# Patient Record
Sex: FEMALE | Race: WHITE | NOT HISPANIC OR LATINO | Employment: OTHER | URBAN - METROPOLITAN AREA
[De-identification: names, ages, dates, MRNs, and addresses within clinical notes are randomized per-mention and may not be internally consistent; named-entity substitution may affect disease eponyms.]

---

## 2017-06-06 ENCOUNTER — OUTPATIENT INFUSION SERVICES (OUTPATIENT)
Dept: ONCOLOGY | Facility: MEDICAL CENTER | Age: 72
End: 2017-06-06
Attending: FAMILY MEDICINE
Payer: MEDICARE

## 2017-06-06 VITALS
OXYGEN SATURATION: 92 % | WEIGHT: 143.3 LBS | BODY MASS INDEX: 23.03 KG/M2 | HEIGHT: 66 IN | HEART RATE: 75 BPM | RESPIRATION RATE: 18 BRPM | TEMPERATURE: 98.8 F | SYSTOLIC BLOOD PRESSURE: 98 MMHG | DIASTOLIC BLOOD PRESSURE: 53 MMHG

## 2017-06-06 LAB
CA-I BLD ISE-SCNC: 1.16 MMOL/L (ref 1.1–1.3)
CREAT BLD-MCNC: 0.9 MG/DL (ref 0.5–1.4)

## 2017-06-06 PROCEDURE — 700111 HCHG RX REV CODE 636 W/ 250 OVERRIDE (IP): Performed by: FAMILY MEDICINE

## 2017-06-06 PROCEDURE — 82565 ASSAY OF CREATININE: CPT

## 2017-06-06 PROCEDURE — 82330 ASSAY OF CALCIUM: CPT

## 2017-06-06 PROCEDURE — 96401 CHEMO ANTI-NEOPL SQ/IM: CPT

## 2017-06-06 RX ADMIN — DENOSUMAB 60 MG: 60 INJECTION SUBCUTANEOUS at 13:52

## 2017-06-06 ASSESSMENT — PAIN SCALES - GENERAL: PAINLEVEL: NO PAIN

## 2017-06-06 NOTE — PROGRESS NOTES
Pharmacy Note:    Ca = 1.16 mmol/L  SCr = 0.9 mg/dL, est CrCl ~60 mL/min  Last Dose 12/7/16  Okay to receive Prolia today.    Sylwia Lyons, PharmD

## 2017-06-06 NOTE — Clinical Note
Infusion Services   [address] [place] [address]  [place] [address]  Phone: [phone]  Fax: [phone]              Dear Dr. Thomson,    Your patient, Yasmin Zhong (: 1945), was scheduled at [facility].  Yasmin's encounter diagnosis is:  No diagnosis found.  She arrived for her appointment, and  the scheduled treatment was   given. These medications were administered to the patient: We administered denosumab..  Yasmin tolerated treatment.. In addition, the following labs were drawn    Recent Results (from the past 24 hour(s))   ISTAT CREATININE    Collection Time: 17  1:45 PM   Result Value Ref Range    Istat Creatinine 0.9 0.5 - 1.4 mg/dL   ISTAT IONIZED CA    Collection Time: 17  1:45 PM   Result Value Ref Range    Istat Ionized Calcium 1.16 1.10 - 1.30 mmol/L            Her next appointment is rescheduled for Visit date not found. 2017    For more information, you may review the nurse's progress notes in chart review under the notes section.       Sincerely,  Erin Gramajo R.N.

## 2017-07-20 ENCOUNTER — TELEPHONE (OUTPATIENT)
Dept: MEDICAL GROUP | Facility: PHYSICIAN GROUP | Age: 72
End: 2017-07-20

## 2017-07-20 NOTE — TELEPHONE ENCOUNTER
Left message for patient to call back regarding pre-visit planning. Please transfer call to Lisette at [phone] re New Pt Appointment

## 2017-07-20 NOTE — TELEPHONE ENCOUNTER
NEW PATIENT VISIT PRE-VISIT PLANNING    1.  [employer] Patient is checked in Patient Demographics? YES    2.  Immunizations were [facility] in [facility] using Web[facility]?: Yes       •  Web Iz Recommendations: TDAP    3.  Is this appointment scheduled as a Hospital Follow-Up? No    4.  Patient is due for the following Health Maintenance Topics:   Health Maintenance Due   Topic Date Due   • Annual Wellness Visit  1945   • IMM DTaP/Tdap/Td Vaccine (1 - Tdap) 11/23/1965   • PAP SMEAR  11/23/1967   • COLONOSCOPY  11/23/1996   • IMM ZOSTER VACCINE  11/23/2006   • IMM PNEUMOCOCCAL 65+ (ADULT) LOW/MEDIUM RISK SERIES (2 of 2 - PCV13) 12/16/2014   • MAMMOGRAM  07/01/2017       - Patient declines Immunizations: TDAP     5.  Reviewed/Updated the following with patient:       •   Preferred Pharmacy? YES       •   Preferred Lab? YES       •   Medications? YES. Was Abstract Encounter opened and chart updated? YES       •   Social History? YES. Was Abstract Encounter opened and chart updated? YES       •   Family History? YES. Was Abstract Encounter opened and chart updated? YES    6.  Updated Care Team?       •   DME Company (gait device, O2, CPAP, etc.) NO       •   Other Specialists (eye doctor, derm, GYN, cardiology, endo, etc): NO    7.  Patient was informed to arrive 15 min prior to their scheduled appointment and bring in their medication bottles.

## 2017-07-21 ENCOUNTER — OFFICE VISIT (OUTPATIENT)
Dept: MEDICAL GROUP | Facility: PHYSICIAN GROUP | Age: 72
End: 2017-07-21
Payer: MEDICARE

## 2017-07-21 VITALS
BODY MASS INDEX: 23.99 KG/M2 | WEIGHT: 143.96 LBS | RESPIRATION RATE: 16 BRPM | DIASTOLIC BLOOD PRESSURE: 58 MMHG | SYSTOLIC BLOOD PRESSURE: 94 MMHG | HEART RATE: 72 BPM | TEMPERATURE: 98.2 F | OXYGEN SATURATION: 96 % | HEIGHT: 65 IN

## 2017-07-21 DIAGNOSIS — F33.41 RECURRENT MAJOR DEPRESSIVE DISORDER, IN PARTIAL REMISSION (HCC): ICD-10-CM

## 2017-07-21 DIAGNOSIS — Z13.1 SCREENING FOR DIABETES MELLITUS: ICD-10-CM

## 2017-07-21 DIAGNOSIS — J84.10 PULMONARY FIBROSIS (HCC): ICD-10-CM

## 2017-07-21 DIAGNOSIS — F51.01 PRIMARY INSOMNIA: ICD-10-CM

## 2017-07-21 DIAGNOSIS — I36.1 NON-RHEUMATIC TRICUSPID VALVE INSUFFICIENCY: ICD-10-CM

## 2017-07-21 DIAGNOSIS — E78.00 PURE HYPERCHOLESTEROLEMIA: ICD-10-CM

## 2017-07-21 DIAGNOSIS — H35.30 MACULAR DEGENERATION: ICD-10-CM

## 2017-07-21 PROCEDURE — 99203 OFFICE O/P NEW LOW 30 MIN: CPT | Performed by: FAMILY MEDICINE

## 2017-07-21 RX ORDER — CITALOPRAM HYDROBROMIDE 10 MG/1
1 TABLET ORAL DAILY
COMMUNITY
Start: 2017-05-02 | End: 2017-10-04 | Stop reason: SDUPTHER

## 2017-07-21 RX ORDER — ATORVASTATIN CALCIUM 20 MG/1
1 TABLET, FILM COATED ORAL DAILY
COMMUNITY
Start: 2017-06-01 | End: 2017-11-21 | Stop reason: SDUPTHER

## 2017-07-21 ASSESSMENT — PATIENT HEALTH QUESTIONNAIRE - PHQ9: CLINICAL INTERPRETATION OF PHQ2 SCORE: 0

## 2017-07-21 NOTE — ASSESSMENT & PLAN NOTE
Patient requests referral to ophthalmologist. She tells me that she was told she had the beginnings of macular degeneration in the past. Denies vision changes, blurry vision or vision loss.

## 2017-07-21 NOTE — PROGRESS NOTES
Yasmin Zhong is a 71 y.o. female here to establish care and discuss macular degeneration, lab request.    HPI:  Yasmin is a pleasant 71-year-old female her to establish. Her previous PCP was Dr. Krishnamurthy. She is a retired  and worked for [employer].    Macular degeneration  Patient requests referral to ophthalmologist. She tells me that she was told she had the beginnings of macular degeneration in the past. Denies vision changes, blurry vision or vision loss.    Pulmonary fibrosis  Patient had a prolonged hospitalization in 2014. She was intubated and later required a tracheostomy tube for respiratory failure. She was found to have pulmonary fibrosis. Patient tells me that she did go through alcohol withdrawal while in the hospital. Now, she rarely drinks. She has not seen a pulmonologist since, but did recently have a normal pulmonary function test in [place] as part of a  study.    Primary insomnia  Patient has difficulty with sleeping. Primarily mind racing. Difficulty falling asleep and staying asleep. Patient uses cell phone or watches TV before bed. Drinks beverages with caffeine into the afternoon. Takes Ambien at night.    Recurrent major depressive disorder, in partial remission (CMS-[facility])  Mood improved with current medication and therapy. She currently takes buspirone 10mg three times daily, citalopram 10mg daily and quetiapine 50mg at night. She has been stable on this regimen for many years. Taking medications as prescribed without side effects. Patient denies SI/HI.    Pure hypercholesterolemia  Doing well. Taking medications as prescribed. No myalgias.    Current medicines (including changes today)  Current Outpatient Prescriptions   Medication Sig Dispense Refill   • atorvastatin (LIPITOR) 20 MG Tab Take 1 Tab by mouth every day.     • citalopram (CELEXA) 10 MG tablet Take 1 Tab by mouth every day.     • Biotin 1 MG Cap Take  by mouth.     • vitamin e  (VITAMIN E) 400 UNIT Cap Take 400 Units by mouth every day.     • Prenatal Vit-Fe Fumarate-FA (PRENATAL VITAMIN PO) Take  by mouth.     • Magnesium 100 MG CAPS Take  by mouth.     • oyster shell calcium/vitamin D 250-125 MG-UNIT TABS tablet Take 1 Tab by mouth 2 Times a Day. 30 Tab    • busPIRone (BUSPAR) 10 MG TABS Take 10 mg by mouth 3 times a day.     • quetiapine (SEROQUEL) 50 MG tablet Take 50 mg by mouth every bedtime.     • zolpidem (AMBIEN) 10 MG TABS Take 10 mg by mouth every bedtime. Scheduled  Indications: Trouble Sleeping       No current facility-administered medications for this visit.     She  has a past medical history of Hyperlipidemia; Peripheral neuropathy; Hypertension; Depression; Anxiety; Pulmonary fibrosis (CMS-HCC); Insomnia; and History of respiratory failure.  She  has past surgical history that includes other and hip hemiarthroplasty (2015).  Social History   Substance Use Topics   • Smoking status: Former Smoker -- 0.25 packs/day for 20 years     Types: Cigarettes     Quit date: 1985   • Smokeless tobacco: Never Used   • Alcohol Use: 0.0 oz/week      Comment: Rare     Retired .   Single. No children. 1 dog.  Lives in [place].    Family History   Problem Relation Age of Onset   • Cancer Mother      Bladder cancer, hx. of smoking   • Diabetes Mother    • Heart Attack Father    • Cancer Maternal Uncle    • Diabetes Maternal Uncle    • Diabetes Maternal Aunt      Family Status   Relation Status Death Age   • Mother  82     Bladder CA   • Father  89     heart attack   • Maternal Uncle  50's     Lung CA     ROS  Constitutional: Negative for fever, chills and malaise/fatigue.   HENT: Negative for congestion.    Eyes: Negative for pain.   Respiratory: Negative for cough and shortness of breath.    Cardiovascular: Negative for leg swelling.   Gastrointestinal: Negative for nausea, vomiting, abdominal pain and diarrhea.   Genitourinary: Negative for  "dysuria and hematuria.   Skin: Negative for rash.   Neurological: Negative for dizziness, focal weakness and headaches.   Endo/Heme/Allergies: Does not bruise/bleed easily.   Psychiatric/Behavioral: See HPI.     Objective:     Physical Exam:  Blood pressure 94/58, pulse 72, temperature 36.8 °C (98.2 °F), resp. rate 16, height 1.638 m (5' 4.5\"), weight 65.3 kg (143 lb 15.4 oz), SpO2 96 %. Body mass index is 24.34 kg/(m^2).  Constitutional: Alert, no distress.  Skin: Warm, dry, good turgor, no rashes in visible areas.  Eye: Equal, round and reactive, conjunctiva clear, lids normal.  ENMT: TM's clear bilaterally, lips without lesions, good dentition, oropharynx clear.  Neck: Trachea midline, no masses, no thyromegaly. No cervical or supraclavicular lymphadenopathy.  Respiratory: Unlabored respiratory effort, lungs clear to auscultation, no wheezes, no ronchi.  Cardiovascular: Normal S1, S2, +soft murmur, no edema.  Abdomen: Soft, non-tender, no masses, no hepatosplenomegaly.  Psych: Alert and oriented x3, normal affect and mood.    Assessment and Plan:     1. Macular degeneration  Per patient history. No new vision changes. Refer to ophthalmology.  - REFERRAL TO OPHTHALMOLOGY    2. Pulmonary fibrosis (CMS-HCC)  Noted during patient's 2014 extended hospitalization. She is asymptomatic. Recently had a pulmonary function test that she reports was normal. We'll continue to monitor and refer to pulmonology if she develops symptoms.    3. Primary insomnia  Chronic issue for patient. Discussed benefits, risks and alternatives to medication. Emphasized importance of maintaining good sleep hygiene including: no caffeine after 3pm, no napping, using bedroom only for sleep or sex, no TV or phones before bed. Doing well on Ambien. Continue to monitor.    4. Recurrent major depressive disorder, in partial remission (CMS-HCC)  Patient is feeling well on current medications. She is taking a small dose of antipsychotic, but has been " stable on this regimen for several years. Will continue. Denies any suicidal or homicidal ideation. Emphasized importance of healthy diet and exercise. Discussed that should the patient have any symptoms they should call suicide prevention hotline or report to the emergency room immediately.    5. Pure hypercholesterolemia  Well controlled. Labs as indicated. Continue statin medication. Continue to monitor.  - LIPID PROFILE; Future    6. Non-rheumatic tricuspid valve insufficiency  Murmur noted on exam. Reviewed 2014 echocardiogram that shows mild tricuspid regurgitation.    7. Screening for diabetes mellitus  Fasting glucose ordered.  - COMP METABOLIC PANEL; Future    Records requested from previous PCP.  Followup: Return in about 4 months (around 11/21/2017) for f/u lab results and med refills, short.         PLEASE NOTE: This dictation was created using voice recognition software. I have made every reasonable attempt to correct obvious errors, but I expect that there are errors of grammar and possibly content that I did not discover before finalizing the note.

## 2017-07-21 NOTE — Clinical Note
[facility]  Katherine Burton M.D.  [address]  [place] [address]  Fax: [phone]   Authorization for Release/Disclosure of   Protected Health Information   Name: KELLY ZHONG : 1945 SSN: [id number]   Address: [address] [place] [address] [address]  [facility] [address] Phone:    [phone] (home)    I authorize the entity listed below to release/disclose the PHI below to:   [facility]/Katherine Burton M.D. and Katherine Burton M.D.   Provider or Entity Name:  Dr. Shade Kebede   Address   [facility], [place], [place]  Phone:      Fax:     Reason for request: continuity of care   Information to be released:    [  ] LAST COLONOSCOPY,  including any PATH REPORT and follow-up  [  ] LAST FIT/COLOGUARD RESULT [  ] LAST DEXA  [  ] LAST MAMMOGRAM  [  ] LAST PAP  [  ] LAST LABS [  ] RETINA EXAM REPORT  [  ] IMMUNIZATION RECORDS  [X  ] Release all info      [  ] Check here and initial the line next to each item to release ALL health information INCLUDING  _____ Care and treatment for drug and / or alcohol abuse  _____ HIV testing, infection status, or AIDS  _____ Genetic Testing    DATES OF SERVICE OR TIME PERIOD TO BE DISCLOSED: _____________  I understand and acknowledge that:  * This Authorization may be revoked at any time by you in writing, except if your health information has already been used or disclosed.  * Your health information that will be used or disclosed as a result of you signing this authorization could be re-disclosed by the recipient. If this occurs, your re-disclosed health information may no longer be protected by State or Federal laws.  * You may refuse to sign this Authorization. Your refusal will not affect your ability to obtain treatment.  * This Authorization becomes effective upon signing and will  on (date) __________.      If no date is indicated, this Authorization will  one (1) year from the signature date.    Name: Kelly Zhong    Signature:   Date:          7/21/2017       PLEASE FAX REQUESTED RECORDS BACK TO: [phone]

## 2017-07-21 NOTE — MR AVS SNAPSHOT
"        Yasmin Zhong   2017 8:20 AM   Office Visit   MRN: [id number]    Department:  [facility]   Dept Phone:  [phone]    Description:  Female : 1945   Provider:  Katherine Burton M.D.           Reason for Visit     Referral Needed mammogram      Allergies as of 2017     No Known Allergies      You were diagnosed with     Macular degeneration   [025107]       Pulmonary fibrosis (CMS-[facility])   [362598]       Primary insomnia   [977746]       Recurrent major depressive disorder, in partial remission (CMS-[facility])   [8409374]       Non-rheumatic tricuspid valve insufficiency   [148254]       Pure hypercholesterolemia   [272.0.ICD-9-CM]       Screening for diabetes mellitus   [V77.1.ICD-9-CM]         Vital Signs     Blood Pressure Pulse Temperature Respirations Height Weight    94/58 mmHg 72 36.8 °C (98.2 °F) 16 1.638 m (5' 4.5\") 65.3 kg (143 lb 15.4 oz)    Body Mass Index Oxygen Saturation Smoking Status             24.34 kg/m2 96% Former Smoker         Basic Information     Date Of Birth Sex Race Ethnicity Preferred Language    1945 Female White Non- English      Your appointments     2017  1:00 PM   Established Patient with Katherine Burton M.D.   [facility] (--)    [address]  [address]  [place] [id number] [phone]           You will be receiving a confirmation call a few days before your appointment from our automated call confirmation system.            Dec 06, 2017  2:00 PM   Est Injection with INFUSION QUICK INJECT   Infusion Services ([facility])    [address] [facility] [address]  [place] [id number] [phone]              Problem List              ICD-10-CM Priority Class Noted - Resolved    Pulmonary fibrosis (CMS-HCC) J84.10   3/11/2015 - Present    Primary insomnia F51.01   2015 - Present    DDD (degenerative disc disease), lumbar M51.36   8/3/2016 - Present    Macular degeneration H35.30   2017 - Present    Recurrent major " depressive disorder, in partial remission (CMS-[facility]) F33.41   7/21/2017 - Present    Pure hypercholesterolemia E78.00   7/21/2017 - Present    Non-rheumatic tricuspid valve insufficiency I36.1   7/21/2017 - Present      Health Maintenance        Date Due Completion Dates    IMM DTaP/Tdap/Td Vaccine (1 - Tdap) 11/23/1965 ---    COLONOSCOPY 11/23/1996 ---    IMM ZOSTER VACCINE 11/23/2006 ---    IMM PNEUMOCOCCAL 65+ (ADULT) LOW/MEDIUM RISK SERIES (2 of 2 - PCV13) 12/16/2014 12/16/2013    MAMMOGRAM 7/1/2017 7/1/2016, 6/19/2015    IMM INFLUENZA (1) 9/1/2017 2/19/2015, 10/1/2013    BONE DENSITY 6/19/2020 6/19/2015            Current Immunizations     Influenza TIV (IM) 10/1/2013    Influenza Vaccine Adult HD 2/19/2015  8:26 AM    Pneumococcal polysaccharide vaccine (PPSV-23) 12/16/2013 11:04 AM      Below and/or attached are the medications your provider expects you to take. Review all of your home medications and newly ordered medications with your provider and/or pharmacist. Follow medication instructions as directed by your provider and/or pharmacist. Please keep your medication list with you and share with your provider. Update the information when medications are discontinued, doses are changed, or new medications (including over-the-counter products) are added; and carry medication information at all times in the event of emergency situations     Allergies:  No Known Allergies          Medications  Valid as of: July 21, 2017 -  8:47 AM    Generic Name Brand Name Tablet Size Instructions for use    Atorvastatin Calcium (Tab) LIPITOR 20 MG Take 1 Tab by mouth every day.        Biotin (Cap) Biotin 1 MG Take  by mouth.        BusPIRone HCl (Tab) BUSPAR 10 MG Take 10 mg by mouth 3 times a day.        Calcium Carb-Cholecalciferol (Tab) oyster shell calcium/vitamin D 250-125 MG-UNIT Take 1 Tab by mouth 2 Times a Day.        Citalopram Hydrobromide (Tab) CELEXA 10 MG Take 1 Tab by mouth every day.        Magnesium (Cap)  Magnesium 100 MG Take  by mouth.        Prenatal Vit-Fe Fumarate-FA   Take  by mouth.        QUEtiapine Fumarate (Tab) SEROQUEL 50 MG Take 50 mg by mouth every bedtime.        Vitamin E (Cap) VITAMIN E 400 UNIT Take 400 Units by mouth every day.        Zolpidem Tartrate (Tab) AMBIEN 10 MG Take 10 mg by mouth every bedtime. Scheduled  Indications: Trouble Sleeping        .                 Medicines prescribed today were sent to:     [facility] [address] [place] [address] [address] [id number] [facility] [place] [address]    Phone: [phone] Fax: [phone]    Open 24 Hours?: No      Medication refill instructions:       If your prescription bottle indicates you have medication refills left, it is not necessary to call your provider’s office. Please contact your pharmacy and they will refill your medication.    If your prescription bottle indicates you do not have any refills left, you may request refills at any time through one of the following ways: The online LiveRamp system (except Urgent Care), by calling your provider’s office, or by asking your pharmacy to contact your provider’s office with a refill request. Medication refills are processed only during regular business hours and may not be available until the next business day. Your provider may request additional information or to have a follow-up visit with you prior to refilling your medication.   *Please Note: Medication refills are assigned a new Rx number when refilled electronically. Your pharmacy may indicate that no refills were authorized even though a new prescription for the same medication is available at the pharmacy. Please request the medicine by name with the pharmacy before contacting your provider for a refill.        Your To Do List     Future Labs/Procedures Complete By Expires    COMP METABOLIC PANEL  As directed 7/22/2018    LIPID PROFILE  As directed 7/22/2018      Referral     A referral request has been sent to our  patient care coordination department. Please allow 3-5 business days for us to process this request and contact you either by phone or mail. If you do not hear from us by the 5th business day, please call us at [phone].           GeoMet[employer] Access Code: Activation code not generated  Current [employer]Met[employer] Status: Active

## 2017-07-21 NOTE — ASSESSMENT & PLAN NOTE
Patient had a prolonged hospitalization in 2014. She was intubated and later required a tracheostomy tube for respiratory failure. She was found to have pulmonary fibrosis. Patient tells me that she did go through alcohol withdrawal while in the hospital. Now, she rarely drinks. She has not seen a pulmonologist since, but did recently have a normal pulmonary function test in [place] as part of a  study.

## 2017-07-21 NOTE — ASSESSMENT & PLAN NOTE
Patient has difficulty with sleeping. Primarily mind racing. Difficulty falling asleep and staying asleep. Patient uses cell phone or watches TV before bed. Drinks beverages with caffeine into the afternoon. Takes Ambien at night.

## 2017-07-21 NOTE — ASSESSMENT & PLAN NOTE
Mood improved with current medication and therapy. She currently takes buspirone 10mg three times daily, citalopram 10mg daily and quetiapine 50mg at night. She has been stable on this regimen for many years. Taking medications as prescribed without side effects. Patient denies SI/HI.

## 2017-07-27 LAB
ALBUMIN SERPL-MCNC: 4.6 G/DL (ref 3.5–4.8)
ALBUMIN/GLOB SERPL: 2.1 {RATIO} (ref 1.2–2.2)
ALP SERPL-CCNC: 54 IU/L (ref 39–117)
ALT SERPL-CCNC: 15 IU/L (ref 0–32)
AST SERPL-CCNC: 16 IU/L (ref 0–40)
BILIRUB SERPL-MCNC: 0.6 MG/DL (ref 0–1.2)
BUN SERPL-MCNC: 19 MG/DL (ref 8–27)
BUN/CREAT SERPL: 20 (ref 12–28)
CALCIUM SERPL-MCNC: 9.6 MG/DL (ref 8.7–10.3)
CHLORIDE SERPL-SCNC: 104 MMOL/L (ref 96–106)
CHOLEST SERPL-MCNC: 150 MG/DL (ref 100–199)
CO2 SERPL-SCNC: 23 MMOL/L (ref 18–29)
COMMENT 011824: ABNORMAL
CREAT SERPL-MCNC: 0.94 MG/DL (ref 0.57–1)
GLOBULIN SER CALC-MCNC: 2.2 G/DL (ref 1.5–4.5)
GLUCOSE SERPL-MCNC: 89 MG/DL (ref 65–99)
HDLC SERPL-MCNC: 47 MG/DL
LDLC SERPL CALC-MCNC: 60 MG/DL (ref 0–99)
POTASSIUM SERPL-SCNC: 4 MMOL/L (ref 3.5–5.2)
PROT SERPL-MCNC: 6.8 G/DL (ref 6–8.5)
SODIUM SERPL-SCNC: 141 MMOL/L (ref 134–144)
TRIGL SERPL-MCNC: 213 MG/DL (ref 0–149)
VLDLC SERPL CALC-MCNC: 43 MG/DL (ref 5–40)

## 2017-08-25 ENCOUNTER — HOSPITAL ENCOUNTER (OUTPATIENT)
Dept: RADIOLOGY | Facility: MEDICAL CENTER | Age: 72
End: 2017-08-25
Attending: FAMILY MEDICINE
Payer: MEDICARE

## 2017-08-25 DIAGNOSIS — Z12.31 VISIT FOR SCREENING MAMMOGRAM: ICD-10-CM

## 2017-08-25 PROCEDURE — 77063 BREAST TOMOSYNTHESIS BI: CPT

## 2017-09-07 RX ORDER — ZOLPIDEM TARTRATE 10 MG/1
10 TABLET ORAL NIGHTLY PRN
Qty: 30 TAB | Refills: 3 | Status: SHIPPED
Start: 2017-09-07 | End: 2017-11-22 | Stop reason: SDUPTHER

## 2017-10-04 RX ORDER — CITALOPRAM HYDROBROMIDE 10 MG/1
10 TABLET ORAL DAILY
Qty: 30 TAB | Refills: 11 | Status: SHIPPED | OUTPATIENT
Start: 2017-10-04 | End: 2018-02-23 | Stop reason: SDUPTHER

## 2017-11-21 RX ORDER — QUETIAPINE FUMARATE 50 MG/1
50 TABLET, FILM COATED ORAL
Qty: 30 TAB | Refills: 11 | Status: SHIPPED | OUTPATIENT
Start: 2017-11-21 | End: 2018-10-31 | Stop reason: SDUPTHER

## 2017-11-21 RX ORDER — ATORVASTATIN CALCIUM 20 MG/1
20 TABLET, FILM COATED ORAL DAILY
Qty: 30 TAB | Refills: 11 | Status: SHIPPED | OUTPATIENT
Start: 2017-11-21 | End: 2018-09-07 | Stop reason: SDUPTHER

## 2017-11-22 ENCOUNTER — OFFICE VISIT (OUTPATIENT)
Dept: MEDICAL GROUP | Facility: PHYSICIAN GROUP | Age: 72
End: 2017-11-22
Payer: MEDICARE

## 2017-11-22 VITALS
BODY MASS INDEX: 23.82 KG/M2 | SYSTOLIC BLOOD PRESSURE: 100 MMHG | OXYGEN SATURATION: 95 % | HEIGHT: 65 IN | RESPIRATION RATE: 16 BRPM | WEIGHT: 143 LBS | TEMPERATURE: 99 F | HEART RATE: 70 BPM | DIASTOLIC BLOOD PRESSURE: 60 MMHG

## 2017-11-22 DIAGNOSIS — Z23 NEED FOR VACCINATION: ICD-10-CM

## 2017-11-22 DIAGNOSIS — N32.81 OVERACTIVE BLADDER: ICD-10-CM

## 2017-11-22 DIAGNOSIS — F51.01 PRIMARY INSOMNIA: ICD-10-CM

## 2017-11-22 DIAGNOSIS — R13.14 PHARYNGOESOPHAGEAL DYSPHAGIA: ICD-10-CM

## 2017-11-22 PROCEDURE — G0008 ADMIN INFLUENZA VIRUS VAC: HCPCS | Performed by: FAMILY MEDICINE

## 2017-11-22 PROCEDURE — 99214 OFFICE O/P EST MOD 30 MIN: CPT | Mod: 25 | Performed by: FAMILY MEDICINE

## 2017-11-22 PROCEDURE — G0009 ADMIN PNEUMOCOCCAL VACCINE: HCPCS | Performed by: FAMILY MEDICINE

## 2017-11-22 PROCEDURE — 90662 IIV NO PRSV INCREASED AG IM: CPT | Performed by: FAMILY MEDICINE

## 2017-11-22 PROCEDURE — 90670 PCV13 VACCINE IM: CPT | Performed by: FAMILY MEDICINE

## 2017-11-22 RX ORDER — OXYBUTYNIN CHLORIDE 10 MG/1
10 TABLET, EXTENDED RELEASE ORAL DAILY
Qty: 30 TAB | Refills: 2 | Status: SHIPPED | OUTPATIENT
Start: 2017-11-22 | End: 2020-08-09

## 2017-11-22 RX ORDER — ZOLPIDEM TARTRATE 10 MG/1
10 TABLET ORAL NIGHTLY PRN
Qty: 33 TAB | Refills: 5 | Status: SHIPPED
Start: 2017-11-22 | End: 2017-12-26 | Stop reason: SDUPTHER

## 2017-11-22 NOTE — PROGRESS NOTES
Subjective:   Yasmin Zhong is a 71 y.o. female here today for follow-up insomnia and dysphagia.    Pharyngoesophageal dysphagia  This is a new problem. Patient reports that she occassionally has difficulty swallowing. No regurgitation of food. No pain with swallowing. No abdominal pain or blood in stool. She tells me that many of her friends are also having similar problems and she wonders if it is related to her age.     Overactive bladder  This is a new problem. Patient reports that she will feel a sense of urgency to use the restroom when she is out in public. It was bothersome to her when she was traveling in [place]. No leakage when laughing, sneezing or coughing. She is interested in trying a medication.    Primary insomnia  Patient has difficulty with sleeping. Primarily mind racing. Difficulty falling asleep and staying asleep. Patient uses cell phone or watches TV before bed. Drinks beverages with caffeine into the afternoon. Takes Ambien at night.     Current medicines (including changes today)  Current Outpatient Prescriptions   Medication Sig Dispense Refill   • oxybutynin SR (DITROPAN-XL) 10 MG CR tablet Take 1 Tab by mouth every day. 30 Tab 2   • zolpidem (AMBIEN) 10 MG Tab Take 1 Tab by mouth at bedtime as needed for Sleep. 33 Tab 5   • atorvastatin (LIPITOR) 20 MG Tab Take 1 Tab by mouth every day. 30 Tab 11   • quetiapine (SEROQUEL) 50 MG tablet Take 1 Tab by mouth every bedtime. 30 Tab 11   • citalopram (CELEXA) 10 MG tablet Take 1 Tab by mouth every day. 30 Tab 11   • Biotin 1 MG Cap Take  by mouth.     • vitamin e (VITAMIN E) 400 UNIT Cap Take 400 Units by mouth every day.     • Prenatal Vit-Fe Fumarate-FA (PRENATAL VITAMIN PO) Take  by mouth.     • Magnesium 100 MG CAPS Take  by mouth.     • oyster shell calcium/vitamin D 250-125 MG-UNIT TABS tablet Take 1 Tab by mouth 2 Times a Day. 30 Tab    • busPIRone (BUSPAR) 10 MG TABS Take 10 mg by mouth 3 times a day.       No current  "facility-administered medications for this visit.      She  has a past medical history of Anxiety; Depression; History of respiratory failure; Hyperlipidemia; Hypertension; Insomnia; Peripheral neuropathy (CMS-HCC); and Pulmonary fibrosis (CMS-[facility]).    ROS   See HPI. No chest pain, no shortness of breath, no abdominal pain.     Objective:     Physical Exam:  Blood pressure 100/60, pulse 70, temperature 37.2 °C (99 °F), resp. rate 16, height 1.638 m (5' 4.5\"), weight 64.9 kg (143 lb), SpO2 95 %. Body mass index is 24.17 kg/m².   Constitutional: Alert, no distress.  Skin: Warm, dry, good turgor, no rashes in visible areas.  Eye: Equal, round and reactive, conjunctiva clear, lids normal.  ENMT: Lips without lesions, good dentition, oropharynx clear.  Neck: Trachea midline, no masses, no thyromegaly.  Respiratory: Unlabored respiratory effort, lungs clear to auscultation, no wheezes, no ronchi.  Cardiovascular: Normal S1, S2, no murmur, no edema.  Abdomen: Soft, non-tender, no masses, no hepatosplenomegaly.  Psych: Alert and oriented x3, normal affect and mood.    Assessment and Plan:     1. Pharyngoesophageal dysphagia  This is a new problem. No red flags and reassuring exam. Patient is not interested in work-up at this time. Will continue to monitor.    2. Overactive bladder  This is a new problem. Trial of oxybutynin. Monitor.  - oxybutynin SR (DITROPAN-XL) 10 MG CR tablet; Take 1 Tab by mouth every day.  Dispense: 30 Tab; Refill: 2    3. Primary insomnia  Chronic issue for patient. Discussed benefits, risks and alternatives to medication. Emphasized importance of maintaining good sleep hygiene including: no caffeine after 3pm, no napping, using bedroom only for sleep or sex, no TV or phones before bed. Prescription for Ambien filled. Continue to monitor.  - zolpidem (AMBIEN) 10 MG Tab; Take 1 Tab by mouth at bedtime as needed for Sleep.  Dispense: 33 Tab; Refill: 5    4. Need for vaccination  Influenza and " Prevnar-13 discussed and administered in office today.  - INFLUENZA VACCINE, HIGH DOSE (65+ ONLY)  - PNEUMOCOCCAL CONJUGATE VACCINE 13-VALENT    Followup: Return in about 6 months (around 5/22/2018) for f/u insomnia, short.         PLEASE NOTE: This dictation was created using voice recognition software. I have made every reasonable attempt to correct obvious errors, but I expect that there are errors of grammar and possibly content that I did not discover before finalizing the note.

## 2017-11-22 NOTE — ASSESSMENT & PLAN NOTE
This is a new problem. Patient reports that she occassionally has difficulty swallowing. No regurgitation of food. No pain with swallowing. No abdominal pain or blood in stool. She tells me that many of her friends are also having similar problems and she wonders if it is related to her age.

## 2017-11-22 NOTE — ASSESSMENT & PLAN NOTE
This is a new problem. Patient reports that she will feel a sense of urgency to use the restroom when she is out in public. It was bothersome to her when she was traveling in [place]. No leakage when laughing, sneezing or coughing. She is interested in trying a medication.

## 2017-12-18 ENCOUNTER — OUTPATIENT INFUSION SERVICES (OUTPATIENT)
Dept: ONCOLOGY | Facility: MEDICAL CENTER | Age: 72
End: 2017-12-18
Attending: FAMILY MEDICINE
Payer: MEDICARE

## 2017-12-18 VITALS
HEART RATE: 78 BPM | DIASTOLIC BLOOD PRESSURE: 53 MMHG | TEMPERATURE: 97.1 F | OXYGEN SATURATION: 93 % | SYSTOLIC BLOOD PRESSURE: 104 MMHG | WEIGHT: 145.94 LBS | BODY MASS INDEX: 24.32 KG/M2 | HEIGHT: 65 IN | RESPIRATION RATE: 18 BRPM

## 2017-12-18 PROCEDURE — 96401 CHEMO ANTI-NEOPL SQ/IM: CPT

## 2017-12-18 PROCEDURE — 82330 ASSAY OF CALCIUM: CPT

## 2017-12-18 PROCEDURE — 700111 HCHG RX REV CODE 636 W/ 250 OVERRIDE (IP): Performed by: FAMILY MEDICINE

## 2017-12-18 PROCEDURE — 82565 ASSAY OF CREATININE: CPT

## 2017-12-18 PROCEDURE — 306780 HCHG STAT FOR TRANSFUSION PER CASE

## 2017-12-18 RX ADMIN — DENOSUMAB 60 MG: 60 INJECTION SUBCUTANEOUS at 14:29

## 2017-12-18 ASSESSMENT — PAIN SCALES - GENERAL: PAINLEVEL: NO PAIN

## 2017-12-18 NOTE — PROGRESS NOTES
Pharmacy Note:    iCa = 1.23mmol/L  SCr = 0.9mg/dL, est CrCl ~ 60mL/min    Last Dose 6/6/17  Okay to receive Prolia today.    Martha AbrahamD.

## 2017-12-18 NOTE — PROGRESS NOTES
Patient arrived to Infusion for Prolia injection; reports no issues or concerns with previous Prolia injections.  No recent or upcoming dental surgeries or procedures known at this time.  Lab drawn with 23G butterfly from R AC, reviewed results. Within parameters. Prolia injection given to R back arm, tolerated well. Pt to f/u with new PCP prior to next appt. Pt discharged home in great spirits under no apparent distress.

## 2017-12-19 LAB
CA-I BLD ISE-SCNC: 1.23 MMOL/L (ref 1.1–1.3)
CREAT BLD-MCNC: 0.9 MG/DL (ref 0.5–1.4)

## 2017-12-26 ENCOUNTER — PATIENT MESSAGE (OUTPATIENT)
Dept: MEDICAL GROUP | Facility: PHYSICIAN GROUP | Age: 72
End: 2017-12-26

## 2017-12-26 DIAGNOSIS — F51.01 PRIMARY INSOMNIA: ICD-10-CM

## 2017-12-26 RX ORDER — ZOLPIDEM TARTRATE 10 MG/1
10-20 TABLET ORAL NIGHTLY PRN
Qty: 33 TAB | Refills: 5 | Status: SHIPPED
Start: 2017-12-26 | End: 2018-03-27 | Stop reason: SDUPTHER

## 2018-01-23 ENCOUNTER — OFFICE VISIT (OUTPATIENT)
Dept: MEDICAL GROUP | Facility: PHYSICIAN GROUP | Age: 73
End: 2018-01-23
Payer: MEDICARE

## 2018-01-23 VITALS
HEART RATE: 73 BPM | OXYGEN SATURATION: 95 % | TEMPERATURE: 98.6 F | DIASTOLIC BLOOD PRESSURE: 58 MMHG | HEIGHT: 65 IN | WEIGHT: 143 LBS | SYSTOLIC BLOOD PRESSURE: 112 MMHG | BODY MASS INDEX: 23.82 KG/M2 | RESPIRATION RATE: 14 BRPM

## 2018-01-23 DIAGNOSIS — G60.9 IDIOPATHIC PERIPHERAL NEUROPATHY: ICD-10-CM

## 2018-01-23 DIAGNOSIS — R26.89 BALANCE DISORDER: ICD-10-CM

## 2018-01-23 DIAGNOSIS — J84.10 PULMONARY FIBROSIS (HCC): ICD-10-CM

## 2018-01-23 PROBLEM — G62.9 PERIPHERAL NEUROPATHY: Status: ACTIVE | Noted: 2018-01-23

## 2018-01-23 PROCEDURE — 99214 OFFICE O/P EST MOD 30 MIN: CPT | Performed by: INTERNAL MEDICINE

## 2018-01-23 NOTE — PROGRESS NOTES
Subjective:   Yasmin Zhong is a 72 y.o. female here today for DMV paper fill out     72-year-old female past medical history of peripheral neuropathy, degenerative joint disease, history of alcohol abuse, major depression is requesting today and DMV fill out. She tells me that because if peripheral neuropathy she has trouble to walk more then 200 feet. She has been intubated due to alcohol use and aspiration pneumonia. She has significant problem with balance. She has no pain  She is asking me about pulmonary fibrosis if that is possible to be recovered. She tells me that she has seen Dr. Tao [facility] once. She denies sob, wheezing. She has had recent PFTs at [facility] and were normal, per Dr. Burton note. I was not able to find [facility] outpatient notes. . She denies chest pain, shortness of breath, wheezing. She does not smoke    Current medicines (including changes today)  Current Outpatient Prescriptions   Medication Sig Dispense Refill   • zolpidem (AMBIEN) 10 MG Tab Take 1-2 Tabs by mouth at bedtime as needed for Sleep for up to 30 days. 33 Tab 5   • oxybutynin SR (DITROPAN-XL) 10 MG CR tablet Take 1 Tab by mouth every day. 30 Tab 2   • atorvastatin (LIPITOR) 20 MG Tab Take 1 Tab by mouth every day. 30 Tab 11   • quetiapine (SEROQUEL) 50 MG tablet Take 1 Tab by mouth every bedtime. 30 Tab 11   • citalopram (CELEXA) 10 MG tablet Take 1 Tab by mouth every day. 30 Tab 11   • Biotin 1 MG Cap Take  by mouth.     • vitamin e (VITAMIN E) 400 UNIT Cap Take 400 Units by mouth every day.     • Prenatal Vit-Fe Fumarate-FA (PRENATAL VITAMIN PO) Take  by mouth.     • Magnesium 100 MG CAPS Take  by mouth.     • oyster shell calcium/vitamin D 250-125 MG-UNIT TABS tablet Take 1 Tab by mouth 2 Times a Day. 30 Tab    • busPIRone (BUSPAR) 10 MG TABS Take 10 mg by mouth 3 times a day.       No current facility-administered medications for this visit.      She  has a past medical history of Anxiety; Depression; History of respiratory  failure; Hyperlipidemia; Hypertension; Insomnia; Peripheral neuropathy (CMS-HCC); and Pulmonary fibrosis (CMS-[facility]).    Current Outpatient Prescriptions   Medication Sig Dispense Refill   • zolpidem (AMBIEN) 10 MG Tab Take 1-2 Tabs by mouth at bedtime as needed for Sleep for up to 30 days. 33 Tab 5   • oxybutynin SR (DITROPAN-XL) 10 MG CR tablet Take 1 Tab by mouth every day. 30 Tab 2   • atorvastatin (LIPITOR) 20 MG Tab Take 1 Tab by mouth every day. 30 Tab 11   • quetiapine (SEROQUEL) 50 MG tablet Take 1 Tab by mouth every bedtime. 30 Tab 11   • citalopram (CELEXA) 10 MG tablet Take 1 Tab by mouth every day. 30 Tab 11   • Biotin 1 MG Cap Take  by mouth.     • vitamin e (VITAMIN E) 400 UNIT Cap Take 400 Units by mouth every day.     • Prenatal Vit-Fe Fumarate-FA (PRENATAL VITAMIN PO) Take  by mouth.     • Magnesium 100 MG CAPS Take  by mouth.     • oyster shell calcium/vitamin D 250-125 MG-UNIT TABS tablet Take 1 Tab by mouth 2 Times a Day. 30 Tab    • busPIRone (BUSPAR) 10 MG TABS Take 10 mg by mouth 3 times a day.       No current facility-administered medications for this visit.        Allergies as of 01/23/2018   • (No Known Allergies)       Social History     Social History   • Marital status: Single     Spouse name: N/A   • Number of children: 0   • Years of education: N/A     Occupational History   • Not on file.     Social History Main Topics   • Smoking status: Former Smoker     Packs/day: 0.25     Years: 20.00     Types: Cigarettes     Quit date: 3/11/1985   • Smokeless tobacco: Never Used   • Alcohol use 0.0 oz/week      Comment: Rare   • Drug use: No   • Sexual activity: Not Currently     Other Topics Concern   • Not on file     Social History Narrative   • No narrative on file        Family History   Problem Relation Age of Onset   • Cancer Mother      Bladder cancer, hx. of smoking   • Diabetes Mother    • Heart Attack Father    • Cancer Maternal Uncle    • Diabetes Maternal Uncle    • Diabetes  "Maternal Aunt        Past Surgical History:   Procedure Laterality Date   • HIP HEMIARTHROPLASTY  4/25/2015    Performed by Raymond Lantigua M.D. at SURGERY [facility] ORS   • OTHER      breast reduction       ROS   All systems reviewed are negative except for HPI       Objective:     Blood pressure 112/58, pulse 73, temperature 37 °C (98.6 °F), resp. rate 14, height 1.657 m (5' 5.25\"), weight 64.9 kg (143 lb), SpO2 95 %, not currently breastfeeding. Body mass index is 23.61 kg/m².   Physical Exam:  Constitutional: Alert, no distress.  Skin: Warm, dry, good turgor, no rashes in visible areas.  Eye: Equal, round and reactive, conjunctiva clear, lids normal. She has right eye strabismus   ENMT: Lips without lesions, good dentition, oropharynx clear.  Neck: Trachea midline, no masses, no thyromegaly. No cervical or supraclavicular lymphadenopathy  Respiratory: Unlabored respiratory effort,   Psych: Alert and oriented x3, normal affect and mood.  Neurologic: Alert & oriented x 3   - Language: Normal rhythm and rate, able to repeat the phrase \"No ifs, ands, or buts\"   - Memory, knowledge, attention   - Cranial Nerves: CNII-XII intact. No tongue deviation, Uvula midline, shoulder shrug equal   - Coordination: Finger to nose smooth and intact bilaterally. Heel to shin intact bilaterally. She has difficulties to do small step walking   - Motor: 5/5 in upper extremities and lower extremities   - Sensation: Tactile sensation intact throughout,       Assessment and Plan:   The following treatment plan was discussed    1. Pulmonary fibrosis (CMS-HCC)  Stable., possible just pneumonitis. She is not on oxygen. Consider repeat chest CT, or referral to [facility]. She is doing better.     2. Idiopathic peripheral neuropathy  3. Balance disorder  DMV paper completed. I suspect balance coordination from previous alcohol use, which some cases can cause cerebellar atrophy, due to previous thiamine deficiency.. Advised patient to continue " physical therapy, hoping to improve her symptoms. Follow up with Dr. Burton   - REFERRAL TO PHYSICAL THERAPY Reason for Therapy: Eval/Treat/Report    Total 21 minutes face-to-face time spent with patient, with greater than 50% of the total time discussing patient's issues and symptoms as listed above in assessment and plan, as well as managing coordination of care for future evaluation and treatment.      Followup: Return if symptoms worsen or fail to improve, keep apt as scheduled with Dr. burton , for Short.

## 2018-01-30 ENCOUNTER — PHYSICAL THERAPY (OUTPATIENT)
Dept: PHYSICAL THERAPY | Facility: REHABILITATION | Age: 73
End: 2018-01-30
Attending: INTERNAL MEDICINE
Payer: MEDICARE

## 2018-01-30 DIAGNOSIS — G60.9 IDIOPATHIC PERIPHERAL NEUROPATHY: ICD-10-CM

## 2018-01-30 DIAGNOSIS — R26.89 UNSTABLE BALANCE: ICD-10-CM

## 2018-01-30 PROCEDURE — 97162 PT EVAL MOD COMPLEX 30 MIN: CPT

## 2018-01-30 PROCEDURE — G8978 MOBILITY CURRENT STATUS: HCPCS | Mod: CK

## 2018-01-30 PROCEDURE — 97110 THERAPEUTIC EXERCISES: CPT

## 2018-01-30 PROCEDURE — G8979 MOBILITY GOAL STATUS: HCPCS | Mod: CJ

## 2018-01-30 ASSESSMENT — ACTIVITIES OF DAILY LIVING (ADL): POOR_BALANCE: 1

## 2018-01-30 NOTE — OP THERAPY EVALUATION
Outpatient Physical Therapy  INITIAL EVALUATION    [facility] Physical Therapy [address]  [address]  [place] [address]  Phone:  [phone]  Fax:  [phone]    Date of Evaluation: 01/30/2018    Patient: Yasmin Zhong  YOB: 1945  MRN: [id number]     Referring Provider: SAL[address]A [address]  [place] [address]   Referring Diagnosis Idiopathic peripheral neuropathy [G60.9];Balance disorder [R26.89]     Time Calculation  Start time: 1430  Stop time: 1515 Time Calculation (min): 45 minutes     Physical Therapy Occurrence Codes    Date physical therapy care plan established or reviewed:  1/30/18          Chief Complaint: Loss Of Balance    Visit Diagnoses     ICD-10-CM   1. Unstable balance R26.89   2. Idiopathic peripheral neuropathy G60.9         Subjective:   History of Present Illness:     Mechanism of injury:  73 y/o female reports that although she hasn't fallen, she realizes her balance is impaired  Pain:     Pain Comments::  2/10 feet due to neuropathy  Patient Goals:     Other patient goals:  Improve balance      Past Medical History:   Diagnosis Date   • Anxiety    • Depression    • History of respiratory failure     6-month hospitalization in 2014   • Hyperlipidemia    • Hypertension     denies   • Insomnia    • Peripheral neuropathy (CMS-HCC)    • Pulmonary fibrosis (CMS-HCC)      Past Surgical History:   Procedure Laterality Date   • HIP HEMIARTHROPLASTY  4/25/2015    Performed by Raymond Lantigua M.D. at SURGERY [facility]   • OTHER      breast reduction     Social History   Substance Use Topics   • Smoking status: Former Smoker     Packs/day: 0.25     Years: 20.00     Types: Cigarettes     Quit date: 3/11/1985   • Smokeless tobacco: Never Used   • Alcohol use 0.0 oz/week      Comment: Rare     Family and Occupational History     Social History   • Marital status: Single     Spouse name: N/A   • Number of children: 0   • Years of  "education: N/A       Objective     Static Posture     Comments  ROM grossly WFL. Strength 4/5 although DF 3/5, Abd.3+/5. Sensation present but impaired both feet. Unable to maintain Rhomberg E/C <5\". DGI 12/24= fall risk. 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 Causes minor difficulty with vision.Minor ataxia present  Ambulation     Comments   Ambulates wide base support. Does not want to use assistive device. Occasional off balance when turning        Therapeutic Exercises (CPT 25614):     Total treatment time spent on Therapeutic Exercises: 10 minutes.      1. STANDING: toe/heel raise, 10    2. Hip Abd, 10    3. Ext, 10    4. Marching    5. Mini squats, 10        Assessment, Response and Plan:   Impairments: abnormal gait, abnormal muscle tone, impaired balance and impaired physical strength    Assessment details:  Pt. Is a fall risk. Suggested SPC but she does not wantto use one at this point  Prognosis: fair    Goals:   Short Term Goals:   1) improve DGI by 1-2 points 2) Pt will be able to stand rhomberg E/C 15\" 3)return demonstration HEP 4) Pt will be able to step over 6\" foam with out all  Short term goal time span:  2-4 weeks      Long Term Goals:    1) decrease fall risk by improving DGI score.2) Pt will be able to safely attend community exercise program 3) Pt will stand 30\" E/C feet together 4) independent HEP  Long term goal time span:  4-6 weeks    Plan:   Planned therapy interventions:  Gait Training (CPT 18670), Neuromuscular Re-education (CPT 02434) and Therapeutic Exercise (CPT 18356)  Plan details:  2x4-5 weeks      Functional Limitation G-Codes and Severity Modifiers  PT Functional Assessment Tool Used: DGI  PT Functional Assessment Score: 12  Current: Mobility: Current (): CK   Goal: Mobility: Goal (): CJ     Referring provider co-signature:  I have reviewed this plan of care and my co-signature certifies the need for " services.  Certification Dates:   From 1/30/18    To 4/29/18    Physician Signature: ________________________________ Date: ______________

## 2018-02-01 ENCOUNTER — APPOINTMENT (OUTPATIENT)
Dept: PHYSICAL THERAPY | Facility: REHABILITATION | Age: 73
End: 2018-02-01
Attending: INTERNAL MEDICINE
Payer: MEDICARE

## 2018-02-08 ENCOUNTER — PHYSICAL THERAPY (OUTPATIENT)
Dept: PHYSICAL THERAPY | Facility: REHABILITATION | Age: 73
End: 2018-02-08
Attending: INTERNAL MEDICINE
Payer: MEDICARE

## 2018-02-08 DIAGNOSIS — G60.9 IDIOPATHIC PERIPHERAL NEUROPATHY: ICD-10-CM

## 2018-02-08 DIAGNOSIS — R26.89 UNSTABLE BALANCE: ICD-10-CM

## 2018-02-08 PROCEDURE — 97110 THERAPEUTIC EXERCISES: CPT

## 2018-02-08 NOTE — OP THERAPY DAILY TREATMENT
"  Outpatient Physical Therapy  DAILY TREATMENT     [facility] Physical [address].  [address]  [place] [address]  Phone:  [phone]  Fax:  [phone]    Date: 02/08/2018    Patient: Yasmin Zhong  YOB: 1945  MRN: [id number]     Time Calculation  Start time: 1500  Stop time: 1530 Time Calculation (min): 30 minutes     Chief Complaint: Loss Of Balance    Visit #: 2    SUBJECTIVE:  States compliant HEP    OBJECTIVE:  Current objective measures: single leg stance 2\"L, 3\" R         Therapeutic Exercises (CPT 23018):     Total treatment time spent on Therapeutic Exercises: 30 minutes.      1. Rocker board, A/P, Lat[place]    2. SLS , 10 each leg    3.  braiding, 10x4    4. Shuttle, 5 cords, 45    5. Shuttle with duel task ball toss, 20    6. Toe/heel raise in corner, 10    7. Seated abd with t-band, 15              ASSESSMENT:   Response to treatment: compliant HEP    PLAN/RECOMMENDATIONS:   Plan for treatment: therapy treatment to continue next visit.  Planned interventions for next visit: continue with current treatment.      "

## 2018-02-13 ENCOUNTER — PHYSICAL THERAPY (OUTPATIENT)
Dept: PHYSICAL THERAPY | Facility: REHABILITATION | Age: 73
End: 2018-02-13
Attending: INTERNAL MEDICINE
Payer: MEDICARE

## 2018-02-13 DIAGNOSIS — R26.89 UNSTABLE BALANCE: ICD-10-CM

## 2018-02-13 DIAGNOSIS — G60.9 IDIOPATHIC PERIPHERAL NEUROPATHY: ICD-10-CM

## 2018-02-13 PROCEDURE — 97110 THERAPEUTIC EXERCISES: CPT

## 2018-02-13 PROCEDURE — 97112 NEUROMUSCULAR REEDUCATION: CPT

## 2018-02-13 NOTE — OP THERAPY DAILY TREATMENT
Outpatient Physical Therapy  DAILY TREATMENT     [facility] Physical Therapy [address].  [address]  [place] [address]  Phone:  [phone]  Fax:  [phone]    Date: 02/13/2018    Patient: Yasmin Zhong  YOB: 1945  MRN: [id number]     Time Calculation  Start time: 1400  Stop time: 1435 Time Calculation (min): 35 minutes     Chief Complaint: Loss Of Balance    Visit #: 3    SUBJECTIVE:  No reported falls. Compliant HEP    OBJECTIVE:  Current objective measures: Mildly SOB after eliptical machine, otherwise she was able to perform all exercise. Practiced braiding b/c functionally she reports trouble with balance when turning         Therapeutic Exercises (CPT 68838):     1. Tramp e/o e/c, 20 reps, 5    2. Braiding , contact assist, 7'    3. Duel tqsk walking with ball toss, 50x2 5'    4. Bosu step up for balance, 6'    5. Shuttle 5 cords , 50, 6'    6. Nu step , 3 , 6'              ASSESSMENT:   Response to treatment: slightly less SOB with exercise    PLAN/RECOMMENDATIONS:   Plan for treatment: therapy treatment to continue next visit.  Planned interventions for next visit: gait training (CPT 49620), neuromuscular re-education (CPT 58801) and therapeutic exercise (CPT 09845).

## 2018-02-15 ENCOUNTER — PHYSICAL THERAPY (OUTPATIENT)
Dept: PHYSICAL THERAPY | Facility: REHABILITATION | Age: 73
End: 2018-02-15
Attending: INTERNAL MEDICINE
Payer: MEDICARE

## 2018-02-15 DIAGNOSIS — G60.9 IDIOPATHIC PERIPHERAL NEUROPATHY: ICD-10-CM

## 2018-02-15 DIAGNOSIS — R26.89 UNSTABLE BALANCE: ICD-10-CM

## 2018-02-15 PROCEDURE — 97112 NEUROMUSCULAR REEDUCATION: CPT

## 2018-02-15 PROCEDURE — 97110 THERAPEUTIC EXERCISES: CPT

## 2018-02-16 NOTE — OP THERAPY DAILY TREATMENT
"  Outpatient Physical Therapy  DAILY TREATMENT     [facility] Physical [address]  [address]  [place] [address]  Phone:  [phone]  Fax:  [phone]    Date: 02/15/2018    Patient: Yasmin Zhong  YOB: 1945  MRN: [id number]     Time Calculation  Start time: 1600  Stop time: 1640 Time Calculation (min): 40 minutes     Chief Complaint: Loss Of Balance    Visit #: 4    SUBJECTIVE:  No reported falls    OBJECTIVE:  Current objective measures: Able to perform Rhomberg E/C for 30\" (STG met)         Therapeutic Exercises (CPT 77743):     Total treatment time spent on Therapeutic Exercises: 45 minutes.      1. Modied superwoman, 8', 5x2    2. 6\" foam roller step overs, 8', 10x2    3. Abd. step outs with t-band, 8', 10x2    4. Partial tandem rowing with t-band, 5', 25    5. Nu step , 2 10'    6.  standing ball circles for balance, 5'              ASSESSMENT:   Response to treatment: SaO2 93% with exercise. Less rest needed today    PLAN/RECOMMENDATIONS:   Plan for treatment: therapy treatment to continue next visit.  Planned interventions for next visit: gait training (CPT 76650), neuromuscular re-education (CPT 81257) and therapeutic exercise (CPT 10717).      "

## 2018-02-20 ENCOUNTER — APPOINTMENT (OUTPATIENT)
Dept: PHYSICAL THERAPY | Facility: REHABILITATION | Age: 73
End: 2018-02-20
Attending: INTERNAL MEDICINE
Payer: MEDICARE

## 2018-02-22 ENCOUNTER — PHYSICAL THERAPY (OUTPATIENT)
Dept: PHYSICAL THERAPY | Facility: REHABILITATION | Age: 73
End: 2018-02-22
Attending: INTERNAL MEDICINE
Payer: MEDICARE

## 2018-02-22 DIAGNOSIS — R26.89 UNSTABLE BALANCE: ICD-10-CM

## 2018-02-22 DIAGNOSIS — G60.9 IDIOPATHIC PERIPHERAL NEUROPATHY: ICD-10-CM

## 2018-02-22 PROCEDURE — 97110 THERAPEUTIC EXERCISES: CPT

## 2018-02-22 PROCEDURE — 97112 NEUROMUSCULAR REEDUCATION: CPT

## 2018-02-22 NOTE — OP THERAPY DAILY TREATMENT
Outpatient Physical Therapy  DAILY TREATMENT     [facility] Physical [address]  [address]  [place] [address]  Phone:  [phone]  Fax:  [phone]    Date: 02/22/2018    Patient: Yasmin Zhong  YOB: 1945  MRN: [id number]     Time Calculation  Start time: 1450  Stop time: 1530 Time Calculation (min): 40 minutes     Chief Complaint: Loss Of Balance    Visit #: 5    SUBJECTIVE:  Feeling stronger. No reported falls    OBJECTIVE:  Current objective measures: Tolerating ther ex without SOB. Close S/Contact assist with single leg balance(Cone stepping )         Therapeutic Exercises (CPT 39538):     1. Bosu step ups, 20x each x2, 5'    2. Lateral step ups, 20x 2, 5'    3. Cone stepping for balance, x4 5'    4. Cone walk arounds for pro active balance training, 5'    5. 1/2 foam balance , 5'    6. Shuttle, 6 cords for LE strength, 5'    7. Swiss ball, wt.. shifts, 5'      Time-based treatments/modalities:  Therapeutic exercise minutes (CPT 51429): 18 minutes  Neuromusc re-ed, balance, coor, post minutes (CPT 12185): 15 minutes             ASSESSMENT:   Response to treatment: Endurance improving    PLAN/RECOMMENDATIONS:   Plan for treatment: therapy treatment to continue next visit.  Planned interventions for next visit: gait training (CPT 20900), neuromuscular re-education (CPT 16361) and therapeutic exercise (CPT 30496).

## 2018-02-23 DIAGNOSIS — F33.41 RECURRENT MAJOR DEPRESSIVE DISORDER, IN PARTIAL REMISSION (HCC): ICD-10-CM

## 2018-02-23 RX ORDER — CITALOPRAM HYDROBROMIDE 10 MG/1
10 TABLET ORAL DAILY
Qty: 90 TAB | Refills: 0 | Status: SHIPPED | OUTPATIENT
Start: 2018-02-23 | End: 2018-02-26 | Stop reason: SDUPTHER

## 2018-02-24 NOTE — TELEPHONE ENCOUNTER
Pharmacy called and left VM. Citalopram is on back order and they wanted to know if they can change it from 10mg to 20mg and have patient cut them in half.     Please Advise. LM

## 2018-02-26 ENCOUNTER — PATIENT MESSAGE (OUTPATIENT)
Dept: MEDICAL GROUP | Facility: PHYSICIAN GROUP | Age: 73
End: 2018-02-26

## 2018-02-26 DIAGNOSIS — F33.41 RECURRENT MAJOR DEPRESSIVE DISORDER, IN PARTIAL REMISSION (HCC): ICD-10-CM

## 2018-02-26 RX ORDER — CITALOPRAM HYDROBROMIDE 10 MG/1
20 TABLET ORAL DAILY
Qty: 90 TAB | Refills: 0 | Status: SHIPPED
Start: 2018-02-26 | End: 2018-05-23 | Stop reason: SDUPTHER

## 2018-02-26 RX ORDER — CITALOPRAM 20 MG/1
10 TABLET ORAL DAILY
Qty: 30 TAB | Refills: 1 | Status: SHIPPED | OUTPATIENT
Start: 2018-02-26 | End: 2018-05-23

## 2018-02-26 NOTE — TELEPHONE ENCOUNTER
"Was the patient seen in the last year in this department? Yes     Does patient have an active prescription for medications requested? No     Received Request Via: Pharmacy     Pharmacy is currently out of 10mg tab. Please write new Rx for 20mg with direction change of \"take one half tab by mouth daily. \"  "

## 2018-02-27 ENCOUNTER — PHYSICAL THERAPY (OUTPATIENT)
Dept: PHYSICAL THERAPY | Facility: REHABILITATION | Age: 73
End: 2018-02-27
Attending: INTERNAL MEDICINE
Payer: MEDICARE

## 2018-02-27 DIAGNOSIS — R26.89 UNSTABLE BALANCE: ICD-10-CM

## 2018-02-27 DIAGNOSIS — G60.9 IDIOPATHIC PERIPHERAL NEUROPATHY: ICD-10-CM

## 2018-02-27 PROCEDURE — 97110 THERAPEUTIC EXERCISES: CPT

## 2018-02-27 PROCEDURE — 97112 NEUROMUSCULAR REEDUCATION: CPT

## 2018-02-27 NOTE — OP THERAPY DAILY TREATMENT
"  Outpatient Physical Therapy  DAILY TREATMENT     [facility] Physical [address]  [address]  [place] [address]  Phone:  [phone]  Fax:  [phone]    Date: 02/27/2018    Patient: Yasmin Zhong  YOB: 1945  MRN: [id number]     Time Calculation  Start time: 1300  Stop time: 1330 Time Calculation (min): 30 minutes     Chief Complaint: Loss Of Balance    Visit #: 6    SUBJECTIVE:  States feels good. No reported falls    OBJECTIVE:  Current objective measures: DGI score improved 18/24 Able to stand rhomberg E/C 30\"         Therapeutic Exercises (CPT 81351):     1. Guatemalan abll isometric abd, 10x2    2. Nu step, 5 10'    3. DGI retest, 6'    4. Foam step overs, 10    5. Single leg /UE isometric, 10 3\" hold      Time-based treatments/modalities:  Therapeutic exercise minutes (CPT 93968): 15 minutes  Neuromusc re-ed, balance, coor, post minutes (CPT 03027): 15 minutes             ASSESSMENT:   Response to treatment: Improved balance scores    PLAN/RECOMMENDATIONS:   Plan for treatment: therapy treatment to continue next visit.  Planned interventions for next visit: neuromuscular re-education (CPT 11715) and therapeutic exercise (CPT 30404). 1 more visit      "

## 2018-03-27 ENCOUNTER — PATIENT MESSAGE (OUTPATIENT)
Dept: MEDICAL GROUP | Facility: PHYSICIAN GROUP | Age: 73
End: 2018-03-27

## 2018-03-27 DIAGNOSIS — F51.01 PRIMARY INSOMNIA: ICD-10-CM

## 2018-03-27 RX ORDER — ZOLPIDEM TARTRATE 10 MG/1
10-20 TABLET ORAL NIGHTLY PRN
Qty: 30 TAB | Refills: 5 | Status: SHIPPED
Start: 2018-03-27 | End: 2018-04-26

## 2018-04-18 NOTE — OP THERAPY DISCHARGE SUMMARY
Outpatient Physical Therapy  DISCHARGE SUMMARY NOTE      [facility] [address]  [address]  [place] [address]  Phone:  [phone]  Fax:  [phone]    Date of Visit: 02/22/2018    Patient: Yasmin Zhong  YOB: 1945  MRN: [id number]     Referring Provider: Sp Gordon M.D.  [address]  [place] [address]   Referring Diagnosis Hereditary and idiopathic neuropathy, unspecified [G60.9];Other abnormalities of gait and mobility [R26.89]     Physical Therapy Occurrence Codes    Date physical therapy care plan established or reviewed:  2/27/18          Functional Limitation G-Codes and Severity Modifiers      Goal:  L4150QR   Discharge:  [id number]       Your patient is being discharged from Physical Therapy with the following comments:   · Patient has failed to schedule or reschedule follow-up visits    Comments:  See note. When last seen see was doing significantly becki rand was not a fall risk     Limitations Remaining:  See last note    Recommendations:  D/C    Onur Cheng, PT    Date: 4/18/2018

## 2018-05-23 ENCOUNTER — OFFICE VISIT (OUTPATIENT)
Dept: MEDICAL GROUP | Facility: PHYSICIAN GROUP | Age: 73
End: 2018-05-23
Payer: MEDICARE

## 2018-05-23 VITALS
WEIGHT: 140 LBS | HEART RATE: 82 BPM | OXYGEN SATURATION: 92 % | SYSTOLIC BLOOD PRESSURE: 92 MMHG | DIASTOLIC BLOOD PRESSURE: 66 MMHG | BODY MASS INDEX: 23.32 KG/M2 | RESPIRATION RATE: 18 BRPM | TEMPERATURE: 98.4 F | HEIGHT: 65 IN

## 2018-05-23 DIAGNOSIS — E78.00 PURE HYPERCHOLESTEROLEMIA: ICD-10-CM

## 2018-05-23 DIAGNOSIS — J84.10 PULMONARY FIBROSIS (HCC): ICD-10-CM

## 2018-05-23 DIAGNOSIS — G60.9 IDIOPATHIC PERIPHERAL NEUROPATHY: ICD-10-CM

## 2018-05-23 DIAGNOSIS — Z00.00 MEDICARE ANNUAL WELLNESS VISIT, INITIAL: ICD-10-CM

## 2018-05-23 DIAGNOSIS — H35.30 MACULAR DEGENERATION, UNSPECIFIED LATERALITY, UNSPECIFIED TYPE: ICD-10-CM

## 2018-05-23 DIAGNOSIS — M81.0 AGE-RELATED OSTEOPOROSIS WITHOUT CURRENT PATHOLOGICAL FRACTURE: ICD-10-CM

## 2018-05-23 DIAGNOSIS — F51.01 PRIMARY INSOMNIA: ICD-10-CM

## 2018-05-23 DIAGNOSIS — I36.1 NON-RHEUMATIC TRICUSPID VALVE INSUFFICIENCY: ICD-10-CM

## 2018-05-23 DIAGNOSIS — F33.41 RECURRENT MAJOR DEPRESSIVE DISORDER, IN PARTIAL REMISSION (HCC): ICD-10-CM

## 2018-05-23 DIAGNOSIS — N32.81 OVERACTIVE BLADDER: ICD-10-CM

## 2018-05-23 PROBLEM — R26.89 BALANCE DISORDER: Status: RESOLVED | Noted: 2018-01-23 | Resolved: 2018-05-23

## 2018-05-23 PROBLEM — R13.14 PHARYNGOESOPHAGEAL DYSPHAGIA: Status: RESOLVED | Noted: 2017-11-22 | Resolved: 2018-05-23

## 2018-05-23 PROCEDURE — G0439 PPPS, SUBSEQ VISIT: HCPCS | Performed by: FAMILY MEDICINE

## 2018-05-23 RX ORDER — ZOLPIDEM TARTRATE 10 MG/1
TABLET ORAL
COMMUNITY
Start: 2018-05-16 | End: 2018-06-13 | Stop reason: SDUPTHER

## 2018-05-23 RX ORDER — CITALOPRAM HYDROBROMIDE 10 MG/1
10 TABLET ORAL DAILY
Qty: 90 TAB | Refills: 3 | Status: SHIPPED | OUTPATIENT
Start: 2018-05-23 | End: 2019-05-10 | Stop reason: SDUPTHER

## 2018-05-23 ASSESSMENT — PAIN SCALES - GENERAL: PAINLEVEL: NO PAIN

## 2018-05-23 ASSESSMENT — ACTIVITIES OF DAILY LIVING (ADL): BATHING_REQUIRES_ASSISTANCE: 0

## 2018-05-23 ASSESSMENT — PATIENT HEALTH QUESTIONNAIRE - PHQ9: CLINICAL INTERPRETATION OF PHQ2 SCORE: 0

## 2018-05-23 ASSESSMENT — ENCOUNTER SYMPTOMS: GENERAL WELL-BEING: EXCELLENT

## 2018-05-23 NOTE — LETTER
[facility]  Katherine Burton M.D.  [address]  [place] [address]  Fax: [phone]   Authorization for Release/Disclosure of   Protected Health Information   Name: KELLY DAVIS : 1945 SSN: [id number]   Address: [address] [place] [address]   [address]  [place] [address] Phone:    [phone] (home)    I authorize the entity listed below to release/disclose the PHI below to:   [facility]/Katherine Burton M.D. and Katherine Burton M.D.   Provider or Entity Name:  [facility]   Address   City, State, Zip:               [address] [place] [address], [place] [address]   Phone:  [phone]      Fax:      [phone]        Reason for request: continuity of care   Information to be released:    [ X ] LAST COLONOSCOPY,  including any PATH REPORT and follow-up  [ X ] LAST FIT/COLOGUARD RESULT [  ] LAST DEXA  [  ] LAST MAMMOGRAM  [  ] LAST PAP  [  ] LAST LABS [  ] RETINA EXAM REPORT  [  ] IMMUNIZATION RECORDS  [  ] Release all info      [  ] Check here and initial the line next to each item to release ALL health information INCLUDING  _____ Care and treatment for drug and / or alcohol abuse  _____ HIV testing, infection status, or AIDS  _____ Genetic Testing    DATES OF SERVICE OR TIME PERIOD TO BE DISCLOSED: _____________  I understand and acknowledge that:  * This Authorization may be revoked at any time by you in writing, except if your health information has already been used or disclosed.  * Your health information that will be used or disclosed as a result of you signing this authorization could be re-disclosed by the recipient. If this occurs, your re-disclosed health information may no longer be protected by State or Federal laws.  * You may refuse to sign this Authorization. Your refusal will not affect your ability to obtain treatment.  * This Authorization becomes effective upon signing and will  on (date) __________.      If no date is indicated, this Authorization will  one (1)  year from the signature date.    Name: Yasminkingsley Villegas Zhong    Signature:   Date:     5/23/2018       PLEASE FAX REQUESTED RECORDS BACK TO: [phone]

## 2018-05-23 NOTE — LETTER
[facility]  Katherine Burton M.D.  [address]  [place] [address]  Fax: [phone]   Authorization for Release/Disclosure of   Protected Health Information   Name: KELLY DAVIS : 1945 SSN: [id number]   Address: [address] [place] [address]   [address]  [place] [address] Phone:    [phone] (home)    I authorize the entity listed below to release/disclose the PHI below to:   [facility]/Katherine Burton M.D. and Katherine Burton M.D.   Provider or Entity Name:  [facility]   Address   [facility], [address]            [address], [place] [address] Phone:  [phone]      Fax:       [phone]          Reason for request: continuity of care   Information to be released:    [ X ] LAST COLONOSCOPY,  including any PATH REPORT and follow-up  [ X ] LAST FIT/COLOGUARD RESULT [  ] LAST DEXA  [  ] LAST MAMMOGRAM  [  ] LAST PAP  [  ] LAST LABS [  ] RETINA EXAM REPORT  [  ] IMMUNIZATION RECORDS  [  ] Release all info      [  ] Check here and initial the line next to each item to release ALL health information INCLUDING  _____ Care and treatment for drug and / or alcohol abuse  _____ HIV testing, infection status, or AIDS  _____ Genetic Testing    DATES OF SERVICE OR TIME PERIOD TO BE DISCLOSED: _____________  I understand and acknowledge that:  * This Authorization may be revoked at any time by you in writing, except if your health information has already been used or disclosed.  * Your health information that will be used or disclosed as a result of you signing this authorization could be re-disclosed by the recipient. If this occurs, your re-disclosed health information may no longer be protected by State or Federal laws.  * You may refuse to sign this Authorization. Your refusal will not affect your ability to obtain treatment.  * This Authorization becomes effective upon signing and will  on (date) __________.      If no date is indicated, this Authorization will  one (1) year  from the signature date.    Name: Yasmin Nelly Trevin    Signature:   Date:     5/23/2018       PLEASE FAX REQUESTED RECORDS BACK TO: [phone]

## 2018-05-24 NOTE — PROGRESS NOTES
Chief Complaint   Patient presents with   • Annual Exam     Medicare Annual Wellness exam       HPI:  Yasmin Zhong is a 72 y.o. here for Medicare Annual Wellness Visit.     Patient Active Problem List    Diagnosis Date Noted   • Age-related osteoporosis without current pathological fracture 05/23/2018   • Peripheral neuropathy ([facility]) 01/23/2018   • Overactive bladder 11/22/2017   • Macular degeneration 07/21/2017   • Recurrent major depressive disorder, in partial remission ([facility]) 07/21/2017   • Pure hypercholesterolemia 07/21/2017   • Non-rheumatic tricuspid valve insufficiency 07/21/2017   • Primary insomnia 04/25/2015   • Pulmonary fibrosis ([facility]) 03/11/2015       Current Outpatient Prescriptions   Medication Sig Dispense Refill   • zolpidem (AMBIEN) 10 MG Tab      • citalopram (CELEXA) 10 MG tablet Take 1 Tab by mouth every day. 90 Tab 3   • denosumab (PROLIA) 60 MG/ML Solution Inject 1 mL as instructed Once for 1 dose. 1 mL 1   • atorvastatin (LIPITOR) 20 MG Tab Take 1 Tab by mouth every day. 30 Tab 11   • quetiapine (SEROQUEL) 50 MG tablet Take 1 Tab by mouth every bedtime. 30 Tab 11   • Biotin 1 MG Cap Take  by mouth.     • vitamin e (VITAMIN E) 400 UNIT Cap Take 400 Units by mouth every day.     • Prenatal Vit-Fe Fumarate-FA (PRENATAL VITAMIN PO) Take  by mouth.     • Magnesium 100 MG CAPS Take  by mouth.     • busPIRone (BUSPAR) 10 MG TABS Take 10 mg by mouth 3 times a day.     • oxybutynin SR (DITROPAN-XL) 10 MG CR tablet Take 1 Tab by mouth every day. 30 Tab 2     No current facility-administered medications for this visit.             Current supplements as per medication list.       Allergies: Patient has no known allergies.    Current social contact/activities: spending time with family and friends, traveling    She  reports that she quit smoking about 33 years ago. Her smoking use included Cigarettes. She has a 5.00 pack-year smoking history. She has never used smokeless tobacco. She reports that  she drinks alcohol. She reports that she does not use drugs.  Counseling given: Not Answered      DPA/Advanced Directive:  Patient has POLST on file. It is from her prolonged hospitalization in 2015. We reviewed it and she confirmed she does not want resuscitation attempted.    ROS:    Gait: Uses no assistive device  Ostomy: No  Other tubes: No  Amputations: No  Chronic oxygen use: No  Last eye exam: This year  Wears hearing aids: No   : Denies any urinary leakage during the last 6 months    Screening:    Depression Screening  Little interest or pleasure in doing things?  0 - not at all  Feeling down, depressed , or hopeless? 0 - not at all  Patient Health Questionnaire Score: 0     If depressive symptoms identified deferred to follow up visit unless specifically addressed in assessment and plan.    Interpretation of PHQ-9 Total Score   Score Severity   1-4 No Depression   5-9 Mild Depression   10-14 Moderate Depression   15-19 Moderately Severe Depression   20-27 Severe Depression    Screening for Cognitive Impairment  Three Minute Recall (leader, season, table) 2/3    Peter clock face with all 12 numbers and set the hands to show 10 past 11.  Yes    Cognitive concerns identified deferred for follow up unless specifically addressed in assessment and plan.    Fall Risk Assessment  Has the patient had two or more falls in the last year or any fall with injury in the last year?  No    Safety Assessment  Throw rugs on floor.  No  Handrails on all stairs.  Yes  Good lighting in all hallways.  Yes  Difficulty hearing.  Yes  Patient counseled about all safety risks that were identified.    Functional Assessment ADLs  Are there any barriers preventing you from cooking for yourself or meeting nutritional needs?  No.    Are there any barriers preventing you from driving safely or obtaining transportation?  No.    Are there any barriers preventing you from using a telephone or calling for help?  No.    Are there any barriers  preventing you from shopping?  No.    Are there any barriers preventing you from taking care of your own finances?  No.    Are there any barriers preventing you from managing your medications?  No.    Are there any barriers preventing you from showering, bathing or dressing yourself?  No.    Are you currently engaging in any exercise or physical activity?  Yes.  Walking dog 4x/day  What is your perception of your health?  Excellent.      Health Maintenance Summary                Annual Wellness Visit Overdue 1945     COLONOSCOPY Postponed 6/13/2018 Originally 11/23/1996. Patient Refused    IMM DTaP/Tdap/Td Vaccine Postponed 5/22/2019 Originally 11/23/1965. Patient Refused    MAMMOGRAM Next Due 8/25/2018      Done 8/25/2017 MA-MAMMO SCREENING BILAT W/TOMOSYNTHESIS W/CAD     Patient has more history with this topic...    BONE DENSITY Next Due 6/19/2020      Done 6/19/2015 DS-BONE DENSITY STUDY (DEXA)          Patient Care Team:  Katherine Burton M.D. as PCP - General (Family Medicine)  Onur Cheng PT as Physical Therapy (Physical Therapy)  Onur Cheng PT as Physical Therapy (Physical Therapy)        Social History   Substance Use Topics   • Smoking status: Former Smoker     Packs/day: 0.25     Years: 20.00     Types: Cigarettes     Quit date: 3/11/1985   • Smokeless tobacco: Never Used   • Alcohol use 0.0 oz/week      Comment: Rare     Family History   Problem Relation Age of Onset   • Cancer Mother      Bladder cancer, hx. of smoking   • Diabetes Mother    • Heart Attack Father    • Cancer Maternal Uncle    • Diabetes Maternal Uncle    • Diabetes Maternal Aunt      She  has a past medical history of Anxiety; Depression; History of respiratory failure; Hyperlipidemia; Hypertension; Insomnia; Peripheral neuropathy (HCC); and Pulmonary fibrosis (HCC).   Past Surgical History:   Procedure Laterality Date   • HIP HEMIARTHROPLASTY  4/25/2015    Performed by Raymond Lantigua M.D. at SURGERY [facility]   •  "OTHER      breast reduction       Exam:   Blood pressure (!) 92/66, pulse 82, temperature 36.9 °C (98.4 °F), resp. rate 18, height 1.657 m (5' 5.25\"), weight 63.5 kg (140 lb), SpO2 92 %, not currently breastfeeding. Body mass index is 23.12 kg/m².    Hearing excellent.    Dentition good.  Alert, oriented in no acute distress.  Eye contact is good, speech goal directed, affect calm.    Assessment and Plan. The following treatment and monitoring plan is recommended:    1. Medicare annual wellness visit, initial  HRA reviewed and appropriate. Patient's previous medical history, healthcare maintenance and immunization status reviewed. See discussion of anticipatory guidance and individual problems below. Patient will return annually for Medicare annual well visit.    2. Recurrent major depressive disorder, in partial remission (HCC)  Chronic and stable. Continue anti-depressant and monitor.  citalopram (CELEXA) 10 MG tablet   3. Age-related osteoporosis without current pathological fracture  Chronic and improving. Continue Prolia and monitor. Next DEXA due in 2020.  denosumab (PROLIA) 60 MG/ML Solution   4. Idiopathic peripheral neuropathy  Chronic and improving. Continue current treatment and therapy. Monitor.   5. Pulmonary fibrosis (HCC)  Chronic and stable. Patient is essentially asymptomatic. Following with pulmonary. Monitor.   6. Primary insomnia  Chronic and stable. Continue Ambien and monitor.   7. Macular degeneration, unspecified laterality, unspecified type  Chronic and stable. Following closely with her ophthalmologist. Monitor.   8. Overactive bladder  Chronic and improving. Continue curren medication and monitor.   9. Non-rheumatic tricuspid valve insufficiency  Chronic and stable. Patient is asymptomatic. Continue to monitor.   10. Pure hypercholesterolemia  Chronic and stable. Continue statin and monitor.     Services suggested: No services needed at this time  Health Care Screening: Age-appropriate " preventive services recommended by USPTF and ACIP covered by Medicare were discussed today. Services ordered if indicated and agreed upon by the patient.  Referrals offered: Community-based lifestyle interventions to reduce health risks and promote self-management and wellness, fall prevention, nutrition, physical activity, tobacco-use cessation, weight loss, and mental health services as per orders if indicated.    Discussion today about general wellness and lifestyle habits:    · Prevent falls and reduce trip hazards; Cautioned about securing or removing rugs.  · Have a working fire alarm and carbon monoxide detector;   · Engage in regular physical activity and social activities     Follow-up: Return in about 6 months (around 11/23/2018) for f/u insomnia, Ambien, short.

## 2018-06-13 DIAGNOSIS — F51.04 CHRONIC INSOMNIA: ICD-10-CM

## 2018-06-13 RX ORDER — ZOLPIDEM TARTRATE 10 MG/1
10-20 TABLET ORAL NIGHTLY PRN
Qty: 33 TAB | Refills: 2 | Status: SHIPPED
Start: 2018-06-13 | End: 2018-09-06 | Stop reason: SDUPTHER

## 2018-06-13 NOTE — TELEPHONE ENCOUNTER
Zolpidem faxed to [facility] pharmacy at [phone], confirmation received, scanned to media.

## 2018-06-18 ENCOUNTER — OUTPATIENT INFUSION SERVICES (OUTPATIENT)
Dept: ONCOLOGY | Facility: MEDICAL CENTER | Age: 73
End: 2018-06-18
Attending: FAMILY MEDICINE
Payer: MEDICARE

## 2018-06-18 VITALS
HEART RATE: 83 BPM | HEIGHT: 65 IN | RESPIRATION RATE: 18 BRPM | WEIGHT: 140.65 LBS | TEMPERATURE: 98.7 F | BODY MASS INDEX: 23.43 KG/M2 | DIASTOLIC BLOOD PRESSURE: 65 MMHG | OXYGEN SATURATION: 92 % | SYSTOLIC BLOOD PRESSURE: 102 MMHG

## 2018-06-18 LAB
CA-I BLD ISE-SCNC: 1.14 MMOL/L (ref 1.1–1.3)
CREAT BLD-MCNC: 1.1 MG/DL (ref 0.5–1.4)

## 2018-06-18 PROCEDURE — 96401 CHEMO ANTI-NEOPL SQ/IM: CPT

## 2018-06-18 PROCEDURE — 82565 ASSAY OF CREATININE: CPT

## 2018-06-18 PROCEDURE — 82330 ASSAY OF CALCIUM: CPT

## 2018-06-18 PROCEDURE — 700111 HCHG RX REV CODE 636 W/ 250 OVERRIDE (IP): Mod: JG | Performed by: FAMILY MEDICINE

## 2018-06-18 RX ADMIN — DENOSUMAB 60 MG: 60 INJECTION SUBCUTANEOUS at 15:00

## 2018-06-18 ASSESSMENT — PAIN SCALES - GENERAL: PAINLEVEL_OUTOF10: 0

## 2018-06-18 NOTE — PROGRESS NOTES
Pt ambulated into [facility] for Q 6 month Prolia injection for Osteoporosis. Pt reported that she has tolerated past injections well and without incident. 25 G butterfly needle used to draw blood from right AC, bleeding controlled with gauze and coban after. Ionized calcium/creatinine tested, WNL, pharmacist notified that Pt within parameters to treat. Prolia injection given in the back of her right arm SQ. Pt tolerated well. Next appointment on 12/17/18 at 13:30 confirmed with Pt prior to leaving, left department by self appearing in good spirits and NAD.

## 2018-06-18 NOTE — LETTER
[facility]   [address] [place] [address]  [place] [address]  Phone: [phone]  Fax: [phone]              Dear Dr. Burton,    Your patient, Yasmin Zhong (: 1945), was scheduled at [facility].  Yasmin's encounter diagnosis is:  No diagnosis found.  She arrived for her appointment, and  the scheduled treatment was   given. These medications were administered to the patient: We administered denosumab..  Yasmin tolerated treatment.. In addition, the following labs were drawn    Recent Results (from the past 24 hour(s))   ISTAT CREATININE    Collection Time: 18  2:35 PM   Result Value Ref Range    Istat Creatinine 1.1 0.5 - 1.4 mg/dL   ISTAT IONIZED CA    Collection Time: 18  2:35 PM   Result Value Ref Range    Istat Ionized Calcium 1.14 1.10 - 1.30 mmol/L            Her next appointment is rescheduled for 6 months from today (18 at 13:30)    For more information, you may review the nurse's progress notes in chart review under the notes section.       Sincerely,  Katherine Mcgrath R.N.

## 2018-06-18 NOTE — PROGRESS NOTES
Pharmacy Note:    iCa = 1.14mmol/L  SCr = 1.1mg/dL, est CrCl ~ 47mL/min    Last Dose 12/18/17  Okay to receive Prolia today.    KARRI Mittal Pharm.D.

## 2018-08-09 RX ORDER — BUSPIRONE HYDROCHLORIDE 10 MG/1
10 TABLET ORAL 3 TIMES DAILY
Qty: 270 TAB | Refills: 3 | Status: SHIPPED | OUTPATIENT
Start: 2018-08-09 | End: 2019-08-09 | Stop reason: SDUPTHER

## 2018-08-27 ENCOUNTER — HOSPITAL ENCOUNTER (OUTPATIENT)
Dept: RADIOLOGY | Facility: MEDICAL CENTER | Age: 73
End: 2018-08-27
Attending: FAMILY MEDICINE
Payer: MEDICARE

## 2018-08-27 DIAGNOSIS — Z12.31 VISIT FOR SCREENING MAMMOGRAM: ICD-10-CM

## 2018-08-27 PROCEDURE — 77067 SCR MAMMO BI INCL CAD: CPT

## 2018-09-06 DIAGNOSIS — F51.04 CHRONIC INSOMNIA: ICD-10-CM

## 2018-09-06 RX ORDER — ZOLPIDEM TARTRATE 10 MG/1
10-20 TABLET ORAL NIGHTLY PRN
Qty: 33 TAB | Refills: 2 | OUTPATIENT
Start: 2018-09-06 | End: 2018-09-07 | Stop reason: SDUPTHER

## 2018-09-06 NOTE — TELEPHONE ENCOUNTER
reviewed and appropriate.  Please phone in prescription as I am out of the office today.  Katherine Burton M.D.

## 2018-09-07 DIAGNOSIS — F51.04 CHRONIC INSOMNIA: ICD-10-CM

## 2018-09-07 RX ORDER — ZOLPIDEM TARTRATE 10 MG/1
TABLET ORAL
Qty: 33 TAB | Refills: 2 | Status: SHIPPED
Start: 2018-09-07 | End: 2018-10-07

## 2018-09-07 RX ORDER — ATORVASTATIN CALCIUM 20 MG/1
20 TABLET, FILM COATED ORAL DAILY
Qty: 90 TAB | Refills: 3 | Status: SHIPPED | OUTPATIENT
Start: 2018-09-07 | End: 2019-08-09 | Stop reason: SDUPTHER

## 2018-10-31 RX ORDER — QUETIAPINE FUMARATE 50 MG/1
TABLET, FILM COATED ORAL
Qty: 90 TAB | Refills: 3 | Status: SHIPPED | OUTPATIENT
Start: 2018-10-31 | End: 2019-11-05 | Stop reason: SDUPTHER

## 2018-11-12 ENCOUNTER — OFFICE VISIT (OUTPATIENT)
Dept: MEDICAL GROUP | Facility: PHYSICIAN GROUP | Age: 73
End: 2018-11-12
Payer: MEDICARE

## 2018-11-12 VITALS
HEIGHT: 65 IN | WEIGHT: 142 LBS | HEART RATE: 62 BPM | RESPIRATION RATE: 18 BRPM | BODY MASS INDEX: 23.66 KG/M2 | TEMPERATURE: 98.6 F | OXYGEN SATURATION: 91 % | DIASTOLIC BLOOD PRESSURE: 62 MMHG | SYSTOLIC BLOOD PRESSURE: 122 MMHG

## 2018-11-12 DIAGNOSIS — R13.19 OTHER DYSPHAGIA: ICD-10-CM

## 2018-11-12 DIAGNOSIS — F51.01 PRIMARY INSOMNIA: ICD-10-CM

## 2018-11-12 DIAGNOSIS — R13.10 DIFFICULTY SWALLOWING SOLIDS: ICD-10-CM

## 2018-11-12 DIAGNOSIS — R11.10 REGURGITATION OF FOOD: ICD-10-CM

## 2018-11-12 PROCEDURE — 99214 OFFICE O/P EST MOD 30 MIN: CPT | Performed by: FAMILY MEDICINE

## 2018-11-12 NOTE — PROGRESS NOTES
Subjective:   Yasmin Zhong is a 72 y.o. female here today for difficulty swallowing, vomiting up food and follow-up of insomnia.  She is unaccompanied for today's visit.    Yasmin recently went on a vacation to [place].  She tells me that while she was there, she had difficulty eating, specifically swallowing solid foods.  On 4 occasions, she had to remove herself and go to the bathroom to throw up because she could not completely swallow the food.  She tells me that it was very embarrassing for her and she would like to find out what she needs to do to fix it.    We have discussed this issue in the past.  Please refer to my note from November 2017.  At that time, her symptoms were not severe enough for her to want to see a gastroenterologist.  She denies any difficulty swallowing or change in her voice.  No abdominal pain or blood in stool.    Of note, her history is significant for a extended ICU stay including intubation and tracheostomy in 2014 secondary to alcohol abuse and upper GI bleed.    In regards to her insomnia, this is well controlled with use of zolpidem 10 mg at night.  She also is taking a small dose of quetiapine.  She is not due for a refill today.    Current medicines (including changes today)  Current Outpatient Prescriptions   Medication Sig Dispense Refill   • quetiapine (SEROQUEL) 50 MG tablet TAKE ONE TABLET BY MOUTH ONCE DAILY AT BEDTIME 90 Tab 3   • atorvastatin (LIPITOR) 20 MG Tab Take 1 Tab by mouth every day. 90 Tab 3   • busPIRone (BUSPAR) 10 MG Tab Take 1 Tab by mouth 3 times a day. 270 Tab 3   • citalopram (CELEXA) 10 MG tablet Take 1 Tab by mouth every day. 90 Tab 3   • oxybutynin SR (DITROPAN-XL) 10 MG CR tablet Take 1 Tab by mouth every day. 30 Tab 2   • Biotin 1 MG Cap Take  by mouth.     • vitamin e (VITAMIN E) 400 UNIT Cap Take 400 Units by mouth every day.     • Prenatal Vit-Fe Fumarate-FA (PRENATAL VITAMIN PO) Take  by mouth.     • Magnesium 100 MG CAPS Take  by mouth.    "    No current facility-administered medications for this visit.      She  has a past medical history of Anxiety; Depression; History of respiratory failure; Hyperlipidemia; Hypertension; Insomnia; Peripheral neuropathy; and Pulmonary fibrosis (HCC).    ROS   No chest pain, no shortness of breath, no abdominal pain.     Objective:     Physical Exam:  Blood pressure 122/62, pulse 62, temperature 37 °C (98.6 °F), temperature source Temporal, resp. rate 18, height 1.651 m (5' 5\"), weight 64.4 kg (142 lb), SpO2 91 %, not currently breastfeeding. Body mass index is 23.63 kg/m².   Constitutional: Alert, no distress, non-toxic appearance.  Skin: Warm, dry, good turgor, no rashes in visible areas.  Eye: Equal, round and reactive, conjunctiva clear, lids normal.  ENMT: Lips without lesions, good dentition, oropharynx clear.  Neck: Healed tracheostomy scar. Trachea midline, no masses, no thyromegaly. No cervical or supraclavicular lymphadenopathy.  Respiratory: Unlabored respiratory effort, no cough.  Abdomen: Soft, no gross masses.  Psych: Alert and oriented x3, normal affect and mood.    Assessment and Plan:     1. Other dysphagia  2. Difficulty swallowing solids  3. Regurgitation of food  Worsening.  Patient has not had multiple episodes of regurgitation of food.  Differentials include esophageal stricture versus mass.  I referred her to gastroenterology for consideration of an upper endoscopy.  - REFERRAL TO GASTROENTEROLOGY    4. Primary insomnia  Chronic issue for patient.  Discussed benefits, risks and alternatives to medication.  Emphasized importance of maintaining good sleep hygiene including: no caffeine after 3pm, no napping, using bedroom only for sleep or sex, no TV or phones before bed. Trial of melatonin.  Continue zolpidem and monitor.    Followup: Return if symptoms worsen or fail to improve.         PLEASE NOTE: This dictation was created using voice recognition software. I have made every reasonable " attempt to correct obvious errors, but I expect that there are errors of grammar and possibly content that I did not discover before finalizing the note.

## 2018-11-27 DIAGNOSIS — F51.01 PRIMARY INSOMNIA: ICD-10-CM

## 2018-11-27 RX ORDER — ZOLPIDEM TARTRATE 10 MG/1
TABLET ORAL
Qty: 33 TAB | Refills: 2 | OUTPATIENT
Start: 2018-11-27 | End: 2018-12-27

## 2018-11-27 NOTE — TELEPHONE ENCOUNTER
Rx phoned in to [facility] pharmacy on automated provider refill request line as pharmacist was unable to take call the tech suggested to use the refill line.

## 2018-11-27 NOTE — TELEPHONE ENCOUNTER
Was the patient seen in the last year in this department? Yes    Does patient have an active prescription for medications requested? Yes    Received Request Via: Pharmacy       KELLY DAVIS     Age: 73  demographics  Data as of: 11/27/2018              NARCOTIC 120        SEDATIVE 271       STIMULANT 000       NARxSCORES can range from 000 to 999. The first two digits represent the composite percentile risk based on an overall analysis of prescription drug use. The third digit represents the number of active prescriptions. The distribution of scores in the population is such that approximately 75% fall below 200, 95% fall below 500 and 99% fall below 650. The information on this report is not warranted as accurate or complete. This report is based on the search criteria supplied and the data entered by the dispensing pharmacy. For more information about any prescription, please contact the dispensing pharmacy or the prescriber. NARxSCORES and Reports are intended to aid, not replace medical decision making. None of the information presented should be used as sole justification for providing or refusing to provide medications.       Rx Graph Grayed out drugs could not be included in score calculations.   [x] Narcotic [x] Sedative [x] Stimulant [x] Buprenorphine [x] Other    Created with Raphaël 2.2.0All Prescribers  Created with Raphaël [id number] - Katherine Burton2 - Katherine Burton3 - Pasquale Kebede  Lorazepam MgEq (LME)  Created with Raphaël [id number]  Created with Raphaël [id number]  *Per CDC guidance, the MME conversion factors prescribed or provided as part of the medication-assisted treatment for opioid use disorder should not be used to benchmark against dosage thresholds meant for opioids prescribed for pain. Buprenorphine products have no agreed upon morphine equivalency, and as partial opioid agonists, are not expected to be associated with overdose risk in the  same dose-dependent manner as doses for full agonist opioids. MME = morphine milligram equivalents. LME = Lorazepam milligram equivalents. mg = dose in milligrams.     Data Analysis   Narcotics (120) 2 months 6 months 1 year 2 years   Prescribers (narcotic, sedative) 1  19 2  22 2  16 3  16   Pharmacies (narcotic, sedative) 1  25 1  16 1  13 2  19   Morphine mg 0  0 0  0 0  0 0  0   Morphine overlap (1) 0  0 0  0 0  0 0  0           Sedatives (271) 2 months 6 months 1 year 2 years   Prescribers (narcotic, sedative) 1  19 2  22 2  16 3  16   Pharmacies (narcotic, sedative) 1  25 1  16 1  13 2  19   Sedative mg 33  37 99  57 214  66 394  74   Sedative overlap (1) 0  0 0  0 1  1 8  4           Stimulants (000)  2 months 6 months 1 year 2 years   Prescribers (stimulant) 0  0 0  0 0  0 0  0   Pharmacies (stimulant) 0  0 0  0 0  0 0  0   Stimulant days 0  0 0  0 0  0 0  0   Stimulant overlap (1) 0  0 0  0 0  0 0  0      (1) Number of days for which a simliar type of medication was prescribed from different prescribers for use on the same day.         Summary   Total Prescriptions: 19   Total Prescribers: 3   Total Pharmacies: 2   Narcotics*    (excluding buprenorphine)  Current Qty: 0   Current MME/day:    30 Day Avg MME/day:    Buprenorphine*   Current Qty: 0   Current mg/day:    30 Day Avg mg/day:    Sedatives*   Current Qty: 0   Current LME/day:    30 Day Avg LME/day:    *Highlighted drugs could not be included in score calculations   PRESCRIPTIONS  Total Prescriptions: 19   Total Private Pay: 0   Fill Date ID Written Drug Qty Days Prescriber Rx # Pharmacy Refill Daily Dose * Pymt Type    10/31/2018  2   09/07/2018  Zolpidem Tartrate 10 MG Tablet  33 30 Me War  [id number] [facility] ([facility])  2 0.55 LME  [place]   10/04/2018  2   09/07/2018  Zolpidem Tartrate 10 MG Tablet  33 30 Me War  [id number] [facility] ([facility]97)  1 0.55 L[place]   09/07/2018  2   09/07/2018  Zolpidem  Tartrate 10 MG Tablet  33 30 Me War  [id number] [facility] ([facility])  0 0.55 L[place]   08/08/2018  2   06/13/2018  Zolpidem Tartrate 10 MG Tablet  33 30 Me War  [id number] [facility] ([facility]32)  2 0.55 L[place]   07/11/2018  2   06/13/2018  Zolpidem Tartrate 10 MG Tablet  33 30 Me War  [id number] [facility] ([facility])  1 0.55 [place]   06/13/2018  2   06/13/2018  Zolpidem Tartrate 10 MG Tablet  33 30 Me War  [id number] [facility] ([facility]32)  0 0.55 [place]   05/16/2018  2   12/26/2017  Zolpidem Tartrate 10 MG Tablet  33 30 Me War  [id number] [facility] ([facility])  5 0.55 L[place]   04/19/2018  2   12/26/2017  Zolpidem Tartrate 10 MG Tablet  33 30 Me War  [id number] [facility] (8532)  4 0.55 L[place]   03/21/2018  2   12/26/2017  Zolpidem Tartrate 10 MG Tablet  33 30 Me War  [id number] [facility] (8532)  3 0.55 L[place]   02/22/2018  2   12/26/2017  Zolpidem Tartrate 10 MG Tablet  33 30 Me War  [id number] [facility] (8532)  2 0.55 LME  [place]   01/24/2018  2   12/26/2017  Zolpidem Tartrate 10 MG Tablet  33 30 Me War  [id number] [facility] (8532)  1 0.55 [place]   12/26/2017  2   12/26/2017  Zolpidem Tartrate 10 MG Tablet  33 30 Me War  [id number] [facility] (8532)  0 0.55 LME  [place]   11/29/2017  2   11/22/2017  Zolpidem Tartrate 10 MG Tablet  33 33 Me War  [id number] [facility] (8532)  0 0.50 LME  [place]   11/01/2017  2   09/07/2017  Zolpidem Tartrate 10 MG Tablet  30 30 Me War  [id number] [facility] (8532)  2 0.50 LME  [place]   10/04/2017  2   09/07/2017  Zolpidem Tartrate 10 MG Tablet  30 30 Me War  [id number] [facility] (8532)  1 0.50 L[place]   09/07/2017  2   09/07/2017  Zolpidem Tartrate 10 MG Tablet  30 30 Me War  [id number] [facility] (8532)  0 0.50 LME  [place]   06/16/2017  2   06/16/2017  Zolpidem Tartrate 10 MG Tablet  90 90 Qu Tho  [id number] [facility] (8532)  0 0.50 LME  [place]   03/20/2017  1   03/20/2017  Zolpidem Tartrate 10 MG Tablet  90 90 Qu Tho  [id number] [facility] (0079)  0 0.50 LME  [place]   12/22/2016  1   12/22/2016  Zolpidem Tartrate 10  MG Tablet  90 90 Qu Tho  [id number] [facility] (1439)  0 0.50 LME  [place]   *Per CDC guidance, the MME conversion factors prescribed or provided as part of the medication-assisted treatment for opioid use disorder should not be used to benchmark against dosage thresholds meant for opioids prescribed for pain. Buprenorphine products have no agreed upon morphine equivalency, and as partial opioid agonists, are not expected to be associated with overdose risk in the same dose-dependent manner as doses for full agonist opioids. MME = morphine milligram equivalents. LME = Lorazepam milligram equivalents. MG = dose in milligrams.  PROVIDERS  Total Providers: 3   Name Address City State Zipcode Phone   Pasquale Kebede [address] [place] [address]    [facility] [address] [place] [address] [address] [place] [address]    [facility] [address] [place] [address]    PHARMACIES  Total Pharmacies: 2   Name Address City State Zipcode Phone   [facility] [phone] [place] [address] [phone]   [facility]  [phone] [facility] [place] [address] [phone]

## 2018-12-03 ENCOUNTER — TELEPHONE (OUTPATIENT)
Dept: MEDICAL GROUP | Facility: PHYSICIAN GROUP | Age: 73
End: 2018-12-03

## 2018-12-03 DIAGNOSIS — F41.9 ANXIETY: ICD-10-CM

## 2018-12-03 RX ORDER — PROPRANOLOL HYDROCHLORIDE 10 MG/1
10 TABLET ORAL 3 TIMES DAILY PRN
Qty: 30 TAB | Refills: 0 | Status: SHIPPED | OUTPATIENT
Start: 2018-12-03 | End: 2020-08-09

## 2018-12-03 NOTE — TELEPHONE ENCOUNTER
1. Caller Name: Yasmin Zhong                                           Call Back Number: [phone] (home)         Patient approves a detailed voicemail message: no    Medication question- Pt would like to know if she could have a Rx for lorazepam. She has started a new job and is having trouble with her anxiety while training. She would like to have the Rx for a month and has had it in the past.

## 2018-12-03 NOTE — TELEPHONE ENCOUNTER
Noted.  I would prefer Yasmin to try a different short-acting medication for anxiety as she is on Ambien.  Lorazepam interacts with Ambien.  Is she interested in trying propranolol or hydroxyzine?  -Katherine Burton M.D.

## 2018-12-03 NOTE — TELEPHONE ENCOUNTER
Phone Number Called: [phone] (home)       Message: Pt informed by phone     Left Message for patient to call back: no

## 2018-12-03 NOTE — TELEPHONE ENCOUNTER
Phone Number Called: [phone] (home)       Message: Pt states she is willing to try either one.     Left Message for patient to call back: yes

## 2018-12-14 ENCOUNTER — TELEPHONE (OUTPATIENT)
Dept: MEDICAL GROUP | Facility: PHYSICIAN GROUP | Age: 73
End: 2018-12-14

## 2018-12-14 DIAGNOSIS — M81.0 AGE-RELATED OSTEOPOROSIS WITHOUT CURRENT PATHOLOGICAL FRACTURE: ICD-10-CM

## 2018-12-14 NOTE — TELEPHONE ENCOUNTER
Pt needs a new Rx for Prolia sent to the infusion center before 5:00 PM today. Pt has an ppt for her inj on 12/17 and is out of refills. Please reorder. I was unable to write up refill for some reason.     Fax [phone] [facility]

## 2018-12-15 NOTE — TELEPHONE ENCOUNTER
----- Message from Arleth Amaya R.N. sent at 12/14/2018  5:26 PM PST -----  Regarding: Orders  Dr. Burton,    Your orders did not arrive. Please refax before pt's appt on Monday.     Thanks,    Arleth

## 2018-12-17 ENCOUNTER — OUTPATIENT INFUSION SERVICES (OUTPATIENT)
Dept: ONCOLOGY | Facility: MEDICAL CENTER | Age: 73
End: 2018-12-17
Attending: FAMILY MEDICINE
Payer: MEDICARE

## 2018-12-17 VITALS
TEMPERATURE: 97.5 F | WEIGHT: 144.62 LBS | HEIGHT: 65 IN | BODY MASS INDEX: 24.1 KG/M2 | HEART RATE: 73 BPM | RESPIRATION RATE: 18 BRPM | OXYGEN SATURATION: 95 % | DIASTOLIC BLOOD PRESSURE: 61 MMHG | SYSTOLIC BLOOD PRESSURE: 123 MMHG

## 2018-12-17 DIAGNOSIS — M81.0 AGE-RELATED OSTEOPOROSIS WITHOUT CURRENT PATHOLOGICAL FRACTURE: ICD-10-CM

## 2018-12-17 LAB
CA-I BLD ISE-SCNC: 1.21 MMOL/L (ref 1.1–1.3)
CREAT BLD-MCNC: 0.8 MG/DL (ref 0.5–1.4)

## 2018-12-17 PROCEDURE — 700111 HCHG RX REV CODE 636 W/ 250 OVERRIDE (IP): Mod: JG | Performed by: FAMILY MEDICINE

## 2018-12-17 PROCEDURE — 96401 CHEMO ANTI-NEOPL SQ/IM: CPT

## 2018-12-17 PROCEDURE — 82330 ASSAY OF CALCIUM: CPT

## 2018-12-17 PROCEDURE — 82565 ASSAY OF CREATININE: CPT

## 2018-12-17 RX ADMIN — DENOSUMAB 60 MG: 60 INJECTION SUBCUTANEOUS at 14:02

## 2018-12-17 ASSESSMENT — PAIN SCALES - GENERAL: PAINLEVEL_OUTOF10: 4

## 2018-12-17 NOTE — PROGRESS NOTES
Pt arrived to [facility] ambulatory, here for q6 month Prolia. iStat Ca/Creat drawn per protocol. Pt denies any current infections or recent/planned dental work. Results reviewed by pharmacy and pt appropriate for Prolia injection. Medication given to back of R arm, band aide applied. No future appts in place at this time. Pt will need to follow up with her MD to determine if Prolia is to be a continued treatment. Pt reported she would follow up with her MD and communication sent to MD as well. Pt understands plan of care. Discharged home under self care in no apparent distress.

## 2018-12-17 NOTE — PROGRESS NOTES
Pharmacy Note:    iCa = 1.21mmol/L  SCr = 0.8mg/dL, est CrCl ~ 66mL/min    Last Dose 6/18/18  Okay to receive Prolia today.    Martha AbrahamD.

## 2018-12-17 NOTE — LETTER
Infusion Services   [address] [place], [place] [address], [place] [address]  Phone: [phone]  Fax: [phone]              Dear Dr. Burton,    Your patient, Yasmin Zhong (: 1945), was scheduled at [facility].  Yasmin's encounter diagnosis is:  osteoporosis  She arrived for her appointment, and  the scheduled treatment was   given. These medications were administered to the patient: We administered denosumab..  Yasmin tolerated treatment. In addition, the following labs were drawn    Recent Results (from the past 24 hour(s))   ISTAT CREATININE    Collection Time: 18  1:45 PM   Result Value Ref Range    Istat Creatinine 0.8 0.5 - 1.4 mg/dL   ISTAT IONIZED CA    Collection Time: 18  1:45 PM   Result Value Ref Range    Istat Ionized Calcium 1.21 1.10 - 1.30 mmol/L          Yasmin did not reschedule; because she must see her provider first. We will make another appt for the pt after you and she have evaluated if the Prolia is still indicated. Last DEXA scan was in . Pt will be in contact with you and we will be happy to schedule her in 6 months for more Prolia when you send orders.     For more information, you may review the nurse's progress notes in chart review under the notes section.       Sincerely,  Arleth Amaya R.N.

## 2018-12-18 ENCOUNTER — PATIENT MESSAGE (OUTPATIENT)
Dept: MEDICAL GROUP | Facility: PHYSICIAN GROUP | Age: 73
End: 2018-12-18

## 2018-12-18 DIAGNOSIS — M81.0 AGE-RELATED OSTEOPOROSIS WITHOUT CURRENT PATHOLOGICAL FRACTURE: ICD-10-CM

## 2018-12-19 ENCOUNTER — PATIENT MESSAGE (OUTPATIENT)
Dept: MEDICAL GROUP | Facility: PHYSICIAN GROUP | Age: 73
End: 2018-12-19

## 2018-12-21 ENCOUNTER — TELEPHONE (OUTPATIENT)
Dept: MEDICAL GROUP | Facility: PHYSICIAN GROUP | Age: 73
End: 2018-12-21

## 2018-12-21 NOTE — TELEPHONE ENCOUNTER
Phone Number Called: [phone] (home)       Message:   Left generic voice message for the pt to call back     Left Message for patient to call back: yes

## 2018-12-21 NOTE — TELEPHONE ENCOUNTER
Pt is unable to unable to get in to get her epidural  in her back and she is calling asking if there is anything we can do for her

## 2018-12-21 NOTE — TELEPHONE ENCOUNTER
We do not do epidurals here.  I can refer her to another provider if she wants another opinion.  She may try over-the-counter pain relievers like Tylenol or Aleve.  If she is looking for narcotic pain medication, we would need to see her for an appointment.  -Katherine Burton M.D.

## 2018-12-27 ENCOUNTER — TELEPHONE (OUTPATIENT)
Dept: MEDICAL GROUP | Facility: PHYSICIAN GROUP | Age: 73
End: 2018-12-27

## 2018-12-27 NOTE — TELEPHONE ENCOUNTER
Pt LVM with message regarding back pain and wanting medication. I called her last week to discuss this she never called back..    Called pt and LVM that the pt needs a appointment for pain medication.    Or to take OTC medication

## 2018-12-27 NOTE — PROGRESS NOTES
Pt pulled to d/c, PA to consult GI for further recommendations    1745: PA reviewed discharge instructions and options with patient and patient verbalized understanding. RN reviewed discharge instructions using teachback method. Pt ambulated to exit without difficulty and in no signs of acute distress escorted by  who will drive home. No complaints or needs expressed at this time. Patient was counseled on medications prescribed at discharge. VSS at time of discharge.  Pt to call GI in the morning for scheduled colonoscopy Pt returns for q 6 month Prolia.  Pt states tolerating this medication well.  Pt denies any dental surgeries in the past 2 months, or planned.  Labs drawn from R AC with 23 G butterfly.  Pt within parameters for Prolia.  Medication given to R back arm.  Next apt scheduled.  Pt left Ic, no s/s of distress.

## 2018-12-27 NOTE — TELEPHONE ENCOUNTER
Yes, patient will need an appointment.  I could squeeze her in tomorrow at 8am.  -Katherine Burton M.D.

## 2018-12-28 NOTE — TELEPHONE ENCOUNTER
Phone Number Called: [phone] (home)       Message:   Left generic voice message for the pt to call back re appointment tomorrow     Left Message for patient to call back: yes

## 2018-12-31 ENCOUNTER — OFFICE VISIT (OUTPATIENT)
Dept: MEDICAL GROUP | Facility: PHYSICIAN GROUP | Age: 73
End: 2018-12-31
Payer: MEDICARE

## 2018-12-31 VITALS
BODY MASS INDEX: 23.03 KG/M2 | DIASTOLIC BLOOD PRESSURE: 68 MMHG | OXYGEN SATURATION: 96 % | SYSTOLIC BLOOD PRESSURE: 110 MMHG | HEART RATE: 94 BPM | RESPIRATION RATE: 16 BRPM | HEIGHT: 65 IN | TEMPERATURE: 97.2 F | WEIGHT: 138.2 LBS

## 2018-12-31 DIAGNOSIS — J84.10 PULMONARY FIBROSIS (HCC): ICD-10-CM

## 2018-12-31 DIAGNOSIS — M51.36 LUMBAR DEGENERATIVE DISC DISEASE: ICD-10-CM

## 2018-12-31 DIAGNOSIS — M54.6 ACUTE MIDLINE THORACIC BACK PAIN: ICD-10-CM

## 2018-12-31 DIAGNOSIS — M81.0 AGE-RELATED OSTEOPOROSIS WITHOUT CURRENT PATHOLOGICAL FRACTURE: ICD-10-CM

## 2018-12-31 DIAGNOSIS — R05.9 COUGH: ICD-10-CM

## 2018-12-31 PROCEDURE — 99214 OFFICE O/P EST MOD 30 MIN: CPT | Performed by: FAMILY MEDICINE

## 2018-12-31 RX ORDER — HYDROCODONE BITARTRATE AND ACETAMINOPHEN 5; 325 MG/1; MG/1
1 TABLET ORAL EVERY 8 HOURS PRN
Qty: 21 TAB | Refills: 0 | Status: SHIPPED | OUTPATIENT
Start: 2018-12-31 | End: 2019-01-07

## 2018-12-31 RX ORDER — AZITHROMYCIN 250 MG/1
TABLET, FILM COATED ORAL
Qty: 6 TAB | Refills: 0 | Status: SHIPPED | OUTPATIENT
Start: 2018-12-31 | End: 2019-06-20

## 2018-12-31 RX ORDER — HYDROCODONE BITARTRATE AND ACETAMINOPHEN 5; 325 MG/1; MG/1
1 TABLET ORAL EVERY 8 HOURS PRN
Qty: 21 TAB | Refills: 0 | Status: SHIPPED | OUTPATIENT
Start: 2018-12-31 | End: 2018-12-31 | Stop reason: SDUPTHER

## 2018-12-31 ASSESSMENT — PATIENT HEALTH QUESTIONNAIRE - PHQ9
2. FEELING DOWN, DEPRESSED, IRRITABLE, OR HOPELESS: NOT AT ALL
1. LITTLE INTEREST OR PLEASURE IN DOING THINGS: NOT AT ALL
9. THOUGHTS THAT YOU WOULD BE BETTER OFF DEAD, OR OF HURTING YOURSELF: NOT AT ALL
SUM OF ALL RESPONSES TO PHQ9 QUESTIONS 1 AND 2: 0
5. POOR APPETITE OR OVEREATING: NOT AT ALL
8. MOVING OR SPEAKING SO SLOWLY THAT OTHER PEOPLE COULD HAVE NOTICED. OR THE OPPOSITE, BEING SO FIGETY OR RESTLESS THAT YOU HAVE BEEN MOVING AROUND A LOT MORE THAN USUAL: NOT AT ALL
6. FEELING BAD ABOUT YOURSELF - OR THAT YOU ARE A FAILURE OR HAVE LET YOURSELF OR YOUR FAMILY DOWN: NOT AL ALL
4. FEELING TIRED OR HAVING LITTLE ENERGY: NOT AT ALL
7. TROUBLE CONCENTRATING ON THINGS, SUCH AS READING THE NEWSPAPER OR WATCHING TELEVISION: NOT AT ALL
SUM OF ALL RESPONSES TO PHQ QUESTIONS 1-9: 0
3. TROUBLE FALLING OR STAYING ASLEEP OR SLEEPING TOO MUCH: NOT AT ALL

## 2018-12-31 ASSESSMENT — PAIN SCALES - GENERAL: PAINLEVEL: 8=MODERATE-SEVERE PAIN

## 2018-12-31 NOTE — PROGRESS NOTES
"Subjective:   Yasmin Zhong is a 73 y.o. female here today for back pain, cough and medication request.    For the last several weeks, Yasmin's mid to upper back has been painful.  She denies any injury or trauma.  She has been working long hours as a  at [employer] during the holiday season.  The pain is located in the middle of her back and extends up towards her neck.  She describes it as a constant \"dull ache\" and intermittently will have \"sharp, shooting\" pains.  No associated muscle weakness, numbness or tingling.  She mentions that she will be following up with her spine specialist.  2 or 3 years ago, she had an epidural injection in her lower back.  Review of imaging shows that she had an MRI in 2016.  She had degenerative disc disease in her lumbar spine as well as some chronic compression fractures.  For help with pain, Yasmin has been taking over-the-counter Aleve, sometimes up to 4 5 tablets a day.    Acute pain   This is a new problem.   Patient is complaining of pain x 2-3 week(s) located in her mid-upper back.  Pain is constant, described as sharp, dull, shooting and a 8/10 on the pain scale.   Treatments tried include:NSAIDs, ice, rest     Is the pain medication improving the patient’s symptoms: Never prescribed  Any adverse effects: Never prescribed  Alcohol or illicit drug use:   She  reports that she drinks alcohol.  She  reports that she does not use drugs.     History of controlled substance used in a way other than prescribed? No     Any early refills of a controlled substance: No  History of lost or stolen controlled substance prescription: No  Any aberrant behavior or intoxication while on a controlled substance: No  Has the patient self-modified their dose or frequency of the medication :No  Compliant with treatment recommendations and plan: Yes  Any major health change to the patient: No  Concerns for misuse, abuse or addiction: No  /NarxCheck report reviewed: No  History " of abnormal drug screening: No  I have assessed the patient’s risk for abuse, dependency, and addiction using the validated Opioid Risk Tool.     Opioid Risk Score: 3      Interpretation of Opioid Risk Score   Score 0-3 = Low risk of abuse. Do UDS at least once per year.  Score 4-7 = Moderate risk of abuse. Do UDS 1-4 times per year.  Score 8+ = High risk of abuse. Refer to specialist.     I have conducted a physical exam and documented findings.     I certify that I have obtained and reviewed her medical history. I have also made a good eryn effort to obtain applicable records from other providers who have treated the patient.  I have reviewed the patient's prescription history as maintained by the [place] Prescription Monitoring Program.      Given the above, I believe the benefits of controlled substance therapy outweigh the risks. The reasons for prescribing controlled substances include non-narcotic, oral analgesic alternatives have been inadequate for pain control. Accordingly, I have discussed the risk and benefits, treatment plan, and alternative therapies with the patient. Patient was advised that this medicine is intended for short term (no more than 14 days)/intermittent use only and not intended to be an ongoing prescription.    For the last few days, Yasmin has also had a dry, nonproductive cough.  She denies fever, chills, sore throat, nasal congestion, shortness of breath or chest pain.  She does have known pulmonary fibrosis, possibly secondary to her previous occupation as a .  She is not on any respiratory medications at this time.    Current medicines (including changes today)  Current Outpatient Prescriptions   Medication Sig Dispense Refill   • azithromycin (ZITHROMAX) 250 MG Tab Take 2 tablets PO on day #1, then 1 tablet PO daily on days #2-5. 6 Tab 0   • HYDROcodone-acetaminophen (NORCO) 5-325 MG Tab per tablet Take 1 Tab by mouth every 8 hours as needed (severe pain) for up to 7  "days. 21 Tab 0   • vitamin D (CHOLECALCIFEROL) 1000 UNIT Tab Take 1,000 Units by mouth every day.     • Calcium Carbonate-Vit D-Min (CALCIUM 1200 PO) Take  by mouth.     • propranolol (INDERAL) 10 MG Tab Take 1 Tab by mouth 3 times a day as needed (anxiety). 30 Tab 0   • quetiapine (SEROQUEL) 50 MG tablet TAKE ONE TABLET BY MOUTH ONCE DAILY AT BEDTIME 90 Tab 3   • atorvastatin (LIPITOR) 20 MG Tab Take 1 Tab by mouth every day. 90 Tab 3   • busPIRone (BUSPAR) 10 MG Tab Take 1 Tab by mouth 3 times a day. 270 Tab 3   • citalopram (CELEXA) 10 MG tablet Take 1 Tab by mouth every day. 90 Tab 3   • Biotin 1 MG Cap Take  by mouth.     • vitamin e (VITAMIN E) 400 UNIT Cap Take 400 Units by mouth every day.     • Prenatal Vit-Fe Fumarate-FA (PRENATAL VITAMIN PO) Take  by mouth.     • Magnesium 100 MG CAPS Take  by mouth.     • oxybutynin SR (DITROPAN-XL) 10 MG CR tablet Take 1 Tab by mouth every day. (Patient not taking: Reported on 12/31/2018) 30 Tab 2     No current facility-administered medications for this visit.      She  has a past medical history of Anxiety; Depression; History of respiratory failure; Hyperlipidemia; Hypertension; Insomnia; Peripheral neuropathy; and Pulmonary fibrosis (HCC).    ROS   No chest pain, no shortness of breath, no abdominal pain.     Objective:     Physical Exam:  Blood pressure 110/68, pulse 94, temperature 36.2 °C (97.2 °F), resp. rate 16, height 1.651 m (5' 5\"), weight 62.7 kg (138 lb 3.2 oz), SpO2 96 %, not currently breastfeeding. Body mass index is 23 kg/m².   Constitutional: Alert, no distress, non-toxic appearance.  Skin: Warm, dry, good turgor, no rashes in visible areas.  Eye: Equal, round and reactive, conjunctiva clear, lids normal.  ENMT: Lips without lesions, good dentition, oropharynx clear.  Neck: Trachea midline, no masses, no thyromegaly. No cervical or supraclavicular lymphadenopathy.  Respiratory: Unlabored respiratory effort, lungs clear to auscultation, no wheezes, no " rhonchi.  Cardiovascular: Normal S1, S2, no murmur, no edema.  Abdomen: Soft, non-tender, no masses, no hepatosplenomegaly.  MSK: Normal gait, moves all extremities.  Spine: No significant spinal curvature on forward bend. Tenderness to palpation along thoracic spine and upper lumbar spine. SLT negative. DTR 2+ patella, 1+ achilles bilaterally. Strength 5/5 proximal and distal LE.  No pain with stress of SI. Full hip ROM. Poor hamstring flexibility. No symptoms with axial loading.   Psych: Alert and oriented x3, normal affect and mood.    Assessment and Plan:     1. Acute midline thoracic back pain  2. Lumbar degenerative disc disease  This is a new problem.  No known injury or trauma.  Patient has been taking over-the-counter anti-inflammatories at high doses without improvement in her pain.  She has close follow-up with her spine specialist to discuss repeat epidural.  I advised her that the location of her current pain is different than when she had it 2-3 years ago.  We agreed to evaluate further with plain films.  I am concerned for compression fractures with her history of osteoporosis.  I've provided her with a small quantity of narcotic pain pills.  Informed consent signed.  - DX-THORACIC SPINE-2 VIEWS; Future  - Consent for Opiate Prescription  - HYDROcodone-acetaminophen (NORCO) 5-325 MG Tab per tablet; Take 1 Tab by mouth every 8 hours as needed (severe pain) for up to 7 days.  Dispense: 21 Tab; Refill: 0    3. Cough  4. Pulmonary fibrosis (HCC)  This is a new issue.  Most likely a viral upper respiratory infection, however, given that tomorrow is a holiday, I have provided a prescription for antibiotics to take if symptoms worsen.  - azithromycin (ZITHROMAX) 250 MG Tab; Take 2 tablets PO on day #1, then 1 tablet PO daily on days #2-5.  Dispense: 6 Tab; Refill: 0    5. Age-related osteoporosis without current pathological fracture  Chronic and stable.  Patient has follow-up DEXA scan ordered to determine  if treatment is still needed.    Followup: As needed.         PLEASE NOTE: This dictation was created using voice recognition software. I have made every reasonable attempt to correct obvious errors, but I expect that there are errors of grammar and possibly content that I did not discover before finalizing the note.

## 2019-01-07 ENCOUNTER — HOSPITAL ENCOUNTER (OUTPATIENT)
Dept: RADIOLOGY | Facility: MEDICAL CENTER | Age: 74
End: 2019-01-07
Attending: FAMILY MEDICINE
Payer: MEDICARE

## 2019-01-07 DIAGNOSIS — M81.0 AGE-RELATED OSTEOPOROSIS WITHOUT CURRENT PATHOLOGICAL FRACTURE: ICD-10-CM

## 2019-01-07 PROCEDURE — 77080 DXA BONE DENSITY AXIAL: CPT

## 2019-01-08 ENCOUNTER — HOSPITAL ENCOUNTER (OUTPATIENT)
Dept: RADIOLOGY | Facility: MEDICAL CENTER | Age: 74
End: 2019-01-08
Attending: FAMILY MEDICINE
Payer: MEDICARE

## 2019-01-08 DIAGNOSIS — M54.6 ACUTE MIDLINE THORACIC BACK PAIN: ICD-10-CM

## 2019-01-08 PROBLEM — M85.89 OSTEOPENIA OF MULTIPLE SITES: Status: ACTIVE | Noted: 2018-05-23

## 2019-01-08 PROCEDURE — 72072 X-RAY EXAM THORAC SPINE 3VWS: CPT

## 2019-03-27 ENCOUNTER — OFFICE VISIT (OUTPATIENT)
Dept: MEDICAL GROUP | Facility: PHYSICIAN GROUP | Age: 74
End: 2019-03-27
Payer: MEDICARE

## 2019-03-27 VITALS
BODY MASS INDEX: 22.99 KG/M2 | TEMPERATURE: 97.9 F | HEART RATE: 70 BPM | SYSTOLIC BLOOD PRESSURE: 112 MMHG | DIASTOLIC BLOOD PRESSURE: 66 MMHG | WEIGHT: 138 LBS | HEIGHT: 65 IN | OXYGEN SATURATION: 94 % | RESPIRATION RATE: 12 BRPM
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
## 2019-03-27 DIAGNOSIS — S91.311A LACERATION OF RIGHT FOOT, INITIAL ENCOUNTER: ICD-10-CM

## 2019-03-27 DIAGNOSIS — Z23 NEED FOR VACCINATION: ICD-10-CM

## 2019-03-27 DIAGNOSIS — Z91.89 AT HIGH RISK FOR INFECTION: ICD-10-CM

## 2019-03-27 PROCEDURE — 90471 IMMUNIZATION ADMIN: CPT | Performed by: FAMILY MEDICINE

## 2019-03-27 PROCEDURE — 12004 RPR S/N/AX/GEN/TRK7.6-12.5CM: CPT | Performed by: FAMILY MEDICINE

## 2019-03-27 PROCEDURE — 90715 TDAP VACCINE 7 YRS/> IM: CPT | Performed by: FAMILY MEDICINE

## 2019-03-27 RX ORDER — CEPHALEXIN 500 MG/1
500 CAPSULE ORAL 3 TIMES DAILY
Qty: 15 CAP | Refills: 0 | Status: SHIPPED | OUTPATIENT
Start: 2019-03-27 | End: 2019-04-01

## 2019-03-27 NOTE — Clinical Note
Aureliano Neal!  Will you check the coding for me on this visit? I don't often get to see lacerations in our office! Thanks! -Katherine Burton M.D.

## 2019-03-27 NOTE — Clinical Note
Saw Neal, Hoping you can review my coding on this one for me.  I had sent it earlier in the week, but the end of the month is approaching. Thanks, Katherine Burton M.D.

## 2019-03-27 NOTE — PROGRESS NOTES
"Subjective:   Yasmin Zhong is a 73 y.o. female here today for cut on bottom of right foot.  She is unaccompanied for today's appointment.    This is a new problem.  Yasmin had contacted our office approximately 30 minutes ago stating that she cut the bottom of her right foot on a piece of glass.  We instructed her to come to our office for further evaluation and treatment.  She tells me that she broke a glass yesterday on the carpet in her living room.  She had thought that it was all cleaned up.  This morning, while walking across the carpet, she felt a sudden, sharp pain.  Looking down, she saw that she had a bloody \"gash\" on the bottom of her right foot.  There was a small piece of glass on the carpet.  She immediately covered it with a paper towel and called our office.  She is able to walk on her foot with little pain.  She reports good feeling.  No previous injury to her right foot.  She believes she may have lost 1/8 cup of blood in total.  She is not on blood thinners.  Due for tetanus shot.    Current medicines (including changes today)  Current Outpatient Prescriptions   Medication Sig Dispense Refill   • cephALEXin (KEFLEX) 500 MG Cap Take 1 Cap by mouth 3 times a day for 5 days. 15 Cap 0   • NON SPECIFIED Adult crutch 1 Each 0   • zolpidem (AMBIEN) 10 MG Tab TAKE 1 TO 2 TABLETS BY MOUTH AT BEDTIME AS NEEDED FOR SLEEP 33 Tab 2   • azithromycin (ZITHROMAX) 250 MG Tab Take 2 tablets PO on day #1, then 1 tablet PO daily on days #2-5. 6 Tab 0   • vitamin D (CHOLECALCIFEROL) 1000 UNIT Tab Take 1,000 Units by mouth every day.     • Calcium Carbonate-Vit D-Min (CALCIUM 1200 PO) Take  by mouth.     • propranolol (INDERAL) 10 MG Tab Take 1 Tab by mouth 3 times a day as needed (anxiety). 30 Tab 0   • quetiapine (SEROQUEL) 50 MG tablet TAKE ONE TABLET BY MOUTH ONCE DAILY AT BEDTIME 90 Tab 3   • atorvastatin (LIPITOR) 20 MG Tab Take 1 Tab by mouth every day. 90 Tab 3   • busPIRone (BUSPAR) 10 MG Tab Take 1 Tab " "by mouth 3 times a day. 270 Tab 3   • citalopram (CELEXA) 10 MG tablet Take 1 Tab by mouth every day. 90 Tab 3   • oxybutynin SR (DITROPAN-XL) 10 MG CR tablet Take 1 Tab by mouth every day. (Patient not taking: Reported on 12/31/2018) 30 Tab 2   • Biotin 1 MG Cap Take  by mouth.     • vitamin e (VITAMIN E) 400 UNIT Cap Take 400 Units by mouth every day.     • Prenatal Vit-Fe Fumarate-FA (PRENATAL VITAMIN PO) Take  by mouth.     • Magnesium 100 MG CAPS Take  by mouth.       No current facility-administered medications for this visit.      She  has a past medical history of Anxiety; Depression; History of respiratory failure; Hyperlipidemia; Hypertension; Insomnia; Peripheral neuropathy; and Pulmonary fibrosis (HCC).    ROS   No fever, no chills, no chest pain, no shortness of breath, no abdominal pain, no lightheadedness.     Objective:     Physical Exam:  Blood pressure 112/66, pulse 70, temperature 36.6 °C (97.9 °F), resp. rate 12, height 1.651 m (5' 5\"), weight 62.6 kg (138 lb), SpO2 94 %, not currently breastfeeding. Body mass index is 22.96 kg/m².   Constitutional: Alert, no distress, non-toxic appearance, well-groomed.  Skin: Warm, dry, good turgor, no rashes in visible areas.  Eye: Equal, round and reactive, conjunctiva clear, lids normal.  ENMT: Lips without lesions, good dentition, moist mucous membranes.  Neck: Trachea midline, no masses, no thyromegaly.  Respiratory: Unlabored respiratory effort, no cough.  Abdomen: Soft, no gross masses.  MSK: Normal gait, moves all extremities.  Neuro: Grossly non-focal. No cranial nerve deficit. Strength and sensation intact.   Feet: Normal left foot exam. On right sole, there is a laceration that measures approximately 10 cm in length. At it's deepest, the laceration is approximately 1 cm in depth with adipose tissue visible. Base of laceration easily visible and there are no foreign objects. No extension to tendons and muscles. The area is bleeding during " exam.  Psych: Alert and oriented x3, normal affect and mood.    Assessment and Plan:     1. Injury from broken glass, initial encounter  2. Laceration of right foot, initial encounter  3. At high risk for infection  This is a new problem.  Patient presents with an acute laceration to her right sole after stepping on broken glass.  On exam, patient has an approximately 10 cm laceration.  Irrigation was performed and no foreign objects were visualized.  Laceration repair report performed.  See procedure note below.  Advised to avoid placing weight on her right foot by using a crutch for the next week.  Given the location of the laceration and concern for infection, antibiotics were prescribed.  Return precautions discussed.  Follow-up in 7 days for suture removal and wound check.    PROCEDURE:    Indication: Right sole laceration.     Note: Wound was irrigated with sterile water. No foreign body seen. 1% lidocaine with epinehrine injected at the borders of wound. A total of 8, 4-0 nylon sutures were placed in interrupted fashion. Steri-strips were also placed.  Patient tolerated procedure well without any complications.    Wound was dressed with topical antibiotic ointment and bandage. Advised to leave bandage on for 24 hours. Follow-up in 7-10 days for recheck and suture removal.    - cephALEXin (KEFLEX) 500 MG Cap; Take 1 Cap by mouth 3 times a day for 5 days.  Dispense: 15 Cap; Refill: 0  - Tdap =>6yo IM  - NON SPECIFIED; Adult crutch  Dispense: 1 Each; Refill: 0    4. Need for vaccination  Patient is overdue for her Tetanus booster and is here today with a new laceration.  Tdap administered.  - Tdap =>6yo IM    Followup: Return in about 1 week (around 4/3/2019) for suture removal and wound check, short.         PLEASE NOTE: This dictation was created using voice recognition software. I have made every reasonable attempt to correct obvious errors, but I expect that there are errors of grammar and possibly content  that I did not discover before finalizing the note.

## 2019-04-03 ENCOUNTER — OFFICE VISIT (OUTPATIENT)
Dept: MEDICAL GROUP | Facility: PHYSICIAN GROUP | Age: 74
End: 2019-04-03
Payer: MEDICARE

## 2019-04-03 VITALS
TEMPERATURE: 98 F | OXYGEN SATURATION: 93 % | WEIGHT: 143.6 LBS | HEART RATE: 86 BPM | BODY MASS INDEX: 23.93 KG/M2 | DIASTOLIC BLOOD PRESSURE: 70 MMHG | SYSTOLIC BLOOD PRESSURE: 116 MMHG | HEIGHT: 65 IN | RESPIRATION RATE: 16 BRPM

## 2019-04-03 DIAGNOSIS — S91.311D LACERATION OF RIGHT FOOT, SUBSEQUENT ENCOUNTER: ICD-10-CM

## 2019-04-03 DIAGNOSIS — Z13.1 SCREENING FOR DIABETES MELLITUS: ICD-10-CM

## 2019-04-03 DIAGNOSIS — Z48.02 ENCOUNTER FOR REMOVAL OF SUTURES: ICD-10-CM
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
## 2019-04-03 DIAGNOSIS — Z13.220 SCREENING FOR LIPID DISORDERS: ICD-10-CM

## 2019-04-03 DIAGNOSIS — Z51.89 ENCOUNTER FOR POST-TRAUMATIC WOUND CHECK: ICD-10-CM

## 2019-04-03 PROCEDURE — 99213 OFFICE O/P EST LOW 20 MIN: CPT | Performed by: FAMILY MEDICINE

## 2019-04-04 NOTE — PROGRESS NOTES
Subjective:   Yasmin Zhong is a 73 y.o. female here today for wound check and suture removal.  She is unaccompanied for today's appointment.    No issues since her last appointment.  She denies foot pain, bleeding or drainage.  No issues with her sutures.  She mentions that she had difficulty obtaining a crutch and so she has been walking on the side of her right foot.  Denies fevers, chills, nausea or abdominal pain.  She is ready to have the sutures removed.    Current medicines (including changes today)  Current Outpatient Prescriptions   Medication Sig Dispense Refill   • NON SPECIFIED Adult crutch 1 Each 0   • vitamin D (CHOLECALCIFEROL) 1000 UNIT Tab Take 1,000 Units by mouth every day.     • Calcium Carbonate-Vit D-Min (CALCIUM 1200 PO) Take  by mouth.     • propranolol (INDERAL) 10 MG Tab Take 1 Tab by mouth 3 times a day as needed (anxiety). 30 Tab 0   • quetiapine (SEROQUEL) 50 MG tablet TAKE ONE TABLET BY MOUTH ONCE DAILY AT BEDTIME 90 Tab 3   • atorvastatin (LIPITOR) 20 MG Tab Take 1 Tab by mouth every day. 90 Tab 3   • busPIRone (BUSPAR) 10 MG Tab Take 1 Tab by mouth 3 times a day. 270 Tab 3   • citalopram (CELEXA) 10 MG tablet Take 1 Tab by mouth every day. 90 Tab 3   • oxybutynin SR (DITROPAN-XL) 10 MG CR tablet Take 1 Tab by mouth every day. 30 Tab 2   • Biotin 1 MG Cap Take  by mouth.     • vitamin e (VITAMIN E) 400 UNIT Cap Take 400 Units by mouth every day.     • Prenatal Vit-Fe Fumarate-FA (PRENATAL VITAMIN PO) Take  by mouth.     • Magnesium 100 MG CAPS Take  by mouth.     • azithromycin (ZITHROMAX) 250 MG Tab Take 2 tablets PO on day #1, then 1 tablet PO daily on days #2-5. 6 Tab 0     No current facility-administered medications for this visit.      She  has a past medical history of Anxiety; Depression; History of respiratory failure; Hyperlipidemia; Hypertension; Insomnia; Peripheral neuropathy; and Pulmonary fibrosis (HCC).    ROS   See HPI. No fever, no chills, no nausea, no  "abdominal pain.     Objective:     Physical Exam:  /70   Pulse 86   Temp 36.7 °C (98 °F)   Resp 16   Ht 1.651 m (5' 5\")   Wt 65.1 kg (143 lb 9.6 oz)   SpO2 93%  Body mass index is 23.9 kg/m².   Constitutional: Alert, no distress, well-groomed.  Skin: Warm, dry, good turgor, no rashes in visible areas.  Eye: Equal, round and reactive, conjunctiva clear, lids normal.  ENMT: Lips without lesions, good dentition, moist mucous membranes.  Neck: Trachea midline, no masses, no thyromegaly.  Respiratory: Unlabored respiratory effort, no cough.  Abdomen: Soft, no gross masses.  MSK: Normal gait, moves all extremities.  Neuro: Grossly non-focal. No cranial nerve deficit. Strength and sensation intact.   Feet: Laceration of right sole well-approximated with steri-strips and 8 sutures. No surrounding erythema. No discharge. Non-tender to palpation. 8 nylon sutures removed without complication.  Psych: Alert and oriented x3, normal affect and mood.    Assessment and Plan:     1. Injury from broken glass, subsequent encounter  2. Laceration of right foot, subsequent encounter  3. Encounter for post-traumatic wound check  4. Encounter for removal of sutures  Improving.  No evidence of infection or poor healing.  Sutures easily removed today.  Return precautions discussed.    5. Screening for diabetes mellitus  Fasting glucose ordered.  - Comp Metabolic Panel; Future    6. Screening for lipid disorders  Fasting lipid panel ordered.  - Lipid Profile; Future    Followup: Return in about 6 months (around 10/3/2019) for f/u cassi Craven.         PLEASE NOTE: This dictation was created using voice recognition software. I have made every reasonable attempt to correct obvious errors, but I expect that there are errors of grammar and possibly content that I did not discover before finalizing the note.  "

## 2019-05-06 ENCOUNTER — TELEPHONE (OUTPATIENT)
Dept: MEDICAL GROUP | Facility: PHYSICIAN GROUP | Age: 74
End: 2019-05-06

## 2019-05-06 DIAGNOSIS — M85.89 OSTEOPENIA OF MULTIPLE SITES: ICD-10-CM

## 2019-05-06 NOTE — TELEPHONE ENCOUNTER
It looks like she needs a new Prolia prescription.  Please fax this to the infusion center.  -Katherine Burton M.D.

## 2019-05-06 NOTE — TELEPHONE ENCOUNTER
Pt called and stated that the infusion center has not received the order for her infusion. I do not see it in the media please advise

## 2019-05-10 DIAGNOSIS — F33.41 RECURRENT MAJOR DEPRESSIVE DISORDER, IN PARTIAL REMISSION (HCC): ICD-10-CM

## 2019-05-10 RX ORDER — CITALOPRAM HYDROBROMIDE 10 MG/1
TABLET ORAL
Qty: 90 TAB | Refills: 3 | Status: SHIPPED | OUTPATIENT
Start: 2019-05-10 | End: 2020-05-01

## 2019-05-17 DIAGNOSIS — F51.01 PRIMARY INSOMNIA: ICD-10-CM

## 2019-05-17 RX ORDER — ZOLPIDEM TARTRATE 10 MG/1
TABLET ORAL
Qty: 33 TAB | Refills: 2 | Status: SHIPPED
Start: 2019-05-17 | End: 2019-06-16

## 2019-05-17 NOTE — TELEPHONE ENCOUNTER
Was the patient seen in the last year in this department? Yes    Does patient have an active prescription for medications requested? Yes    Received Request Via: Patient         KELLY DAVIS     Age: 73  demographics  Data as of: 05/17/2019              NARCOTIC 190        SEDATIVE 291       STIMULANT 000       NARxSCORES can range from 000 to 999. The first two digits represent the composite percentile risk based on an overall analysis of prescription drug use. The third digit represents the number of active prescriptions. The distribution of scores in the population is such that approximately 75% fall below 200, 95% fall below 500 and 99% fall below 650. The information on this report is not warranted as accurate or complete. This report is based on the search criteria supplied and the data entered by the dispensing pharmacy. For more information about any prescription, please contact the dispensing pharmacy or the prescriber. NARxSCORES and Reports are intended to aid, not replace medical decision making. None of the information presented should be used as sole justification for providing or refusing to provide medications.       Rx Graph Grayed out drugs could not be included in score calculations.   [x] Narcotic [x] Sedative [x] Stimulant [x] Buprenorphine [x] Other    Created with Raphaël 2.2.0All Prescribers  Created with Raphaël [id number] - Katherine Burton2 - Katherine Burton3 - Pasquale Kebede  Morphine MgEq (MME)  Created with Raphaël [id number]  Created with Raphaël [id number]  Lorazepam MgEq (LME)  Created with Raphaël [id number]  Created with Raphaël [id number]  *Per CDC guidance, the MME conversion factors prescribed or provided as part of the medication-assisted treatment for opioid use disorder should not be used to benchmark against dosage thresholds meant for opioids prescribed for pain. Buprenorphine products have no agreed upon  "morphine equivalency, and as partial opioid agonists, are not expected to be associated with overdose risk in the same dose-dependent manner as doses for full agonist opioids. MME = morphine milligram equivalents. LME = Lorazepam milligram equivalents. mg = dose in milligrams.     Data Analysis   Narcotics (190) 2 months 6 months 1 year 2 years   Prescribers (narcotic, sedative) 1  19 2  22 2  16 3  16   Pharmacies (narcotic, sedative) 1  25 1  16 1  13 1  10   Morphine mg 0  0 105  24 105  26 105  25   Morphine overlap (1) 0  0 0  0 0  0 0  0           Sedatives (291) 2 months 6 months 1 year 2 years   Prescribers (narcotic, sedative) 1  19 2  22 2  16 3  16   Pharmacies (narcotic, sedative) 1  25 1  16 1  13 1  10   Sedative mg 33  37 99  57 198  65 404  74   Sedative overlap (1) 0  0 0  0 0  0 8  4           Stimulants (000)  2 months 6 months 1 year 2 years   Prescribers (stimulant) 0  0 0  0 0  0 0  0   Pharmacies (stimulant) 0  0 0  0 0  0 0  0   Stimulant days 0  0 0  0 0  0 0  0   Stimulant overlap (1) 0  0 0  0 0  0 0  0      (1) Number of days for which a simliar type of medication was prescribed from different prescribers for use on the same day.         Summary   Total Prescriptions: 24   Total Prescribers: 3   Total Pharmacies: 1   Narcotics*    (excluding buprenorphine)  Current Qty: 0   Current MME/day: 0.00   30 Day Avg MME/day: 0.00   Buprenorphine*   Current Qty: 0   Current mg/day: 0.00   30 Day Avg mg/day: 0.00   Sedatives*     Zolpidem Tartrate 10 Mg Tablet : 3\"> Current Qty: 3   Current LME/day: 0.55    30 Day Avg LME/day: 0.59      *Highlighted drugs could not be included in score calculations   PRESCRIPTIONS  Total Prescriptions: 24   Total Private Pay: 0   Fill Date ID Written Drug Qty Days Prescriber Rx # Pharmacy Refill Daily Dose * Pymt Type    04/21/2019  1   02/25/2019  Zolpidem Tartrate 10 MG Tablet  33 30 Me War  [id number]   [facility]  " 2 0.55 L[place]  [place]   03/24/2019  1   02/25/2019  Zolpidem Tartrate 10 MG Tablet  33 30 Me War  [id number]   [facility] (8532[facility]  1 0.55 L[place]   02/25/2019  1   02/25/2019  Zolpidem Tartrate 10 MG Tablet  33 30 Me War  [id number]   [facility] (8532[facility]  0 0.55 [place]   01/26/2019  1   11/27/2018  Zolpidem Tartrate 10 MG Tablet  33 30 Me War  [id number]   [facility] ([facility]32)  2 0.55 L[place]   01/01/2019  1   01/01/2019  Hydrocodone-Acetamin 5-325 MG  21 7 Me War  [id number]   [facility] (8532)  0 15.00 M[place]   12/28/2018  1   11/27/2018  Zolpidem Tartrate 10 MG Tablet  33 30 Me War  [id number]   [facility] (8532)  1 0.55 L[place]   12/01/2018  1   11/27/2018  Zolpidem Tartrate 10 MG Tablet  33 30 Me War  [id number]   [facility] (8532)  0 0.55 [place]   10/31/2018  1   09/07/2018  Zolpidem Tartrate 10 MG Tablet  33 30 Me War  [id number]   [facility] (8532)  2 0.55 [place]   10/04/2018  1   09/07/2018  Zolpidem Tartrate 10 MG Tablet  33 30 Me War  [id number]   [facility] (8532)  1 0.55 [place]   09/07/2018  1   09/07/2018  Zolpidem Tartrate 10 MG Tablet  33 30 Me War  [id number]   [facility] (8532)  0 0.55 L[place]   08/08/2018  1   06/13/2018  Zolpidem Tartrate 10 MG Tablet  33 30 Me War  [id number]   [facility] (8532)  2 0.55 [place]   07/11/2018  1   06/13/2018  Zolpidem Tartrate 10 MG Tablet  33 30 Me War  [id number]   [facility] (8532[facility]  1 0.55 [place]   06/13/2018  1   06/13/2018  Zolpidem Tartrate 10 MG Tablet  33 30 Me War  [id number]   [facility] (8532)  0 0.55 L[place]   05/16/2018  1   12/26/2017  Zolpidem Tartrate 10 MG Tablet  33 30 Me War  [id number]   [facility] (8532)  5 0.55 L[place]   04/19/2018  1   12/26/2017  Zolpidem Tartrate 10 MG Tablet  33 30 Me War  [id number]   [facility] (8549)  4 0.55 LME  [place]   03/21/2018  1   12/26/2017  Zolpidem Tartrate 10 MG Tablet  33 30 Me War  [id number]   [facility] (8532)  3 0.55 LME  [place]   02/22/2018  1   12/26/2017  Zolpidem Tartrate 10 MG Tablet  33 30 Me War   [id number]   [facility] (8532)  2 0.55 L[place]   01/24/2018  1   12/26/2017  Zolpidem Tartrate 10 MG Tablet  33 30 Me War  [id number]   [facility] (8532)  1 0.55 L[place]   12/26/2017  1   12/26/2017  Zolpidem Tartrate 10 MG Tablet  33 30 Me War  [id number]   [facility] (8532)  0 0.55 LME  [place]   11/29/2017  1   11/22/2017  Zolpidem Tartrate 10 MG Tablet  33 33 Me War  [id number]   [facility] (8532)  0 0.50 LME  [place]   11/01/2017  1   09/07/2017  Zolpidem Tartrate 10 MG Tablet  30 30 Me War  [id number]   [facility] (8532)  2 0.50 LME  [place]   10/04/2017  1   09/07/2017  Zolpidem Tartrate 10 MG Tablet  30 30 Me War  [id number]   [facility] (8532)  1 0.50 LME  [place]   09/07/2017  1   09/07/2017  Zolpidem Tartrate 10 MG Tablet  30 30 Me War  [id number]   [facility] (8532)  0 0.50 LME  [place]   06/16/2017  1   06/16/2017  Zolpidem Tartrate 10 MG Tablet  90 90 Qu Tho  [id number]   [facility] (8532)  0 0.50 LME  [place]   *Per CDC guidance, the MME conversion factors prescribed or provided as part of the medication-assisted treatment for opioid use disorder should not be used to benchmark against dosage thresholds meant for opioids prescribed for pain. Buprenorphine products have no agreed upon morphine equivalency, and as partial opioid agonists, are not expected to be associated with overdose risk in the same dose-dependent manner as doses for full agonist opioids. MME = morphine milligram equivalents. LME = Lorazepam milligram equivalents. MG = dose in milligrams.   PROVIDERS  Total Providers: 3   Name Address City State Zipcode Phone   Pasquale Kebede [address] [place] [address]    [facility] [address] [place] [address]    [facility] [address] [place] [address]    PHARMACIES  Total Pharmacies: 1   Name Address City State Zipcode Phone   [facility]  [phone] [facility] [address] [phone]

## 2019-06-19 ENCOUNTER — TELEPHONE (OUTPATIENT)
Dept: MEDICAL GROUP | Facility: PHYSICIAN GROUP | Age: 74
End: 2019-06-19

## 2019-06-19 NOTE — TELEPHONE ENCOUNTER
Mariaelena from the infusion center called to update Dr. Burton that she will be changing the route to SQ

## 2019-06-20 ENCOUNTER — OUTPATIENT INFUSION SERVICES (OUTPATIENT)
Dept: ONCOLOGY | Facility: MEDICAL CENTER | Age: 74
End: 2019-06-20
Attending: FAMILY MEDICINE
Payer: MEDICARE

## 2019-06-20 VITALS
HEIGHT: 65 IN | BODY MASS INDEX: 23.03 KG/M2 | HEART RATE: 83 BPM | DIASTOLIC BLOOD PRESSURE: 57 MMHG | WEIGHT: 138.23 LBS | SYSTOLIC BLOOD PRESSURE: 107 MMHG | RESPIRATION RATE: 18 BRPM | OXYGEN SATURATION: 95 % | TEMPERATURE: 97.4 F

## 2019-06-20 DIAGNOSIS — Z13.220 SCREENING FOR LIPID DISORDERS: ICD-10-CM

## 2019-06-20 DIAGNOSIS — M81.0 OSTEOPOROSIS, UNSPECIFIED OSTEOPOROSIS TYPE, UNSPECIFIED PATHOLOGICAL FRACTURE PRESENCE: Primary | ICD-10-CM

## 2019-06-20 DIAGNOSIS — Z13.1 SCREENING FOR DIABETES MELLITUS: ICD-10-CM

## 2019-06-20 LAB
ALBUMIN SERPL BCP-MCNC: 4 G/DL (ref 3.2–4.9)
ALBUMIN/GLOB SERPL: 1.7 G/DL
ALP SERPL-CCNC: 57 U/L (ref 30–99)
ALT SERPL-CCNC: 11 U/L (ref 2–50)
ANION GAP SERPL CALC-SCNC: 8 MMOL/L (ref 0–11.9)
AST SERPL-CCNC: 15 U/L (ref 12–45)
BILIRUB SERPL-MCNC: 0.6 MG/DL (ref 0.1–1.5)
BUN SERPL-MCNC: 17 MG/DL (ref 8–22)
CA-I BLD ISE-SCNC: 1.06 MMOL/L (ref 1.1–1.3)
CALCIUM SERPL-MCNC: 9.4 MG/DL (ref 8.5–10.5)
CHLORIDE SERPL-SCNC: 110 MMOL/L (ref 96–112)
CHOLEST SERPL-MCNC: 137 MG/DL (ref 100–199)
CO2 SERPL-SCNC: 24 MMOL/L (ref 20–33)
CREAT BLD-MCNC: 0.8 MG/DL (ref 0.5–1.4)
CREAT SERPL-MCNC: 0.82 MG/DL (ref 0.5–1.4)
GLOBULIN SER CALC-MCNC: 2.4 G/DL (ref 1.9–3.5)
GLUCOSE SERPL-MCNC: 100 MG/DL (ref 65–99)
HDLC SERPL-MCNC: 62 MG/DL
LDLC SERPL CALC-MCNC: 55 MG/DL
POTASSIUM SERPL-SCNC: 3.7 MMOL/L (ref 3.6–5.5)
PROT SERPL-MCNC: 6.4 G/DL (ref 6–8.2)
SODIUM SERPL-SCNC: 142 MMOL/L (ref 135–145)
TRIGL SERPL-MCNC: 98 MG/DL (ref 0–149)

## 2019-06-20 PROCEDURE — 36415 COLL VENOUS BLD VENIPUNCTURE: CPT

## 2019-06-20 PROCEDURE — 96372 THER/PROPH/DIAG INJ SC/IM: CPT

## 2019-06-20 PROCEDURE — 82565 ASSAY OF CREATININE: CPT

## 2019-06-20 PROCEDURE — 82330 ASSAY OF CALCIUM: CPT

## 2019-06-20 PROCEDURE — 80053 COMPREHEN METABOLIC PANEL: CPT

## 2019-06-20 PROCEDURE — 80061 LIPID PANEL: CPT

## 2019-06-20 PROCEDURE — 700111 HCHG RX REV CODE 636 W/ 250 OVERRIDE (IP): Mod: JG | Performed by: FAMILY MEDICINE

## 2019-06-20 RX ORDER — UBIDECARENONE 75 MG
100 CAPSULE ORAL DAILY
COMMUNITY
End: 2020-08-09

## 2019-06-20 RX ADMIN — DENOSUMAB 60 MG: 60 INJECTION SUBCUTANEOUS at 09:18

## 2019-06-20 NOTE — PROGRESS NOTES
Pharmacy note    Ionized Ca = 1.06  Cr = 0.8, CrCl ~ 63 ml/min  OK for denosumab (Prolia) 60 mg SQ today    Last tx = 12/17/18      Sheryl Christie, PharmD

## 2019-06-20 NOTE — PROGRESS NOTES
Pt to \[facility]\"" for labs and possible Prolia injection.  Pt denied any current infections. No recent oral surgery, nor does she have plans for any surgery in the future. Denied s/sx of hypocalcemia.  Stated that she takes calcium once daily. Pt stated she has orders from Dr. Burton for CMP and Lipid panel.  Orders in Epic, released.  Venipuncture right AC with 25g butterfly, unable to fill entire green top. Gauze and coban to site.  Venipuncture left AC with 25g butterfly, specimen obtained.  iStat calcium 1.06mg/dl.  Discussed with patient that her calcium was a little low.  She stated that she may have run out of calcium, that she would go home and verify that she has some in stock, and will remember to take it daily.  Received Prolia as ordered. Tolerated well. Left OPIC in NAD.

## 2019-08-09 DIAGNOSIS — E78.00 PURE HYPERCHOLESTEROLEMIA: ICD-10-CM

## 2019-08-09 DIAGNOSIS — F33.41 RECURRENT MAJOR DEPRESSIVE DISORDER, IN PARTIAL REMISSION (HCC): ICD-10-CM

## 2019-08-09 DIAGNOSIS — F51.01 PRIMARY INSOMNIA: ICD-10-CM

## 2019-08-09 RX ORDER — BUSPIRONE HYDROCHLORIDE 10 MG/1
10 TABLET ORAL 3 TIMES DAILY
Qty: 270 TAB | Refills: 3 | Status: SHIPPED | OUTPATIENT
Start: 2019-08-09 | End: 2020-08-09

## 2019-08-09 RX ORDER — ZOLPIDEM TARTRATE 10 MG/1
10-20 TABLET ORAL NIGHTLY PRN
Qty: 33 TAB | Refills: 0 | Status: SHIPPED
Start: 2019-08-09 | End: 2019-09-09 | Stop reason: SDUPTHER

## 2019-08-09 RX ORDER — ATORVASTATIN CALCIUM 20 MG/1
20 TABLET, FILM COATED ORAL DAILY
Qty: 90 TAB | Refills: 3 | Status: SHIPPED | OUTPATIENT
Start: 2019-08-09 | End: 2020-07-28 | Stop reason: SDUPTHER

## 2019-08-09 RX ORDER — ZOLPIDEM TARTRATE 10 MG/1
10 TABLET ORAL
Refills: 2 | COMMUNITY
Start: 2019-07-14 | End: 2019-08-09 | Stop reason: SDUPTHER

## 2019-08-09 NOTE — TELEPHONE ENCOUNTER
"Was the patient seen in the last year in this department? Yes    Does patient have an active prescription for medications requested? No     Received Request Via: Pharmacy     <HTML><META HTTP-EQUIV=\"content-type\" CONTENT=\"text/html;charset=utf-8\"><p class=\"title-bar\"><SPAN class=\"patient-title\">KELLY DAVIS</SPAN>       <SPAN class=\"float-right\"><IMG alt=\"Between logo\" src=\"[email or web address]\">       </SPAN>     </DIV><p id=\"demobar\"><SPAN>Age: 73</SPAN>       <SPAN class=\"demoitem\"></SPAN>       <SPAN class=\"demoitem\"><A class=\"nc_link\" rvbg-jsmsnyjpq-abpi-overlay-toggle=\"\">demographics</A>  </SPAN>       <SPAN class=\"demodate\">Data as of: 08/09/2019</SPAN>     </DIV><p> </DIV><TABLE class=\"nc_datatable\"><TBODY><TR><TD></TD><TD></TD><TD></TD><TD></TD><TD></TD></TR><TR><TD class=\"score_label\">NARCOTIC</TD><TD class=\"score\">160</TD><TD></TD><TD></TD><TD></TD><TD style=\"text-align: right;\" rowspan=\"4\"><p id=\"percent-graph\"><svg xmlns=\"http://www.[employer]iki.org/2000/svg\" style=\"top: -0.12px; overflow: hidden; position: relative;\" viewBox=\"-5 -5 310 262\" preserveAspectRatio=\"xMinYMin meet\" width=\"300\" height=\"272\" version=\"1.1\" xmlns=\"http://www.w3.org/2000/svg\" xmlns:NS1=\"\" NS1:xmlns:xlink=\"http://www.w3.org/1999/xlink\"><desc></desc><defs></defs><path fill=\"#[id number]\" stroke=\"#767069\" stroke-width=\"0\" d=\"M 0 15 L 0 255 L 255 255 L 120 225 L 60 190 L 0 15\"></path><path fill=\"none\" stroke=\"#cccccc\" stroke-width=\"2px\" d=\"M 60 255 L 60 50\"></path><text font-family=\"&quot;[place]&quot;\" font-size=\"11px\" style=\"font-family: &quot;[place]&quot;; font-size: 11px; text-anchor: middle;\" fill=\"#305071\" stroke=\"none\" text-anchor=\"middle\" x=\"64\" y=\"43\"><tspan dy=\"3.635\">75%</tspan></text><path fill=\"none\" stroke=\"#cccccc\" stroke-width=\"2px\" d=\"M 153 255 L 153 50\"></path><text font-family=\"&quot;[place]&quot;\" font-size=\"11px\" style=\"font-family: &quot;[place]&quot;; font-size: 11px; " "text-anchor: middle;\" fill=\"#831018\" stroke=\"none\" text-anchor=\"middle\" x=\"157\" y=\"43\"><tspan dy=\"3.635\">95%</tspan></text><path fill=\"none\" stroke=\"#cccccc\" stroke-width=\"2px\" d=\"M 199.5 255 L 199.5 50\"></path><text font-family=\"&quot;[place]&quot;\" font-size=\"11px\" style=\"font-family: &quot;[place]&quot;; font-size: 11px; text-anchor: middle;\" fill=\"#939175\" stroke=\"none\" text-anchor=\"middle\" x=\"203.5\" y=\"43\"><tspan dy=\"3.635\">99%</tspan></text><text font-family=\"&quot;[place]&quot;\" font-size=\"12px\" style=\"font-family: &quot;[place]&quot;; font-size: 12px; text-anchor: middle;\" fill=\"#185946\" stroke=\"none\" text-anchor=\"middle\" x=\"0\" y=\"265\"><tspan dy=\"3.96\">0</tspan></text><text font-family=\"&quot;[place]&quot;\" font-size=\"12px\" style=\"font-family: &quot;[place]&quot;; font-size: 12px; text-anchor: middle;\" fill=\"#634089\" stroke=\"none\" text-anchor=\"middle\" x=\"30\" y=\"265\"><tspan dy=\"3.96\">100</tspan></text><text font-family=\"&quot;[place]&quot;\" font-size=\"12px\" style=\"font-family: &quot;[place]&quot;; font-size: 12px; text-anchor: middle;\" fill=\"#658918\" stroke=\"none\" text-anchor=\"middle\" x=\"60\" y=\"265\"><tspan dy=\"3.96\">200</tspan></text><text font-family=\"&quot;[place]&quot;\" font-size=\"12px\" style=\"font-family: &quot;[place]&quot;; font-size: 12px; text-anchor: middle;\" fill=\"#130043\" stroke=\"none\" text-anchor=\"middle\" x=\"90\" y=\"265\"><tspan dy=\"3.96\">300</tspan></text><text font-family=\"&quot;[place]&quot;\" font-size=\"12px\" style=\"font-family: &quot;[place]&quot;; font-size: 12px; text-anchor: middle;\" fill=\"#242264\" stroke=\"none\" text-anchor=\"middle\" x=\"120\" y=\"265\"><tspan dy=\"3.96\">400</tspan></text><text font-family=\"&quot;[place]&quot;\" font-size=\"12px\" style=\"font-family: &quot;[place]&quot;; font-size: 12px; text-anchor: middle;\" fill=\"#492154\" stroke=\"none\" text-anchor=\"middle\" x=\"150\" y=\"265\"><tspan dy=\"3.96\">500</tspan></text><text font-family=\"&quot;[place]&quot;\" font-size=\"12px\" style=\"font-family: &quot;[place]&quot;; font-size: " "12px; text-anchor: middle;\" fill=\"#570000\" stroke=\"none\" text-anchor=\"middle\" x=\"180\" y=\"265\"><tspan dy=\"3.96\">600</tspan></text><text font-family=\"&quot;[place]&quot;\" font-size=\"12px\" style=\"font-family: &quot;[place]&quot;; font-size: 12px; text-anchor: middle;\" fill=\"#843672\" stroke=\"none\" text-anchor=\"middle\" x=\"210\" y=\"265\"><tspan dy=\"3.96\">700</tspan></text><text font-family=\"&quot;[place]&quot;\" font-size=\"12px\" style=\"font-family: &quot;[place]&quot;; font-size: 12px; text-anchor: middle;\" fill=\"#905838\" stroke=\"none\" text-anchor=\"middle\" x=\"240\" y=\"265\"><tspan dy=\"3.96\">800</tspan></text><text font-family=\"&quot;[place]&quot;\" font-size=\"12px\" style=\"font-family: &quot;[place]&quot;; font-size: 12px; text-anchor: middle;\" fill=\"#745482\" stroke=\"none\" text-anchor=\"middle\" x=\"270\" y=\"265\"><tspan dy=\"3.96\">900</tspan></text><path fill=\"none\" stroke=\"#ig9298\" stroke-width=\"2px\" d=\"M 30 255 L 30 50\"></path><path fill=\"none\" stroke=\"#0000ff\" stroke-width=\"2px\" d=\"M 18 255 L 18 50\"></path><path fill=\"none\" stroke=\"#365578\" stroke-width=\"2px\" d=\"M 42 255 L 42 50\"></path><path fill=\"none\" stroke=\"#895408\" stroke-width=\"2px\" d=\"M 0 0 L 0 255\"></path><path fill=\"none\" stroke=\"#582252\" stroke-width=\"2px\" d=\"M 0 255 L 300 255\"></path></svg></DIV></TD></TR><TR><TD class=\"score_label\">SEDATIVE</TD><TD class=\"score\">271</TD><TD></TD><TD></TD><TD></TD></TR><TR><TD class=\"score_label\">STIMULANT</TD><TD class=\"score\">000</TD><TD></TD><TD></TD><TD></TD></TR><TR><TD class=\"score_legend\" colspan=\"5\">          NARxSCORES can range from 000 to 999. The first          two digits represent the composite percentile risk based on an overall          analysis of prescription drug use. The third digit represents the number          of active prescriptions. The distribution of scores in the population          is such that approximately 75% fall below 200, 95% fall below 500 and          99% fall below 650.  The information on this report is not " "warranted          as accurate or complete. This report is based on the search          criteria supplied and the data entered by the dispensing pharmacy.  For          more information about any prescription, please contact the dispensing          pharmacy or the prescriber. NARxSCORES and Reports are intended to aid,          not replace medical decision making. None of the information presented          should be used as sole justification for providing or refusing to          provide medications.         </TD></TR></TBODY></TABLE><HR><p style=\"margin-top: 12px;\"><SPAN class=\"title\" style=\"margin-left: 10px;\" data-test-id=\"graphName\">RX GRAPH</SPAN>       <SPAN class=\"gray aligin-right float-right font14\">Grayed out drugs could not be included in score calculations.</SPAN>     </DIV><p class=\"nc_clear\"></DIV><p class=\"centered font14\" style=\"margin-bottom: 10px;\"><LABEL style=\"color: red;\"><INPUT class=\"draw-graph\" id=\"cbNarcotic\" type=\"checkbox\" checked=\"checked\"> Narcotic</LABEL>       <LABEL style=\"color: darkblue; margin-left: 40px;\"><INPUT class=\"draw-graph\" id=\"cbSedative\" type=\"checkbox\" checked=\"checked\"> Sedative</LABEL>       <LABEL style=\"color: rgb(51, 153, 51); margin-left: 40px;\"><INPUT class=\"draw-graph\" id=\"cbStimulant\" type=\"checkbox\" checked=\"checked\"> Stimulant</LABEL>       <LABEL style=\"color: rgb(47, 3, 81); margin-left: 40px; display: none;\"><INPUT class=\"draw-graph\" id=\"cbBuprenorphine\" type=\"checkbox\" checked=\"checked\"></LABEL>       <LABEL style=\"color: rgb(47, 3, 81); margin-left: 40px;\"><INPUT class=\"draw-graph\" id=\"cbOther\" type=\"checkbox\" checked=\"checked\"> Other</LABEL>       <SPAN id=\"msgbox\"></SPAN>     </DIV><p id=\"graph_container\"><p><p id=\"summary_container\"><svg xmlns=\"http://www.[employer]/2000/svg\" style=\"top: -0.47px; overflow: hidden; position: relative;\" width=\"1005\" height=\"23\" version=\"1.1\" xmlns=\"http://www.[employer]/2000/svg\" xmlns:NS2=\"\" " "NS2:xmlns:xlink=\"http://www.w3.org/1999/xlink\"><rect style=\"cursor: pointer; opacity: 1;\" opacity=\"1\" fill=\"#ffffff\" stroke=\"#f8f8f8\" stroke-width=\"1\" x=\"1\" y=\"2\" width=\"169\" height=\"21\" rx=\"0\" ry=\"0\"></rect><desc></desc><defs></defs><path fill=\"none\" stroke=\"#cccccc\" stroke-width=\"1\" transform=\"matrix(1 0 0 1 0.5 0.5)\" d=\"M 170 1 L 170 24 Z\"></path><path fill=\"none\" stroke=\"#dddddd\" stroke-width=\"1\" transform=\"matrix(1 0 0 1 0.5 0.5)\" d=\"M 230 1 L 230 22 Z\"></path><path fill=\"none\" stroke=\"#dddddd\" stroke-width=\"1\" transform=\"matrix(1 0 0 1 0.5 0.5)\" d=\"M 1 1 L 1006 1 Z\"></path><path fill=\"none\" stroke=\"#dddddd\" stroke-width=\"1\" transform=\"matrix(1 0 0 1 0.5 0.5)\" d=\"M 1 22 L 1006 22 Z\"></path><text font-family=\"&quot;[place]&quot;\" font-size=\"10px\" style=\"font: italic 14px/normal arial; font-size-adjust: none; font-stretch: normal; text-anchor: start;\" fill=\"#239270\" stroke=\"none\" text-anchor=\"start\" x=\"10\" y=\"11\" font=\"italic 14px arial\"><tspan dy=\"4.63\">All Prescribers</tspan></text><rect style=\"opacity: 0.5;\" opacity=\"0.5\" fill=\"#0000ff\" stroke=\"#0000ff\" stroke-width=\"1\" x=\"226\" y=\"3\" width=\"30\" height=\"18\" rx=\"0\" ry=\"0\"></rect><rect style=\"opacity: 0.5;\" opacity=\"0.5\" fill=\"#0000ff\" stroke=\"#0000ff\" stroke-width=\"1\" x=\"255\" y=\"3\" width=\"30\" height=\"18\" rx=\"0\" ry=\"0\"></rect><rect style=\"opacity: 0.5;\" opacity=\"0.5\" fill=\"#0000ff\" stroke=\"#0000ff\" stroke-width=\"1\" x=\"284\" y=\"3\" width=\"30\" height=\"18\" rx=\"0\" ry=\"0\"></rect><rect style=\"opacity: 0.5;\" opacity=\"0.5\" fill=\"#0000ff\" stroke=\"#0000ff\" stroke-width=\"1\" x=\"310\" y=\"3\" width=\"30\" height=\"18\" rx=\"0\" ry=\"0\"></rect><rect style=\"opacity: 0.5;\" opacity=\"0.5\" fill=\"#0000ff\" stroke=\"#0000ff\" stroke-width=\"1\" x=\"338\" y=\"3\" width=\"30\" height=\"18\" rx=\"0\" ry=\"0\"></rect><rect style=\"opacity: 0.5;\" opacity=\"0.5\" fill=\"#0000ff\" stroke=\"#0000ff\" stroke-width=\"1\" x=\"365\" y=\"3\" width=\"30\" height=\"18\" rx=\"0\" ry=\"0\"></rect><rect style=\"opacity: 0.5;\" opacity=\"0.5\" " "fill=\"#0000ff\" stroke=\"#0000ff\" stroke-width=\"1\" x=\"395\" y=\"3\" width=\"30\" height=\"18\" rx=\"0\" ry=\"0\"></rect><rect style=\"opacity: 0.5;\" opacity=\"0.5\" fill=\"#mh4347\" stroke=\"#aa5137\" stroke-width=\"1\" x=\"443\" y=\"3\" width=\"7\" height=\"18\" rx=\"0\" ry=\"0\"></rect><rect style=\"opacity: 0.5;\" opacity=\"0.5\" fill=\"#0000ff\" stroke=\"#0000ff\" stroke-width=\"1\" x=\"424\" y=\"3\" width=\"30\" height=\"18\" rx=\"0\" ry=\"0\"></rect><rect style=\"opacity: 0.5;\" opacity=\"0.5\" fill=\"#0000ff\" stroke=\"#0000ff\" stroke-width=\"1\" x=\"451\" y=\"3\" width=\"30\" height=\"18\" rx=\"0\" ry=\"0\"></rect><rect style=\"opacity: 0.5;\" opacity=\"0.5\" fill=\"#0000ff\" stroke=\"#0000ff\" stroke-width=\"1\" x=\"482\" y=\"3\" width=\"30\" height=\"18\" rx=\"0\" ry=\"0\"></rect><rect style=\"opacity: 0.5;\" opacity=\"0.5\" fill=\"#0000ff\" stroke=\"#0000ff\" stroke-width=\"1\" x=\"509\" y=\"3\" width=\"30\" height=\"18\" rx=\"0\" ry=\"0\"></rect><rect style=\"opacity: 0.5;\" opacity=\"0.5\" fill=\"#0000ff\" stroke=\"#0000ff\" stroke-width=\"1\" x=\"536\" y=\"3\" width=\"30\" height=\"18\" rx=\"0\" ry=\"0\"></rect><rect style=\"opacity: 0.5;\" opacity=\"0.5\" fill=\"#0000ff\" stroke=\"#0000ff\" stroke-width=\"1\" x=\"566\" y=\"3\" width=\"30\" height=\"18\" rx=\"0\" ry=\"0\"></rect><rect style=\"opacity: 0.5;\" opacity=\"0.5\" fill=\"#0000ff\" stroke=\"#0000ff\" stroke-width=\"1\" x=\"594\" y=\"3\" width=\"30\" height=\"18\" rx=\"0\" ry=\"0\"></rect><rect style=\"opacity: 0.5;\" opacity=\"0.5\" fill=\"#0000ff\" stroke=\"#0000ff\" stroke-width=\"1\" x=\"622\" y=\"3\" width=\"30\" height=\"18\" rx=\"0\" ry=\"0\"></rect><rect style=\"opacity: 0.5;\" opacity=\"0.5\" fill=\"#0000ff\" stroke=\"#0000ff\" stroke-width=\"1\" x=\"650\" y=\"3\" width=\"30\" height=\"18\" rx=\"0\" ry=\"0\"></rect><rect style=\"opacity: 0.5;\" opacity=\"0.5\" fill=\"#0000ff\" stroke=\"#0000ff\" stroke-width=\"1\" x=\"677\" y=\"3\" width=\"30\" height=\"18\" rx=\"0\" ry=\"0\"></rect><rect style=\"opacity: 0.5;\" opacity=\"0.5\" fill=\"#0000ff\" stroke=\"#0000ff\" stroke-width=\"1\" x=\"706\" y=\"3\" width=\"30\" height=\"18\" rx=\"0\" ry=\"0\"></rect><rect style=\"opacity: 0.5;\" opacity=\"0.5\" " "fill=\"#0000ff\" stroke=\"#0000ff\" stroke-width=\"1\" x=\"733\" y=\"3\" width=\"30\" height=\"18\" rx=\"0\" ry=\"0\"></rect><rect style=\"opacity: 0.5;\" opacity=\"0.5\" fill=\"#0000ff\" stroke=\"#0000ff\" stroke-width=\"1\" x=\"762\" y=\"3\" width=\"30\" height=\"18\" rx=\"0\" ry=\"0\"></rect><rect style=\"opacity: 0.5;\" opacity=\"0.5\" fill=\"#0000ff\" stroke=\"#0000ff\" stroke-width=\"1\" x=\"791\" y=\"3\" width=\"30\" height=\"18\" rx=\"0\" ry=\"0\"></rect><rect style=\"opacity: 0.5;\" opacity=\"0.5\" fill=\"#0000ff\" stroke=\"#0000ff\" stroke-width=\"1\" x=\"815\" y=\"3\" width=\"33\" height=\"18\" rx=\"0\" ry=\"0\"></rect><rect style=\"opacity: 0.5;\" opacity=\"0.5\" fill=\"#0000ff\" stroke=\"#0000ff\" stroke-width=\"1\" x=\"846\" y=\"3\" width=\"30\" height=\"18\" rx=\"0\" ry=\"0\"></rect><rect style=\"opacity: 0.5;\" opacity=\"0.5\" fill=\"#0000ff\" stroke=\"#0000ff\" stroke-width=\"1\" x=\"874\" y=\"3\" width=\"30\" height=\"18\" rx=\"0\" ry=\"0\"></rect><rect style=\"opacity: 0.5;\" opacity=\"0.5\" fill=\"#0000ff\" stroke=\"#0000ff\" stroke-width=\"1\" x=\"901\" y=\"3\" width=\"30\" height=\"18\" rx=\"0\" ry=\"0\"></rect><rect style=\"cursor: pointer; opacity: 0;\" opacity=\"0\" fill=\"#ffffff\" stroke=\"#0000ff\" stroke-width=\"1\" x=\"170\" y=\"2\" width=\"790\" height=\"20\" rx=\"0\" ry=\"0\"></rect></svg></DIV><p id=\"canvas_container\" style=\"margin-top: 30px;\"><svg xmlns=\"http://www.[employer].org/2000/svg\" style=\"top: -0.46px; overflow: hidden; position: relative;\" width=\"1005\" height=\"127\" version=\"1.1\" xmlns=\"http://www.[employer].org/2000/svg\" xmlns:NS3=\"\" NS3:xmlns:xlink=\"http://www.[employer].org/1999/xlink\"><rect style=\"cursor: pointer; opacity: 1;\" opacity=\"1\" fill=\"#f8f8f8\" stroke=\"#f8f8f8\" stroke-width=\"1\" x=\"1\" y=\"107\" width=\"1005\" height=\"20\" rx=\"0\" ry=\"0\"></rect><rect style=\"cursor: pointer; opacity: 1;\" opacity=\"1\" fill=\"#ffffff\" stroke=\"#f8f8f8\" stroke-width=\"1\" x=\"1\" y=\"2\" width=\"169\" height=\"104\" rx=\"0\" ry=\"0\"></rect><desc></desc><defs></defs><path fill=\"none\" stroke=\"#cccccc\" stroke-width=\"1\" transform=\"matrix(1 0 0 1 0.5 0.5)\" d=\"M 1 106 L 1006 106 Z\"></path><path " "fill=\"none\" stroke=\"#cccccc\" stroke-width=\"1\" transform=\"matrix(1 0 0 1 0.5 0.5)\" d=\"M 1 85 L 1006 85 Z\"></path><path fill=\"none\" stroke=\"#cccccc\" stroke-width=\"1\" transform=\"matrix(1 0 0 1 0.5 0.5)\" d=\"M 1 64 L 1006 64 Z\"></path><path fill=\"none\" stroke=\"#cccccc\" stroke-width=\"1\" transform=\"matrix(1 0 0 1 0.5 0.5)\" d=\"M 1 43 L 1006 43 Z\"></path><path fill=\"none\" stroke=\"#cccccc\" stroke-width=\"1\" transform=\"matrix(1 0 0 1 0.5 0.5)\" d=\"M 1 22 L 1006 22 Z\"></path><path fill=\"none\" stroke=\"#cccccc\" stroke-width=\"1\" transform=\"matrix(1 0 0 1 0.5 0.5)\" d=\"M 1 1 L 1006 1 Z\"></path><path fill=\"none\" stroke=\"#cccccc\" stroke-width=\"1\" transform=\"matrix(1 0 0 1 0.5 0.5)\" d=\"M 170 1 L 170 127 Z\"></path><path fill=\"none\" stroke=\"#dddddd\" stroke-width=\"1\" transform=\"matrix(1 0 0 1 0.5 0.5)\" d=\"M 230 1 L 230 106 Z\"></path><text font-family=\"&quot;[place]&quot;\" font-size=\"12px\" font-weight=\"bold\" style=\"font-family: &quot;[place]&quot;; font-size: 12px; font-weight: bold; text-anchor: middle;\" fill=\"#145569\" stroke=\"none\" text-anchor=\"middle\" x=\"45\" y=\"117\"><tspan dy=\"3.96\">Timeline</tspan></text><text font-family=\"&quot;[place]&quot;\" font-size=\"12px\" style=\"font-family: &quot;[place]&quot;; font-size: 12px; text-anchor: middle;\" fill=\"#739915\" stroke=\"none\" text-anchor=\"middle\" x=\"230\" y=\"117\"><tspan dy=\"3.96\">08/09</tspan></text><text font-family=\"&quot;[place]&quot;\" font-size=\"12px\" style=\"font-family: &quot;[place]&quot;; font-size: 12px; text-anchor: middle;\" fill=\"#868189\" stroke=\"none\" text-anchor=\"middle\" x=\"290\" y=\"117\"><tspan dy=\"3.96\">2m</tspan></text><text font-family=\"&quot;[place]&quot;\" font-size=\"12px\" style=\"font-family: &quot;[place]&quot;; font-size: 12px; text-anchor: middle;\" fill=\"#241012\" stroke=\"none\" text-anchor=\"middle\" x=\"410\" y=\"117\"><tspan dy=\"3.96\">6m</tspan></text><text font-family=\"&quot;[place]&quot;\" font-size=\"12px\" style=\"font-family: &quot;[place]&quot;; font-size: 12px; text-anchor: middle;\" fill=\"#085798\" " "stroke=\"none\" text-anchor=\"middle\" x=\"595\" y=\"117\"><tspan dy=\"3.96\">1y</tspan></text><text font-family=\"&quot;[place]&quot;\" font-size=\"12px\" style=\"font-family: &quot;[place]&quot;; font-size: 12px; text-anchor: middle;\" fill=\"#297851\" stroke=\"none\" text-anchor=\"middle\" x=\"945\" y=\"117\"><tspan dy=\"3.96\">2y</tspan></text><path fill=\"none\" stroke=\"#cccccc\" stroke-width=\"1\" transform=\"matrix(1 0 0 1 0.5 0.5)\" d=\"M 1 126 L 1006 126 Z\"></path><text font-family=\"&quot;[place]&quot;\" font-size=\"12px\" style=\"font-family: &quot;[place]&quot;; font-size: 12px; text-anchor: start;\" fill=\"#608045\" stroke=\"none\" text-anchor=\"start\" x=\"10\" y=\"12\"><tspan dy=\"3.96\">Prescribers</tspan></text><text font-family=\"&quot;[place]&quot;\" font-size=\"12px\" style=\"font-family: &quot;[place]&quot;; font-size: 12px; text-anchor: start;\" fill=\"#001728\" stroke=\"none\" text-anchor=\"start\" x=\"10\" y=\"96\"><tspan dy=\"3.96\">1 - Katherine L Burton</tspan></text><rect style=\"cursor: pointer; opacity: 0;\" opacity=\"0\" fill=\"#lc5996\" stroke=\"#bc0768\" stroke-width=\"1\" x=\"1\" y=\"86\" width=\"168\" height=\"20\" rx=\"0\" ry=\"0\"></rect><text font-family=\"&quot;[place]&quot;\" font-size=\"12px\" style=\"font-family: &quot;[place]&quot;; font-size: 12px; text-anchor: start;\" fill=\"#306728\" stroke=\"none\" text-anchor=\"start\" x=\"10\" y=\"75\"><tspan dy=\"3.96\">[address] [place], [place] [address]han L Burton</tspan></text><rect style=\"cursor: pointer; opacity: 0;\" opacity=\"0\" fill=\"#vk4789\" stroke=\"#kn0940\" stroke-width=\"1\" x=\"1\" y=\"65\" width=\"168\" height=\"20\" rx=\"0\" ry=\"0\"></rect><rect style=\"cursor: pointer; opacity: 0.5;\" opacity=\"0.5\" fill=\"#0000ff\" stroke=\"#0000ff\" stroke-width=\"1\" x=\"226\" y=\"87\" width=\"30\" height=\"18\" rx=\"0\" ry=\"0\"></rect><rect style=\"cursor: pointer; opacity: 0.5;\" opacity=\"0.5\" fill=\"#0000ff\" stroke=\"#0000ff\" stroke-width=\"1\" x=\"255\" y=\"87\" width=\"30\" height=\"18\" rx=\"0\" ry=\"0\"></rect><rect style=\"cursor: pointer; opacity: 0.5;\" opacity=\"0.5\" fill=\"#0000ff\" stroke=\"#0000ff\" stroke-width=\"1\" x=\"284\" y=\"87\" " "width=\"30\" height=\"18\" rx=\"0\" ry=\"0\"></rect><rect style=\"cursor: pointer; opacity: 0.5;\" opacity=\"0.5\" fill=\"#0000ff\" stroke=\"#0000ff\" stroke-width=\"1\" x=\"310\" y=\"87\" width=\"30\" height=\"18\" rx=\"0\" ry=\"0\"></rect><rect style=\"cursor: pointer; opacity: 0.5;\" opacity=\"0.5\" fill=\"#0000ff\" stroke=\"#0000ff\" stroke-width=\"1\" x=\"338\" y=\"87\" width=\"30\" height=\"18\" rx=\"0\" ry=\"0\"></rect><rect style=\"cursor: pointer; opacity: 0.5;\" opacity=\"0.5\" fill=\"#0000ff\" stroke=\"#0000ff\" stroke-width=\"1\" x=\"365\" y=\"87\" width=\"30\" height=\"18\" rx=\"0\" ry=\"0\"></rect><rect style=\"cursor: pointer; opacity: 0.5;\" opacity=\"0.5\" fill=\"#0000ff\" stroke=\"#0000ff\" stroke-width=\"1\" x=\"395\" y=\"66\" width=\"30\" height=\"18\" rx=\"0\" ry=\"0\"></rect><rect style=\"cursor: pointer; opacity: 0.5;\" opacity=\"0.5\" fill=\"#cq0319\" stroke=\"#wm6563\" stroke-width=\"1\" x=\"443\" y=\"66\" width=\"7\" height=\"18\" rx=\"0\" ry=\"0\"></rect><rect style=\"cursor: pointer; opacity: 0.5;\" opacity=\"0.5\" fill=\"#0000ff\" stroke=\"#0000ff\" stroke-width=\"1\" x=\"424\" y=\"66\" width=\"30\" height=\"18\" rx=\"0\" ry=\"0\"></rect><rect style=\"cursor: pointer; opacity: 0.5;\" opacity=\"0.5\" fill=\"#0000ff\" stroke=\"#0000ff\" stroke-width=\"1\" x=\"451\" y=\"66\" width=\"30\" height=\"18\" rx=\"0\" ry=\"0\"></rect><rect style=\"cursor: pointer; opacity: 0.5;\" opacity=\"0.5\" fill=\"#0000ff\" stroke=\"#0000ff\" stroke-width=\"1\" x=\"482\" y=\"66\" width=\"30\" height=\"18\" rx=\"0\" ry=\"0\"></rect><rect style=\"cursor: pointer; opacity: 0.5;\" opacity=\"0.5\" fill=\"#0000ff\" stroke=\"#0000ff\" stroke-width=\"1\" x=\"509\" y=\"66\" width=\"30\" height=\"18\" rx=\"0\" ry=\"0\"></rect><rect style=\"cursor: pointer; opacity: 0.5;\" opacity=\"0.5\" fill=\"#0000ff\" stroke=\"#0000ff\" stroke-width=\"1\" x=\"536\" y=\"66\" width=\"30\" height=\"18\" rx=\"0\" ry=\"0\"></rect><rect style=\"cursor: pointer; opacity: 0.5;\" opacity=\"0.5\" fill=\"#0000ff\" stroke=\"#0000ff\" stroke-width=\"1\" x=\"566\" y=\"87\" width=\"30\" height=\"18\" rx=\"0\" ry=\"0\"></rect><rect style=\"cursor: pointer; opacity: 0.5;\" opacity=\"0.5\" " "fill=\"#0000ff\" stroke=\"#0000ff\" stroke-width=\"1\" x=\"594\" y=\"87\" width=\"30\" height=\"18\" rx=\"0\" ry=\"0\"></rect><rect style=\"cursor: pointer; opacity: 0.5;\" opacity=\"0.5\" fill=\"#0000ff\" stroke=\"#0000ff\" stroke-width=\"1\" x=\"622\" y=\"87\" width=\"30\" height=\"18\" rx=\"0\" ry=\"0\"></rect><rect style=\"cursor: pointer; opacity: 0.5;\" opacity=\"0.5\" fill=\"#0000ff\" stroke=\"#0000ff\" stroke-width=\"1\" x=\"650\" y=\"87\" width=\"30\" height=\"18\" rx=\"0\" ry=\"0\"></rect><rect style=\"cursor: pointer; opacity: 0.5;\" opacity=\"0.5\" fill=\"#0000ff\" stroke=\"#0000ff\" stroke-width=\"1\" x=\"677\" y=\"87\" width=\"30\" height=\"18\" rx=\"0\" ry=\"0\"></rect><rect style=\"cursor: pointer; opacity: 0.5;\" opacity=\"0.5\" fill=\"#0000ff\" stroke=\"#0000ff\" stroke-width=\"1\" x=\"706\" y=\"66\" width=\"30\" height=\"18\" rx=\"0\" ry=\"0\"></rect><rect style=\"cursor: pointer; opacity: 0.5;\" opacity=\"0.5\" fill=\"#0000ff\" stroke=\"#0000ff\" stroke-width=\"1\" x=\"733\" y=\"66\" width=\"30\" height=\"18\" rx=\"0\" ry=\"0\"></rect><rect style=\"cursor: pointer; opacity: 0.5;\" opacity=\"0.5\" fill=\"#0000ff\" stroke=\"#0000ff\" stroke-width=\"1\" x=\"762\" y=\"66\" width=\"30\" height=\"18\" rx=\"0\" ry=\"0\"></rect><rect style=\"cursor: pointer; opacity: 0.5;\" opacity=\"0.5\" fill=\"#0000ff\" stroke=\"#0000ff\" stroke-width=\"1\" x=\"791\" y=\"66\" width=\"30\" height=\"18\" rx=\"0\" ry=\"0\"></rect><rect style=\"cursor: pointer; opacity: 0.5;\" opacity=\"0.5\" fill=\"#0000ff\" stroke=\"#0000ff\" stroke-width=\"1\" x=\"815\" y=\"66\" width=\"33\" height=\"18\" rx=\"0\" ry=\"0\"></rect><rect style=\"cursor: pointer; opacity: 0.5;\" opacity=\"0.5\" fill=\"#0000ff\" stroke=\"#0000ff\" stroke-width=\"1\" x=\"846\" y=\"66\" width=\"30\" height=\"18\" rx=\"0\" ry=\"0\"></rect><rect style=\"cursor: pointer; opacity: 0.5;\" opacity=\"0.5\" fill=\"#0000ff\" stroke=\"#0000ff\" stroke-width=\"1\" x=\"874\" y=\"66\" width=\"30\" height=\"18\" rx=\"0\" ry=\"0\"></rect><rect style=\"cursor: pointer; opacity: 0.5;\" opacity=\"0.5\" fill=\"#0000ff\" stroke=\"#0000ff\" stroke-width=\"1\" x=\"901\" y=\"66\" width=\"30\" height=\"18\" rx=\"0\" " "ry=\"0\"></rect><rect style=\"cursor: pointer; opacity: 0;\" opacity=\"0\" fill=\"#ffffff\" stroke=\"#0000ff\" stroke-width=\"1\" x=\"170\" y=\"1\" width=\"790\" height=\"107\" rx=\"0\" ry=\"0\"></rect></svg></DIV></DIV><p><p class=\"mmeDivider\">Morphine MgEq (MME)</DIV><p id=\"mme_container\" style=\"margin-top: 35px;\"><svg xmlns=\"http://www.[employer].org/2000/svg\" style=\"top: -0.46px; overflow: hidden; position: relative;\" width=\"1005\" height=\"100\" version=\"1.1\" xmlns=\"http://www.[employer].org/2000/svg\" xmlns:NS4=\"\" NS4:xmlns:xlink=\"http://www.[employer].org/1999/xlink\"><path fill=\"none\" stroke=\"#dddddd\" stroke-width=\"1\" transform=\"matrix(1 0 0 1 0.5 0.5)\" d=\"M 170 90 L 1005 90 Z\"></path><path fill=\"none\" stroke=\"#eeeeee\" stroke-width=\"1\" transform=\"matrix(1 0 0 1 0.5 0.5)\" d=\"M 170 70 L 1005 70 Z\"></path><path fill=\"none\" stroke=\"#eeeeee\" stroke-width=\"1\" transform=\"matrix(1 0 0 1 0.5 0.5)\" d=\"M 170 40 L 1005 40 Z\"></path><path fill=\"none\" stroke=\"#dddddd\" stroke-width=\"1\" transform=\"matrix(1 0 0 1 0.5 0.5)\" d=\"M 170 10 L 1005 10 Z\"></path><desc></desc><defs></defs><path fill=\"none\" stroke=\"#cccccc\" stroke-width=\"1\" transform=\"matrix(1 0 0 1 0.5 0.5)\" d=\"M 170 10 L 170 90 Z\"></path><path fill=\"none\" stroke=\"#dddddd\" stroke-width=\"1\" transform=\"matrix(1 0 0 1 0.5 0.5)\" d=\"M 230 10 L 230 110 Z\"></path><text font-family=\"&quot;[place]&quot;\" font-size=\"12px\" font-weight=\"bold\" style=\"font-family: &quot;[place]&quot;; font-size: 12px; font-weight: bold; text-anchor: end;\" fill=\"#361200\" stroke=\"none\" text-anchor=\"end\" x=\"160\" y=\"10\"><tspan dy=\"3.96\">320</tspan></text><text font-family=\"&quot;[place]&quot;\" font-size=\"12px\" font-weight=\"bold\" style=\"font-family: &quot;[place]&quot;; font-size: 12px; font-weight: bold; text-anchor: end;\" fill=\"#541984\" stroke=\"none\" text-anchor=\"end\" x=\"160\" y=\"40\"><tspan dy=\"3.96\">200</tspan></text><text font-family=\"&quot;[place]&quot;\" font-size=\"12px\" font-weight=\"bold\" style=\"font-family: &quot;[place]&quot;; font-size: 12px; font-weight: " "bold; text-anchor: end;\" fill=\"#320893\" stroke=\"none\" text-anchor=\"end\" x=\"160\" y=\"70\"><tspan dy=\"3.96\">80</tspan></text><text font-family=\"&quot;[place]&quot;\" font-size=\"12px\" font-weight=\"bold\" style=\"font-family: &quot;[place]&quot;; font-size: 12px; font-weight: bold; text-anchor: end;\" fill=\"#541342\" stroke=\"none\" text-anchor=\"end\" x=\"160\" y=\"90\"><tspan dy=\"3.96\">0</tspan></text><rect style=\"cursor: pointer; opacity: 0.65;\" opacity=\"0.65\" fill=\"#mk0567\" stroke=\"#am2269\" stroke-width=\"1\" x=\"443\" y=\"87\" width=\"1\" height=\"3\" rx=\"0\" ry=\"0\"></rect><rect style=\"cursor: pointer; opacity: 0.65;\" opacity=\"0.65\" fill=\"#lu7080\" stroke=\"#rh3488\" stroke-width=\"1\" x=\"444\" y=\"87\" width=\"1\" height=\"3\" rx=\"0\" ry=\"0\"></rect><rect style=\"cursor: pointer; opacity: 0.65;\" opacity=\"0.65\" fill=\"#uf5832\" stroke=\"#fc9172\" stroke-width=\"1\" x=\"445\" y=\"87\" width=\"1\" height=\"3\" rx=\"0\" ry=\"0\"></rect><rect style=\"cursor: pointer; opacity: 0.65;\" opacity=\"0.65\" fill=\"#an9522\" stroke=\"#ov9714\" stroke-width=\"1\" x=\"446\" y=\"87\" width=\"1\" height=\"3\" rx=\"0\" ry=\"0\"></rect><rect style=\"cursor: pointer; opacity: 0.65;\" opacity=\"0.65\" fill=\"#ra2182\" stroke=\"#ht9291\" stroke-width=\"1\" x=\"447\" y=\"87\" width=\"1\" height=\"3\" rx=\"0\" ry=\"0\"></rect><rect style=\"cursor: pointer; opacity: 0.65;\" opacity=\"0.65\" fill=\"#ec7333\" stroke=\"#hx6528\" stroke-width=\"1\" x=\"448\" y=\"87\" width=\"1\" height=\"3\" rx=\"0\" ry=\"0\"></rect><rect style=\"cursor: pointer; opacity: 0.65;\" opacity=\"0.65\" fill=\"#wi9244\" stroke=\"#zm6047\" stroke-width=\"1\" x=\"449\" y=\"87\" width=\"1\" height=\"3\" rx=\"0\" ry=\"0\"></rect></svg></DIV><p id=\"mme_container_footer\"><svg xmlns=\"http://www.[employer].org/2000/svg\" style=\"top: -0.38px; overflow: hidden; position: relative;\" width=\"1005\" height=\"20\" version=\"1.1\" xmlns=\"http://www.[employer].org/2000/svg\" xmlns:NS5=\"\" NS5:xmlns:xlink=\"http://www.[employer].org/1999/xlink\"><rect style=\"cursor: pointer; opacity: 1;\" opacity=\"1\" fill=\"#f8f8f8\" stroke=\"#f8f8f8\" stroke-width=\"1\" x=\"0\" y=\"0\" " "width=\"1005\" height=\"20\" rx=\"0\" ry=\"0\"></rect><desc></desc><defs></defs><text font-family=\"&quot;[place]&quot;\" font-size=\"12px\" font-weight=\"bold\" style=\"font-family: &quot;[place]&quot;; font-size: 12px; font-weight: bold; text-anchor: middle;\" fill=\"#250125\" stroke=\"none\" text-anchor=\"middle\" x=\"45\" y=\"12\"><tspan dy=\"3.96\">Timeline</tspan></text><text font-family=\"&quot;[place]&quot;\" font-size=\"12px\" style=\"font-family: &quot;[place]&quot;; font-size: 12px; text-anchor: middle;\" fill=\"#953591\" stroke=\"none\" text-anchor=\"middle\" x=\"230\" y=\"10\"><tspan dy=\"3.96\">08/09</tspan></text><text font-family=\"&quot;[place]&quot;\" font-size=\"12px\" style=\"font-family: &quot;[place]&quot;; font-size: 12px; text-anchor: middle;\" fill=\"#446073\" stroke=\"none\" text-anchor=\"middle\" x=\"290\" y=\"10\"><tspan dy=\"3.96\">2m</tspan></text><text font-family=\"&quot;[place]&quot;\" font-size=\"12px\" style=\"font-family: &quot;[place]&quot;; font-size: 12px; text-anchor: middle;\" fill=\"#999680\" stroke=\"none\" text-anchor=\"middle\" x=\"410\" y=\"10\"><tspan dy=\"3.96\">6m</tspan></text><text font-family=\"&quot;[place]&quot;\" font-size=\"12px\" style=\"font-family: &quot;[place]&quot;; font-size: 12px; text-anchor: middle;\" fill=\"#236855\" stroke=\"none\" text-anchor=\"middle\" x=\"595\" y=\"10\"><tspan dy=\"3.96\">1y</tspan></text><text font-family=\"&quot;[place]&quot;\" font-size=\"12px\" style=\"font-family: &quot;[place]&quot;; font-size: 12px; text-anchor: middle;\" fill=\"#104062\" stroke=\"none\" text-anchor=\"middle\" x=\"945\" y=\"10\"><tspan dy=\"3.96\">2y</tspan></text><path fill=\"none\" stroke=\"#dddddd\" stroke-width=\"1\" transform=\"matrix(1 0 0 1 0.5 0.5)\" d=\"M 0 0 L 1005 0 Z\"></path></svg></DIV></DIV><p style=\"display: none;\"><p class=\"mmeDivider\"></DIV><p id=\"buprenorphine_container\" style=\"margin-top: 35px;\"><svg xmlns=\"http://www.[employer].org/2000/svg\" style=\"overflow: hidden; position: relative;\" width=\"1005\" height=\"100\" version=\"1.1\" xmlns=\"http://www.[employer].org/2000/svg\" xmlns:NS6=\"\" " "NS6:xmlns:xlink=\"http://www.w[employer].org/1999/xlink\"><path fill=\"none\" stroke=\"#dddddd\" stroke-width=\"1\" transform=\"matrix(1 0 0 1 0.5 0.5)\" d=\"M 170 90 L 1005 90 Z\"></path><path fill=\"none\" stroke=\"#eeeeee\" stroke-width=\"1\" transform=\"matrix(1 0 0 1 0.5 0.5)\" d=\"M 170 79 L 1005 79 Z\"></path><path fill=\"none\" stroke=\"#eeeeee\" stroke-width=\"1\" transform=\"matrix(1 0 0 1 0.5 0.5)\" d=\"M 170 44.5 L 1005 44.5 Z\"></path><path fill=\"none\" stroke=\"#dddddd\" stroke-width=\"1\" transform=\"matrix(1 0 0 1 0.5 0.5)\" d=\"M 170 10 L 1005 10 Z\"></path><desc></desc><defs></defs><path fill=\"none\" stroke=\"#cccccc\" stroke-width=\"1\" transform=\"matrix(1 0 0 1 0.5 0.5)\" d=\"M 170 10 L 170 90 Z\"></path><path fill=\"none\" stroke=\"#dddddd\" stroke-width=\"1\" transform=\"matrix(1 0 0 1 0.5 0.5)\" d=\"M 230 10 L 230 110 Z\"></path><text font-family=\"&quot;[place]&quot;\" font-size=\"12px\" font-weight=\"bold\" style=\"font-family: &quot;[place]&quot;; font-size: 12px; font-weight: bold; text-anchor: end;\" fill=\"#742153\" stroke=\"none\" text-anchor=\"end\" x=\"160\" y=\"10\"><tspan dy=\"10\"></tspan></text><text font-family=\"&quot;[place]&quot;\" font-size=\"12px\" font-weight=\"bold\" style=\"font-family: &quot;[place]&quot;; font-size: 12px; font-weight: bold; text-anchor: end;\" fill=\"#695731\" stroke=\"none\" text-anchor=\"end\" x=\"160\" y=\"44.5\"><tspan dy=\"44.5\"></tspan></text><text font-family=\"&quot;[place]&quot;\" font-size=\"12px\" font-weight=\"bold\" style=\"font-family: &quot;[place]&quot;; font-size: 12px; font-weight: bold; text-anchor: end;\" fill=\"#899361\" stroke=\"none\" text-anchor=\"end\" x=\"160\" y=\"79\"><tspan dy=\"79\"></tspan></text><text font-family=\"&quot;[place]&quot;\" font-size=\"12px\" font-weight=\"bold\" style=\"font-family: &quot;[place]&quot;; font-size: 12px; font-weight: bold; text-anchor: end;\" fill=\"#223923\" stroke=\"none\" text-anchor=\"end\" x=\"160\" y=\"90\"><tspan dy=\"90\"></tspan></text></svg></DIV><p id=\"buprenorphine_container_footer\"><svg xmlns=\"http://www.w3.org/2000/svg\" style=\"overflow: " "hidden; position: relative;\" width=\"1005\" height=\"20\" version=\"1.1\" xmlns=\"http://www.[employer].org/2000/svg\" xmlns:NS7=\"\" NS7:xmlns:xlink=\"http://www.[employer].org/1999/xlink\"><rect style=\"cursor: pointer; opacity: 1;\" opacity=\"1\" fill=\"#f8f8f8\" stroke=\"#f8f8f8\" stroke-width=\"1\" x=\"0\" y=\"0\" width=\"1005\" height=\"20\" rx=\"0\" ry=\"0\"></rect><desc></desc><defs></defs><text font-family=\"&quot;[place]&quot;\" font-size=\"12px\" font-weight=\"bold\" style=\"font-family: &quot;[place]&quot;; font-size: 12px; font-weight: bold; text-anchor: middle;\" fill=\"#990940\" stroke=\"none\" text-anchor=\"middle\" x=\"45\" y=\"12\"><tspan dy=\"12\"></tspan></text><text font-family=\"&quot;[place]&quot;\" font-size=\"12px\" style=\"font-family: &quot;[place]&quot;; font-size: 12px; text-anchor: middle;\" fill=\"#569797\" stroke=\"none\" text-anchor=\"middle\" x=\"230\" y=\"10\"><tspan dy=\"10\"></tspan></text><text font-family=\"&quot;[place]&quot;\" font-size=\"12px\" style=\"font-family: &quot;[place]&quot;; font-size: 12px; text-anchor: middle;\" fill=\"#894019\" stroke=\"none\" text-anchor=\"middle\" x=\"290\" y=\"10\"><tspan dy=\"10\"></tspan></text><text font-family=\"&quot;[place]&quot;\" font-size=\"12px\" style=\"font-family: &quot;[place]&quot;; font-size: 12px; text-anchor: middle;\" fill=\"#134616\" stroke=\"none\" text-anchor=\"middle\" x=\"410\" y=\"10\"><tspan dy=\"10\"></tspan></text><text font-family=\"&quot;[place]&quot;\" font-size=\"12px\" style=\"font-family: &quot;[place]&quot;; font-size: 12px; text-anchor: middle;\" fill=\"#044441\" stroke=\"none\" text-anchor=\"middle\" x=\"595\" y=\"10\"><tspan dy=\"10\"></tspan></text><text font-family=\"&quot;[place]&quot;\" font-size=\"12px\" style=\"font-family: &quot;[place]&quot;; font-size: 12px; text-anchor: middle;\" fill=\"#728469\" stroke=\"none\" text-anchor=\"middle\" x=\"945\" y=\"10\"><tspan dy=\"10\"></tspan></text><path fill=\"none\" stroke=\"#dddddd\" stroke-width=\"1\" transform=\"matrix(1 0 0 1 0.5 0.5)\" d=\"M 0 0 L 1005 0 Z\"></path></svg></DIV></DIV><p><p class=\"mmeDivider\">Lorazepam MgEq (LME)</DIV><p " "id=\"sedative_container\" style=\"margin-top: 35px;\"><svg xmlns=\"http://www.[employer].org/2000/svg\" style=\"top: -0.28px; overflow: hidden; position: relative;\" width=\"1005\" height=\"100\" version=\"1.1\" xmlns=\"http://www.[employer].org/2000/svg\" xmlns:NS8=\"\" NS8:xmlns:xlink=\"http://www.[employer].org/1999/xlink\"><path fill=\"none\" stroke=\"#dddddd\" stroke-width=\"1\" transform=\"matrix(1 0 0 1 0.5 0.5)\" d=\"M 170 90 L 1005 90 Z\"></path><path fill=\"none\" stroke=\"#eeeeee\" stroke-width=\"1\" transform=\"matrix(1 0 0 1 0.5 0.5)\" d=\"M 170 81 L 1005 81 Z\"></path><path fill=\"none\" stroke=\"#eeeeee\" stroke-width=\"1\" transform=\"matrix(1 0 0 1 0.5 0.5)\" d=\"M 170 45.5 L 1005 45.5 Z\"></path><path fill=\"none\" stroke=\"#dddddd\" stroke-width=\"1\" transform=\"matrix(1 0 0 1 0.5 0.5)\" d=\"M 170 10 L 1005 10 Z\"></path><desc></desc><defs></defs><path fill=\"none\" stroke=\"#cccccc\" stroke-width=\"1\" transform=\"matrix(1 0 0 1 0.5 0.5)\" d=\"M 170 10 L 170 90 Z\"></path><path fill=\"none\" stroke=\"#dddddd\" stroke-width=\"1\" transform=\"matrix(1 0 0 1 0.5 0.5)\" d=\"M 230 10 L 230 110 Z\"></path><text font-family=\"&quot;[place]&quot;\" font-size=\"12px\" font-weight=\"bold\" style=\"font-family: &quot;[place]&quot;; font-size: 12px; font-weight: bold; text-anchor: end;\" fill=\"#780153\" stroke=\"none\" text-anchor=\"end\" x=\"160\" y=\"10\"><tspan dy=\"3.96\">18</tspan></text><text font-family=\"&quot;[place]&quot;\" font-size=\"12px\" font-weight=\"bold\" style=\"font-family: &quot;[place]&quot;; font-size: 12px; font-weight: bold; text-anchor: end;\" fill=\"#209889\" stroke=\"none\" text-anchor=\"end\" x=\"160\" y=\"45.5\"><tspan dy=\"3.96\">10</tspan></text><text font-family=\"&quot;[place]&quot;\" font-size=\"12px\" font-weight=\"bold\" style=\"font-family: &quot;[place]&quot;; font-size: 12px; font-weight: bold; text-anchor: end;\" fill=\"#100661\" stroke=\"none\" text-anchor=\"end\" x=\"160\" y=\"81\"><tspan dy=\"3.96\">2</tspan></text><text font-family=\"&quot;[place]&quot;\" font-size=\"12px\" font-weight=\"bold\" style=\"font-family: &quot;[place]&quot;; " "font-size: 12px; font-weight: bold; text-anchor: end;\" fill=\"#468337\" stroke=\"none\" text-anchor=\"end\" x=\"160\" y=\"92\"><tspan dy=\"3.96\">0</tspan></text><rect style=\"cursor: pointer; opacity: 0.65;\" opacity=\"0.65\" fill=\"#0000ff\" stroke=\"#0000ff\" stroke-width=\"1\" x=\"226\" y=\"88\" width=\"1\" height=\"2\" rx=\"0\" ry=\"0\"></rect><rect style=\"cursor: pointer; opacity: 0.65;\" opacity=\"0.65\" fill=\"#0000ff\" stroke=\"#0000ff\" stroke-width=\"1\" x=\"227\" y=\"88\" width=\"1\" height=\"2\" rx=\"0\" ry=\"0\"></rect><rect style=\"cursor: pointer; opacity: 0.65;\" opacity=\"0.65\" fill=\"#0000ff\" stroke=\"#0000ff\" stroke-width=\"1\" x=\"228\" y=\"88\" width=\"1\" height=\"2\" rx=\"0\" ry=\"0\"></rect><rect style=\"cursor: pointer; opacity: 0.65;\" opacity=\"0.65\" fill=\"#0000ff\" stroke=\"#0000ff\" stroke-width=\"1\" x=\"229\" y=\"88\" width=\"1\" height=\"2\" rx=\"0\" ry=\"0\"></rect><rect style=\"cursor: pointer; opacity: 0.65;\" opacity=\"0.65\" fill=\"#0000ff\" stroke=\"#0000ff\" stroke-width=\"1\" x=\"230\" y=\"88\" width=\"1\" height=\"2\" rx=\"0\" ry=\"0\"></rect><rect style=\"cursor: pointer; opacity: 0.65;\" opacity=\"0.65\" fill=\"#0000ff\" stroke=\"#0000ff\" stroke-width=\"1\" x=\"231\" y=\"88\" width=\"1\" height=\"2\" rx=\"0\" ry=\"0\"></rect><rect style=\"cursor: pointer; opacity: 0.65;\" opacity=\"0.65\" fill=\"#0000ff\" stroke=\"#0000ff\" stroke-width=\"1\" x=\"232\" y=\"88\" width=\"1\" height=\"2\" rx=\"0\" ry=\"0\"></rect><rect style=\"cursor: pointer; opacity: 0.65;\" opacity=\"0.65\" fill=\"#0000ff\" stroke=\"#0000ff\" stroke-width=\"1\" x=\"233\" y=\"88\" width=\"1\" height=\"2\" rx=\"0\" ry=\"0\"></rect><rect style=\"cursor: pointer; opacity: 0.65;\" opacity=\"0.65\" fill=\"#0000ff\" stroke=\"#0000ff\" stroke-width=\"1\" x=\"234\" y=\"88\" width=\"1\" height=\"2\" rx=\"0\" ry=\"0\"></rect><rect style=\"cursor: pointer; opacity: 0.65;\" opacity=\"0.65\" fill=\"#0000ff\" stroke=\"#0000ff\" stroke-width=\"1\" x=\"235\" y=\"88\" width=\"1\" height=\"2\" rx=\"0\" ry=\"0\"></rect><rect style=\"cursor: pointer; opacity: 0.65;\" opacity=\"0.65\" fill=\"#0000ff\" stroke=\"#0000ff\" stroke-width=\"1\" x=\"236\" y=\"88\" " "width=\"1\" height=\"2\" rx=\"0\" ry=\"0\"></rect><rect style=\"cursor: pointer; opacity: 0.65;\" opacity=\"0.65\" fill=\"#0000ff\" stroke=\"#0000ff\" stroke-width=\"1\" x=\"237\" y=\"88\" width=\"1\" height=\"2\" rx=\"0\" ry=\"0\"></rect><rect style=\"cursor: pointer; opacity: 0.65;\" opacity=\"0.65\" fill=\"#0000ff\" stroke=\"#0000ff\" stroke-width=\"1\" x=\"238\" y=\"88\" width=\"1\" height=\"2\" rx=\"0\" ry=\"0\"></rect><rect style=\"cursor: pointer; opacity: 0.65;\" opacity=\"0.65\" fill=\"#0000ff\" stroke=\"#0000ff\" stroke-width=\"1\" x=\"239\" y=\"88\" width=\"1\" height=\"2\" rx=\"0\" ry=\"0\"></rect><rect style=\"cursor: pointer; opacity: 0.65;\" opacity=\"0.65\" fill=\"#0000ff\" stroke=\"#0000ff\" stroke-width=\"1\" x=\"240\" y=\"88\" width=\"1\" height=\"2\" rx=\"0\" ry=\"0\"></rect><rect style=\"cursor: pointer; opacity: 0.65;\" opacity=\"0.65\" fill=\"#0000ff\" stroke=\"#0000ff\" stroke-width=\"1\" x=\"241\" y=\"88\" width=\"1\" height=\"2\" rx=\"0\" ry=\"0\"></rect><rect style=\"cursor: pointer; opacity: 0.65;\" opacity=\"0.65\" fill=\"#0000ff\" stroke=\"#0000ff\" stroke-width=\"1\" x=\"242\" y=\"88\" width=\"1\" height=\"2\" rx=\"0\" ry=\"0\"></rect><rect style=\"cursor: pointer; opacity: 0.65;\" opacity=\"0.65\" fill=\"#0000ff\" stroke=\"#0000ff\" stroke-width=\"1\" x=\"243\" y=\"88\" width=\"1\" height=\"2\" rx=\"0\" ry=\"0\"></rect><rect style=\"cursor: pointer; opacity: 0.65;\" opacity=\"0.65\" fill=\"#0000ff\" stroke=\"#0000ff\" stroke-width=\"1\" x=\"244\" y=\"88\" width=\"1\" height=\"2\" rx=\"0\" ry=\"0\"></rect><rect style=\"cursor: pointer; opacity: 0.65;\" opacity=\"0.65\" fill=\"#0000ff\" stroke=\"#0000ff\" stroke-width=\"1\" x=\"245\" y=\"88\" width=\"1\" height=\"2\" rx=\"0\" ry=\"0\"></rect><rect style=\"cursor: pointer; opacity: 0.65;\" opacity=\"0.65\" fill=\"#0000ff\" stroke=\"#0000ff\" stroke-width=\"1\" x=\"246\" y=\"88\" width=\"1\" height=\"2\" rx=\"0\" ry=\"0\"></rect><rect style=\"cursor: pointer; opacity: 0.65;\" opacity=\"0.65\" fill=\"#0000ff\" stroke=\"#0000ff\" stroke-width=\"1\" x=\"247\" y=\"88\" width=\"1\" height=\"2\" rx=\"0\" ry=\"0\"></rect><rect style=\"cursor: pointer; opacity: 0.65;\" opacity=\"0.65\" " "fill=\"#0000ff\" stroke=\"#0000ff\" stroke-width=\"1\" x=\"248\" y=\"88\" width=\"1\" height=\"2\" rx=\"0\" ry=\"0\"></rect><rect style=\"cursor: pointer; opacity: 0.65;\" opacity=\"0.65\" fill=\"#0000ff\" stroke=\"#0000ff\" stroke-width=\"1\" x=\"249\" y=\"88\" width=\"1\" height=\"2\" rx=\"0\" ry=\"0\"></rect><rect style=\"cursor: pointer; opacity: 0.65;\" opacity=\"0.65\" fill=\"#0000ff\" stroke=\"#0000ff\" stroke-width=\"1\" x=\"250\" y=\"88\" width=\"1\" height=\"2\" rx=\"0\" ry=\"0\"></rect><rect style=\"cursor: pointer; opacity: 0.65;\" opacity=\"0.65\" fill=\"#0000ff\" stroke=\"#0000ff\" stroke-width=\"1\" x=\"251\" y=\"88\" width=\"1\" height=\"2\" rx=\"0\" ry=\"0\"></rect><rect style=\"cursor: pointer; opacity: 0.65;\" opacity=\"0.65\" fill=\"#0000ff\" stroke=\"#0000ff\" stroke-width=\"1\" x=\"252\" y=\"88\" width=\"1\" height=\"2\" rx=\"0\" ry=\"0\"></rect><rect style=\"cursor: pointer; opacity: 0.65;\" opacity=\"0.65\" fill=\"#0000ff\" stroke=\"#0000ff\" stroke-width=\"1\" x=\"253\" y=\"88\" width=\"1\" height=\"2\" rx=\"0\" ry=\"0\"></rect><rect style=\"cursor: pointer; opacity: 0.65;\" opacity=\"0.65\" fill=\"#0000ff\" stroke=\"#0000ff\" stroke-width=\"1\" x=\"254\" y=\"88\" width=\"1\" height=\"2\" rx=\"0\" ry=\"0\"></rect><rect style=\"cursor: pointer; opacity: 0.65;\" opacity=\"0.65\" fill=\"#0000ff\" stroke=\"#0000ff\" stroke-width=\"1\" x=\"255\" y=\"86\" width=\"1\" height=\"4\" rx=\"0\" ry=\"0\"></rect><rect style=\"cursor: pointer; opacity: 0.65;\" opacity=\"0.65\" fill=\"#0000ff\" stroke=\"#0000ff\" stroke-width=\"1\" x=\"256\" y=\"88\" width=\"1\" height=\"2\" rx=\"0\" ry=\"0\"></rect><rect style=\"cursor: pointer; opacity: 0.65;\" opacity=\"0.65\" fill=\"#0000ff\" stroke=\"#0000ff\" stroke-width=\"1\" x=\"257\" y=\"88\" width=\"1\" height=\"2\" rx=\"0\" ry=\"0\"></rect><rect style=\"cursor: pointer; opacity: 0.65;\" opacity=\"0.65\" fill=\"#0000ff\" stroke=\"#0000ff\" stroke-width=\"1\" x=\"258\" y=\"88\" width=\"1\" height=\"2\" rx=\"0\" ry=\"0\"></rect><rect style=\"cursor: pointer; opacity: 0.65;\" opacity=\"0.65\" fill=\"#0000ff\" stroke=\"#0000ff\" stroke-width=\"1\" x=\"259\" y=\"88\" width=\"1\" height=\"2\" rx=\"0\" ry=\"0\"></rect><rect " "style=\"cursor: pointer; opacity: 0.65;\" opacity=\"0.65\" fill=\"#0000ff\" stroke=\"#0000ff\" stroke-width=\"1\" x=\"260\" y=\"88\" width=\"1\" height=\"2\" rx=\"0\" ry=\"0\"></rect><rect style=\"cursor: pointer; opacity: 0.65;\" opacity=\"0.65\" fill=\"#0000ff\" stroke=\"#0000ff\" stroke-width=\"1\" x=\"261\" y=\"88\" width=\"1\" height=\"2\" rx=\"0\" ry=\"0\"></rect><rect style=\"cursor: pointer; opacity: 0.65;\" opacity=\"0.65\" fill=\"#0000ff\" stroke=\"#0000ff\" stroke-width=\"1\" x=\"262\" y=\"88\" width=\"1\" height=\"2\" rx=\"0\" ry=\"0\"></rect><rect style=\"cursor: pointer; opacity: 0.65;\" opacity=\"0.65\" fill=\"#0000ff\" stroke=\"#0000ff\" stroke-width=\"1\" x=\"263\" y=\"88\" width=\"1\" height=\"2\" rx=\"0\" ry=\"0\"></rect><rect style=\"cursor: pointer; opacity: 0.65;\" opacity=\"0.65\" fill=\"#0000ff\" stroke=\"#0000ff\" stroke-width=\"1\" x=\"264\" y=\"88\" width=\"1\" height=\"2\" rx=\"0\" ry=\"0\"></rect><rect style=\"cursor: pointer; opacity: 0.65;\" opacity=\"0.65\" fill=\"#0000ff\" stroke=\"#0000ff\" stroke-width=\"1\" x=\"265\" y=\"88\" width=\"1\" height=\"2\" rx=\"0\" ry=\"0\"></rect><rect style=\"cursor: pointer; opacity: 0.65;\" opacity=\"0.65\" fill=\"#0000ff\" stroke=\"#0000ff\" stroke-width=\"1\" x=\"266\" y=\"88\" width=\"1\" height=\"2\" rx=\"0\" ry=\"0\"></rect><rect style=\"cursor: pointer; opacity: 0.65;\" opacity=\"0.65\" fill=\"#0000ff\" stroke=\"#0000ff\" stroke-width=\"1\" x=\"267\" y=\"88\" width=\"1\" height=\"2\" rx=\"0\" ry=\"0\"></rect><rect style=\"cursor: pointer; opacity: 0.65;\" opacity=\"0.65\" fill=\"#0000ff\" stroke=\"#0000ff\" stroke-width=\"1\" x=\"268\" y=\"88\" width=\"1\" height=\"2\" rx=\"0\" ry=\"0\"></rect><rect style=\"cursor: pointer; opacity: 0.65;\" opacity=\"0.65\" fill=\"#0000ff\" stroke=\"#0000ff\" stroke-width=\"1\" x=\"269\" y=\"88\" width=\"1\" height=\"2\" rx=\"0\" ry=\"0\"></rect><rect style=\"cursor: pointer; opacity: 0.65;\" opacity=\"0.65\" fill=\"#0000ff\" stroke=\"#0000ff\" stroke-width=\"1\" x=\"270\" y=\"88\" width=\"1\" height=\"2\" rx=\"0\" ry=\"0\"></rect><rect style=\"cursor: pointer; opacity: 0.65;\" opacity=\"0.65\" fill=\"#0000ff\" stroke=\"#0000ff\" stroke-width=\"1\" x=\"271\" " "y=\"88\" width=\"1\" height=\"2\" rx=\"0\" ry=\"0\"></rect><rect style=\"cursor: pointer; opacity: 0.65;\" opacity=\"0.65\" fill=\"#0000ff\" stroke=\"#0000ff\" stroke-width=\"1\" x=\"272\" y=\"88\" width=\"1\" height=\"2\" rx=\"0\" ry=\"0\"></rect><rect style=\"cursor: pointer; opacity: 0.65;\" opacity=\"0.65\" fill=\"#0000ff\" stroke=\"#0000ff\" stroke-width=\"1\" x=\"273\" y=\"88\" width=\"1\" height=\"2\" rx=\"0\" ry=\"0\"></rect><rect style=\"cursor: pointer; opacity: 0.65;\" opacity=\"0.65\" fill=\"#0000ff\" stroke=\"#0000ff\" stroke-width=\"1\" x=\"274\" y=\"88\" width=\"1\" height=\"2\" rx=\"0\" ry=\"0\"></rect><rect style=\"cursor: pointer; opacity: 0.65;\" opacity=\"0.65\" fill=\"#0000ff\" stroke=\"#0000ff\" stroke-width=\"1\" x=\"275\" y=\"88\" width=\"1\" height=\"2\" rx=\"0\" ry=\"0\"></rect><rect style=\"cursor: pointer; opacity: 0.65;\" opacity=\"0.65\" fill=\"#0000ff\" stroke=\"#0000ff\" stroke-width=\"1\" x=\"276\" y=\"88\" width=\"1\" height=\"2\" rx=\"0\" ry=\"0\"></rect><rect style=\"cursor: pointer; opacity: 0.65;\" opacity=\"0.65\" fill=\"#0000ff\" stroke=\"#0000ff\" stroke-width=\"1\" x=\"277\" y=\"88\" width=\"1\" height=\"2\" rx=\"0\" ry=\"0\"></rect><rect style=\"cursor: pointer; opacity: 0.65;\" opacity=\"0.65\" fill=\"#0000ff\" stroke=\"#0000ff\" stroke-width=\"1\" x=\"278\" y=\"88\" width=\"1\" height=\"2\" rx=\"0\" ry=\"0\"></rect><rect style=\"cursor: pointer; opacity: 0.65;\" opacity=\"0.65\" fill=\"#0000ff\" stroke=\"#0000ff\" stroke-width=\"1\" x=\"279\" y=\"88\" width=\"1\" height=\"2\" rx=\"0\" ry=\"0\"></rect><rect style=\"cursor: pointer; opacity: 0.65;\" opacity=\"0.65\" fill=\"#0000ff\" stroke=\"#0000ff\" stroke-width=\"1\" x=\"280\" y=\"88\" width=\"1\" height=\"2\" rx=\"0\" ry=\"0\"></rect><rect style=\"cursor: pointer; opacity: 0.65;\" opacity=\"0.65\" fill=\"#0000ff\" stroke=\"#0000ff\" stroke-width=\"1\" x=\"281\" y=\"88\" width=\"1\" height=\"2\" rx=\"0\" ry=\"0\"></rect><rect style=\"cursor: pointer; opacity: 0.65;\" opacity=\"0.65\" fill=\"#0000ff\" stroke=\"#0000ff\" stroke-width=\"1\" x=\"282\" y=\"88\" width=\"1\" height=\"2\" rx=\"0\" ry=\"0\"></rect><rect style=\"cursor: pointer; opacity: 0.65;\" opacity=\"0.65\" " "fill=\"#0000ff\" stroke=\"#0000ff\" stroke-width=\"1\" x=\"283\" y=\"88\" width=\"1\" height=\"2\" rx=\"0\" ry=\"0\"></rect><rect style=\"cursor: pointer; opacity: 0.65;\" opacity=\"0.65\" fill=\"#0000ff\" stroke=\"#0000ff\" stroke-width=\"1\" x=\"284\" y=\"86\" width=\"1\" height=\"4\" rx=\"0\" ry=\"0\"></rect><rect style=\"cursor: pointer; opacity: 0.65;\" opacity=\"0.65\" fill=\"#0000ff\" stroke=\"#0000ff\" stroke-width=\"1\" x=\"285\" y=\"88\" width=\"1\" height=\"2\" rx=\"0\" ry=\"0\"></rect><rect style=\"cursor: pointer; opacity: 0.65;\" opacity=\"0.65\" fill=\"#0000ff\" stroke=\"#0000ff\" stroke-width=\"1\" x=\"286\" y=\"88\" width=\"1\" height=\"2\" rx=\"0\" ry=\"0\"></rect><rect style=\"cursor: pointer; opacity: 0.65;\" opacity=\"0.65\" fill=\"#0000ff\" stroke=\"#0000ff\" stroke-width=\"1\" x=\"287\" y=\"88\" width=\"1\" height=\"2\" rx=\"0\" ry=\"0\"></rect><rect style=\"cursor: pointer; opacity: 0.65;\" opacity=\"0.65\" fill=\"#0000ff\" stroke=\"#0000ff\" stroke-width=\"1\" x=\"288\" y=\"88\" width=\"1\" height=\"2\" rx=\"0\" ry=\"0\"></rect><rect style=\"cursor: pointer; opacity: 0.65;\" opacity=\"0.65\" fill=\"#0000ff\" stroke=\"#0000ff\" stroke-width=\"1\" x=\"289\" y=\"88\" width=\"1\" height=\"2\" rx=\"0\" ry=\"0\"></rect><rect style=\"cursor: pointer; opacity: 0.65;\" opacity=\"0.65\" fill=\"#0000ff\" stroke=\"#0000ff\" stroke-width=\"1\" x=\"290\" y=\"88\" width=\"1\" height=\"2\" rx=\"0\" ry=\"0\"></rect><rect style=\"cursor: pointer; opacity: 0.65;\" opacity=\"0.65\" fill=\"#0000ff\" stroke=\"#0000ff\" stroke-width=\"1\" x=\"291\" y=\"88\" width=\"1\" height=\"2\" rx=\"0\" ry=\"0\"></rect><rect style=\"cursor: pointer; opacity: 0.65;\" opacity=\"0.65\" fill=\"#0000ff\" stroke=\"#0000ff\" stroke-width=\"1\" x=\"292\" y=\"88\" width=\"1\" height=\"2\" rx=\"0\" ry=\"0\"></rect><rect style=\"cursor: pointer; opacity: 0.65;\" opacity=\"0.65\" fill=\"#0000ff\" stroke=\"#0000ff\" stroke-width=\"1\" x=\"293\" y=\"88\" width=\"1\" height=\"2\" rx=\"0\" ry=\"0\"></rect><rect style=\"cursor: pointer; opacity: 0.65;\" opacity=\"0.65\" fill=\"#0000ff\" stroke=\"#0000ff\" stroke-width=\"1\" x=\"294\" y=\"88\" width=\"1\" height=\"2\" rx=\"0\" ry=\"0\"></rect><rect " "style=\"cursor: pointer; opacity: 0.65;\" opacity=\"0.65\" fill=\"#0000ff\" stroke=\"#0000ff\" stroke-width=\"1\" x=\"295\" y=\"88\" width=\"1\" height=\"2\" rx=\"0\" ry=\"0\"></rect><rect style=\"cursor: pointer; opacity: 0.65;\" opacity=\"0.65\" fill=\"#0000ff\" stroke=\"#0000ff\" stroke-width=\"1\" x=\"296\" y=\"88\" width=\"1\" height=\"2\" rx=\"0\" ry=\"0\"></rect><rect style=\"cursor: pointer; opacity: 0.65;\" opacity=\"0.65\" fill=\"#0000ff\" stroke=\"#0000ff\" stroke-width=\"1\" x=\"297\" y=\"88\" width=\"1\" height=\"2\" rx=\"0\" ry=\"0\"></rect><rect style=\"cursor: pointer; opacity: 0.65;\" opacity=\"0.65\" fill=\"#0000ff\" stroke=\"#0000ff\" stroke-width=\"1\" x=\"298\" y=\"88\" width=\"1\" height=\"2\" rx=\"0\" ry=\"0\"></rect><rect style=\"cursor: pointer; opacity: 0.65;\" opacity=\"0.65\" fill=\"#0000ff\" stroke=\"#0000ff\" stroke-width=\"1\" x=\"299\" y=\"88\" width=\"1\" height=\"2\" rx=\"0\" ry=\"0\"></rect><rect style=\"cursor: pointer; opacity: 0.65;\" opacity=\"0.65\" fill=\"#0000ff\" stroke=\"#0000ff\" stroke-width=\"1\" x=\"300\" y=\"88\" width=\"1\" height=\"2\" rx=\"0\" ry=\"0\"></rect><rect style=\"cursor: pointer; opacity: 0.65;\" opacity=\"0.65\" fill=\"#0000ff\" stroke=\"#0000ff\" stroke-width=\"1\" x=\"301\" y=\"88\" width=\"1\" height=\"2\" rx=\"0\" ry=\"0\"></rect><rect style=\"cursor: pointer; opacity: 0.65;\" opacity=\"0.65\" fill=\"#0000ff\" stroke=\"#0000ff\" stroke-width=\"1\" x=\"302\" y=\"88\" width=\"1\" height=\"2\" rx=\"0\" ry=\"0\"></rect><rect style=\"cursor: pointer; opacity: 0.65;\" opacity=\"0.65\" fill=\"#0000ff\" stroke=\"#0000ff\" stroke-width=\"1\" x=\"303\" y=\"88\" width=\"1\" height=\"2\" rx=\"0\" ry=\"0\"></rect><rect style=\"cursor: pointer; opacity: 0.65;\" opacity=\"0.65\" fill=\"#0000ff\" stroke=\"#0000ff\" stroke-width=\"1\" x=\"304\" y=\"88\" width=\"1\" height=\"2\" rx=\"0\" ry=\"0\"></rect><rect style=\"cursor: pointer; opacity: 0.65;\" opacity=\"0.65\" fill=\"#0000ff\" stroke=\"#0000ff\" stroke-width=\"1\" x=\"305\" y=\"88\" width=\"1\" height=\"2\" rx=\"0\" ry=\"0\"></rect><rect style=\"cursor: pointer; opacity: 0.65;\" opacity=\"0.65\" fill=\"#0000ff\" stroke=\"#0000ff\" stroke-width=\"1\" x=\"306\" " "y=\"88\" width=\"1\" height=\"2\" rx=\"0\" ry=\"0\"></rect><rect style=\"cursor: pointer; opacity: 0.65;\" opacity=\"0.65\" fill=\"#0000ff\" stroke=\"#0000ff\" stroke-width=\"1\" x=\"307\" y=\"88\" width=\"1\" height=\"2\" rx=\"0\" ry=\"0\"></rect><rect style=\"cursor: pointer; opacity: 0.65;\" opacity=\"0.65\" fill=\"#0000ff\" stroke=\"#0000ff\" stroke-width=\"1\" x=\"308\" y=\"88\" width=\"1\" height=\"2\" rx=\"0\" ry=\"0\"></rect><rect style=\"cursor: pointer; opacity: 0.65;\" opacity=\"0.65\" fill=\"#0000ff\" stroke=\"#0000ff\" stroke-width=\"1\" x=\"309\" y=\"88\" width=\"1\" height=\"2\" rx=\"0\" ry=\"0\"></rect><rect style=\"cursor: pointer; opacity: 0.65;\" opacity=\"0.65\" fill=\"#0000ff\" stroke=\"#0000ff\" stroke-width=\"1\" x=\"310\" y=\"86\" width=\"1\" height=\"4\" rx=\"0\" ry=\"0\"></rect><rect style=\"cursor: pointer; opacity: 0.65;\" opacity=\"0.65\" fill=\"#0000ff\" stroke=\"#0000ff\" stroke-width=\"1\" x=\"311\" y=\"86\" width=\"1\" height=\"4\" rx=\"0\" ry=\"0\"></rect><rect style=\"cursor: pointer; opacity: 0.65;\" opacity=\"0.65\" fill=\"#0000ff\" stroke=\"#0000ff\" stroke-width=\"1\" x=\"312\" y=\"86\" width=\"1\" height=\"4\" rx=\"0\" ry=\"0\"></rect><rect style=\"cursor: pointer; opacity: 0.65;\" opacity=\"0.65\" fill=\"#0000ff\" stroke=\"#0000ff\" stroke-width=\"1\" x=\"313\" y=\"86\" width=\"1\" height=\"4\" rx=\"0\" ry=\"0\"></rect><rect style=\"cursor: pointer; opacity: 0.65;\" opacity=\"0.65\" fill=\"#0000ff\" stroke=\"#0000ff\" stroke-width=\"1\" x=\"314\" y=\"88\" width=\"1\" height=\"2\" rx=\"0\" ry=\"0\"></rect><rect style=\"cursor: pointer; opacity: 0.65;\" opacity=\"0.65\" fill=\"#0000ff\" stroke=\"#0000ff\" stroke-width=\"1\" x=\"315\" y=\"88\" width=\"1\" height=\"2\" rx=\"0\" ry=\"0\"></rect><rect style=\"cursor: pointer; opacity: 0.65;\" opacity=\"0.65\" fill=\"#0000ff\" stroke=\"#0000ff\" stroke-width=\"1\" x=\"316\" y=\"88\" width=\"1\" height=\"2\" rx=\"0\" ry=\"0\"></rect><rect style=\"cursor: pointer; opacity: 0.65;\" opacity=\"0.65\" fill=\"#0000ff\" stroke=\"#0000ff\" stroke-width=\"1\" x=\"317\" y=\"88\" width=\"1\" height=\"2\" rx=\"0\" ry=\"0\"></rect><rect style=\"cursor: pointer; opacity: 0.65;\" opacity=\"0.65\" " "fill=\"#0000ff\" stroke=\"#0000ff\" stroke-width=\"1\" x=\"318\" y=\"88\" width=\"1\" height=\"2\" rx=\"0\" ry=\"0\"></rect><rect style=\"cursor: pointer; opacity: 0.65;\" opacity=\"0.65\" fill=\"#0000ff\" stroke=\"#0000ff\" stroke-width=\"1\" x=\"319\" y=\"88\" width=\"1\" height=\"2\" rx=\"0\" ry=\"0\"></rect><rect style=\"cursor: pointer; opacity: 0.65;\" opacity=\"0.65\" fill=\"#0000ff\" stroke=\"#0000ff\" stroke-width=\"1\" x=\"320\" y=\"88\" width=\"1\" height=\"2\" rx=\"0\" ry=\"0\"></rect><rect style=\"cursor: pointer; opacity: 0.65;\" opacity=\"0.65\" fill=\"#0000ff\" stroke=\"#0000ff\" stroke-width=\"1\" x=\"321\" y=\"88\" width=\"1\" height=\"2\" rx=\"0\" ry=\"0\"></rect><rect style=\"cursor: pointer; opacity: 0.65;\" opacity=\"0.65\" fill=\"#0000ff\" stroke=\"#0000ff\" stroke-width=\"1\" x=\"322\" y=\"88\" width=\"1\" height=\"2\" rx=\"0\" ry=\"0\"></rect><rect style=\"cursor: pointer; opacity: 0.65;\" opacity=\"0.65\" fill=\"#0000ff\" stroke=\"#0000ff\" stroke-width=\"1\" x=\"323\" y=\"88\" width=\"1\" height=\"2\" rx=\"0\" ry=\"0\"></rect><rect style=\"cursor: pointer; opacity: 0.65;\" opacity=\"0.65\" fill=\"#0000ff\" stroke=\"#0000ff\" stroke-width=\"1\" x=\"324\" y=\"88\" width=\"1\" height=\"2\" rx=\"0\" ry=\"0\"></rect><rect style=\"cursor: pointer; opacity: 0.65;\" opacity=\"0.65\" fill=\"#0000ff\" stroke=\"#0000ff\" stroke-width=\"1\" x=\"325\" y=\"88\" width=\"1\" height=\"2\" rx=\"0\" ry=\"0\"></rect><rect style=\"cursor: pointer; opacity: 0.65;\" opacity=\"0.65\" fill=\"#0000ff\" stroke=\"#0000ff\" stroke-width=\"1\" x=\"326\" y=\"88\" width=\"1\" height=\"2\" rx=\"0\" ry=\"0\"></rect><rect style=\"cursor: pointer; opacity: 0.65;\" opacity=\"0.65\" fill=\"#0000ff\" stroke=\"#0000ff\" stroke-width=\"1\" x=\"327\" y=\"88\" width=\"1\" height=\"2\" rx=\"0\" ry=\"0\"></rect><rect style=\"cursor: pointer; opacity: 0.65;\" opacity=\"0.65\" fill=\"#0000ff\" stroke=\"#0000ff\" stroke-width=\"1\" x=\"328\" y=\"88\" width=\"1\" height=\"2\" rx=\"0\" ry=\"0\"></rect><rect style=\"cursor: pointer; opacity: 0.65;\" opacity=\"0.65\" fill=\"#0000ff\" stroke=\"#0000ff\" stroke-width=\"1\" x=\"329\" y=\"88\" width=\"1\" height=\"2\" rx=\"0\" ry=\"0\"></rect><rect " "style=\"cursor: pointer; opacity: 0.65;\" opacity=\"0.65\" fill=\"#0000ff\" stroke=\"#0000ff\" stroke-width=\"1\" x=\"330\" y=\"88\" width=\"1\" height=\"2\" rx=\"0\" ry=\"0\"></rect><rect style=\"cursor: pointer; opacity: 0.65;\" opacity=\"0.65\" fill=\"#0000ff\" stroke=\"#0000ff\" stroke-width=\"1\" x=\"331\" y=\"88\" width=\"1\" height=\"2\" rx=\"0\" ry=\"0\"></rect><rect style=\"cursor: pointer; opacity: 0.65;\" opacity=\"0.65\" fill=\"#0000ff\" stroke=\"#0000ff\" stroke-width=\"1\" x=\"332\" y=\"88\" width=\"1\" height=\"2\" rx=\"0\" ry=\"0\"></rect><rect style=\"cursor: pointer; opacity: 0.65;\" opacity=\"0.65\" fill=\"#0000ff\" stroke=\"#0000ff\" stroke-width=\"1\" x=\"333\" y=\"88\" width=\"1\" height=\"2\" rx=\"0\" ry=\"0\"></rect><rect style=\"cursor: pointer; opacity: 0.65;\" opacity=\"0.65\" fill=\"#0000ff\" stroke=\"#0000ff\" stroke-width=\"1\" x=\"334\" y=\"88\" width=\"1\" height=\"2\" rx=\"0\" ry=\"0\"></rect><rect style=\"cursor: pointer; opacity: 0.65;\" opacity=\"0.65\" fill=\"#0000ff\" stroke=\"#0000ff\" stroke-width=\"1\" x=\"335\" y=\"88\" width=\"1\" height=\"2\" rx=\"0\" ry=\"0\"></rect><rect style=\"cursor: pointer; opacity: 0.65;\" opacity=\"0.65\" fill=\"#0000ff\" stroke=\"#0000ff\" stroke-width=\"1\" x=\"336\" y=\"88\" width=\"1\" height=\"2\" rx=\"0\" ry=\"0\"></rect><rect style=\"cursor: pointer; opacity: 0.65;\" opacity=\"0.65\" fill=\"#0000ff\" stroke=\"#0000ff\" stroke-width=\"1\" x=\"337\" y=\"88\" width=\"1\" height=\"2\" rx=\"0\" ry=\"0\"></rect><rect style=\"cursor: pointer; opacity: 0.65;\" opacity=\"0.65\" fill=\"#0000ff\" stroke=\"#0000ff\" stroke-width=\"1\" x=\"338\" y=\"86\" width=\"1\" height=\"4\" rx=\"0\" ry=\"0\"></rect><rect style=\"cursor: pointer; opacity: 0.65;\" opacity=\"0.65\" fill=\"#0000ff\" stroke=\"#0000ff\" stroke-width=\"1\" x=\"339\" y=\"86\" width=\"1\" height=\"4\" rx=\"0\" ry=\"0\"></rect><rect style=\"cursor: pointer; opacity: 0.65;\" opacity=\"0.65\" fill=\"#0000ff\" stroke=\"#0000ff\" stroke-width=\"1\" x=\"340\" y=\"88\" width=\"1\" height=\"2\" rx=\"0\" ry=\"0\"></rect><rect style=\"cursor: pointer; opacity: 0.65;\" opacity=\"0.65\" fill=\"#0000ff\" stroke=\"#0000ff\" stroke-width=\"1\" x=\"341\" " "y=\"88\" width=\"1\" height=\"2\" rx=\"0\" ry=\"0\"></rect><rect style=\"cursor: pointer; opacity: 0.65;\" opacity=\"0.65\" fill=\"#0000ff\" stroke=\"#0000ff\" stroke-width=\"1\" x=\"342\" y=\"88\" width=\"1\" height=\"2\" rx=\"0\" ry=\"0\"></rect><rect style=\"cursor: pointer; opacity: 0.65;\" opacity=\"0.65\" fill=\"#0000ff\" stroke=\"#0000ff\" stroke-width=\"1\" x=\"343\" y=\"88\" width=\"1\" height=\"2\" rx=\"0\" ry=\"0\"></rect><rect style=\"cursor: pointer; opacity: 0.65;\" opacity=\"0.65\" fill=\"#0000ff\" stroke=\"#0000ff\" stroke-width=\"1\" x=\"344\" y=\"88\" width=\"1\" height=\"2\" rx=\"0\" ry=\"0\"></rect><rect style=\"cursor: pointer; opacity: 0.65;\" opacity=\"0.65\" fill=\"#0000ff\" stroke=\"#0000ff\" stroke-width=\"1\" x=\"345\" y=\"88\" width=\"1\" height=\"2\" rx=\"0\" ry=\"0\"></rect><rect style=\"cursor: pointer; opacity: 0.65;\" opacity=\"0.65\" fill=\"#0000ff\" stroke=\"#0000ff\" stroke-width=\"1\" x=\"346\" y=\"88\" width=\"1\" height=\"2\" rx=\"0\" ry=\"0\"></rect><rect style=\"cursor: pointer; opacity: 0.65;\" opacity=\"0.65\" fill=\"#0000ff\" stroke=\"#0000ff\" stroke-width=\"1\" x=\"347\" y=\"88\" width=\"1\" height=\"2\" rx=\"0\" ry=\"0\"></rect><rect style=\"cursor: pointer; opacity: 0.65;\" opacity=\"0.65\" fill=\"#0000ff\" stroke=\"#0000ff\" stroke-width=\"1\" x=\"348\" y=\"88\" width=\"1\" height=\"2\" rx=\"0\" ry=\"0\"></rect><rect style=\"cursor: pointer; opacity: 0.65;\" opacity=\"0.65\" fill=\"#0000ff\" stroke=\"#0000ff\" stroke-width=\"1\" x=\"349\" y=\"88\" width=\"1\" height=\"2\" rx=\"0\" ry=\"0\"></rect><rect style=\"cursor: pointer; opacity: 0.65;\" opacity=\"0.65\" fill=\"#0000ff\" stroke=\"#0000ff\" stroke-width=\"1\" x=\"350\" y=\"88\" width=\"1\" height=\"2\" rx=\"0\" ry=\"0\"></rect><rect style=\"cursor: pointer; opacity: 0.65;\" opacity=\"0.65\" fill=\"#0000ff\" stroke=\"#0000ff\" stroke-width=\"1\" x=\"351\" y=\"88\" width=\"1\" height=\"2\" rx=\"0\" ry=\"0\"></rect><rect style=\"cursor: pointer; opacity: 0.65;\" opacity=\"0.65\" fill=\"#0000ff\" stroke=\"#0000ff\" stroke-width=\"1\" x=\"352\" y=\"88\" width=\"1\" height=\"2\" rx=\"0\" ry=\"0\"></rect><rect style=\"cursor: pointer; opacity: 0.65;\" opacity=\"0.65\" " "fill=\"#0000ff\" stroke=\"#0000ff\" stroke-width=\"1\" x=\"353\" y=\"88\" width=\"1\" height=\"2\" rx=\"0\" ry=\"0\"></rect><rect style=\"cursor: pointer; opacity: 0.65;\" opacity=\"0.65\" fill=\"#0000ff\" stroke=\"#0000ff\" stroke-width=\"1\" x=\"354\" y=\"88\" width=\"1\" height=\"2\" rx=\"0\" ry=\"0\"></rect><rect style=\"cursor: pointer; opacity: 0.65;\" opacity=\"0.65\" fill=\"#0000ff\" stroke=\"#0000ff\" stroke-width=\"1\" x=\"355\" y=\"88\" width=\"1\" height=\"2\" rx=\"0\" ry=\"0\"></rect><rect style=\"cursor: pointer; opacity: 0.65;\" opacity=\"0.65\" fill=\"#0000ff\" stroke=\"#0000ff\" stroke-width=\"1\" x=\"356\" y=\"88\" width=\"1\" height=\"2\" rx=\"0\" ry=\"0\"></rect><rect style=\"cursor: pointer; opacity: 0.65;\" opacity=\"0.65\" fill=\"#0000ff\" stroke=\"#0000ff\" stroke-width=\"1\" x=\"357\" y=\"88\" width=\"1\" height=\"2\" rx=\"0\" ry=\"0\"></rect><rect style=\"cursor: pointer; opacity: 0.65;\" opacity=\"0.65\" fill=\"#0000ff\" stroke=\"#0000ff\" stroke-width=\"1\" x=\"358\" y=\"88\" width=\"1\" height=\"2\" rx=\"0\" ry=\"0\"></rect><rect style=\"cursor: pointer; opacity: 0.65;\" opacity=\"0.65\" fill=\"#0000ff\" stroke=\"#0000ff\" stroke-width=\"1\" x=\"359\" y=\"88\" width=\"1\" height=\"2\" rx=\"0\" ry=\"0\"></rect><rect style=\"cursor: pointer; opacity: 0.65;\" opacity=\"0.65\" fill=\"#0000ff\" stroke=\"#0000ff\" stroke-width=\"1\" x=\"360\" y=\"88\" width=\"1\" height=\"2\" rx=\"0\" ry=\"0\"></rect><rect style=\"cursor: pointer; opacity: 0.65;\" opacity=\"0.65\" fill=\"#0000ff\" stroke=\"#0000ff\" stroke-width=\"1\" x=\"361\" y=\"88\" width=\"1\" height=\"2\" rx=\"0\" ry=\"0\"></rect><rect style=\"cursor: pointer; opacity: 0.65;\" opacity=\"0.65\" fill=\"#0000ff\" stroke=\"#0000ff\" stroke-width=\"1\" x=\"362\" y=\"88\" width=\"1\" height=\"2\" rx=\"0\" ry=\"0\"></rect><rect style=\"cursor: pointer; opacity: 0.65;\" opacity=\"0.65\" fill=\"#0000ff\" stroke=\"#0000ff\" stroke-width=\"1\" x=\"363\" y=\"88\" width=\"1\" height=\"2\" rx=\"0\" ry=\"0\"></rect><rect style=\"cursor: pointer; opacity: 0.65;\" opacity=\"0.65\" fill=\"#0000ff\" stroke=\"#0000ff\" stroke-width=\"1\" x=\"364\" y=\"88\" width=\"1\" height=\"2\" rx=\"0\" ry=\"0\"></rect><rect " "style=\"cursor: pointer; opacity: 0.65;\" opacity=\"0.65\" fill=\"#0000ff\" stroke=\"#0000ff\" stroke-width=\"1\" x=\"365\" y=\"86\" width=\"1\" height=\"4\" rx=\"0\" ry=\"0\"></rect><rect style=\"cursor: pointer; opacity: 0.65;\" opacity=\"0.65\" fill=\"#0000ff\" stroke=\"#0000ff\" stroke-width=\"1\" x=\"366\" y=\"86\" width=\"1\" height=\"4\" rx=\"0\" ry=\"0\"></rect><rect style=\"cursor: pointer; opacity: 0.65;\" opacity=\"0.65\" fill=\"#0000ff\" stroke=\"#0000ff\" stroke-width=\"1\" x=\"367\" y=\"86\" width=\"1\" height=\"4\" rx=\"0\" ry=\"0\"></rect><rect style=\"cursor: pointer; opacity: 0.65;\" opacity=\"0.65\" fill=\"#0000ff\" stroke=\"#0000ff\" stroke-width=\"1\" x=\"368\" y=\"88\" width=\"1\" height=\"2\" rx=\"0\" ry=\"0\"></rect><rect style=\"cursor: pointer; opacity: 0.65;\" opacity=\"0.65\" fill=\"#0000ff\" stroke=\"#0000ff\" stroke-width=\"1\" x=\"369\" y=\"88\" width=\"1\" height=\"2\" rx=\"0\" ry=\"0\"></rect><rect style=\"cursor: pointer; opacity: 0.65;\" opacity=\"0.65\" fill=\"#0000ff\" stroke=\"#0000ff\" stroke-width=\"1\" x=\"370\" y=\"88\" width=\"1\" height=\"2\" rx=\"0\" ry=\"0\"></rect><rect style=\"cursor: pointer; opacity: 0.65;\" opacity=\"0.65\" fill=\"#0000ff\" stroke=\"#0000ff\" stroke-width=\"1\" x=\"371\" y=\"88\" width=\"1\" height=\"2\" rx=\"0\" ry=\"0\"></rect><rect style=\"cursor: pointer; opacity: 0.65;\" opacity=\"0.65\" fill=\"#0000ff\" stroke=\"#0000ff\" stroke-width=\"1\" x=\"372\" y=\"88\" width=\"1\" height=\"2\" rx=\"0\" ry=\"0\"></rect><rect style=\"cursor: pointer; opacity: 0.65;\" opacity=\"0.65\" fill=\"#0000ff\" stroke=\"#0000ff\" stroke-width=\"1\" x=\"373\" y=\"88\" width=\"1\" height=\"2\" rx=\"0\" ry=\"0\"></rect><rect style=\"cursor: pointer; opacity: 0.65;\" opacity=\"0.65\" fill=\"#0000ff\" stroke=\"#0000ff\" stroke-width=\"1\" x=\"374\" y=\"88\" width=\"1\" height=\"2\" rx=\"0\" ry=\"0\"></rect><rect style=\"cursor: pointer; opacity: 0.65;\" opacity=\"0.65\" fill=\"#0000ff\" stroke=\"#0000ff\" stroke-width=\"1\" x=\"375\" y=\"88\" width=\"1\" height=\"2\" rx=\"0\" ry=\"0\"></rect><rect style=\"cursor: pointer; opacity: 0.65;\" opacity=\"0.65\" fill=\"#0000ff\" stroke=\"#0000ff\" stroke-width=\"1\" x=\"376\" " "y=\"88\" width=\"1\" height=\"2\" rx=\"0\" ry=\"0\"></rect><rect style=\"cursor: pointer; opacity: 0.65;\" opacity=\"0.65\" fill=\"#0000ff\" stroke=\"#0000ff\" stroke-width=\"1\" x=\"377\" y=\"88\" width=\"1\" height=\"2\" rx=\"0\" ry=\"0\"></rect><rect style=\"cursor: pointer; opacity: 0.65;\" opacity=\"0.65\" fill=\"#0000ff\" stroke=\"#0000ff\" stroke-width=\"1\" x=\"378\" y=\"88\" width=\"1\" height=\"2\" rx=\"0\" ry=\"0\"></rect><rect style=\"cursor: pointer; opacity: 0.65;\" opacity=\"0.65\" fill=\"#0000ff\" stroke=\"#0000ff\" stroke-width=\"1\" x=\"379\" y=\"88\" width=\"1\" height=\"2\" rx=\"0\" ry=\"0\"></rect><rect style=\"cursor: pointer; opacity: 0.65;\" opacity=\"0.65\" fill=\"#0000ff\" stroke=\"#0000ff\" stroke-width=\"1\" x=\"380\" y=\"88\" width=\"1\" height=\"2\" rx=\"0\" ry=\"0\"></rect><rect style=\"cursor: pointer; opacity: 0.65;\" opacity=\"0.65\" fill=\"#0000ff\" stroke=\"#0000ff\" stroke-width=\"1\" x=\"381\" y=\"88\" width=\"1\" height=\"2\" rx=\"0\" ry=\"0\"></rect><rect style=\"cursor: pointer; opacity: 0.65;\" opacity=\"0.65\" fill=\"#0000ff\" stroke=\"#0000ff\" stroke-width=\"1\" x=\"382\" y=\"88\" width=\"1\" height=\"2\" rx=\"0\" ry=\"0\"></rect><rect style=\"cursor: pointer; opacity: 0.65;\" opacity=\"0.65\" fill=\"#0000ff\" stroke=\"#0000ff\" stroke-width=\"1\" x=\"383\" y=\"88\" width=\"1\" height=\"2\" rx=\"0\" ry=\"0\"></rect><rect style=\"cursor: pointer; opacity: 0.65;\" opacity=\"0.65\" fill=\"#0000ff\" stroke=\"#0000ff\" stroke-width=\"1\" x=\"384\" y=\"88\" width=\"1\" height=\"2\" rx=\"0\" ry=\"0\"></rect><rect style=\"cursor: pointer; opacity: 0.65;\" opacity=\"0.65\" fill=\"#0000ff\" stroke=\"#0000ff\" stroke-width=\"1\" x=\"385\" y=\"88\" width=\"1\" height=\"2\" rx=\"0\" ry=\"0\"></rect><rect style=\"cursor: pointer; opacity: 0.65;\" opacity=\"0.65\" fill=\"#0000ff\" stroke=\"#0000ff\" stroke-width=\"1\" x=\"386\" y=\"88\" width=\"1\" height=\"2\" rx=\"0\" ry=\"0\"></rect><rect style=\"cursor: pointer; opacity: 0.65;\" opacity=\"0.65\" fill=\"#0000ff\" stroke=\"#0000ff\" stroke-width=\"1\" x=\"387\" y=\"88\" width=\"1\" height=\"2\" rx=\"0\" ry=\"0\"></rect><rect style=\"cursor: pointer; opacity: 0.65;\" opacity=\"0.65\" " "fill=\"#0000ff\" stroke=\"#0000ff\" stroke-width=\"1\" x=\"388\" y=\"88\" width=\"1\" height=\"2\" rx=\"0\" ry=\"0\"></rect><rect style=\"cursor: pointer; opacity: 0.65;\" opacity=\"0.65\" fill=\"#0000ff\" stroke=\"#0000ff\" stroke-width=\"1\" x=\"389\" y=\"88\" width=\"1\" height=\"2\" rx=\"0\" ry=\"0\"></rect><rect style=\"cursor: pointer; opacity: 0.65;\" opacity=\"0.65\" fill=\"#0000ff\" stroke=\"#0000ff\" stroke-width=\"1\" x=\"390\" y=\"88\" width=\"1\" height=\"2\" rx=\"0\" ry=\"0\"></rect><rect style=\"cursor: pointer; opacity: 0.65;\" opacity=\"0.65\" fill=\"#0000ff\" stroke=\"#0000ff\" stroke-width=\"1\" x=\"391\" y=\"88\" width=\"1\" height=\"2\" rx=\"0\" ry=\"0\"></rect><rect style=\"cursor: pointer; opacity: 0.65;\" opacity=\"0.65\" fill=\"#0000ff\" stroke=\"#0000ff\" stroke-width=\"1\" x=\"392\" y=\"88\" width=\"1\" height=\"2\" rx=\"0\" ry=\"0\"></rect><rect style=\"cursor: pointer; opacity: 0.65;\" opacity=\"0.65\" fill=\"#0000ff\" stroke=\"#0000ff\" stroke-width=\"1\" x=\"393\" y=\"88\" width=\"1\" height=\"2\" rx=\"0\" ry=\"0\"></rect><rect style=\"cursor: pointer; opacity: 0.65;\" opacity=\"0.65\" fill=\"#0000ff\" stroke=\"#0000ff\" stroke-width=\"1\" x=\"394\" y=\"88\" width=\"1\" height=\"2\" rx=\"0\" ry=\"0\"></rect><rect style=\"cursor: pointer; opacity: 0.65;\" opacity=\"0.65\" fill=\"#0000ff\" stroke=\"#0000ff\" stroke-width=\"1\" x=\"395\" y=\"88\" width=\"1\" height=\"2\" rx=\"0\" ry=\"0\"></rect><rect style=\"cursor: pointer; opacity: 0.65;\" opacity=\"0.65\" fill=\"#0000ff\" stroke=\"#0000ff\" stroke-width=\"1\" x=\"396\" y=\"88\" width=\"1\" height=\"2\" rx=\"0\" ry=\"0\"></rect><rect style=\"cursor: pointer; opacity: 0.65;\" opacity=\"0.65\" fill=\"#0000ff\" stroke=\"#0000ff\" stroke-width=\"1\" x=\"397\" y=\"88\" width=\"1\" height=\"2\" rx=\"0\" ry=\"0\"></rect><rect style=\"cursor: pointer; opacity: 0.65;\" opacity=\"0.65\" fill=\"#0000ff\" stroke=\"#0000ff\" stroke-width=\"1\" x=\"398\" y=\"88\" width=\"1\" height=\"2\" rx=\"0\" ry=\"0\"></rect><rect style=\"cursor: pointer; opacity: 0.65;\" opacity=\"0.65\" fill=\"#0000ff\" stroke=\"#0000ff\" stroke-width=\"1\" x=\"399\" y=\"88\" width=\"1\" height=\"2\" rx=\"0\" ry=\"0\"></rect><rect " "style=\"cursor: pointer; opacity: 0.65;\" opacity=\"0.65\" fill=\"#0000ff\" stroke=\"#0000ff\" stroke-width=\"1\" x=\"400\" y=\"88\" width=\"1\" height=\"2\" rx=\"0\" ry=\"0\"></rect><rect style=\"cursor: pointer; opacity: 0.65;\" opacity=\"0.65\" fill=\"#0000ff\" stroke=\"#0000ff\" stroke-width=\"1\" x=\"401\" y=\"88\" width=\"1\" height=\"2\" rx=\"0\" ry=\"0\"></rect><rect style=\"cursor: pointer; opacity: 0.65;\" opacity=\"0.65\" fill=\"#0000ff\" stroke=\"#0000ff\" stroke-width=\"1\" x=\"402\" y=\"88\" width=\"1\" height=\"2\" rx=\"0\" ry=\"0\"></rect><rect style=\"cursor: pointer; opacity: 0.65;\" opacity=\"0.65\" fill=\"#0000ff\" stroke=\"#0000ff\" stroke-width=\"1\" x=\"403\" y=\"88\" width=\"1\" height=\"2\" rx=\"0\" ry=\"0\"></rect><rect style=\"cursor: pointer; opacity: 0.65;\" opacity=\"0.65\" fill=\"#0000ff\" stroke=\"#0000ff\" stroke-width=\"1\" x=\"404\" y=\"88\" width=\"1\" height=\"2\" rx=\"0\" ry=\"0\"></rect><rect style=\"cursor: pointer; opacity: 0.65;\" opacity=\"0.65\" fill=\"#0000ff\" stroke=\"#0000ff\" stroke-width=\"1\" x=\"405\" y=\"88\" width=\"1\" height=\"2\" rx=\"0\" ry=\"0\"></rect><rect style=\"cursor: pointer; opacity: 0.65;\" opacity=\"0.65\" fill=\"#0000ff\" stroke=\"#0000ff\" stroke-width=\"1\" x=\"406\" y=\"88\" width=\"1\" height=\"2\" rx=\"0\" ry=\"0\"></rect><rect style=\"cursor: pointer; opacity: 0.65;\" opacity=\"0.65\" fill=\"#0000ff\" stroke=\"#0000ff\" stroke-width=\"1\" x=\"407\" y=\"88\" width=\"1\" height=\"2\" rx=\"0\" ry=\"0\"></rect><rect style=\"cursor: pointer; opacity: 0.65;\" opacity=\"0.65\" fill=\"#0000ff\" stroke=\"#0000ff\" stroke-width=\"1\" x=\"408\" y=\"88\" width=\"1\" height=\"2\" rx=\"0\" ry=\"0\"></rect><rect style=\"cursor: pointer; opacity: 0.65;\" opacity=\"0.65\" fill=\"#0000ff\" stroke=\"#0000ff\" stroke-width=\"1\" x=\"409\" y=\"88\" width=\"1\" height=\"2\" rx=\"0\" ry=\"0\"></rect><rect style=\"cursor: pointer; opacity: 0.65;\" opacity=\"0.65\" fill=\"#0000ff\" stroke=\"#0000ff\" stroke-width=\"1\" x=\"410\" y=\"88\" width=\"1\" height=\"2\" rx=\"0\" ry=\"0\"></rect><rect style=\"cursor: pointer; opacity: 0.65;\" opacity=\"0.65\" fill=\"#0000ff\" stroke=\"#0000ff\" stroke-width=\"1\" x=\"411\" " "y=\"88\" width=\"1\" height=\"2\" rx=\"0\" ry=\"0\"></rect><rect style=\"cursor: pointer; opacity: 0.65;\" opacity=\"0.65\" fill=\"#0000ff\" stroke=\"#0000ff\" stroke-width=\"1\" x=\"412\" y=\"88\" width=\"1\" height=\"2\" rx=\"0\" ry=\"0\"></rect><rect style=\"cursor: pointer; opacity: 0.65;\" opacity=\"0.65\" fill=\"#0000ff\" stroke=\"#0000ff\" stroke-width=\"1\" x=\"413\" y=\"88\" width=\"1\" height=\"2\" rx=\"0\" ry=\"0\"></rect><rect style=\"cursor: pointer; opacity: 0.65;\" opacity=\"0.65\" fill=\"#0000ff\" stroke=\"#0000ff\" stroke-width=\"1\" x=\"414\" y=\"88\" width=\"1\" height=\"2\" rx=\"0\" ry=\"0\"></rect><rect style=\"cursor: pointer; opacity: 0.65;\" opacity=\"0.65\" fill=\"#0000ff\" stroke=\"#0000ff\" stroke-width=\"1\" x=\"415\" y=\"88\" width=\"1\" height=\"2\" rx=\"0\" ry=\"0\"></rect><rect style=\"cursor: pointer; opacity: 0.65;\" opacity=\"0.65\" fill=\"#0000ff\" stroke=\"#0000ff\" stroke-width=\"1\" x=\"416\" y=\"88\" width=\"1\" height=\"2\" rx=\"0\" ry=\"0\"></rect><rect style=\"cursor: pointer; opacity: 0.65;\" opacity=\"0.65\" fill=\"#0000ff\" stroke=\"#0000ff\" stroke-width=\"1\" x=\"417\" y=\"88\" width=\"1\" height=\"2\" rx=\"0\" ry=\"0\"></rect><rect style=\"cursor: pointer; opacity: 0.65;\" opacity=\"0.65\" fill=\"#0000ff\" stroke=\"#0000ff\" stroke-width=\"1\" x=\"418\" y=\"88\" width=\"1\" height=\"2\" rx=\"0\" ry=\"0\"></rect><rect style=\"cursor: pointer; opacity: 0.65;\" opacity=\"0.65\" fill=\"#0000ff\" stroke=\"#0000ff\" stroke-width=\"1\" x=\"419\" y=\"88\" width=\"1\" height=\"2\" rx=\"0\" ry=\"0\"></rect><rect style=\"cursor: pointer; opacity: 0.65;\" opacity=\"0.65\" fill=\"#0000ff\" stroke=\"#0000ff\" stroke-width=\"1\" x=\"420\" y=\"88\" width=\"1\" height=\"2\" rx=\"0\" ry=\"0\"></rect><rect style=\"cursor: pointer; opacity: 0.65;\" opacity=\"0.65\" fill=\"#0000ff\" stroke=\"#0000ff\" stroke-width=\"1\" x=\"421\" y=\"88\" width=\"1\" height=\"2\" rx=\"0\" ry=\"0\"></rect><rect style=\"cursor: pointer; opacity: 0.65;\" opacity=\"0.65\" fill=\"#0000ff\" stroke=\"#0000ff\" stroke-width=\"1\" x=\"422\" y=\"88\" width=\"1\" height=\"2\" rx=\"0\" ry=\"0\"></rect><rect style=\"cursor: pointer; opacity: 0.65;\" opacity=\"0.65\" " "fill=\"#0000ff\" stroke=\"#0000ff\" stroke-width=\"1\" x=\"423\" y=\"88\" width=\"1\" height=\"2\" rx=\"0\" ry=\"0\"></rect><rect style=\"cursor: pointer; opacity: 0.65;\" opacity=\"0.65\" fill=\"#0000ff\" stroke=\"#0000ff\" stroke-width=\"1\" x=\"424\" y=\"86\" width=\"1\" height=\"4\" rx=\"0\" ry=\"0\"></rect><rect style=\"cursor: pointer; opacity: 0.65;\" opacity=\"0.65\" fill=\"#0000ff\" stroke=\"#0000ff\" stroke-width=\"1\" x=\"425\" y=\"88\" width=\"1\" height=\"2\" rx=\"0\" ry=\"0\"></rect><rect style=\"cursor: pointer; opacity: 0.65;\" opacity=\"0.65\" fill=\"#0000ff\" stroke=\"#0000ff\" stroke-width=\"1\" x=\"426\" y=\"88\" width=\"1\" height=\"2\" rx=\"0\" ry=\"0\"></rect><rect style=\"cursor: pointer; opacity: 0.65;\" opacity=\"0.65\" fill=\"#0000ff\" stroke=\"#0000ff\" stroke-width=\"1\" x=\"427\" y=\"88\" width=\"1\" height=\"2\" rx=\"0\" ry=\"0\"></rect><rect style=\"cursor: pointer; opacity: 0.65;\" opacity=\"0.65\" fill=\"#0000ff\" stroke=\"#0000ff\" stroke-width=\"1\" x=\"428\" y=\"88\" width=\"1\" height=\"2\" rx=\"0\" ry=\"0\"></rect><rect style=\"cursor: pointer; opacity: 0.65;\" opacity=\"0.65\" fill=\"#0000ff\" stroke=\"#0000ff\" stroke-width=\"1\" x=\"429\" y=\"88\" width=\"1\" height=\"2\" rx=\"0\" ry=\"0\"></rect><rect style=\"cursor: pointer; opacity: 0.65;\" opacity=\"0.65\" fill=\"#0000ff\" stroke=\"#0000ff\" stroke-width=\"1\" x=\"430\" y=\"88\" width=\"1\" height=\"2\" rx=\"0\" ry=\"0\"></rect><rect style=\"cursor: pointer; opacity: 0.65;\" opacity=\"0.65\" fill=\"#0000ff\" stroke=\"#0000ff\" stroke-width=\"1\" x=\"431\" y=\"88\" width=\"1\" height=\"2\" rx=\"0\" ry=\"0\"></rect><rect style=\"cursor: pointer; opacity: 0.65;\" opacity=\"0.65\" fill=\"#0000ff\" stroke=\"#0000ff\" stroke-width=\"1\" x=\"432\" y=\"88\" width=\"1\" height=\"2\" rx=\"0\" ry=\"0\"></rect><rect style=\"cursor: pointer; opacity: 0.65;\" opacity=\"0.65\" fill=\"#0000ff\" stroke=\"#0000ff\" stroke-width=\"1\" x=\"433\" y=\"88\" width=\"1\" height=\"2\" rx=\"0\" ry=\"0\"></rect><rect style=\"cursor: pointer; opacity: 0.65;\" opacity=\"0.65\" fill=\"#0000ff\" stroke=\"#0000ff\" stroke-width=\"1\" x=\"434\" y=\"88\" width=\"1\" height=\"2\" rx=\"0\" ry=\"0\"></rect><rect " "style=\"cursor: pointer; opacity: 0.65;\" opacity=\"0.65\" fill=\"#0000ff\" stroke=\"#0000ff\" stroke-width=\"1\" x=\"435\" y=\"88\" width=\"1\" height=\"2\" rx=\"0\" ry=\"0\"></rect><rect style=\"cursor: pointer; opacity: 0.65;\" opacity=\"0.65\" fill=\"#0000ff\" stroke=\"#0000ff\" stroke-width=\"1\" x=\"436\" y=\"88\" width=\"1\" height=\"2\" rx=\"0\" ry=\"0\"></rect><rect style=\"cursor: pointer; opacity: 0.65;\" opacity=\"0.65\" fill=\"#0000ff\" stroke=\"#0000ff\" stroke-width=\"1\" x=\"437\" y=\"88\" width=\"1\" height=\"2\" rx=\"0\" ry=\"0\"></rect><rect style=\"cursor: pointer; opacity: 0.65;\" opacity=\"0.65\" fill=\"#0000ff\" stroke=\"#0000ff\" stroke-width=\"1\" x=\"438\" y=\"88\" width=\"1\" height=\"2\" rx=\"0\" ry=\"0\"></rect><rect style=\"cursor: pointer; opacity: 0.65;\" opacity=\"0.65\" fill=\"#0000ff\" stroke=\"#0000ff\" stroke-width=\"1\" x=\"439\" y=\"88\" width=\"1\" height=\"2\" rx=\"0\" ry=\"0\"></rect><rect style=\"cursor: pointer; opacity: 0.65;\" opacity=\"0.65\" fill=\"#0000ff\" stroke=\"#0000ff\" stroke-width=\"1\" x=\"440\" y=\"88\" width=\"1\" height=\"2\" rx=\"0\" ry=\"0\"></rect><rect style=\"cursor: pointer; opacity: 0.65;\" opacity=\"0.65\" fill=\"#0000ff\" stroke=\"#0000ff\" stroke-width=\"1\" x=\"441\" y=\"88\" width=\"1\" height=\"2\" rx=\"0\" ry=\"0\"></rect><rect style=\"cursor: pointer; opacity: 0.65;\" opacity=\"0.65\" fill=\"#0000ff\" stroke=\"#0000ff\" stroke-width=\"1\" x=\"442\" y=\"88\" width=\"1\" height=\"2\" rx=\"0\" ry=\"0\"></rect><rect style=\"cursor: pointer; opacity: 0.65;\" opacity=\"0.65\" fill=\"#0000ff\" stroke=\"#0000ff\" stroke-width=\"1\" x=\"443\" y=\"88\" width=\"1\" height=\"2\" rx=\"0\" ry=\"0\"></rect><rect style=\"cursor: pointer; opacity: 0.65;\" opacity=\"0.65\" fill=\"#0000ff\" stroke=\"#0000ff\" stroke-width=\"1\" x=\"444\" y=\"88\" width=\"1\" height=\"2\" rx=\"0\" ry=\"0\"></rect><rect style=\"cursor: pointer; opacity: 0.65;\" opacity=\"0.65\" fill=\"#0000ff\" stroke=\"#0000ff\" stroke-width=\"1\" x=\"445\" y=\"88\" width=\"1\" height=\"2\" rx=\"0\" ry=\"0\"></rect><rect style=\"cursor: pointer; opacity: 0.65;\" opacity=\"0.65\" fill=\"#0000ff\" stroke=\"#0000ff\" stroke-width=\"1\" x=\"446\" " "y=\"88\" width=\"1\" height=\"2\" rx=\"0\" ry=\"0\"></rect><rect style=\"cursor: pointer; opacity: 0.65;\" opacity=\"0.65\" fill=\"#0000ff\" stroke=\"#0000ff\" stroke-width=\"1\" x=\"447\" y=\"88\" width=\"1\" height=\"2\" rx=\"0\" ry=\"0\"></rect><rect style=\"cursor: pointer; opacity: 0.65;\" opacity=\"0.65\" fill=\"#0000ff\" stroke=\"#0000ff\" stroke-width=\"1\" x=\"448\" y=\"88\" width=\"1\" height=\"2\" rx=\"0\" ry=\"0\"></rect><rect style=\"cursor: pointer; opacity: 0.65;\" opacity=\"0.65\" fill=\"#0000ff\" stroke=\"#0000ff\" stroke-width=\"1\" x=\"449\" y=\"88\" width=\"1\" height=\"2\" rx=\"0\" ry=\"0\"></rect><rect style=\"cursor: pointer; opacity: 0.65;\" opacity=\"0.65\" fill=\"#0000ff\" stroke=\"#0000ff\" stroke-width=\"1\" x=\"450\" y=\"88\" width=\"1\" height=\"2\" rx=\"0\" ry=\"0\"></rect><rect style=\"cursor: pointer; opacity: 0.65;\" opacity=\"0.65\" fill=\"#0000ff\" stroke=\"#0000ff\" stroke-width=\"1\" x=\"451\" y=\"86\" width=\"1\" height=\"4\" rx=\"0\" ry=\"0\"></rect><rect style=\"cursor: pointer; opacity: 0.65;\" opacity=\"0.65\" fill=\"#0000ff\" stroke=\"#0000ff\" stroke-width=\"1\" x=\"452\" y=\"86\" width=\"1\" height=\"4\" rx=\"0\" ry=\"0\"></rect><rect style=\"cursor: pointer; opacity: 0.65;\" opacity=\"0.65\" fill=\"#0000ff\" stroke=\"#0000ff\" stroke-width=\"1\" x=\"453\" y=\"86\" width=\"1\" height=\"4\" rx=\"0\" ry=\"0\"></rect><rect style=\"cursor: pointer; opacity: 0.65;\" opacity=\"0.65\" fill=\"#0000ff\" stroke=\"#0000ff\" stroke-width=\"1\" x=\"454\" y=\"88\" width=\"1\" height=\"2\" rx=\"0\" ry=\"0\"></rect><rect style=\"cursor: pointer; opacity: 0.65;\" opacity=\"0.65\" fill=\"#0000ff\" stroke=\"#0000ff\" stroke-width=\"1\" x=\"455\" y=\"88\" width=\"1\" height=\"2\" rx=\"0\" ry=\"0\"></rect><rect style=\"cursor: pointer; opacity: 0.65;\" opacity=\"0.65\" fill=\"#0000ff\" stroke=\"#0000ff\" stroke-width=\"1\" x=\"456\" y=\"88\" width=\"1\" height=\"2\" rx=\"0\" ry=\"0\"></rect><rect style=\"cursor: pointer; opacity: 0.65;\" opacity=\"0.65\" fill=\"#0000ff\" stroke=\"#0000ff\" stroke-width=\"1\" x=\"457\" y=\"88\" width=\"1\" height=\"2\" rx=\"0\" ry=\"0\"></rect><rect style=\"cursor: pointer; opacity: 0.65;\" opacity=\"0.65\" " "fill=\"#0000ff\" stroke=\"#0000ff\" stroke-width=\"1\" x=\"458\" y=\"88\" width=\"1\" height=\"2\" rx=\"0\" ry=\"0\"></rect><rect style=\"cursor: pointer; opacity: 0.65;\" opacity=\"0.65\" fill=\"#0000ff\" stroke=\"#0000ff\" stroke-width=\"1\" x=\"459\" y=\"88\" width=\"1\" height=\"2\" rx=\"0\" ry=\"0\"></rect><rect style=\"cursor: pointer; opacity: 0.65;\" opacity=\"0.65\" fill=\"#0000ff\" stroke=\"#0000ff\" stroke-width=\"1\" x=\"460\" y=\"88\" width=\"1\" height=\"2\" rx=\"0\" ry=\"0\"></rect><rect style=\"cursor: pointer; opacity: 0.65;\" opacity=\"0.65\" fill=\"#0000ff\" stroke=\"#0000ff\" stroke-width=\"1\" x=\"461\" y=\"88\" width=\"1\" height=\"2\" rx=\"0\" ry=\"0\"></rect><rect style=\"cursor: pointer; opacity: 0.65;\" opacity=\"0.65\" fill=\"#0000ff\" stroke=\"#0000ff\" stroke-width=\"1\" x=\"462\" y=\"88\" width=\"1\" height=\"2\" rx=\"0\" ry=\"0\"></rect><rect style=\"cursor: pointer; opacity: 0.65;\" opacity=\"0.65\" fill=\"#0000ff\" stroke=\"#0000ff\" stroke-width=\"1\" x=\"463\" y=\"88\" width=\"1\" height=\"2\" rx=\"0\" ry=\"0\"></rect><rect style=\"cursor: pointer; opacity: 0.65;\" opacity=\"0.65\" fill=\"#0000ff\" stroke=\"#0000ff\" stroke-width=\"1\" x=\"464\" y=\"88\" width=\"1\" height=\"2\" rx=\"0\" ry=\"0\"></rect><rect style=\"cursor: pointer; opacity: 0.65;\" opacity=\"0.65\" fill=\"#0000ff\" stroke=\"#0000ff\" stroke-width=\"1\" x=\"465\" y=\"88\" width=\"1\" height=\"2\" rx=\"0\" ry=\"0\"></rect><rect style=\"cursor: pointer; opacity: 0.65;\" opacity=\"0.65\" fill=\"#0000ff\" stroke=\"#0000ff\" stroke-width=\"1\" x=\"466\" y=\"88\" width=\"1\" height=\"2\" rx=\"0\" ry=\"0\"></rect><rect style=\"cursor: pointer; opacity: 0.65;\" opacity=\"0.65\" fill=\"#0000ff\" stroke=\"#0000ff\" stroke-width=\"1\" x=\"467\" y=\"88\" width=\"1\" height=\"2\" rx=\"0\" ry=\"0\"></rect><rect style=\"cursor: pointer; opacity: 0.65;\" opacity=\"0.65\" fill=\"#0000ff\" stroke=\"#0000ff\" stroke-width=\"1\" x=\"468\" y=\"88\" width=\"1\" height=\"2\" rx=\"0\" ry=\"0\"></rect><rect style=\"cursor: pointer; opacity: 0.65;\" opacity=\"0.65\" fill=\"#0000ff\" stroke=\"#0000ff\" stroke-width=\"1\" x=\"469\" y=\"88\" width=\"1\" height=\"2\" rx=\"0\" ry=\"0\"></rect><rect " "style=\"cursor: pointer; opacity: 0.65;\" opacity=\"0.65\" fill=\"#0000ff\" stroke=\"#0000ff\" stroke-width=\"1\" x=\"470\" y=\"88\" width=\"1\" height=\"2\" rx=\"0\" ry=\"0\"></rect><rect style=\"cursor: pointer; opacity: 0.65;\" opacity=\"0.65\" fill=\"#0000ff\" stroke=\"#0000ff\" stroke-width=\"1\" x=\"471\" y=\"88\" width=\"1\" height=\"2\" rx=\"0\" ry=\"0\"></rect><rect style=\"cursor: pointer; opacity: 0.65;\" opacity=\"0.65\" fill=\"#0000ff\" stroke=\"#0000ff\" stroke-width=\"1\" x=\"472\" y=\"88\" width=\"1\" height=\"2\" rx=\"0\" ry=\"0\"></rect><rect style=\"cursor: pointer; opacity: 0.65;\" opacity=\"0.65\" fill=\"#0000ff\" stroke=\"#0000ff\" stroke-width=\"1\" x=\"473\" y=\"88\" width=\"1\" height=\"2\" rx=\"0\" ry=\"0\"></rect><rect style=\"cursor: pointer; opacity: 0.65;\" opacity=\"0.65\" fill=\"#0000ff\" stroke=\"#0000ff\" stroke-width=\"1\" x=\"474\" y=\"88\" width=\"1\" height=\"2\" rx=\"0\" ry=\"0\"></rect><rect style=\"cursor: pointer; opacity: 0.65;\" opacity=\"0.65\" fill=\"#0000ff\" stroke=\"#0000ff\" stroke-width=\"1\" x=\"475\" y=\"88\" width=\"1\" height=\"2\" rx=\"0\" ry=\"0\"></rect><rect style=\"cursor: pointer; opacity: 0.65;\" opacity=\"0.65\" fill=\"#0000ff\" stroke=\"#0000ff\" stroke-width=\"1\" x=\"476\" y=\"88\" width=\"1\" height=\"2\" rx=\"0\" ry=\"0\"></rect><rect style=\"cursor: pointer; opacity: 0.65;\" opacity=\"0.65\" fill=\"#0000ff\" stroke=\"#0000ff\" stroke-width=\"1\" x=\"477\" y=\"88\" width=\"1\" height=\"2\" rx=\"0\" ry=\"0\"></rect><rect style=\"cursor: pointer; opacity: 0.65;\" opacity=\"0.65\" fill=\"#0000ff\" stroke=\"#0000ff\" stroke-width=\"1\" x=\"478\" y=\"88\" width=\"1\" height=\"2\" rx=\"0\" ry=\"0\"></rect><rect style=\"cursor: pointer; opacity: 0.65;\" opacity=\"0.65\" fill=\"#0000ff\" stroke=\"#0000ff\" stroke-width=\"1\" x=\"479\" y=\"88\" width=\"1\" height=\"2\" rx=\"0\" ry=\"0\"></rect><rect style=\"cursor: pointer; opacity: 0.65;\" opacity=\"0.65\" fill=\"#0000ff\" stroke=\"#0000ff\" stroke-width=\"1\" x=\"480\" y=\"88\" width=\"1\" height=\"2\" rx=\"0\" ry=\"0\"></rect><rect style=\"cursor: pointer; opacity: 0.65;\" opacity=\"0.65\" fill=\"#0000ff\" stroke=\"#0000ff\" stroke-width=\"1\" x=\"482\" " "y=\"88\" width=\"1\" height=\"2\" rx=\"0\" ry=\"0\"></rect><rect style=\"cursor: pointer; opacity: 0.65;\" opacity=\"0.65\" fill=\"#0000ff\" stroke=\"#0000ff\" stroke-width=\"1\" x=\"483\" y=\"88\" width=\"1\" height=\"2\" rx=\"0\" ry=\"0\"></rect><rect style=\"cursor: pointer; opacity: 0.65;\" opacity=\"0.65\" fill=\"#0000ff\" stroke=\"#0000ff\" stroke-width=\"1\" x=\"484\" y=\"88\" width=\"1\" height=\"2\" rx=\"0\" ry=\"0\"></rect><rect style=\"cursor: pointer; opacity: 0.65;\" opacity=\"0.65\" fill=\"#0000ff\" stroke=\"#0000ff\" stroke-width=\"1\" x=\"485\" y=\"88\" width=\"1\" height=\"2\" rx=\"0\" ry=\"0\"></rect><rect style=\"cursor: pointer; opacity: 0.65;\" opacity=\"0.65\" fill=\"#0000ff\" stroke=\"#0000ff\" stroke-width=\"1\" x=\"486\" y=\"88\" width=\"1\" height=\"2\" rx=\"0\" ry=\"0\"></rect><rect style=\"cursor: pointer; opacity: 0.65;\" opacity=\"0.65\" fill=\"#0000ff\" stroke=\"#0000ff\" stroke-width=\"1\" x=\"487\" y=\"88\" width=\"1\" height=\"2\" rx=\"0\" ry=\"0\"></rect><rect style=\"cursor: pointer; opacity: 0.65;\" opacity=\"0.65\" fill=\"#0000ff\" stroke=\"#0000ff\" stroke-width=\"1\" x=\"488\" y=\"88\" width=\"1\" height=\"2\" rx=\"0\" ry=\"0\"></rect><rect style=\"cursor: pointer; opacity: 0.65;\" opacity=\"0.65\" fill=\"#0000ff\" stroke=\"#0000ff\" stroke-width=\"1\" x=\"489\" y=\"88\" width=\"1\" height=\"2\" rx=\"0\" ry=\"0\"></rect><rect style=\"cursor: pointer; opacity: 0.65;\" opacity=\"0.65\" fill=\"#0000ff\" stroke=\"#0000ff\" stroke-width=\"1\" x=\"490\" y=\"88\" width=\"1\" height=\"2\" rx=\"0\" ry=\"0\"></rect><rect style=\"cursor: pointer; opacity: 0.65;\" opacity=\"0.65\" fill=\"#0000ff\" stroke=\"#0000ff\" stroke-width=\"1\" x=\"491\" y=\"88\" width=\"1\" height=\"2\" rx=\"0\" ry=\"0\"></rect><rect style=\"cursor: pointer; opacity: 0.65;\" opacity=\"0.65\" fill=\"#0000ff\" stroke=\"#0000ff\" stroke-width=\"1\" x=\"492\" y=\"88\" width=\"1\" height=\"2\" rx=\"0\" ry=\"0\"></rect><rect style=\"cursor: pointer; opacity: 0.65;\" opacity=\"0.65\" fill=\"#0000ff\" stroke=\"#0000ff\" stroke-width=\"1\" x=\"493\" y=\"88\" width=\"1\" height=\"2\" rx=\"0\" ry=\"0\"></rect><rect style=\"cursor: pointer; opacity: 0.65;\" opacity=\"0.65\" " "fill=\"#0000ff\" stroke=\"#0000ff\" stroke-width=\"1\" x=\"494\" y=\"88\" width=\"1\" height=\"2\" rx=\"0\" ry=\"0\"></rect><rect style=\"cursor: pointer; opacity: 0.65;\" opacity=\"0.65\" fill=\"#0000ff\" stroke=\"#0000ff\" stroke-width=\"1\" x=\"495\" y=\"88\" width=\"1\" height=\"2\" rx=\"0\" ry=\"0\"></rect><rect style=\"cursor: pointer; opacity: 0.65;\" opacity=\"0.65\" fill=\"#0000ff\" stroke=\"#0000ff\" stroke-width=\"1\" x=\"496\" y=\"88\" width=\"1\" height=\"2\" rx=\"0\" ry=\"0\"></rect><rect style=\"cursor: pointer; opacity: 0.65;\" opacity=\"0.65\" fill=\"#0000ff\" stroke=\"#0000ff\" stroke-width=\"1\" x=\"497\" y=\"88\" width=\"1\" height=\"2\" rx=\"0\" ry=\"0\"></rect><rect style=\"cursor: pointer; opacity: 0.65;\" opacity=\"0.65\" fill=\"#0000ff\" stroke=\"#0000ff\" stroke-width=\"1\" x=\"498\" y=\"88\" width=\"1\" height=\"2\" rx=\"0\" ry=\"0\"></rect><rect style=\"cursor: pointer; opacity: 0.65;\" opacity=\"0.65\" fill=\"#0000ff\" stroke=\"#0000ff\" stroke-width=\"1\" x=\"499\" y=\"88\" width=\"1\" height=\"2\" rx=\"0\" ry=\"0\"></rect><rect style=\"cursor: pointer; opacity: 0.65;\" opacity=\"0.65\" fill=\"#0000ff\" stroke=\"#0000ff\" stroke-width=\"1\" x=\"500\" y=\"88\" width=\"1\" height=\"2\" rx=\"0\" ry=\"0\"></rect><rect style=\"cursor: pointer; opacity: 0.65;\" opacity=\"0.65\" fill=\"#0000ff\" stroke=\"#0000ff\" stroke-width=\"1\" x=\"501\" y=\"88\" width=\"1\" height=\"2\" rx=\"0\" ry=\"0\"></rect><rect style=\"cursor: pointer; opacity: 0.65;\" opacity=\"0.65\" fill=\"#0000ff\" stroke=\"#0000ff\" stroke-width=\"1\" x=\"502\" y=\"88\" width=\"1\" height=\"2\" rx=\"0\" ry=\"0\"></rect><rect style=\"cursor: pointer; opacity: 0.65;\" opacity=\"0.65\" fill=\"#0000ff\" stroke=\"#0000ff\" stroke-width=\"1\" x=\"503\" y=\"88\" width=\"1\" height=\"2\" rx=\"0\" ry=\"0\"></rect><rect style=\"cursor: pointer; opacity: 0.65;\" opacity=\"0.65\" fill=\"#0000ff\" stroke=\"#0000ff\" stroke-width=\"1\" x=\"504\" y=\"88\" width=\"1\" height=\"2\" rx=\"0\" ry=\"0\"></rect><rect style=\"cursor: pointer; opacity: 0.65;\" opacity=\"0.65\" fill=\"#0000ff\" stroke=\"#0000ff\" stroke-width=\"1\" x=\"505\" y=\"88\" width=\"1\" height=\"2\" rx=\"0\" ry=\"0\"></rect><rect " "style=\"cursor: pointer; opacity: 0.65;\" opacity=\"0.65\" fill=\"#0000ff\" stroke=\"#0000ff\" stroke-width=\"1\" x=\"506\" y=\"88\" width=\"1\" height=\"2\" rx=\"0\" ry=\"0\"></rect><rect style=\"cursor: pointer; opacity: 0.65;\" opacity=\"0.65\" fill=\"#0000ff\" stroke=\"#0000ff\" stroke-width=\"1\" x=\"507\" y=\"88\" width=\"1\" height=\"2\" rx=\"0\" ry=\"0\"></rect><rect style=\"cursor: pointer; opacity: 0.65;\" opacity=\"0.65\" fill=\"#0000ff\" stroke=\"#0000ff\" stroke-width=\"1\" x=\"508\" y=\"88\" width=\"1\" height=\"2\" rx=\"0\" ry=\"0\"></rect><rect style=\"cursor: pointer; opacity: 0.65;\" opacity=\"0.65\" fill=\"#0000ff\" stroke=\"#0000ff\" stroke-width=\"1\" x=\"509\" y=\"86\" width=\"1\" height=\"4\" rx=\"0\" ry=\"0\"></rect><rect style=\"cursor: pointer; opacity: 0.65;\" opacity=\"0.65\" fill=\"#0000ff\" stroke=\"#0000ff\" stroke-width=\"1\" x=\"510\" y=\"86\" width=\"1\" height=\"4\" rx=\"0\" ry=\"0\"></rect><rect style=\"cursor: pointer; opacity: 0.65;\" opacity=\"0.65\" fill=\"#0000ff\" stroke=\"#0000ff\" stroke-width=\"1\" x=\"511\" y=\"86\" width=\"1\" height=\"4\" rx=\"0\" ry=\"0\"></rect><rect style=\"cursor: pointer; opacity: 0.65;\" opacity=\"0.65\" fill=\"#0000ff\" stroke=\"#0000ff\" stroke-width=\"1\" x=\"512\" y=\"88\" width=\"1\" height=\"2\" rx=\"0\" ry=\"0\"></rect><rect style=\"cursor: pointer; opacity: 0.65;\" opacity=\"0.65\" fill=\"#0000ff\" stroke=\"#0000ff\" stroke-width=\"1\" x=\"513\" y=\"88\" width=\"1\" height=\"2\" rx=\"0\" ry=\"0\"></rect><rect style=\"cursor: pointer; opacity: 0.65;\" opacity=\"0.65\" fill=\"#0000ff\" stroke=\"#0000ff\" stroke-width=\"1\" x=\"514\" y=\"88\" width=\"1\" height=\"2\" rx=\"0\" ry=\"0\"></rect><rect style=\"cursor: pointer; opacity: 0.65;\" opacity=\"0.65\" fill=\"#0000ff\" stroke=\"#0000ff\" stroke-width=\"1\" x=\"515\" y=\"88\" width=\"1\" height=\"2\" rx=\"0\" ry=\"0\"></rect><rect style=\"cursor: pointer; opacity: 0.65;\" opacity=\"0.65\" fill=\"#0000ff\" stroke=\"#0000ff\" stroke-width=\"1\" x=\"516\" y=\"88\" width=\"1\" height=\"2\" rx=\"0\" ry=\"0\"></rect><rect style=\"cursor: pointer; opacity: 0.65;\" opacity=\"0.65\" fill=\"#0000ff\" stroke=\"#0000ff\" stroke-width=\"1\" x=\"517\" " "y=\"88\" width=\"1\" height=\"2\" rx=\"0\" ry=\"0\"></rect><rect style=\"cursor: pointer; opacity: 0.65;\" opacity=\"0.65\" fill=\"#0000ff\" stroke=\"#0000ff\" stroke-width=\"1\" x=\"518\" y=\"88\" width=\"1\" height=\"2\" rx=\"0\" ry=\"0\"></rect><rect style=\"cursor: pointer; opacity: 0.65;\" opacity=\"0.65\" fill=\"#0000ff\" stroke=\"#0000ff\" stroke-width=\"1\" x=\"519\" y=\"88\" width=\"1\" height=\"2\" rx=\"0\" ry=\"0\"></rect><rect style=\"cursor: pointer; opacity: 0.65;\" opacity=\"0.65\" fill=\"#0000ff\" stroke=\"#0000ff\" stroke-width=\"1\" x=\"520\" y=\"88\" width=\"1\" height=\"2\" rx=\"0\" ry=\"0\"></rect><rect style=\"cursor: pointer; opacity: 0.65;\" opacity=\"0.65\" fill=\"#0000ff\" stroke=\"#0000ff\" stroke-width=\"1\" x=\"521\" y=\"88\" width=\"1\" height=\"2\" rx=\"0\" ry=\"0\"></rect><rect style=\"cursor: pointer; opacity: 0.65;\" opacity=\"0.65\" fill=\"#0000ff\" stroke=\"#0000ff\" stroke-width=\"1\" x=\"522\" y=\"88\" width=\"1\" height=\"2\" rx=\"0\" ry=\"0\"></rect><rect style=\"cursor: pointer; opacity: 0.65;\" opacity=\"0.65\" fill=\"#0000ff\" stroke=\"#0000ff\" stroke-width=\"1\" x=\"523\" y=\"88\" width=\"1\" height=\"2\" rx=\"0\" ry=\"0\"></rect><rect style=\"cursor: pointer; opacity: 0.65;\" opacity=\"0.65\" fill=\"#0000ff\" stroke=\"#0000ff\" stroke-width=\"1\" x=\"524\" y=\"88\" width=\"1\" height=\"2\" rx=\"0\" ry=\"0\"></rect><rect style=\"cursor: pointer; opacity: 0.65;\" opacity=\"0.65\" fill=\"#0000ff\" stroke=\"#0000ff\" stroke-width=\"1\" x=\"525\" y=\"88\" width=\"1\" height=\"2\" rx=\"0\" ry=\"0\"></rect><rect style=\"cursor: pointer; opacity: 0.65;\" opacity=\"0.65\" fill=\"#0000ff\" stroke=\"#0000ff\" stroke-width=\"1\" x=\"526\" y=\"88\" width=\"1\" height=\"2\" rx=\"0\" ry=\"0\"></rect><rect style=\"cursor: pointer; opacity: 0.65;\" opacity=\"0.65\" fill=\"#0000ff\" stroke=\"#0000ff\" stroke-width=\"1\" x=\"527\" y=\"88\" width=\"1\" height=\"2\" rx=\"0\" ry=\"0\"></rect><rect style=\"cursor: pointer; opacity: 0.65;\" opacity=\"0.65\" fill=\"#0000ff\" stroke=\"#0000ff\" stroke-width=\"1\" x=\"528\" y=\"88\" width=\"1\" height=\"2\" rx=\"0\" ry=\"0\"></rect><rect style=\"cursor: pointer; opacity: 0.65;\" opacity=\"0.65\" " "fill=\"#0000ff\" stroke=\"#0000ff\" stroke-width=\"1\" x=\"529\" y=\"88\" width=\"1\" height=\"2\" rx=\"0\" ry=\"0\"></rect><rect style=\"cursor: pointer; opacity: 0.65;\" opacity=\"0.65\" fill=\"#0000ff\" stroke=\"#0000ff\" stroke-width=\"1\" x=\"530\" y=\"88\" width=\"1\" height=\"2\" rx=\"0\" ry=\"0\"></rect><rect style=\"cursor: pointer; opacity: 0.65;\" opacity=\"0.65\" fill=\"#0000ff\" stroke=\"#0000ff\" stroke-width=\"1\" x=\"531\" y=\"88\" width=\"1\" height=\"2\" rx=\"0\" ry=\"0\"></rect><rect style=\"cursor: pointer; opacity: 0.65;\" opacity=\"0.65\" fill=\"#0000ff\" stroke=\"#0000ff\" stroke-width=\"1\" x=\"532\" y=\"88\" width=\"1\" height=\"2\" rx=\"0\" ry=\"0\"></rect><rect style=\"cursor: pointer; opacity: 0.65;\" opacity=\"0.65\" fill=\"#0000ff\" stroke=\"#0000ff\" stroke-width=\"1\" x=\"533\" y=\"88\" width=\"1\" height=\"2\" rx=\"0\" ry=\"0\"></rect><rect style=\"cursor: pointer; opacity: 0.65;\" opacity=\"0.65\" fill=\"#0000ff\" stroke=\"#0000ff\" stroke-width=\"1\" x=\"534\" y=\"88\" width=\"1\" height=\"2\" rx=\"0\" ry=\"0\"></rect><rect style=\"cursor: pointer; opacity: 0.65;\" opacity=\"0.65\" fill=\"#0000ff\" stroke=\"#0000ff\" stroke-width=\"1\" x=\"535\" y=\"88\" width=\"1\" height=\"2\" rx=\"0\" ry=\"0\"></rect><rect style=\"cursor: pointer; opacity: 0.65;\" opacity=\"0.65\" fill=\"#0000ff\" stroke=\"#0000ff\" stroke-width=\"1\" x=\"536\" y=\"86\" width=\"1\" height=\"4\" rx=\"0\" ry=\"0\"></rect><rect style=\"cursor: pointer; opacity: 0.65;\" opacity=\"0.65\" fill=\"#0000ff\" stroke=\"#0000ff\" stroke-width=\"1\" x=\"537\" y=\"86\" width=\"1\" height=\"4\" rx=\"0\" ry=\"0\"></rect><rect style=\"cursor: pointer; opacity: 0.65;\" opacity=\"0.65\" fill=\"#0000ff\" stroke=\"#0000ff\" stroke-width=\"1\" x=\"538\" y=\"86\" width=\"1\" height=\"4\" rx=\"0\" ry=\"0\"></rect><rect style=\"cursor: pointer; opacity: 0.65;\" opacity=\"0.65\" fill=\"#0000ff\" stroke=\"#0000ff\" stroke-width=\"1\" x=\"539\" y=\"88\" width=\"1\" height=\"2\" rx=\"0\" ry=\"0\"></rect><rect style=\"cursor: pointer; opacity: 0.65;\" opacity=\"0.65\" fill=\"#0000ff\" stroke=\"#0000ff\" stroke-width=\"1\" x=\"540\" y=\"88\" width=\"1\" height=\"2\" rx=\"0\" ry=\"0\"></rect><rect " "style=\"cursor: pointer; opacity: 0.65;\" opacity=\"0.65\" fill=\"#0000ff\" stroke=\"#0000ff\" stroke-width=\"1\" x=\"541\" y=\"88\" width=\"1\" height=\"2\" rx=\"0\" ry=\"0\"></rect><rect style=\"cursor: pointer; opacity: 0.65;\" opacity=\"0.65\" fill=\"#0000ff\" stroke=\"#0000ff\" stroke-width=\"1\" x=\"542\" y=\"88\" width=\"1\" height=\"2\" rx=\"0\" ry=\"0\"></rect><rect style=\"cursor: pointer; opacity: 0.65;\" opacity=\"0.65\" fill=\"#0000ff\" stroke=\"#0000ff\" stroke-width=\"1\" x=\"543\" y=\"88\" width=\"1\" height=\"2\" rx=\"0\" ry=\"0\"></rect><rect style=\"cursor: pointer; opacity: 0.65;\" opacity=\"0.65\" fill=\"#0000ff\" stroke=\"#0000ff\" stroke-width=\"1\" x=\"544\" y=\"88\" width=\"1\" height=\"2\" rx=\"0\" ry=\"0\"></rect><rect style=\"cursor: pointer; opacity: 0.65;\" opacity=\"0.65\" fill=\"#0000ff\" stroke=\"#0000ff\" stroke-width=\"1\" x=\"545\" y=\"88\" width=\"1\" height=\"2\" rx=\"0\" ry=\"0\"></rect><rect style=\"cursor: pointer; opacity: 0.65;\" opacity=\"0.65\" fill=\"#0000ff\" stroke=\"#0000ff\" stroke-width=\"1\" x=\"546\" y=\"88\" width=\"1\" height=\"2\" rx=\"0\" ry=\"0\"></rect><rect style=\"cursor: pointer; opacity: 0.65;\" opacity=\"0.65\" fill=\"#0000ff\" stroke=\"#0000ff\" stroke-width=\"1\" x=\"547\" y=\"88\" width=\"1\" height=\"2\" rx=\"0\" ry=\"0\"></rect><rect style=\"cursor: pointer; opacity: 0.65;\" opacity=\"0.65\" fill=\"#0000ff\" stroke=\"#0000ff\" stroke-width=\"1\" x=\"548\" y=\"88\" width=\"1\" height=\"2\" rx=\"0\" ry=\"0\"></rect><rect style=\"cursor: pointer; opacity: 0.65;\" opacity=\"0.65\" fill=\"#0000ff\" stroke=\"#0000ff\" stroke-width=\"1\" x=\"549\" y=\"88\" width=\"1\" height=\"2\" rx=\"0\" ry=\"0\"></rect><rect style=\"cursor: pointer; opacity: 0.65;\" opacity=\"0.65\" fill=\"#0000ff\" stroke=\"#0000ff\" stroke-width=\"1\" x=\"550\" y=\"88\" width=\"1\" height=\"2\" rx=\"0\" ry=\"0\"></rect><rect style=\"cursor: pointer; opacity: 0.65;\" opacity=\"0.65\" fill=\"#0000ff\" stroke=\"#0000ff\" stroke-width=\"1\" x=\"551\" y=\"88\" width=\"1\" height=\"2\" rx=\"0\" ry=\"0\"></rect><rect style=\"cursor: pointer; opacity: 0.65;\" opacity=\"0.65\" fill=\"#0000ff\" stroke=\"#0000ff\" stroke-width=\"1\" x=\"552\" " "y=\"88\" width=\"1\" height=\"2\" rx=\"0\" ry=\"0\"></rect><rect style=\"cursor: pointer; opacity: 0.65;\" opacity=\"0.65\" fill=\"#0000ff\" stroke=\"#0000ff\" stroke-width=\"1\" x=\"553\" y=\"88\" width=\"1\" height=\"2\" rx=\"0\" ry=\"0\"></rect><rect style=\"cursor: pointer; opacity: 0.65;\" opacity=\"0.65\" fill=\"#0000ff\" stroke=\"#0000ff\" stroke-width=\"1\" x=\"554\" y=\"88\" width=\"1\" height=\"2\" rx=\"0\" ry=\"0\"></rect><rect style=\"cursor: pointer; opacity: 0.65;\" opacity=\"0.65\" fill=\"#0000ff\" stroke=\"#0000ff\" stroke-width=\"1\" x=\"555\" y=\"88\" width=\"1\" height=\"2\" rx=\"0\" ry=\"0\"></rect><rect style=\"cursor: pointer; opacity: 0.65;\" opacity=\"0.65\" fill=\"#0000ff\" stroke=\"#0000ff\" stroke-width=\"1\" x=\"556\" y=\"88\" width=\"1\" height=\"2\" rx=\"0\" ry=\"0\"></rect><rect style=\"cursor: pointer; opacity: 0.65;\" opacity=\"0.65\" fill=\"#0000ff\" stroke=\"#0000ff\" stroke-width=\"1\" x=\"557\" y=\"88\" width=\"1\" height=\"2\" rx=\"0\" ry=\"0\"></rect><rect style=\"cursor: pointer; opacity: 0.65;\" opacity=\"0.65\" fill=\"#0000ff\" stroke=\"#0000ff\" stroke-width=\"1\" x=\"558\" y=\"88\" width=\"1\" height=\"2\" rx=\"0\" ry=\"0\"></rect><rect style=\"cursor: pointer; opacity: 0.65;\" opacity=\"0.65\" fill=\"#0000ff\" stroke=\"#0000ff\" stroke-width=\"1\" x=\"559\" y=\"88\" width=\"1\" height=\"2\" rx=\"0\" ry=\"0\"></rect><rect style=\"cursor: pointer; opacity: 0.65;\" opacity=\"0.65\" fill=\"#0000ff\" stroke=\"#0000ff\" stroke-width=\"1\" x=\"560\" y=\"88\" width=\"1\" height=\"2\" rx=\"0\" ry=\"0\"></rect><rect style=\"cursor: pointer; opacity: 0.65;\" opacity=\"0.65\" fill=\"#0000ff\" stroke=\"#0000ff\" stroke-width=\"1\" x=\"561\" y=\"88\" width=\"1\" height=\"2\" rx=\"0\" ry=\"0\"></rect><rect style=\"cursor: pointer; opacity: 0.65;\" opacity=\"0.65\" fill=\"#0000ff\" stroke=\"#0000ff\" stroke-width=\"1\" x=\"562\" y=\"88\" width=\"1\" height=\"2\" rx=\"0\" ry=\"0\"></rect><rect style=\"cursor: pointer; opacity: 0.65;\" opacity=\"0.65\" fill=\"#0000ff\" stroke=\"#0000ff\" stroke-width=\"1\" x=\"563\" y=\"88\" width=\"1\" height=\"2\" rx=\"0\" ry=\"0\"></rect><rect style=\"cursor: pointer; opacity: 0.65;\" opacity=\"0.65\" " "fill=\"#0000ff\" stroke=\"#0000ff\" stroke-width=\"1\" x=\"564\" y=\"88\" width=\"1\" height=\"2\" rx=\"0\" ry=\"0\"></rect><rect style=\"cursor: pointer; opacity: 0.65;\" opacity=\"0.65\" fill=\"#0000ff\" stroke=\"#0000ff\" stroke-width=\"1\" x=\"565\" y=\"88\" width=\"1\" height=\"2\" rx=\"0\" ry=\"0\"></rect><rect style=\"cursor: pointer; opacity: 0.65;\" opacity=\"0.65\" fill=\"#0000ff\" stroke=\"#0000ff\" stroke-width=\"1\" x=\"566\" y=\"88\" width=\"1\" height=\"2\" rx=\"0\" ry=\"0\"></rect><rect style=\"cursor: pointer; opacity: 0.65;\" opacity=\"0.65\" fill=\"#0000ff\" stroke=\"#0000ff\" stroke-width=\"1\" x=\"567\" y=\"88\" width=\"1\" height=\"2\" rx=\"0\" ry=\"0\"></rect><rect style=\"cursor: pointer; opacity: 0.65;\" opacity=\"0.65\" fill=\"#0000ff\" stroke=\"#0000ff\" stroke-width=\"1\" x=\"568\" y=\"88\" width=\"1\" height=\"2\" rx=\"0\" ry=\"0\"></rect><rect style=\"cursor: pointer; opacity: 0.65;\" opacity=\"0.65\" fill=\"#0000ff\" stroke=\"#0000ff\" stroke-width=\"1\" x=\"569\" y=\"88\" width=\"1\" height=\"2\" rx=\"0\" ry=\"0\"></rect><rect style=\"cursor: pointer; opacity: 0.65;\" opacity=\"0.65\" fill=\"#0000ff\" stroke=\"#0000ff\" stroke-width=\"1\" x=\"570\" y=\"88\" width=\"1\" height=\"2\" rx=\"0\" ry=\"0\"></rect><rect style=\"cursor: pointer; opacity: 0.65;\" opacity=\"0.65\" fill=\"#0000ff\" stroke=\"#0000ff\" stroke-width=\"1\" x=\"571\" y=\"88\" width=\"1\" height=\"2\" rx=\"0\" ry=\"0\"></rect><rect style=\"cursor: pointer; opacity: 0.65;\" opacity=\"0.65\" fill=\"#0000ff\" stroke=\"#0000ff\" stroke-width=\"1\" x=\"572\" y=\"88\" width=\"1\" height=\"2\" rx=\"0\" ry=\"0\"></rect><rect style=\"cursor: pointer; opacity: 0.65;\" opacity=\"0.65\" fill=\"#0000ff\" stroke=\"#0000ff\" stroke-width=\"1\" x=\"573\" y=\"88\" width=\"1\" height=\"2\" rx=\"0\" ry=\"0\"></rect><rect style=\"cursor: pointer; opacity: 0.65;\" opacity=\"0.65\" fill=\"#0000ff\" stroke=\"#0000ff\" stroke-width=\"1\" x=\"574\" y=\"88\" width=\"1\" height=\"2\" rx=\"0\" ry=\"0\"></rect><rect style=\"cursor: pointer; opacity: 0.65;\" opacity=\"0.65\" fill=\"#0000ff\" stroke=\"#0000ff\" stroke-width=\"1\" x=\"575\" y=\"88\" width=\"1\" height=\"2\" rx=\"0\" ry=\"0\"></rect><rect " "style=\"cursor: pointer; opacity: 0.65;\" opacity=\"0.65\" fill=\"#0000ff\" stroke=\"#0000ff\" stroke-width=\"1\" x=\"576\" y=\"88\" width=\"1\" height=\"2\" rx=\"0\" ry=\"0\"></rect><rect style=\"cursor: pointer; opacity: 0.65;\" opacity=\"0.65\" fill=\"#0000ff\" stroke=\"#0000ff\" stroke-width=\"1\" x=\"577\" y=\"88\" width=\"1\" height=\"2\" rx=\"0\" ry=\"0\"></rect><rect style=\"cursor: pointer; opacity: 0.65;\" opacity=\"0.65\" fill=\"#0000ff\" stroke=\"#0000ff\" stroke-width=\"1\" x=\"578\" y=\"88\" width=\"1\" height=\"2\" rx=\"0\" ry=\"0\"></rect><rect style=\"cursor: pointer; opacity: 0.65;\" opacity=\"0.65\" fill=\"#0000ff\" stroke=\"#0000ff\" stroke-width=\"1\" x=\"579\" y=\"88\" width=\"1\" height=\"2\" rx=\"0\" ry=\"0\"></rect><rect style=\"cursor: pointer; opacity: 0.65;\" opacity=\"0.65\" fill=\"#0000ff\" stroke=\"#0000ff\" stroke-width=\"1\" x=\"580\" y=\"88\" width=\"1\" height=\"2\" rx=\"0\" ry=\"0\"></rect><rect style=\"cursor: pointer; opacity: 0.65;\" opacity=\"0.65\" fill=\"#0000ff\" stroke=\"#0000ff\" stroke-width=\"1\" x=\"581\" y=\"88\" width=\"1\" height=\"2\" rx=\"0\" ry=\"0\"></rect><rect style=\"cursor: pointer; opacity: 0.65;\" opacity=\"0.65\" fill=\"#0000ff\" stroke=\"#0000ff\" stroke-width=\"1\" x=\"582\" y=\"88\" width=\"1\" height=\"2\" rx=\"0\" ry=\"0\"></rect><rect style=\"cursor: pointer; opacity: 0.65;\" opacity=\"0.65\" fill=\"#0000ff\" stroke=\"#0000ff\" stroke-width=\"1\" x=\"583\" y=\"88\" width=\"1\" height=\"2\" rx=\"0\" ry=\"0\"></rect><rect style=\"cursor: pointer; opacity: 0.65;\" opacity=\"0.65\" fill=\"#0000ff\" stroke=\"#0000ff\" stroke-width=\"1\" x=\"584\" y=\"88\" width=\"1\" height=\"2\" rx=\"0\" ry=\"0\"></rect><rect style=\"cursor: pointer; opacity: 0.65;\" opacity=\"0.65\" fill=\"#0000ff\" stroke=\"#0000ff\" stroke-width=\"1\" x=\"585\" y=\"88\" width=\"1\" height=\"2\" rx=\"0\" ry=\"0\"></rect><rect style=\"cursor: pointer; opacity: 0.65;\" opacity=\"0.65\" fill=\"#0000ff\" stroke=\"#0000ff\" stroke-width=\"1\" x=\"586\" y=\"88\" width=\"1\" height=\"2\" rx=\"0\" ry=\"0\"></rect><rect style=\"cursor: pointer; opacity: 0.65;\" opacity=\"0.65\" fill=\"#0000ff\" stroke=\"#0000ff\" stroke-width=\"1\" x=\"587\" " "y=\"88\" width=\"1\" height=\"2\" rx=\"0\" ry=\"0\"></rect><rect style=\"cursor: pointer; opacity: 0.65;\" opacity=\"0.65\" fill=\"#0000ff\" stroke=\"#0000ff\" stroke-width=\"1\" x=\"588\" y=\"88\" width=\"1\" height=\"2\" rx=\"0\" ry=\"0\"></rect><rect style=\"cursor: pointer; opacity: 0.65;\" opacity=\"0.65\" fill=\"#0000ff\" stroke=\"#0000ff\" stroke-width=\"1\" x=\"589\" y=\"88\" width=\"1\" height=\"2\" rx=\"0\" ry=\"0\"></rect><rect style=\"cursor: pointer; opacity: 0.65;\" opacity=\"0.65\" fill=\"#0000ff\" stroke=\"#0000ff\" stroke-width=\"1\" x=\"590\" y=\"88\" width=\"1\" height=\"2\" rx=\"0\" ry=\"0\"></rect><rect style=\"cursor: pointer; opacity: 0.65;\" opacity=\"0.65\" fill=\"#0000ff\" stroke=\"#0000ff\" stroke-width=\"1\" x=\"591\" y=\"88\" width=\"1\" height=\"2\" rx=\"0\" ry=\"0\"></rect><rect style=\"cursor: pointer; opacity: 0.65;\" opacity=\"0.65\" fill=\"#0000ff\" stroke=\"#0000ff\" stroke-width=\"1\" x=\"592\" y=\"88\" width=\"1\" height=\"2\" rx=\"0\" ry=\"0\"></rect><rect style=\"cursor: pointer; opacity: 0.65;\" opacity=\"0.65\" fill=\"#0000ff\" stroke=\"#0000ff\" stroke-width=\"1\" x=\"593\" y=\"88\" width=\"1\" height=\"2\" rx=\"0\" ry=\"0\"></rect><rect style=\"cursor: pointer; opacity: 0.65;\" opacity=\"0.65\" fill=\"#0000ff\" stroke=\"#0000ff\" stroke-width=\"1\" x=\"594\" y=\"86\" width=\"1\" height=\"4\" rx=\"0\" ry=\"0\"></rect><rect style=\"cursor: pointer; opacity: 0.65;\" opacity=\"0.65\" fill=\"#0000ff\" stroke=\"#0000ff\" stroke-width=\"1\" x=\"595\" y=\"86\" width=\"1\" height=\"4\" rx=\"0\" ry=\"0\"></rect><rect style=\"cursor: pointer; opacity: 0.65;\" opacity=\"0.65\" fill=\"#0000ff\" stroke=\"#0000ff\" stroke-width=\"1\" x=\"596\" y=\"88\" width=\"1\" height=\"2\" rx=\"0\" ry=\"0\"></rect><rect style=\"cursor: pointer; opacity: 0.65;\" opacity=\"0.65\" fill=\"#0000ff\" stroke=\"#0000ff\" stroke-width=\"1\" x=\"597\" y=\"88\" width=\"1\" height=\"2\" rx=\"0\" ry=\"0\"></rect><rect style=\"cursor: pointer; opacity: 0.65;\" opacity=\"0.65\" fill=\"#0000ff\" stroke=\"#0000ff\" stroke-width=\"1\" x=\"598\" y=\"88\" width=\"1\" height=\"2\" rx=\"0\" ry=\"0\"></rect><rect style=\"cursor: pointer; opacity: 0.65;\" opacity=\"0.65\" " "fill=\"#0000ff\" stroke=\"#0000ff\" stroke-width=\"1\" x=\"599\" y=\"88\" width=\"1\" height=\"2\" rx=\"0\" ry=\"0\"></rect><rect style=\"cursor: pointer; opacity: 0.65;\" opacity=\"0.65\" fill=\"#0000ff\" stroke=\"#0000ff\" stroke-width=\"1\" x=\"600\" y=\"88\" width=\"1\" height=\"2\" rx=\"0\" ry=\"0\"></rect><rect style=\"cursor: pointer; opacity: 0.65;\" opacity=\"0.65\" fill=\"#0000ff\" stroke=\"#0000ff\" stroke-width=\"1\" x=\"601\" y=\"88\" width=\"1\" height=\"2\" rx=\"0\" ry=\"0\"></rect><rect style=\"cursor: pointer; opacity: 0.65;\" opacity=\"0.65\" fill=\"#0000ff\" stroke=\"#0000ff\" stroke-width=\"1\" x=\"602\" y=\"88\" width=\"1\" height=\"2\" rx=\"0\" ry=\"0\"></rect><rect style=\"cursor: pointer; opacity: 0.65;\" opacity=\"0.65\" fill=\"#0000ff\" stroke=\"#0000ff\" stroke-width=\"1\" x=\"603\" y=\"88\" width=\"1\" height=\"2\" rx=\"0\" ry=\"0\"></rect><rect style=\"cursor: pointer; opacity: 0.65;\" opacity=\"0.65\" fill=\"#0000ff\" stroke=\"#0000ff\" stroke-width=\"1\" x=\"604\" y=\"88\" width=\"1\" height=\"2\" rx=\"0\" ry=\"0\"></rect><rect style=\"cursor: pointer; opacity: 0.65;\" opacity=\"0.65\" fill=\"#0000ff\" stroke=\"#0000ff\" stroke-width=\"1\" x=\"605\" y=\"88\" width=\"1\" height=\"2\" rx=\"0\" ry=\"0\"></rect><rect style=\"cursor: pointer; opacity: 0.65;\" opacity=\"0.65\" fill=\"#0000ff\" stroke=\"#0000ff\" stroke-width=\"1\" x=\"606\" y=\"88\" width=\"1\" height=\"2\" rx=\"0\" ry=\"0\"></rect><rect style=\"cursor: pointer; opacity: 0.65;\" opacity=\"0.65\" fill=\"#0000ff\" stroke=\"#0000ff\" stroke-width=\"1\" x=\"607\" y=\"88\" width=\"1\" height=\"2\" rx=\"0\" ry=\"0\"></rect><rect style=\"cursor: pointer; opacity: 0.65;\" opacity=\"0.65\" fill=\"#0000ff\" stroke=\"#0000ff\" stroke-width=\"1\" x=\"608\" y=\"88\" width=\"1\" height=\"2\" rx=\"0\" ry=\"0\"></rect><rect style=\"cursor: pointer; opacity: 0.65;\" opacity=\"0.65\" fill=\"#0000ff\" stroke=\"#0000ff\" stroke-width=\"1\" x=\"609\" y=\"88\" width=\"1\" height=\"2\" rx=\"0\" ry=\"0\"></rect><rect style=\"cursor: pointer; opacity: 0.65;\" opacity=\"0.65\" fill=\"#0000ff\" stroke=\"#0000ff\" stroke-width=\"1\" x=\"610\" y=\"88\" width=\"1\" height=\"2\" rx=\"0\" ry=\"0\"></rect><rect " "style=\"cursor: pointer; opacity: 0.65;\" opacity=\"0.65\" fill=\"#0000ff\" stroke=\"#0000ff\" stroke-width=\"1\" x=\"611\" y=\"88\" width=\"1\" height=\"2\" rx=\"0\" ry=\"0\"></rect><rect style=\"cursor: pointer; opacity: 0.65;\" opacity=\"0.65\" fill=\"#0000ff\" stroke=\"#0000ff\" stroke-width=\"1\" x=\"612\" y=\"88\" width=\"1\" height=\"2\" rx=\"0\" ry=\"0\"></rect><rect style=\"cursor: pointer; opacity: 0.65;\" opacity=\"0.65\" fill=\"#0000ff\" stroke=\"#0000ff\" stroke-width=\"1\" x=\"613\" y=\"88\" width=\"1\" height=\"2\" rx=\"0\" ry=\"0\"></rect><rect style=\"cursor: pointer; opacity: 0.65;\" opacity=\"0.65\" fill=\"#0000ff\" stroke=\"#0000ff\" stroke-width=\"1\" x=\"614\" y=\"88\" width=\"1\" height=\"2\" rx=\"0\" ry=\"0\"></rect><rect style=\"cursor: pointer; opacity: 0.65;\" opacity=\"0.65\" fill=\"#0000ff\" stroke=\"#0000ff\" stroke-width=\"1\" x=\"615\" y=\"88\" width=\"1\" height=\"2\" rx=\"0\" ry=\"0\"></rect><rect style=\"cursor: pointer; opacity: 0.65;\" opacity=\"0.65\" fill=\"#0000ff\" stroke=\"#0000ff\" stroke-width=\"1\" x=\"616\" y=\"88\" width=\"1\" height=\"2\" rx=\"0\" ry=\"0\"></rect><rect style=\"cursor: pointer; opacity: 0.65;\" opacity=\"0.65\" fill=\"#0000ff\" stroke=\"#0000ff\" stroke-width=\"1\" x=\"617\" y=\"88\" width=\"1\" height=\"2\" rx=\"0\" ry=\"0\"></rect><rect style=\"cursor: pointer; opacity: 0.65;\" opacity=\"0.65\" fill=\"#0000ff\" stroke=\"#0000ff\" stroke-width=\"1\" x=\"618\" y=\"88\" width=\"1\" height=\"2\" rx=\"0\" ry=\"0\"></rect><rect style=\"cursor: pointer; opacity: 0.65;\" opacity=\"0.65\" fill=\"#0000ff\" stroke=\"#0000ff\" stroke-width=\"1\" x=\"619\" y=\"88\" width=\"1\" height=\"2\" rx=\"0\" ry=\"0\"></rect><rect style=\"cursor: pointer; opacity: 0.65;\" opacity=\"0.65\" fill=\"#0000ff\" stroke=\"#0000ff\" stroke-width=\"1\" x=\"620\" y=\"88\" width=\"1\" height=\"2\" rx=\"0\" ry=\"0\"></rect><rect style=\"cursor: pointer; opacity: 0.65;\" opacity=\"0.65\" fill=\"#0000ff\" stroke=\"#0000ff\" stroke-width=\"1\" x=\"621\" y=\"88\" width=\"1\" height=\"2\" rx=\"0\" ry=\"0\"></rect><rect style=\"cursor: pointer; opacity: 0.65;\" opacity=\"0.65\" fill=\"#0000ff\" stroke=\"#0000ff\" stroke-width=\"1\" x=\"622\" " "y=\"86\" width=\"1\" height=\"4\" rx=\"0\" ry=\"0\"></rect><rect style=\"cursor: pointer; opacity: 0.65;\" opacity=\"0.65\" fill=\"#0000ff\" stroke=\"#0000ff\" stroke-width=\"1\" x=\"623\" y=\"86\" width=\"1\" height=\"4\" rx=\"0\" ry=\"0\"></rect><rect style=\"cursor: pointer; opacity: 0.65;\" opacity=\"0.65\" fill=\"#0000ff\" stroke=\"#0000ff\" stroke-width=\"1\" x=\"624\" y=\"88\" width=\"1\" height=\"2\" rx=\"0\" ry=\"0\"></rect><rect style=\"cursor: pointer; opacity: 0.65;\" opacity=\"0.65\" fill=\"#0000ff\" stroke=\"#0000ff\" stroke-width=\"1\" x=\"625\" y=\"88\" width=\"1\" height=\"2\" rx=\"0\" ry=\"0\"></rect><rect style=\"cursor: pointer; opacity: 0.65;\" opacity=\"0.65\" fill=\"#0000ff\" stroke=\"#0000ff\" stroke-width=\"1\" x=\"626\" y=\"88\" width=\"1\" height=\"2\" rx=\"0\" ry=\"0\"></rect><rect style=\"cursor: pointer; opacity: 0.65;\" opacity=\"0.65\" fill=\"#0000ff\" stroke=\"#0000ff\" stroke-width=\"1\" x=\"627\" y=\"88\" width=\"1\" height=\"2\" rx=\"0\" ry=\"0\"></rect><rect style=\"cursor: pointer; opacity: 0.65;\" opacity=\"0.65\" fill=\"#0000ff\" stroke=\"#0000ff\" stroke-width=\"1\" x=\"628\" y=\"88\" width=\"1\" height=\"2\" rx=\"0\" ry=\"0\"></rect><rect style=\"cursor: pointer; opacity: 0.65;\" opacity=\"0.65\" fill=\"#0000ff\" stroke=\"#0000ff\" stroke-width=\"1\" x=\"629\" y=\"88\" width=\"1\" height=\"2\" rx=\"0\" ry=\"0\"></rect><rect style=\"cursor: pointer; opacity: 0.65;\" opacity=\"0.65\" fill=\"#0000ff\" stroke=\"#0000ff\" stroke-width=\"1\" x=\"630\" y=\"88\" width=\"1\" height=\"2\" rx=\"0\" ry=\"0\"></rect><rect style=\"cursor: pointer; opacity: 0.65;\" opacity=\"0.65\" fill=\"#0000ff\" stroke=\"#0000ff\" stroke-width=\"1\" x=\"631\" y=\"88\" width=\"1\" height=\"2\" rx=\"0\" ry=\"0\"></rect><rect style=\"cursor: pointer; opacity: 0.65;\" opacity=\"0.65\" fill=\"#0000ff\" stroke=\"#0000ff\" stroke-width=\"1\" x=\"632\" y=\"88\" width=\"1\" height=\"2\" rx=\"0\" ry=\"0\"></rect><rect style=\"cursor: pointer; opacity: 0.65;\" opacity=\"0.65\" fill=\"#0000ff\" stroke=\"#0000ff\" stroke-width=\"1\" x=\"633\" y=\"88\" width=\"1\" height=\"2\" rx=\"0\" ry=\"0\"></rect><rect style=\"cursor: pointer; opacity: 0.65;\" opacity=\"0.65\" " "fill=\"#0000ff\" stroke=\"#0000ff\" stroke-width=\"1\" x=\"634\" y=\"88\" width=\"1\" height=\"2\" rx=\"0\" ry=\"0\"></rect><rect style=\"cursor: pointer; opacity: 0.65;\" opacity=\"0.65\" fill=\"#0000ff\" stroke=\"#0000ff\" stroke-width=\"1\" x=\"635\" y=\"88\" width=\"1\" height=\"2\" rx=\"0\" ry=\"0\"></rect><rect style=\"cursor: pointer; opacity: 0.65;\" opacity=\"0.65\" fill=\"#0000ff\" stroke=\"#0000ff\" stroke-width=\"1\" x=\"636\" y=\"88\" width=\"1\" height=\"2\" rx=\"0\" ry=\"0\"></rect><rect style=\"cursor: pointer; opacity: 0.65;\" opacity=\"0.65\" fill=\"#0000ff\" stroke=\"#0000ff\" stroke-width=\"1\" x=\"637\" y=\"88\" width=\"1\" height=\"2\" rx=\"0\" ry=\"0\"></rect><rect style=\"cursor: pointer; opacity: 0.65;\" opacity=\"0.65\" fill=\"#0000ff\" stroke=\"#0000ff\" stroke-width=\"1\" x=\"638\" y=\"88\" width=\"1\" height=\"2\" rx=\"0\" ry=\"0\"></rect><rect style=\"cursor: pointer; opacity: 0.65;\" opacity=\"0.65\" fill=\"#0000ff\" stroke=\"#0000ff\" stroke-width=\"1\" x=\"639\" y=\"88\" width=\"1\" height=\"2\" rx=\"0\" ry=\"0\"></rect><rect style=\"cursor: pointer; opacity: 0.65;\" opacity=\"0.65\" fill=\"#0000ff\" stroke=\"#0000ff\" stroke-width=\"1\" x=\"640\" y=\"88\" width=\"1\" height=\"2\" rx=\"0\" ry=\"0\"></rect><rect style=\"cursor: pointer; opacity: 0.65;\" opacity=\"0.65\" fill=\"#0000ff\" stroke=\"#0000ff\" stroke-width=\"1\" x=\"641\" y=\"88\" width=\"1\" height=\"2\" rx=\"0\" ry=\"0\"></rect><rect style=\"cursor: pointer; opacity: 0.65;\" opacity=\"0.65\" fill=\"#0000ff\" stroke=\"#0000ff\" stroke-width=\"1\" x=\"642\" y=\"88\" width=\"1\" height=\"2\" rx=\"0\" ry=\"0\"></rect><rect style=\"cursor: pointer; opacity: 0.65;\" opacity=\"0.65\" fill=\"#0000ff\" stroke=\"#0000ff\" stroke-width=\"1\" x=\"643\" y=\"88\" width=\"1\" height=\"2\" rx=\"0\" ry=\"0\"></rect><rect style=\"cursor: pointer; opacity: 0.65;\" opacity=\"0.65\" fill=\"#0000ff\" stroke=\"#0000ff\" stroke-width=\"1\" x=\"644\" y=\"88\" width=\"1\" height=\"2\" rx=\"0\" ry=\"0\"></rect><rect style=\"cursor: pointer; opacity: 0.65;\" opacity=\"0.65\" fill=\"#0000ff\" stroke=\"#0000ff\" stroke-width=\"1\" x=\"645\" y=\"88\" width=\"1\" height=\"2\" rx=\"0\" ry=\"0\"></rect><rect " "style=\"cursor: pointer; opacity: 0.65;\" opacity=\"0.65\" fill=\"#0000ff\" stroke=\"#0000ff\" stroke-width=\"1\" x=\"646\" y=\"88\" width=\"1\" height=\"2\" rx=\"0\" ry=\"0\"></rect><rect style=\"cursor: pointer; opacity: 0.65;\" opacity=\"0.65\" fill=\"#0000ff\" stroke=\"#0000ff\" stroke-width=\"1\" x=\"647\" y=\"88\" width=\"1\" height=\"2\" rx=\"0\" ry=\"0\"></rect><rect style=\"cursor: pointer; opacity: 0.65;\" opacity=\"0.65\" fill=\"#0000ff\" stroke=\"#0000ff\" stroke-width=\"1\" x=\"648\" y=\"88\" width=\"1\" height=\"2\" rx=\"0\" ry=\"0\"></rect><rect style=\"cursor: pointer; opacity: 0.65;\" opacity=\"0.65\" fill=\"#0000ff\" stroke=\"#0000ff\" stroke-width=\"1\" x=\"649\" y=\"88\" width=\"1\" height=\"2\" rx=\"0\" ry=\"0\"></rect><rect style=\"cursor: pointer; opacity: 0.65;\" opacity=\"0.65\" fill=\"#0000ff\" stroke=\"#0000ff\" stroke-width=\"1\" x=\"650\" y=\"86\" width=\"1\" height=\"4\" rx=\"0\" ry=\"0\"></rect><rect style=\"cursor: pointer; opacity: 0.65;\" opacity=\"0.65\" fill=\"#0000ff\" stroke=\"#0000ff\" stroke-width=\"1\" x=\"651\" y=\"86\" width=\"1\" height=\"4\" rx=\"0\" ry=\"0\"></rect><rect style=\"cursor: pointer; opacity: 0.65;\" opacity=\"0.65\" fill=\"#0000ff\" stroke=\"#0000ff\" stroke-width=\"1\" x=\"652\" y=\"88\" width=\"1\" height=\"2\" rx=\"0\" ry=\"0\"></rect><rect style=\"cursor: pointer; opacity: 0.65;\" opacity=\"0.65\" fill=\"#0000ff\" stroke=\"#0000ff\" stroke-width=\"1\" x=\"653\" y=\"88\" width=\"1\" height=\"2\" rx=\"0\" ry=\"0\"></rect><rect style=\"cursor: pointer; opacity: 0.65;\" opacity=\"0.65\" fill=\"#0000ff\" stroke=\"#0000ff\" stroke-width=\"1\" x=\"654\" y=\"88\" width=\"1\" height=\"2\" rx=\"0\" ry=\"0\"></rect><rect style=\"cursor: pointer; opacity: 0.65;\" opacity=\"0.65\" fill=\"#0000ff\" stroke=\"#0000ff\" stroke-width=\"1\" x=\"655\" y=\"88\" width=\"1\" height=\"2\" rx=\"0\" ry=\"0\"></rect><rect style=\"cursor: pointer; opacity: 0.65;\" opacity=\"0.65\" fill=\"#0000ff\" stroke=\"#0000ff\" stroke-width=\"1\" x=\"656\" y=\"88\" width=\"1\" height=\"2\" rx=\"0\" ry=\"0\"></rect><rect style=\"cursor: pointer; opacity: 0.65;\" opacity=\"0.65\" fill=\"#0000ff\" stroke=\"#0000ff\" stroke-width=\"1\" x=\"657\" " "y=\"88\" width=\"1\" height=\"2\" rx=\"0\" ry=\"0\"></rect><rect style=\"cursor: pointer; opacity: 0.65;\" opacity=\"0.65\" fill=\"#0000ff\" stroke=\"#0000ff\" stroke-width=\"1\" x=\"658\" y=\"88\" width=\"1\" height=\"2\" rx=\"0\" ry=\"0\"></rect><rect style=\"cursor: pointer; opacity: 0.65;\" opacity=\"0.65\" fill=\"#0000ff\" stroke=\"#0000ff\" stroke-width=\"1\" x=\"659\" y=\"88\" width=\"1\" height=\"2\" rx=\"0\" ry=\"0\"></rect><rect style=\"cursor: pointer; opacity: 0.65;\" opacity=\"0.65\" fill=\"#0000ff\" stroke=\"#0000ff\" stroke-width=\"1\" x=\"660\" y=\"88\" width=\"1\" height=\"2\" rx=\"0\" ry=\"0\"></rect><rect style=\"cursor: pointer; opacity: 0.65;\" opacity=\"0.65\" fill=\"#0000ff\" stroke=\"#0000ff\" stroke-width=\"1\" x=\"661\" y=\"88\" width=\"1\" height=\"2\" rx=\"0\" ry=\"0\"></rect><rect style=\"cursor: pointer; opacity: 0.65;\" opacity=\"0.65\" fill=\"#0000ff\" stroke=\"#0000ff\" stroke-width=\"1\" x=\"662\" y=\"88\" width=\"1\" height=\"2\" rx=\"0\" ry=\"0\"></rect><rect style=\"cursor: pointer; opacity: 0.65;\" opacity=\"0.65\" fill=\"#0000ff\" stroke=\"#0000ff\" stroke-width=\"1\" x=\"663\" y=\"88\" width=\"1\" height=\"2\" rx=\"0\" ry=\"0\"></rect><rect style=\"cursor: pointer; opacity: 0.65;\" opacity=\"0.65\" fill=\"#0000ff\" stroke=\"#0000ff\" stroke-width=\"1\" x=\"664\" y=\"88\" width=\"1\" height=\"2\" rx=\"0\" ry=\"0\"></rect><rect style=\"cursor: pointer; opacity: 0.65;\" opacity=\"0.65\" fill=\"#0000ff\" stroke=\"#0000ff\" stroke-width=\"1\" x=\"665\" y=\"88\" width=\"1\" height=\"2\" rx=\"0\" ry=\"0\"></rect><rect style=\"cursor: pointer; opacity: 0.65;\" opacity=\"0.65\" fill=\"#0000ff\" stroke=\"#0000ff\" stroke-width=\"1\" x=\"666\" y=\"88\" width=\"1\" height=\"2\" rx=\"0\" ry=\"0\"></rect><rect style=\"cursor: pointer; opacity: 0.65;\" opacity=\"0.65\" fill=\"#0000ff\" stroke=\"#0000ff\" stroke-width=\"1\" x=\"667\" y=\"88\" width=\"1\" height=\"2\" rx=\"0\" ry=\"0\"></rect><rect style=\"cursor: pointer; opacity: 0.65;\" opacity=\"0.65\" fill=\"#0000ff\" stroke=\"#0000ff\" stroke-width=\"1\" x=\"668\" y=\"88\" width=\"1\" height=\"2\" rx=\"0\" ry=\"0\"></rect><rect style=\"cursor: pointer; opacity: 0.65;\" opacity=\"0.65\" " "fill=\"#0000ff\" stroke=\"#0000ff\" stroke-width=\"1\" x=\"669\" y=\"88\" width=\"1\" height=\"2\" rx=\"0\" ry=\"0\"></rect><rect style=\"cursor: pointer; opacity: 0.65;\" opacity=\"0.65\" fill=\"#0000ff\" stroke=\"#0000ff\" stroke-width=\"1\" x=\"670\" y=\"88\" width=\"1\" height=\"2\" rx=\"0\" ry=\"0\"></rect><rect style=\"cursor: pointer; opacity: 0.65;\" opacity=\"0.65\" fill=\"#0000ff\" stroke=\"#0000ff\" stroke-width=\"1\" x=\"671\" y=\"88\" width=\"1\" height=\"2\" rx=\"0\" ry=\"0\"></rect><rect style=\"cursor: pointer; opacity: 0.65;\" opacity=\"0.65\" fill=\"#0000ff\" stroke=\"#0000ff\" stroke-width=\"1\" x=\"672\" y=\"88\" width=\"1\" height=\"2\" rx=\"0\" ry=\"0\"></rect><rect style=\"cursor: pointer; opacity: 0.65;\" opacity=\"0.65\" fill=\"#0000ff\" stroke=\"#0000ff\" stroke-width=\"1\" x=\"673\" y=\"88\" width=\"1\" height=\"2\" rx=\"0\" ry=\"0\"></rect><rect style=\"cursor: pointer; opacity: 0.65;\" opacity=\"0.65\" fill=\"#0000ff\" stroke=\"#0000ff\" stroke-width=\"1\" x=\"674\" y=\"88\" width=\"1\" height=\"2\" rx=\"0\" ry=\"0\"></rect><rect style=\"cursor: pointer; opacity: 0.65;\" opacity=\"0.65\" fill=\"#0000ff\" stroke=\"#0000ff\" stroke-width=\"1\" x=\"675\" y=\"88\" width=\"1\" height=\"2\" rx=\"0\" ry=\"0\"></rect><rect style=\"cursor: pointer; opacity: 0.65;\" opacity=\"0.65\" fill=\"#0000ff\" stroke=\"#0000ff\" stroke-width=\"1\" x=\"676\" y=\"88\" width=\"1\" height=\"2\" rx=\"0\" ry=\"0\"></rect><rect style=\"cursor: pointer; opacity: 0.65;\" opacity=\"0.65\" fill=\"#0000ff\" stroke=\"#0000ff\" stroke-width=\"1\" x=\"677\" y=\"86\" width=\"1\" height=\"4\" rx=\"0\" ry=\"0\"></rect><rect style=\"cursor: pointer; opacity: 0.65;\" opacity=\"0.65\" fill=\"#0000ff\" stroke=\"#0000ff\" stroke-width=\"1\" x=\"678\" y=\"86\" width=\"1\" height=\"4\" rx=\"0\" ry=\"0\"></rect><rect style=\"cursor: pointer; opacity: 0.65;\" opacity=\"0.65\" fill=\"#0000ff\" stroke=\"#0000ff\" stroke-width=\"1\" x=\"679\" y=\"86\" width=\"1\" height=\"4\" rx=\"0\" ry=\"0\"></rect><rect style=\"cursor: pointer; opacity: 0.65;\" opacity=\"0.65\" fill=\"#0000ff\" stroke=\"#0000ff\" stroke-width=\"1\" x=\"680\" y=\"88\" width=\"1\" height=\"2\" rx=\"0\" ry=\"0\"></rect><rect " "style=\"cursor: pointer; opacity: 0.65;\" opacity=\"0.65\" fill=\"#0000ff\" stroke=\"#0000ff\" stroke-width=\"1\" x=\"681\" y=\"88\" width=\"1\" height=\"2\" rx=\"0\" ry=\"0\"></rect><rect style=\"cursor: pointer; opacity: 0.65;\" opacity=\"0.65\" fill=\"#0000ff\" stroke=\"#0000ff\" stroke-width=\"1\" x=\"682\" y=\"88\" width=\"1\" height=\"2\" rx=\"0\" ry=\"0\"></rect><rect style=\"cursor: pointer; opacity: 0.65;\" opacity=\"0.65\" fill=\"#0000ff\" stroke=\"#0000ff\" stroke-width=\"1\" x=\"683\" y=\"88\" width=\"1\" height=\"2\" rx=\"0\" ry=\"0\"></rect><rect style=\"cursor: pointer; opacity: 0.65;\" opacity=\"0.65\" fill=\"#0000ff\" stroke=\"#0000ff\" stroke-width=\"1\" x=\"684\" y=\"88\" width=\"1\" height=\"2\" rx=\"0\" ry=\"0\"></rect><rect style=\"cursor: pointer; opacity: 0.65;\" opacity=\"0.65\" fill=\"#0000ff\" stroke=\"#0000ff\" stroke-width=\"1\" x=\"685\" y=\"88\" width=\"1\" height=\"2\" rx=\"0\" ry=\"0\"></rect><rect style=\"cursor: pointer; opacity: 0.65;\" opacity=\"0.65\" fill=\"#0000ff\" stroke=\"#0000ff\" stroke-width=\"1\" x=\"686\" y=\"88\" width=\"1\" height=\"2\" rx=\"0\" ry=\"0\"></rect><rect style=\"cursor: pointer; opacity: 0.65;\" opacity=\"0.65\" fill=\"#0000ff\" stroke=\"#0000ff\" stroke-width=\"1\" x=\"687\" y=\"88\" width=\"1\" height=\"2\" rx=\"0\" ry=\"0\"></rect><rect style=\"cursor: pointer; opacity: 0.65;\" opacity=\"0.65\" fill=\"#0000ff\" stroke=\"#0000ff\" stroke-width=\"1\" x=\"688\" y=\"88\" width=\"1\" height=\"2\" rx=\"0\" ry=\"0\"></rect><rect style=\"cursor: pointer; opacity: 0.65;\" opacity=\"0.65\" fill=\"#0000ff\" stroke=\"#0000ff\" stroke-width=\"1\" x=\"689\" y=\"88\" width=\"1\" height=\"2\" rx=\"0\" ry=\"0\"></rect><rect style=\"cursor: pointer; opacity: 0.65;\" opacity=\"0.65\" fill=\"#0000ff\" stroke=\"#0000ff\" stroke-width=\"1\" x=\"690\" y=\"88\" width=\"1\" height=\"2\" rx=\"0\" ry=\"0\"></rect><rect style=\"cursor: pointer; opacity: 0.65;\" opacity=\"0.65\" fill=\"#0000ff\" stroke=\"#0000ff\" stroke-width=\"1\" x=\"691\" y=\"88\" width=\"1\" height=\"2\" rx=\"0\" ry=\"0\"></rect><rect style=\"cursor: pointer; opacity: 0.65;\" opacity=\"0.65\" fill=\"#0000ff\" stroke=\"#0000ff\" stroke-width=\"1\" x=\"692\" " "y=\"88\" width=\"1\" height=\"2\" rx=\"0\" ry=\"0\"></rect><rect style=\"cursor: pointer; opacity: 0.65;\" opacity=\"0.65\" fill=\"#0000ff\" stroke=\"#0000ff\" stroke-width=\"1\" x=\"693\" y=\"88\" width=\"1\" height=\"2\" rx=\"0\" ry=\"0\"></rect><rect style=\"cursor: pointer; opacity: 0.65;\" opacity=\"0.65\" fill=\"#0000ff\" stroke=\"#0000ff\" stroke-width=\"1\" x=\"694\" y=\"88\" width=\"1\" height=\"2\" rx=\"0\" ry=\"0\"></rect><rect style=\"cursor: pointer; opacity: 0.65;\" opacity=\"0.65\" fill=\"#0000ff\" stroke=\"#0000ff\" stroke-width=\"1\" x=\"695\" y=\"88\" width=\"1\" height=\"2\" rx=\"0\" ry=\"0\"></rect><rect style=\"cursor: pointer; opacity: 0.65;\" opacity=\"0.65\" fill=\"#0000ff\" stroke=\"#0000ff\" stroke-width=\"1\" x=\"696\" y=\"88\" width=\"1\" height=\"2\" rx=\"0\" ry=\"0\"></rect><rect style=\"cursor: pointer; opacity: 0.65;\" opacity=\"0.65\" fill=\"#0000ff\" stroke=\"#0000ff\" stroke-width=\"1\" x=\"697\" y=\"88\" width=\"1\" height=\"2\" rx=\"0\" ry=\"0\"></rect><rect style=\"cursor: pointer; opacity: 0.65;\" opacity=\"0.65\" fill=\"#0000ff\" stroke=\"#0000ff\" stroke-width=\"1\" x=\"698\" y=\"88\" width=\"1\" height=\"2\" rx=\"0\" ry=\"0\"></rect><rect style=\"cursor: pointer; opacity: 0.65;\" opacity=\"0.65\" fill=\"#0000ff\" stroke=\"#0000ff\" stroke-width=\"1\" x=\"699\" y=\"88\" width=\"1\" height=\"2\" rx=\"0\" ry=\"0\"></rect><rect style=\"cursor: pointer; opacity: 0.65;\" opacity=\"0.65\" fill=\"#0000ff\" stroke=\"#0000ff\" stroke-width=\"1\" x=\"700\" y=\"88\" width=\"1\" height=\"2\" rx=\"0\" ry=\"0\"></rect><rect style=\"cursor: pointer; opacity: 0.65;\" opacity=\"0.65\" fill=\"#0000ff\" stroke=\"#0000ff\" stroke-width=\"1\" x=\"701\" y=\"88\" width=\"1\" height=\"2\" rx=\"0\" ry=\"0\"></rect><rect style=\"cursor: pointer; opacity: 0.65;\" opacity=\"0.65\" fill=\"#0000ff\" stroke=\"#0000ff\" stroke-width=\"1\" x=\"702\" y=\"88\" width=\"1\" height=\"2\" rx=\"0\" ry=\"0\"></rect><rect style=\"cursor: pointer; opacity: 0.65;\" opacity=\"0.65\" fill=\"#0000ff\" stroke=\"#0000ff\" stroke-width=\"1\" x=\"703\" y=\"88\" width=\"1\" height=\"2\" rx=\"0\" ry=\"0\"></rect><rect style=\"cursor: pointer; opacity: 0.65;\" opacity=\"0.65\" " "fill=\"#0000ff\" stroke=\"#0000ff\" stroke-width=\"1\" x=\"704\" y=\"88\" width=\"1\" height=\"2\" rx=\"0\" ry=\"0\"></rect><rect style=\"cursor: pointer; opacity: 0.65;\" opacity=\"0.65\" fill=\"#0000ff\" stroke=\"#0000ff\" stroke-width=\"1\" x=\"705\" y=\"88\" width=\"1\" height=\"2\" rx=\"0\" ry=\"0\"></rect><rect style=\"cursor: pointer; opacity: 0.65;\" opacity=\"0.65\" fill=\"#0000ff\" stroke=\"#0000ff\" stroke-width=\"1\" x=\"706\" y=\"86\" width=\"1\" height=\"4\" rx=\"0\" ry=\"0\"></rect><rect style=\"cursor: pointer; opacity: 0.65;\" opacity=\"0.65\" fill=\"#0000ff\" stroke=\"#0000ff\" stroke-width=\"1\" x=\"707\" y=\"88\" width=\"1\" height=\"2\" rx=\"0\" ry=\"0\"></rect><rect style=\"cursor: pointer; opacity: 0.65;\" opacity=\"0.65\" fill=\"#0000ff\" stroke=\"#0000ff\" stroke-width=\"1\" x=\"708\" y=\"88\" width=\"1\" height=\"2\" rx=\"0\" ry=\"0\"></rect><rect style=\"cursor: pointer; opacity: 0.65;\" opacity=\"0.65\" fill=\"#0000ff\" stroke=\"#0000ff\" stroke-width=\"1\" x=\"709\" y=\"88\" width=\"1\" height=\"2\" rx=\"0\" ry=\"0\"></rect><rect style=\"cursor: pointer; opacity: 0.65;\" opacity=\"0.65\" fill=\"#0000ff\" stroke=\"#0000ff\" stroke-width=\"1\" x=\"710\" y=\"88\" width=\"1\" height=\"2\" rx=\"0\" ry=\"0\"></rect><rect style=\"cursor: pointer; opacity: 0.65;\" opacity=\"0.65\" fill=\"#0000ff\" stroke=\"#0000ff\" stroke-width=\"1\" x=\"711\" y=\"88\" width=\"1\" height=\"2\" rx=\"0\" ry=\"0\"></rect><rect style=\"cursor: pointer; opacity: 0.65;\" opacity=\"0.65\" fill=\"#0000ff\" stroke=\"#0000ff\" stroke-width=\"1\" x=\"712\" y=\"88\" width=\"1\" height=\"2\" rx=\"0\" ry=\"0\"></rect><rect style=\"cursor: pointer; opacity: 0.65;\" opacity=\"0.65\" fill=\"#0000ff\" stroke=\"#0000ff\" stroke-width=\"1\" x=\"713\" y=\"88\" width=\"1\" height=\"2\" rx=\"0\" ry=\"0\"></rect><rect style=\"cursor: pointer; opacity: 0.65;\" opacity=\"0.65\" fill=\"#0000ff\" stroke=\"#0000ff\" stroke-width=\"1\" x=\"714\" y=\"88\" width=\"1\" height=\"2\" rx=\"0\" ry=\"0\"></rect><rect style=\"cursor: pointer; opacity: 0.65;\" opacity=\"0.65\" fill=\"#0000ff\" stroke=\"#0000ff\" stroke-width=\"1\" x=\"715\" y=\"88\" width=\"1\" height=\"2\" rx=\"0\" ry=\"0\"></rect><rect " "style=\"cursor: pointer; opacity: 0.65;\" opacity=\"0.65\" fill=\"#0000ff\" stroke=\"#0000ff\" stroke-width=\"1\" x=\"716\" y=\"88\" width=\"1\" height=\"2\" rx=\"0\" ry=\"0\"></rect><rect style=\"cursor: pointer; opacity: 0.65;\" opacity=\"0.65\" fill=\"#0000ff\" stroke=\"#0000ff\" stroke-width=\"1\" x=\"717\" y=\"88\" width=\"1\" height=\"2\" rx=\"0\" ry=\"0\"></rect><rect style=\"cursor: pointer; opacity: 0.65;\" opacity=\"0.65\" fill=\"#0000ff\" stroke=\"#0000ff\" stroke-width=\"1\" x=\"718\" y=\"88\" width=\"1\" height=\"2\" rx=\"0\" ry=\"0\"></rect><rect style=\"cursor: pointer; opacity: 0.65;\" opacity=\"0.65\" fill=\"#0000ff\" stroke=\"#0000ff\" stroke-width=\"1\" x=\"719\" y=\"88\" width=\"1\" height=\"2\" rx=\"0\" ry=\"0\"></rect><rect style=\"cursor: pointer; opacity: 0.65;\" opacity=\"0.65\" fill=\"#0000ff\" stroke=\"#0000ff\" stroke-width=\"1\" x=\"720\" y=\"88\" width=\"1\" height=\"2\" rx=\"0\" ry=\"0\"></rect><rect style=\"cursor: pointer; opacity: 0.65;\" opacity=\"0.65\" fill=\"#0000ff\" stroke=\"#0000ff\" stroke-width=\"1\" x=\"721\" y=\"88\" width=\"1\" height=\"2\" rx=\"0\" ry=\"0\"></rect><rect style=\"cursor: pointer; opacity: 0.65;\" opacity=\"0.65\" fill=\"#0000ff\" stroke=\"#0000ff\" stroke-width=\"1\" x=\"722\" y=\"88\" width=\"1\" height=\"2\" rx=\"0\" ry=\"0\"></rect><rect style=\"cursor: pointer; opacity: 0.65;\" opacity=\"0.65\" fill=\"#0000ff\" stroke=\"#0000ff\" stroke-width=\"1\" x=\"723\" y=\"88\" width=\"1\" height=\"2\" rx=\"0\" ry=\"0\"></rect><rect style=\"cursor: pointer; opacity: 0.65;\" opacity=\"0.65\" fill=\"#0000ff\" stroke=\"#0000ff\" stroke-width=\"1\" x=\"724\" y=\"88\" width=\"1\" height=\"2\" rx=\"0\" ry=\"0\"></rect><rect style=\"cursor: pointer; opacity: 0.65;\" opacity=\"0.65\" fill=\"#0000ff\" stroke=\"#0000ff\" stroke-width=\"1\" x=\"725\" y=\"88\" width=\"1\" height=\"2\" rx=\"0\" ry=\"0\"></rect><rect style=\"cursor: pointer; opacity: 0.65;\" opacity=\"0.65\" fill=\"#0000ff\" stroke=\"#0000ff\" stroke-width=\"1\" x=\"726\" y=\"88\" width=\"1\" height=\"2\" rx=\"0\" ry=\"0\"></rect><rect style=\"cursor: pointer; opacity: 0.65;\" opacity=\"0.65\" fill=\"#0000ff\" stroke=\"#0000ff\" stroke-width=\"1\" x=\"727\" " "y=\"88\" width=\"1\" height=\"2\" rx=\"0\" ry=\"0\"></rect><rect style=\"cursor: pointer; opacity: 0.65;\" opacity=\"0.65\" fill=\"#0000ff\" stroke=\"#0000ff\" stroke-width=\"1\" x=\"728\" y=\"88\" width=\"1\" height=\"2\" rx=\"0\" ry=\"0\"></rect><rect style=\"cursor: pointer; opacity: 0.65;\" opacity=\"0.65\" fill=\"#0000ff\" stroke=\"#0000ff\" stroke-width=\"1\" x=\"729\" y=\"88\" width=\"1\" height=\"2\" rx=\"0\" ry=\"0\"></rect><rect style=\"cursor: pointer; opacity: 0.65;\" opacity=\"0.65\" fill=\"#0000ff\" stroke=\"#0000ff\" stroke-width=\"1\" x=\"730\" y=\"88\" width=\"1\" height=\"2\" rx=\"0\" ry=\"0\"></rect><rect style=\"cursor: pointer; opacity: 0.65;\" opacity=\"0.65\" fill=\"#0000ff\" stroke=\"#0000ff\" stroke-width=\"1\" x=\"731\" y=\"88\" width=\"1\" height=\"2\" rx=\"0\" ry=\"0\"></rect><rect style=\"cursor: pointer; opacity: 0.65;\" opacity=\"0.65\" fill=\"#0000ff\" stroke=\"#0000ff\" stroke-width=\"1\" x=\"732\" y=\"88\" width=\"1\" height=\"2\" rx=\"0\" ry=\"0\"></rect><rect style=\"cursor: pointer; opacity: 0.65;\" opacity=\"0.65\" fill=\"#0000ff\" stroke=\"#0000ff\" stroke-width=\"1\" x=\"733\" y=\"86\" width=\"1\" height=\"4\" rx=\"0\" ry=\"0\"></rect><rect style=\"cursor: pointer; opacity: 0.65;\" opacity=\"0.65\" fill=\"#0000ff\" stroke=\"#0000ff\" stroke-width=\"1\" x=\"734\" y=\"86\" width=\"1\" height=\"4\" rx=\"0\" ry=\"0\"></rect><rect style=\"cursor: pointer; opacity: 0.65;\" opacity=\"0.65\" fill=\"#0000ff\" stroke=\"#0000ff\" stroke-width=\"1\" x=\"735\" y=\"86\" width=\"1\" height=\"4\" rx=\"0\" ry=\"0\"></rect><rect style=\"cursor: pointer; opacity: 0.65;\" opacity=\"0.65\" fill=\"#0000ff\" stroke=\"#0000ff\" stroke-width=\"1\" x=\"736\" y=\"88\" width=\"1\" height=\"2\" rx=\"0\" ry=\"0\"></rect><rect style=\"cursor: pointer; opacity: 0.65;\" opacity=\"0.65\" fill=\"#0000ff\" stroke=\"#0000ff\" stroke-width=\"1\" x=\"737\" y=\"88\" width=\"1\" height=\"2\" rx=\"0\" ry=\"0\"></rect><rect style=\"cursor: pointer; opacity: 0.65;\" opacity=\"0.65\" fill=\"#0000ff\" stroke=\"#0000ff\" stroke-width=\"1\" x=\"738\" y=\"88\" width=\"1\" height=\"2\" rx=\"0\" ry=\"0\"></rect><rect style=\"cursor: pointer; opacity: 0.65;\" opacity=\"0.65\" " "fill=\"#0000ff\" stroke=\"#0000ff\" stroke-width=\"1\" x=\"739\" y=\"88\" width=\"1\" height=\"2\" rx=\"0\" ry=\"0\"></rect><rect style=\"cursor: pointer; opacity: 0.65;\" opacity=\"0.65\" fill=\"#0000ff\" stroke=\"#0000ff\" stroke-width=\"1\" x=\"740\" y=\"88\" width=\"1\" height=\"2\" rx=\"0\" ry=\"0\"></rect><rect style=\"cursor: pointer; opacity: 0.65;\" opacity=\"0.65\" fill=\"#0000ff\" stroke=\"#0000ff\" stroke-width=\"1\" x=\"741\" y=\"88\" width=\"1\" height=\"2\" rx=\"0\" ry=\"0\"></rect><rect style=\"cursor: pointer; opacity: 0.65;\" opacity=\"0.65\" fill=\"#0000ff\" stroke=\"#0000ff\" stroke-width=\"1\" x=\"742\" y=\"88\" width=\"1\" height=\"2\" rx=\"0\" ry=\"0\"></rect><rect style=\"cursor: pointer; opacity: 0.65;\" opacity=\"0.65\" fill=\"#0000ff\" stroke=\"#0000ff\" stroke-width=\"1\" x=\"743\" y=\"88\" width=\"1\" height=\"2\" rx=\"0\" ry=\"0\"></rect><rect style=\"cursor: pointer; opacity: 0.65;\" opacity=\"0.65\" fill=\"#0000ff\" stroke=\"#0000ff\" stroke-width=\"1\" x=\"744\" y=\"88\" width=\"1\" height=\"2\" rx=\"0\" ry=\"0\"></rect><rect style=\"cursor: pointer; opacity: 0.65;\" opacity=\"0.65\" fill=\"#0000ff\" stroke=\"#0000ff\" stroke-width=\"1\" x=\"745\" y=\"88\" width=\"1\" height=\"2\" rx=\"0\" ry=\"0\"></rect><rect style=\"cursor: pointer; opacity: 0.65;\" opacity=\"0.65\" fill=\"#0000ff\" stroke=\"#0000ff\" stroke-width=\"1\" x=\"746\" y=\"88\" width=\"1\" height=\"2\" rx=\"0\" ry=\"0\"></rect><rect style=\"cursor: pointer; opacity: 0.65;\" opacity=\"0.65\" fill=\"#0000ff\" stroke=\"#0000ff\" stroke-width=\"1\" x=\"747\" y=\"88\" width=\"1\" height=\"2\" rx=\"0\" ry=\"0\"></rect><rect style=\"cursor: pointer; opacity: 0.65;\" opacity=\"0.65\" fill=\"#0000ff\" stroke=\"#0000ff\" stroke-width=\"1\" x=\"748\" y=\"88\" width=\"1\" height=\"2\" rx=\"0\" ry=\"0\"></rect><rect style=\"cursor: pointer; opacity: 0.65;\" opacity=\"0.65\" fill=\"#0000ff\" stroke=\"#0000ff\" stroke-width=\"1\" x=\"749\" y=\"88\" width=\"1\" height=\"2\" rx=\"0\" ry=\"0\"></rect><rect style=\"cursor: pointer; opacity: 0.65;\" opacity=\"0.65\" fill=\"#0000ff\" stroke=\"#0000ff\" stroke-width=\"1\" x=\"750\" y=\"88\" width=\"1\" height=\"2\" rx=\"0\" ry=\"0\"></rect><rect " "style=\"cursor: pointer; opacity: 0.65;\" opacity=\"0.65\" fill=\"#0000ff\" stroke=\"#0000ff\" stroke-width=\"1\" x=\"751\" y=\"88\" width=\"1\" height=\"2\" rx=\"0\" ry=\"0\"></rect><rect style=\"cursor: pointer; opacity: 0.65;\" opacity=\"0.65\" fill=\"#0000ff\" stroke=\"#0000ff\" stroke-width=\"1\" x=\"752\" y=\"88\" width=\"1\" height=\"2\" rx=\"0\" ry=\"0\"></rect><rect style=\"cursor: pointer; opacity: 0.65;\" opacity=\"0.65\" fill=\"#0000ff\" stroke=\"#0000ff\" stroke-width=\"1\" x=\"753\" y=\"88\" width=\"1\" height=\"2\" rx=\"0\" ry=\"0\"></rect><rect style=\"cursor: pointer; opacity: 0.65;\" opacity=\"0.65\" fill=\"#0000ff\" stroke=\"#0000ff\" stroke-width=\"1\" x=\"754\" y=\"88\" width=\"1\" height=\"2\" rx=\"0\" ry=\"0\"></rect><rect style=\"cursor: pointer; opacity: 0.65;\" opacity=\"0.65\" fill=\"#0000ff\" stroke=\"#0000ff\" stroke-width=\"1\" x=\"755\" y=\"88\" width=\"1\" height=\"2\" rx=\"0\" ry=\"0\"></rect><rect style=\"cursor: pointer; opacity: 0.65;\" opacity=\"0.65\" fill=\"#0000ff\" stroke=\"#0000ff\" stroke-width=\"1\" x=\"756\" y=\"88\" width=\"1\" height=\"2\" rx=\"0\" ry=\"0\"></rect><rect style=\"cursor: pointer; opacity: 0.65;\" opacity=\"0.65\" fill=\"#0000ff\" stroke=\"#0000ff\" stroke-width=\"1\" x=\"757\" y=\"88\" width=\"1\" height=\"2\" rx=\"0\" ry=\"0\"></rect><rect style=\"cursor: pointer; opacity: 0.65;\" opacity=\"0.65\" fill=\"#0000ff\" stroke=\"#0000ff\" stroke-width=\"1\" x=\"758\" y=\"88\" width=\"1\" height=\"2\" rx=\"0\" ry=\"0\"></rect><rect style=\"cursor: pointer; opacity: 0.65;\" opacity=\"0.65\" fill=\"#0000ff\" stroke=\"#0000ff\" stroke-width=\"1\" x=\"759\" y=\"88\" width=\"1\" height=\"2\" rx=\"0\" ry=\"0\"></rect><rect style=\"cursor: pointer; opacity: 0.65;\" opacity=\"0.65\" fill=\"#0000ff\" stroke=\"#0000ff\" stroke-width=\"1\" x=\"760\" y=\"88\" width=\"1\" height=\"2\" rx=\"0\" ry=\"0\"></rect><rect style=\"cursor: pointer; opacity: 0.65;\" opacity=\"0.65\" fill=\"#0000ff\" stroke=\"#0000ff\" stroke-width=\"1\" x=\"761\" y=\"88\" width=\"1\" height=\"2\" rx=\"0\" ry=\"0\"></rect><rect style=\"cursor: pointer; opacity: 0.65;\" opacity=\"0.65\" fill=\"#0000ff\" stroke=\"#0000ff\" stroke-width=\"1\" x=\"762\" " "y=\"86\" width=\"1\" height=\"4\" rx=\"0\" ry=\"0\"></rect><rect style=\"cursor: pointer; opacity: 0.65;\" opacity=\"0.65\" fill=\"#0000ff\" stroke=\"#0000ff\" stroke-width=\"1\" x=\"763\" y=\"88\" width=\"1\" height=\"2\" rx=\"0\" ry=\"0\"></rect><rect style=\"cursor: pointer; opacity: 0.65;\" opacity=\"0.65\" fill=\"#0000ff\" stroke=\"#0000ff\" stroke-width=\"1\" x=\"764\" y=\"88\" width=\"1\" height=\"2\" rx=\"0\" ry=\"0\"></rect><rect style=\"cursor: pointer; opacity: 0.65;\" opacity=\"0.65\" fill=\"#0000ff\" stroke=\"#0000ff\" stroke-width=\"1\" x=\"765\" y=\"88\" width=\"1\" height=\"2\" rx=\"0\" ry=\"0\"></rect><rect style=\"cursor: pointer; opacity: 0.65;\" opacity=\"0.65\" fill=\"#0000ff\" stroke=\"#0000ff\" stroke-width=\"1\" x=\"766\" y=\"88\" width=\"1\" height=\"2\" rx=\"0\" ry=\"0\"></rect><rect style=\"cursor: pointer; opacity: 0.65;\" opacity=\"0.65\" fill=\"#0000ff\" stroke=\"#0000ff\" stroke-width=\"1\" x=\"767\" y=\"88\" width=\"1\" height=\"2\" rx=\"0\" ry=\"0\"></rect><rect style=\"cursor: pointer; opacity: 0.65;\" opacity=\"0.65\" fill=\"#0000ff\" stroke=\"#0000ff\" stroke-width=\"1\" x=\"768\" y=\"88\" width=\"1\" height=\"2\" rx=\"0\" ry=\"0\"></rect><rect style=\"cursor: pointer; opacity: 0.65;\" opacity=\"0.65\" fill=\"#0000ff\" stroke=\"#0000ff\" stroke-width=\"1\" x=\"769\" y=\"88\" width=\"1\" height=\"2\" rx=\"0\" ry=\"0\"></rect><rect style=\"cursor: pointer; opacity: 0.65;\" opacity=\"0.65\" fill=\"#0000ff\" stroke=\"#0000ff\" stroke-width=\"1\" x=\"770\" y=\"88\" width=\"1\" height=\"2\" rx=\"0\" ry=\"0\"></rect><rect style=\"cursor: pointer; opacity: 0.65;\" opacity=\"0.65\" fill=\"#0000ff\" stroke=\"#0000ff\" stroke-width=\"1\" x=\"771\" y=\"88\" width=\"1\" height=\"2\" rx=\"0\" ry=\"0\"></rect><rect style=\"cursor: pointer; opacity: 0.65;\" opacity=\"0.65\" fill=\"#0000ff\" stroke=\"#0000ff\" stroke-width=\"1\" x=\"772\" y=\"88\" width=\"1\" height=\"2\" rx=\"0\" ry=\"0\"></rect><rect style=\"cursor: pointer; opacity: 0.65;\" opacity=\"0.65\" fill=\"#0000ff\" stroke=\"#0000ff\" stroke-width=\"1\" x=\"773\" y=\"88\" width=\"1\" height=\"2\" rx=\"0\" ry=\"0\"></rect><rect style=\"cursor: pointer; opacity: 0.65;\" opacity=\"0.65\" " "fill=\"#0000ff\" stroke=\"#0000ff\" stroke-width=\"1\" x=\"774\" y=\"88\" width=\"1\" height=\"2\" rx=\"0\" ry=\"0\"></rect><rect style=\"cursor: pointer; opacity: 0.65;\" opacity=\"0.65\" fill=\"#0000ff\" stroke=\"#0000ff\" stroke-width=\"1\" x=\"775\" y=\"88\" width=\"1\" height=\"2\" rx=\"0\" ry=\"0\"></rect><rect style=\"cursor: pointer; opacity: 0.65;\" opacity=\"0.65\" fill=\"#0000ff\" stroke=\"#0000ff\" stroke-width=\"1\" x=\"776\" y=\"88\" width=\"1\" height=\"2\" rx=\"0\" ry=\"0\"></rect><rect style=\"cursor: pointer; opacity: 0.65;\" opacity=\"0.65\" fill=\"#0000ff\" stroke=\"#0000ff\" stroke-width=\"1\" x=\"777\" y=\"88\" width=\"1\" height=\"2\" rx=\"0\" ry=\"0\"></rect><rect style=\"cursor: pointer; opacity: 0.65;\" opacity=\"0.65\" fill=\"#0000ff\" stroke=\"#0000ff\" stroke-width=\"1\" x=\"778\" y=\"88\" width=\"1\" height=\"2\" rx=\"0\" ry=\"0\"></rect><rect style=\"cursor: pointer; opacity: 0.65;\" opacity=\"0.65\" fill=\"#0000ff\" stroke=\"#0000ff\" stroke-width=\"1\" x=\"779\" y=\"88\" width=\"1\" height=\"2\" rx=\"0\" ry=\"0\"></rect><rect style=\"cursor: pointer; opacity: 0.65;\" opacity=\"0.65\" fill=\"#0000ff\" stroke=\"#0000ff\" stroke-width=\"1\" x=\"780\" y=\"88\" width=\"1\" height=\"2\" rx=\"0\" ry=\"0\"></rect><rect style=\"cursor: pointer; opacity: 0.65;\" opacity=\"0.65\" fill=\"#0000ff\" stroke=\"#0000ff\" stroke-width=\"1\" x=\"781\" y=\"88\" width=\"1\" height=\"2\" rx=\"0\" ry=\"0\"></rect><rect style=\"cursor: pointer; opacity: 0.65;\" opacity=\"0.65\" fill=\"#0000ff\" stroke=\"#0000ff\" stroke-width=\"1\" x=\"782\" y=\"88\" width=\"1\" height=\"2\" rx=\"0\" ry=\"0\"></rect><rect style=\"cursor: pointer; opacity: 0.65;\" opacity=\"0.65\" fill=\"#0000ff\" stroke=\"#0000ff\" stroke-width=\"1\" x=\"783\" y=\"88\" width=\"1\" height=\"2\" rx=\"0\" ry=\"0\"></rect><rect style=\"cursor: pointer; opacity: 0.65;\" opacity=\"0.65\" fill=\"#0000ff\" stroke=\"#0000ff\" stroke-width=\"1\" x=\"784\" y=\"88\" width=\"1\" height=\"2\" rx=\"0\" ry=\"0\"></rect><rect style=\"cursor: pointer; opacity: 0.65;\" opacity=\"0.65\" fill=\"#0000ff\" stroke=\"#0000ff\" stroke-width=\"1\" x=\"785\" y=\"88\" width=\"1\" height=\"2\" rx=\"0\" ry=\"0\"></rect><rect " "style=\"cursor: pointer; opacity: 0.65;\" opacity=\"0.65\" fill=\"#0000ff\" stroke=\"#0000ff\" stroke-width=\"1\" x=\"786\" y=\"88\" width=\"1\" height=\"2\" rx=\"0\" ry=\"0\"></rect><rect style=\"cursor: pointer; opacity: 0.65;\" opacity=\"0.65\" fill=\"#0000ff\" stroke=\"#0000ff\" stroke-width=\"1\" x=\"787\" y=\"88\" width=\"1\" height=\"2\" rx=\"0\" ry=\"0\"></rect><rect style=\"cursor: pointer; opacity: 0.65;\" opacity=\"0.65\" fill=\"#0000ff\" stroke=\"#0000ff\" stroke-width=\"1\" x=\"788\" y=\"88\" width=\"1\" height=\"2\" rx=\"0\" ry=\"0\"></rect><rect style=\"cursor: pointer; opacity: 0.65;\" opacity=\"0.65\" fill=\"#0000ff\" stroke=\"#0000ff\" stroke-width=\"1\" x=\"789\" y=\"88\" width=\"1\" height=\"2\" rx=\"0\" ry=\"0\"></rect><rect style=\"cursor: pointer; opacity: 0.65;\" opacity=\"0.65\" fill=\"#0000ff\" stroke=\"#0000ff\" stroke-width=\"1\" x=\"790\" y=\"88\" width=\"1\" height=\"2\" rx=\"0\" ry=\"0\"></rect><rect style=\"cursor: pointer; opacity: 0.65;\" opacity=\"0.65\" fill=\"#0000ff\" stroke=\"#0000ff\" stroke-width=\"1\" x=\"791\" y=\"86\" width=\"1\" height=\"4\" rx=\"0\" ry=\"0\"></rect><rect style=\"cursor: pointer; opacity: 0.65;\" opacity=\"0.65\" fill=\"#0000ff\" stroke=\"#0000ff\" stroke-width=\"1\" x=\"792\" y=\"88\" width=\"1\" height=\"2\" rx=\"0\" ry=\"0\"></rect><rect style=\"cursor: pointer; opacity: 0.65;\" opacity=\"0.65\" fill=\"#0000ff\" stroke=\"#0000ff\" stroke-width=\"1\" x=\"793\" y=\"88\" width=\"1\" height=\"2\" rx=\"0\" ry=\"0\"></rect><rect style=\"cursor: pointer; opacity: 0.65;\" opacity=\"0.65\" fill=\"#0000ff\" stroke=\"#0000ff\" stroke-width=\"1\" x=\"794\" y=\"88\" width=\"1\" height=\"2\" rx=\"0\" ry=\"0\"></rect><rect style=\"cursor: pointer; opacity: 0.65;\" opacity=\"0.65\" fill=\"#0000ff\" stroke=\"#0000ff\" stroke-width=\"1\" x=\"795\" y=\"88\" width=\"1\" height=\"2\" rx=\"0\" ry=\"0\"></rect><rect style=\"cursor: pointer; opacity: 0.65;\" opacity=\"0.65\" fill=\"#0000ff\" stroke=\"#0000ff\" stroke-width=\"1\" x=\"796\" y=\"88\" width=\"1\" height=\"2\" rx=\"0\" ry=\"0\"></rect><rect style=\"cursor: pointer; opacity: 0.65;\" opacity=\"0.65\" fill=\"#0000ff\" stroke=\"#0000ff\" stroke-width=\"1\" x=\"797\" " "y=\"88\" width=\"1\" height=\"2\" rx=\"0\" ry=\"0\"></rect><rect style=\"cursor: pointer; opacity: 0.65;\" opacity=\"0.65\" fill=\"#0000ff\" stroke=\"#0000ff\" stroke-width=\"1\" x=\"798\" y=\"88\" width=\"1\" height=\"2\" rx=\"0\" ry=\"0\"></rect><rect style=\"cursor: pointer; opacity: 0.65;\" opacity=\"0.65\" fill=\"#0000ff\" stroke=\"#0000ff\" stroke-width=\"1\" x=\"799\" y=\"88\" width=\"1\" height=\"2\" rx=\"0\" ry=\"0\"></rect><rect style=\"cursor: pointer; opacity: 0.65;\" opacity=\"0.65\" fill=\"#0000ff\" stroke=\"#0000ff\" stroke-width=\"1\" x=\"800\" y=\"88\" width=\"1\" height=\"2\" rx=\"0\" ry=\"0\"></rect><rect style=\"cursor: pointer; opacity: 0.65;\" opacity=\"0.65\" fill=\"#0000ff\" stroke=\"#0000ff\" stroke-width=\"1\" x=\"801\" y=\"88\" width=\"1\" height=\"2\" rx=\"0\" ry=\"0\"></rect><rect style=\"cursor: pointer; opacity: 0.65;\" opacity=\"0.65\" fill=\"#0000ff\" stroke=\"#0000ff\" stroke-width=\"1\" x=\"802\" y=\"88\" width=\"1\" height=\"2\" rx=\"0\" ry=\"0\"></rect><rect style=\"cursor: pointer; opacity: 0.65;\" opacity=\"0.65\" fill=\"#0000ff\" stroke=\"#0000ff\" stroke-width=\"1\" x=\"803\" y=\"88\" width=\"1\" height=\"2\" rx=\"0\" ry=\"0\"></rect><rect style=\"cursor: pointer; opacity: 0.65;\" opacity=\"0.65\" fill=\"#0000ff\" stroke=\"#0000ff\" stroke-width=\"1\" x=\"804\" y=\"88\" width=\"1\" height=\"2\" rx=\"0\" ry=\"0\"></rect><rect style=\"cursor: pointer; opacity: 0.65;\" opacity=\"0.65\" fill=\"#0000ff\" stroke=\"#0000ff\" stroke-width=\"1\" x=\"805\" y=\"88\" width=\"1\" height=\"2\" rx=\"0\" ry=\"0\"></rect><rect style=\"cursor: pointer; opacity: 0.65;\" opacity=\"0.65\" fill=\"#0000ff\" stroke=\"#0000ff\" stroke-width=\"1\" x=\"806\" y=\"88\" width=\"1\" height=\"2\" rx=\"0\" ry=\"0\"></rect><rect style=\"cursor: pointer; opacity: 0.65;\" opacity=\"0.65\" fill=\"#0000ff\" stroke=\"#0000ff\" stroke-width=\"1\" x=\"807\" y=\"88\" width=\"1\" height=\"2\" rx=\"0\" ry=\"0\"></rect><rect style=\"cursor: pointer; opacity: 0.65;\" opacity=\"0.65\" fill=\"#0000ff\" stroke=\"#0000ff\" stroke-width=\"1\" x=\"808\" y=\"88\" width=\"1\" height=\"2\" rx=\"0\" ry=\"0\"></rect><rect style=\"cursor: pointer; opacity: 0.65;\" opacity=\"0.65\" " "fill=\"#0000ff\" stroke=\"#0000ff\" stroke-width=\"1\" x=\"809\" y=\"88\" width=\"1\" height=\"2\" rx=\"0\" ry=\"0\"></rect><rect style=\"cursor: pointer; opacity: 0.65;\" opacity=\"0.65\" fill=\"#0000ff\" stroke=\"#0000ff\" stroke-width=\"1\" x=\"810\" y=\"88\" width=\"1\" height=\"2\" rx=\"0\" ry=\"0\"></rect><rect style=\"cursor: pointer; opacity: 0.65;\" opacity=\"0.65\" fill=\"#0000ff\" stroke=\"#0000ff\" stroke-width=\"1\" x=\"811\" y=\"88\" width=\"1\" height=\"2\" rx=\"0\" ry=\"0\"></rect><rect style=\"cursor: pointer; opacity: 0.65;\" opacity=\"0.65\" fill=\"#0000ff\" stroke=\"#0000ff\" stroke-width=\"1\" x=\"812\" y=\"88\" width=\"1\" height=\"2\" rx=\"0\" ry=\"0\"></rect><rect style=\"cursor: pointer; opacity: 0.65;\" opacity=\"0.65\" fill=\"#0000ff\" stroke=\"#0000ff\" stroke-width=\"1\" x=\"813\" y=\"88\" width=\"1\" height=\"2\" rx=\"0\" ry=\"0\"></rect><rect style=\"cursor: pointer; opacity: 0.65;\" opacity=\"0.65\" fill=\"#0000ff\" stroke=\"#0000ff\" stroke-width=\"1\" x=\"814\" y=\"88\" width=\"1\" height=\"2\" rx=\"0\" ry=\"0\"></rect><rect style=\"cursor: pointer; opacity: 0.65;\" opacity=\"0.65\" fill=\"#0000ff\" stroke=\"#0000ff\" stroke-width=\"1\" x=\"815\" y=\"86\" width=\"1\" height=\"4\" rx=\"0\" ry=\"0\"></rect><rect style=\"cursor: pointer; opacity: 0.65;\" opacity=\"0.65\" fill=\"#0000ff\" stroke=\"#0000ff\" stroke-width=\"1\" x=\"816\" y=\"86\" width=\"1\" height=\"4\" rx=\"0\" ry=\"0\"></rect><rect style=\"cursor: pointer; opacity: 0.65;\" opacity=\"0.65\" fill=\"#0000ff\" stroke=\"#0000ff\" stroke-width=\"1\" x=\"817\" y=\"86\" width=\"1\" height=\"4\" rx=\"0\" ry=\"0\"></rect><rect style=\"cursor: pointer; opacity: 0.65;\" opacity=\"0.65\" fill=\"#0000ff\" stroke=\"#0000ff\" stroke-width=\"1\" x=\"818\" y=\"86\" width=\"1\" height=\"4\" rx=\"0\" ry=\"0\"></rect><rect style=\"cursor: pointer; opacity: 0.65;\" opacity=\"0.65\" fill=\"#0000ff\" stroke=\"#0000ff\" stroke-width=\"1\" x=\"819\" y=\"86\" width=\"1\" height=\"4\" rx=\"0\" ry=\"0\"></rect><rect style=\"cursor: pointer; opacity: 0.65;\" opacity=\"0.65\" fill=\"#0000ff\" stroke=\"#0000ff\" stroke-width=\"1\" x=\"820\" y=\"86\" width=\"1\" height=\"4\" rx=\"0\" ry=\"0\"></rect><rect " "style=\"cursor: pointer; opacity: 0.65;\" opacity=\"0.65\" fill=\"#0000ff\" stroke=\"#0000ff\" stroke-width=\"1\" x=\"821\" y=\"88\" width=\"1\" height=\"2\" rx=\"0\" ry=\"0\"></rect><rect style=\"cursor: pointer; opacity: 0.65;\" opacity=\"0.65\" fill=\"#0000ff\" stroke=\"#0000ff\" stroke-width=\"1\" x=\"822\" y=\"88\" width=\"1\" height=\"2\" rx=\"0\" ry=\"0\"></rect><rect style=\"cursor: pointer; opacity: 0.65;\" opacity=\"0.65\" fill=\"#0000ff\" stroke=\"#0000ff\" stroke-width=\"1\" x=\"823\" y=\"88\" width=\"1\" height=\"2\" rx=\"0\" ry=\"0\"></rect><rect style=\"cursor: pointer; opacity: 0.65;\" opacity=\"0.65\" fill=\"#0000ff\" stroke=\"#0000ff\" stroke-width=\"1\" x=\"824\" y=\"88\" width=\"1\" height=\"2\" rx=\"0\" ry=\"0\"></rect><rect style=\"cursor: pointer; opacity: 0.65;\" opacity=\"0.65\" fill=\"#0000ff\" stroke=\"#0000ff\" stroke-width=\"1\" x=\"825\" y=\"88\" width=\"1\" height=\"2\" rx=\"0\" ry=\"0\"></rect><rect style=\"cursor: pointer; opacity: 0.65;\" opacity=\"0.65\" fill=\"#0000ff\" stroke=\"#0000ff\" stroke-width=\"1\" x=\"826\" y=\"88\" width=\"1\" height=\"2\" rx=\"0\" ry=\"0\"></rect><rect style=\"cursor: pointer; opacity: 0.65;\" opacity=\"0.65\" fill=\"#0000ff\" stroke=\"#0000ff\" stroke-width=\"1\" x=\"827\" y=\"88\" width=\"1\" height=\"2\" rx=\"0\" ry=\"0\"></rect><rect style=\"cursor: pointer; opacity: 0.65;\" opacity=\"0.65\" fill=\"#0000ff\" stroke=\"#0000ff\" stroke-width=\"1\" x=\"828\" y=\"88\" width=\"1\" height=\"2\" rx=\"0\" ry=\"0\"></rect><rect style=\"cursor: pointer; opacity: 0.65;\" opacity=\"0.65\" fill=\"#0000ff\" stroke=\"#0000ff\" stroke-width=\"1\" x=\"829\" y=\"88\" width=\"1\" height=\"2\" rx=\"0\" ry=\"0\"></rect><rect style=\"cursor: pointer; opacity: 0.65;\" opacity=\"0.65\" fill=\"#0000ff\" stroke=\"#0000ff\" stroke-width=\"1\" x=\"830\" y=\"88\" width=\"1\" height=\"2\" rx=\"0\" ry=\"0\"></rect><rect style=\"cursor: pointer; opacity: 0.65;\" opacity=\"0.65\" fill=\"#0000ff\" stroke=\"#0000ff\" stroke-width=\"1\" x=\"831\" y=\"88\" width=\"1\" height=\"2\" rx=\"0\" ry=\"0\"></rect><rect style=\"cursor: pointer; opacity: 0.65;\" opacity=\"0.65\" fill=\"#0000ff\" stroke=\"#0000ff\" stroke-width=\"1\" x=\"832\" " "y=\"88\" width=\"1\" height=\"2\" rx=\"0\" ry=\"0\"></rect><rect style=\"cursor: pointer; opacity: 0.65;\" opacity=\"0.65\" fill=\"#0000ff\" stroke=\"#0000ff\" stroke-width=\"1\" x=\"833\" y=\"88\" width=\"1\" height=\"2\" rx=\"0\" ry=\"0\"></rect><rect style=\"cursor: pointer; opacity: 0.65;\" opacity=\"0.65\" fill=\"#0000ff\" stroke=\"#0000ff\" stroke-width=\"1\" x=\"834\" y=\"88\" width=\"1\" height=\"2\" rx=\"0\" ry=\"0\"></rect><rect style=\"cursor: pointer; opacity: 0.65;\" opacity=\"0.65\" fill=\"#0000ff\" stroke=\"#0000ff\" stroke-width=\"1\" x=\"835\" y=\"88\" width=\"1\" height=\"2\" rx=\"0\" ry=\"0\"></rect><rect style=\"cursor: pointer; opacity: 0.65;\" opacity=\"0.65\" fill=\"#0000ff\" stroke=\"#0000ff\" stroke-width=\"1\" x=\"836\" y=\"88\" width=\"1\" height=\"2\" rx=\"0\" ry=\"0\"></rect><rect style=\"cursor: pointer; opacity: 0.65;\" opacity=\"0.65\" fill=\"#0000ff\" stroke=\"#0000ff\" stroke-width=\"1\" x=\"837\" y=\"88\" width=\"1\" height=\"2\" rx=\"0\" ry=\"0\"></rect><rect style=\"cursor: pointer; opacity: 0.65;\" opacity=\"0.65\" fill=\"#0000ff\" stroke=\"#0000ff\" stroke-width=\"1\" x=\"838\" y=\"88\" width=\"1\" height=\"2\" rx=\"0\" ry=\"0\"></rect><rect style=\"cursor: pointer; opacity: 0.65;\" opacity=\"0.65\" fill=\"#0000ff\" stroke=\"#0000ff\" stroke-width=\"1\" x=\"839\" y=\"88\" width=\"1\" height=\"2\" rx=\"0\" ry=\"0\"></rect><rect style=\"cursor: pointer; opacity: 0.65;\" opacity=\"0.65\" fill=\"#0000ff\" stroke=\"#0000ff\" stroke-width=\"1\" x=\"840\" y=\"88\" width=\"1\" height=\"2\" rx=\"0\" ry=\"0\"></rect><rect style=\"cursor: pointer; opacity: 0.65;\" opacity=\"0.65\" fill=\"#0000ff\" stroke=\"#0000ff\" stroke-width=\"1\" x=\"841\" y=\"88\" width=\"1\" height=\"2\" rx=\"0\" ry=\"0\"></rect><rect style=\"cursor: pointer; opacity: 0.65;\" opacity=\"0.65\" fill=\"#0000ff\" stroke=\"#0000ff\" stroke-width=\"1\" x=\"842\" y=\"88\" width=\"1\" height=\"2\" rx=\"0\" ry=\"0\"></rect><rect style=\"cursor: pointer; opacity: 0.65;\" opacity=\"0.65\" fill=\"#0000ff\" stroke=\"#0000ff\" stroke-width=\"1\" x=\"843\" y=\"88\" width=\"1\" height=\"2\" rx=\"0\" ry=\"0\"></rect><rect style=\"cursor: pointer; opacity: 0.65;\" opacity=\"0.65\" " "fill=\"#0000ff\" stroke=\"#0000ff\" stroke-width=\"1\" x=\"844\" y=\"88\" width=\"1\" height=\"2\" rx=\"0\" ry=\"0\"></rect><rect style=\"cursor: pointer; opacity: 0.65;\" opacity=\"0.65\" fill=\"#0000ff\" stroke=\"#0000ff\" stroke-width=\"1\" x=\"845\" y=\"88\" width=\"1\" height=\"2\" rx=\"0\" ry=\"0\"></rect><rect style=\"cursor: pointer; opacity: 0.65;\" opacity=\"0.65\" fill=\"#0000ff\" stroke=\"#0000ff\" stroke-width=\"1\" x=\"846\" y=\"86\" width=\"1\" height=\"4\" rx=\"0\" ry=\"0\"></rect><rect style=\"cursor: pointer; opacity: 0.65;\" opacity=\"0.65\" fill=\"#0000ff\" stroke=\"#0000ff\" stroke-width=\"1\" x=\"847\" y=\"86\" width=\"1\" height=\"4\" rx=\"0\" ry=\"0\"></rect><rect style=\"cursor: pointer; opacity: 0.65;\" opacity=\"0.65\" fill=\"#0000ff\" stroke=\"#0000ff\" stroke-width=\"1\" x=\"848\" y=\"88\" width=\"1\" height=\"2\" rx=\"0\" ry=\"0\"></rect><rect style=\"cursor: pointer; opacity: 0.65;\" opacity=\"0.65\" fill=\"#0000ff\" stroke=\"#0000ff\" stroke-width=\"1\" x=\"849\" y=\"88\" width=\"1\" height=\"2\" rx=\"0\" ry=\"0\"></rect><rect style=\"cursor: pointer; opacity: 0.65;\" opacity=\"0.65\" fill=\"#0000ff\" stroke=\"#0000ff\" stroke-width=\"1\" x=\"850\" y=\"88\" width=\"1\" height=\"2\" rx=\"0\" ry=\"0\"></rect><rect style=\"cursor: pointer; opacity: 0.65;\" opacity=\"0.65\" fill=\"#0000ff\" stroke=\"#0000ff\" stroke-width=\"1\" x=\"851\" y=\"88\" width=\"1\" height=\"2\" rx=\"0\" ry=\"0\"></rect><rect style=\"cursor: pointer; opacity: 0.65;\" opacity=\"0.65\" fill=\"#0000ff\" stroke=\"#0000ff\" stroke-width=\"1\" x=\"852\" y=\"88\" width=\"1\" height=\"2\" rx=\"0\" ry=\"0\"></rect><rect style=\"cursor: pointer; opacity: 0.65;\" opacity=\"0.65\" fill=\"#0000ff\" stroke=\"#0000ff\" stroke-width=\"1\" x=\"853\" y=\"88\" width=\"1\" height=\"2\" rx=\"0\" ry=\"0\"></rect><rect style=\"cursor: pointer; opacity: 0.65;\" opacity=\"0.65\" fill=\"#0000ff\" stroke=\"#0000ff\" stroke-width=\"1\" x=\"854\" y=\"88\" width=\"1\" height=\"2\" rx=\"0\" ry=\"0\"></rect><rect style=\"cursor: pointer; opacity: 0.65;\" opacity=\"0.65\" fill=\"#0000ff\" stroke=\"#0000ff\" stroke-width=\"1\" x=\"855\" y=\"88\" width=\"1\" height=\"2\" rx=\"0\" ry=\"0\"></rect><rect " "style=\"cursor: pointer; opacity: 0.65;\" opacity=\"0.65\" fill=\"#0000ff\" stroke=\"#0000ff\" stroke-width=\"1\" x=\"856\" y=\"88\" width=\"1\" height=\"2\" rx=\"0\" ry=\"0\"></rect><rect style=\"cursor: pointer; opacity: 0.65;\" opacity=\"0.65\" fill=\"#0000ff\" stroke=\"#0000ff\" stroke-width=\"1\" x=\"857\" y=\"88\" width=\"1\" height=\"2\" rx=\"0\" ry=\"0\"></rect><rect style=\"cursor: pointer; opacity: 0.65;\" opacity=\"0.65\" fill=\"#0000ff\" stroke=\"#0000ff\" stroke-width=\"1\" x=\"858\" y=\"88\" width=\"1\" height=\"2\" rx=\"0\" ry=\"0\"></rect><rect style=\"cursor: pointer; opacity: 0.65;\" opacity=\"0.65\" fill=\"#0000ff\" stroke=\"#0000ff\" stroke-width=\"1\" x=\"859\" y=\"88\" width=\"1\" height=\"2\" rx=\"0\" ry=\"0\"></rect><rect style=\"cursor: pointer; opacity: 0.65;\" opacity=\"0.65\" fill=\"#0000ff\" stroke=\"#0000ff\" stroke-width=\"1\" x=\"860\" y=\"88\" width=\"1\" height=\"2\" rx=\"0\" ry=\"0\"></rect><rect style=\"cursor: pointer; opacity: 0.65;\" opacity=\"0.65\" fill=\"#0000ff\" stroke=\"#0000ff\" stroke-width=\"1\" x=\"861\" y=\"88\" width=\"1\" height=\"2\" rx=\"0\" ry=\"0\"></rect><rect style=\"cursor: pointer; opacity: 0.65;\" opacity=\"0.65\" fill=\"#0000ff\" stroke=\"#0000ff\" stroke-width=\"1\" x=\"862\" y=\"88\" width=\"1\" height=\"2\" rx=\"0\" ry=\"0\"></rect><rect style=\"cursor: pointer; opacity: 0.65;\" opacity=\"0.65\" fill=\"#0000ff\" stroke=\"#0000ff\" stroke-width=\"1\" x=\"863\" y=\"88\" width=\"1\" height=\"2\" rx=\"0\" ry=\"0\"></rect><rect style=\"cursor: pointer; opacity: 0.65;\" opacity=\"0.65\" fill=\"#0000ff\" stroke=\"#0000ff\" stroke-width=\"1\" x=\"864\" y=\"88\" width=\"1\" height=\"2\" rx=\"0\" ry=\"0\"></rect><rect style=\"cursor: pointer; opacity: 0.65;\" opacity=\"0.65\" fill=\"#0000ff\" stroke=\"#0000ff\" stroke-width=\"1\" x=\"865\" y=\"88\" width=\"1\" height=\"2\" rx=\"0\" ry=\"0\"></rect><rect style=\"cursor: pointer; opacity: 0.65;\" opacity=\"0.65\" fill=\"#0000ff\" stroke=\"#0000ff\" stroke-width=\"1\" x=\"866\" y=\"88\" width=\"1\" height=\"2\" rx=\"0\" ry=\"0\"></rect><rect style=\"cursor: pointer; opacity: 0.65;\" opacity=\"0.65\" fill=\"#0000ff\" stroke=\"#0000ff\" stroke-width=\"1\" x=\"867\" " "y=\"88\" width=\"1\" height=\"2\" rx=\"0\" ry=\"0\"></rect><rect style=\"cursor: pointer; opacity: 0.65;\" opacity=\"0.65\" fill=\"#0000ff\" stroke=\"#0000ff\" stroke-width=\"1\" x=\"868\" y=\"88\" width=\"1\" height=\"2\" rx=\"0\" ry=\"0\"></rect><rect style=\"cursor: pointer; opacity: 0.65;\" opacity=\"0.65\" fill=\"#0000ff\" stroke=\"#0000ff\" stroke-width=\"1\" x=\"869\" y=\"88\" width=\"1\" height=\"2\" rx=\"0\" ry=\"0\"></rect><rect style=\"cursor: pointer; opacity: 0.65;\" opacity=\"0.65\" fill=\"#0000ff\" stroke=\"#0000ff\" stroke-width=\"1\" x=\"870\" y=\"88\" width=\"1\" height=\"2\" rx=\"0\" ry=\"0\"></rect><rect style=\"cursor: pointer; opacity: 0.65;\" opacity=\"0.65\" fill=\"#0000ff\" stroke=\"#0000ff\" stroke-width=\"1\" x=\"871\" y=\"88\" width=\"1\" height=\"2\" rx=\"0\" ry=\"0\"></rect><rect style=\"cursor: pointer; opacity: 0.65;\" opacity=\"0.65\" fill=\"#0000ff\" stroke=\"#0000ff\" stroke-width=\"1\" x=\"872\" y=\"88\" width=\"1\" height=\"2\" rx=\"0\" ry=\"0\"></rect><rect style=\"cursor: pointer; opacity: 0.65;\" opacity=\"0.65\" fill=\"#0000ff\" stroke=\"#0000ff\" stroke-width=\"1\" x=\"873\" y=\"88\" width=\"1\" height=\"2\" rx=\"0\" ry=\"0\"></rect><rect style=\"cursor: pointer; opacity: 0.65;\" opacity=\"0.65\" fill=\"#0000ff\" stroke=\"#0000ff\" stroke-width=\"1\" x=\"874\" y=\"86\" width=\"1\" height=\"4\" rx=\"0\" ry=\"0\"></rect><rect style=\"cursor: pointer; opacity: 0.65;\" opacity=\"0.65\" fill=\"#0000ff\" stroke=\"#0000ff\" stroke-width=\"1\" x=\"875\" y=\"86\" width=\"1\" height=\"4\" rx=\"0\" ry=\"0\"></rect><rect style=\"cursor: pointer; opacity: 0.65;\" opacity=\"0.65\" fill=\"#0000ff\" stroke=\"#0000ff\" stroke-width=\"1\" x=\"876\" y=\"88\" width=\"1\" height=\"2\" rx=\"0\" ry=\"0\"></rect><rect style=\"cursor: pointer; opacity: 0.65;\" opacity=\"0.65\" fill=\"#0000ff\" stroke=\"#0000ff\" stroke-width=\"1\" x=\"877\" y=\"88\" width=\"1\" height=\"2\" rx=\"0\" ry=\"0\"></rect><rect style=\"cursor: pointer; opacity: 0.65;\" opacity=\"0.65\" fill=\"#0000ff\" stroke=\"#0000ff\" stroke-width=\"1\" x=\"878\" y=\"88\" width=\"1\" height=\"2\" rx=\"0\" ry=\"0\"></rect><rect style=\"cursor: pointer; opacity: 0.65;\" opacity=\"0.65\" " "fill=\"#0000ff\" stroke=\"#0000ff\" stroke-width=\"1\" x=\"879\" y=\"88\" width=\"1\" height=\"2\" rx=\"0\" ry=\"0\"></rect><rect style=\"cursor: pointer; opacity: 0.65;\" opacity=\"0.65\" fill=\"#0000ff\" stroke=\"#0000ff\" stroke-width=\"1\" x=\"880\" y=\"88\" width=\"1\" height=\"2\" rx=\"0\" ry=\"0\"></rect><rect style=\"cursor: pointer; opacity: 0.65;\" opacity=\"0.65\" fill=\"#0000ff\" stroke=\"#0000ff\" stroke-width=\"1\" x=\"881\" y=\"88\" width=\"1\" height=\"2\" rx=\"0\" ry=\"0\"></rect><rect style=\"cursor: pointer; opacity: 0.65;\" opacity=\"0.65\" fill=\"#0000ff\" stroke=\"#0000ff\" stroke-width=\"1\" x=\"882\" y=\"88\" width=\"1\" height=\"2\" rx=\"0\" ry=\"0\"></rect><rect style=\"cursor: pointer; opacity: 0.65;\" opacity=\"0.65\" fill=\"#0000ff\" stroke=\"#0000ff\" stroke-width=\"1\" x=\"883\" y=\"88\" width=\"1\" height=\"2\" rx=\"0\" ry=\"0\"></rect><rect style=\"cursor: pointer; opacity: 0.65;\" opacity=\"0.65\" fill=\"#0000ff\" stroke=\"#0000ff\" stroke-width=\"1\" x=\"884\" y=\"88\" width=\"1\" height=\"2\" rx=\"0\" ry=\"0\"></rect><rect style=\"cursor: pointer; opacity: 0.65;\" opacity=\"0.65\" fill=\"#0000ff\" stroke=\"#0000ff\" stroke-width=\"1\" x=\"885\" y=\"88\" width=\"1\" height=\"2\" rx=\"0\" ry=\"0\"></rect><rect style=\"cursor: pointer; opacity: 0.65;\" opacity=\"0.65\" fill=\"#0000ff\" stroke=\"#0000ff\" stroke-width=\"1\" x=\"886\" y=\"88\" width=\"1\" height=\"2\" rx=\"0\" ry=\"0\"></rect><rect style=\"cursor: pointer; opacity: 0.65;\" opacity=\"0.65\" fill=\"#0000ff\" stroke=\"#0000ff\" stroke-width=\"1\" x=\"887\" y=\"88\" width=\"1\" height=\"2\" rx=\"0\" ry=\"0\"></rect><rect style=\"cursor: pointer; opacity: 0.65;\" opacity=\"0.65\" fill=\"#0000ff\" stroke=\"#0000ff\" stroke-width=\"1\" x=\"888\" y=\"88\" width=\"1\" height=\"2\" rx=\"0\" ry=\"0\"></rect><rect style=\"cursor: pointer; opacity: 0.65;\" opacity=\"0.65\" fill=\"#0000ff\" stroke=\"#0000ff\" stroke-width=\"1\" x=\"889\" y=\"88\" width=\"1\" height=\"2\" rx=\"0\" ry=\"0\"></rect><rect style=\"cursor: pointer; opacity: 0.65;\" opacity=\"0.65\" fill=\"#0000ff\" stroke=\"#0000ff\" stroke-width=\"1\" x=\"890\" y=\"88\" width=\"1\" height=\"2\" rx=\"0\" ry=\"0\"></rect><rect " "style=\"cursor: pointer; opacity: 0.65;\" opacity=\"0.65\" fill=\"#0000ff\" stroke=\"#0000ff\" stroke-width=\"1\" x=\"891\" y=\"88\" width=\"1\" height=\"2\" rx=\"0\" ry=\"0\"></rect><rect style=\"cursor: pointer; opacity: 0.65;\" opacity=\"0.65\" fill=\"#0000ff\" stroke=\"#0000ff\" stroke-width=\"1\" x=\"892\" y=\"88\" width=\"1\" height=\"2\" rx=\"0\" ry=\"0\"></rect><rect style=\"cursor: pointer; opacity: 0.65;\" opacity=\"0.65\" fill=\"#0000ff\" stroke=\"#0000ff\" stroke-width=\"1\" x=\"893\" y=\"88\" width=\"1\" height=\"2\" rx=\"0\" ry=\"0\"></rect><rect style=\"cursor: pointer; opacity: 0.65;\" opacity=\"0.65\" fill=\"#0000ff\" stroke=\"#0000ff\" stroke-width=\"1\" x=\"894\" y=\"88\" width=\"1\" height=\"2\" rx=\"0\" ry=\"0\"></rect><rect style=\"cursor: pointer; opacity: 0.65;\" opacity=\"0.65\" fill=\"#0000ff\" stroke=\"#0000ff\" stroke-width=\"1\" x=\"895\" y=\"88\" width=\"1\" height=\"2\" rx=\"0\" ry=\"0\"></rect><rect style=\"cursor: pointer; opacity: 0.65;\" opacity=\"0.65\" fill=\"#0000ff\" stroke=\"#0000ff\" stroke-width=\"1\" x=\"896\" y=\"88\" width=\"1\" height=\"2\" rx=\"0\" ry=\"0\"></rect><rect style=\"cursor: pointer; opacity: 0.65;\" opacity=\"0.65\" fill=\"#0000ff\" stroke=\"#0000ff\" stroke-width=\"1\" x=\"897\" y=\"88\" width=\"1\" height=\"2\" rx=\"0\" ry=\"0\"></rect><rect style=\"cursor: pointer; opacity: 0.65;\" opacity=\"0.65\" fill=\"#0000ff\" stroke=\"#0000ff\" stroke-width=\"1\" x=\"898\" y=\"88\" width=\"1\" height=\"2\" rx=\"0\" ry=\"0\"></rect><rect style=\"cursor: pointer; opacity: 0.65;\" opacity=\"0.65\" fill=\"#0000ff\" stroke=\"#0000ff\" stroke-width=\"1\" x=\"899\" y=\"88\" width=\"1\" height=\"2\" rx=\"0\" ry=\"0\"></rect><rect style=\"cursor: pointer; opacity: 0.65;\" opacity=\"0.65\" fill=\"#0000ff\" stroke=\"#0000ff\" stroke-width=\"1\" x=\"900\" y=\"88\" width=\"1\" height=\"2\" rx=\"0\" ry=\"0\"></rect><rect style=\"cursor: pointer; opacity: 0.65;\" opacity=\"0.65\" fill=\"#0000ff\" stroke=\"#0000ff\" stroke-width=\"1\" x=\"901\" y=\"86\" width=\"1\" height=\"4\" rx=\"0\" ry=\"0\"></rect><rect style=\"cursor: pointer; opacity: 0.65;\" opacity=\"0.65\" fill=\"#0000ff\" stroke=\"#0000ff\" stroke-width=\"1\" x=\"902\" " "y=\"86\" width=\"1\" height=\"4\" rx=\"0\" ry=\"0\"></rect><rect style=\"cursor: pointer; opacity: 0.65;\" opacity=\"0.65\" fill=\"#0000ff\" stroke=\"#0000ff\" stroke-width=\"1\" x=\"903\" y=\"86\" width=\"1\" height=\"4\" rx=\"0\" ry=\"0\"></rect><rect style=\"cursor: pointer; opacity: 0.65;\" opacity=\"0.65\" fill=\"#0000ff\" stroke=\"#0000ff\" stroke-width=\"1\" x=\"904\" y=\"88\" width=\"1\" height=\"2\" rx=\"0\" ry=\"0\"></rect><rect style=\"cursor: pointer; opacity: 0.65;\" opacity=\"0.65\" fill=\"#0000ff\" stroke=\"#0000ff\" stroke-width=\"1\" x=\"905\" y=\"88\" width=\"1\" height=\"2\" rx=\"0\" ry=\"0\"></rect><rect style=\"cursor: pointer; opacity: 0.65;\" opacity=\"0.65\" fill=\"#0000ff\" stroke=\"#0000ff\" stroke-width=\"1\" x=\"906\" y=\"88\" width=\"1\" height=\"2\" rx=\"0\" ry=\"0\"></rect><rect style=\"cursor: pointer; opacity: 0.65;\" opacity=\"0.65\" fill=\"#0000ff\" stroke=\"#0000ff\" stroke-width=\"1\" x=\"907\" y=\"88\" width=\"1\" height=\"2\" rx=\"0\" ry=\"0\"></rect><rect style=\"cursor: pointer; opacity: 0.65;\" opacity=\"0.65\" fill=\"#0000ff\" stroke=\"#0000ff\" stroke-width=\"1\" x=\"908\" y=\"88\" width=\"1\" height=\"2\" rx=\"0\" ry=\"0\"></rect><rect style=\"cursor: pointer; opacity: 0.65;\" opacity=\"0.65\" fill=\"#0000ff\" stroke=\"#0000ff\" stroke-width=\"1\" x=\"909\" y=\"88\" width=\"1\" height=\"2\" rx=\"0\" ry=\"0\"></rect><rect style=\"cursor: pointer; opacity: 0.65;\" opacity=\"0.65\" fill=\"#0000ff\" stroke=\"#0000ff\" stroke-width=\"1\" x=\"910\" y=\"88\" width=\"1\" height=\"2\" rx=\"0\" ry=\"0\"></rect><rect style=\"cursor: pointer; opacity: 0.65;\" opacity=\"0.65\" fill=\"#0000ff\" stroke=\"#0000ff\" stroke-width=\"1\" x=\"911\" y=\"88\" width=\"1\" height=\"2\" rx=\"0\" ry=\"0\"></rect><rect style=\"cursor: pointer; opacity: 0.65;\" opacity=\"0.65\" fill=\"#0000ff\" stroke=\"#0000ff\" stroke-width=\"1\" x=\"912\" y=\"88\" width=\"1\" height=\"2\" rx=\"0\" ry=\"0\"></rect><rect style=\"cursor: pointer; opacity: 0.65;\" opacity=\"0.65\" fill=\"#0000ff\" stroke=\"#0000ff\" stroke-width=\"1\" x=\"913\" y=\"88\" width=\"1\" height=\"2\" rx=\"0\" ry=\"0\"></rect><rect style=\"cursor: pointer; opacity: 0.65;\" opacity=\"0.65\" " "fill=\"#0000ff\" stroke=\"#0000ff\" stroke-width=\"1\" x=\"914\" y=\"88\" width=\"1\" height=\"2\" rx=\"0\" ry=\"0\"></rect><rect style=\"cursor: pointer; opacity: 0.65;\" opacity=\"0.65\" fill=\"#0000ff\" stroke=\"#0000ff\" stroke-width=\"1\" x=\"915\" y=\"88\" width=\"1\" height=\"2\" rx=\"0\" ry=\"0\"></rect><rect style=\"cursor: pointer; opacity: 0.65;\" opacity=\"0.65\" fill=\"#0000ff\" stroke=\"#0000ff\" stroke-width=\"1\" x=\"916\" y=\"88\" width=\"1\" height=\"2\" rx=\"0\" ry=\"0\"></rect><rect style=\"cursor: pointer; opacity: 0.65;\" opacity=\"0.65\" fill=\"#0000ff\" stroke=\"#0000ff\" stroke-width=\"1\" x=\"917\" y=\"88\" width=\"1\" height=\"2\" rx=\"0\" ry=\"0\"></rect><rect style=\"cursor: pointer; opacity: 0.65;\" opacity=\"0.65\" fill=\"#0000ff\" stroke=\"#0000ff\" stroke-width=\"1\" x=\"918\" y=\"88\" width=\"1\" height=\"2\" rx=\"0\" ry=\"0\"></rect><rect style=\"cursor: pointer; opacity: 0.65;\" opacity=\"0.65\" fill=\"#0000ff\" stroke=\"#0000ff\" stroke-width=\"1\" x=\"919\" y=\"88\" width=\"1\" height=\"2\" rx=\"0\" ry=\"0\"></rect><rect style=\"cursor: pointer; opacity: 0.65;\" opacity=\"0.65\" fill=\"#0000ff\" stroke=\"#0000ff\" stroke-width=\"1\" x=\"920\" y=\"88\" width=\"1\" height=\"2\" rx=\"0\" ry=\"0\"></rect><rect style=\"cursor: pointer; opacity: 0.65;\" opacity=\"0.65\" fill=\"#0000ff\" stroke=\"#0000ff\" stroke-width=\"1\" x=\"921\" y=\"88\" width=\"1\" height=\"2\" rx=\"0\" ry=\"0\"></rect><rect style=\"cursor: pointer; opacity: 0.65;\" opacity=\"0.65\" fill=\"#0000ff\" stroke=\"#0000ff\" stroke-width=\"1\" x=\"922\" y=\"88\" width=\"1\" height=\"2\" rx=\"0\" ry=\"0\"></rect><rect style=\"cursor: pointer; opacity: 0.65;\" opacity=\"0.65\" fill=\"#0000ff\" stroke=\"#0000ff\" stroke-width=\"1\" x=\"923\" y=\"88\" width=\"1\" height=\"2\" rx=\"0\" ry=\"0\"></rect><rect style=\"cursor: pointer; opacity: 0.65;\" opacity=\"0.65\" fill=\"#0000ff\" stroke=\"#0000ff\" stroke-width=\"1\" x=\"924\" y=\"88\" width=\"1\" height=\"2\" rx=\"0\" ry=\"0\"></rect><rect style=\"cursor: pointer; opacity: 0.65;\" opacity=\"0.65\" fill=\"#0000ff\" stroke=\"#0000ff\" stroke-width=\"1\" x=\"925\" y=\"88\" width=\"1\" height=\"2\" rx=\"0\" ry=\"0\"></rect><rect " "style=\"cursor: pointer; opacity: 0.65;\" opacity=\"0.65\" fill=\"#0000ff\" stroke=\"#0000ff\" stroke-width=\"1\" x=\"926\" y=\"88\" width=\"1\" height=\"2\" rx=\"0\" ry=\"0\"></rect><rect style=\"cursor: pointer; opacity: 0.65;\" opacity=\"0.65\" fill=\"#0000ff\" stroke=\"#0000ff\" stroke-width=\"1\" x=\"927\" y=\"88\" width=\"1\" height=\"2\" rx=\"0\" ry=\"0\"></rect><rect style=\"cursor: pointer; opacity: 0.65;\" opacity=\"0.65\" fill=\"#0000ff\" stroke=\"#0000ff\" stroke-width=\"1\" x=\"928\" y=\"88\" width=\"1\" height=\"2\" rx=\"0\" ry=\"0\"></rect><rect style=\"cursor: pointer; opacity: 0.65;\" opacity=\"0.65\" fill=\"#0000ff\" stroke=\"#0000ff\" stroke-width=\"1\" x=\"929\" y=\"88\" width=\"1\" height=\"2\" rx=\"0\" ry=\"0\"></rect><rect style=\"cursor: pointer; opacity: 0.65;\" opacity=\"0.65\" fill=\"#0000ff\" stroke=\"#0000ff\" stroke-width=\"1\" x=\"930\" y=\"88\" width=\"1\" height=\"2\" rx=\"0\" ry=\"0\"></rect></svg></DIV><p id=\"sedative_container_footer\"><svg xmlns=\"http://www.[email or web address].org/2000/svg\" style=\"top: -0.19px; overflow: hidden; position: relative;\" width=\"1005\" height=\"20\" version=\"1.1\" xmlns=\"http://www.[employer].org/2000/svg\" xmlns:NS9=\"\" NS9:xmlns:xlink=\"http://www.[employer].org/1999/xlink\"><rect style=\"cursor: pointer; opacity: 1;\" opacity=\"1\" fill=\"#f8f8f8\" stroke=\"#f8f8f8\" stroke-width=\"1\" x=\"0\" y=\"0\" width=\"1005\" height=\"20\" rx=\"0\" ry=\"0\"></rect><desc></desc><defs></defs><text font-family=\"&quot;[place]&quot;\" font-size=\"12px\" font-weight=\"bold\" style=\"font-family: &quot;[place]&quot;; font-size: 12px; font-weight: bold; text-anchor: middle;\" fill=\"#812778\" stroke=\"none\" text-anchor=\"middle\" x=\"45\" y=\"12\"><tspan dy=\"3.96\">Timeline</tspan></text><text font-family=\"&quot;[place]&quot;\" font-size=\"12px\" style=\"font-family: &quot;[place]&quot;; font-size: 12px; text-anchor: middle;\" fill=\"#728388\" stroke=\"none\" text-anchor=\"middle\" x=\"230\" y=\"10\"><tspan dy=\"3.96\">08/09</tspan></text><text font-family=\"&quot;[place]&quot;\" font-size=\"12px\" style=\"font-family: &quot;[place]&quot;; font-size: 12px; text-anchor: middle;\" " "fill=\"#920664\" stroke=\"none\" text-anchor=\"middle\" x=\"290\" y=\"10\"><tspan dy=\"3.96\">2m</tspan></text><text font-family=\"&quot;[place]&quot;\" font-size=\"12px\" style=\"font-family: &quot;[place]&quot;; font-size: 12px; text-anchor: middle;\" fill=\"#023919\" stroke=\"none\" text-anchor=\"middle\" x=\"410\" y=\"10\"><tspan dy=\"3.96\">6m</tspan></text><text font-family=\"&quot;[place]&quot;\" font-size=\"12px\" style=\"font-family: &quot;[place]&quot;; font-size: 12px; text-anchor: middle;\" fill=\"#530512\" stroke=\"none\" text-anchor=\"middle\" x=\"595\" y=\"10\"><tspan dy=\"3.96\">1y</tspan></text><text font-family=\"&quot;[place]&quot;\" font-size=\"12px\" style=\"font-family: &quot;[place]&quot;; font-size: 12px; text-anchor: middle;\" fill=\"#325986\" stroke=\"none\" text-anchor=\"middle\" x=\"945\" y=\"10\"><tspan dy=\"3.96\">2y</tspan></text><path fill=\"none\" stroke=\"#dddddd\" stroke-width=\"1\" transform=\"matrix(1 0 0 1 0.5 0.5)\" d=\"M 0 0 L 1005 0 Z\"></path></svg></DIV></DIV></DIV><p class=\"nc_gray_notice_box\" id=\"cdc-box\">      *Per [facility] guidance, the MME conversion factors prescribed or provided as part of      the medication-assisted treatment for opioid use disorder should not be used to      benchmark against dosage thresholds meant for opioids prescribed for pain.      Buprenorphine products have no agreed upon morphine equivalency,      and as partial opioid agonists, are not expected to be associated with      overdose risk in the same dose-dependent manner as doses for full agonist opioids.      MME = morphine milligram equivalents.      LME = Lorazepam milligram equivalents.      mg = dose in milligrams.     </DIV><p class=\"nc_clear\"></DIV><HR>       <p id=\"data-analysis\"><SPAN class=\"title\">Data Analysis</SPAN>         <SPAN class=\"ui-icon ui-icon-triangle-1-e\" id=\"fctk-tuxyjajy-qwta\" style=\"float: left;\"></SPAN></DIV><TABLE class=\"layout\" id=\"kknb-cgmybfxs-lcdmo\" style=\"display: none;\"><TBODY><TR><TD><TABLE style=\"margin: 0px;\"><TBODY><TR class=\"drug_even\"><TD " "class=\"left font14 bold\" style=\"width: 400px;\"></TD><TD class=\"centered bold\" style=\"width: 140px;\"></TD><TD class=\"centered bold\" style=\"width: 140px;\"></TD><TD class=\"centered bold\" style=\"width: 140px;\"></TD><TD class=\"centered bold\" style=\"width: 140px;\"></TD></TR><TR><TD class=\"left\"></TD><TD class=\"centered\"></TD><TD class=\"centered\"></TD><TD class=\"centered\"></TD><TD class=\"centered\"></TD></TR><TR><TD class=\"left\"></TD><TD class=\"centered\"></TD><TD class=\"centered\"></TD><TD class=\"centered\"></TD><TD class=\"centered\"></TD></TR><TR><TD class=\"left\"></TD><TD class=\"centered\"></TD><TD class=\"centered\"></TD><TD class=\"centered\"></TD><TD class=\"centered\"></TD></TR><TR><TD class=\"left\"></TD><TD class=\"centered\"></TD><TD class=\"centered\"></TD><TD class=\"centered\"></TD><TD class=\"centered\"></TD></TR></TBODY></TABLE></TD></TR><TR class=\"left\"><TD></TD></TR><TR><TD><TABLE style=\"margin: 0px;\"><TBODY><TR class=\"drug_even\"><TD class=\"bold left font14\" style=\"width: 400px;\"></TD><TD class=\"centered bold\" style=\"width: 140px;\"></TD><TD class=\"centered bold\" style=\"width: 140px;\"></TD><TD class=\"centered bold\" style=\"width: 140px;\"></TD><TD class=\"centered bold\" style=\"width: 140px;\"></TD></TR><TR><TD class=\"left\"></TD><TD class=\"centered\"></TD><TD class=\"centered\"></TD><TD class=\"centered\"></TD><TD class=\"centered\"></TD></TR><TR><TD class=\"left\"></TD><TD class=\"centered\"></TD><TD class=\"centered\"></TD><TD class=\"centered\"></TD><TD class=\"centered\"></TD></TR><TR><TD class=\"left\"></TD><TD class=\"centered\"></TD><TD class=\"centered\"></TD><TD class=\"centered\"></TD><TD class=\"centered\"></TD></TR><TR><TD class=\"left\"></TD><TD class=\"centered\"></TD><TD class=\"centered\"></TD><TD class=\"centered\"></TD><TD class=\"centered\"></TD></TR></TBODY></TABLE></TD></TR><TR class=\"left\"><TD></TD></TR><TR><TD><TABLE style=\"margin: 0px;\"><TBODY><TR class=\"drug_even\"><TD class=\"bold left font14\" style=\"width: 400px; border-bottom-color: rgb(221, " "221, 221); border-bottom-width: 0px; border-bottom-style: solid;\"></TD><TD class=\"centered bold\" style=\"width: 140px;\"></TD><TD class=\"centered bold\" style=\"width: 140px;\"></TD><TD class=\"centered bold\" style=\"width: 140px;\"></TD><TD class=\"centered bold\" style=\"width: 140px;\"></TD></TR><TR><TD class=\"left\"></TD><TD class=\"centered\"></TD><TD class=\"centered\"></TD><TD class=\"centered\"></TD><TD class=\"centered\"></TD></TR><TR><TD class=\"left\"></TD><TD class=\"centered\"></TD><TD class=\"centered\"></TD><TD class=\"centered\"></TD><TD class=\"centered\"></TD></TR><TR><TD class=\"left\"></TD><TD class=\"centered\"></TD><TD class=\"centered\"></TD><TD class=\"centered\"></TD><TD class=\"centered\"></TD></TR><TR><TD class=\"left\"></TD><TD class=\"centered\"></TD><TD class=\"centered\"></TD><TD class=\"centered\"></TD><TD class=\"centered\"></TD></TR></TBODY></TABLE></TD></TR><TR class=\"left\"><TD style=\"paddinpx 0px 10px 10px;\"><BR></TD></TR></TBODY></TABLE><p>                  <HR></DIV><p class=\"nc_section_body\" id=\"sumary_section\"><p class=\"nc_section_pannel nc_summary_table_container\"><p class=\"nc_summary_table\"><SPAN>Summary</SPAN>           <TABLE><TBODY><TR title=\"The total number of prescriptions listed below\" class=\"datatable_pannel_header_extra nc_html_tooltip\"><TD>Total Prescriptions:</TD><TD data-nc_prescriptions_count=\"\">26</TD></TR><TR title=\"The total number of prescribers listed below\" class=\"datatable_pannel_header_extra nc_html_tooltip\"><TD>Total Prescribers:</TD><TD data-nc_providers_count=\"\">2</TD></TR><TR title=\"The total number of pharmacies listed below\" class=\"datatable_pannel_header_extra nc_html_tooltip\"><TD>Total Pharmacies:</TD><TD data-nc_pharmacies_count=\"\">1</TD></TR></TBODY></TABLE></DIV><p class=\"nc_summary_spacer\"></DIV><p class=\"nc_summary_table\"><SPAN>Narcotics*</SPAN> <P> (excluding buprenorphine)</P>           <TABLE><TBODY><TR class=\"datatable_pannel_header_extra nc_html_tooltip\" " "data-nc_html_tooltip=\"Estimated remaining quantity of medication\"><TD>Current Qty:</TD><TD id=\"narcotic_remaining_quantity\">0</TD></TR><TR class=\"datatable_pannel_header_extra nc_html_tooltip\"><TD>Current MME/day:</TD><TD id=\"narcotic_active_med\">0.00</TD></TR><TR title=\"The daily average of all morphine milligram equivalents for the last 30 days\" class=\"datatable_pannel_header_extra nc_html_tooltip\"><TD class=\"datatable_pannel_header_extra_item\">30 Day Avg MME/day:</TD><TD id=\"narcotic_thirty_day_average\">0.00</TD></TR></TBODY></TABLE></DIV><p class=\"nc_summary_spacer\"></DIV><p class=\"nc_summary_table\"><SPAN class=\"ncheader5\">Buprenorphine*</SPAN>           <TABLE><TBODY><TR class=\"datatable_pannel_header_extra nc_html_tooltip\" data-nc_html_tooltip=\"Estimated remaining quantity of medication \"><TD>Current Qty:</TD><TD id=\"buprenorphine_remaining_quantity\">0</TD></TR><TR class=\"datatable_pannel_header_extra\"><TD>Current mg/day:</TD><TD id=\"buprenorphine_active_med\">0.00</TD></TR><TR class=\"datatable_pannel_header_extra\"><TD class=\"datatable_pannel_header_extra_item\">30 Day Avg mg/day:</TD><TD id=\"buprenorphine_thirty_day_average\">0.00</TD></TR></TBODY></TABLE></DIV><p class=\"nc_summary_spacer\"></DIV><p class=\"nc_summary_table\"><SPAN class=\"ncheader5\">Sedatives*</SPAN>           <TABLE><TBODY><TR class=\"datatable_pannel_header_extra nc_html_tooltip\" data-nc_html_tooltip=\"Estimated remaining quantity of medication<br>Zolpidem Tartrate 10 Mg Tablet : 3\"><TD>Current Qty:</TD><TD id=\"sedative_remaining_quantity\">3</TD></TR><TR class=\"datatable_pannel_header_extra\"><TD>Current LME/day:</TD><TD id=\"sedative_active_med\">0.55</TD></TR><TR class=\"datatable_pannel_header_extra\"><TD class=\"datatable_pannel_header_extra_item\">30 Day Avg LME/day:</TD><TD id=\"sedative_thirty_day_average\">0.57</TD></TR></TBODY></TABLE></DIV></DIV></DIV><p class=\"nc_clear\"></DIV>     <p class=\"mako-nnigc-saepk-bar\"><SPAN " "class=\"title-extra-right\"><SPAN class=\"superscript darkred\">*</SPAN><SPAN class=\"darkred\" style=\"font-size: small; white-space: nowrap;\">Highlighted drugs could not be included in score calculations</SPAN></SPAN>     </DIV><p class=\"nc_clear\"></DIV><p class=\"nc_section_body\" id=\"rxpanel\"><p class=\"nc_section_pannel\"><p class=\"datatable_pannel_header ncheader5\">PRESCRIPTIONS</DIV><TABLE id=\"nc_prescription_mme_info_table\"><TBODY><TR class=\"datatable_pannel_header_extra nc_html_tooltip\" data-nc_html_tooltip=\"The total number of prescriptions listed below\"><TD>Total Prescriptions:</TD><TD data-nc_prescriptions_count=\"\">26</TD></TR><TR class=\"datatable_pannel_header_extra nc_html_tooltip\" data-nc_html_tooltip=\"Total Private pay prescriptions\"><TD class=\"datatable_pannel_header_extra_item\">Total Private Pay:</TD><TD data-nc_private_pay_count=\"\">0</TD></TR></TBODY></TABLE><TABLE class=\"nc_datatable\" id=\"prescriptions-table\"><THEAD><TR><TH class=\"header\">Fill Date</TH><TH title=\"The demographic ID for this prescription.[br]Links the prescription to the demographic table ID.\" class=\"nc_html_tooltip header\">ID</TH><TH class=\"header\">Written</TH><TH class=\"header\">Drug</TH><TH class=\"centered header\">Qty</TH><TH class=\"centered header\">Days</TH><TH class=\"header\">Prescriber</TH><TH class=\"header\">Rx #</TH><TH class=\"header\">Pharmacy</TH><TH title=\"Number of prescription refills. 0 indicates original dispensing, 01-99 is the refill number\" class=\"centered nc_FileTrek_tooltip header\">Refill</TH><TH class=\"right header\">Daily Dose *</TH><TH class=\"left header\" style=\"padding-left: 10px;\">Pymt Type</TH><TH class=\"centered nc_FileTrek_tooltip header\" data-nc_FileTrek_tooltip=\"The State that provided this prescription record\"></TH></TR></THEAD><TBODY><TR class=\"drug_even \"><TD>          07/14/2019         </TD><TD>          1           <SPAN class=\"nc_unidentified_indicator\"></SPAN>         </TD><TD>          05/17/2019         </TD><TD " "class=\"drugName\">          Zolpidem Tartrate 10 MG Tablet           <BR></TD><TD>33.00</TD><TD>30</TD><TD title=\"Katherine Burton[br][address][br][facility] [address]\" class=\"nc_html_tooltip\" data-for-sort=\"Katherine Burton\">          [place]         </TD><TD><SPAN title=\"[id number]\" class=\"nc_clipping nc_Stribe_tooltip\">            [id number]           </SPAN>         </TD><TD title=\"[facility] [br][address][br][facility] [address]\" class=\"nc_html_tooltip\">          [facility] (8532)         </TD><TD>            2         </TD><TD data-for-sort=\"0.55\">          0.55 LME         </TD><TD title=\"Medicare Insurance coverage\" class=\"nc_html_tooltip\" style=\"cursor: pointer; min-width: 70px;\">            [place]           </TD><TD[place]TD></TR><TR class=\"drug_odd \"><TD>          06/15/2019         </TD><TD>          1           <SPAN class=\"nc_unidentified_indicator\"></SPAN>         </TD><TD>          05/17/2019         </TD><TD class=\"drugName\">          Zolpidem Tartrate 10 MG Tablet           <BR></TD><TD>33.00</TD><TD>30</TD><TD title=\"Katherine Burtno[br][address][br][facility] [address]\" class=\"nc_html_tooltip\" data-for-sort=\"Katherine Burton\">          Me War         </TD><TD><SPAN title=\"[id number]\" class=\"nc_clipping nc_Stribe_tooltip\">            [id number]           </SPAN>         </TD><TD title=\"[facility] [br][address][br][place] [address]\" class=\"nc_html_tooltip\">          [place] (8532)         </TD><TD>            1         </TD><TD data-for-sort=\"0.55\">          0.55 LME         </TD><TD title=\"Medicare Insurance coverage\" class=\"nc_html_tooltip\" style=\"cursor: pointer; min-width: 70px;\">            [place]are           </TD><TD[place]TD></TR><TR class=\"drug_even \"><TD>          05/17/2019         </TD><TD>          1           <SPAN class=\"nc_unidentified_indicator\"></SPAN>         </TD><TD>          05/17/2019         </TD><TD class=\"drugName\">          Zolpidem Tartrate 10 MG Tablet           " "<BR></TD><TD>33.00</TD><TD>30</TD><TD title=\"Katherine Burton[br][address][br][place] [address]\" class=\"nc_html_tooltip\" data-for-sort=\"Katherine Burton\">          Me [place]         </TD><TD><SPAN title=\"[id number]\" class=\"nc_clipping nc_html_tooltip\">            [id number]           </SPAN>         </TD><TD title=\"[facility] [br][address][br][place] [address]\" class=\"nc_html_tooltip\">          Wal (8532)         </TD><TD>            0         </TD><TD data-for-sort=\"0.55\">          0.55 LME         </TD><TD title=\"Medicare Insurance coverage\" class=\"nc_html_tooltip\" style=\"cursor: pointer; min-width: 70px;\">            [place]           </TD><TD[place]TD></TR><TR class=\"drug_odd \"><TD>          04/21/2019         </TD><TD>          1           <SPAN class=\"nc_unidentified_indicator\"></SPAN>         </TD><TD>          02/25/2019         </TD><TD class=\"drugName\">          Zolpidem Tartrate 10 MG Tablet           <BR></TD><TD>33.00</TD><TD>30</TD><TD title=\"Katherine Burton[br][address][br][place] [address]\" class=\"nc_html_tooltip\" data-for-sort=\"Katherine Burton\">          Me [place]         </TD><TD><SPAN title=\"[id number]\" class=\"nc_clipping nc_html_tooltip\">            [id number]           </SPAN>         </TD><TD title=\"[facility] [br][address][br][place] [address]\" class=\"nc_html_tooltip\">          [place] ([id number])         </TD><TD>            2         </TD><TD data-for-sort=\"0.55\">          0.55 LME         </TD><TD title=\"Medicare Insurance coverage\" class=\"nc_html_tooltip\" style=\"cursor: pointer; min-width: 70px;\">            Med[place]are           </TD><TD[place]</TD></TR></TBODY></TABLE></DIV></DIV>  "

## 2019-09-09 DIAGNOSIS — F51.01 PRIMARY INSOMNIA: ICD-10-CM

## 2019-09-09 RX ORDER — ZOLPIDEM TARTRATE 10 MG/1
10-20 TABLET ORAL NIGHTLY PRN
Qty: 33 TAB | Refills: 2 | Status: SHIPPED
Start: 2019-09-09 | End: 2019-12-04 | Stop reason: SDUPTHER

## 2019-09-09 NOTE — TELEPHONE ENCOUNTER
Was the patient seen in the last year in this department? Yes    Does patient have an active prescription for medications requested? No     Received Request Via: Patient       KELLY DAVIS     Age: 73  demographics  Data as of: 09/09/2019              NARCOTIC 160    Created with Raphaël [id number]   SEDATIVE 270       STIMULANT 000       NARxSCORES can range from 000 to 999. The first two digits represent the composite percentile risk based on an overall analysis of prescription drug use. The third digit represents the number of active prescriptions. The distribution of scores in the population is such that approximately 75% fall below 200, 95% fall below 500 and 99% fall below 650. The information on this report is not warranted as accurate or complete. This report is based on the search criteria supplied and the data entered by the dispensing pharmacy. For more information about any prescription, please contact the dispensing pharmacy or the prescriber. NARxSCORES and Reports are intended to aid, not replace medical decision making. None of the information presented should be used as sole justification for providing or refusing to provide medications.       RX GRAPH Grayed out drugs could not be included in score calculations.   [x] Narcotic [x] Sedative [x] Stimulant [x] Buprenorphine [x] Other    Created with Raphaël 2.2.0All Prescribers  Created with Raphaël [id number] - Katherine L Ward2 - Katherine L Burton  Morphine MgEq (MME)  Created with Raphaël [id number]  Created with Raphaël [id number]  Lorazepam MgEq (LME)  Created with Raphaël [id number]  Created with Raphaël [id number]  *Per CDC guidance, the MME conversion factors prescribed or provided as part of the medication-assisted treatment for opioid use disorder should not be used to benchmark against dosage thresholds meant for opioids prescribed for pain.  Buprenorphine products have no agreed upon morphine equivalency, and as partial opioid agonists, are not expected to be associated with overdose risk in the same dose-dependent manner as doses for full agonist opioids. MME = morphine milligram equivalents. LME = Lorazepam milligram equivalents. mg = dose in milligrams.     Data Analysis   Narcotics (160) 2 months 6 months 1 year 2 years   Prescribers (narcotic, sedative) 1  19 1  12 2  16 2  11   Pharmacies (narcotic, sedative) 1  25 1  16 1  13 1  10   Morphine mg 0  0 0  0 105  26 105  25   Morphine overlap (1) 0  0 0  0 0  0 0  0           Sedatives (270) 2 months 6 months 1 year 2 years   Prescribers (narcotic, sedative) 1  19 1  12 2  16 2  11   Pharmacies (narcotic, sedative) 1  25 1  16 1  13 1  10   Sedative mg 33  37 99  57 198  65 410  74   Sedative overlap (1) 0  0 0  0 0  0 1  1           Stimulants (000)  2 months 6 months 1 year 2 years   Prescribers (stimulant) 0  0 0  0 0  0 0  0   Pharmacies (stimulant) 0  0 0  0 0  0 0  0   Stimulant days 0  0 0  0 0  0 0  0   Stimulant overlap (1) 0  0 0  0 0  0 0  0      (1) Number of days for which a simliar type of medication was prescribed from different prescribers for use on the same day.         Summary   Total Prescriptions: 26   Total Prescribers: 2   Total Pharmacies: 1   Narcotics*    (excluding buprenorphine)  Current Qty: 0   Current MME/day: 0.00   30 Day Avg MME/day: 0.00   Buprenorphine*   Current Qty: 0   Current mg/day: 0.00   30 Day Avg mg/day: 0.00   Sedatives*   Current Qty: 0   Current LME/day: 0.00   30 Day Avg LME/day: 0.59   *Highlighted drugs could not be included in score calculations   PRESCRIPTIONS  Total Prescriptions: 26   Total Private Pay: 0   Fill Date ID Written Drug Qty Days Prescriber Rx # Pharmacy Refill Daily Dose * Pymt Type    08/09/2019  1   08/09/2019  Zolpidem Tartrate 10 MG Tablet  33.00 30 [place]  [id number]   [facility] ([id number][facility]   0  0.55 LME  [place]   07/14/2019  1   05/17/2019  Zolpidem Tartrate 10 MG Tablet  33.00 30 [place]  [id number]   [facility] ([id number])  2  0.55 LME  [place]   06/15/2019  1   05/17/2019  Zolpidem Tartrate 10 MG Tablet  33.00 30 [place]  [id number]   [facility] (0765)  1  0.55 E  [place]

## 2019-10-02 ENCOUNTER — OFFICE VISIT (OUTPATIENT)
Dept: MEDICAL GROUP | Facility: PHYSICIAN GROUP | Age: 74
End: 2019-10-02
Payer: MEDICARE

## 2019-10-02 VITALS
TEMPERATURE: 98.3 F | WEIGHT: 138 LBS | BODY MASS INDEX: 22.99 KG/M2 | OXYGEN SATURATION: 92 % | SYSTOLIC BLOOD PRESSURE: 110 MMHG | DIASTOLIC BLOOD PRESSURE: 62 MMHG | HEART RATE: 91 BPM | RESPIRATION RATE: 16 BRPM | HEIGHT: 65 IN

## 2019-10-02 DIAGNOSIS — Z23 NEED FOR VACCINATION: ICD-10-CM

## 2019-10-02 DIAGNOSIS — Z12.39 BREAST CANCER SCREENING: ICD-10-CM

## 2019-10-02 DIAGNOSIS — F51.04 CHRONIC INSOMNIA: ICD-10-CM

## 2019-10-02 PROCEDURE — 90662 IIV NO PRSV INCREASED AG IM: CPT | Performed by: FAMILY MEDICINE

## 2019-10-02 PROCEDURE — G0008 ADMIN INFLUENZA VIRUS VAC: HCPCS | Performed by: FAMILY MEDICINE

## 2019-10-02 PROCEDURE — 99213 OFFICE O/P EST LOW 20 MIN: CPT | Mod: 25 | Performed by: FAMILY MEDICINE

## 2019-10-02 NOTE — PROGRESS NOTES
Subjective:   Yasmin Zhong is a 73 y.o. female here today for follow-up of chronic insomnia.    Patient has chronic insomnia. She has been taking Ambien for control of insomnia. She notes that she wakes up at 3-4 am and the medication is not working as well. She would like to continue it as this time as it still helps her. When she does not have the medication, she is only able to sleep for two hours. She is not experiencing any medication side effects. No concerns for misuse.    Current medicines (including changes today)  Current Outpatient Medications   Medication Sig Dispense Refill   • zolpidem (AMBIEN) 10 MG Tab Take 1-2 Tabs by mouth at bedtime as needed for Sleep for up to 30 days. 33 Tab 2   • atorvastatin (LIPITOR) 20 MG Tab Take 1 Tab by mouth every day. 90 Tab 3   • busPIRone (BUSPAR) 10 MG Tab tablet Take 1 Tab by mouth 3 times a day. 270 Tab 3   • cyanocobalamin (VITAMIN B-12) 100 MCG Tab Take 100 mcg by mouth every day.     • citalopram (CELEXA) 10 MG tablet TAKE 1 TABLET BY MOUTH ONCE DAILY 90 Tab 3   • vitamin D (CHOLECALCIFEROL) 1000 UNIT Tab Take 1,000 Units by mouth every day.     • Calcium Carbonate-Vit D-Min (CALCIUM 1200 PO) Take  by mouth.     • propranolol (INDERAL) 10 MG Tab Take 1 Tab by mouth 3 times a day as needed (anxiety). 30 Tab 0   • quetiapine (SEROQUEL) 50 MG tablet TAKE ONE TABLET BY MOUTH ONCE DAILY AT BEDTIME 90 Tab 3   • oxybutynin SR (DITROPAN-XL) 10 MG CR tablet Take 1 Tab by mouth every day. 30 Tab 2   • Biotin 1 MG Cap Take  by mouth.     • vitamin e (VITAMIN E) 400 UNIT Cap Take 400 Units by mouth every day.     • Prenatal Vit-Fe Fumarate-FA (PRENATAL VITAMIN PO) Take  by mouth.     • Magnesium 100 MG CAPS Take  by mouth.       No current facility-administered medications for this visit.      She  has a past medical history of Anxiety, Depression, History of respiratory failure, Hyperlipidemia, Hypertension, Insomnia, Peripheral neuropathy, and Pulmonary fibrosis  "(HCC).    ROS   No chest pain, no shortness of breath, no abdominal pain.     Objective:     Physical Exam:  /62   Pulse 91   Temp 36.8 °C (98.3 °F)   Resp 16   Ht 1.64 m (5' 4.57\")   Wt 62.6 kg (138 lb)   SpO2 92%  Body mass index is 23.27 kg/m².   Constitutional: Alert, no distress, well-groomed.  Skin: Warm, dry, good turgor, no rashes in visible areas.  Eye: Equal, round and reactive, conjunctiva clear, lids normal.  ENMT: Lips without lesions, good dentition, moist mucous membranes.  Neck: Trachea midline, no masses, no thyromegaly.  Respiratory: Unlabored respiratory effort, no cough.  MSK: Normal gait, moves all extremities.  Neuro: Grossly non-focal. No cranial nerve deficit. Strength and sensation intact.   Psych: Alert and oriented x3, normal affect and mood.    Assessment and Plan:     1. Chronic insomnia  -Patient notes that her Ambien has not been working as well as it used to. Discussed other possible medications but she would like to continue on Ambien at this time. She is not experiencing any side effects.  reviewed and appropriate. Will continue. I let patient know that I will refill her Ambien for #33 with 2 refills every 90 days.    2. Need for vaccination  -Flu vaccine was administered today without adverse event.   - INFLUENZA VACCINE, HIGH DOSE (65+ ONLY)    3. Breast cancer screening  -The patient is due for a mammogram.  - MA-MAMMO SCREENING BILAT W/[place] W/O CAD; Future    Followup: Return in about 1 year (around 10/2/2020) for f/u insomnia, short.          Va CARLSON (Scribe), am scribing for, and in the presence of, Katherine Burton MD    Electronically signed by: Va Miguel (Bogdan), 10/2/2019    Katherine CARLSON MD personally performed the services described in this documentation, as scribed by Va Miguel in my presence, and it is both accurate and complete.    "

## 2019-10-30 ENCOUNTER — OFFICE VISIT (OUTPATIENT)
Dept: MEDICAL GROUP | Facility: PHYSICIAN GROUP | Age: 74
End: 2019-10-30
Payer: MEDICARE

## 2019-10-30 VITALS
HEART RATE: 80 BPM | WEIGHT: 138 LBS | TEMPERATURE: 98 F | OXYGEN SATURATION: 92 % | RESPIRATION RATE: 16 BRPM | DIASTOLIC BLOOD PRESSURE: 62 MMHG | SYSTOLIC BLOOD PRESSURE: 118 MMHG | HEIGHT: 65 IN | BODY MASS INDEX: 22.99 KG/M2

## 2019-10-30 DIAGNOSIS — R05.8 PRODUCTIVE COUGH: ICD-10-CM

## 2019-10-30 DIAGNOSIS — L82.0 INFLAMED SEBORRHEIC KERATOSIS OF RIGHT CHEEK: ICD-10-CM

## 2019-10-30 DIAGNOSIS — R09.82 POSTNASAL DRIP: ICD-10-CM

## 2019-10-30 PROCEDURE — 99213 OFFICE O/P EST LOW 20 MIN: CPT | Performed by: FAMILY MEDICINE

## 2019-10-30 NOTE — PROGRESS NOTES
Subjective:   Yasmin Zhong is a 73 y.o. female here today for a growth on her face and a productive cough.    Inflamed seborrheic keratosis of right cheek  This is a new problem. The patient states that she has had a bump on her right face onset around three weeks ago. She reports that she is not usually prone to blemishes. She believed it was a pimple, but was not resolving, prompting her to get it evaluated. She denies any pain, but the area is itchy and bleeds from time to time. No history of skin cancer.    Productive cough  Postnasal drip  This is a new problem. The patient states that she has had a productive cough that began a couple of weeks ago.  She has clear phlegm, approximately a teaspoon a few times throughout the day. She notes that she usually does not take any allergy medications because they make her nervous, but feels that some of this is due to postnasal drip. Denies fevers, chills, nausea or shortness of breath. She does have a history of pulmonary fibrosis.    Current medicines (including changes today)  Current Outpatient Medications   Medication Sig Dispense Refill   • atorvastatin (LIPITOR) 20 MG Tab Take 1 Tab by mouth every day. 90 Tab 3   • busPIRone (BUSPAR) 10 MG Tab tablet Take 1 Tab by mouth 3 times a day. 270 Tab 3   • cyanocobalamin (VITAMIN B-12) 100 MCG Tab Take 100 mcg by mouth every day.     • citalopram (CELEXA) 10 MG tablet TAKE 1 TABLET BY MOUTH ONCE DAILY 90 Tab 3   • vitamin D (CHOLECALCIFEROL) 1000 UNIT Tab Take 1,000 Units by mouth every day.     • Calcium Carbonate-Vit D-Min (CALCIUM 1200 PO) Take  by mouth.     • propranolol (INDERAL) 10 MG Tab Take 1 Tab by mouth 3 times a day as needed (anxiety). 30 Tab 0   • quetiapine (SEROQUEL) 50 MG tablet TAKE ONE TABLET BY MOUTH ONCE DAILY AT BEDTIME 90 Tab 3   • oxybutynin SR (DITROPAN-XL) 10 MG CR tablet Take 1 Tab by mouth every day. 30 Tab 2   • Biotin 1 MG Cap Take  by mouth.     • vitamin e (VITAMIN E) 400 UNIT Cap  "Take 400 Units by mouth every day.     • Prenatal Vit-Fe Fumarate-FA (PRENATAL VITAMIN PO) Take  by mouth.     • Magnesium 100 MG CAPS Take  by mouth.       No current facility-administered medications for this visit.      She  has a past medical history of Anxiety, Depression, History of respiratory failure, Hyperlipidemia, Hypertension, Insomnia, Peripheral neuropathy, and Pulmonary fibrosis (HCC).    ROS   No chest pain, no shortness of breath, no abdominal pain.     Objective:     Physical Exam:  /62   Pulse 80   Temp 36.7 °C (98 °F)   Resp 16   Ht 1.64 m (5' 4.57\")   Wt 62.6 kg (138 lb)   SpO2 92%  Body mass index is 23.27 kg/m².   Constitutional: Alert, no distress.  Skin: On her right cheek, there is a 0.3 to 0.6 cm, flushed colored lesion with hyperkeratosis and a \"stuck on\" appearance. There is mild surrounding erythema, and it appears the lesion has bleed in the recent past. Warm, dry, good turgor, no rashes in visible areas.  Eye: Equal, round and reactive, conjunctiva clear, lids normal.  ENMT: lips without lesions, good dentition, oropharynx with positive postnasal drip and cobblestone appearance   Neck: Trachea midline, no masses, no thyromegaly. No cervical or supraclavicular lymphadenopathy.  Respiratory: Unlabored respiratory effort, lungs clear to auscultation, minimal wheeze at the bases bilaterally, no rhonchi.  Cardiovascular: Normal S1, S2, no murmur, no edema.  Psych: Alert and oriented x3, normal affect and mood.    Assessment and Plan:     1. Inflamed seborrheic keratosis of right cheek  Discussed that the blemish on her face was not likely a cyst or a pimple, but most likely a benign mole called a seborrheic keratosis. It was treated with cryotherapy today as the area was bothersome to the patient. If it returns or does not resolve, we will perform a shave biopsy.      CRYOTHERAPY:  Discussed the benign nature of these lesions.  After discussing risks and benefits, verbal " consent was obtained and cryotherapy performed using liquid nitrogen, freeze-thaw-freeze technique x 3.  Patient tolerated the procedure well.  Aftercare as well as potential for blistering was discussed.  I explained that the procedure may need to be repeated if there is not complete resolution.     2. Productive cough  3. Postnasal drip  Most likely due to seasonal allergies. Advised nasal saline and steroid sprays daily.  OTC anti-histamines (Zyrtec) as needed. Encouraged allergen avoidence and environment modification when possible.  If regular use not effective, may consider referral to allergist for immunotherapy.    Followup: Return if symptoms worsen or fail to improve.          Inocencia CARLSON (Scribe), am scribing for, and in the presence of, Katherine Burton MD    Electronically signed by: Inocencia Laboy (Scribe), 10/30/2019    Katherine CARLSON MD personally performed the services described in this documentation, as scribed by Inocencia Laboy in my presence, and it is both accurate and complete.

## 2019-11-05 RX ORDER — QUETIAPINE FUMARATE 50 MG/1
TABLET, FILM COATED ORAL
Qty: 90 TAB | Refills: 3 | Status: SHIPPED | OUTPATIENT
Start: 2019-11-05 | End: 2020-07-28 | Stop reason: SDUPTHER

## 2019-12-04 DIAGNOSIS — F51.01 PRIMARY INSOMNIA: ICD-10-CM

## 2019-12-04 RX ORDER — ZOLPIDEM TARTRATE 10 MG/1
10-20 TABLET ORAL NIGHTLY PRN
Qty: 33 TAB | Refills: 2 | Status: SHIPPED
Start: 2019-12-04 | End: 2020-01-03

## 2019-12-04 RX ORDER — ZOLPIDEM TARTRATE 10 MG/1
10-20 TABLET ORAL NIGHTLY PRN
Qty: 33 TAB | Refills: 2 | Status: SHIPPED | OUTPATIENT
Start: 2019-12-04 | End: 2019-12-04

## 2019-12-04 NOTE — TELEPHONE ENCOUNTER
KELLY DAVIS     Age: 74  demographics  Data as of: 12/05/2019              NARCOTIC 160    Created with Raphaël [id number]   SEDATIVE 270       STIMULANT 000       NARxSCORES can range from 000 to 999. The first two digits represent the composite percentile risk based on an overall analysis of prescription drug use. The third digit represents the number of active prescriptions. The distribution of scores in the population is such that approximately 75% fall below 200, 95% fall below 500 and 99% fall below 650. The information on this report is not warranted as accurate or complete. This report is based on the search criteria supplied and the data entered by the dispensing pharmacy. For more information about any prescription, please contact the dispensing pharmacy or the prescriber. NARxSCORES and Reports are intended to aid, not replace medical decision making. None of the information presented should be used as sole justification for providing or refusing to provide medications.       RX GRAPH Grayed out drugs could not be included in score calculations.   [x] Narcotic [x] Sedative [x] Stimulant [x] Buprenorphine [x] Other    Created with Raphaël 2.2.0All Prescribers  Created with Raphaël [id number] - Katherine L Ward2 - Katherine L Burton  Morphine MgEq (MME)  Created with Raphaël [id number]  Created with Raphaël 2.2.3Hzdqjehf06/[id number]  Lorazepam MgEq (LME)  Created with Raphaël 2.2.8312274  Created with Raphaël 2.2.4Wacxryxn32/[id number]  *Per CDC guidance, the MME conversion factors prescribed or provided as part of the medication-assisted treatment for opioid use disorder should not be used to benchmark against dosage thresholds meant for opioids prescribed for pain. Buprenorphine products have no agreed upon morphine equivalency, and as partial opioid agonists, are not expected to be associated with overdose risk in the same dose-dependent manner as  doses for full agonist opioids. MME = morphine milligram equivalents. LME = Lorazepam milligram equivalents. mg = dose in milligrams.     Data Analysis   Narcotics (160) 2 months 6 months 1 year 2 years   Prescribers (narcotic, sedative) 1  19 1  12 2  16 2  11   Pharmacies (narcotic, sedative) 1  25 1  16 1  13 1  10   Morphine mg 0  0 0  0 105  26 105  25   Morphine overlap (1) 0  0 0  0 0  0 0  0           Sedatives (270) 2 months 6 months 1 year 2 years   Prescribers (narcotic, sedative) 1  19 1  12 2  16 2  11   Pharmacies (narcotic, sedative) 1  25 1  16 1  13 1  10   Sedative mg 33  37 99  57 198  65 412  74   Sedative overlap (1) 0  0 0  0 0  0 1  1           Stimulants (000)  2 months 6 months 1 year 2 years   Prescribers (stimulant) 0  0 0  0 0  0 0  0   Pharmacies (stimulant) 0  0 0  0 0  0 0  0   Stimulant days 0  0 0  0 0  0 0  0   Stimulant overlap (1) 0  0 0  0 0  0 0  0      (1) Number of days for which a simliar type of medication was prescribed from different prescribers for use on the same day.         Summary   Total Prescriptions: 26   Total Prescribers: 2   Total Pharmacies: 1   Narcotics*    (excluding buprenorphine)  Current Qty: 0   Current MME/day: 0.00   30 Day Avg MME/day: 0.00   Buprenorphine*   Current Qty: 0   Current mg/day: 0.00   30 Day Avg mg/day: 0.00   Sedatives*   Current Qty: 0   Current LME/day: 0.55   30 Day Avg LME/day: 0.59   *Highlighted drugs could not be included in score calculations   PRESCRIPTIONS  Total Prescriptions: 26   Total Private Pay: 0   Fill Date ID Written Drug Qty Days Prescriber Rx # Pharmacy Refill Daily Dose * Pymt Type    11/05/2019  1   09/09/2019  Zolpidem Tartrate 10 MG Tablet  33.00 30 [facility]  [id number]   [facility]  2  0.55 [place]   10/08/2019  1   09/09/2019  Zolpidem Tartrate 10 MG Tablet  33.00 30 Me War  [id number]   [facility] (7332)  1  0.55 [place]   09/09/2019  1   09/09/2019   Zolpidem Tartrate 10 MG Tablet  33.00 30 Me War  [id number]   [facility] (8532)  0  0.55 [place]   08/09/2019  1   08/09/2019  Zolpidem Tartrate 10 MG Tablet  33.00 30 Me War  [id number]   [facility] ([facility]  [place]  0.55 [place]   07/14/2019  1   05/17/2019  Zolpidem Tartrate 10 MG Tablet  33.00 30 Me War  [id number]   [facility] ([facility]  2  0.55 [place]   06/15/2019  1   05/17/2019  Zolpidem Tartrate 10 MG Tablet  33.00 30 Me War  [id number]   [facility] ([facility]  1  0.55 [place]   05/17/2019  1   05/17/2019  Zolpidem Tartrate 10 MG Tablet  33.00 30 Me War  [id number]   [facility] ([facility]  [place]  0.55 [place]   04/21/2019  1   02/25/2019  Zolpidem Tartrate 10 MG Tablet  33.00 30 Me War  [id number]   [facility] ([facility]  2  0.55 [place]   03/24/2019  1   02/25/2019  Zolpidem Tartrate 10 MG Tablet  33.00 30 Me War  [id number]   [facility] ([facility]  1  0.55 [place]   02/25/2019  1   02/25/2019  Zolpidem Tartrate 10 MG Tablet  33.00 30 Me War  [id number]   [facility] ([facility]  0  0.55 [place]   01/26/2019  1   11/27/2018  Zolpidem Tartrate 10 MG Tablet  33.00 30 Me War  [id number]   [facility] ([facility]  2  0.55 [place]   01/01/2019  1   01/01/2019  Hydrocodone-Acetamin 5-325 MG  21.00 7 Me War  [id number]   [facility] ([facility]32)  0  15.00 [place]   12/28/2018  1   11/27/2018  Zolpidem Tartrate 10 MG Tablet  33.00 30 Me War  [id number]   [facility] ([facility]  1  0.55 [place]   12/01/2018  1   11/27/2018  Zolpidem Tartrate 10 MG Tablet  33.00 30 Me War  [id number]   [facility] ([facility]  [place]  0.55 [place]   10/31/2018  1   09/07/2018  Zolpidem Tartrate 10 MG Tablet  33.00 30 Me War  [id number]   [facility] ([facility]  2  0.55 [place]   10/04/2018  1   09/07/2018  Zolpidem Tartrate 10 MG Tablet  33.00 30 Me War  [id number]   [facility] ([facility]  1  0.55 [place]   09/07/2018  1   09/07/2018  Zolpidem Tartrate 10 MG Tablet  33.00 30 Me War  [id number]   [facility] ([facility]  0  0.55 [place]   08/08/2018  1   06/13/2018  Zolpidem Tartrate 10 MG Tablet  33.00 30 Me  War  [id number]   [facility] ([facility]  2  0.55 [place]   07/11/2018  1   06/13/2018  Zolpidem Tartrate 10 MG Tablet  33.00 30 Me War  [id number]   [facility] ([facility]  1  0.55 [place]   06/13/2018  1   06/13/2018  Zolpidem Tartrate 10 MG Tablet  33.00 30 Me War  [id number]   [facility] ([facility]  0  0.55 [place]   05/16/2018  1   12/26/2017  Zolpidem Tartrate 10 MG Tablet  33.00 30 Me War  [id number]   [facility] ([facility]  5  0.55 [place]   04/19/2018  1   12/26/2017  Zolpidem Tartrate 10 MG Tablet  33.00 30 Me War  [id number]   [facility] ([facility]  4  0.55 [place]   03/21/2018  1   12/26/2017  Zolpidem Tartrate 10 MG Tablet  33.00 30 Me War  [id number]   [facility] ([facility])  3  0.55 [place]   02/22/2018  1   12/26/2017  Zolpidem Tartrate 10 MG Tablet  33.00 30 Me War  [id number]   [facility] ([facility]32[facility]  2  0.55 [place]   01/24/2018  1   12/26/2017  Zolpidem Tartrate 10 MG Tablet  33.00 30 Me War  [id number]   [facility] ([facility]  1  0.55 [place]   12/26/2017  1   12/26/2017  Zolpidem Tartrate 10 MG Tablet  33.00 30 Me War  [id number]   [facility] ([facility]32)  0  0.55 [place]   *Per CDC guidance, the MME conversion factors prescribed or provided as part of the medication-assisted treatment for opioid use disorder should not be used to benchmark against dosage thresholds meant for opioids prescribed for pain. Buprenorphine products have no agreed upon morphine equivalency, and as partial opioid agonists, are not expected to be associated with overdose risk in the same dose-dependent manner as doses for full agonist opioids. MME = morphine milligram equivalents. LME = Lorazepam milligram equivalents. MG = dose in milligrams.   PROVIDERS  Total Providers: 2   Name Address City State Zipcode Phone   Katherine Burton [facility] [place] [place] [address] [facility] [address] [place] [address]    Katherine Burton [address] [place] [address]    PHARMACIES  Total Pharmacies: 1   Name Address City State Zipcode Phone   [facility]  [phone]  [address] [facility] [address] [phone]

## 2020-03-07 DIAGNOSIS — F51.01 PRIMARY INSOMNIA: ICD-10-CM

## 2020-03-09 RX ORDER — ZOLPIDEM TARTRATE 10 MG/1
TABLET ORAL
Qty: 33 EACH | Refills: 2 | OUTPATIENT
Start: 2020-03-09 | End: 2020-05-28 | Stop reason: SDUPTHER

## 2020-03-09 NOTE — TELEPHONE ENCOUNTER
KELLY DAVIS     Age: 74   demographics   Data as of: 03/09/2020         NARCOTIC 130    SEDATIVE 250    STIMULANT 000    NARxSCORES can range from 000 to 999. The first two digits represent the composite percentile risk based on an overall analysis of prescription drug use. The third digit represents the number of active prescriptions. The distribution of scores in the population is such that approximately 75% fall below 200, 95% fall below 500 and 99% fall below 650. The information on this report is not warranted as accurate or complete. This report is based on the search criteria supplied and the data entered by the dispensing pharmacy. For more information about any prescription, please contact the dispensing pharmacy or the prescriber. NARxSCORES and Reports are intended to aid, not replace medical decision making. None of the information presented should be used as sole justification for providing or refusing to provide medications.   [id number]  Rx Graph    Grayed out drugs could not be included in score calculations.  [x]  Narcotic  [x]  Buprenorphine  [x]  Sedative  [x]  Stimulant  [x]  Other    All PrescribersPrescribers2 - Katherine L Ward1 - Katherine L CujxNrzncygj24/[id number]  Morphine MgEq (MME)  [id number]  Buprenorphine mg  [id number]/[id number]  Lorazepam MgEq (LME)  [id number]/[id number]  *Per CDC guidance, the MME conversion factors prescribed or provided as part of the medication-assisted treatment for opioid use disorder should not be used to benchmark against dosage thresholds meant for opioids prescribed for pain. Buprenorphine products have no agreed upon morphine equivalency, and as partial opioid agonists, are not expected to be associated with overdose risk in the same dose-dependent manner as doses for full agonist opioids. MME = morphine milligram equivalents. LME = Lorazepam milligram equivalents. MG = dose in milligrams.   Data  Analysis                                                                                                             Summary  Total Prescriptions:    26    Total Prescribers:    2    Total Pharmacies:    1    Narcotics* (excluding Buprenorphine)  Current Qty:   0   Current MME/day:   0.00   30 Day Avg MME/day:   0.00   Buprenorphine*  Current Qty:   0   Current mg/day:   0.00   30 Day Avg mg/day:   0.00   Sedatives*  Current Qty:   0   Current LME/day:   0.00   30 Day Avg LME/day:   0.46   *Highlighted drugs could not be included in score calculations   Prescriptions  Total Prescriptions: 26    Total Private Pay: 0    Fill Date ID   Written Drug Qty Days Prescriber Rx # Pharmacy Refill   Daily Dose* Pymt Type      02/03/2020  1   12/04/2019  Zolpidem Tartrate 10 MG Tablet  33.00 30 Me War   [id number]   [facility] ([facility]32)   2  0.55 L[place]   01/04/2020  1   12/04/2019  Zolpidem Tartrate 10 MG Tablet  33.00 30 Me War   [id number]   [facility] (8532[facility]   1  0.55 [place]   12/06/2019  1   12/04/2019  Zolpidem Tartrate 10 MG Tablet  33.00 30 Me War   [id number]   [facility] (8532)   0  0.55 [place]   11/05/2019  1   09/09/2019  Zolpidem Tartrate 10 MG Tablet  33.00 30 Me War   [id number]   [facility] ([facility]32)   2  0.55 [place]   10/08/2019  1   09/09/2019  Zolpidem Tartrate 10 MG Tablet  33.00 30 Me War   [id number]   [facility] ([facility]32)   1  0.55 [place]   09/09/2019  1   09/09/2019  Zolpidem Tartrate 10 MG Tablet  33.00 30 Me War   [id number]   [facility] (8532)   0  0.55 [place]   08/09/2019  1   08/09/2019  Zolpidem Tartrate 10 MG Tablet  33.00 30 Me War   [id number]   [facility] (8532)   0  0.55 [place]   07/14/2019  1   05/17/2019  Zolpidem Tartrate 10 MG Tablet  33.00 30 Me War   [id number]   [facility] (8532)   2  0.55 [place]   06/15/2019  1   05/17/2019  Zolpidem Tartrate 10 MG Tablet  33.00 30 Me War   [id number]   [facility] (8598)   1  0.55 [place]   05/17/2019  1   05/17/2019  Zolpidem Tartrate 10 MG  Tablet  33.00 30 Me War   [id number]   [facility] (8532)   0  0.55 [place]   04/21/2019  1   02/25/2019  Zolpidem Tartrate 10 MG Tablet  33.00 30 Me War   [id number]   [facility] (8532)   2  0.55 [place]   03/24/2019  1   02/25/2019  Zolpidem Tartrate 10 MG Tablet  33.00 30 Me War   [id number]   [facility] (8532)   1  0.55 [place]   02/25/2019  1   02/25/2019  Zolpidem Tartrate 10 MG Tablet  33.00 30 Me War   [id number]   [facility] (8532)   0  0.55 [place]   01/26/2019  1   11/27/2018  Zolpidem Tartrate 10 MG Tablet  33.00 30 Me War   [id number]   [facility] (8532)   2  0.55 [place]   01/01/2019  1   01/01/2019  Hydrocodone-Acetamin 5-325 MG  21.00 7 Me War   [id number]   [facility] (8532)   0  15.00 [place]   12/28/2018  1   11/27/2018  Zolpidem Tartrate 10 MG Tablet  33.00 30 Me War   [id number]   [facility] (8532)   1  0.55 [place]   12/01/2018  1   11/27/2018  Zolpidem Tartrate 10 MG Tablet  33.00 30 Me War   [id number]   [facility] (8532)   0  0.55 [place]   10/31/2018  1   09/07/2018  Zolpidem Tartrate 10 MG Tablet  33.00 30 Me War   [id number]   [facility] (8532)   2  0.55 [place]   10/04/2018  1   09/07/2018  Zolpidem Tartrate 10 MG Tablet  33.00 30 Me War   [id number]   [facility] (8532)   1  0.55 [place]   09/07/2018  1   09/07/2018  Zolpidem Tartrate 10 MG Tablet  33.00 30 Me War   [id number]   [facility] (8532)   0  0.55 [place]   08/08/2018  1   06/13/2018  Zolpidem Tartrate 10 MG Tablet  33.00 30 Me War   [id number]   [facility] (8532)   2  0.55 [place]   07/11/2018  1   06/13/2018  Zolpidem Tartrate 10 MG Tablet  33.00 30 Me War   [id number]   [facility] (8532)   1  0.55 [place]   06/13/2018  1   06/13/2018  Zolpidem Tartrate 10 MG Tablet  33.00 30 Me War   [id number]   [facility] (8532)   0  0.55 [place]   05/16/2018  1   12/26/2017  Zolpidem Tartrate 10 MG Tablet  33.00 30 Me War   [id number]   [facility] (9332)   5  0.55 [place]   04/19/2018  1   12/26/2017  Zolpidem Tartrate 10 MG  Tablet  33.00 30 Profex   [id number]   [facility] (6132)   4  0.55 [place]   03/21/2018  1   12/26/2017  Zolpidem Tartrate 10 MG Tablet  33.00 30 Me [employer]   [id number]   [facility] (7805)   3  0.55 L[place]  [place]   *Per CDC guidance, the MME conversion factors prescribed or provided as part of the medication-assisted treatment for opioid use disorder should not be used to benchmark against dosage thresholds meant for opioids prescribed for pain. Buprenorphine products have no agreed upon morphine equivalency, and as partial opioid agonists, are not expected to be associated with overdose risk in the same dose-dependent manner as doses for full agonist opioids. MME = morphine milligram equivalents. LME = Lorazepam milligram equivalents. MG = dose in milligrams.   Providers  Total Providers: 2   Name Address City State Zipcode [facility] [address] [place] [address] [address]   [place] [address]    [facility] [address]   [place] [address]    Pharmacies  Total Pharmacies: 1   Name Address City State Zipcode Phone   [facility]  [phone] [facility] [place] [address] [phone]   This Graph is Interactive  Patient Demographics  Score History  Drug Details  Prescriber Details

## 2020-03-20 ENCOUNTER — HOME HEALTH ADMISSION (OUTPATIENT)
Dept: HOME HEALTH SERVICES | Facility: HOME HEALTHCARE | Age: 75
End: 2020-03-20
Payer: MEDICARE

## 2020-03-23 ENCOUNTER — HOME CARE VISIT (OUTPATIENT)
Dept: HOME HEALTH SERVICES | Facility: HOME HEALTHCARE | Age: 75
End: 2020-03-23
Payer: MEDICARE

## 2020-03-23 VITALS
RESPIRATION RATE: 16 BRPM | HEART RATE: 70 BPM | SYSTOLIC BLOOD PRESSURE: 116 MMHG | DIASTOLIC BLOOD PRESSURE: 75 MMHG | TEMPERATURE: 98.7 F | OXYGEN SATURATION: 98 %

## 2020-03-23 PROCEDURE — G0493 RN CARE EA 15 MIN HH/HOSPICE: HCPCS

## 2020-03-23 PROCEDURE — 665001 SOC-HOME HEALTH

## 2020-03-23 PROCEDURE — 665999 HH PPS REVENUE DEBIT

## 2020-03-23 PROCEDURE — 665998 HH PPS REVENUE CREDIT

## 2020-03-23 ASSESSMENT — PATIENT HEALTH QUESTIONNAIRE - PHQ9
5. POOR APPETITE OR OVEREATING: 0 - NOT AT ALL
1. LITTLE INTEREST OR PLEASURE IN DOING THINGS: 00
SUM OF ALL RESPONSES TO PHQ QUESTIONS 1-9: 3
CLINICAL INTERPRETATION OF PHQ2 SCORE: 1
2. FEELING DOWN, DEPRESSED, IRRITABLE, OR HOPELESS: 01

## 2020-03-23 ASSESSMENT — ACTIVITIES OF DAILY LIVING (ADL): OASIS_M1830: 03

## 2020-03-23 ASSESSMENT — ENCOUNTER SYMPTOMS
NAUSEA: DENIES
VOMITING: DENIES

## 2020-03-24 ENCOUNTER — DOCUMENTATION (OUTPATIENT)
Dept: VASCULAR LAB | Facility: MEDICAL CENTER | Age: 75
End: 2020-03-24

## 2020-03-24 PROCEDURE — 665998 HH PPS REVENUE CREDIT

## 2020-03-24 PROCEDURE — 665999 HH PPS REVENUE DEBIT

## 2020-03-24 NOTE — PROGRESS NOTES
[facility]    Received referral from [facility] to review patient's medication list.    Med list reviewed and reconciled.  No clinically significant DDI identified.  Allergies reviewed.    Kayla Menendez, MaryD

## 2020-03-25 ENCOUNTER — HOME CARE VISIT (OUTPATIENT)
Dept: HOME HEALTH SERVICES | Facility: HOME HEALTHCARE | Age: 75
End: 2020-03-25
Payer: MEDICARE

## 2020-03-25 VITALS
RESPIRATION RATE: 16 BRPM | TEMPERATURE: 98.8 F | DIASTOLIC BLOOD PRESSURE: 60 MMHG | SYSTOLIC BLOOD PRESSURE: 110 MMHG | OXYGEN SATURATION: 97 % | HEART RATE: 86 BPM

## 2020-03-25 PROCEDURE — G0151 HHCP-SERV OF PT,EA 15 MIN: HCPCS

## 2020-03-25 PROCEDURE — 665999 HH PPS REVENUE DEBIT

## 2020-03-25 PROCEDURE — 665998 HH PPS REVENUE CREDIT

## 2020-03-25 ASSESSMENT — GAIT ASSESSMENTS
WALKING STANCE: 0 - HEELS APART
PATH SCORE: 1
STEP SYMMETRY: 1 - RIGHT AND LEFT STEP LENGTH APPEAR EQUAL
PATH: 1 - MILD/MODERATE DEVIATION OR USES WALKING AID
INITIATION OF GAIT IMMEDIATELY AFTER GO: 1 - NO HESITANCY
TRUNK SCORE: 0
BALANCE AND GAIT SCORE: 16
GAIT SCORE: 7
TRUNK: 0 - MARKED SWAY OR USES WALKING AID
STEP CONTINUITY: 0 - STOPPING OR DISCONTINUITY BETWEEN STEPS

## 2020-03-25 ASSESSMENT — ACTIVITIES OF DAILY LIVING (ADL)
GROOMING_COMMENTS: SEE OT EVAL
PHYSICAL TRANSFERS ASSESSED: 1
AMBULATION ASSISTANCE: CONTACT GUARD ASSIST
CURRENT_FUNCTION: CONTACT GUARD ASSIST
AMBULATION ASSISTANCE: 1
FEEDING_COMMENTS: SEE OT EVAL
PHYSICAL_TRANSFERS_DEVICES: SPC
BATHING_COMMENTS: SEE OT EVAL
AMBULATION ASSISTANCE ON FLAT SURFACES: 1

## 2020-03-25 ASSESSMENT — ENCOUNTER SYMPTOMS: DEBILITATING PAIN: 1

## 2020-03-25 ASSESSMENT — BALANCE ASSESSMENTS
SITTING BALANCE: 1 - STEADY, SAFE
STANDING BALANCE: 1 - STEADY BUT WIDE STANCE AND USES CANE OR OTHER SUPPORT
BALANCE SCORE: 9
SITTING DOWN: 1 - USES ARMS OR NOT SMOOTH MOTION
EYES CLOSED AT MAXIMUM POSITION NUDGED: 0 - UNSTEADY
NUDGED: 1 - STAGGERS, GRABS, CATCHES SELF
ARISING SCORE: 1
TURNING 360 DEGREES STEPS: 1 - CONTINUOUS STEPS
ATTEMPTS TO ARISE: 2 - ABLE TO RISE, ONE ATTEMPT
IMMEDIATE STANDING BALANCE FIRST 5 SECONDS: 1 - STEADY BUT USES WALKER OR OTHER SUPPORT
NUDGED SCORE: 1
ARISES: 1 - ABLE, USES ARMS TO HELP

## 2020-03-26 ENCOUNTER — HOME CARE VISIT (OUTPATIENT)
Dept: HOME HEALTH SERVICES | Facility: HOME HEALTHCARE | Age: 75
End: 2020-03-26
Payer: MEDICARE

## 2020-03-26 ENCOUNTER — OFFICE VISIT (OUTPATIENT)
Dept: MEDICAL GROUP | Facility: PHYSICIAN GROUP | Age: 75
End: 2020-03-26
Payer: MEDICARE

## 2020-03-26 VITALS
WEIGHT: 139 LBS | OXYGEN SATURATION: 96 % | BODY MASS INDEX: 23.16 KG/M2 | HEART RATE: 88 BPM | SYSTOLIC BLOOD PRESSURE: 110 MMHG | DIASTOLIC BLOOD PRESSURE: 72 MMHG | RESPIRATION RATE: 20 BRPM | HEIGHT: 65 IN | TEMPERATURE: 97.6 F

## 2020-03-26 DIAGNOSIS — S22.000A COMPRESSION FRACTURE OF THORACIC VERTEBRA, INITIAL ENCOUNTER, UNSPECIFIED THORACIC VERTEBRAL LEVEL: ICD-10-CM

## 2020-03-26 PROCEDURE — G0152 HHCP-SERV OF OT,EA 15 MIN: HCPCS

## 2020-03-26 PROCEDURE — 665999 HH PPS REVENUE DEBIT

## 2020-03-26 PROCEDURE — 665998 HH PPS REVENUE CREDIT

## 2020-03-26 PROCEDURE — 99214 OFFICE O/P EST MOD 30 MIN: CPT | Performed by: PHYSICIAN ASSISTANT

## 2020-03-26 RX ORDER — MORPHINE SULFATE 15 MG/1
15 TABLET, FILM COATED, EXTENDED RELEASE ORAL EVERY 12 HOURS
Qty: 14 TAB | Refills: 0 | Status: SHIPPED | OUTPATIENT
Start: 2020-03-26 | End: 2020-04-02

## 2020-03-26 ASSESSMENT — PAIN SCALES - GENERAL: PAINLEVEL: 10=SEVERE PAIN

## 2020-03-26 NOTE — PROGRESS NOTES
Chief Complaint   Patient presents with   • Hospital Follow-up     fracture pelvic,spine        HISTORY OF PRESENT ILLNESS: Yasmin Zhong is an established 74 y.o. female here to discuss the evaluation and management of:    Patient is a 74-year-old female here today to follow-up on compression fractures.  Patient states about 3 weeks ago she fell while walking up a flight of steps.  States she was immediately seen at [facility] and was airlifted to [facility].  States several imagings were completed and she was discharged to [facility].  States she was there for 2 weeks and received occupational and physical therapy.  Patient did not undergo surgical intervention.  When reviewing [facility] medical records it appears patient has T3, T4, T7, T8, T10, T12 compression fractures chronic versus acute on chronic and a small retropulsed fragment at T12 that is chronic.  Patient was discharged home with morphine 15 extended release tab twice daily for 7 days.  Patient is requesting a refill during today's appointment.  She appears to be in severe pain.  Patient is irritable and tearful during today's appointment.    She tells me that other forms of pain medication does not alleviate symptoms.  States morphine only alleviates pain symptoms for 5 hours and then pain symptoms return.  Describes pain as a constant deep uncomfortable pain.  Discussed with patient she will need to follow-up with pain management for further evaluation of pain symptoms.  When reviewing [facility] notes patient was discharged home and advised to follow-up with an orthopedic surgeon in approximately 2 weeks.  No brace as recommended by orthospine at [facility].  She tells me that she has not contacted the orthopedic surgeon because she was under the impression they would be contacting her.  She tells me that she is getting at home occupational and physical  therapy.  She denies incontinence or saddle paresthesia.      Patient Active Problem List    Diagnosis Date Noted   • Osteopenia of multiple sites 05/23/2018   • Peripheral neuropathy 01/23/2018   • Overactive bladder 11/22/2017   • Macular degeneration 07/21/2017   • Recurrent major depressive disorder, in partial remission ([facility]) 07/21/2017   • Pure hypercholesterolemia 07/21/2017   • Non-rheumatic tricuspid valve insufficiency 07/21/2017   • Chronic insomnia 04/25/2015   • Pulmonary fibrosis ([facility]) 03/11/2015       Allergies:Patient has no known allergies.    Current Outpatient Medications   Medication Sig Dispense Refill   • morphine ER (MS CONTIN) 15 MG Tab CR tablet Take 1 Tab by mouth every 12 hours for 7 days. 14 Tab 0   • zolpidem (AMBIEN) 10 MG Tab TAKE 1 TO 2 TABLETS BY MOUTH AT BEDTIME AS NEEDED FOR SLEEP 33 Each 2   • quetiapine (SEROQUEL) 50 MG tablet TAKE 1 TABLET BY MOUTH AT BEDTIME 90 Tab 3   • atorvastatin (LIPITOR) 20 MG Tab Take 1 Tab by mouth every day. 90 Tab 3   • busPIRone (BUSPAR) 10 MG Tab tablet Take 1 Tab by mouth 3 times a day. 270 Tab 3   • cyanocobalamin (VITAMIN B-12) 100 MCG Tab Take 100 mcg by mouth every day.     • citalopram (CELEXA) 10 MG tablet TAKE 1 TABLET BY MOUTH ONCE DAILY 90 Tab 3   • vitamin D (CHOLECALCIFEROL) 1000 UNIT Tab Take 1,000 Units by mouth every day.     • Calcium Carbonate-Vit D-Min (CALCIUM 1200 PO) Take  by mouth.     • propranolol (INDERAL) 10 MG Tab Take 1 Tab by mouth 3 times a day as needed (anxiety). 30 Tab 0   • oxybutynin SR (DITROPAN-XL) 10 MG CR tablet Take 1 Tab by mouth every day. 30 Tab 2   • Biotin 1 MG Cap Take  by mouth.     • vitamin e (VITAMIN E) 400 UNIT Cap Take 400 Units by mouth every day.     • Prenatal Vit-Fe Fumarate-FA (PRENATAL VITAMIN PO) Take  by mouth.     • Magnesium 100 MG CAPS Take  by mouth.       No current facility-administered medications for this visit.        Social History     Tobacco Use   • Smoking status: Former Smoker  "    Packs/day: 0.25     Years: 20.00     Pack years: 5.00     Types: Cigarettes     Last attempt to quit: 3/11/1985     Years since quittin.0   • Smokeless tobacco: Never Used   Substance Use Topics   • Alcohol use: Yes     Alcohol/week: 0.0 oz     Comment: once a week    • Drug use: No       Family Status   Relation Name Status   • Mo   at age 82        Bladder CA   • Fa   at age 89        heart attack   • MUnc   at age 50's        Lung CA   • MAunt  (Not Specified)     Family History   Problem Relation Age of Onset   • Cancer Mother         Bladder cancer, hx. of smoking   • Diabetes Mother    • Heart Attack Father    • Cancer Maternal Uncle    • Diabetes Maternal Uncle    • Diabetes Maternal Aunt        ROS:  Review of Systems   Constitutional: Negative for fever, chills, weight loss and malaise/fatigue.   HENT: Negative for ear pain, nosebleeds, congestion, sore throat and neck pain.    Eyes: Negative for blurred vision.   Respiratory: Negative for cough, sputum production, shortness of breath and wheezing.    Cardiovascular: Negative for chest pain, palpitations, orthopnea and leg swelling.   Gastrointestinal: Negative for heartburn, nausea, vomiting and abdominal pain.   Genitourinary: Negative for dysuria, urgency and frequency.   Musculoskeletal: Negative for myalgias, and joint pain.  Positive for back pain.  Skin: Negative for rash and itching.   Neurological: Negative for dizziness, tingling, tremors, sensory change, focal weakness and headaches.   Endo/Heme/Allergies: Does not bruise/bleed easily.   Psychiatric/Behavioral: Negative for depression, suicidal ideas and memory loss.  The patient is not nervous/anxious and does not have insomnia.    All other systems reviewed and are negative except as in HPI.    Exam: /72 (BP Location: Left arm, Patient Position: Sitting)   Pulse 88   Temp 36.4 °C (97.6 °F) (Temporal)   Resp 20   Ht 1.64 m (5' 4.57\")   Wt 63 kg (139 " lb)   SpO2 96%  Body mass index is 23.44 kg/m².  General: Normal appearing.  Patient is tearful and irritable during today's appointment.  HEENT: Normocephalic. Eyes conjunctiva clear lids without ptosis, ears normal shape and contour.  Neck: Supple without JVD. Thyroid is not enlarged.  Pulmonary: Clear to ausculation.  Normal effort. No rales, ronchi, or wheezing.  Cardiovascular: Regular rate and rhythm without murmur.   Abdomen: Nondistended.   Neurologic: Grossly nonfocal.  Cranial nerves are normal.   Skin: Warm and dry.  No rashes or suspicious skin lesions.  Musculoskeletal: No extremity cyanosis, clubbing, or edema.  Thoracic spine is tender to palpation.  Positive for antalgic gait.  Psych: Normal mood and affect. Alert and oriented x3. Judgment and insight is normal.    Medical decision-making and discussion:  1. Compression fracture of thoracic vertebra, initial encounter, unspecified thoracic vertebral level (HCC)    Narxcheck and  reviewed. No red flags.  Discussed the importance of not combining Ambien and morphine.      When reviewing [facility] medical records it appears patient has T3, T4, T7, T8, T10, T12 compression fractures chronic versus acute on chronic and a small retropulsed fragment at T12 that is chronic.   Surgical intervention was not required.  When reviewing [facility] medical records they stated that spine at [facility] did not recommend back brace.  Patient is getting at home physical therapy and Occupational Therapy.    Discussed in great detail with patient that she will need to follow-up with pain management for further evaluation of pain symptoms.  Patient is frustrated and tearful during today's appointment.  Patient does not understand why I will not manage morphine for her.  Discussed with patient morphine is for acute use only and not intended for chronic use.  Discussed with patient if morphine 15m g extended release twice daily  "is not alleviating symptoms that it would be in her best interest to follow-up with pain management to discuss other treatment options.  Discussed with patient I will refill morphine 15 mg ER twice daily for 7 days.  Advised patient to not take Ambien when taking morphine.  Patient asked if I could fax over prescription for her.  Discussed [facility] laws with patient.  Advised patient she will have the hand deliver prescription to her pharmacist.  Patient is frustrated that I am unable to fax prescription to her pharmacist.  She tells me that [facility] was able to fax prescription for her.  Once again reiterated [facility] laws with patient.  She admits that she is irritable during today's appointment.    Vies patient to not drink alcohol, combine other sedating medications, or operate machinery while taking morphine.  Patient agreed to plan.    Patient understands this prescription is a controlled substance which is potentially habit-forming and its use is regulated by the [place]. We also discussed the new \"black box\" warning regarding the lethal combination of opioids and benzodiazepines.  Any refill requires an office visit. Narcotics have may adverse effects and the risks of addiction, accidental overdose and death were emphasized.    Advised patient to follow-up with her PCP in the near future.  Highly emphasized importance of calling neurosurgery for follow-up.    - morphine ER (MS CONTIN) 15 MG Tab CR tablet; Take 1 Tab by mouth every 12 hours for 7 days.  Dispense: 14 Tab; Refill: 0  - REFERRAL TO PAIN MANAGEMENT      Please note that this dictation was created using voice recognition software. I have made every reasonable attempt to correct obvious errors, but I expect that there are errors of grammar and possibly content that I did not discover before finalizing the note.    Assessment/Plan:  1. Compression fracture of thoracic vertebra, initial encounter, unspecified thoracic " vertebral level (HCC)  morphine ER (MS CONTIN) 15 MG Tab CR tablet    REFERRAL TO PAIN MANAGEMENT       Return if symptoms worsen or fail to improve.

## 2020-03-27 ENCOUNTER — TELEPHONE (OUTPATIENT)
Dept: MEDICAL GROUP | Facility: PHYSICIAN GROUP | Age: 75
End: 2020-03-27

## 2020-03-27 ENCOUNTER — HOME CARE VISIT (OUTPATIENT)
Dept: HOME HEALTH SERVICES | Facility: HOME HEALTHCARE | Age: 75
End: 2020-03-27
Payer: MEDICARE

## 2020-03-27 VITALS
SYSTOLIC BLOOD PRESSURE: 123 MMHG | HEART RATE: 89 BPM | TEMPERATURE: 98.5 F | OXYGEN SATURATION: 94 % | RESPIRATION RATE: 16 BRPM | DIASTOLIC BLOOD PRESSURE: 61 MMHG

## 2020-03-27 DIAGNOSIS — S22.000A COMPRESSION FRACTURE OF THORACIC VERTEBRA, INITIAL ENCOUNTER (HCC): ICD-10-CM

## 2020-03-27 PROCEDURE — 665999 HH PPS REVENUE DEBIT

## 2020-03-27 PROCEDURE — 665998 HH PPS REVENUE CREDIT

## 2020-03-27 ASSESSMENT — ACTIVITIES OF DAILY LIVING (ADL)
ORAL_CARE_ASSESSED: 1
GROOMING_CURRENT_FUNCTION: INDEPENDENT
PREPARING MEALS: DEPENDENT
BATHING ASSESSED: 1
AMBULATION ASSISTANCE: 1
TELEPHONE USE ASSESSED: 1
DRESSING_UB_CURRENT_FUNCTION: INDEPENDENT
DRESSING_LB_CURRENT_FUNCTION: INDEPENDENT
PHYSICAL TRANSFERS ASSESSED: 1
SHOPPING: DEPENDENT
FEEDING ASSESSED: 1
LIGHT HOUSEKEEPING: DEPENDENT
FEEDING: INDEPENDENT
BATHING_CURRENT_FUNCTION: SUPERVISION
ORAL_CARE_CURRENT_FUNCTION: INDEPENDENT
GROOMING ASSESSED: 1
CURRENT_FUNCTION: INDEPENDENT
LAUNDRY: DEPENDENT
USING THE TELPHONE: INDEPENDENT
LAUNDRY ASSESSED: 1
TRANSPORTATION: DEPENDENT
PHYSICAL_TRANSFERS_DEVICES: CANE OR WALKER
TOILETING: INDEPENDENT
TRANSPORTATION ASSESSED: 1
HOUSEKEEPING ASSESSED: 1
SHOPPING ASSESSED: 1
TOILETING: 1
AMBULATION ASSISTANCE: INDEPENDENT

## 2020-03-27 ASSESSMENT — ENCOUNTER SYMPTOMS: POOR JUDGMENT: 1

## 2020-03-27 NOTE — TELEPHONE ENCOUNTER
VOICEMAIL  1. Caller Name: Yasmin Zhong                        Call Back Number: [phone] (home)       2. Message: Pt was returning [facility] call about Referral.  Please call back

## 2020-03-27 NOTE — TELEPHONE ENCOUNTER
Called patient to discuss referral to pain management/neurosurgery more detail.  Left patient a detailed voice message to call me back.    Spoke with Dr. Burton this a.m. and Dr. Burton suggested patient may benefit from going to [facility] [place].  Explained [place] she could see a neurosurgeon as well as receive pain management.      I Would like to discuss this in more detail with patient before I place a new referral.

## 2020-03-27 NOTE — TELEPHONE ENCOUNTER
Spoke with Yasmin.  Discussed with Yasmin it may be beneficial for her to follow-up with [facility]iano.  Discussed with Yasmin she could see a neurosurgeon as well as pain management at the same facility.  Patient wanted alda Mariano has contacted formation.  Discussed with patient I need to speak with her first before I would place the referral.  Advised patient to contact them on Monday.      She states she does not understand why pain management does not work on the weekends and why they would not call her over the weekend.  States she only has a weeks worth of pain medication.    Discussed with patient that the employees probably have families etc. and do not work over the weekends.    Patient is irritable and frustrated.    Reiterated once again for patient to call the referral department on Monday to make sure that the referral has been processed.  Referral has been placed urgently.

## 2020-03-27 NOTE — TELEPHONE ENCOUNTER
Return patient's call and it appears patient's phone is not working.  Very statically.  Tried 3 times.

## 2020-03-27 NOTE — TELEPHONE ENCOUNTER
VOICEMAIL  1. Caller Name: Yasmin Zhong                   Call Back Number: [phone]     2. Message: Pt returning Bri Donis call, would prefer CB on number above as number on file is having trouble connecting.

## 2020-03-28 PROCEDURE — 665998 HH PPS REVENUE CREDIT

## 2020-03-28 PROCEDURE — 665999 HH PPS REVENUE DEBIT

## 2020-03-29 PROCEDURE — 665998 HH PPS REVENUE CREDIT

## 2020-03-29 PROCEDURE — 665999 HH PPS REVENUE DEBIT

## 2020-03-30 ENCOUNTER — TELEPHONE (OUTPATIENT)
Dept: MEDICAL GROUP | Facility: PHYSICIAN GROUP | Age: 75
End: 2020-03-30

## 2020-03-30 PROCEDURE — 665998 HH PPS REVENUE CREDIT

## 2020-03-30 PROCEDURE — 665999 HH PPS REVENUE DEBIT

## 2020-03-30 NOTE — TELEPHONE ENCOUNTER
VOICEMAIL  1. Caller Name: Celi Mariano                     Call Back Number: [phone]    2. Message: Celi VALLE stating patient called them regarding referral however they have not received.     3. Patient approves office to leave a detailed voicemail/MyChart message: yes

## 2020-03-31 ENCOUNTER — HOSPITAL ENCOUNTER (OUTPATIENT)
Dept: RADIOLOGY | Facility: MEDICAL CENTER | Age: 75
End: 2020-03-31
Attending: PHYSICIAN ASSISTANT
Payer: MEDICARE

## 2020-03-31 ENCOUNTER — HOME CARE VISIT (OUTPATIENT)
Dept: HOME HEALTH SERVICES | Facility: HOME HEALTHCARE | Age: 75
End: 2020-03-31
Payer: MEDICARE

## 2020-03-31 DIAGNOSIS — S22.080A WEDGE COMPRESSION FRACTURE OF T11-T12 VERTEBRA, INITIAL ENCOUNTER FOR CLOSED FRACTURE (HCC): ICD-10-CM

## 2020-03-31 DIAGNOSIS — S22.040A CLOSED WEDGE COMPRESSION FRACTURE OF FOURTH THORACIC VERTEBRA, INITIAL ENCOUNTER: ICD-10-CM

## 2020-03-31 DIAGNOSIS — S22.030A CLOSED WEDGE COMPRESSION FRACTURE OF THIRD THORACIC VERTEBRA, INITIAL ENCOUNTER: ICD-10-CM

## 2020-03-31 DIAGNOSIS — S32.030A WEDGE COMPRESSION FRACTURE OF THIRD LUMBAR VERTEBRA, INITIAL ENCOUNTER FOR CLOSED FRACTURE (HCC): ICD-10-CM

## 2020-03-31 DIAGNOSIS — S32.010A WEDGE COMPRESSION FRACTURE OF FIRST LUMBAR VERTEBRA, INITIAL ENCOUNTER FOR CLOSED FRACTURE (HCC): ICD-10-CM

## 2020-03-31 DIAGNOSIS — S22.070A WEDGE COMPRESSION FRACTURE OF T9-T10 VERTEBRA, INITIAL ENCOUNTER FOR CLOSED FRACTURE (HCC): ICD-10-CM

## 2020-03-31 DIAGNOSIS — S22.060A WEDGE COMPRESSION FRACTURE OF T7-T8 VERTEBRA, INITIAL ENCOUNTER FOR CLOSED FRACTURE (HCC): ICD-10-CM

## 2020-03-31 PROCEDURE — 665998 HH PPS REVENUE CREDIT

## 2020-03-31 PROCEDURE — G0152 HHCP-SERV OF OT,EA 15 MIN: HCPCS

## 2020-03-31 PROCEDURE — 665999 HH PPS REVENUE DEBIT

## 2020-03-31 PROCEDURE — 72131 CT LUMBAR SPINE W/O DYE: CPT

## 2020-03-31 PROCEDURE — 72128 CT CHEST SPINE W/O DYE: CPT

## 2020-03-31 PROCEDURE — G0151 HHCP-SERV OF PT,EA 15 MIN: HCPCS

## 2020-03-31 SDOH — ECONOMIC STABILITY: HOUSING INSECURITY
HOME SAFETY: LIMITED SAFETY AWARENESS NOTED - PT ASKING RE: DRIVING, RETURN HOME INDEP AND PLANS TO TAKE CANE TO NEURO MD APPT TODAY EASIER"  CAUTION PROVIDED W/ RE-EDUC HIGH RISK DRIVING W/ MEDS AND WEAKNESS/PAIN, RECOMMEND CONTINUED EASE OF MOBILITY AT SISTER'"

## 2020-03-31 SDOH — ECONOMIC STABILITY: HOUSING INSECURITY
HOME SAFETY: S HOME VS RUSH TO RETURN TO INDEP LIVING WELL, I HAVE THE STAIR CHAIR NOW", AND AGREES W/ 4WW BENEFITS TO MD OFFICE, FAMILY TO A"

## 2020-03-31 NOTE — TELEPHONE ENCOUNTER
Phone Number Called:   JABIER BARKER   [address]   [place]  [address]   Phone: [phone]     Call outcome: LVM informing Celi referral was refaxed - requested she call me back if not received.

## 2020-04-01 PROCEDURE — 665999 HH PPS REVENUE DEBIT

## 2020-04-01 PROCEDURE — 665998 HH PPS REVENUE CREDIT

## 2020-04-01 ASSESSMENT — ENCOUNTER SYMPTOMS: POOR JUDGMENT: 1

## 2020-04-02 ENCOUNTER — HOME CARE VISIT (OUTPATIENT)
Dept: HOME HEALTH SERVICES | Facility: HOME HEALTHCARE | Age: 75
End: 2020-04-02
Payer: MEDICARE

## 2020-04-02 PROCEDURE — G0152 HHCP-SERV OF OT,EA 15 MIN: HCPCS

## 2020-04-02 PROCEDURE — G0151 HHCP-SERV OF PT,EA 15 MIN: HCPCS

## 2020-04-02 PROCEDURE — 665999 HH PPS REVENUE DEBIT

## 2020-04-02 PROCEDURE — G0299 HHS/HOSPICE OF RN EA 15 MIN: HCPCS

## 2020-04-02 PROCEDURE — 665998 HH PPS REVENUE CREDIT

## 2020-04-02 SDOH — ECONOMIC STABILITY: HOUSING INSECURITY
HOME SAFETY: ON VS ONE DAY COMPLETELY INDEP ON HER OWN W/ SMALL DOG  THIS PT NOT TO PROVIDE PERMISSION, ONLY TO EDUCATE REGARDING POTENTIAL BARRIERS TO ALLOW PATIENT AND FAMILY TO DECIDE ON READINESS  PT ASKING TO HAVE NEXT PT VISIT AT HER CONDO W/ SISTER PRESENT

## 2020-04-02 SDOH — ECONOMIC STABILITY: HOUSING INSECURITY
HOME SAFETY: ARENESS/JUDGEMENT SHE IS ALWAYS SHAKEY LIKE THAT" - SISTER REPORTS "I HAVE TO REMIND HER ABOUT A LOT OF THINGS TOO"  PT ASKING FOR PERMISSION FROM PT TO RETURN TO CONDO - EXPLAINED RISKS IDENTIFIED AND RECOMMENDED POSSIBLE FAMILY A FOR SLOW TRANSITI"

## 2020-04-02 SDOH — ECONOMIC STABILITY: HOUSING INSECURITY
HOME SAFETY: THEN I CAN DECIDE" - VISIT SCHEDULED  OT, RN SUPERV AND CM NOTIFIED OF CONCERNS    SISTER REPORTS PT ATTEMPTED USE OF TREADMILL - EDUCATION PROVIDED HIGH RISK TRIP/FALL - DO NOT RECOMMEND TREADMILL USE, AGREE W/ 4WW USE FOR WALKING PROGRAM AS REPOR"

## 2020-04-02 SDOH — ECONOMIC STABILITY: HOUSING INSECURITY
HOME SAFETY: TRENGTH, BALANCE LIMITATIONSI HAVE TO BE ABLE TO GET THE POOPS IN THE BAG"  PT REPORTS SHE IS A SMALL PORTABLE SCOOTER SALES PERSON AND HAS ONE AT HOME "I WAS THINKING MAYBE I WOULD WALK THE DOG THAT WAY"  D/W PT AND SISTER RE: CONCERN FOR SAFETY AW"

## 2020-04-02 SDOH — ECONOMIC STABILITY: HOUSING INSECURITY
HOME SAFETY: RE-EDUC 4WW LOCK USE FOR SAFETY W/ MOBILITY  RE-EDUC SPINAL PRECAUTIONS, BODY MECHANICS  PT LET ME SHOW YOU HOW I CAN PICK THIS DOG TOY UP" W/ FORWARD FLEXION AND 4WW UNLOCKED - RE-EDUC PROVIDED, CONCERN FOR PT'S GOAL TO WALK DOG AS PAIN, POSTURE, S"

## 2020-04-02 SDOH — ECONOMIC STABILITY: HOUSING INSECURITY: HOME SAFETY: TED - PT AGREED

## 2020-04-02 ASSESSMENT — ENCOUNTER SYMPTOMS: POOR JUDGMENT: 1

## 2020-04-03 PROCEDURE — 665999 HH PPS REVENUE DEBIT

## 2020-04-03 PROCEDURE — G0180 MD CERTIFICATION HHA PATIENT: HCPCS | Performed by: FAMILY MEDICINE

## 2020-04-03 PROCEDURE — 665998 HH PPS REVENUE CREDIT

## 2020-04-04 PROCEDURE — 665998 HH PPS REVENUE CREDIT

## 2020-04-04 PROCEDURE — 665999 HH PPS REVENUE DEBIT

## 2020-04-05 PROCEDURE — 665998 HH PPS REVENUE CREDIT

## 2020-04-05 PROCEDURE — 665999 HH PPS REVENUE DEBIT

## 2020-04-06 ENCOUNTER — HOME CARE VISIT (OUTPATIENT)
Dept: HOME HEALTH SERVICES | Facility: HOME HEALTHCARE | Age: 75
End: 2020-04-06
Payer: MEDICARE

## 2020-04-06 VITALS
TEMPERATURE: 98.9 F | DIASTOLIC BLOOD PRESSURE: 65 MMHG | DIASTOLIC BLOOD PRESSURE: 65 MMHG | HEART RATE: 87 BPM | OXYGEN SATURATION: 95 % | SYSTOLIC BLOOD PRESSURE: 118 MMHG | RESPIRATION RATE: 16 BRPM | OXYGEN SATURATION: 90 % | HEART RATE: 87 BPM | TEMPERATURE: 98.9 F | SYSTOLIC BLOOD PRESSURE: 118 MMHG | RESPIRATION RATE: 16 BRPM

## 2020-04-06 VITALS
SYSTOLIC BLOOD PRESSURE: 117 MMHG | HEART RATE: 78 BPM | TEMPERATURE: 98.3 F | OXYGEN SATURATION: 97 % | RESPIRATION RATE: 17 BRPM | DIASTOLIC BLOOD PRESSURE: 62 MMHG

## 2020-04-06 PROCEDURE — 665999 HH PPS REVENUE DEBIT

## 2020-04-06 PROCEDURE — 665998 HH PPS REVENUE CREDIT

## 2020-04-06 PROCEDURE — G0151 HHCP-SERV OF PT,EA 15 MIN: HCPCS

## 2020-04-07 VITALS
SYSTOLIC BLOOD PRESSURE: 103 MMHG | RESPIRATION RATE: 16 BRPM | OXYGEN SATURATION: 93 % | HEART RATE: 82 BPM | TEMPERATURE: 98.7 F | DIASTOLIC BLOOD PRESSURE: 58 MMHG

## 2020-04-07 PROCEDURE — 665998 HH PPS REVENUE CREDIT

## 2020-04-07 PROCEDURE — 665999 HH PPS REVENUE DEBIT

## 2020-04-08 PROCEDURE — 665999 HH PPS REVENUE DEBIT

## 2020-04-08 PROCEDURE — 665998 HH PPS REVENUE CREDIT

## 2020-04-09 ENCOUNTER — HOME CARE VISIT (OUTPATIENT)
Dept: HOME HEALTH SERVICES | Facility: HOME HEALTHCARE | Age: 75
End: 2020-04-09
Payer: MEDICARE

## 2020-04-09 PROCEDURE — G0151 HHCP-SERV OF PT,EA 15 MIN: HCPCS

## 2020-04-09 PROCEDURE — 665999 HH PPS REVENUE DEBIT

## 2020-04-09 PROCEDURE — 665998 HH PPS REVENUE CREDIT

## 2020-04-10 VITALS
DIASTOLIC BLOOD PRESSURE: 68 MMHG | OXYGEN SATURATION: 95 % | SYSTOLIC BLOOD PRESSURE: 112 MMHG | TEMPERATURE: 99.1 F | HEART RATE: 88 BPM | RESPIRATION RATE: 18 BRPM

## 2020-04-10 PROCEDURE — 665998 HH PPS REVENUE CREDIT

## 2020-04-10 PROCEDURE — 665999 HH PPS REVENUE DEBIT

## 2020-04-10 ASSESSMENT — ENCOUNTER SYMPTOMS: DEBILITATING PAIN: 1

## 2020-04-11 PROCEDURE — 665999 HH PPS REVENUE DEBIT

## 2020-04-11 PROCEDURE — 665998 HH PPS REVENUE CREDIT

## 2020-04-12 PROCEDURE — 665998 HH PPS REVENUE CREDIT

## 2020-04-12 PROCEDURE — 665999 HH PPS REVENUE DEBIT

## 2020-04-13 PROCEDURE — 665998 HH PPS REVENUE CREDIT

## 2020-04-13 PROCEDURE — 665999 HH PPS REVENUE DEBIT

## 2020-04-14 ENCOUNTER — HOME CARE VISIT (OUTPATIENT)
Dept: HOME HEALTH SERVICES | Facility: HOME HEALTHCARE | Age: 75
End: 2020-04-14
Payer: MEDICARE

## 2020-04-14 PROCEDURE — 665998 HH PPS REVENUE CREDIT

## 2020-04-14 PROCEDURE — 665999 HH PPS REVENUE DEBIT

## 2020-04-15 ENCOUNTER — HOME CARE VISIT (OUTPATIENT)
Dept: HOME HEALTH SERVICES | Facility: HOME HEALTHCARE | Age: 75
End: 2020-04-15
Payer: MEDICARE

## 2020-04-15 PROCEDURE — 665999 HH PPS REVENUE DEBIT

## 2020-04-15 PROCEDURE — G0151 HHCP-SERV OF PT,EA 15 MIN: HCPCS

## 2020-04-15 PROCEDURE — 665998 HH PPS REVENUE CREDIT

## 2020-04-16 ENCOUNTER — HOME CARE VISIT (OUTPATIENT)
Dept: HOME HEALTH SERVICES | Facility: HOME HEALTHCARE | Age: 75
End: 2020-04-16
Payer: MEDICARE

## 2020-04-16 PROCEDURE — 665999 HH PPS REVENUE DEBIT

## 2020-04-16 PROCEDURE — 665998 HH PPS REVENUE CREDIT

## 2020-04-16 PROCEDURE — G0151 HHCP-SERV OF PT,EA 15 MIN: HCPCS

## 2020-04-17 VITALS
HEART RATE: 84 BPM | TEMPERATURE: 98.3 F | DIASTOLIC BLOOD PRESSURE: 60 MMHG | HEART RATE: 90 BPM | OXYGEN SATURATION: 98 % | RESPIRATION RATE: 18 BRPM | SYSTOLIC BLOOD PRESSURE: 102 MMHG | RESPIRATION RATE: 18 BRPM | OXYGEN SATURATION: 99 % | SYSTOLIC BLOOD PRESSURE: 106 MMHG | DIASTOLIC BLOOD PRESSURE: 60 MMHG | TEMPERATURE: 98.4 F

## 2020-04-17 PROCEDURE — 665999 HH PPS REVENUE DEBIT

## 2020-04-17 PROCEDURE — 665998 HH PPS REVENUE CREDIT

## 2020-04-17 SDOH — ECONOMIC STABILITY: HOUSING INSECURITY: HOME SAFETY: PT LIVES ALONE AND HAS A FRIEND THAT HELPS WITH DOG ONCE A DAY AND CHECKS IN ON HER

## 2020-04-17 ASSESSMENT — BALANCE ASSESSMENTS
SITTING DOWN: 1 - USES ARMS OR NOT SMOOTH MOTION
TURNING 360 DEGREES STEPS: 0 - DISCONTINUOUS STEPS
BALANCE SCORE: 11
SITTING BALANCE: 1 - STEADY, SAFE
ARISES: 1 - ABLE, USES ARMS TO HELP
EYES CLOSED AT MAXIMUM POSITION NUDGED: 1 - STEADY
NUDGED SCORE: 2
STANDING BALANCE: 1 - STEADY BUT WIDE STANCE AND USES CANE OR OTHER SUPPORT
IMMEDIATE STANDING BALANCE FIRST 5 SECONDS: 2 - STEADY WITHOUT WALKER OR OTHER SUPPORT
ARISING SCORE: 1
NUDGED: 2 - STEADY
ATTEMPTS TO ARISE: 1 - ABLE, REQUIRES MORE THAN ONE ATTEMPT

## 2020-04-17 ASSESSMENT — GAIT ASSESSMENTS
TRUNK SCORE: 0
PATH SCORE: 1
STEP SYMMETRY: 1 - RIGHT AND LEFT STEP LENGTH APPEAR EQUAL
TRUNK: 0 - MARKED SWAY OR USES WALKING AID
STEP CONTINUITY: 1 - STEPS APPEAR CONTINUOUS
PATH: 1 - MILD/MODERATE DEVIATION OR USES WALKING AID
GAIT SCORE: 9
INITIATION OF GAIT IMMEDIATELY AFTER GO: 1 - NO HESITANCY
WALKING STANCE: 1 - HEELS ALMOST TOUCHING WHILE WALKING
BALANCE AND GAIT SCORE: 20

## 2020-04-17 ASSESSMENT — ACTIVITIES OF DAILY LIVING (ADL)
HOME_HEALTH_OASIS: 00
OASIS_M1830: 01

## 2020-04-18 PROCEDURE — 665998 HH PPS REVENUE CREDIT

## 2020-04-18 PROCEDURE — 665999 HH PPS REVENUE DEBIT

## 2020-04-19 PROCEDURE — 665999 HH PPS REVENUE DEBIT

## 2020-04-19 PROCEDURE — 665998 HH PPS REVENUE CREDIT

## 2020-04-20 PROCEDURE — 665999 HH PPS REVENUE DEBIT

## 2020-04-20 PROCEDURE — 665998 HH PPS REVENUE CREDIT

## 2020-04-21 PROCEDURE — 665999 HH PPS REVENUE DEBIT

## 2020-04-21 PROCEDURE — 665998 HH PPS REVENUE CREDIT

## 2020-05-01 DIAGNOSIS — F33.41 RECURRENT MAJOR DEPRESSIVE DISORDER, IN PARTIAL REMISSION (HCC): ICD-10-CM

## 2020-05-01 RX ORDER — CITALOPRAM HYDROBROMIDE 10 MG/1
TABLET ORAL
Qty: 90 TAB | Refills: 0 | Status: SHIPPED | OUTPATIENT
Start: 2020-05-01 | End: 2020-07-28 | Stop reason: SDUPTHER

## 2020-05-12 ENCOUNTER — TELEPHONE (OUTPATIENT)
Dept: MEDICAL GROUP | Facility: PHYSICIAN GROUP | Age: 75
End: 2020-05-12

## 2020-05-13 ENCOUNTER — HOSPITAL ENCOUNTER (OUTPATIENT)
Dept: RADIOLOGY | Facility: MEDICAL CENTER | Age: 75
End: 2020-05-13
Attending: NURSE PRACTITIONER
Payer: MEDICARE

## 2020-05-13 ENCOUNTER — OFFICE VISIT (OUTPATIENT)
Dept: MEDICAL GROUP | Facility: PHYSICIAN GROUP | Age: 75
End: 2020-05-13
Payer: MEDICARE

## 2020-05-13 VITALS
WEIGHT: 127 LBS | HEART RATE: 95 BPM | HEIGHT: 65 IN | RESPIRATION RATE: 20 BRPM | BODY MASS INDEX: 21.16 KG/M2 | OXYGEN SATURATION: 90 % | DIASTOLIC BLOOD PRESSURE: 82 MMHG | SYSTOLIC BLOOD PRESSURE: 122 MMHG | TEMPERATURE: 98.1 F

## 2020-05-13 DIAGNOSIS — K59.03 DRUG-INDUCED CONSTIPATION: ICD-10-CM

## 2020-05-13 DIAGNOSIS — K59.01 SLOW TRANSIT CONSTIPATION: ICD-10-CM

## 2020-05-13 PROCEDURE — 99214 OFFICE O/P EST MOD 30 MIN: CPT | Performed by: NURSE PRACTITIONER

## 2020-05-13 PROCEDURE — 74018 RADEX ABDOMEN 1 VIEW: CPT

## 2020-05-13 NOTE — TELEPHONE ENCOUNTER
VOICEMAIL  1. Caller Name:Yasmin Zhong                      Call Back Number: [phone] (home)     2. Message: Returned patient's messages re: concern over bowel movement issues to offer her an appointment. Left number to scheduling for her to schedule with a covering provider.    3. Patient approves office to leave a detailed voicemail/MyChart message: N\A

## 2020-05-13 NOTE — ASSESSMENT & PLAN NOTE
This is a new problem.  Patient is currently taking morphine related to a recent spine fracture.  Patient states that she was recommended by the spine doctor to try Colace, Dulcolax, and a third medication that she did not remember states that she was instructed to  something at the pharmacy that she is unsure about states that she was unable to  this medication related to cost but believes it was related to constipation.  Patient states that she did try milk of magnesia when she was in the rehab immediately after her fracture.  States that this was not helpful.  Patient states that she had her last normal bowel movement was approximately 5 days ago.  States that she is passing some hard stool but that with her last bowel movement there was pain.  Denies any blood or mucus in her stool, denies any nausea or vomiting, denies fever, chills, denies severe abdominal pain.  Patient states that she does not wish to go to the hospital and would like help alleviating her constipation.

## 2020-05-13 NOTE — PATIENT INSTRUCTIONS
Polyethylene glycol (Miralax) dose on package 3 times per day until adequate BM  1 tbsp mineral oil daily

## 2020-05-14 NOTE — PROGRESS NOTES
Chief Complaint   Patient presents with   • Constipation     intermitten x 1 month, pt is on morphine        HISTORY OF PRESENT ILLNESS: Patient is a 74 y.o. female established patient who presents today to discuss constipation.    Drug-induced constipation  This is a new problem.  Patient is currently taking morphine related to a recent spine fracture.  Patient states that she was recommended by the spine doctor to try Colace, Dulcolax, and a third medication that she did not remember states that she was instructed to  something at the pharmacy that she is unsure about states that she was unable to  this medication related to cost but believes it was related to constipation.  Patient states that she did try milk of magnesia when she was in the rehab immediately after her fracture.  States that this was not helpful.  Patient states that she had her last normal bowel movement was approximately 5 days ago.  States that she is passing some hard stool but that with her last bowel movement there was pain.  Denies any blood or mucus in her stool, denies any nausea or vomiting, denies fever, chills, denies severe abdominal pain.  Patient states that she does not wish to go to the hospital and would like help alleviating her constipation.      Patient Active Problem List    Diagnosis Date Noted   • Drug-induced constipation 05/13/2020   • Osteopenia of multiple sites 05/23/2018   • Peripheral neuropathy 01/23/2018   • Overactive bladder 11/22/2017   • Macular degeneration 07/21/2017   • Recurrent major depressive disorder, in partial remission (HCC) 07/21/2017   • Pure hypercholesterolemia 07/21/2017   • Non-rheumatic tricuspid valve insufficiency 07/21/2017   • Chronic insomnia 04/25/2015   • Pulmonary fibrosis (HCC) 03/11/2015       Allergies:Patient has no known allergies.    Current Outpatient Medications   Medication Sig Dispense Refill   • citalopram (CELEXA) 10 MG tablet Take 1 tablet by mouth once  daily 90 Tab 0   • morphine (MS IR) 15 MG tablet Take 15 mg by mouth every 12 hours.     • HYDROcodone-acetaminophen (NORCO) 5-325 MG Tab per tablet Take 1 Tab by mouth 2 times a day.     • quetiapine (SEROQUEL) 50 MG tablet TAKE 1 TABLET BY MOUTH AT BEDTIME 90 Tab 3   • atorvastatin (LIPITOR) 20 MG Tab Take 1 Tab by mouth every day. 90 Tab 3   • busPIRone (BUSPAR) 10 MG Tab tablet Take 1 Tab by mouth 3 times a day. 270 Tab 3   • cyanocobalamin (VITAMIN B-12) 100 MCG Tab Take 100 mcg by mouth every day.     • vitamin D (CHOLECALCIFEROL) 1000 UNIT Tab Take 1,000 Units by mouth every day.     • Calcium Carbonate-Vit D-Min (CALCIUM 1200 PO) Take  by mouth.     • propranolol (INDERAL) 10 MG Tab Take 1 Tab by mouth 3 times a day as needed (anxiety). 30 Tab 0   • oxybutynin SR (DITROPAN-XL) 10 MG CR tablet Take 1 Tab by mouth every day. 30 Tab 2   • Biotin 1 MG Cap Take  by mouth.     • vitamin e (VITAMIN E) 400 UNIT Cap Take 400 Units by mouth every day.     • Prenatal Vit-Fe Fumarate-FA (PRENATAL VITAMIN PO) Take  by mouth.     • Magnesium 100 MG CAPS Take  by mouth.       No current facility-administered medications for this visit.        Social History     Tobacco Use   • Smoking status: Former Smoker     Packs/day: 0.25     Years: 20.00     Pack years: 5.00     Types: Cigarettes     Last attempt to quit: 3/11/1985     Years since quittin.1   • Smokeless tobacco: Never Used   Substance Use Topics   • Alcohol use: Yes     Alcohol/week: 0.0 oz     Comment: once a week    • Drug use: No       Family Status   Relation Name Status   • Mo   at age 82        Bladder CA   • Fa   at age 89        heart attack   • MUnc   at age 50's        Lung CA   • MAunt  (Not Specified)     Family History   Problem Relation Age of Onset   • Cancer Mother         Bladder cancer, hx. of smoking   • Diabetes Mother    • Heart Attack Father    • Cancer Maternal Uncle    • Diabetes Maternal Uncle    • Diabetes  "Maternal Aunt        Review of Systems:   Constitutional:  Negative for fever, chills, weight loss and malaise/fatigue.   HEENT:  Negative for ear pain, nosebleeds, congestion, sore throat and neck pain.    Eyes:  Negative for blurred vision.   Respiratory:  Negative for cough, sputum production, shortness of breath and wheezing.    Cardiovascular:  Negative for chest pain, palpitations, orthopnea and leg swelling.   Gastrointestinal:  Negative for heartburn, nausea, vomiting and + mild abdominal pain.   Genitourinary:  Negative for dysuria, urgency and frequency.   Musculoskeletal:  Negative for myalgias, back pain and joint pain.   Skin:  Negative for rash and itching.   Neurological:  Negative for dizziness, tingling, tremors, sensory change, focal weakness and headaches.   Endo/Heme/Allergies:  Does not bruise/bleed easily.   Psychiatric/Behavioral:  Negative for depression, suicidal ideas and memory loss.  The patient is not nervous/anxious and does not have insomnia.    All other systems reviewed and are negative except as in HPI.    Exam:  /82   Pulse 95   Temp 36.7 °C (98.1 °F)   Resp 20   Ht 1.64 m (5' 4.57\")   Wt 57.6 kg (127 lb)   SpO2 90%   General:  Normal appearing. No distress.  Pulmonary:  Clear to ausculation.  Normal effort. No rales, ronchi, or wheezing.  Cardiovascular:  Regular rate and rhythm without murmur. Carotid and radial pulses are intact and equal bilaterally.  Abdomen:  Soft, mild tenderness to palpation of multiple areas, nondistended.  Decreased bowel sounds. Liver and spleen are not palpable  Neurologic:  Grossly nonfocal  Lymph:  No cervical, supraclavicular or axillary lymph nodes are palpable  Skin:  Warm and dry.  No obvious lesions.  Musculoskeletal:  Normal gait. No extremity cyanosis, clubbing, or edema.  Psych:  Normal mood and affect. Alert and oriented x3. Judgment and insight is normal.      PLAN:    1. Drug-induced constipation  Plan at this time is for " patient to start MiraLAX 3 times a day, increase fluid intake, try 1 tablespoon of mineral oil daily, she will also will obtain an enema states that she does have assistance to perform this at home.  May consider mag citrate, senna.  Patient will obtain an abdominal x-ray to determine if there is any concern that requires surgical to the hospital.  Counseled patient extensively on reasons to go to the hospital including blood in her stool, nausea, vomiting, worsening abdominal pain, if we are unable to produce a good bowel movement in the next 1 or 2 days discussed concern that she may need to be provided with an enema.  Counseled patient extensively on the risks of developing a bowel obstruction, that this can require emergency surgery.  Patient states that she understands.  - [id number] VIEW; Future    Patient was seen for 25 minutes face to face of which, greater than 50% was spent counseling regarding the above mentioned problems and the need to be seen in the emergency room immediately for worsening symptoms..      Follow-up next week or sooner as needed depending on results. Patient is encouraged to be seen in the emergency room for chest pain, palpitations, shortness of breath, dizziness, severe abdominal pain or other concerning symptoms.    Please note that this dictation was created using voice recognition software. I have made every reasonable attempt to correct obvious errors, but I expect that there are errors of grammar and possibly content that I did not discover before finalizing the note.    Assessment/Plan:  1. Drug-induced constipation  [id number] VIEW          I have placed the below orders and discussed them with an approved delegating provider. The MA is performing the below orders under the direction of Dr. Fontenot.

## 2020-05-18 ENCOUNTER — TELEPHONE (OUTPATIENT)
Dept: MEDICAL GROUP | Facility: PHYSICIAN GROUP | Age: 75
End: 2020-05-18

## 2020-05-18 NOTE — TELEPHONE ENCOUNTER
Phone Number Called: [phone] (home)       Call outcome: Left detailed message for patient. Informed to call back with any additional questions.    Message: LVM for Pt to C/B to r/s appt type to virtual

## 2020-05-19 ENCOUNTER — TELEPHONE (OUTPATIENT)
Dept: MEDICAL GROUP | Facility: PHYSICIAN GROUP | Age: 75
End: 2020-05-19

## 2020-05-19 NOTE — TELEPHONE ENCOUNTER
Talked to patient about her appointment tomorrow and she asked if she should continue taking the Miralax .  Talked to Dr. Amaya about this and she stated that she soul continue taking it one a day and then to talk to who RX her Morphine as to what they would suggest todo.

## 2020-05-20 ENCOUNTER — APPOINTMENT (OUTPATIENT)
Dept: MEDICAL GROUP | Facility: PHYSICIAN GROUP | Age: 75
End: 2020-05-20
Payer: MEDICARE

## 2020-05-28 DIAGNOSIS — F51.01 PRIMARY INSOMNIA: ICD-10-CM

## 2020-05-28 RX ORDER — ZOLPIDEM TARTRATE 10 MG/1
TABLET ORAL
Qty: 33 EACH | Refills: 2 | Status: SHIPPED | OUTPATIENT
Start: 2020-05-28 | End: 2020-07-28 | Stop reason: SDUPTHER

## 2020-05-28 NOTE — TELEPHONE ENCOUNTER
Received request via: Patient    Was the patient seen in the last year in this department? Yes    Does the patient have an active prescription (recently filled or refills available) for medication(s) requested? No     KELLY DAVIS     Age: 74   demographics   Data as of: 05/28/2020         NARCOTIC 542    SEDATIVE 471    STIMULANT 000    NARxSCORES can range from 000 to 999. The first two digits represent the composite percentile risk based on an overall analysis of prescription drug use. The third digit represents the number of active prescriptions. The distribution of scores in the population is such that approximately 75% fall below 200, 95% fall below 500 and 99% fall below 650. The information on this report is not warranted as accurate or complete. This report is based on the search criteria supplied and the data entered by the dispensing pharmacy. For more information about any prescription, please contact the dispensing pharmacy or the prescriber. NARxSCORES and Reports are intended to aid, not replace medical decision making. None of the information presented should be used as sole justification for providing or refusing to provide medications.   [id number]  Rx Graph    Grayed out drugs could not be included in score calculations.  [x]  Narcotic  [x]  Buprenorphine  [x]  Sedative  [x]  Stimulant  [x]  Other    All PrescribersPrescribers9 - Katherine Burton8 - Glenys Bocanegra7 - [facility]6 - Sb Cabrera5 - Gus Velez4 - Lucina Tobias3 - Сергей Solorio - Bri Donis1 - Katherine VORA [id number]  Morphine MgEq (MME)  [id number]  Buprenorphine mg  [id number]  Lorazepam MgEq (LME)  [id number]  *Per CDC guidance, the MME conversion factors prescribed or provided as part of the medication-assisted treatment for opioid use disorder should not be used to benchmark against dosage thresholds meant for opioids  prescribed for pain. Buprenorphine products have no agreed upon morphine equivalency, and as partial opioid agonists, are not expected to be associated with overdose risk in the same dose-dependent manner as doses for full agonist opioids. MME = morphine milligram equivalents. LME = Lorazepam milligram equivalents. MG = dose in milligrams.   Data Analysis                                                                                 Summary  Total Prescriptions:    38    Total Prescribers:    9    Total Pharmacies:    4    Narcotics* (excluding Buprenorphine)  Current Qty:   18   Current MME/day:   40.00   30 Day Avg MME/day:   39.00   Sedatives*  Current Qty:   6   Current LME/day:   0.55   30 Day Avg LME/day:   0.59   Buprenorphine*  Current Qty:   0   Current mg/day:   0.00   30 Day Avg mg/day:   0.00   *Highlighted drugs could not be included in score calculations   Prescriptions  Total Prescriptions: 38    Total Private Pay: 6    Fill Date ID   Written Drug Qty Days Prescriber Rx # Pharmacy Refill   Daily Dose* Pymt Type      05/07/2020  1   04/27/2020  Hydrocodone-Acetamin 5-325 MG  60.00 30 Ca Cor   [id number]   [facility] ([facility]   0  10.00 [place]   05/05/2020  1   03/09/2020  Zolpidem Tartrate 10 MG Tablet  33.00 30 Me War   [id number]   [facility] (7525)   1  0.55 L[place]   04/30/2020  1   04/27/2020  Morphine Sulf Er 15 MG Tablet  60.00 30 Ca Cor   [id number]   [facility] (8532)   0  30.00 M[place]   04/07/2020  1   03/09/2020  Zolpidem Tartrate 10 MG Tablet  33.00 30 Me War   [id number]   [facility] ([facility]25)   0  0.55 L[place]   04/01/2020  1   04/01/2020  Hydrocodone-Acetamin 5-325 MG  60.00 30 Ke Natacha   [id number]   [facility] (7525)   0  10.00 M[place]   04/01/2020  1   04/01/2020  Morphine Sulf Er 15 MG Tablet  60.00 30 Ke Natacha   [id number]   [facility] (7525)   0  30.00 MME  [place]   03/27/2020  1   03/27/2020  Morphine Sulf Er 15 MG Tablet  14.00 7 Ke Dc   [id number]   [facility] (7525)   [place]  30.00 [place]   03/19/2020  2   03/18/2020  Morphine Sulf Er 15 MG Tablet  14.00 7 De Whi   [id number]   [facility] (9511)   0  30.00 M[place]   03/18/2020  3   03/18/2020  Zolpidem Tartrate 10 MG Tablet  7.00 7 De i   [id number]   [facility] (2039)   0  0.50 LME  [place]   03/11/2020  3   03/11/2020  Morphine Sulf Er 15 MG Tablet  26.00 13 Ma Can   [id number]   [facility] (2039)   0  30.00 MME  [place]   03/09/2020  1   03/09/2020  Zolpidem Tartrate 10 MG Tablet  33.00 30 Me War   [id number]   [facility] (3932)   0  0.55 LME  [place]   03/09/2020  3   03/09/2020  Oxycodone Hcl 5 MG Tablet  24.00 6 De i   [id number]   [facility] (2039)   0  30.00 MME  [place]   03/09/2020  3   03/09/2020  Zolpidem Tartrate 10 MG Tablet  7.00 7 De i   [id number]   [facility] (2039)   0  0.50 LME  [place]   03/06/2020  3   03/06/2020  Oxycodone Hcl 5 MG Tablet  11.00 3 Al Emily   [id number]   [facility] (2039)   0  27.50 MME  [place]   03/06/2020  3   03/06/2020  Zolpidem Tartrate 10 MG Tablet  5.00 5 Pe Por   [id number]   [facility] (2039)   0  0.50 LME  [place]   02/03/2020  1   12/04/2019  Zolpidem Tartrate 10 MG Tablet  33.00 30 Me War   [id number]   [facility] (8532)   2  0.55 LME  [place]   01/04/2020  1   12/04/2019  Zolpidem Tartrate 10 MG Tablet  33.00 30 Me War   [id number]   [facility] (8532)   1  0.55 LME  [place]   12/06/2019  1   12/04/2019  Zolpidem Tartrate 10 MG Tablet  33.00 30 Me War   [id number]   [facility] (8532)   0  0.55 LME  [place]

## 2020-06-04 ENCOUNTER — TELEPHONE (OUTPATIENT)
Dept: MEDICAL GROUP | Facility: PHYSICIAN GROUP | Age: 75
End: 2020-06-04

## 2020-06-04 DIAGNOSIS — M85.89 OSTEOPENIA OF MULTIPLE SITES: ICD-10-CM

## 2020-06-04 NOTE — TELEPHONE ENCOUNTER
Pt needs a new order for prolia    Pt would also like to request a form for a handicap placard, would you like pt to come in for an appt to discuss this?

## 2020-06-05 PROBLEM — S32.9XXA CLOSED FRACTURE OF PELVIS (HCC): Status: ACTIVE | Noted: 2020-03-02

## 2020-06-05 NOTE — TELEPHONE ENCOUNTER
Faxed prolia order.    Left VM for pt in regards to DMV form, and to call back and let us know if she'd like to come pick it up or have it mailed.

## 2020-06-05 NOTE — TELEPHONE ENCOUNTER
[facility]  Handicap placard form filled out.  Patient may  or we can mail it to her home.  -Katherine Burton M.D.

## 2020-06-16 ENCOUNTER — TELEPHONE (OUTPATIENT)
Dept: ONCOLOGY | Facility: MEDICAL CENTER | Age: 75
End: 2020-06-16

## 2020-06-17 ENCOUNTER — OUTPATIENT INFUSION SERVICES (OUTPATIENT)
Dept: ONCOLOGY | Facility: MEDICAL CENTER | Age: 75
End: 2020-06-17
Attending: FAMILY MEDICINE
Payer: MEDICARE

## 2020-06-17 VITALS
HEART RATE: 77 BPM | WEIGHT: 117.73 LBS | SYSTOLIC BLOOD PRESSURE: 128 MMHG | DIASTOLIC BLOOD PRESSURE: 58 MMHG | BODY MASS INDEX: 21.66 KG/M2 | OXYGEN SATURATION: 96 % | HEIGHT: 62 IN | RESPIRATION RATE: 18 BRPM | TEMPERATURE: 97.3 F

## 2020-06-17 LAB
CA-I BLD ISE-SCNC: 1.25 MMOL/L (ref 1.1–1.3)
CREAT BLD-MCNC: 0.7 MG/DL (ref 0.5–1.4)

## 2020-06-17 PROCEDURE — 96372 THER/PROPH/DIAG INJ SC/IM: CPT

## 2020-06-17 PROCEDURE — 82330 ASSAY OF CALCIUM: CPT

## 2020-06-17 PROCEDURE — 36415 COLL VENOUS BLD VENIPUNCTURE: CPT

## 2020-06-17 PROCEDURE — 82565 ASSAY OF CREATININE: CPT

## 2020-06-17 PROCEDURE — 700111 HCHG RX REV CODE 636 W/ 250 OVERRIDE (IP): Mod: JG | Performed by: FAMILY MEDICINE

## 2020-06-17 RX ADMIN — DENOSUMAB 60 MG: 60 INJECTION SUBCUTANEOUS at 15:28

## 2020-06-18 NOTE — PROGRESS NOTES
Patient arrived ambulatory to the [facility] for Prolia. Reviewed vital signs, labs, and physician order. Patient denies S&S of infection, bleeding, or dental extractions/surgeries. Lab collected via 23g butterfly to right AC, gauze and coban dressing placed. Labs within limits to receive treatment. Prolia administered to back of LUE, band aid dressing placed.  Confirmed upcoming appointment date and time with patient. Patient left the [facility] ambulatory in no sign of distress.

## 2020-07-24 NOTE — TELEPHONE ENCOUNTER
Was the patient seen in the last year in this department? Yes     Does patient have an active prescription for medications requested? No     Received Request Via: Pharmacy   Oxybutynin Pregnancy And Lactation Text: This medication is Pregnancy Category B and is considered safe during pregnancy. It is unknown if it is excreted in breast milk.

## 2020-07-27 DIAGNOSIS — E78.00 PURE HYPERCHOLESTEROLEMIA: ICD-10-CM

## 2020-07-27 DIAGNOSIS — F33.41 RECURRENT MAJOR DEPRESSIVE DISORDER, IN PARTIAL REMISSION (HCC): ICD-10-CM

## 2020-07-28 ENCOUNTER — TELEPHONE (OUTPATIENT)
Dept: MEDICAL GROUP | Facility: PHYSICIAN GROUP | Age: 75
End: 2020-07-28

## 2020-07-28 RX ORDER — CITALOPRAM HYDROBROMIDE 10 MG/1
TABLET ORAL
Qty: 90 TAB | Refills: 3 | Status: SHIPPED | OUTPATIENT
Start: 2020-07-28 | End: 2020-08-09

## 2020-07-28 RX ORDER — QUETIAPINE FUMARATE 50 MG/1
TABLET, FILM COATED ORAL
Qty: 90 TAB | Refills: 3 | Status: SHIPPED | OUTPATIENT
Start: 2020-07-28 | End: 2020-08-09

## 2020-07-28 RX ORDER — ATORVASTATIN CALCIUM 20 MG/1
20 TABLET, FILM COATED ORAL DAILY
Qty: 90 TAB | Refills: 3 | Status: SHIPPED | OUTPATIENT
Start: 2020-07-28 | End: 2020-08-09

## 2020-07-28 NOTE — TELEPHONE ENCOUNTER
From: Yasmin Zhong  To: Office of Katherine Burton M.D.  Sent: 7/27/2020 5:56 PM PDT  Subject: Medication Renewal Request    Yasmin Zhong would like a refill of the following medications:   Other - Ambian. Request pres for 35 insyead of 33. Need noevin case insurance doesn't cover 2 pill increase. Thank you!    Preferred pharmacy: [facility] [address] [place] [address]      Medication renewals requested in this message routed separately:     atorvastatin (LIPITOR) 20 MG Tab [Katherine Burton M.D.]     quetiapine (SEROQUEL) 50 MG tablet [Katherine Burton M.D.]       citalopram (CELEXA) 10 MG tablet [REJI Guadarrama.P.RMisaelN.]

## 2020-07-28 NOTE — TELEPHONE ENCOUNTER
VOICEMAIL  1. Caller Name: [facility]                      Call Back Number: [phone]     2. Message: Recent refill sent in Sig did not have the days supply number.  Sig has a total quantity of 33 tablets, how many days is that for?

## 2020-07-28 NOTE — TELEPHONE ENCOUNTER
Phone Number Called: [facility] [phone]     Call outcome: Sydney @ [facility] informed.

## 2020-08-09 ENCOUNTER — HOSPITAL ENCOUNTER (OUTPATIENT)
Dept: RADIOLOGY | Facility: MEDICAL CENTER | Age: 75
End: 2020-08-09
Attending: FAMILY MEDICINE
Payer: MEDICARE

## 2020-08-09 ENCOUNTER — OFFICE VISIT (OUTPATIENT)
Dept: URGENT CARE | Facility: PHYSICIAN GROUP | Age: 75
End: 2020-08-09
Payer: MEDICARE

## 2020-08-09 ENCOUNTER — HOSPITAL ENCOUNTER (EMERGENCY)
Facility: MEDICAL CENTER | Age: 75
End: 2020-08-09
Attending: EMERGENCY MEDICINE
Payer: MEDICARE

## 2020-08-09 ENCOUNTER — APPOINTMENT (OUTPATIENT)
Dept: RADIOLOGY | Facility: MEDICAL CENTER | Age: 75
End: 2020-08-09
Attending: EMERGENCY MEDICINE
Payer: MEDICARE

## 2020-08-09 VITALS
HEART RATE: 94 BPM | TEMPERATURE: 97.7 F | HEIGHT: 65 IN | OXYGEN SATURATION: 90 % | RESPIRATION RATE: 14 BRPM | BODY MASS INDEX: 19.49 KG/M2 | SYSTOLIC BLOOD PRESSURE: 112 MMHG | DIASTOLIC BLOOD PRESSURE: 62 MMHG | WEIGHT: 117 LBS

## 2020-08-09 VITALS
OXYGEN SATURATION: 93 % | WEIGHT: 116.84 LBS | DIASTOLIC BLOOD PRESSURE: 79 MMHG | SYSTOLIC BLOOD PRESSURE: 118 MMHG | RESPIRATION RATE: 16 BRPM | HEIGHT: 65 IN | HEART RATE: 95 BPM | BODY MASS INDEX: 19.47 KG/M2 | TEMPERATURE: 97.9 F

## 2020-08-09 DIAGNOSIS — S22.42XA CLOSED FRACTURE OF MULTIPLE RIBS OF LEFT SIDE, INITIAL ENCOUNTER: ICD-10-CM
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
## 2020-08-09 DIAGNOSIS — R07.81 RIB PAIN: ICD-10-CM

## 2020-08-09 PROCEDURE — 71101 X-RAY EXAM UNILAT RIBS/CHEST: CPT | Mod: LT

## 2020-08-09 PROCEDURE — 99214 OFFICE O/P EST MOD 30 MIN: CPT | Performed by: FAMILY MEDICINE

## 2020-08-09 PROCEDURE — 71250 CT THORAX DX C-: CPT | Mod: MG

## 2020-08-09 PROCEDURE — 99284 EMERGENCY DEPT VISIT MOD MDM: CPT

## 2020-08-09 PROCEDURE — 700101 HCHG RX REV CODE 250: Performed by: EMERGENCY MEDICINE

## 2020-08-09 RX ORDER — LIDOCAINE 50 MG/G
1 PATCH TOPICAL ONCE
Status: DISCONTINUED | OUTPATIENT
Start: 2020-08-09 | End: 2020-08-09 | Stop reason: HOSPADM

## 2020-08-09 RX ORDER — GABAPENTIN 100 MG/1
100 CAPSULE ORAL 3 TIMES DAILY
Qty: 90 CAP | Refills: 0 | Status: SHIPPED | OUTPATIENT
Start: 2020-08-09 | End: 2020-09-02 | Stop reason: SDUPTHER

## 2020-08-09 RX ORDER — MORPHINE SULFATE 15 MG/1
15 TABLET, FILM COATED, EXTENDED RELEASE ORAL PRN
Status: SHIPPED | COMMUNITY
Start: 2020-05-29 | End: 2020-08-21

## 2020-08-09 RX ORDER — OXYCODONE HYDROCHLORIDE 5 MG/1
5 TABLET ORAL EVERY 4 HOURS PRN
Qty: 10 TAB | Refills: 0 | Status: SHIPPED | OUTPATIENT
Start: 2020-08-09 | End: 2020-08-12 | Stop reason: SDUPTHER

## 2020-08-09 RX ORDER — ZOLPIDEM TARTRATE 10 MG/1
10 TABLET ORAL
Status: SHIPPED | COMMUNITY
End: 2020-08-26 | Stop reason: SDUPTHER

## 2020-08-09 RX ORDER — ATORVASTATIN CALCIUM 20 MG/1
20 TABLET, FILM COATED ORAL NIGHTLY
Status: SHIPPED | COMMUNITY
End: 2020-11-21 | Stop reason: SDUPTHER

## 2020-08-09 RX ORDER — QUETIAPINE FUMARATE 50 MG/1
50 TABLET, FILM COATED ORAL EVERY EVENING
Status: SHIPPED | COMMUNITY
End: 2020-10-12

## 2020-08-09 RX ORDER — CITALOPRAM HYDROBROMIDE 10 MG/1
10 TABLET ORAL EVERY EVENING
Status: SHIPPED | COMMUNITY
End: 2020-10-12

## 2020-08-09 RX ORDER — OXYBUTYNIN CHLORIDE 10 MG/1
10 TABLET, EXTENDED RELEASE ORAL EVERY EVENING
Status: SHIPPED | COMMUNITY
End: 2020-11-16

## 2020-08-09 RX ORDER — PROPRANOLOL HYDROCHLORIDE 10 MG/1
10 TABLET ORAL EVERY EVENING
Status: SHIPPED | COMMUNITY
End: 2020-11-16

## 2020-08-09 RX ORDER — BUSPIRONE HYDROCHLORIDE 10 MG/1
10-20 TABLET ORAL 2 TIMES DAILY
Status: SHIPPED | COMMUNITY
End: 2020-08-26 | Stop reason: SDUPTHER

## 2020-08-09 RX ADMIN — LIDOCAINE 1 PATCH: 50 PATCH TOPICAL at 17:44

## 2020-08-09 ASSESSMENT — ENCOUNTER SYMPTOMS: FALLS: 1

## 2020-08-09 ASSESSMENT — PAIN SCALES - GENERAL: PAINLEVEL: 10=SEVERE PAIN

## 2020-08-09 NOTE — ED NOTES
Med rec updated and complete  Allergies reviewed  Interviewed pt with friend at bedside with permission from pt.  Pt reports that she is not taking her NORCO 5-325MG, and that she only takes her MORPHINE ER 15MG when she as needed.   Pt reports no antibiotics in the last 2 weeks

## 2020-08-09 NOTE — ED TRIAGE NOTES
"Pt is referred to our facility from [facility] for further management of the following discovered on X rays, after falling yesterday against a coffee table.    IMPRESSION:     1.  Multiple left inferolateral rib fracture     2.  Most of the rib fracture probably acute although some are old     3.  No pneumothorax     4.  Bilateral atelectasis     5.  Large hiatal hernia  Chief Complaint   Patient presents with   • T-5000 FALL   • Rib Pain     /77   Pulse 90   Temp 36.6 °C (97.9 °F) (Temporal)   Resp 20   Ht 1.651 m (5' 5\")   Wt 53 kg (116 lb 13.5 oz)   SpO2 92%   BMI 19.44 kg/m²     "

## 2020-08-10 ENCOUNTER — PATIENT MESSAGE (OUTPATIENT)
Dept: MEDICAL GROUP | Facility: PHYSICIAN GROUP | Age: 75
End: 2020-08-10

## 2020-08-10 DIAGNOSIS — R91.8 LUNG MASS: ICD-10-CM

## 2020-08-10 DIAGNOSIS — S22.49XA CLOSED FRACTURE OF MULTIPLE RIBS, UNSPECIFIED LATERALITY, INITIAL ENCOUNTER: ICD-10-CM

## 2020-08-10 NOTE — ED NOTES
Pt resting comfortably in the wheelchair awaiting CT results. Denies needs at this time. Friend at bedside.

## 2020-08-10 NOTE — DISCHARGE INSTRUCTIONS
You have a pulmonary nodule that is 2.2 x 1.5 cm concerning for malignancy that you need follow-up for.  You also have a large hiatal hernia.    For your rib fractures you can take 400 mg of ibuprofen and 650 mg of Tylenol together every 4-6 hours.  You should take this every 4-6 hours for the next number of days you can take oxycodone as needed on top of this and take gabapentin 3 times a day scheduled as well.

## 2020-08-10 NOTE — ED NOTES
Pt given discharge instructions. RN answered questions. VSS. Pt in wheelchair out to [facility].

## 2020-08-10 NOTE — ED PROVIDER NOTES
ED Provider Note    This patient was cared for during the COVID-19 pandemic. History and physical exam may be limited/truncated by the inherent challenges of PPE and the need to decrease staff exposure to novel coronavirus. Some aspects of disease management may be different to protect staff and help slow the spread of disease. I verified that, if possible, the patient was wearing a mask and I was wearing appropriate PPE every time I encountered the patient.       CHIEF COMPLAINT  Chief Complaint   Patient presents with   • T-5000 FALL   • Rib Pain       HPI  Yasmin Zhong is a 74 y.o. female who presents with rib pain. Yesterday was putting on her shoes and fell into a coffee table. She went to urgent care today for having rib pain. She was noted to have a oxygen saturation of 90% at .  She has more pain with breathing and coughing and movement. She took a morphine pain pill this morning at 8 am.  She does not typically take pain medication she was given them after her last fall.  She lives alone. She denies feeling she is short of breath just has pain with inspiration.        REVIEW OF SYSTEMS  positive for rib pain, negative for fevers, chills sweating. All other systems are negative.     PAST MEDICAL HISTORY   has a past medical history of Anxiety, Depression, History of respiratory failure, Hyperlipidemia, Hypertension, Insomnia, Peripheral neuropathy, and Pulmonary fibrosis (HCC).    SOCIAL HISTORY  Social History     Tobacco Use   • Smoking status: Former Smoker     Packs/day: 0.25     Years: 20.00     Pack years: 5.00     Types: Cigarettes     Quit date: 3/11/1985     Years since quittin.4   • Smokeless tobacco: Never Used   Substance and Sexual Activity   • Alcohol use: Not Currently     Alcohol/week: 0.0 oz     Comment: once a week    • Drug use: No   • Sexual activity: Not Currently       SURGICAL HISTORY   has a past surgical history that includes other and hip hemiarthroplasty  "(4/25/2015).    CURRENT MEDICATIONS  Home Medications     Reviewed by Minda Spears (Pharmacy Tech) on 08/09/20 at 1625  Med List Status: Complete   Medication Last Dose Status   atorvastatin (LIPITOR) 20 MG Tab 8/8/2020 Active   BIOTIN PO 8/9/2020 Active   busPIRone (BUSPAR) 10 MG Tab tablet 8/9/2020 Active   Calcium Citrate-Vitamin D (CALCIUM + D PO) 8/9/2020 Active   Cholecalciferol (VITAMIN D3 PO) 8/9/2020 Active   citalopram (CELEXA) 10 MG tablet 8/8/2020 Active   Cyanocobalamin (B-12 PO) 8/9/2020 Active   MAGNESIUM PO 8/9/2020 Active   morphine ER (MS CONTIN) 15 MG Tab CR tablet 8/9/2020 Active   oxybutynin SR (DITROPAN-XL) 10 MG CR tablet 8/8/2020 Active   Prenatal Vit-Fe Fumarate-FA (PRENATAL VITAMIN PO) 8/9/2020 Active   propranolol (INDERAL) 10 MG Tab 8/8/2020 Active   quetiapine (SEROQUEL) 50 MG tablet 8/8/2020 Active   VITAMIN E PO 8/9/2020 Active   zolpidem (AMBIEN) 10 MG Tab 8/8/2020 Active                ALLERGIES  No Known Allergies    PHYSICAL EXAM  VITAL SIGNS: /79   Pulse 95   Temp 36.6 °C (97.9 °F) (Temporal)   Resp 16   Ht 1.651 m (5' 5\")   Wt 53 kg (116 lb 13.5 oz)   SpO2 93%   BMI 19.44 kg/m² .  Constitutional: Alert in no apparent distress.  HENT: No signs of trauma, Bilateral external ears normal, Nose normal.   Eyes: Pupils are equal and reactive, Conjunctiva normal, Non-icteric.   Neck: Normal range of motion, No tenderness, Supple, No stridor.   Cardiovascular: Regular rate and rhythm, no murmurs.   Thorax & Lungs: decreased breath sounds on left, sternal tenderness, No respiratory distress, No wheezing, No chest tenderness.   Skin: Warm, Dry, No erythema, No rash.   Musculoskeletal:  no major deformities noted.   Neurologic: Alert,  No focal deficits noted.   Psychiatric: Affect normal, Judgment normal, Mood normal.       DIAGNOSTIC STUDIES / PROCEDURES        RADIOLOGY  CT-CHEST (THORAX) W/O   Final Result      1.  Numerous acute rib fracture involving the left 4th " through 9th ribs in a lateral location and left 7th through 10th ribs in a posterior medial location      2.  Enlargement of left lower lobe pulmonary nodule/mass now measuring 2.2 x 1.5 cm and highly suspicious for malignancy      3.  Interstitial lung disease      4.  Extremely large hiatal hernia containing the majority or the entire stomach, small bowel, and transverse colon      5.  Numerous thoracic and lumbar compression fracture involving T3, T4, T5, T7, T9, T10, T12, L1, L2, and L3. Some are old and some are new since 2015      6.  Aortic and coronary atherosclerotic plaque      Fleischner Society pulmonary nodule recommendations:                Medical records reviewed for continuity of care.  X-ray from urgent care was reviewed and noted multiple rib fractures    Differential Diagnosis includes but is not limited to: Pneumothorax atelectasis rib fractures    COURSE & MEDICAL DECISION MAKING  Pertinent Labs & Imaging studies reviewed. (See chart for details)  This is a 74-year-old female that presents with chest wall pain from multiple rib fractures.  CT scan will be performed to further evaluate these fractures and look for any underlying lung injury.    5:59 PM  I updated the patient on her CT findings she has numerous rib fractures involving ribs 4 through 9 and 7 through 10 on the left.  She also has a suspicious pulmonary nodule concerning for malignancy and a very large hiatal hernia.  She has not been made aware of these findings in the past and I have updated her regarding all of these.  Given the numerous rib fractures I will talk to trauma regarding management.    I spoke with Dr. Mendoza, trauma who said inpatient management really depends on her clinical condition.  I spoke to the patient at length she is reliable she says that she was rushing up the stairs when this occurred.  She does live alone but would prefer to go home.  She is in no acute respiratory distress she is not hypoxic she is  93% on room air.  She was given incentive spirometry as well as gabapentin and short prescription for oxycodone for pain.  She was recommended to take ibuprofen and Tylenol scheduled as well as the gabapentin and take oxycodone only as needed as well as using local lidocaine patches.  Patient understands that she is at increased risk for developing pneumonia and will present to the [facility] for any worsening difficulty breathing or shortness of breath.    Patient was also updated about her pulmonary nodule that is reported to be highly suspicious for malignancy.  She also has a large hiatal hernia.  She will talk to her primary care doctor regarding these issues seen on CT scan to determine further management and work-up of them.     The patient will return for new or worsening symptoms and is stable at the time of discharge. Patient was given return precautions. Patient and/or family member verbalizes understanding and will comply.    DISPOSITION:  Patient will be discharged home in stable condition.    FOLLOW UP:  Katherine Burton M.D.  [address]  [place] [id number] [phone]      You have a suspicious appearing pulmonary nodule in the left lower lobe that needs follow-up.  You also have a very large hiatal hernia.    [facility], Emergency Dept  [address]  [place] [id number] [phone]    Return to the [facility] location if you are having worsening pain that is unable to be controlled with the medications you have been given difficulty breathing fevers or other concerns.  Use the incentive spirometer 10 times an hour while you are awake      OUTPATIENT MEDICATIONS:  Discharge Medication List as of 8/9/2020  6:44 PM      START taking these medications    Details   gabapentin (NEURONTIN) 100 MG Cap Take 1 Cap by mouth 3 times a day., Disp-90 Cap,R-0, Print Rx Paper      oxyCODONE immediate-release (ROXICODONE) 5 MG Tab Take 1 Tab by mouth every four  hours as needed for Severe Pain for up to 3 days., Disp-10 Tab,R-0, Print Rx Paper                 FINAL IMPRESSION  1. Closed fracture of multiple ribs of left side, initial encounter  oxyCODONE immediate-release (ROXICODONE) 5 MG Tab       2.   3.    This dictation has been creating using voice recognition software. The accuracy of the dictation is limited the abilities of the software.  I expect there may be some errors of grammar and possibly content. I made every attempt to manually correct the errors within my dictation. However errors related to this voice recognition software may still exist and should be interpreted within the appropriate context.      The note accurately reflects work and decisions made by me.  Linda Roper M.D.  8/9/2020  10:08 PM

## 2020-08-13 ENCOUNTER — OFFICE VISIT (OUTPATIENT)
Dept: HEMATOLOGY ONCOLOGY | Facility: MEDICAL CENTER | Age: 75
End: 2020-08-13
Payer: MEDICARE

## 2020-08-13 VITALS
DIASTOLIC BLOOD PRESSURE: 62 MMHG | HEIGHT: 68 IN | BODY MASS INDEX: 17.93 KG/M2 | WEIGHT: 118.28 LBS | SYSTOLIC BLOOD PRESSURE: 112 MMHG | RESPIRATION RATE: 16 BRPM | TEMPERATURE: 98.3 F | OXYGEN SATURATION: 93 % | HEART RATE: 63 BPM

## 2020-08-13 DIAGNOSIS — R91.1 LUNG NODULE: ICD-10-CM

## 2020-08-13 PROCEDURE — 99204 OFFICE O/P NEW MOD 45 MIN: CPT | Performed by: NURSE PRACTITIONER

## 2020-08-13 ASSESSMENT — ENCOUNTER SYMPTOMS
NAUSEA: 0
TINGLING: 1
HEADACHES: 0
SPUTUM PRODUCTION: 0
PALPITATIONS: 0
DIZZINESS: 0
INSOMNIA: 0
DIAPHORESIS: 0
FEVER: 0
WHEEZING: 0
WEIGHT LOSS: 1
COUGH: 0
VOMITING: 0
CONSTIPATION: 1
SORE THROAT: 0
SHORTNESS OF BREATH: 0
HEMOPTYSIS: 0
CHILLS: 0
DIARRHEA: 0

## 2020-08-13 NOTE — PROGRESS NOTES
Subjective:      Yasmin Zhong is a 74 y.o. female who presents as a New Patient (NP/ [facility]/ Lung mass/Katherine Burton).        HPI    Patient referred to me, Intake Oncology Coordinator by her PCP Dr. Burton for a lung nodule.  Patient is accompanied by her friend for today's visit.    Patient recently had a fall at home after she was attempting to tie her shoes and fell into her coffee table.  She ended up having 7 fractures of the ribs from this fall.  After the fall patient was seen in urgent care due to the severity of pain she had been experiencing.  She did undergo an x-ray of the ribs which did note the fractures.  However it does appear that patient was having low oxygenation levels in urgent care and therefore was transferred to the emergency department for further evaluation.  During her emergency room visit on August 9, 2020 patient did undergo CT chest without contrast.  Again numerous acute rib fractures noted involving the left fourth through ninth ribs in the lateral location and the left seventh through 10th ribs in the posterior medial location.  Also on the CT scan did show enlargement of the left lower lobe pulmonary nodule that is now measuring 2.2 x 1.5 cm in size which is concerning for malignancy.  I did personally review the CT report in detail with the patient, as well as reviewed the images in detail with her as well.    Patient did state that she fell downstairs in March 2020 and did undergo spine fractures as well.  She complains of peripheral neuropathy in her feet which may be part of the cause of her frequent falls.    Patient did mention that she has known of a lung nodule dating back to 2015.  Patient stated that she had severe respiratory illness which required 6 months hospitalization for interstitial lung disease.  Therefore she has had continued monitoring of her lungs with CT chest.  CT chest completed on February 4, 2015 noted a left lower lobe lung nodule that was unchanged  in size dating back to .  Reading report measured the lung nodule to be 19 x 13 mm in size.  CT scan in 2016 noted the left lower lobe nodule to still be unchanged, measuring 20 x 13 mm in size.  In 2020 after her fall downstairs she had a CT of the T-spine.  The CT scan of the T-spine showed the left lower lobe pulmonary nodule measuring 16 mm in size.  Reading radiologist thought this was likely to be benign as this is been present on previous studies dating back to .  However, now with the most recent CT scan noting the left lower lobe pulmonary nodule has increased in size to 22 x 15 mm concerning for possible malignancy.    Prior to patient's most recent fall she had noticed an increase in some weight loss.  She stated the weight loss has been progressive over the last year.  She denies any fevers chills fatigue.  She denies any congestion sore throat, coughing, wheezing shortness of breath.  She does have a chest pain currently from the rib fractures but denies any heart palpitations.  Patient has been on narcotic use since her previous fall in 2020, therefore she does have some constipation.  She denies any nausea vomiting, diarrhea.  She voids without difficulty denies any rashes or itching.  She does have the peripheral neuropathy in her feet, denies any headaches dizziness or trouble sleeping.    Please see past medical and surgical history below.    Is a former smoker, quit in .  She stated she smoked for approximately 15 years and average of 1/4pack/day.    Patient does have a family history of cancer in her mother who  of bladder cancer.  Her father had lung cancer and a maternal uncle had lung cancer.    No Known Allergies  Current Outpatient Medications on File Prior to Visit   Medication Sig Dispense Refill   • oxyCODONE immediate-release (ROXICODONE) 5 MG Tab Take 1 Tab by mouth every 8 hours as needed for Severe Pain for up to 7 days. 21 Tab 0   • atorvastatin  (LIPITOR) 20 MG Tab Take 20 mg by mouth every evening.     • BIOTIN PO Take 1 Tab by mouth every day.     • busPIRone (BUSPAR) 10 MG Tab tablet Take 10-20 mg by mouth 2 times a day. Pt takes 10MG in AM and 20MG HS     • Calcium Citrate-Vitamin D (CALCIUM + D PO) Take 1 Tab by mouth every day.     • citalopram (CELEXA) 10 MG tablet Take 10 mg by mouth every evening.     • Cyanocobalamin (B-12 PO) Take 1 Tab by mouth every day.     • MAGNESIUM PO Take 1 Cap by mouth every day.     • oxybutynin SR (DITROPAN-XL) 10 MG CR tablet Take 10 mg by mouth every evening.     • propranolol (INDERAL) 10 MG Tab Take 10 mg by mouth every evening.     • quetiapine (SEROQUEL) 50 MG tablet Take 50 mg by mouth every evening.     • Cholecalciferol (VITAMIN D3 PO) Take 1 Cap by mouth every day.     • VITAMIN E PO Take 1 Cap by mouth every day.     • zolpidem (AMBIEN) 10 MG Tab Take 10 mg by mouth every bedtime.     • gabapentin (NEURONTIN) 100 MG Cap Take 1 Cap by mouth 3 times a day. 90 Cap 0   • Prenatal Vit-Fe Fumarate-FA (PRENATAL VITAMIN PO) Take 1 Tab by mouth every day.     • morphine ER (MS CONTIN) 15 MG Tab CR tablet Take 15 mg by mouth as needed (For pain).       No current facility-administered medications on file prior to visit.      Past Medical History:   Diagnosis Date   • Anxiety    • Depression    • History of respiratory failure     6-month hospitalization in 2014   • Hyperlipidemia    • Hypertension     denies   • Insomnia    • Peripheral neuropathy    • Pulmonary fibrosis (HCC)      Past Surgical History:   Procedure Laterality Date   • HIP HEMIARTHROPLASTY  4/25/2015    Performed by Raymond Lantigua M.D. at SURGERY [facility] ORS   • OTHER      breast reduction       Family History   Problem Relation Age of Onset   • Cancer Mother         Bladder cancer, hx. of smoking   • Diabetes Mother    • Heart Attack Father    • Cancer Father         Colon    • Cancer Maternal Uncle         Lung   • Diabetes Maternal Uncle     • Diabetes Maternal Aunt        Social History     Socioeconomic History   • Marital status: Single     Spouse name: Not on file   • Number of children: 0   • Years of education: Not on file   • Highest education level: Not on file   Occupational History   • Not on file   Social Needs   • Financial resource strain: Not on file   • Food insecurity     Worry: Not on file     Inability: Not on file   • Transportation needs     Medical: Not on file     Non-medical: Not on file   Tobacco Use   • Smoking status: Former Smoker     Packs/day: 0.25     Years: 15.00     Pack years: 3.75     Types: Cigarettes     Quit date: 3/11/1985     Years since quittin.4   • Smokeless tobacco: Never Used   • Tobacco comment: passive smoke exposure her entire life   Substance and Sexual Activity   • Alcohol use: Not Currently     Alcohol/week: 0.0 oz     Comment: once a week    • Drug use: No   • Sexual activity: Not Currently   Lifestyle   • Physical activity     Days per week: Not on file     Minutes per session: Not on file   • Stress: Not on file   Relationships   • Social connections     Talks on phone: Not on file     Gets together: Not on file     Attends Jain service: Not on file     Active member of club or organization: Not on file     Attends meetings of clubs or organizations: Not on file     Relationship status: Not on file   • Intimate partner violence     Fear of current or ex partner: Not on file     Emotionally abused: Not on file     Physically abused: Not on file     Forced sexual activity: Not on file   Other Topics Concern   • Not on file   Social History Narrative   • Not on file           Review of Systems   Constitutional: Positive for weight loss. Negative for chills, diaphoresis, fever and malaise/fatigue.   HENT: Negative for congestion and sore throat.    Respiratory: Negative for cough, hemoptysis, sputum production, shortness of breath and wheezing.    Cardiovascular: Positive for chest pain (from  "rib fractures). Negative for palpitations.   Gastrointestinal: Positive for constipation (from narcotic use). Negative for diarrhea, nausea and vomiting.   Genitourinary: Negative for dysuria.   Musculoskeletal:        Rib pain from fractures   Skin: Negative for itching and rash.   Neurological: Positive for tingling (feet). Negative for dizziness and headaches.   Psychiatric/Behavioral: The patient does not have insomnia.           Objective:     /62 (Patient Position: Sitting, BP Cuff Size: Adult)   Pulse 63   Temp 36.8 °C (98.3 °F) (Temporal)   Resp 16   Ht 1.72 m (5' 7.72\")   Wt 53.7 kg (118 lb 4.4 oz)   SpO2 93%   BMI 18.13 kg/m²      Physical Exam  Vitals signs reviewed.   Constitutional:       General: She is not in acute distress.     Appearance: She is well-developed. She is not diaphoretic.   HENT:      Head: Normocephalic and atraumatic.      Mouth/Throat:      Mouth: Mucous membranes are dry.      Pharynx: Oropharynx is clear. No oropharyngeal exudate or posterior oropharyngeal erythema.   Eyes:      General: No scleral icterus.        Right eye: No discharge.         Left eye: No discharge.      Conjunctiva/sclera: Conjunctivae normal.      Pupils: Pupils are equal, round, and reactive to light.   Neck:      Musculoskeletal: Normal range of motion and neck supple.      Thyroid: No thyromegaly.   Cardiovascular:      Rate and Rhythm: Normal rate and regular rhythm.      Heart sounds: Normal heart sounds. No murmur. No friction rub. No gallop.    Pulmonary:      Effort: Pulmonary effort is normal. No respiratory distress.      Breath sounds: No wheezing.      Comments: Coarse lung sounds in bilateral lower lobes  Abdominal:      General: Bowel sounds are normal. There is no distension.      Palpations: Abdomen is soft.      Tenderness: There is no abdominal tenderness.   Musculoskeletal:         General: Tenderness present.      Comments: Able to ambulate very slowly, however she is in a " wheelchair due to distance to walk to clinic.  She does have significant pain on the chest wall location from her acute rib fractures.   Lymphadenopathy:      Head:      Right side of head: No submental, submandibular, tonsillar, preauricular, posterior auricular or occipital adenopathy.      Left side of head: No submental, submandibular, tonsillar, preauricular, posterior auricular or occipital adenopathy.      Cervical: No cervical adenopathy.      Right cervical: No superficial, deep or posterior cervical adenopathy.     Left cervical: No superficial, deep or posterior cervical adenopathy.      Upper Body:      Right upper body: No supraclavicular or axillary adenopathy.      Left upper body: No supraclavicular or axillary adenopathy.   Skin:     General: Skin is warm and dry.      Coloration: Skin is not pale.      Findings: No erythema or rash.   Neurological:      Mental Status: She is alert and oriented to person, place, and time.   Psychiatric:         Mood and Affect: Mood normal.         Behavior: Behavior normal.            Ct-chest (thorax) W/o    Result Date: 8/9/2020 8/9/2020 4:50 PM HISTORY/REASON FOR EXAM:  rib fractures. TECHNIQUE/EXAM DESCRIPTION: CT scan of the chest without contrast. Noncontrast helical scanning of the chest was obtained from the lung apices through the adrenal glands. Low dose optimization technique was utilized for this CT exam including automated exposure control and adjustment of the mA and/or kV according to patient size. COMPARISON: None. FINDINGS: Osseous structures: There are fractures of the left 4th, 5th, 6th,, 8th, 9th ribs in a lateral location. There are fractures of the left 7th through 10th ribs and a posteromedial location. There are numerous thoracic compression fracture. These involve T3, T4, T5, T7, T9, T10, T12, L1, L2, and L3. Comparing to CT 9/14/2015 minute-year-old and some are new. There is an old sternal fracture. LUNGS: The lungs are abnormal. There  are multiple peripheral interstitial densities consistent with interstitial lung disease. There is a left lower lobe mass. It measures 2.2 x 1.5 cm and previously measured 1.1 x 0.8 cm. This finding is highly suspicious for malignancy. There is atelectasis at both lung bases, left greater than right. MEDIASTINUM: There is no adenopathy or mass within the axilla, mediastinum, or nasir. AORTA: There is aortic atherosclerotic plaque. PLEURA: There no pleural effusion or pneumothoraces. ABDOMEN: There is an extremely large hiatal hernia. It contains the majority or all of the stomach, small bowel, and a portion of the transverse colon.     1.  Numerous acute rib fracture involving the left 4th through 9th ribs in a lateral location and left 7th through 10th ribs in a posterior medial location 2.  Enlargement of left lower lobe pulmonary nodule/mass now measuring 2.2 x 1.5 cm and highly suspicious for malignancy 3.  Interstitial lung disease 4.  Extremely large hiatal hernia containing the majority or the entire stomach, small bowel, and transverse colon 5.  Numerous thoracic and lumbar compression fracture involving T3, T4, T5, T7, T9, T10, T12, L1, L2, and L3. Some are old and some are new since 2015 6.  Aortic and coronary atherosclerotic plaque Fleischner Society pulmonary nodule recommendations:          Assessment/Plan:        1. Lung nodule  [id number] BASE TO MID-THIGH     Plan  1.  Patient with a left lower lobe lung nodule that has been monitored closely over the last 5 years.  Most recently it does appear that the lung nodule has slightly grown in size now measuring 22 x 15 mm.  The most previous CT scan stated that this measured approximately 20 x 13 mm in size.  Based on the increase in size there is suspicion that this may be malignancy.  I will go ahead and proceed with a PET/CT for further evaluation and have the patient follow-up with me in the clinic to review those results and discuss further plan  of care at that time.    Patient did verbalize understanding and is in agreement with the plan.      Please note that this dictation was created using voice recognition software. I have made every reasonable attempt to correct obvious errors, but I expect that there are errors of grammar and possibly content that I did not discover before finalizing the note.

## 2020-08-21 ENCOUNTER — TELEMEDICINE (OUTPATIENT)
Dept: MEDICAL GROUP | Facility: PHYSICIAN GROUP | Age: 75
End: 2020-08-21
Payer: MEDICARE

## 2020-08-21 VITALS — HEIGHT: 68 IN | WEIGHT: 118 LBS | BODY MASS INDEX: 17.88 KG/M2

## 2020-08-21 DIAGNOSIS — S22.42XD CLOSED FRACTURE OF MULTIPLE RIBS OF LEFT SIDE WITH ROUTINE HEALING, SUBSEQUENT ENCOUNTER: ICD-10-CM

## 2020-08-21 DIAGNOSIS — R91.8 LUNG MASS: ICD-10-CM

## 2020-08-21 PROCEDURE — 99214 OFFICE O/P EST MOD 30 MIN: CPT | Mod: 95,CR | Performed by: FAMILY MEDICINE

## 2020-08-21 RX ORDER — OXYCODONE HYDROCHLORIDE 5 MG/1
5 TABLET ORAL EVERY 8 HOURS PRN
Qty: 42 TAB | Refills: 0 | Status: SHIPPED | OUTPATIENT
Start: 2020-08-21 | End: 2020-09-02

## 2020-08-21 NOTE — PROGRESS NOTES
Virtual Visit: Established Patient   This visit was conducted via Momo using secure and encrypted videoconferencing technology. The patient was in a private location in the state of [place].      The patient's identity was confirmed and verbal consent was obtained for this virtual visit.    Subjective:   CC: follow-up rib fractures and lung mass  Chief Complaint   Patient presents with   • Medication Management     pain med     Yasmin Zhong is a 74 y.o. female presenting for evaluation and management of:    Seen in ED after a fall on 8/9/2020.  Imaging showed 7 rib fractures on the left side and an enlarging lung mass.  She has been breathing well, able to tolerate food and water.  She was treating her pain with oxycodone 5mg 2-3 times a day and has since run out of her short prescription.  She contacted our office earlier this week requesting an appointment for a refill.    Of note, she was taking morphine recently after a hip fracture.  She has been off this medication for several weeks.    The oxycodone medication is helping with her pain.  She has friends who are checking in on her and helping her with groceries and bathing.  She denies any adverse effects from the pain medication.     She  reports previous alcohol use.  She  reports no history of drug use.     History of controlled substance used in a way other than prescribed? No     Any early refills of a controlled substance: No  History of lost or stolen controlled substance prescription: No  Any aberrant behavior or intoxication while on a controlled substance: No  Has the patient self-modified their dose or frequency of the medication :No  Compliant with treatment recommendations and plan: Yes  Any major health change to the patient: Yes undergoing work up for lung mass  Concerns for misuse, abuse or addiction: No  /NarxCheck report reviewed: Yes  History of abnormal drug screening: No  I have assessed the patient’s risk for abuse,  dependency, and addiction using the validated Opioid Risk Tool.     Opioid Risk Score: 3      Interpretation of Opioid Risk Score   Score 0-3 = Low risk of abuse. Do UDS at least once per year.  Score 4-7 = Moderate risk of abuse. Do UDS 1-4 times per year.  Score 8+ = High risk of abuse. Refer to specialist.     I have conducted a physical exam and documented findings.     I certify that I have obtained and reviewed her medical history. I have also made a good eryn effort to obtain applicable records from other providers who have treated the patient.  I have reviewed the patient's prescription history as maintained by the [place] Prescription Monitoring Program.      Given the above, I believe the benefits of controlled substance therapy outweigh the risks. The reasons for prescribing controlled substances include non-narcotic, oral analgesic alternatives have been inadequate for pain control. Accordingly, I have discussed the risk and benefits, treatment plan, and alternative therapies with the patient. Patient was advised that this medicine is intended for short term (no more than 14 days)/intermittent use only and not intended to be an ongoing prescription.    ROS   Denies any recent fevers or chills. No nausea or vomiting. No chest pains or shortness of breath.     No Known Allergies    Current medicines (including changes today)  Current Outpatient Medications   Medication Sig Dispense Refill   • oxyCODONE immediate-release (ROXICODONE) 5 MG Tab Take 1 Tab by mouth every 8 hours as needed for Severe Pain for up to 14 days. 42 Tab 0   • atorvastatin (LIPITOR) 20 MG Tab Take 20 mg by mouth every evening.     • BIOTIN PO Take 1 Tab by mouth every day.     • busPIRone (BUSPAR) 10 MG Tab tablet Take 10-20 mg by mouth 2 times a day. Pt takes 10MG in AM and 20MG HS     • Calcium Citrate-Vitamin D (CALCIUM + D PO) Take 1 Tab by mouth every day.     • citalopram (CELEXA) 10 MG tablet Take 10 mg by mouth every evening.   "   • Cyanocobalamin (B-12 PO) Take 1 Tab by mouth every day.     • MAGNESIUM PO Take 1 Cap by mouth every day.     • oxybutynin SR (DITROPAN-XL) 10 MG CR tablet Take 10 mg by mouth every evening.     • propranolol (INDERAL) 10 MG Tab Take 10 mg by mouth every evening.     • quetiapine (SEROQUEL) 50 MG tablet Take 50 mg by mouth every evening.     • Cholecalciferol (VITAMIN D3 PO) Take 1 Cap by mouth every day.     • VITAMIN E PO Take 1 Cap by mouth every day.     • zolpidem (AMBIEN) 10 MG Tab Take 10 mg by mouth every bedtime.     • gabapentin (NEURONTIN) 100 MG Cap Take 1 Cap by mouth 3 times a day. 90 Cap 0   • Prenatal Vit-Fe Fumarate-FA (PRENATAL VITAMIN PO) Take 1 Tab by mouth every day.       No current facility-administered medications for this visit.        Patient Active Problem List    Diagnosis Date Noted   • Drug-induced constipation 05/13/2020   • Closed fracture of pelvis (HCC) 03/02/2020   • Osteopenia of multiple sites 05/23/2018   • Peripheral neuropathy 01/23/2018   • Overactive bladder 11/22/2017   • Macular degeneration 07/21/2017   • Recurrent major depressive disorder, in partial remission (HCC) 07/21/2017   • Pure hypercholesterolemia 07/21/2017   • Non-rheumatic tricuspid valve insufficiency 07/21/2017   • Chronic insomnia 04/25/2015   • Pulmonary fibrosis (HCC) 03/11/2015       Family History   Problem Relation Age of Onset   • Cancer Mother         Bladder cancer, hx. of smoking   • Diabetes Mother    • Heart Attack Father    • Cancer Father         Colon    • Cancer Maternal Uncle         Lung   • Diabetes Maternal Uncle    • Diabetes Maternal Aunt        She  has a past medical history of Anxiety, Depression, History of respiratory failure, Hyperlipidemia, Hypertension, Insomnia, Peripheral neuropathy, and Pulmonary fibrosis (HCC).  She  has a past surgical history that includes other and hip hemiarthroplasty (4/25/2015).       Objective:   Ht 1.72 m (5' 7.72\")   Wt 53.5 kg (118 lb)   " BMI 18.09 kg/m²  Respirations 16.    Physical Exam:  Constitutional: Alert, no distress, well-groomed.  Skin: No rashes in visible areas.  Eye: Round. Conjunctiva clear, lids normal. No icterus.   ENMT: Lips pink without lesions, good dentition, moist mucous membranes. Phonation normal.  Neck: No masses, no thyromegaly. Moves freely without pain.  Respiratory: Unlabored respiratory effort, no cough or audible wheeze.  Psych: Alert and oriented x3, normal affect and mood.     Assessment and Plan:   The following treatment plan was discussed:     1. Closed fracture of multiple ribs of left side with routine healing, subsequent encounter  2. Lung mass  Ongoing issues.  Patient continues to have pain secondary to her multiple rib fractures.  However, this has improved and we discussed trying to decrease pain medication slowly.  She is following closely with oncology for work-up of her lung mass and has a PET scan scheduled for Monday.  Patient was counseled extensively on the benefits, alternatives and risks of pain medication.  A 14-day supply was provided.  She was instructed not to drink alcohol or drive while on this medication.  The controlled substance informed consent was reviewed with her during our virtual visit and patient verbally consented.  - oxyCODONE immediate-release (ROXICODONE) 5 MG Tab; Take 1 Tab by mouth every 8 hours as needed for Severe Pain for up to 14 days.  Dispense: 42 Tab; Refill: 0    Follow-up: PET scan and oncology, in our office for pain medication refills as needed

## 2020-08-24 ENCOUNTER — HOSPITAL ENCOUNTER (OUTPATIENT)
Dept: RADIOLOGY | Facility: MEDICAL CENTER | Age: 75
End: 2020-08-24
Attending: NURSE PRACTITIONER
Payer: MEDICARE

## 2020-08-24 DIAGNOSIS — R91.1 LUNG NODULE: ICD-10-CM

## 2020-08-24 PROCEDURE — A9552 F18 FDG: HCPCS

## 2020-08-25 ENCOUNTER — OFFICE VISIT (OUTPATIENT)
Dept: HEMATOLOGY ONCOLOGY | Facility: MEDICAL CENTER | Age: 75
End: 2020-08-25
Payer: MEDICARE

## 2020-08-25 VITALS
HEIGHT: 64 IN | RESPIRATION RATE: 16 BRPM | TEMPERATURE: 98.3 F | HEART RATE: 83 BPM | OXYGEN SATURATION: 95 % | BODY MASS INDEX: 20.57 KG/M2 | WEIGHT: 120.48 LBS | DIASTOLIC BLOOD PRESSURE: 60 MMHG | SYSTOLIC BLOOD PRESSURE: 100 MMHG

## 2020-08-25 DIAGNOSIS — R91.1 LUNG NODULE: ICD-10-CM

## 2020-08-25 PROCEDURE — 99213 OFFICE O/P EST LOW 20 MIN: CPT | Performed by: NURSE PRACTITIONER

## 2020-08-25 ASSESSMENT — ENCOUNTER SYMPTOMS
SHORTNESS OF BREATH: 0
COUGH: 0
WHEEZING: 0
CHILLS: 0
WEIGHT LOSS: 0
FEVER: 0

## 2020-08-25 NOTE — Clinical Note
Dr. Burton,   I discussed the patient's hernia with her today.  I did tell her that surgery would be the option to correct it, however due to the extensive large hernias she has would probably not be a surgical candidate.  However I encouraged that she speak to you in case you had any other options or thoughts on how to manage this.  It does not appear that is bothersome to her however she would like to have all options.  With regards to the lung on the center to the nodule clinic as I think she needs continue monitoring but I do not believe this is malignancy.  But, I could be wrong so I would like to have nodule clinic continue to monitor her.    Senait

## 2020-08-25 NOTE — PROGRESS NOTES
Subjective:      Yasmin Zhong is a 74 y.o. female who presents for Results (PET CT Results).          HPI    Patient seen today in follow-up for PET/CT results.  She presents accompanied by her niece for today's visit.    She was originally seen on August 13, 2020 after abnormal CT scan completed on August 9 noted left lower lobe pulmonary nodule that had increased in size to 22 x 15 mm.  An extensive review of patient's records there has been a left lower lobe pulmonary nodule that has been monitored dating back to 2014.  I was able to review previous CT reports.    - In February 2015 the left lower lobe nodule was unchanged in size from 2013 and measured 19 x 13 mm.  -In March 2016 reading radiologist of the CT scan and noted the left lower lobe nodule had unchanged measuring 20 x 13 mm in size.  -In March 2020 after she had a severe fall she had a CT of the T-spine.  That CT noted a left lower lobe lung nodule that had measured 16 mm in size.  -Most recent CT scan in August 2020 as mentioned above was noted to have increased in size measuring 22 x 15 mm.    Based on the increase in size of the left lower lobe lung nodule patient was sent for PET/CT for further evaluation which she is here today to review.  PET/CT noted a lobulated mass in the left lower lobe which was the nodule of concern is not FDG avid, and according to the reading radiologist this is suggestive of a benign process.  However, according to the radiologist adenocarcinoma in situ could not be excluded.  Patient also noted to have multiple FDG avid left rib fractures which is very consistent with most recent fall she had this month.  Is also a large hiatal hernia containing a segment of transverse colon noted on PET/CT.  I personally reviewed the imaging report and images in detail and reviewed them with the patient and her niece today.    Clinically patient is feeling much better since her recent fall.  She is able to ambulate on her own  as prior she was in a wheelchair.  She stated that her pain is better but she is still taking oxycodone every once in a while.  I did inform her that the PET scan did note large amount of stool within the rectal vault consistent with constipation likely related to her narcotics.  Patient was also aware of the hernia and we did discuss that as well in detail today.    No Known Allergies  Current Outpatient Medications on File Prior to Visit   Medication Sig Dispense Refill   • oxyCODONE immediate-release (ROXICODONE) 5 MG Tab Take 1 Tab by mouth every 8 hours as needed for Severe Pain for up to 14 days. 42 Tab 0   • atorvastatin (LIPITOR) 20 MG Tab Take 20 mg by mouth every evening.     • BIOTIN PO Take 1 Tab by mouth every day.     • busPIRone (BUSPAR) 10 MG Tab tablet Take 10-20 mg by mouth 2 times a day. Pt takes 10MG in AM and 20MG HS     • Calcium Citrate-Vitamin D (CALCIUM + D PO) Take 1 Tab by mouth every day.     • citalopram (CELEXA) 10 MG tablet Take 10 mg by mouth every evening.     • Cyanocobalamin (B-12 PO) Take 1 Tab by mouth every day.     • MAGNESIUM PO Take 1 Cap by mouth every day.     • oxybutynin SR (DITROPAN-XL) 10 MG CR tablet Take 10 mg by mouth every evening.     • propranolol (INDERAL) 10 MG Tab Take 10 mg by mouth every evening.     • quetiapine (SEROQUEL) 50 MG tablet Take 50 mg by mouth every evening.     • Cholecalciferol (VITAMIN D3 PO) Take 1 Cap by mouth every day.     • VITAMIN E PO Take 1 Cap by mouth every day.     • zolpidem (AMBIEN) 10 MG Tab Take 10 mg by mouth every bedtime.     • gabapentin (NEURONTIN) 100 MG Cap Take 1 Cap by mouth 3 times a day. 90 Cap 0   • Prenatal Vit-Fe Fumarate-FA (PRENATAL VITAMIN PO) Take 1 Tab by mouth every day.       No current facility-administered medications on file prior to visit.        Review of Systems   Constitutional: Positive for malaise/fatigue. Negative for chills, fever and weight loss.   Respiratory: Negative for cough, shortness of  "breath and wheezing.           Objective:     /60   Pulse 83   Temp 36.8 °C (98.3 °F) (Temporal)   Resp 16   Ht 1.63 m (5' 4.17\")   Wt 54.6 kg (120 lb 7.7 oz)   SpO2 95%   BMI 20.57 kg/m²      Physical Exam  Vitals signs reviewed.   Constitutional:       General: She is not in acute distress.     Appearance: Normal appearance. She is not diaphoretic.   Cardiovascular:      Rate and Rhythm: Normal rate and regular rhythm.      Heart sounds: Normal heart sounds.   Pulmonary:      Effort: Pulmonary effort is normal. No respiratory distress.      Breath sounds: Normal breath sounds. No wheezing.   Skin:     General: Skin is warm and dry.   Neurological:      Mental Status: She is alert and oriented to person, place, and time.   Psychiatric:         Mood and Affect: Mood normal.         Behavior: Behavior normal.            Ct-chest (thorax) W/o    Result Date: 8/9/2020 8/9/2020 4:50 PM HISTORY/REASON FOR EXAM:  rib fractures. TECHNIQUE/EXAM DESCRIPTION: CT scan of the chest without contrast. Noncontrast helical scanning of the chest was obtained from the lung apices through the adrenal glands. Low dose optimization technique was utilized for this CT exam including automated exposure control and adjustment of the mA and/or kV according to patient size. COMPARISON: None. FINDINGS: Osseous structures: There are fractures of the left 4th, 5th, 6th,, 8th, 9th ribs in a lateral location. There are fractures of the left 7th through 10th ribs and a posteromedial location. There are numerous thoracic compression fracture. These involve T3, T4, T5, T7, T9, T10, T12, L1, L2, and L3. Comparing to CT 9/14/2015 minute-year-old and some are new. There is an old sternal fracture. LUNGS: The lungs are abnormal. There are multiple peripheral interstitial densities consistent with interstitial lung disease. There is a left lower lobe mass. It measures 2.2 x 1.5 cm and previously measured 1.1 x 0.8 cm. This finding is highly " suspicious for malignancy. There is atelectasis at both lung bases, left greater than right. MEDIASTINUM: There is no adenopathy or mass within the axilla, mediastinum, or nasir. AORTA: There is aortic atherosclerotic plaque. PLEURA: There no pleural effusion or pneumothoraces. ABDOMEN: There is an extremely large hiatal hernia. It contains the majority or all of the stomach, small bowel, and a portion of the transverse colon.     1.  Numerous acute rib fracture involving the left 4th through 9th ribs in a lateral location and left 7th through 10th ribs in a posterior medial location 2.  Enlargement of left lower lobe pulmonary nodule/mass now measuring 2.2 x 1.5 cm and highly suspicious for malignancy 3.  Interstitial lung disease 4.  Extremely large hiatal hernia containing the majority or the entire stomach, small bowel, and transverse colon 5.  Numerous thoracic and lumbar compression fracture involving T3, T4, T5, T7, T9, T10, T12, L1, L2, and L3. Some are old and some are new since 2015 6.  Aortic and coronary atherosclerotic plaque Fleischner Society pulmonary nodule recommendations:         [employer] To Mid-thigh    Result Date: 8/24/2020 8/24/2020 12:06 PM HISTORY/REASON FOR EXAM:  Left lower lobe pulmonary nodule, enlarging. History of interstitial lung disease TECHNIQUE/EXAM DESCRIPTION AND NUMBER OF VIEWS: PET body imaging. Initially, 17.59 mCi F-18 FDG was administered intravenously under standardized conditions. Approximately 45 minutes after FDG administration, the patient was placed in the supine position on the PET CT table. Blood glucose level was 82 mg/dL. Low dose spiral CT imaging was performed from the skull base the mid thighs. PET imaging was then performed from the skull base to the mid thighs. CT images, PET images, and PET/CT fused images were reviewed on a PACS 3D workstation. The limited non-contrast CT data are used primarily for attenuation correction and anatomic  correlation.  Evaluation of solid organs and bowel are especially limited utilizing this technique. COMPARISON: Chest CT 8/9/2020 and 3/11/2016 FINDINGS: Head and neck: No abnormal focal FDG activity. Chest: The lobulated mass containing calcification in the left lower lobe is not FDG avid. Abdomen and pelvis: There is focal activity in the herniated stomach with a maximum SUV of 3.6. There is activity in the proximal transverse colon, likely physiologic Musculoskeletal: Focal activity in the left fourth through ninth ribs, consistent with recent fractures. Focal activity in the posterior left ninth and 10th ribs is consistent with prior fracture. Milder activity in multiple posterior left rib fractures. Incidental findings on CT: Scattered arterial calcifications. Hernia containing a segment of transverse colon. Large hiatal hernia. Small pericardial effusion. There is left renal atrophy. A large amount of stool is in the rectal vault. Left hip hemiarthroplasty noted. Sclerotic and lytic appearance of the sacrum and left pubic bone is likely related to prior trauma. No associated FDG activity is appreciated. There is spinal curvature with degenerative change. Multiple compression deformities are again noted     1.  Lobulated mass in the left lower lobe is not FDG avid, suggestive of a benign process. Adenocarcinoma in situ cannot be excluded. 2.  Multiple FDG avid left rib fractures. 3.  Large hiatal hernia also containing a segment of transverse colon.         Assessment/Plan:        1. Lung nodule  REFERRAL TO PULMONARY AND SLEEP MEDICINE Lung Nodule Clinic; > 8 mm (> 250 mm3); Solid; Solitary       Plan  1.  Patient with a left lower lobe lung nodule that has recently slightly increased in size now measuring 22 x 15 mm.  When reviewing the medical record dating back to 2013 patient had the lung nodule had measured 19 x 13 mm in size.  Over the course of the last 7 years it has increased a maximum of 3 mm in  size.  However due to the increase in size and concern for possible malignancy she was sent for PET/CT.  The lung nodule on PET/CT did not demonstrate any FDG avidity, and according to the reading radiologist is suggestive of a benign process.  However an adenocarcinoma in situ could not be completely excluded.  Did inform patient since this has been noted and documented over the last 7 years with very mild increase I would like for her to be continued monitored and I will go ahead and refer her to our pulmonary nodule clinic for further monitoring.  Also with significant history of pneumonia as well as pulmonary fibrosis.  According to patient in 2013 she did require 6 months hospitalization for respiratory issues.  Therefore, more importantly to be seen by pulmonary for continued monitoring as well.    I discussed the plan of care in detail with the patient and her niece today and she did verbalize understanding was in agreement the plan.    2.  Patient does have a very large hiatal hernia that does have segments of her transverse colon noted within the hernia.  Discussed with patient today and commend that she follow-up with her PCP to discuss further options.    There is no further follow-up needed with [facility].

## 2020-08-26 ENCOUNTER — PATIENT MESSAGE (OUTPATIENT)
Dept: MEDICAL GROUP | Facility: PHYSICIAN GROUP | Age: 75
End: 2020-08-26

## 2020-08-26 DIAGNOSIS — K44.9 LARGE HIATAL HERNIA: ICD-10-CM

## 2020-08-26 DIAGNOSIS — F51.01 PRIMARY INSOMNIA: ICD-10-CM

## 2020-08-26 RX ORDER — BUSPIRONE HYDROCHLORIDE 10 MG/1
TABLET ORAL
Qty: 270 TAB | Refills: 3 | Status: SHIPPED | OUTPATIENT
Start: 2020-08-26 | End: 2021-11-04 | Stop reason: SDUPTHER

## 2020-08-26 RX ORDER — ZOLPIDEM TARTRATE 10 MG/1
10 TABLET ORAL
Qty: 30 TAB | Refills: 2 | Status: SHIPPED | OUTPATIENT
Start: 2020-08-26 | End: 2020-09-25

## 2020-08-27 ENCOUNTER — TELEPHONE (OUTPATIENT)
Dept: HEMATOLOGY ONCOLOGY | Facility: MEDICAL CENTER | Age: 75
End: 2020-08-27

## 2020-08-27 NOTE — TELEPHONE ENCOUNTER
Patient return phone call today and stated that she is still having diarrhea.  She described it as leaking.  She stated that she did not have any episodes throughout the night but is still having these episodes.    I did recommend that she continue to monitor and if is not resolved by tomorrow then she should call her primary care provider.  Her last dose of MiraLAX was yesterday morning.  She has only taken 2 doses.  She did question if this may be related to the PET/CT as well.  It is been over 48 hours since her PET scan as well, and it is not necessarily a side effect, but may be related.  I have encouraged her to stay hydrated and drink electrolyte solutions as well.  She did question if she could take anything to stop it, and we did discuss Imodium, but did not want to have her take that as of yet due to the fear for constipation.    Again, recommended patient follow-up with her PCP should this not resolve.  Patient was appreciative of the phone call.

## 2020-08-27 NOTE — TELEPHONE ENCOUNTER
Patient contacted office yesterday due to concern for loose stool.  At visit the day prior patient had noted some constipation and her PET scan did notice large amount of stool in her rectal vault.  Discussed with patient and she had taken a dose of MiraLAX on Tuesday night and again on Wednesday morning.  She stated that she had 2 large episodes of loose stool which were quite disturbing to her.  She denied any abdominal pain or any abdominal cramping, but biggest complaint was the loose stools.  I did inform patient to not take any more MiraLAX at this time and continue to monitor her bowels.  I have requested that she follow-up with her PCP should she have any other issues.    I did reach out the patient today and left a voice message for her to call back.

## 2020-08-27 NOTE — TELEPHONE ENCOUNTER
Pt called to return call to YOEL [place]. Informed patient that she was unavailable and I would relay a message to YOEL Sapp

## 2020-08-29 ENCOUNTER — PATIENT MESSAGE (OUTPATIENT)
Dept: MEDICAL GROUP | Facility: PHYSICIAN GROUP | Age: 75
End: 2020-08-29

## 2020-08-29 DIAGNOSIS — S22.42XD CLOSED FRACTURE OF MULTIPLE RIBS OF LEFT SIDE WITH ROUTINE HEALING, SUBSEQUENT ENCOUNTER: ICD-10-CM

## 2020-09-02 RX ORDER — GABAPENTIN 100 MG/1
100 CAPSULE ORAL 3 TIMES DAILY
Qty: 90 CAP | Refills: 1 | Status: SHIPPED
Start: 2020-09-02 | End: 2020-10-12

## 2020-09-07 ENCOUNTER — PATIENT MESSAGE (OUTPATIENT)
Dept: MEDICAL GROUP | Facility: PHYSICIAN GROUP | Age: 75
End: 2020-09-07

## 2020-09-07 DIAGNOSIS — F51.01 PRIMARY INSOMNIA: ICD-10-CM

## 2020-09-09 RX ORDER — TRAZODONE HYDROCHLORIDE 100 MG/1
100 TABLET ORAL
Qty: 30 TAB | Refills: 0 | Status: SHIPPED | OUTPATIENT
Start: 2020-09-09 | End: 2020-09-18 | Stop reason: SDUPTHER

## 2020-09-17 ENCOUNTER — PATIENT MESSAGE (OUTPATIENT)
Dept: MEDICAL GROUP | Facility: PHYSICIAN GROUP | Age: 75
End: 2020-09-17

## 2020-09-17 DIAGNOSIS — F51.01 PRIMARY INSOMNIA: ICD-10-CM

## 2020-09-18 RX ORDER — TRAZODONE HYDROCHLORIDE 100 MG/1
200 TABLET ORAL
Qty: 60 TAB | Refills: 1 | Status: SHIPPED
Start: 2020-09-18 | End: 2020-10-12

## 2020-09-21 ENCOUNTER — HOSPITAL ENCOUNTER (OUTPATIENT)
Facility: MEDICAL CENTER | Age: 75
End: 2020-09-21
Attending: SURGERY | Admitting: SURGERY
Payer: MEDICARE

## 2020-10-07 ENCOUNTER — APPOINTMENT (OUTPATIENT)
Dept: MEDICAL GROUP | Facility: PHYSICIAN GROUP | Age: 75
End: 2020-10-07
Payer: MEDICARE

## 2020-10-12 ENCOUNTER — OFFICE VISIT (OUTPATIENT)
Dept: MEDICAL GROUP | Facility: PHYSICIAN GROUP | Age: 75
End: 2020-10-12
Payer: MEDICARE

## 2020-10-12 ENCOUNTER — PATIENT MESSAGE (OUTPATIENT)
Dept: MEDICAL GROUP | Facility: PHYSICIAN GROUP | Age: 75
End: 2020-10-12

## 2020-10-12 VITALS
SYSTOLIC BLOOD PRESSURE: 110 MMHG | TEMPERATURE: 98.3 F | WEIGHT: 115 LBS | DIASTOLIC BLOOD PRESSURE: 62 MMHG | RESPIRATION RATE: 14 BRPM | HEIGHT: 64 IN | OXYGEN SATURATION: 93 % | HEART RATE: 103 BPM | BODY MASS INDEX: 19.63 KG/M2

## 2020-10-12 DIAGNOSIS — E63.9 INADEQUATE DIETARY CALORIC INTAKE: ICD-10-CM

## 2020-10-12 DIAGNOSIS — R29.6 RECURRENT FALLS: ICD-10-CM

## 2020-10-12 DIAGNOSIS — M25.552 LEFT HIP PAIN: ICD-10-CM

## 2020-10-12 DIAGNOSIS — Z87.81 HISTORY OF PELVIC FRACTURE: ICD-10-CM

## 2020-10-12 DIAGNOSIS — R42 LIGHTHEADEDNESS: ICD-10-CM

## 2020-10-12 DIAGNOSIS — R63.4 ABNORMAL WEIGHT LOSS: ICD-10-CM

## 2020-10-12 DIAGNOSIS — Z96.642 HISTORY OF LEFT HIP REPLACEMENT: ICD-10-CM

## 2020-10-12 DIAGNOSIS — F51.04 CHRONIC INSOMNIA: ICD-10-CM

## 2020-10-12 DIAGNOSIS — H53.001 LAZY EYE OF RIGHT SIDE: ICD-10-CM

## 2020-10-12 DIAGNOSIS — Z87.81 HISTORY OF RIB FRACTURE: ICD-10-CM

## 2020-10-12 DIAGNOSIS — R01.1 HEART MURMUR: ICD-10-CM

## 2020-10-12 PROBLEM — S32.9XXA CLOSED FRACTURE OF PELVIS (HCC): Status: RESOLVED | Noted: 2020-03-02 | Resolved: 2020-10-12

## 2020-10-12 PROBLEM — K59.03 DRUG-INDUCED CONSTIPATION: Status: RESOLVED | Noted: 2020-05-13 | Resolved: 2020-10-12

## 2020-10-12 PROCEDURE — 99214 OFFICE O/P EST MOD 30 MIN: CPT | Performed by: FAMILY MEDICINE

## 2020-10-12 RX ORDER — MIRTAZAPINE 15 MG/1
15 TABLET, FILM COATED ORAL
Qty: 30 TAB | Refills: 2 | Status: SHIPPED | OUTPATIENT
Start: 2020-10-12 | End: 2020-10-26 | Stop reason: SDUPTHER

## 2020-10-12 NOTE — PATIENT INSTRUCTIONS
We discontinued the following medications today:  1. Gabapentin  2. Trazodone  3. Ambien  4. Citalopram  5. Seroquel

## 2020-10-12 NOTE — PROGRESS NOTES
Subjective:   Yasmin Zhong is a 74 y.o. female here today for multiple issues including lightheadedness, lazy eye, weight loss, difficulty sleeping.  She is accompanied by her granddaughter, Senait, for today's appointment.    Lightheadedness is her main concern.  She initially describes this as dizziness, but describes more the feeling of near fainting than vertigo.  No associated headaches, chest pain, shortness of breath or nausea.  She has had two big falls this year, one in March that resulted in a pelvic fracture and another in August for several rib fractures.  Onset is 8 months ago prior to the falls.  She does not remember any specific medication changes or other triggers.  She has struggled with balance off an on for several years, mainly since a prolonged ICU and hospital stay for aspiration pneumonia.  Alcohol was a contributing factor in that admission.    She does have a lazy eye on the right side.  This started several years ago, she believes after her hospitalization in 2014.  She thinks it is getting worse and may be contributing to her lightheadedness and falls.  Several years ago, she did meet with an ophthalmologist, but decided against surgery at that time.  She is interested in seeing a specialist now, even if nothing can be done.    She has lost approximately 25 pounds since March.  Her niece says that she does not eat much.  Patient describes having a protein shake for breakfast, salad with chicken for lunch and something like porkchops for dinner.  She has lost some of her appetite lately.  She tells me that it seemed to her to be related to the alternating diarrhea and constipation from the different medications she was on around the time of her falls.  No nausea, vomiting, persistent diarrhea, blood in stool.    She continues to struggle with difficulty sleeping.  This has been a chronic problem for Yasmin.  For years, she has taken Ambien 10mg at night.  In addition, she has been  on Seroquel 25mg since her hospitalization in 2014.  Reading the discharge summary, it appears that she was agitated at times, hence the need for different sedatives.  She is also currently on a low dose of gabapentin to help with pain after her rib fractures, but tells me that she only takes this as needed.  A month or so ago, she did reach out to me on MyChart to ask about a different sleep aid as the Ambien was not working well.  She has since tried up to 300mg of trazodone without improvement.    Lastly, she is also currently on citalopram 10mg to help with mood.  She denies depression or anxiety.  Her niece mentions that she does seem sad from time to time.  Denies SI/HI.    Current medicines (including changes today)  Current Outpatient Medications   Medication Sig Dispense Refill   • traZODone (DESYREL) 100 MG Tab Take 2 Tabs by mouth every bedtime. 60 Tab 1   • gabapentin (NEURONTIN) 100 MG Cap Take 1 Cap by mouth 3 times a day. 90 Cap 1   • busPIRone (BUSPAR) 10 MG Tab tablet Pt takes 10MG in AM and 20MG HS Take 10-20 mg by mouth 2 times a day. Pt takes 10MG in AM and 20MG  Tab 3   • atorvastatin (LIPITOR) 20 MG Tab Take 20 mg by mouth every evening.     • BIOTIN PO Take 1 Tab by mouth every day.     • Calcium Citrate-Vitamin D (CALCIUM + D PO) Take 1 Tab by mouth every day.     • citalopram (CELEXA) 10 MG tablet Take 10 mg by mouth every evening.     • Cyanocobalamin (B-12 PO) Take 1 Tab by mouth every day.     • MAGNESIUM PO Take 1 Cap by mouth every day.     • oxybutynin SR (DITROPAN-XL) 10 MG CR tablet Take 10 mg by mouth every evening.     • propranolol (INDERAL) 10 MG Tab Take 10 mg by mouth every evening.     • quetiapine (SEROQUEL) 50 MG tablet Take 50 mg by mouth every evening.     • Cholecalciferol (VITAMIN D3 PO) Take 1 Cap by mouth every day.     • VITAMIN E PO Take 1 Cap by mouth every day.     • Prenatal Vit-Fe Fumarate-FA (PRENATAL VITAMIN PO) Take 1 Tab by mouth every day.       No  "current facility-administered medications for this visit.      She  has a past medical history of Anxiety, Depression, History of respiratory failure, Hyperlipidemia, Hypertension, Insomnia, Peripheral neuropathy, and Pulmonary fibrosis (HCC).    ROS   No chest pain, no shortness of breath, no abdominal pain.     Objective:     Physical Exam:  /62 (BP Location: Right arm, Patient Position: Sitting, BP Cuff Size: Adult)   Pulse (!) 103   Temp 36.8 °C (98.3 °F)   Resp 14   Ht 1.63 m (5' 4.17\")   Wt 52.2 kg (115 lb)   SpO2 93%  Body mass index is 19.64 kg/m².   Constitutional: Alert, thin female in no distress.  Skin: Warm, dry, good turgor, no rashes in visible areas.  Eye: Equal, round and reactive, conjunctiva clear, lids normal. Right eye amblyopia.   ENMT: +Mask.  Neck: Trachea midline, no masses, no thyromegaly.   Respiratory: Unlabored respiratory effort, lungs clear to auscultation, no wheezes, no rhonchi.  Cardiovascular: Normal S1, S2, +murmur, no edema.  Abdomen: Soft, non-tender, no masses, no hepatosplenomegaly.  Psych: Alert and oriented x3, normal affect and mood.    Assessment and Plan:     1. Lightheadedness  2. History of pelvic fracture  3. History of rib fracture  4. Recurrent falls  Polypharmacy is at the top of my differential.  Patient is thin, with anorexia and weight loss as well.  We are discontinuing gabapentin (rarely taking), trazodone (not currently taking and not effective for insomnia), Ambien (patient most likely tolerant, no longer as effective) and Seroquel (not appropriate to use long-term).  We are also going to stop her citalopram.  Possible symptoms of discontinuing these medications discussed.  I do believe the benefits outweigh the risk with her recurrent falls with subsequent fractures this year.  She does have a more noticeable murmur on exam today and we will investigate this further with an echocardiogram.  Will continue to monitor closely.    5. Lazy eye of " right side  Ongoing issue, worsening per patient.  I did advise her that there may not be a surgical option.  Referral placed to ophthalmology.  - REFERRAL TO OPHTHALMOLOGY    6. Abnormal weight loss  7. Inadequate dietary caloric intake  Likely due to low caloric intake.  Will need to monitor further after making adjustments to her medication regimen.  Referral placed to nutritionist.  Of note, patient did have a recent PET scan to evaluate a lung nodule further and this was negative for cancer.  - REFERRAL TO [facility] (HIP) Services Requested: Registered Dietitian for Medical Nutrition Therapy; Reason for Visit: Medical Condition Requiring Nutrition Counseling    8. Chronic insomnia  Long-term struggle for Yasmin.  We are discontinuing many medications today (gabapentin, trazodone, Ambien, Seroquel, citalopram).  Start mirtazapine as this has a safer profile and may also help with her mood and appetite.  - mirtazapine (REMERON) 15 MG Tab; Take 1 Tab by mouth every bedtime.  Dispense: 30 Tab; Refill: 2    9. Heart murmur  New issue.  Investigate with echo.  - EC-ECHOCARDIOGRAM COMPLETE W/O CONT; Future    10. Left hip pain  11. History of left hip replacement  At the end of the visit, patient mentioned having more hip pain on her left side.  She does have history of hip replacement after a fracture.  X-rays ordered.  - DX-HIP-UNILATERAL-W/O PELVIS-2/3 VIEWS LEFT; Future    We discussed multiple issues today and patient will need close follow-up.  She does have surgery pending to repair a large abdominal hernia on November 2nd.  I advised her that we may need to postpone this depending on our investigation of some of the concerns we discussed today.    Followup: 2 weeks         PLEASE NOTE: This dictation was created using voice recognition software. I have made every reasonable attempt to correct obvious errors, but I expect that there are errors of grammar and possibly content that I did  not discover before finalizing the note.

## 2020-10-26 ENCOUNTER — OFFICE VISIT (OUTPATIENT)
Dept: MEDICAL GROUP | Facility: PHYSICIAN GROUP | Age: 75
End: 2020-10-26
Payer: MEDICARE

## 2020-10-26 VITALS
OXYGEN SATURATION: 98 % | DIASTOLIC BLOOD PRESSURE: 56 MMHG | TEMPERATURE: 98.4 F | BODY MASS INDEX: 20.14 KG/M2 | SYSTOLIC BLOOD PRESSURE: 108 MMHG | WEIGHT: 118 LBS | HEIGHT: 64 IN | RESPIRATION RATE: 18 BRPM | HEART RATE: 102 BPM

## 2020-10-26 DIAGNOSIS — F51.04 CHRONIC INSOMNIA: ICD-10-CM

## 2020-10-26 DIAGNOSIS — R29.6 RECURRENT FALLS: ICD-10-CM

## 2020-10-26 DIAGNOSIS — R42 LIGHTHEADEDNESS: ICD-10-CM

## 2020-10-26 DIAGNOSIS — Z23 NEED FOR VACCINATION: ICD-10-CM

## 2020-10-26 DIAGNOSIS — R91.1 LUNG NODULE: ICD-10-CM

## 2020-10-26 PROBLEM — F33.42 RECURRENT MAJOR DEPRESSIVE DISORDER, IN FULL REMISSION (HCC): Status: ACTIVE | Noted: 2017-07-21

## 2020-10-26 PROCEDURE — 99214 OFFICE O/P EST MOD 30 MIN: CPT | Mod: 25 | Performed by: FAMILY MEDICINE

## 2020-10-26 PROCEDURE — G0008 ADMIN INFLUENZA VIRUS VAC: HCPCS | Performed by: FAMILY MEDICINE

## 2020-10-26 PROCEDURE — 90662 IIV NO PRSV INCREASED AG IM: CPT | Performed by: FAMILY MEDICINE

## 2020-10-26 RX ORDER — ZOLPIDEM TARTRATE 10 MG/1
1 TABLET ORAL NIGHTLY PRN
COMMUNITY
Start: 2020-10-23 | End: 2020-11-16 | Stop reason: SDUPTHER

## 2020-10-26 RX ORDER — MIRTAZAPINE 30 MG/1
30 TABLET, FILM COATED ORAL
Qty: 30 TAB | Refills: 0 | Status: SHIPPED
Start: 2020-10-26 | End: 2020-11-16

## 2020-10-26 NOTE — PATIENT INSTRUCTIONS
Let's continue the mirtazapine medication until our next appointment.    Stop quetiapine for the next 3 days to see if you still need this medication.    Ophthalmologist office:  [facility] [address]  [place], [place]  Phone: [phone]

## 2020-10-26 NOTE — PROGRESS NOTES
Subjective:   Yasmin Zhong is a 74 y.o. female here today for follow-up on several issues including lightheadedness, recurrent falls, difficulty sleeping and lung nodule.  She is unaccompanied today.    Since her last visit approximately 2 weeks ago, Yasmin tells me that her lightheadedness has resolved.  She has not had any falls.  At her last visit, we discontinued several medications as polypharmacy was a big concern.  I discontinued gabapentin, trazodone, citalopram, Ambien and quetiapine.  Patient tells me that she has been taking the quetiapine and did not realize that it was one of the medications we have talked about discontinuing.  Additionally, she has been taking Ambien from time to time to help with sleeping.    She has had 2 major falls this year.  The first in March resulted in a pelvic fracture.  The second fall in August resulted in multiple rib fractures.  During her ER visit for the second fall, a pulmonary nodule was noted on imaging.  It appeared that the pulmonary nodule had increased in size from March to August.  She underwent a PET scan which did not show any abnormal uptake and oncology consultation.  It was determined that this was likely not cancerous.  She does have an appointment tomorrow to establish with a pulmonologist who will help us with monitoring this pulmonary nodule.    She has not yet been able to have her echocardiogram.  This was ordered as a way for us to evaluate her lightheadedness and also for a louder sounding heart murmur.  She is scheduled to undergo an abdominal hernia surgery on November 2 and we talked about postponing this as she has not yet had her echocardiogram.    One of Yasmin's major concerns is her chronic insomnia.  This is not a new issue for Yasmin, but has been an issue for many many years.  For years, she has taken Ambien 10 mg at night.  Recently, she stated that the medication was not as helpful to her.  In addition, she has also been on quetiapine  25 mg for several years.  This was started during a hospitalization in 2014.  On review of her chart, the medication was started due to agitation and the need for various sedatives.  She is a recovered alcoholic.    At her last visit, in addition to discontinuing several medications that she was using to help sleep, I started her on mirtazapine.  We quickly increase the dose from 15 mg to 30 mg.  She has been taking this at night, but has not yet noticed any improvement in her sleep.    She has gained some weight since I last saw her.  Additionally, she tells me that her mood is improved and she denies any depression or anxiety.    Current medicines (including changes today)  Current Outpatient Medications   Medication Sig Dispense Refill   • mirtazapine (REMERON) 30 MG Tab tablet Take 1 Tab by mouth every bedtime. 30 Tab 0   • zolpidem (AMBIEN) 10 MG Tab Take 1 Tab by mouth at bedtime as needed.     • busPIRone (BUSPAR) 10 MG Tab tablet Pt takes 10MG in AM and 20MG HS Take 10-20 mg by mouth 2 times a day. Pt takes 10MG in AM and 20MG  Tab 3   • atorvastatin (LIPITOR) 20 MG Tab Take 20 mg by mouth every evening.     • BIOTIN PO Take 1 Tab by mouth every day.     • Calcium Citrate-Vitamin D (CALCIUM + D PO) Take 1 Tab by mouth every day.     • Cyanocobalamin (B-12 PO) Take 1 Tab by mouth every day.     • MAGNESIUM PO Take 1 Cap by mouth every day.     • oxybutynin SR (DITROPAN-XL) 10 MG CR tablet Take 10 mg by mouth every evening.     • propranolol (INDERAL) 10 MG Tab Take 10 mg by mouth every evening.     • Cholecalciferol (VITAMIN D3 PO) Take 1 Cap by mouth every day.     • VITAMIN E PO Take 1 Cap by mouth every day.     • Prenatal Vit-Fe Fumarate-FA (PRENATAL VITAMIN PO) Take 1 Tab by mouth every day.       No current facility-administered medications for this visit.      She  has a past medical history of Anxiety, Depression, History of respiratory failure, Hyperlipidemia, Hypertension, Insomnia, Peripheral  "neuropathy, and Pulmonary fibrosis (HCC).    ROS   Positive for intermittent cough. No chest pain, no shortness of breath, no abdominal pain.     Objective:     Physical Exam:  /56 (BP Location: Right arm, Patient Position: Sitting, BP Cuff Size: Adult)   Pulse (!) 102   Temp 36.9 °C (98.4 °F)   Resp 18   Ht 1.63 m (5' 4.17\")   Wt 53.5 kg (118 lb)   SpO2 98%  Body mass index is 20.15 kg/m².   Constitutional: Alert, thin female in no distress.  Skin: Warm, dry, good turgor, no rashes in visible areas.  Eye: Equal, round and reactive, conjunctiva clear, lids normal. Right eye amblyopia.   ENMT: Oropharynx with +postnasal drip.  Neck: Trachea midline, no masses, no thyromegaly.   Respiratory: Unlabored respiratory effort, lungs clear to auscultation, no wheezes, no rhonchi.  Cardiovascular: Normal S1, S2, +murmur, no edema.  Psych: Alert and oriented x3, normal affect and mood.    Assessment and Plan:     1. Lightheadedness  2. Recurrent falls  Essentially resolved.  Patient has not had any further episodes of lightheadedness and no falls since her last appointment 2 weeks ago.  At that visit, we discontinued several medications.  Will continue to monitor closely.  Patient has pending echocardiogram.  She will postpone her abdominal hernia surgery for now.    3. Chronic insomnia  Chronic and not well controlled.  She had several medications she was taking as sleep aides and after a lengthy discussion we stopped many of these at her last appointment.  We stopped gabapentin (rarely taking, was started to help with rib fracture pain that has now resolved), trazodone (not effective for insomnia).  She was started on mirtazapine and is currently taking 30 mg at bedtime.  Patient does still take Ambien 10 mg from time to time.  At her last appointment, we had discontinued her Seroquel, but patient was not aware and continue to take this.     We had another long talk about her chronic insomnia.  This is an issue " that is not recent and has been ongoing for most of her adult years.  Patient is a recovered alcoholic.  She has been on Ambien for many many years and I believe that she is most likely tolerant of the medication at it as it is no longer effective.  I would like to have her switch completely to just taking mirtazapine at night.  She is willing to give this a try until her next appointment.  Additionally, I do not believe that the Seroquel is providing any benefit and also could be harmful using it long-term and have asked her to stop this medication.  She will let me know via [employer] how she is feeling in the next few days.    - mirtazapine (REMERON) 30 MG Tab tablet; Take 1 Tab by mouth every bedtime.  Dispense: 30 Tab; Refill: 0    4. Lung nodule  Stable.  Patient has upcoming appointment to establish with pulmonology to help us follow this nodule.  She did have previous work-up to make sure that this was not a cancerous nodule.  We will continue to monitor.    5. Need for vaccination  Flu shot discussed and administered in office today.  - INFLUENZA VACCINE, HIGH DOSE (65+ ONLY)    Followup: 3 weeks         PLEASE NOTE: This dictation was created using voice recognition software. I have made every reasonable attempt to correct obvious errors, but I expect that there are errors of grammar and possibly content that I did not discover before finalizing the note.

## 2020-10-26 NOTE — Clinical Note
Saw Sapp,  This [facility] patient of mine was referred to you by Senait London to help me follow her pulmonary nodule.  She had a negative PET.  She may ask you about her chronic insomnia.  Hoping my note gives you some info on the medications she has been on.  I'm crossing my fingers we can help her with mirtazapine.    Thanks and stay well!   -Ana

## 2020-10-27 ENCOUNTER — OFFICE VISIT (OUTPATIENT)
Dept: SLEEP MEDICINE | Facility: MEDICAL CENTER | Age: 75
End: 2020-10-27
Payer: MEDICARE

## 2020-10-27 VITALS
RESPIRATION RATE: 16 BRPM | SYSTOLIC BLOOD PRESSURE: 122 MMHG | HEIGHT: 64 IN | HEART RATE: 96 BPM | OXYGEN SATURATION: 96 % | BODY MASS INDEX: 20.14 KG/M2 | DIASTOLIC BLOOD PRESSURE: 74 MMHG | WEIGHT: 118 LBS

## 2020-10-27 DIAGNOSIS — R91.1 LUNG NODULE: ICD-10-CM

## 2020-10-27 DIAGNOSIS — F51.04 CHRONIC INSOMNIA: ICD-10-CM

## 2020-10-27 DIAGNOSIS — Z87.891 FORMER SMOKER, STOPPED SMOKING MANY YEARS AGO: ICD-10-CM

## 2020-10-27 PROCEDURE — 99204 OFFICE O/P NEW MOD 45 MIN: CPT | Performed by: INTERNAL MEDICINE

## 2020-10-27 NOTE — PROGRESS NOTES
Chief Complaint   Patient presents with   • Establish Care     Ref by SCOTT DIALLO Dx: Lung Nodule , Chest CT 20       HPI: This patient is a 74 y.o. female is referred to lung nodule clinic.  She had suffered a ground-level fall 2020 with chest CAT scan identifying multiple rib fractures as well as interval increase in size of a left lower lobe lobulated, pulmonary nodule to 22 x 15 mm.  Of note she has had a documented left lower lobe pulmonary nodule since , measuring 19 x 13 mm.  Subsequent PET scan 2020 showed FDG uptake within the nodule.  Of note calcification was noted in the nodule.  The patient denies exertional dyspnea, chest pain, wheezing.  She has chronic cough with clear sputum.  She has a <5-pack-year history of tobacco use remotely.  Suffers from chronic insomnia for many years.  She had been taking Ambien for 10 years without adverse effect however by her own recommendation had decided to switch to trazodone, followed by Remeron, which she finds far less effective. Denies snoring/apneas/EDS.    Past Medical History:   Diagnosis Date   • Anxiety    • Depression    • History of respiratory failure     6-month hospitalization in    • Hyperlipidemia    • Hypertension     denies   • Insomnia    • Peripheral neuropathy    • Pulmonary fibrosis (HCC)        Social History     Socioeconomic History   • Marital status: Single     Spouse name: Not on file   • Number of children: 0   • Years of education: Not on file   • Highest education level: Not on file   Occupational History   • Not on file   Social Needs   • Financial resource strain: Not on file   • Food insecurity     Worry: Not on file     Inability: Not on file   • Transportation needs     Medical: Not on file     Non-medical: Not on file   Tobacco Use   • Smoking status: Former Smoker     Packs/day: 0.25     Years: 15.00     Pack years: 3.75     Types: Cigarettes     Quit date: 3/11/1985     Years since quittin.6    • Smokeless tobacco: Never Used   • Tobacco comment: passive smoke exposure her entire life   Substance and Sexual Activity   • Alcohol use: Not Currently     Alcohol/week: 0.0 oz     Comment: once a week    • Drug use: No   • Sexual activity: Not Currently   Lifestyle   • Physical activity     Days per week: Not on file     Minutes per session: Not on file   • Stress: Not on file   Relationships   • Social connections     Talks on phone: Not on file     Gets together: Not on file     Attends Restorationist service: Not on file     Active member of club or organization: Not on file     Attends meetings of clubs or organizations: Not on file     Relationship status: Not on file   • Intimate partner violence     Fear of current or ex partner: Not on file     Emotionally abused: Not on file     Physically abused: Not on file     Forced sexual activity: Not on file   Other Topics Concern   • Not on file   Social History Narrative   • Not on file       Family History   Problem Relation Age of Onset   • Cancer Mother         Bladder cancer, hx. of smoking   • Diabetes Mother    • Heart Attack Father    • Cancer Father         Colon    • Cancer Maternal Uncle         Lung   • Diabetes Maternal Uncle    • Diabetes Maternal Aunt        Current Outpatient Medications on File Prior to Visit   Medication Sig Dispense Refill   • zolpidem (AMBIEN) 10 MG Tab Take 1 Tab by mouth at bedtime as needed.     • mirtazapine (REMERON) 30 MG Tab tablet Take 1 Tab by mouth every bedtime. 30 Tab 0   • busPIRone (BUSPAR) 10 MG Tab tablet Pt takes 10MG in AM and 20MG HS Take 10-20 mg by mouth 2 times a day. Pt takes 10MG in AM and 20MG  Tab 3   • BIOTIN PO Take 1 Tab by mouth every day.     • Calcium Citrate-Vitamin D (CALCIUM + D PO) Take 1 Tab by mouth every day.     • Cyanocobalamin (B-12 PO) Take 1 Tab by mouth every day.     • MAGNESIUM PO Take 1 Cap by mouth every day.     • propranolol (INDERAL) 10 MG Tab Take 10 mg by mouth every  "evening.     • Cholecalciferol (VITAMIN D3 PO) Take 1 Cap by mouth every day.     • VITAMIN E PO Take 1 Cap by mouth every day.     • Prenatal Vit-Fe Fumarate-FA (PRENATAL VITAMIN PO) Take 1 Tab by mouth every day.     • atorvastatin (LIPITOR) 20 MG Tab Take 20 mg by mouth every evening.     • oxybutynin SR (DITROPAN-XL) 10 MG CR tablet Take 10 mg by mouth every evening.       No current facility-administered medications on file prior to visit.        Allergies: Patient has no known allergies.    ROS:   Constitutional: Denies fevers, chills, night sweats, fatigue<+weight loss  Eyes: +vision loss, denies pain, drainage, double vision  Ears, Nose, Throat: Denies earache, +difficulty hearing, denies tinnitus, nasal congestion, hoarseness  Cardiovascular: Denies chest pain, tightness, palpitations, orthopnea or edema  Respiratory: As in HPI  Sleep: Denies daytime sleepiness, snoring, apneas, +insomnia, denies morning headaches  GI: Denies heartburn, dysphagia, nausea, abdominal pain, diarrhea or constipation  : Denies frequent urination, hematuria, discharge or painful urination  Musculoskeletal: Denies back pain, +painful joints, denies sore muscles  Neurological: Denies weakness or headaches  Skin: No rashes    /74 (BP Location: Left arm, Patient Position: Sitting, BP Cuff Size: Adult)   Pulse 96   Resp 16   Ht 1.626 m (5' 4\")   Wt 53.5 kg (118 lb)   SpO2 96%     Physical Exam:  Appearance: Well-nourished, well-developed, in no acute distress  HEENT: Normocephalic, atraumatic, white sclera, PERRLA, masked  Neck: No adenopathy or masses  Respiratory: no intercostal retractions or accessory muscle use  Lungs auscultation: Clear to auscultation bilaterally  Cardiovascular: Regular rate rhythm. 2/6 systolic murmur, no rubs or gallops.  No LE edema  Abdomen: soft, nondistended  Gait: Normal  Digits: No clubbing, cyanosis  Motor: No focal deficits  Orientation: Oriented to time, person and " place    Diagnosis:  1. Lung nodule  CT-CHEST (THORAX) W/O   2. Chronic insomnia     3. Former smoker, stopped smoking many years ago         Plan:  The patient has a left lower lobe lobulated pulmonary nodule noted by chest imaging since 2013, with calcification and negative FDG uptake on PET scan, supporting benign etiology.    We discussed the nodule had shown minimal interval growth in this time frame. She has no significant smoking history or malignancy risk factors.  She is asymptomatic.  Recommend surveillance with chest CAT scan without contrast in 6-12 months.  She is interested in continuing surveillance.  With respect to her chronic insomnia, I feel she would benefit from cognitive behavioral therapy.  She also showed improved response to Ambien and recommend switching back to Ambien 10 mg nightly.  Discontinue mirtazapine.  Return in about 6 months (around 4/27/2021) for at lung nodule clinic.

## 2020-10-28 ENCOUNTER — PATIENT MESSAGE (OUTPATIENT)
Dept: MEDICAL GROUP | Facility: PHYSICIAN GROUP | Age: 75
End: 2020-10-28

## 2020-10-28 NOTE — LETTER
October 28, 2020        To Whom It May Concern:      Re: Yasmin Zhong; 1945; recent medical appointments      Ms. Zhong has been a patient of mine since July 2017.  On October 12th, 2020, she presented to my office for lightheadedness.  We determined that some of her medications could be the cause and adjusted her regimen.  I saw Ms. Zhong for follow-up on October 26th, 2020, and her lightheadedness had essentially resolved.      If you have any questions or concerns, please don't hesitate to call.        Sincerely,        Katherine Burton M.D.

## 2020-10-29 ENCOUNTER — APPOINTMENT (OUTPATIENT)
Dept: ADMISSIONS | Facility: MEDICAL CENTER | Age: 75
End: 2020-10-29
Payer: MEDICARE

## 2020-10-29 NOTE — PROGRESS NOTES
Please mail patient the letter I've written for her.  It is placed in the credenza.  -Katherine Burton M.D.

## 2020-11-12 ENCOUNTER — HOSPITAL ENCOUNTER (OUTPATIENT)
Dept: CARDIOLOGY | Facility: MEDICAL CENTER | Age: 75
End: 2020-11-12
Attending: FAMILY MEDICINE
Payer: MEDICARE

## 2020-11-12 DIAGNOSIS — R01.1 HEART MURMUR: ICD-10-CM

## 2020-11-12 LAB
LV EJECT FRACT  99904: 75
LV EJECT FRACT MOD 2C 99903: 65.14
LV EJECT FRACT MOD 4C 99902: 50.1
LV EJECT FRACT MOD BP 99901: 57.79

## 2020-11-12 PROCEDURE — 93306 TTE W/DOPPLER COMPLETE: CPT

## 2020-11-12 PROCEDURE — 93306 TTE W/DOPPLER COMPLETE: CPT | Mod: 26 | Performed by: INTERNAL MEDICINE

## 2020-11-16 ENCOUNTER — TELEMEDICINE (OUTPATIENT)
Dept: MEDICAL GROUP | Facility: PHYSICIAN GROUP | Age: 75
End: 2020-11-16
Payer: MEDICARE

## 2020-11-16 VITALS — HEIGHT: 62 IN | WEIGHT: 118 LBS | BODY MASS INDEX: 21.71 KG/M2

## 2020-11-16 DIAGNOSIS — F51.04 CHRONIC INSOMNIA: ICD-10-CM

## 2020-11-16 DIAGNOSIS — I34.0 NONRHEUMATIC MITRAL VALVE REGURGITATION: ICD-10-CM

## 2020-11-16 DIAGNOSIS — I35.8 AORTIC VALVE SCLEROSIS: ICD-10-CM

## 2020-11-16 DIAGNOSIS — R01.1 HEART MURMUR: ICD-10-CM

## 2020-11-16 PROCEDURE — 99214 OFFICE O/P EST MOD 30 MIN: CPT | Mod: 95,CR | Performed by: FAMILY MEDICINE

## 2020-11-16 RX ORDER — ZOLPIDEM TARTRATE 10 MG/1
10 TABLET ORAL NIGHTLY PRN
Qty: 30 TAB | Refills: 5 | Status: SHIPPED | OUTPATIENT
Start: 2020-11-16 | End: 2020-12-16

## 2020-11-16 NOTE — PROGRESS NOTES
Virtual Visit: Established Patient   This visit was conducted via [employer] using secure and encrypted videoconferencing technology. The patient was in a private location in the state of [place].    The patient's identity was confirmed and verbal consent was obtained for this virtual visit.    Subjective:   CC:   Chief Complaint   Patient presents with   • Follow-Up     echo results, sleep   • Medication Refill     zolpidem      Yasmin Zhong is a 74 y.o. female presenting for evaluation and management of:    Yasmin is joining me for a virtual visit to discuss two issues, her recent heart ultrasound and chronic insomnia.    At her last appointment with me approximately 1 month ago, she had a heart murmur on examination.  The murmur was somewhat louder than on previous visits and therefore we ordered an echocardiogram.  We reviewed the results of the echocardiogram today.  They do show mild mitral regurgitation and aortic sclerosis.  Her left ventricular ejection fraction is 75%.  Overall, this echocardiogram was similar to the one from 2014.  She denies any chest pain, shortness of breath or orthopnea.  She is currently in the process of being scheduled for an abdominal hernia repair.    In regards to her chronic insomnia, this has been an issue for Yasmin for many years.  She is a former .  Earlier this year, she was on multiple medications to help with sleep.  Several of these were stopped 2 to 3 months ago as there was concern for polypharmacy.  We discontinued her trazodone, gabapentin, citalopram, quetiapine and Ambien.  I had started her on a low-dose of mirtazapine that was titrated to 30 mg at night.  She did not find the mirtazapine to be helpful.  She would like to restart taking Ambien.  In the past, she has taken Ambien 10 mg at night, almost every night, to help her with sleep.  She denies any side effects.    ROS   Denies any recent fevers or chills. No nausea or vomiting. No chest  pains or shortness of breath.     No Known Allergies    Current medicines (including changes today)  Current Outpatient Medications   Medication Sig Dispense Refill   • zolpidem (AMBIEN) 10 MG Tab Take 1 Tab by mouth at bedtime as needed for up to 30 days. 30 Tab 5   • busPIRone (BUSPAR) 10 MG Tab tablet Pt takes 10MG in AM and 20MG HS Take 10-20 mg by mouth 2 times a day. Pt takes 10MG in AM and 20MG  Tab 3   • atorvastatin (LIPITOR) 20 MG Tab Take 20 mg by mouth every evening.     • BIOTIN PO Take 1 Tab by mouth every day.     • Calcium Citrate-Vitamin D (CALCIUM + D PO) Take 1 Tab by mouth every day.     • Cyanocobalamin (B-12 PO) Take 1 Tab by mouth every day.     • MAGNESIUM PO Take 1 Cap by mouth every day.     • Cholecalciferol (VITAMIN D3 PO) Take 1 Cap by mouth every day.     • VITAMIN E PO Take 1 Cap by mouth every day.     • Prenatal Vit-Fe Fumarate-FA (PRENATAL VITAMIN PO) Take 1 Tab by mouth every day.       No current facility-administered medications for this visit.        Patient Active Problem List    Diagnosis Date Noted   • Aortic valve sclerosis 11/16/2020   • Lung nodule 10/26/2020   • Osteopenia of multiple sites 05/23/2018   • Peripheral neuropathy 01/23/2018   • Overactive bladder 11/22/2017   • Macular degeneration 07/21/2017   • Recurrent major depressive disorder, in full remission (HCC) 07/21/2017   • Pure hypercholesterolemia 07/21/2017   • Nonrheumatic mitral valve regurgitation 07/21/2017   • Chronic insomnia 04/25/2015   • Pulmonary fibrosis (HCC) 03/11/2015       Family History   Problem Relation Age of Onset   • Cancer Mother         Bladder cancer, hx. of smoking   • Diabetes Mother    • Heart Attack Father    • Cancer Father         Colon    • Cancer Maternal Uncle         Lung   • Diabetes Maternal Uncle    • Diabetes Maternal Aunt        She  has a past medical history of Anxiety, Depression, History of respiratory failure, Hyperlipidemia, Hypertension, Insomnia,  "Peripheral neuropathy, and Pulmonary fibrosis (HCC).  She  has a past surgical history that includes other and hip hemiarthroplasty (4/25/2015).     Objective:   Ht 1.575 m (5' 2\")   Wt 53.5 kg (118 lb)   BMI 21.58 kg/m²    Respirations: 16    Physical Exam:  Constitutional: Alert, no distress, well-groomed.  Skin: No rashes in visible areas.  Eye: Round. Conjunctiva clear, lids normal. No icterus.   ENMT: Lips pink without lesions, good dentition, moist mucous membranes. Phonation normal.  Neck: No masses, no thyromegaly.   Respiratory: Unlabored respiratory effort, no cough or audible wheeze  Psych: Alert and oriented x3, normal affect and mood.     Assessment and Plan:   The following treatment plan was discussed:     1. Heart murmur  2. Nonrheumatic mitral valve regurgitation  3. Aortic valve sclerosis  Reviewed results of patient's echocardiogram.  There have been no changes since 2014.  We will submit a copy of the echocardiogram to her surgeon in preparation for her upcoming abdominal hernia repair surgery.    4. Chronic insomnia  This has been an ongoing issue for Yasmin.  She has suffered from insomnia for most of her adult life.  In order to help, she has been on sleep aids.  2 to 3 months ago, we discontinued several medications that were no longer necessary.  She has tried multiple over-the-counter remedies to help with sleep, cognitive behavioral therapy as well as prescription medication.  I did have her on a trial of mirtazapine for close to 2 months, but she has not found this medication to be effective.  Therefore, after discussion, we agreed to restart her Ambien 10 mg at bedtime.  At one point in the past, she was taking a second tablet if she did not sleep well.  I strongly encouraged her to stick to 1 tablet per 24 hours.   was reviewed and is appropriate.  Medications have been sent over to her pharmacy.  - zolpidem (AMBIEN) 10 MG Tab; Take 1 Tab by mouth at bedtime as needed for up to 30 " days.  Dispense: 30 Tab; Refill: 5    Follow-up: after abdominal hernia surgery

## 2020-11-18 ENCOUNTER — PRE-ADMISSION TESTING (OUTPATIENT)
Dept: ADMISSIONS | Facility: MEDICAL CENTER | Age: 75
End: 2020-11-18
Attending: SURGERY
Payer: MEDICARE

## 2020-11-18 DIAGNOSIS — Z01.812 PRE-OPERATIVE LABORATORY EXAMINATION: ICD-10-CM

## 2020-11-18 DIAGNOSIS — Z01.810 PRE-OPERATIVE CARDIOVASCULAR EXAMINATION: ICD-10-CM

## 2020-11-18 LAB
ANION GAP SERPL CALC-SCNC: 11 MMOL/L (ref 7–16)
BUN SERPL-MCNC: 18 MG/DL (ref 8–22)
CALCIUM SERPL-MCNC: 10.2 MG/DL (ref 8.5–10.5)
CHLORIDE SERPL-SCNC: 106 MMOL/L (ref 96–112)
CO2 SERPL-SCNC: 24 MMOL/L (ref 20–33)
COVID ORDER STATUS COVID19: NORMAL
CREAT SERPL-MCNC: 0.75 MG/DL (ref 0.5–1.4)
EKG IMPRESSION: NORMAL
ERYTHROCYTE [DISTWIDTH] IN BLOOD BY AUTOMATED COUNT: 47.2 FL (ref 35.9–50)
GLUCOSE SERPL-MCNC: 86 MG/DL (ref 65–99)
HCT VFR BLD AUTO: 33.6 % (ref 37–47)
HGB BLD-MCNC: 10.1 G/DL (ref 12–16)
MCH RBC QN AUTO: 28.7 PG (ref 27–33)
MCHC RBC AUTO-ENTMCNC: 30.1 G/DL (ref 33.6–35)
MCV RBC AUTO: 95.5 FL (ref 81.4–97.8)
PLATELET # BLD AUTO: 291 K/UL (ref 164–446)
PMV BLD AUTO: 8.8 FL (ref 9–12.9)
POTASSIUM SERPL-SCNC: 4.7 MMOL/L (ref 3.6–5.5)
RBC # BLD AUTO: 3.52 M/UL (ref 4.2–5.4)
SARS-COV-2 RNA RESP QL NAA+PROBE: NOTDETECTED
SODIUM SERPL-SCNC: 141 MMOL/L (ref 135–145)
SPECIMEN SOURCE: NORMAL
WBC # BLD AUTO: 4.1 K/UL (ref 4.8–10.8)

## 2020-11-18 PROCEDURE — 36415 COLL VENOUS BLD VENIPUNCTURE: CPT

## 2020-11-18 PROCEDURE — C9803 HOPD COVID-19 SPEC COLLECT: HCPCS

## 2020-11-18 PROCEDURE — U0003 INFECTIOUS AGENT DETECTION BY NUCLEIC ACID (DNA OR RNA); SEVERE ACUTE RESPIRATORY SYNDROME CORONAVIRUS 2 (SARS-COV-2) (CORONAVIRUS DISEASE [COVID-19]), AMPLIFIED PROBE TECHNIQUE, MAKING USE OF HIGH THROUGHPUT TECHNOLOGIES AS DESCRIBED BY CMS-2020-01-R: HCPCS

## 2020-11-18 PROCEDURE — 93005 ELECTROCARDIOGRAM TRACING: CPT

## 2020-11-18 PROCEDURE — 80048 BASIC METABOLIC PNL TOTAL CA: CPT

## 2020-11-18 PROCEDURE — 85027 COMPLETE CBC AUTOMATED: CPT

## 2020-11-18 PROCEDURE — 93010 ELECTROCARDIOGRAM REPORT: CPT | Performed by: INTERNAL MEDICINE

## 2020-11-20 ENCOUNTER — PATIENT MESSAGE (OUTPATIENT)
Dept: MEDICAL GROUP | Facility: PHYSICIAN GROUP | Age: 75
End: 2020-11-20

## 2020-11-21 RX ORDER — ATORVASTATIN CALCIUM 20 MG/1
20 TABLET, FILM COATED ORAL DAILY
Qty: 90 TAB | Refills: 3 | Status: SHIPPED | OUTPATIENT
Start: 2020-11-21 | End: 2021-11-04 | Stop reason: SDUPTHER

## 2020-11-22 NOTE — OR NURSING
COVID-19 Pre-surgery screenin. Do you have an undiagnosed respiratory illness or symptoms such as coughing or sneezing? no (Yes/No)      2. Do you have an unexplained fever greater than 100.4 degrees Fahrenheit or 38 degrees Celsius?     no (Yes/No)    3. Have you had direct exposure to a patient who tested positive for Covid-19?    no (Yes/No)    4. Have you had any loss of your sense of taste or smell? Have you had N/V or sore throat? no    Patient has been informed of visitor policy and asked to wear a mask upon entering the hospital   yes (Yes/No)

## 2020-11-23 ENCOUNTER — TELEPHONE (OUTPATIENT)
Dept: OPHTHALMOLOGY | Facility: MEDICAL CENTER | Age: 75
End: 2020-11-23

## 2020-11-23 ENCOUNTER — ANESTHESIA (OUTPATIENT)
Dept: SURGERY | Facility: MEDICAL CENTER | Age: 75
End: 2020-11-23
Payer: MEDICARE

## 2020-11-23 ENCOUNTER — HOSPITAL ENCOUNTER (OUTPATIENT)
Facility: MEDICAL CENTER | Age: 75
End: 2020-11-24
Attending: SURGERY | Admitting: SURGERY
Payer: MEDICARE

## 2020-11-23 ENCOUNTER — ANESTHESIA EVENT (OUTPATIENT)
Dept: SURGERY | Facility: MEDICAL CENTER | Age: 75
End: 2020-11-23
Payer: MEDICARE

## 2020-11-23 DIAGNOSIS — G89.18 POSTOPERATIVE PAIN: ICD-10-CM

## 2020-11-23 PROCEDURE — 501664 HCHG TUBING, FILTER STRYKER: Performed by: SURGERY

## 2020-11-23 PROCEDURE — 700102 HCHG RX REV CODE 250 W/ 637 OVERRIDE(OP): Performed by: PHYSICIAN ASSISTANT

## 2020-11-23 PROCEDURE — 700105 HCHG RX REV CODE 258: Performed by: SURGERY

## 2020-11-23 PROCEDURE — 160035 HCHG PACU - 1ST 60 MINS PHASE I: Performed by: SURGERY

## 2020-11-23 PROCEDURE — G0378 HOSPITAL OBSERVATION PER HR: HCPCS

## 2020-11-23 PROCEDURE — 160041 HCHG SURGERY MINUTES - EA ADDL 1 MIN LEVEL 4: Performed by: SURGERY

## 2020-11-23 PROCEDURE — 160029 HCHG SURGERY MINUTES - 1ST 30 MINS LEVEL 4: Performed by: SURGERY

## 2020-11-23 PROCEDURE — C1781 MESH (IMPLANTABLE): HCPCS | Performed by: SURGERY

## 2020-11-23 PROCEDURE — 700111 HCHG RX REV CODE 636 W/ 250 OVERRIDE (IP): Performed by: ANESTHESIOLOGY

## 2020-11-23 PROCEDURE — 700101 HCHG RX REV CODE 250: Performed by: SURGERY

## 2020-11-23 PROCEDURE — 700102 HCHG RX REV CODE 250 W/ 637 OVERRIDE(OP): Performed by: ANESTHESIOLOGY

## 2020-11-23 PROCEDURE — 500868 HCHG NEEDLE, SURGI(VARES): Performed by: SURGERY

## 2020-11-23 PROCEDURE — 501583 HCHG TROCAR, THRD CAN&SEAL 5X100: Performed by: SURGERY

## 2020-11-23 PROCEDURE — 500521 HCHG ENDOSTITCH LOAD UNIT: Performed by: SURGERY

## 2020-11-23 PROCEDURE — 160009 HCHG ANES TIME/MIN: Performed by: SURGERY

## 2020-11-23 PROCEDURE — 160002 HCHG RECOVERY MINUTES (STAT): Performed by: SURGERY

## 2020-11-23 PROCEDURE — 501577 HCHG TROCAR, STEP 11MM: Performed by: SURGERY

## 2020-11-23 PROCEDURE — A9270 NON-COVERED ITEM OR SERVICE: HCPCS | Performed by: PHYSICIAN ASSISTANT

## 2020-11-23 PROCEDURE — 700111 HCHG RX REV CODE 636 W/ 250 OVERRIDE (IP): Performed by: PHYSICIAN ASSISTANT

## 2020-11-23 PROCEDURE — 501838 HCHG SUTURE GENERAL: Performed by: SURGERY

## 2020-11-23 PROCEDURE — 96374 THER/PROPH/DIAG INJ IV PUSH: CPT | Mod: XU

## 2020-11-23 PROCEDURE — 501570 HCHG TROCAR, SEPARATOR: Performed by: SURGERY

## 2020-11-23 PROCEDURE — 502571 HCHG PACK, LAP CHOLE: Performed by: SURGERY

## 2020-11-23 PROCEDURE — 160036 HCHG PACU - EA ADDL 30 MINS PHASE I: Performed by: SURGERY

## 2020-11-23 PROCEDURE — 500522 HCHG ENDOSTITCH SUTURING DEVICE: Performed by: SURGERY

## 2020-11-23 PROCEDURE — 700102 HCHG RX REV CODE 250 W/ 637 OVERRIDE(OP): Performed by: SURGERY

## 2020-11-23 PROCEDURE — 160048 HCHG OR STATISTICAL LEVEL 1-5: Performed by: SURGERY

## 2020-11-23 PROCEDURE — A9270 NON-COVERED ITEM OR SERVICE: HCPCS | Performed by: SURGERY

## 2020-11-23 PROCEDURE — A9270 NON-COVERED ITEM OR SERVICE: HCPCS | Performed by: ANESTHESIOLOGY

## 2020-11-23 PROCEDURE — 500002 HCHG ADHESIVE, DERMABOND: Performed by: SURGERY

## 2020-11-23 PROCEDURE — 700101 HCHG RX REV CODE 250: Performed by: ANESTHESIOLOGY

## 2020-11-23 DEVICE — MESH SURGICAL PHASIX SEPRA 7X10CM: Type: IMPLANTABLE DEVICE | Site: ABDOMEN | Status: FUNCTIONAL

## 2020-11-23 RX ORDER — BUSPIRONE HYDROCHLORIDE 10 MG/1
20 TABLET ORAL NIGHTLY
Status: DISCONTINUED | OUTPATIENT
Start: 2020-11-23 | End: 2020-11-24 | Stop reason: HOSPADM

## 2020-11-23 RX ORDER — SODIUM CHLORIDE, SODIUM LACTATE, POTASSIUM CHLORIDE, CALCIUM CHLORIDE 600; 310; 30; 20 MG/100ML; MG/100ML; MG/100ML; MG/100ML
INJECTION, SOLUTION INTRAVENOUS CONTINUOUS
Status: ACTIVE | OUTPATIENT
Start: 2020-11-23 | End: 2020-11-23

## 2020-11-23 RX ORDER — HALOPERIDOL 5 MG/ML
1 INJECTION INTRAMUSCULAR
Status: DISCONTINUED | OUTPATIENT
Start: 2020-11-23 | End: 2020-11-23 | Stop reason: HOSPADM

## 2020-11-23 RX ORDER — CEFAZOLIN SODIUM 1 G/3ML
INJECTION, POWDER, FOR SOLUTION INTRAMUSCULAR; INTRAVENOUS PRN
Status: DISCONTINUED | OUTPATIENT
Start: 2020-11-23 | End: 2020-11-23 | Stop reason: SURG

## 2020-11-23 RX ORDER — MORPHINE SULFATE 4 MG/ML
1-5 INJECTION, SOLUTION INTRAMUSCULAR; INTRAVENOUS
Status: DISCONTINUED | OUTPATIENT
Start: 2020-11-23 | End: 2020-11-24 | Stop reason: HOSPADM

## 2020-11-23 RX ORDER — OXYCODONE HCL 5 MG/5 ML
10 SOLUTION, ORAL ORAL
Status: COMPLETED | OUTPATIENT
Start: 2020-11-23 | End: 2020-11-23

## 2020-11-23 RX ORDER — LABETALOL HYDROCHLORIDE 5 MG/ML
INJECTION, SOLUTION INTRAVENOUS PRN
Status: DISCONTINUED | OUTPATIENT
Start: 2020-11-23 | End: 2020-11-23 | Stop reason: SURG

## 2020-11-23 RX ORDER — ONDANSETRON 2 MG/ML
INJECTION INTRAMUSCULAR; INTRAVENOUS PRN
Status: DISCONTINUED | OUTPATIENT
Start: 2020-11-23 | End: 2020-11-23 | Stop reason: SURG

## 2020-11-23 RX ORDER — ONDANSETRON 2 MG/ML
4 INJECTION INTRAMUSCULAR; INTRAVENOUS EVERY 4 HOURS PRN
Status: DISCONTINUED | OUTPATIENT
Start: 2020-11-23 | End: 2020-11-24 | Stop reason: HOSPADM

## 2020-11-23 RX ORDER — SODIUM CHLORIDE, SODIUM LACTATE, POTASSIUM CHLORIDE, CALCIUM CHLORIDE 600; 310; 30; 20 MG/100ML; MG/100ML; MG/100ML; MG/100ML
INJECTION, SOLUTION INTRAVENOUS CONTINUOUS
Status: DISCONTINUED | OUTPATIENT
Start: 2020-11-23 | End: 2020-11-24 | Stop reason: HOSPADM

## 2020-11-23 RX ORDER — HYDROMORPHONE HYDROCHLORIDE 1 MG/ML
0.2 INJECTION, SOLUTION INTRAMUSCULAR; INTRAVENOUS; SUBCUTANEOUS
Status: DISCONTINUED | OUTPATIENT
Start: 2020-11-23 | End: 2020-11-23 | Stop reason: HOSPADM

## 2020-11-23 RX ORDER — LABETALOL HYDROCHLORIDE 5 MG/ML
10 INJECTION, SOLUTION INTRAVENOUS EVERY 6 HOURS PRN
Status: DISCONTINUED | OUTPATIENT
Start: 2020-11-23 | End: 2020-11-24 | Stop reason: HOSPADM

## 2020-11-23 RX ORDER — ONDANSETRON 4 MG/1
4 TABLET, ORALLY DISINTEGRATING ORAL EVERY 4 HOURS PRN
Status: DISCONTINUED | OUTPATIENT
Start: 2020-11-23 | End: 2020-11-24 | Stop reason: HOSPADM

## 2020-11-23 RX ORDER — ONDANSETRON 2 MG/ML
4 INJECTION INTRAMUSCULAR; INTRAVENOUS
Status: DISCONTINUED | OUTPATIENT
Start: 2020-11-23 | End: 2020-11-23 | Stop reason: HOSPADM

## 2020-11-23 RX ORDER — HYDROMORPHONE HYDROCHLORIDE 1 MG/ML
0.4 INJECTION, SOLUTION INTRAMUSCULAR; INTRAVENOUS; SUBCUTANEOUS
Status: DISCONTINUED | OUTPATIENT
Start: 2020-11-23 | End: 2020-11-23 | Stop reason: HOSPADM

## 2020-11-23 RX ORDER — HYDROMORPHONE HYDROCHLORIDE 1 MG/ML
0.1 INJECTION, SOLUTION INTRAMUSCULAR; INTRAVENOUS; SUBCUTANEOUS
Status: DISCONTINUED | OUTPATIENT
Start: 2020-11-23 | End: 2020-11-23 | Stop reason: HOSPADM

## 2020-11-23 RX ORDER — DIPHENHYDRAMINE HYDROCHLORIDE 50 MG/ML
25 INJECTION INTRAMUSCULAR; INTRAVENOUS EVERY 6 HOURS PRN
Status: DISCONTINUED | OUTPATIENT
Start: 2020-11-23 | End: 2020-11-24 | Stop reason: HOSPADM

## 2020-11-23 RX ORDER — FAMOTIDINE 20 MG/1
20 TABLET, FILM COATED ORAL 2 TIMES DAILY
Status: DISCONTINUED | OUTPATIENT
Start: 2020-11-23 | End: 2020-11-24 | Stop reason: HOSPADM

## 2020-11-23 RX ORDER — ZOLPIDEM TARTRATE 5 MG/1
10 TABLET ORAL NIGHTLY PRN
Status: DISCONTINUED | OUTPATIENT
Start: 2020-11-23 | End: 2020-11-24 | Stop reason: HOSPADM

## 2020-11-23 RX ORDER — KETOROLAC TROMETHAMINE 30 MG/ML
15 INJECTION, SOLUTION INTRAMUSCULAR; INTRAVENOUS EVERY 6 HOURS
Status: DISCONTINUED | OUTPATIENT
Start: 2020-11-23 | End: 2020-11-24 | Stop reason: HOSPADM

## 2020-11-23 RX ORDER — ATORVASTATIN CALCIUM 20 MG/1
20 TABLET, FILM COATED ORAL DAILY
Status: DISCONTINUED | OUTPATIENT
Start: 2020-11-24 | End: 2020-11-23

## 2020-11-23 RX ORDER — ATORVASTATIN CALCIUM 20 MG/1
20 TABLET, FILM COATED ORAL NIGHTLY
Status: DISCONTINUED | OUTPATIENT
Start: 2020-11-23 | End: 2020-11-24 | Stop reason: HOSPADM

## 2020-11-23 RX ORDER — DIPHENHYDRAMINE HYDROCHLORIDE 50 MG/ML
12.5 INJECTION INTRAMUSCULAR; INTRAVENOUS
Status: DISCONTINUED | OUTPATIENT
Start: 2020-11-23 | End: 2020-11-23 | Stop reason: HOSPADM

## 2020-11-23 RX ORDER — BUSPIRONE HYDROCHLORIDE 10 MG/1
10 TABLET ORAL EVERY MORNING
Status: DISCONTINUED | OUTPATIENT
Start: 2020-11-24 | End: 2020-11-24 | Stop reason: HOSPADM

## 2020-11-23 RX ORDER — DEXAMETHASONE SODIUM PHOSPHATE 4 MG/ML
INJECTION, SOLUTION INTRA-ARTICULAR; INTRALESIONAL; INTRAMUSCULAR; INTRAVENOUS; SOFT TISSUE PRN
Status: DISCONTINUED | OUTPATIENT
Start: 2020-11-23 | End: 2020-11-23 | Stop reason: SURG

## 2020-11-23 RX ORDER — OXYCODONE HCL 5 MG/5 ML
5 SOLUTION, ORAL ORAL
Status: COMPLETED | OUTPATIENT
Start: 2020-11-23 | End: 2020-11-23

## 2020-11-23 RX ORDER — SODIUM CHLORIDE, SODIUM LACTATE, POTASSIUM CHLORIDE, CALCIUM CHLORIDE 600; 310; 30; 20 MG/100ML; MG/100ML; MG/100ML; MG/100ML
INJECTION, SOLUTION INTRAVENOUS CONTINUOUS
Status: DISCONTINUED | OUTPATIENT
Start: 2020-11-23 | End: 2020-11-23 | Stop reason: HOSPADM

## 2020-11-23 RX ORDER — BUPIVACAINE HYDROCHLORIDE AND EPINEPHRINE 5; 5 MG/ML; UG/ML
INJECTION, SOLUTION EPIDURAL; INTRACAUDAL; PERINEURAL
Status: DISCONTINUED | OUTPATIENT
Start: 2020-11-23 | End: 2020-11-23 | Stop reason: HOSPADM

## 2020-11-23 RX ORDER — MEPERIDINE HYDROCHLORIDE 25 MG/ML
12.5 INJECTION INTRAMUSCULAR; INTRAVENOUS; SUBCUTANEOUS
Status: DISCONTINUED | OUTPATIENT
Start: 2020-11-23 | End: 2020-11-23 | Stop reason: HOSPADM

## 2020-11-23 RX ADMIN — HYDROCODONE BITARTRATE AND ACETAMINOPHEN 7.5 MG: 7.5; 325 SOLUTION ORAL at 22:36

## 2020-11-23 RX ADMIN — DEXAMETHASONE SODIUM PHOSPHATE 8 MG: 4 INJECTION, SOLUTION INTRA-ARTICULAR; INTRALESIONAL; INTRAMUSCULAR; INTRAVENOUS; SOFT TISSUE at 11:05

## 2020-11-23 RX ADMIN — SODIUM CHLORIDE, POTASSIUM CHLORIDE, SODIUM LACTATE AND CALCIUM CHLORIDE: 600; 310; 30; 20 INJECTION, SOLUTION INTRAVENOUS at 10:37

## 2020-11-23 RX ADMIN — FENTANYL CITRATE 50 MCG: 50 INJECTION, SOLUTION INTRAMUSCULAR; INTRAVENOUS at 10:47

## 2020-11-23 RX ADMIN — FENTANYL CITRATE 25 MCG: 50 INJECTION, SOLUTION INTRAMUSCULAR; INTRAVENOUS at 13:10

## 2020-11-23 RX ADMIN — FENTANYL CITRATE 50 MCG: 50 INJECTION, SOLUTION INTRAMUSCULAR; INTRAVENOUS at 11:02

## 2020-11-23 RX ADMIN — FAMOTIDINE 20 MG: 20 TABLET ORAL at 17:55

## 2020-11-23 RX ADMIN — LABETALOL HYDROCHLORIDE 10 MG: 5 INJECTION, SOLUTION INTRAVENOUS at 11:15

## 2020-11-23 RX ADMIN — FENTANYL CITRATE 25 MCG: 50 INJECTION, SOLUTION INTRAMUSCULAR; INTRAVENOUS at 13:19

## 2020-11-23 RX ADMIN — CEFAZOLIN 2 G: 330 INJECTION, POWDER, FOR SOLUTION INTRAMUSCULAR; INTRAVENOUS at 10:47

## 2020-11-23 RX ADMIN — ATORVASTATIN CALCIUM 20 MG: 20 TABLET, FILM COATED ORAL at 23:12

## 2020-11-23 RX ADMIN — BUSPIRONE HYDROCHLORIDE 20 MG: 10 TABLET ORAL at 23:12

## 2020-11-23 RX ADMIN — MIDAZOLAM 2 MG: 1 INJECTION INTRAMUSCULAR; INTRAVENOUS at 10:47

## 2020-11-23 RX ADMIN — ONDANSETRON 4 MG: 2 INJECTION INTRAMUSCULAR; INTRAVENOUS at 11:39

## 2020-11-23 RX ADMIN — ZOLPIDEM TARTRATE 10 MG: 5 TABLET ORAL at 23:12

## 2020-11-23 RX ADMIN — PROPOFOL 130 MG: 10 INJECTION, EMULSION INTRAVENOUS at 10:48

## 2020-11-23 RX ADMIN — SODIUM CHLORIDE, POTASSIUM CHLORIDE, SODIUM LACTATE AND CALCIUM CHLORIDE: 600; 310; 30; 20 INJECTION, SOLUTION INTRAVENOUS at 08:42

## 2020-11-23 RX ADMIN — HYDROMORPHONE HYDROCHLORIDE 0.2 MG: 1 INJECTION, SOLUTION INTRAMUSCULAR; INTRAVENOUS; SUBCUTANEOUS at 14:40

## 2020-11-23 RX ADMIN — KETOROLAC TROMETHAMINE 15 MG: 30 INJECTION, SOLUTION INTRAMUSCULAR at 18:00

## 2020-11-23 RX ADMIN — HYDROCODONE BITARTRATE AND ACETAMINOPHEN 7.5 MG: 7.5; 325 SOLUTION ORAL at 17:55

## 2020-11-23 RX ADMIN — OXYCODONE HYDROCHLORIDE 5 MG: 5 SOLUTION ORAL at 14:07

## 2020-11-23 RX ADMIN — MEPERIDINE HYDROCHLORIDE 12.5 MG: 25 INJECTION INTRAMUSCULAR; INTRAVENOUS; SUBCUTANEOUS at 13:27

## 2020-11-23 RX ADMIN — POVIDONE IODINE 15 ML: 100 SOLUTION TOPICAL at 08:42

## 2020-11-23 RX ADMIN — FENTANYL CITRATE 50 MCG: 50 INJECTION, SOLUTION INTRAMUSCULAR; INTRAVENOUS at 13:37

## 2020-11-23 RX ADMIN — FENTANYL CITRATE 50 MCG: 50 INJECTION, SOLUTION INTRAMUSCULAR; INTRAVENOUS at 10:51

## 2020-11-23 ASSESSMENT — PAIN DESCRIPTION - PAIN TYPE
TYPE: SURGICAL PAIN;REFERRED PAIN
TYPE: REFERRED PAIN
TYPE: ACUTE PAIN;SURGICAL PAIN
TYPE: SURGICAL PAIN;REFERRED PAIN
TYPE: SURGICAL PAIN
TYPE: SURGICAL PAIN
TYPE: SURGICAL PAIN;REFERRED PAIN
TYPE: SURGICAL PAIN;REFERRED PAIN
TYPE: SURGICAL PAIN
TYPE: REFERRED PAIN
TYPE: SURGICAL PAIN;REFERRED PAIN

## 2020-11-23 ASSESSMENT — LIFESTYLE VARIABLES
EVER HAD A DRINK FIRST THING IN THE MORNING TO STEADY YOUR NERVES TO GET RID OF A HANGOVER: NO
CONSUMPTION TOTAL: NEGATIVE
TOTAL SCORE: 0
ON A TYPICAL DAY WHEN YOU DRINK ALCOHOL HOW MANY DRINKS DO YOU HAVE: 1
AVERAGE NUMBER OF DAYS PER WEEK YOU HAVE A DRINK CONTAINING ALCOHOL: 1
DOES PATIENT WANT TO STOP DRINKING: NO
EVER FELT BAD OR GUILTY ABOUT YOUR DRINKING: NO
HAVE PEOPLE ANNOYED YOU BY CRITICIZING YOUR DRINKING: NO
HAVE YOU EVER FELT YOU SHOULD CUT DOWN ON YOUR DRINKING: NO
TOTAL SCORE: 0
ALCOHOL_USE: YES
TOTAL SCORE: 0
HOW MANY TIMES IN THE PAST YEAR HAVE YOU HAD 5 OR MORE DRINKS IN A DAY: 0

## 2020-11-23 ASSESSMENT — COGNITIVE AND FUNCTIONAL STATUS - GENERAL
DAILY ACTIVITIY SCORE: 23
TOILETING: A LITTLE
SUGGESTED CMS G CODE MODIFIER DAILY ACTIVITY: CI
SUGGESTED CMS G CODE MODIFIER MOBILITY: CJ
MOBILITY SCORE: 20
STANDING UP FROM CHAIR USING ARMS: A LITTLE
WALKING IN HOSPITAL ROOM: A LITTLE
MOVING TO AND FROM BED TO CHAIR: A LITTLE
CLIMB 3 TO 5 STEPS WITH RAILING: A LITTLE

## 2020-11-23 ASSESSMENT — PATIENT HEALTH QUESTIONNAIRE - PHQ9
SUM OF ALL RESPONSES TO PHQ9 QUESTIONS 1 AND 2: 0
2. FEELING DOWN, DEPRESSED, IRRITABLE, OR HOPELESS: NOT AT ALL
1. LITTLE INTEREST OR PLEASURE IN DOING THINGS: NOT AT ALL

## 2020-11-23 ASSESSMENT — FIBROSIS 4 INDEX: FIB4 SCORE: 1.15

## 2020-11-23 ASSESSMENT — PAIN SCALES - GENERAL: PAIN_LEVEL: 2

## 2020-11-23 NOTE — ANESTHESIA QCDR
2019 [facility] Clinical Data Registry (for Quality Improvement)     Postoperative nausea/vomiting risk protocol (Adult = 18 yrs and Pediatric 3-17 yrs)- (430 and 463)  General inhalation anesthetic (NOT TIVA) with PONV risk factors: Yes  Provision of anti-emetic therapy with at least 2 different classes of agents: Yes   Patient DID NOT receive anti-emetic therapy and reason is documented in Medical Record:  N/A    Multimodal Pain Management- (477)  Non-emergent surgery AND patient age >= 18:   Use of Multimodal Pain Management, two or more drugs and/or interventions, NOT including systemic opioids:   Exception: Documented allergy to multiple classes of analgesics:     Smoking Abstinence (404)  Patient is current smoker (cigarette, pipe, e-cig, marijuanna):   Elective Surgery:   Abstinence instructions provided prior to day of surgery:   Patient abstained from smoking on day of surgery:     Pre-Op Beta-Blocker in Isolated CABG (44)  Isolated CABG AND patient age >= 18:   Beta-blocker admin within 24 hours of surgical incision:   Exception:of medical reason(s) for not administering beta blocker within 24 hours prior to surgical incision (e.g., not  indicated,other medical reason):     PACU assessment of acute postoperative pain prior to Anesthesia Care End- Applies to Patients Age = 18- (ABG7)  Initial PACU pain score is which of the following: < 7/10  Patient unable to report pain score: N/A    Post-anesthetic transfer of care checklist/protocol to PACU/ICU- (426 and 427)  Upon conclusion of case, patient transferred to which of the following locations: PACU/Non-ICU  Use of transfer checklist/protocol: Yes  Exclusion: Service Performed in Patient Hospital Room (and thus did not require transfer): N/A  Unplanned admission to ICU related to anesthesia service up through end of PACU care- (MD51)  Unplanned admission to ICU (not initially anticipated at anesthesia start time): No

## 2020-11-23 NOTE — OR NURSING
Report given to Cecile OCAMPO via [facility] , family member- niece notified pt has a room, pt verbalized understanding of poc and admission.  Pt presently A/O x 4, pain has improved with medication.  Pt is in NAD at this time. Pt bag on bed- pink tote

## 2020-11-23 NOTE — ANESTHESIA PREPROCEDURE EVALUATION
Relevant Problems   CARDIAC   (+) Nonrheumatic mitral valve regurgitation       Physical Exam    Airway   Mallampati: II  TM distance: >3 FB  Neck ROM: full       Cardiovascular - normal exam  Rhythm: regular  Rate: normal  (-) murmur     Dental - normal exam           Pulmonary - normal exam  Breath sounds clear to auscultation     Abdominal   Abdomen: soft  Bowel sounds: normal   Neurological - normal exam                 Anesthesia Plan    ASA 2       Plan - general       Airway plan will be ETT        Induction: intravenous    Postoperative Plan: Postoperative administration of opioids is intended.    Pertinent diagnostic labs and testing reviewed    Informed Consent:    Anesthetic plan and risks discussed with patient.    Use of blood products discussed with: patient whom consented to blood products.

## 2020-11-23 NOTE — ANESTHESIA PROCEDURE NOTES
Airway    Date/Time: 11/23/2020 10:47 AM  Performed by: Clinton Burden M.D.  Authorized by: Clinton Burden M.D.     Location:  OR  Urgency:  Elective  Indications for Airway Management:  Anesthesia      Spontaneous Ventilation: absent    Sedation Level:  Deep  Preoxygenated: Yes    Patient Position:  Sniffing  Final Airway Type:  Endotracheal airway  Final Endotracheal Airway:  ETT  Cuffed: Yes    Technique Used for Successful ETT Placement:  Direct laryngoscopy    Insertion Site:  Oral  Blade Type:  Irving  Laryngoscope Blade/Videolaryngoscope Blade Size:  3  ETT Size (mm):  7.0  Measured from:  Teeth  ETT to Teeth (cm):  22  Placement Verified by: auscultation and capnometry    Cormack-Lehane Classification:  Grade IIa - partial view of glottis  Number of Attempts at Approach:  1

## 2020-11-23 NOTE — OP REPORT
Operative Report    Date: 11/23/2020    PreOp Diagnosis:   1.  Paraesophageal hernia     PostOp Diagnosis: same     Procedure(s):  1.  LAPAROSCOPIC PARAESOPHAGEAL HERNIA REPAIR WITH MESH, partial fundoplication- Wound Class: Clean     Surgeon(s):  Pritesh Baptiste M.D.     Anesthesiologist/Type of Anesthesia:  Anesthesiologist: Clinton Burden M.D./General     Surgical Staff:  Assistant: ALPESH North  Circulator: Monica Gómez R.N.  Relief Circulator: Lyndon Colunga R.N.  Relief Scrub: Aimee Joel  Scrub Person: Toshia Lakhani R.N.     Specimens removed if any:  * No specimens in log *     Estimated Blood Loss: 15mL     Complications: None noted    Indications:   The patient has a long history of symptoms related to a large paraesophageal hiatal hernia. Risks, benefits, and alternatives to repair with mesh and fundoplication were outlined in detail. All questions were answered and wished to proceed.    Findings: A large hernia, repaired with 7x10 cm Phasix mesh support, 270 degree fundoplication over 54 Fr bougie    Procedure in detail:    The patient was identified and general anesthetic administered.  The abdomen was prepped and draped in the usual sterile fashion.  Local anesthesia of 0.5% Marcaine with epinephrine was injected prior to making skin incision.  Small incision was made to left midline in low epigastric region and the Veress needle passed.  The abdomen was insufflated with carbon dioxide without incident and a 5-mm blunt trocar and 5-mm 30-degree scope was inserted. A Thai liver retractor was passed through a small subxiphoid incision and used to elevate the lateral segment of the liver and this was held with the robotic arm.  Right upper quadrant 5, left upper quadrant 11, left lateral subcostal 5 mm trocars were placed.  Inspection of the hiatus showed the above findings.  Dissection was begun by dividing the gastrohepatic ligament using the Harmonic scalpel, which was  used for all of the dissection.  The hernia sac was mobilized from the hiatus circumferentially and teased out of the mediastinum allowing the stomach and its contents to be reduced.  Circumferential dissection was carried around the esophagus well into the mediastinum to allow the gastroesophageal junction to be brought well below the hiatus under no tension upwards.  The hernia sac was taken off the greater curve of the stomach and discarded.    Posterior hiatal hernia repair was carried out with 0 Surgidac using the EndoStitch device.  Three interrupted sutures placed in the crura, which was reinforced with a small portion of the 7 x 10 cm Phasix mesh.  This approximated the crura well.  The remainder of the 7 x 10 cm mesh was soaked in saline and notched to accommodate the esophagus and inserted in the abdomen.  It was secured to itself anteriorly and also incorporated the manuel anteriorly.    Short gastric vessels were taken down on the superior aspect of the fundus to allow for fundoplication.  A 54-Italian bougie was passed by anesthesia and the fundoplication was carried out rotating the fundus from left to right behind the esophagus.  A 270-degree fundoplication was then completed, bringing it around forward and tacking both sides down to the esophagus with 3 sutures with the uppermost sutures incorporating the hiatus.   The bougie was removed and the wrap maintained good orientation with no torsion or tension upwards.  A posterior suture between the fundoplication and the manuel was then performed using the 0 Surgidac.  The abdomen was irrigated and hemostasis was assured.  Liver retractor and trocars were withdrawn, the abdomen deflated, and incision was closed with Vicryl.  Sterile dressings were applied.  The patient returned to recovery room in stable condition.  Sponge and needle counts were correct at the end of the case.       Pritesh Baptiste M.D.  [facility]  [phone]

## 2020-11-23 NOTE — ANESTHESIA TIME REPORT
Anesthesia Start and Stop Event Times     Date Time Event    11/23/2020 1027 Ready for Procedure     1037 Anesthesia Start     1250 Anesthesia Stop        Responsible Staff  11/23/20    Name Role Begin End    Clinton Burden M.D. Anesth 1037 1250        Preop Diagnosis (Free Text):  Pre-op Diagnosis     PARAESOPHAGEAL HIATAL HERNIA        Preop Diagnosis (Codes):    Post op Diagnosis  Hiatal hernia      Premium Reason  Non-Premium    Comments:

## 2020-11-23 NOTE — DISCHARGE INSTR - OTHER INFO
Clears without carbonation today. Please ensure patient able to tolerate several ounces (small sips at a time) of water/clears without dysphagia or vomiting/regurgitation before sending home.  Advance diet as tolerated to full liquids tomorrow. Follow diet progression handout given at preop appt.  Up ad omer.  Ok to Shower over Dermabond  No baths or soaks x 14 days.  No driving x 4-5 days.  No lifting > 15 lbs until x 4 weeks.  D/C home when alert, comfortable, ambulatory, and tolerating PO well  Pt Counseled re: I.S., diet, activity, home med's, and wound care  Begin PPI at home (all home med's larger in size than eraser head should be crushed or changed to liquid form x 2-3 weeks, while on liquid diet)  Hold MVI/supplements until after postop check.  F/U with Dr. Baptiste ~ 1-2 weeks.

## 2020-11-23 NOTE — OR SURGEON
Immediate Post OP Note    PreOp Diagnosis:   1.  Paraesophageal hernia    PostOp Diagnosis: same    Procedure(s):  1.  LAPAROSCOPIC PARAESOPHAGEAL HERNIA REPAIR WITH MESH, partial fundoplication- Wound Class: Clean    Surgeon(s):  Pritesh Baptiste M.D.    Anesthesiologist/Type of Anesthesia:  Anesthesiologist: Clinton Burden M.D./General    Surgical Staff:  Assistant: ALPESH North  Circulator: Monica Gómez R.N.  Relief Circulator: Lyndon Colunga R.N.  Relief Scrub: Aimee Joel  Scrub Person: Toshia Lakhani R.N.    Specimens removed if any:  * No specimens in log *    Estimated Blood Loss: 15mL    Findings: Very large paraesophageal hernia containing most of stomach    Complications: None noted    Outcome: Transferred to PACU in stable condition    11/23/2020 12:44 PM Pritesh Baptiste M.D.

## 2020-11-23 NOTE — ANESTHESIA POSTPROCEDURE EVALUATION
Patient: Yasmin Zhong    Procedure Summary     Date: 11/23/20 Room / Location: [place] [address] / SURGERY [facility]    Anesthesia Start: 1037 Anesthesia Stop: 1250    Procedure: FUNDOPLICATION, NISSEN, LAPAROSCOPIC- FOR PARAESOPHAGEAL WITH MESH (N/A Abdomen) Diagnosis: (PARAESOPHAGEAL HIATAL HERNIA)    Surgeons: Pritesh Baptiste M.D. Responsible Provider: Clinton Burden M.D.    Anesthesia Type: general ASA Status: 2          Final Anesthesia Type: general  Last vitals  BP   Blood Pressure : 123/73    Temp   36.4 °C (97.6 °F)    Pulse   Pulse: 93   Resp   15    SpO2   93 %      Anesthesia Post Evaluation    Patient location during evaluation: PACU  Patient participation: complete - patient participated  Level of consciousness: awake and alert  Pain score: 2    Airway patency: patent  Anesthetic complications: no  Cardiovascular status: hemodynamically stable  Respiratory status: acceptable  Hydration status: euvolemic    PONV: none           Nurse Pain Score: 0 (NPRS)

## 2020-11-24 VITALS
TEMPERATURE: 98.4 F | RESPIRATION RATE: 18 BRPM | OXYGEN SATURATION: 96 % | BODY MASS INDEX: 21.23 KG/M2 | HEART RATE: 90 BPM | WEIGHT: 124.34 LBS | DIASTOLIC BLOOD PRESSURE: 64 MMHG | SYSTOLIC BLOOD PRESSURE: 97 MMHG | HEIGHT: 64 IN

## 2020-11-24 PROCEDURE — 700111 HCHG RX REV CODE 636 W/ 250 OVERRIDE (IP): Performed by: PHYSICIAN ASSISTANT

## 2020-11-24 PROCEDURE — G0378 HOSPITAL OBSERVATION PER HR: HCPCS

## 2020-11-24 PROCEDURE — A9270 NON-COVERED ITEM OR SERVICE: HCPCS | Performed by: PHYSICIAN ASSISTANT

## 2020-11-24 PROCEDURE — 700102 HCHG RX REV CODE 250 W/ 637 OVERRIDE(OP): Performed by: PHYSICIAN ASSISTANT

## 2020-11-24 PROCEDURE — 96376 TX/PRO/DX INJ SAME DRUG ADON: CPT

## 2020-11-24 RX ADMIN — KETOROLAC TROMETHAMINE 15 MG: 30 INJECTION, SOLUTION INTRAMUSCULAR at 00:24

## 2020-11-24 RX ADMIN — KETOROLAC TROMETHAMINE 15 MG: 30 INJECTION, SOLUTION INTRAMUSCULAR at 06:42

## 2020-11-24 RX ADMIN — FAMOTIDINE 20 MG: 20 TABLET ORAL at 06:44

## 2020-11-24 ASSESSMENT — ENCOUNTER SYMPTOMS
NAUSEA: 0
SHORTNESS OF BREATH: 0
FEVER: 0
VOMITING: 0
ABDOMINAL PAIN: 1
CHILLS: 0
HEARTBURN: 0

## 2020-11-24 ASSESSMENT — PAIN DESCRIPTION - PAIN TYPE: TYPE: SURGICAL PAIN

## 2020-11-24 NOTE — PROGRESS NOTES
Surgical Progress Note    Author: ALPESH North Date & Time created: 2020   8:56 AM     Interval Events:  S/p lap repair of paraesophageal hernia with mesh, and partial fundoplication - POD#1, doing well; deni po well without dysphagia or vomiting; ambulatory; pain controlled; using IS; wants to go home    Review of Systems   Constitutional: Negative for chills and fever.   Respiratory: Negative for shortness of breath.    Gastrointestinal: Positive for abdominal pain. Negative for heartburn, nausea and vomiting.   Skin: Negative for itching and rash.     Hemodynamics:  Temp (24hrs), Av °C (98.6 °F), Min:36.3 °C (97.4 °F), Max:37.4 °C (99.4 °F)  Temperature: 37 °C (98.6 °F)  Pulse  Av.1  Min: 83  Max: 93   Blood Pressure : 101/66     Respiratory:    Respiration: 12, Pulse Oximetry: 96 %           Neuro:  GCS       Fluids:    Intake/Output Summary (Last 24 hours) at 2020 0856  Last data filed at 2020 0715  Gross per 24 hour   Intake 1880 ml   Output 0 ml   Net 1880 ml        Current Diet Order   Procedures   • Diet Order Diet: Clear Liquid (no carbonation)     Physical Exam  Vitals signs and nursing note reviewed.   Constitutional:       General: She is not in acute distress.  Cardiovascular:      Rate and Rhythm: Normal rate.   Pulmonary:      Effort: Pulmonary effort is normal. No respiratory distress.   Abdominal:      Palpations: Abdomen is soft.      Comments: Wounds c/d/i   Skin:     General: Skin is warm and dry.   Neurological:      Mental Status: She is alert and oriented to person, place, and time.   Psychiatric:         Mood and Affect: Mood normal.       Labs:  No results found for this or any previous visit (from the past 24 hour(s)).  Medical Decision Making, by Problem:  There are no active hospital problems to display for this patient.    Plan:  Clears without carbonation today. Please ensure patient able to tolerate several ounces (small sips at a time) of  water/clears without dysphagia or vomiting/regurgitation before sending home.  Advance diet as tolerated to full liquids tomorrow. Follow diet progression handout given at preop appt.  Up ad omer.  Ok to Shower over Dermabond, but no baths or soaks x 14 days.  No driving x 4-5 days.  No lifting > 15 lbs until x 4 weeks.  D/C home when alert, comfortable, ambulatory, and tolerating PO well  Pt Counseled re: I.S., diet, activity, home med's, and wound care  Begin PPI at home (all home med's larger in size than eraser head should be crushed or changed to liquid form x 2-3 weeks, while on liquid diet)  Hold MVI/supplements until after postop check.  F/U with Dr. Baptiste ~ 1-2 weeks.    Quality Measures:  Quality-Core Measures   Reviewed items::  Medications reviewed  Murry catheter::  No Murry  DVT prophylaxis pharmacological::  Enoxaparin (Lovenox)  DVT prophylaxis - mechanical:  SCDs  Ulcer Prophylaxis::  Yes      Discussed patient condition with RN, Patient and Dr. Baptiste

## 2020-11-24 NOTE — PROGRESS NOTES
Received report from PAC-U.  Pt arrived to T427 bed 2 via wheel chair  Patient ambulated to the restroom   +voiding   x1 assist hand held  10L oxy mask  No immediate distress.  A&Ox4  Denies pain at this time  Denies n/v  Clear liquid diet   No carbonation  x5 lap sites CDI   Oriented to room, educated on welcome packet, white board, TV, call light, call before fall, plan of care, oral care expectations, ambulation goals, and up to chair for all meals goal.  Call light and personal belongings within reach.  Fall precautions and hourly rounding in place

## 2020-11-24 NOTE — CARE PLAN
Problem: Safety  Goal: Will remain free from injury  Outcome: PROGRESSING AS EXPECTED  Intervention: Provide assistance with mobility  Flowsheets (Taken 11/23/2020 2000)  Assistance: Assistance of One  Ambulation Tolerance:   Tolerates Well   General Weakness     Problem: Venous Thromboembolism (VTW)/Deep Vein Thrombosis (DVT) Prevention:  Goal: Patient will participate in Venous Thrombosis (VTE)/Deep Vein Thrombosis (DVT)Prevention Measures  Outcome: PROGRESSING AS EXPECTED  Flowsheets (Taken 11/23/2020 2000)  Risk Assessment Score: 3  VTE RISK: High  Mechanical Prophylaxis: SCDs, Sequential Compression Device  SCDs, Sequential Compression Device: On  Pharmacologic Prophylaxis Used: LMWH: Enoxaparin(Lovenox)

## 2020-11-24 NOTE — PROGRESS NOTES
Discharge instructions reviewed with patient and pt's niece and questions answered. PIV removed and questions answered. Pt given norco script and instructed per MD to have clear liquid diet with no carbonation until f/u appt. Pt has f/u appt scheduled next week. Pt instructed to only shower and no bathes or hot tubs. Pt educated on s/s infection and to notify MD.

## 2020-11-24 NOTE — DISCHARGE INSTRUCTIONS
Discharge Instructions    Discharged to home by car with relative. Discharged via wheelchair, hospital escort: Yes.  Special equipment needed: Not Applicable    Be sure to schedule a follow-up appointment with your primary care doctor or any specialists as instructed.     Discharge Plan:   Diet Plan: Discussed  Activity Level: Discussed  Confirmed Follow up Appointment: Patient to Call and Schedule Appointment  Confirmed Symptoms Management: Discussed  Influenza Vaccine Indication: Not indicated: Previously immunized this influenza season and > 8 years of age    I understand that a diet low in cholesterol, fat, and sodium is recommended for good health. Unless I have been given specific instructions below for another diet, I accept this instruction as my diet prescription.   Other diet: clear liquid diet with no carbonation per discussion with Dr. Baptiste until f/u appt next week    Special Instructions: None    · Is patient discharged on Warfarin / Coumadin?   No     Depression / Suicide Risk    As you are discharged from this [facility] facility, it is important to learn how to keep safe from harming yourself.    Recognize the warning signs:  · Abrupt changes in personality, positive or negative- including increase in energy   · Giving away possessions  · Change in eating patterns- significant weight changes-  positive or negative  · Change in sleeping patterns- unable to sleep or sleeping all the time   · Unwillingness or inability to communicate  · Depression  · Unusual sadness, discouragement and loneliness  · Talk of wanting to die  · Neglect of personal appearance   · Rebelliousness- reckless behavior  · Withdrawal from people/activities they love  · Confusion- inability to concentrate     If you or a loved one observes any of these behaviors or has concerns about self-harm, here's what you can do:  · Talk about it- your feelings and reasons for harming yourself  · Remove any means that you might use to  hurt yourself (examples: pills, rope, extension cords, firearm)  · Get professional help from the community (Mental Health, Substance Abuse, psychological counseling)  · Do not be alone:Call your Safe Contact- someone whom you trust who will be there for you.  · Call your local CRISIS HOTLINE 884-7170 or 535-107-8114  · Call your local Children's Mobile Crisis Response Team [place] (711) 120-3891 or www.Smart Cube  · Call the toll free National Suicide Prevention Hotlines   · National Suicide Prevention Lifeline 130-025-YPQC (4791)  · [employer] [facility] Hope Line Network 800-SUICIDE (384-3640)      Hernia Repair  Care After  These instructions give you information on caring for yourself after your procedure. Your doctor may also give you more specific instructions. Call your doctor if you have any problems or questions after your procedure.  HOME CARE   · You may have changes in your poops (bowel movements).  · You may have loose or watery poop (diarrhea).  · You may be not able to poop.  · Your bowels will slowly get back to normal.  · Do not eat any food that makes you sick to your stomach (nauseous). Eat small meals 4 to 6 times a day instead of 3 large ones.  · Do not drink pop. It will give you gas.  · Do not drink alcohol.  · Do not lift anything heavier than 10 pounds. This is about the weight of a gallon of milk.  · Do not do anything that makes you very tired for at least 6 weeks.  · Do not get your wound wet for 2 days.  · You may take a sponge bath during this time.  · After 2 days you may take a shower. Gently pat your surgical cut (incision) dry with a towel. Do not rub it.  · For men: You may have been given an athletic supporter (scrotal support) before you left the hospital. It holds your scrotum and testicles closer to your body so there is no strain on your wound. Wear the supporter until your doctor tells you that you do not need it anymore.  GET HELP RIGHT AWAY IF:  · You have watery poop, or  cannot poop for more than 3 days.  · You feel sick to your stomach or throw up (vomit) more than 2 or 3 times.  · You have temperature by mouth above 102° F (38.9° C).  · You see redness or puffiness (swelling) around your wound.  · You see yellowish white fluid (pus) coming from your wound.  · You see a bulge or bump in your lower belly (abdomen) or near your groin.  · You develop a rash, trouble breathing, or any other symptoms from medicines taken.  MAKE SURE YOU:  · Understand these instructions.  · Will watch your condition.  · Will get help right away if your are not doing well or get worse.  Document Released: 11/30/2009 Document Revised: 03/11/2013 Document Reviewed: 11/30/2009  [employer]® Patient Information ©2014 [employer].  Acetaminophen; Hydrocodone oral solution  What is this medicine?  ACETAMINOPHEN; HYDROCODONE (a set a CORDELIA mya fen; josé antonio droe KOE done) is a pain reliever. It is used to treat moderate to severe pain.  This medicine may be used for other purposes; ask your health care provider or pharmacist if you have questions.  COMMON BRAND NAME(S): Hycet, Liquicet, Lortab, Zamicet, Zolvit  What should I tell my health care provider before I take this medicine?  They need to know if you have any of these conditions:  · brain tumor  · Crohn's disease, inflammatory bowel disease, or ulcerative colitis  · drug abuse or addiction  · head injury  · heart or circulation problems  · if you often drink alcohol  · kidney disease or problems going to the bathroom  · liver disease  · lung disease, asthma, or breathing problems  · an unusual or allergic reaction to acetaminophen, hydrocodone, other opioid analgesics, other medicines, foods, dyes, or preservatives  · pregnant or trying to get pregnant  · breast-feeding  How should I use this medicine?  Take this medicine by mouth. Follow the directions on the prescription label. Use a specially marked spoon or container to measure your dose. Ask your pharmacist  if you do not have one. Household spoons are not accurate. You can take this medicine with or without food. If it upsets your stomach, take it with food. Do not take your medicine more often than directed.  A special MedGuide will be given to you by the pharmacist with each prescription and refill. Be sure to read this information carefully each time.  Talk to your pediatrician regarding the use of this medicine in children. While this drug may be prescribed for children as young as 2 years for selected conditions, precautions do apply.  Overdosage: If you think you have taken too much of this medicine contact a poison control center or emergency room at once.  NOTE: This medicine is only for you. Do not share this medicine with others.  What if I miss a dose?  If you miss a dose, take it as soon as you can. If it is almost time for your next dose, take only that dose. Do not take double or extra doses.  What may interact with this medicine?  This medicine may interact with the following medications:  · alcohol  · antiviral medicines for HIV or AIDS  · atropine  · antihistamines for allergy, cough and cold  · certain antibiotics like erythromycin, clarithromycin  · certain medicines for anxiety or sleep  · certain medicines for bladder problems like oxybutynin, tolterodine  · certain medicines for depression like amitriptyline, fluoxetine, sertraline  · certain medicines for fungal infections like ketoconazole and itraconazole  · certain medicines for Parkinson's disease like benztropine, trihexyphenidyl  · certain medicines for seizures like carbamazepine, phenobarbital, phenytoin, primidone  · certain medicines for stomach problems like dicyclomine, hyoscyamine  · certain medicines for travel sickness like scopolamine  · general anesthetics like halothane, isoflurane, methoxyflurane, propofol  · ipratropium  · local anesthetics like lidocaine, pramoxine, tetracaine  · MAOIs like Carbex, Eldepryl, Marplan, Nardil,  and Parnate  · medicines that relax muscles for surgery  · other medicines with acetaminophen  · other narcotic medicines for pain or cough  · phenothiazines like chlorpromazine, mesoridazine, prochlorperazine, thioridazine  · rifampin  This list may not describe all possible interactions. Give your health care provider a list of all the medicines, herbs, non-prescription drugs, or dietary supplements you use. Also tell them if you smoke, drink alcohol, or use illegal drugs. Some items may interact with your medicine.  What should I watch for while using this medicine?  Tell your doctor or health care professional if your pain does not go away, if it gets worse, or if you have new or a different type of pain. You may develop tolerance to the medicine. Tolerance means that you will need a higher dose of the medicine for pain relief. Tolerance is normal and is expected if you take this medicine for a long time.  Do not suddenly stop taking your medicine because you may develop a severe reaction. Your body becomes used to the medicine. This does NOT mean you are addicted. Addiction is a behavior related to getting and using a drug for a non-medical reason. If you have pain, you have a medical reason to take pain medicine. Your doctor will tell you how much medicine to take. If your doctor wants you to stop the medicine, the dose will be slowly lowered over time to avoid any side effects.  There are different types of narcotic medicines (opiates). If you take more than one type at the same time or if you are taking another medicine that also causes drowsiness, you may have more side effects. Give your health care provider a list of all medicines you use. Your doctor will tell you how much medicine to take. Do not take more medicine than directed. Call emergency for help if you have problems breathing or unusual sleepiness.  Do not take other medicines that contain acetaminophen with this medicine. Always read labels  carefully. If you have questions, ask your doctor or pharmacist.  If you take too much acetaminophen get medical help right away. Too much acetaminophen can be very dangerous and cause liver damage. Even if you do not have symptoms, it is important to get help right away.  You may get drowsy or dizzy. Do not drive, use machinery, or do anything that needs mental alertness until you know how this medicine affects you. Do not stand or sit up quickly, especially if you are an older patient. This reduces the risk of dizzy or fainting spells. Alcohol may interfere with the effect of this medicine. Avoid alcoholic drinks.  The medicine will cause constipation. Try to have a bowel movement at least every 2 to 3 days. If you do not have a bowel movement for 3 days, call your doctor or health care professional.  Your mouth may get dry. Chewing sugarless gum or sucking hard candy, and drinking plenty of water may help. Contact your doctor if the problem does not go away or is severe.  What side effects may I notice from receiving this medicine?  Side effects that you should report to your doctor or health care professional as soon as possible:  · allergic reactions like skin rash, itching or hives, swelling of the face, lips, or tongue  · breathing problems  · confusion  · redness, blistering, peeling or loosening of the skin, including inside the mouth  · signs and symptoms of low blood pressure like dizziness; feeling faint or lightheaded, falls; unusually weak or tired  · trouble passing urine or change in the amount of urine  · yellowing of the eyes or skin  Side effects that usually do not require medical attention (report to your doctor or health care professional if they continue or are bothersome):  · constipation  · dry mouth  · nausea, vomiting  · tiredness  This list may not describe all possible side effects. Call your doctor for medical advice about side effects. You may report side effects to FDA at  1-800-FDA-1085.  Where should I keep my medicine?  Keep out of the reach of children. This medicine can be abused. Keep your medicine in a safe place to protect it from theft. Do not share this medicine with anyone. Selling or giving away this medicine is dangerous and against the law.  Store at room temperature between 20 and 25 degrees C (68 and 77 degrees F).  This medicine may cause harm and death if it is taken by other adults, children, or pets. Return medicine that has not been used to an official disposal site. Contact the Critical access hospital at 1-410.569.4505 or your [facility] government to find a site. If you cannot return the medicine, flush it down the toilet. Do not use the medicine after the expiration date.  NOTE: This sheet is a summary. It may not cover all possible information. If you have questions about this medicine, talk to your doctor, pharmacist, or health care provider.  © 2020 [employer] (2018-04-24 14:12:21)

## 2020-11-24 NOTE — PROGRESS NOTES
Assumed care at 0700    Received report from NOC shift RN.    Reviewed recent lab results, notes, orders, and MAR  POC discussed and updated on care board  Bed is in the lowest and locked position, call light within reach      A&Ox4  RA  Pain well controlled with medication per MAR  Stand by to self   +voiding   +flatus  Tolerating diet  x5 lap sites CDI   Tolerating diet  Patient feels ready to discharge home   Active order

## 2020-11-24 NOTE — PROGRESS NOTES
"Pt A&O x4.    Vitals: BP (!) 90/57 Comment: Notified nurse.  Pulse 83   Temp 37.4 °C (99.4 °F) (Temporal)   Resp 17   Ht 1.626 m (5' 4\")   Wt 56.4 kg (124 lb 5.4 oz)   SpO2 92%   BMI 21.34 kg/m²     Pt rates pain 5 out of 10. Medicated for pain. Declines heat/ ice packs.     Neuro: ALFORD. Denies new onset of numbness/ tingling. Baseline neuropathy in feet.     Cardiac: Denies new onset of chest pain.    Vascular: Pulses 2+ BUE, BLE. No edema noted.    Respiratory: Lungs sound clear to auscultation. On 1.5L O2 NC.  on, satting in 90's. Denies SOB.    GI: Abdomen soft, tender. Hypoactive bowel sounds, - flatus, - BM post-op. On clear liquid diet with no carbonation, tolerating well, pt states, \"I'm hungry\". - nausea/ vomiting.    : Pt voiding adequately.    MSK: Pt up to bathroom SBA using hand held assist, tolerating well.    Integumentary: x5 abdominal lap sites CDI, approximated, Dermabond, BRANDON, no drainage.     Labs noted.    Fall precautions in place: Bed locked in lowest position, Upper bed rails up, treaded socks in place, personal belongings within reach, call light within reach, appropriate mobility signs in place, - bed alarm. Pt calls appropriately.     Pt updated on POC.   "

## 2020-12-08 ENCOUNTER — HOSPITAL ENCOUNTER (OUTPATIENT)
Dept: LAB | Facility: MEDICAL CENTER | Age: 75
End: 2020-12-08
Attending: FAMILY MEDICINE
Payer: MEDICARE

## 2020-12-08 ENCOUNTER — HOSPITAL ENCOUNTER (OUTPATIENT)
Dept: RADIOLOGY | Facility: MEDICAL CENTER | Age: 75
End: 2020-12-08
Attending: FAMILY MEDICINE
Payer: MEDICARE

## 2020-12-08 DIAGNOSIS — R42 LIGHTHEADEDNESS: ICD-10-CM

## 2020-12-08 DIAGNOSIS — R29.6 RECURRENT FALLS: ICD-10-CM

## 2020-12-08 DIAGNOSIS — M25.552 LEFT HIP PAIN: ICD-10-CM

## 2020-12-08 DIAGNOSIS — Z96.642 HISTORY OF LEFT HIP REPLACEMENT: ICD-10-CM

## 2020-12-08 LAB
ALBUMIN SERPL BCP-MCNC: 4.3 G/DL (ref 3.2–4.9)
ALBUMIN/GLOB SERPL: 1.4 G/DL
ALP SERPL-CCNC: 119 U/L (ref 30–99)
ALT SERPL-CCNC: 8 U/L (ref 2–50)
ANION GAP SERPL CALC-SCNC: 12 MMOL/L (ref 7–16)
ANISOCYTOSIS BLD QL SMEAR: ABNORMAL
AST SERPL-CCNC: 11 U/L (ref 12–45)
BASOPHILS # BLD AUTO: 1 % (ref 0–1.8)
BASOPHILS # BLD: 0.05 K/UL (ref 0–0.12)
BILIRUB SERPL-MCNC: 0.2 MG/DL (ref 0.1–1.5)
BUN SERPL-MCNC: 10 MG/DL (ref 8–22)
CALCIUM SERPL-MCNC: 9.9 MG/DL (ref 8.5–10.5)
CHLORIDE SERPL-SCNC: 107 MMOL/L (ref 96–112)
CO2 SERPL-SCNC: 24 MMOL/L (ref 20–33)
COMMENT 1642: NORMAL
CREAT SERPL-MCNC: 0.61 MG/DL (ref 0.5–1.4)
EOSINOPHIL # BLD AUTO: 0.25 K/UL (ref 0–0.51)
EOSINOPHIL NFR BLD: 4.9 % (ref 0–6.9)
ERYTHROCYTE [DISTWIDTH] IN BLOOD BY AUTOMATED COUNT: 49.1 FL (ref 35.9–50)
GIANT PLATELETS BLD QL SMEAR: NORMAL
GLOBULIN SER CALC-MCNC: 3 G/DL (ref 1.9–3.5)
GLUCOSE SERPL-MCNC: 86 MG/DL (ref 65–99)
HCT VFR BLD AUTO: 36.8 % (ref 37–47)
HGB BLD-MCNC: 10.7 G/DL (ref 12–16)
HYPOCHROMIA BLD QL SMEAR: ABNORMAL
IMM GRANULOCYTES # BLD AUTO: 0.02 K/UL (ref 0–0.11)
IMM GRANULOCYTES NFR BLD AUTO: 0.4 % (ref 0–0.9)
LYMPHOCYTES # BLD AUTO: 1.93 K/UL (ref 1–4.8)
LYMPHOCYTES NFR BLD: 37.8 % (ref 22–41)
MCH RBC QN AUTO: 26.9 PG (ref 27–33)
MCHC RBC AUTO-ENTMCNC: 29.1 G/DL (ref 33.6–35)
MCV RBC AUTO: 92.5 FL (ref 81.4–97.8)
MICROCYTES BLD QL SMEAR: ABNORMAL
MONOCYTES # BLD AUTO: 0.41 K/UL (ref 0–0.85)
MONOCYTES NFR BLD AUTO: 8 % (ref 0–13.4)
MORPHOLOGY BLD-IMP: NORMAL
NEUTROPHILS # BLD AUTO: 2.45 K/UL (ref 2–7.15)
NEUTROPHILS NFR BLD: 47.9 % (ref 44–72)
NRBC # BLD AUTO: 0 K/UL
NRBC BLD-RTO: 0 /100 WBC
OVALOCYTES BLD QL SMEAR: NORMAL
PLATELET # BLD AUTO: 401 K/UL (ref 164–446)
PLATELET BLD QL SMEAR: NORMAL
PMV BLD AUTO: 8.8 FL (ref 9–12.9)
POLYCHROMASIA BLD QL SMEAR: NORMAL
POTASSIUM SERPL-SCNC: 5 MMOL/L (ref 3.6–5.5)
PROT SERPL-MCNC: 7.3 G/DL (ref 6–8.2)
RBC # BLD AUTO: 3.98 M/UL (ref 4.2–5.4)
RBC BLD AUTO: PRESENT
SODIUM SERPL-SCNC: 143 MMOL/L (ref 135–145)
VARIANT LYMPHS BLD QL SMEAR: NORMAL
WBC # BLD AUTO: 5.1 K/UL (ref 4.8–10.8)

## 2020-12-08 PROCEDURE — 84439 ASSAY OF FREE THYROXINE: CPT

## 2020-12-08 PROCEDURE — 73502 X-RAY EXAM HIP UNI 2-3 VIEWS: CPT | Mod: LT

## 2020-12-08 PROCEDURE — 82607 VITAMIN B-12: CPT

## 2020-12-08 PROCEDURE — 85025 COMPLETE CBC W/AUTO DIFF WBC: CPT

## 2020-12-08 PROCEDURE — 36415 COLL VENOUS BLD VENIPUNCTURE: CPT

## 2020-12-08 PROCEDURE — 80053 COMPREHEN METABOLIC PANEL: CPT

## 2020-12-08 PROCEDURE — 84443 ASSAY THYROID STIM HORMONE: CPT | Mod: GA

## 2020-12-09 DIAGNOSIS — R42 LIGHTHEADEDNESS: ICD-10-CM

## 2020-12-09 DIAGNOSIS — R79.89 DECREASED THYROID STIMULATING HORMONE (TSH) LEVEL: ICD-10-CM

## 2020-12-09 DIAGNOSIS — R53.83 OTHER FATIGUE: ICD-10-CM

## 2020-12-09 LAB
T4 FREE SERPL-MCNC: 1.28 NG/DL (ref 0.93–1.7)
TSH SERPL DL<=0.005 MIU/L-ACNC: 0.01 UIU/ML (ref 0.38–5.33)
VIT B12 SERPL-MCNC: 569 PG/ML (ref 211–911)

## 2020-12-21 ENCOUNTER — OUTPATIENT INFUSION SERVICES (OUTPATIENT)
Dept: ONCOLOGY | Facility: MEDICAL CENTER | Age: 75
End: 2020-12-21
Attending: FAMILY MEDICINE
Payer: MEDICARE

## 2020-12-21 VITALS
BODY MASS INDEX: 21.91 KG/M2 | HEIGHT: 63 IN | WEIGHT: 123.68 LBS | SYSTOLIC BLOOD PRESSURE: 120 MMHG | RESPIRATION RATE: 18 BRPM | HEART RATE: 95 BPM | TEMPERATURE: 98.2 F | OXYGEN SATURATION: 90 % | DIASTOLIC BLOOD PRESSURE: 57 MMHG

## 2020-12-21 DIAGNOSIS — M81.0 OSTEOPOROSIS, UNSPECIFIED OSTEOPOROSIS TYPE, UNSPECIFIED PATHOLOGICAL FRACTURE PRESENCE: ICD-10-CM

## 2020-12-21 PROCEDURE — 96372 THER/PROPH/DIAG INJ SC/IM: CPT

## 2020-12-21 PROCEDURE — 700111 HCHG RX REV CODE 636 W/ 250 OVERRIDE (IP): Mod: JG | Performed by: FAMILY MEDICINE

## 2020-12-21 RX ORDER — UBIDECARENONE 75 MG
100 CAPSULE ORAL EVERY MORNING
COMMUNITY

## 2020-12-21 RX ORDER — CETIRIZINE HYDROCHLORIDE 10 MG/1
10 TABLET ORAL PRN
Status: ON HOLD | COMMUNITY
End: 2022-01-01

## 2020-12-21 RX ADMIN — DENOSUMAB 60 MG: 60 INJECTION SUBCUTANEOUS at 13:47

## 2020-12-21 ASSESSMENT — FIBROSIS 4 INDEX: FIB4 SCORE: 0.72

## 2020-12-21 NOTE — PROGRESS NOTES
Pt arrives to [facility] for Prolia injection.  Pt denies s/sx of infection or recent/planned dental procedures.  CMP drawn on 12/08/2020 was reviewed.  Ok per pharmacy to proceed with Prolia.  Prolia given SC to back of RUE.  Informed pt will need new Prolia order from her doctor.  Pt has no planned appt to see her doctor but, will contact their office to inform of need of new order.  Pt dc home to self care.

## 2021-01-15 ENCOUNTER — TELEPHONE (OUTPATIENT)
Dept: MEDICAL GROUP | Facility: PHYSICIAN GROUP | Age: 76
End: 2021-01-15

## 2021-01-15 DIAGNOSIS — Z23 NEED FOR VACCINATION: ICD-10-CM

## 2021-01-15 NOTE — TELEPHONE ENCOUNTER
Pt is needing a new prolia order, last one was 6/20. Rx can be found in media.    Coding department is also needing a copy of this to be faxed to [phone]

## 2021-01-15 NOTE — TELEPHONE ENCOUNTER
Order completed in credenza for signature.  Please complete frequency and end date as this was not on previous order.  Thank you,  Patricia FULTON

## 2021-01-27 ENCOUNTER — APPOINTMENT (OUTPATIENT)
Dept: FAMILY PLANNING/WOMEN'S HEALTH CLINIC | Facility: IMMUNIZATION CENTER | Age: 76
End: 2021-01-27
Attending: INTERNAL MEDICINE
Payer: MEDICARE

## 2021-01-27 DIAGNOSIS — Z23 NEED FOR VACCINATION: ICD-10-CM

## 2021-01-27 PROCEDURE — 91301 MODERNA SARS-COV-2 VACCINE: CPT

## 2021-01-27 PROCEDURE — 0011A MODERNA SARS-COV-2 VACCINE: CPT

## 2021-01-28 ENCOUNTER — IMMUNIZATION (OUTPATIENT)
Dept: FAMILY PLANNING/WOMEN'S HEALTH CLINIC | Facility: IMMUNIZATION CENTER | Age: 76
End: 2021-01-28
Payer: MEDICARE

## 2021-01-28 DIAGNOSIS — Z23 ENCOUNTER FOR VACCINATION: Primary | ICD-10-CM

## 2021-02-17 ENCOUNTER — APPOINTMENT (OUTPATIENT)
Dept: PHYSICAL THERAPY | Facility: REHABILITATION | Age: 76
End: 2021-02-17
Attending: FAMILY MEDICINE
Payer: MEDICARE

## 2021-02-23 ENCOUNTER — APPOINTMENT (OUTPATIENT)
Dept: PHYSICAL THERAPY | Facility: REHABILITATION | Age: 76
End: 2021-02-23
Payer: MEDICARE

## 2021-02-25 ENCOUNTER — IMMUNIZATION (OUTPATIENT)
Dept: FAMILY PLANNING/WOMEN'S HEALTH CLINIC | Facility: IMMUNIZATION CENTER | Age: 76
End: 2021-02-25
Attending: INTERNAL MEDICINE
Payer: MEDICARE

## 2021-02-25 ENCOUNTER — APPOINTMENT (OUTPATIENT)
Dept: PHYSICAL THERAPY | Facility: REHABILITATION | Age: 76
End: 2021-02-25
Payer: MEDICARE

## 2021-02-25 DIAGNOSIS — Z23 ENCOUNTER FOR VACCINATION: Primary | ICD-10-CM

## 2021-02-25 PROCEDURE — 91301 MODERNA SARS-COV-2 VACCINE: CPT

## 2021-02-25 PROCEDURE — 0012A MODERNA SARS-COV-2 VACCINE: CPT

## 2021-03-01 ENCOUNTER — HOSPITAL ENCOUNTER (OUTPATIENT)
Dept: RADIOLOGY | Facility: MEDICAL CENTER | Age: 76
End: 2021-03-01
Attending: INTERNAL MEDICINE
Payer: MEDICARE

## 2021-03-01 ENCOUNTER — HOSPITAL ENCOUNTER (OUTPATIENT)
Dept: LAB | Facility: MEDICAL CENTER | Age: 76
End: 2021-03-01
Attending: FAMILY MEDICINE
Payer: MEDICARE

## 2021-03-01 DIAGNOSIS — R79.89 DECREASED THYROID STIMULATING HORMONE (TSH) LEVEL: ICD-10-CM

## 2021-03-01 DIAGNOSIS — R53.83 OTHER FATIGUE: ICD-10-CM

## 2021-03-01 DIAGNOSIS — R91.1 LUNG NODULE: ICD-10-CM

## 2021-03-01 DIAGNOSIS — R42 LIGHTHEADEDNESS: ICD-10-CM

## 2021-03-01 PROCEDURE — 71250 CT THORAX DX C-: CPT | Mod: MG

## 2021-03-01 PROCEDURE — 36415 COLL VENOUS BLD VENIPUNCTURE: CPT

## 2021-03-01 PROCEDURE — 84481 FREE ASSAY (FT-3): CPT

## 2021-03-01 PROCEDURE — 84439 ASSAY OF FREE THYROXINE: CPT

## 2021-03-01 PROCEDURE — 84443 ASSAY THYROID STIM HORMONE: CPT

## 2021-03-02 ENCOUNTER — APPOINTMENT (OUTPATIENT)
Dept: PHYSICAL THERAPY | Facility: REHABILITATION | Age: 76
End: 2021-03-02
Payer: MEDICARE

## 2021-03-02 LAB
T3FREE SERPL-MCNC: 2.91 PG/ML (ref 2–4.4)
T4 FREE SERPL-MCNC: 1.01 NG/DL (ref 0.93–1.7)
TSH SERPL DL<=0.005 MIU/L-ACNC: 0.41 UIU/ML (ref 0.38–5.33)

## 2021-03-03 ENCOUNTER — TELEMEDICINE (OUTPATIENT)
Dept: SLEEP MEDICINE | Facility: MEDICAL CENTER | Age: 76
End: 2021-03-03
Payer: MEDICARE

## 2021-03-03 VITALS — BODY MASS INDEX: 20.49 KG/M2 | HEIGHT: 64 IN | WEIGHT: 120 LBS

## 2021-03-03 DIAGNOSIS — R91.8 PULMONARY NODULES: ICD-10-CM

## 2021-03-03 DIAGNOSIS — R93.89 ABNORMAL CHEST CT: ICD-10-CM

## 2021-03-03 DIAGNOSIS — R91.1 SOLITARY PULMONARY NODULE: ICD-10-CM

## 2021-03-03 PROCEDURE — 99213 OFFICE O/P EST LOW 20 MIN: CPT | Mod: 95,CR | Performed by: INTERNAL MEDICINE

## 2021-03-03 ASSESSMENT — FIBROSIS 4 INDEX: FIB4 SCORE: 0.72

## 2021-03-03 NOTE — PROGRESS NOTES
"Telemedicine: Established Patient   This evaluation was conducted via Zoom using secure and encrypted videoconferencing technology. The patient was in a private location in the state of [place].    The patient's identity was confirmed and verbal consent was obtained for this virtual visit.    Subjective:   CC:   Chief Complaint   Patient presents with   • Follow-Up     Lung Nodule. Last seen 10/27/20   • Results     CT-Chest 10/27/20       Yasmin Zhong is a 75 y.o. female presenting for evaluation and management of an abnormal CT scan of the chest.  The patient has a left lower lobe nodule that was first documented in 2013.  At that time the size was noted to be 19 x 13 mm.  She had a repeat CT scan in August 2020 after a fall that fractured ribs.  The nodule is now 22 x 15 mm.  A PET scan showed no FDG avidity but felt that adenocarcinoma in situ could not be ruled out.  Repeat CT on 3/1/2021 measures the nodule 23 x 14 mm.  It was felt to be stable.    The patient otherwise feels well.  She had a hiatal hernia repair several months ago.        ROS  12 point review of systems is negative    Allergies   Allergen Reactions   • Pollen Extract      \"cough\"       Current medicines (including changes today)  Current Outpatient Medications   Medication Sig Dispense Refill   • cyanocobalamin (VITAMIN B-12) 100 MCG Tab Take 100 mcg by mouth every day.     • cetirizine (ZYRTEC) 10 MG Tab Take 10 mg by mouth as needed for Allergies.     • atorvastatin (LIPITOR) 20 MG Tab Take 1 Tab by mouth every day. 90 Tab 3   • busPIRone (BUSPAR) 10 MG Tab tablet Pt takes 10MG in AM and 20MG HS Take 10-20 mg by mouth 2 times a day. Pt takes 10MG in AM and 20MG  Tab 3   • BIOTIN PO Take 1 Tab by mouth every day.     • Calcium Citrate-Vitamin D (CALCIUM + D PO) Take 1 Tab by mouth every day.     • MAGNESIUM PO Take 1 Cap by mouth every day.     • Cholecalciferol (VITAMIN D3 PO) Take 1 Cap by mouth every day.     • VITAMIN E PO " "Take 1 Cap by mouth every day.       No current facility-administered medications for this visit.       Patient Active Problem List    Diagnosis Date Noted   • Aortic valve sclerosis 11/16/2020   • Lung nodule 10/26/2020   • Osteopenia of multiple sites 05/23/2018   • Peripheral neuropathy 01/23/2018   • Overactive bladder 11/22/2017   • Macular degeneration 07/21/2017   • Recurrent major depressive disorder, in full remission (HCC) 07/21/2017   • Pure hypercholesterolemia 07/21/2017   • Nonrheumatic mitral valve regurgitation 07/21/2017   • Chronic insomnia 04/25/2015   • Pulmonary fibrosis (HCC) 03/11/2015       Family History   Problem Relation Age of Onset   • Cancer Mother         Bladder cancer, hx. of smoking   • Diabetes Mother    • Heart Attack Father    • Cancer Father         Colon    • Cancer Maternal Uncle         Lung   • Diabetes Maternal Uncle    • Diabetes Maternal Aunt        She  has a past medical history of Anxiety, Arrhythmia (11/18/2020), Arthritis (11/18/2020), Blood clotting disorder ([facility]), Breath shortness (11/18/2020), Cancer ([facility]) (1990), Cataract, Depression, Heart murmur, History of respiratory failure, Hyperlipidemia, Hypertension, Indigestion (11/18/2020), Insomnia, Pain (11/18/2020), Peripheral neuropathy, Pneumonia, and Pulmonary fibrosis ([facility]).  She  has a past surgical history that includes other; hip hemiarthroplasty (4/25/2015); and pr lap,esophagogast fundoplasty (N/A, 11/23/2020).       Objective:   Ht 1.626 m (5' 4\") Comment: per pt  Wt 54.4 kg (120 lb) Comment: per pt  BMI 20.60 kg/m²     Physical Exam  No distress at rest.  No audible wheezing.  Regular rate and rhythm.  Assessment and Plan:   The following treatment plan was discussed:     1. Pulmonary nodules    2. Abnormal chest CT    3. Solitary pulmonary nodule  - CT-CHEST (THORAX) W/O; Future    We will repeat her CT scan in another 6 months.  If it continues to be stable I think we can stop the repetitive " scans.    Follow-up: Return in about 6 months (around 9/3/2021) for follow up visit with physician.

## 2021-03-04 ENCOUNTER — APPOINTMENT (OUTPATIENT)
Dept: PHYSICAL THERAPY | Facility: REHABILITATION | Age: 76
End: 2021-03-04
Payer: MEDICARE

## 2021-03-05 ENCOUNTER — PHYSICAL THERAPY (OUTPATIENT)
Dept: PHYSICAL THERAPY | Facility: REHABILITATION | Age: 76
End: 2021-03-05
Attending: FAMILY MEDICINE
Payer: MEDICARE

## 2021-03-05 DIAGNOSIS — Z91.81 RISK FOR FALLS: ICD-10-CM

## 2021-03-05 DIAGNOSIS — R26.89 IMBALANCE: ICD-10-CM

## 2021-03-05 DIAGNOSIS — G60.9 IDIOPATHIC PERIPHERAL NEUROPATHY: ICD-10-CM

## 2021-03-05 PROCEDURE — 97110 THERAPEUTIC EXERCISES: CPT

## 2021-03-05 PROCEDURE — 97161 PT EVAL LOW COMPLEX 20 MIN: CPT

## 2021-03-05 SDOH — ECONOMIC STABILITY: GENERAL: QUALITY OF LIFE: FAIR

## 2021-03-05 ASSESSMENT — ENCOUNTER SYMPTOMS
PAIN SCALE: 0
PAIN SCALE AT HIGHEST: 3
PAIN SCALE AT LOWEST: 0

## 2021-03-05 NOTE — OP THERAPY EVALUATION
Outpatient Physical Therapy  INITIAL EVALUATION    [facility] [place]  [address]  [place] [address]  Phone:  [phone]  Fax:  [phone]    Date of Evaluation: 2021    Patient: Yasmin Zhong  YOB: 1945  MRN: [id number]     Referring Provider: [address]  [place] [address]   Referring Diagnosis Idiopathic peripheral neuropathy [G60.9];Imbalance [R26.89];Risk for falls [Z91.81]     Time Calculation    Start time: 0930  Stop time: 1030 Time Calculation (min): 60 minutes         Chief Complaint: Difficulty Walking    Visit Diagnoses     ICD-10-CM   1. Idiopathic peripheral neuropathy  G60.9   2. Imbalance  R26.89   3. Risk for falls  Z91.81       No data found  Subjective:   History of Present Illness:     Date of onset:  3/2/2020  Quality of life:  Fair  Prior level of function:  Ongoing balance deficits for about 4 years  Pain:     Current pain ratin    At best pain ratin    At worst pain rating:  3  Diagnostic Tests:     X-ray: abnormal    Activities of Daily Living:     Patient reported ADL status: Limited with stairs  Limited with curb transfers  Limited with ambulation  Poor movement tolerance   Patient Goals:     Patient goals for therapy:  Decreased pain, increased motion and increased strength    Patient is a 75 y.o. female that presents to therapy with balance deficits and minor back pain. States that symptoms were due to a fall in onset. Balance is her more pressing issue. Reports the pain quality to be sharp/dull, intermittent and are primarily across the back. Reports that symptoms now worsening. States that aggravating factors are movement, turning.  States that easng factors are rest/not walking. Denies red flags.    Past Medical History:   Diagnosis Date   • Anxiety    • Arrhythmia 2020   • Arthritis 2020   • Blood clotting disorder (HCC)     hx 2013 in lung   • Breath  shortness 2020    no supplemental oxygen   • Cancer (HCC)     basal cell per hx   • Cataract    • Depression    • Heart murmur    • History of respiratory failure     6-month hospitalization in    • Hyperlipidemia    • Hypertension     denies   • Indigestion 2020    GERD   • Insomnia    • Pain 2020    back pain   • Peripheral neuropathy    • Pneumonia     hx    • Pulmonary fibrosis (HCC)      Past Surgical History:   Procedure Laterality Date   • PB LAP,ESOPHAGOGAST FUNDOPLASTY N/A 2020    Procedure: FUNDOPLICATION, NISSEN, LAPAROSCOPIC- FOR PARAESOPHAGEAL WITH MESH;  Surgeon: Pritesh Baptiste M.D.;  Location: SURGERY [facility];  Service: General   • HIP HEMIARTHROPLASTY  2015    Performed by Raymond Lantigua M.D. at SURGERY [facility] ORS   • OTHER      breast reduction     Social History     Tobacco Use   • Smoking status: Former Smoker     Packs/day: 0.25     Years: 15.00     Pack years: 3.75     Types: Cigarettes     Quit date: 3/11/1985     Years since quittin.0   • Smokeless tobacco: Never Used   • Tobacco comment: passive smoke exposure her entire life   Substance Use Topics   • Alcohol use: Not Currently     Alcohol/week: 0.0 oz     Family and Occupational History     Socioeconomic History   • Marital status: Single     Spouse name: Not on file   • Number of children: 0   • Years of education: Not on file   • Highest education level: Not on file   Occupational History   • Not on file       Objective     Postural Observations  Seated posture: poor  Standing posture: poor        Neurological Testing     Reflexes   Left   Patellar (L4): normal (2+)  Achilles (S1): trace (1+)  Ankle clonus reflex: negative  Babinski sign: negative    Right   Patellar (L4): normal (2+)  Achilles (S1): trace (1+)  Ankle clonus reflex: negative  Babinski sign: negative    Myotome testing   Lumbar (left)   L1 (hip flexors): 5  L2 (hip flexors): 5  L3 (knee extensors): 5  L4 (ankle  dorsiflexors): 5  L5 (great toe extension): 4  S1 (ankle plantar flexors): 4-    Lumbar (right)   L1 (hip flexors): 4  L2 (hip flexors): 4  L3 (knee extensors): 5  L4 (ankle dorsiflexors): 5  L5 (great toe extension): 5  S1 (ankle plantar flexors): 4-    Additional Neurological Details  Spotty sensation decrease most noted below knee  Decreased proprioception B ankles  High fear of falling    Active Range of Motion     Lumbar   Flexion: within functional limits  Extension: decreased    Strength:      Left Hip   Planes of Motion   Abduction: 3  Adduction: 3    Right Hip   Planes of Motion   Abduction: 3  Adduction: 3    Left Knee   Flexion: 3    Right Knee   Flexion: 3        Therapeutic Exercises (CPT 15333):     1. Sit to stand, x5    2. Seated hip abd, x10      Time-based treatments/modalities:    Physical Therapy Timed Treatment Charges  Therapeutic exercise minutes (CPT 15907): 10 minutes      Assessment, Response and Plan:   Impairments: abnormal or restricted ROM, activity intolerance, impaired physical strength and pain with function    Assessment details:  Patient presents with signs and symptoms consistent with a balance deficit and thoracic pain. Patient limitations include weakness, decreased ROM, and pain. Patient will benefit from skilled therapy to improve the aforementioned deficits and decrease further functional decline.   Prognosis: fair    Goals:   Short Term Goals:   1) Patient's hip abd strength will improve by a half muscle grade to facilitate improved standing tolerance.  2) Patient's symptoms will improve to facilitate a TUG score under 12sec.  Short term goal time span:  2-4 weeks      Long Term Goals:    1) Patient's symptoms will improve to allow for ambulation >1/4 mile.  2) Patient's BBS will improve by 8 to demonstrate functional improvement  Long term goal time span:  6-8 weeks    Plan:   Therapy options:  Physical therapy treatment to continue  Planned therapy interventions:  E Stim  Unattended (CPT 21044), Manual Therapy (CPT 58823), Neuromuscular Re-education (CPT 91911), Therapeutic Exercise (CPT 78710) and Hot or Cold Pack Therapy (CPT 66835)  Frequency:  2x week  Duration in weeks:  8  Discussed with:  Patient      Functional Assessment Used  PT Functional Assessment Tool Used: TUG/BBS  PT Functional Assessment Score: 12.92sec/43     Referring provider co-signature:  I have reviewed this plan of care and my co-signature certifies the need for services.    Certification Period: 03/05/2021 to  04/30/21    Physician Signature: ________________________________ Date: ______________

## 2021-03-09 ENCOUNTER — APPOINTMENT (OUTPATIENT)
Dept: PHYSICAL THERAPY | Facility: REHABILITATION | Age: 76
End: 2021-03-09
Payer: MEDICARE

## 2021-03-09 ENCOUNTER — APPOINTMENT (OUTPATIENT)
Dept: PHYSICAL THERAPY | Facility: REHABILITATION | Age: 76
End: 2021-03-09
Attending: FAMILY MEDICINE
Payer: MEDICARE

## 2021-03-11 ENCOUNTER — APPOINTMENT (OUTPATIENT)
Dept: PHYSICAL THERAPY | Facility: REHABILITATION | Age: 76
End: 2021-03-11
Payer: MEDICARE

## 2021-03-12 ENCOUNTER — PHYSICAL THERAPY (OUTPATIENT)
Dept: PHYSICAL THERAPY | Facility: REHABILITATION | Age: 76
End: 2021-03-12
Attending: FAMILY MEDICINE
Payer: MEDICARE

## 2021-03-12 DIAGNOSIS — R26.89 IMBALANCE: ICD-10-CM

## 2021-03-12 DIAGNOSIS — G60.9 IDIOPATHIC PERIPHERAL NEUROPATHY: ICD-10-CM

## 2021-03-12 DIAGNOSIS — Z91.81 RISK FOR FALLS: ICD-10-CM

## 2021-03-12 PROCEDURE — 97110 THERAPEUTIC EXERCISES: CPT

## 2021-03-12 NOTE — OP THERAPY DAILY TREATMENT
Outpatient Physical Therapy  DAILY TREATMENT     [facility] [place]  [address]  [facility] [address]  Phone:  [phone]  Fax:  [phone]    Date: 03/12/2021    Patient: Yasmin Zhong  YOB: 1945  MRN: [id number]     Time Calculation    Start time: 1000  Stop time: 1030 Time Calculation (min): 30 minutes         Chief Complaint: Difficulty Walking    Visit #: 2    SUBJECTIVE:  Patient reports that she missed last visit due to HA post covid vaccine. Patient reports no real change in symptoms.     OBJECTIVE:  Current objective measures:           Therapeutic Exercises (CPT 35470):     1. Nu Step, x8min    2. Seated hip abd, x20    3. Sit to stand, x10    4. Ankle 4 way, NT    5. Heel raises, x10    6. Quad sets, x10    7. SAQ, x10      Therapeutic Exercise Summary: Access Code: [email or web address]      Exercises  Sit to Stand with Counter Support - 10 reps - 2 sets - 1x daily - 7x weekly  Supine Quad Set - 10 reps - 2 sets - 1x daily - 7x weekly  Supine Knee Extension Strengthening - 10 reps - 2 sets - 1x daily - 7x weekly  Seated Hip Abduction with Resistance - 10 reps - 2 sets - 1x daily - 7x weekly      Time-based treatments/modalities:    Physical Therapy Timed Treatment Charges  Therapeutic exercise minutes (CPT 33793): 30 minutes        ASSESSMENT:   Response to treatment: Patient LE weakness is apparent and will be focused on first. Patient exercise tolerance is low at this time.     PLAN/RECOMMENDATIONS:   Plan for treatment: therapy treatment to continue next visit.  Planned interventions for next visit: continue with current treatment.

## 2021-03-17 ENCOUNTER — PHYSICAL THERAPY (OUTPATIENT)
Dept: PHYSICAL THERAPY | Facility: REHABILITATION | Age: 76
End: 2021-03-17
Attending: FAMILY MEDICINE
Payer: MEDICARE

## 2021-03-17 DIAGNOSIS — Z91.81 RISK FOR FALLS: ICD-10-CM

## 2021-03-17 DIAGNOSIS — G60.9 IDIOPATHIC PERIPHERAL NEUROPATHY: ICD-10-CM

## 2021-03-17 DIAGNOSIS — R26.89 IMBALANCE: ICD-10-CM

## 2021-03-17 PROCEDURE — 97110 THERAPEUTIC EXERCISES: CPT

## 2021-03-17 NOTE — OP THERAPY DAILY TREATMENT
Outpatient Physical Therapy  DAILY TREATMENT     [facility] [place]  [address]  [facility] [address]  Phone:  [phone]  Fax:  [phone]    Date: 03/17/2021    Patient: Yasmin Zhong  YOB: 1945  MRN: [id number]     Time Calculation    Start time: 1330  Stop time: 1357 Time Calculation (min): 27 minutes         Chief Complaint: Difficulty Walking    Visit #: 3    SUBJECTIVE:  Trying to progress exercises at home, still cautious with balance and use walker when I walk my dog.     OBJECTIVE:  Current objective measures: R trunk lean in sitting and standing. Pt with h/o L hip hemiarthroplasty           Therapeutic Exercises (CPT 89524):     1. Nu Step, x6 min     2. Sit to stand, x10. with airex: x10     3. At bar: , HR x15, march x15 B, hip abd x10 B    6. Quad sets, x10, with towel roll    7. SAQ, x10    8. TrA education and activation , x10     9. TrA+ seate LE lift on DD, x10 B      Therapeutic Exercise Summary: Access Code: [email or web address]      Exercises  Sit to Stand with Counter Support - 10 reps - 2 sets - 1x daily - 7x weekly  Supine Quad Set - 10 reps - 2 sets - 1x daily - 7x weekly  Supine Knee Extension Strengthening - 10 reps - 2 sets - 1x daily - 7x weekly  Seated Hip Abduction with Resistance - 10 reps - 2 sets - 1x daily - 7x weekly      Time-based treatments/modalities:    Physical Therapy Timed Treatment Charges  Therapeutic exercise minutes (CPT 73175): 25 minutes          ASSESSMENT:   Response to treatment: Pt with use of B knees on chair with sit to stand, but this decreased allowing pt to use hands to assist. Pt also with poor eccentric control, but this also improved with use of 1 UE assist. Good tolerance to standing exercises at bar today with SBA. Possible R trunk lean secondary to h/o L hemiarthroplasty and continued compensation.     PLAN/RECOMMENDATIONS:   Plan for treatment: therapy treatment to continue next visit. Progress  functional strength (especially L LE), balance and gait as able.   Planned interventions for next visit: continue with current treatment.

## 2021-03-19 ENCOUNTER — PHYSICAL THERAPY (OUTPATIENT)
Dept: PHYSICAL THERAPY | Facility: REHABILITATION | Age: 76
End: 2021-03-19
Attending: FAMILY MEDICINE
Payer: MEDICARE

## 2021-03-19 DIAGNOSIS — G60.9 IDIOPATHIC PERIPHERAL NEUROPATHY: ICD-10-CM

## 2021-03-19 DIAGNOSIS — Z91.81 RISK FOR FALLS: ICD-10-CM

## 2021-03-19 DIAGNOSIS — R26.89 IMBALANCE: ICD-10-CM

## 2021-03-19 PROCEDURE — 97110 THERAPEUTIC EXERCISES: CPT

## 2021-03-19 PROCEDURE — 97112 NEUROMUSCULAR REEDUCATION: CPT

## 2021-03-19 NOTE — OP THERAPY DAILY TREATMENT
Outpatient Physical Therapy  DAILY TREATMENT     [facility] [place]  [address]  [place] [address]  Phone:  [phone]  Fax:  [phone]    Date: 03/19/2021    Patient: Yasmin Zhong  YOB: 1945  MRN: [id number]     Time Calculation    Start time: 1500  Stop time: 1530 Time Calculation (min): 30 minutes         Chief Complaint: Difficulty Walking    Visit #: 4    SUBJECTIVE:  Patient reports no falls. States that she is doing her home program.     OBJECTIVE:  Current objective measures: Able to hold 15sec NBOS eyes closed without LOB          Therapeutic Exercises (CPT 51069):     1. Nu Step, x10min L3    2. Shuttle, x4min 4c    3. Sit to stand , x5    Therapeutic Treatments and Modalities:     1. Neuromuscular Re-education (CPT 89417), Balance training on foam NBOS/WBOS eyes open/closed;     Time-based treatments/modalities:    Physical Therapy Timed Treatment Charges  Neuromusc re-ed, balance, coor, post minutes (CPT 75400): 15 minutes  Therapeutic exercise minutes (CPT 93312): 15 minutes      Pain rating (1-10) before treatment:  0  Pain rating (1-10) after treatment:  0    ASSESSMENT:   Response to treatment: Patient seemed to tolerate an increase in exercise well. Patient responded well with only an increase in fatigue.     PLAN/RECOMMENDATIONS:   Plan for treatment: therapy treatment to continue next visit.  Planned interventions for next visit: continue with current treatment.

## 2021-03-22 ENCOUNTER — PHYSICAL THERAPY (OUTPATIENT)
Dept: PHYSICAL THERAPY | Facility: REHABILITATION | Age: 76
End: 2021-03-22
Attending: FAMILY MEDICINE
Payer: MEDICARE

## 2021-03-22 DIAGNOSIS — Z91.81 RISK FOR FALLS: ICD-10-CM

## 2021-03-22 DIAGNOSIS — G60.9 IDIOPATHIC PERIPHERAL NEUROPATHY: ICD-10-CM

## 2021-03-22 DIAGNOSIS — R26.89 IMBALANCE: ICD-10-CM

## 2021-03-22 PROCEDURE — 97110 THERAPEUTIC EXERCISES: CPT

## 2021-03-22 PROCEDURE — 97112 NEUROMUSCULAR REEDUCATION: CPT

## 2021-03-22 NOTE — OP THERAPY DAILY TREATMENT
Outpatient Physical Therapy  DAILY TREATMENT     [facility] [place]  [address]  [place] [address]  Phone:  [phone]  Fax:  [phone]    Date: 03/22/2021    Patient: Yasmin Zhong  YOB: 1945  MRN: [id number]     Time Calculation    Start time: 1100  Stop time: 1130 Time Calculation (min): 30 minutes         Chief Complaint: Difficulty Walking    Visit #: 5    SUBJECTIVE:  Have a big fear of falling due to my last 2 falls. Trying to work on my posture to not lean so much to the L.     OBJECTIVE:          Therapeutic Exercises (CPT 52891):     1. Nu Step, x8 min L4    2. Shuttle, x2min 5c bilateral; SL x1 min ea 3c     3. Sit to stand , x5 without airex, x5 with airex, UE assist as needed, use of mirror for posture    Therapeutic Treatments and Modalities:     1. Neuromuscular Re-education (CPT 32300), Balance training with gait belt SBA to CGA: see below    Therapeutic Treatment and Modalities Summary: Reaching: multi-directional at mirror x5 rounds.   Standing at bar: floor: normal and narrow JOSE EO and EC for each; on airex: normal and narrow JOSE  EO and EC for each; toe tap to airex x2 min, step up to airex alt x2 min; HR/TR for forward/backward weightshift x15    Time-based treatments/modalities:    Physical Therapy Timed Treatment Charges  Neuromusc re-ed, balance, coor, post minutes (CPT 19030): 20 minutes  Therapeutic exercise minutes (CPT 93168): 10 minutes          ASSESSMENT:   Response to treatment: Pt reported no increase in pain throughout PT session and required no seated rest break due to fatigue. Pt with most difficulty on airex with EC and neuropathy may be contributing to decreased proprioception at feet. Pt willing to increase LOS as long as PT was CGA with gait belt today.     PLAN/RECOMMENDATIONS:   Plan for treatment: therapy treatment to continue next visit.  Planned interventions for next visit: continue with current  treatment.

## 2021-03-25 ENCOUNTER — PHYSICAL THERAPY (OUTPATIENT)
Dept: PHYSICAL THERAPY | Facility: REHABILITATION | Age: 76
End: 2021-03-25
Attending: FAMILY MEDICINE
Payer: MEDICARE

## 2021-03-25 DIAGNOSIS — R26.89 IMBALANCE: ICD-10-CM

## 2021-03-25 DIAGNOSIS — Z91.81 RISK FOR FALLS: ICD-10-CM

## 2021-03-25 DIAGNOSIS — G60.9 IDIOPATHIC PERIPHERAL NEUROPATHY: ICD-10-CM

## 2021-03-25 PROCEDURE — 97112 NEUROMUSCULAR REEDUCATION: CPT

## 2021-03-25 PROCEDURE — 97110 THERAPEUTIC EXERCISES: CPT

## 2021-03-30 ENCOUNTER — PHYSICAL THERAPY (OUTPATIENT)
Dept: PHYSICAL THERAPY | Facility: REHABILITATION | Age: 76
End: 2021-03-30
Attending: FAMILY MEDICINE
Payer: MEDICARE

## 2021-03-30 DIAGNOSIS — R26.89 IMBALANCE: ICD-10-CM

## 2021-03-30 DIAGNOSIS — Z91.81 RISK FOR FALLS: ICD-10-CM

## 2021-03-30 DIAGNOSIS — G60.9 IDIOPATHIC PERIPHERAL NEUROPATHY: ICD-10-CM

## 2021-03-30 PROCEDURE — 97112 NEUROMUSCULAR REEDUCATION: CPT

## 2021-03-30 PROCEDURE — 97110 THERAPEUTIC EXERCISES: CPT

## 2021-03-30 NOTE — OP THERAPY DAILY TREATMENT
Outpatient Physical Therapy  DAILY TREATMENT     [facility] [place]  [address]  [place] [facility] [address]  Phone:  [phone]  Fax:  [phone]    Date: 03/30/2021    Patient: Yasmin Zhong  YOB: 1945  MRN: [id number]     Time Calculation    Start time: 1330  Stop time: 1400 Time Calculation (min): 30 minutes         Chief Complaint: Difficulty Walking    Visit #: 7    SUBJECTIVE:  Patient reports that she feels weak today. Notes increased difficulty walking today. Reports no falls.     OBJECTIVE:  Current objective measures:   Suggested SPC and patient to buy a SPC          Therapeutic Exercises (CPT 12399):     1. Nu Step, x10min L6    2. Shuttle, x2min 5c bilateral; SL x1 min ea 3c     3. Sit to stand , x5 without airex, x5 with airex, UE assist as needed, use of mirror for posture    Therapeutic Treatments and Modalities:     1. Neuromuscular Re-education (CPT 65638), Balance training with with airex pad NBOS/WBOS eyes open/closed; 1/2 foam roller a-p training with CGA    Time-based treatments/modalities:    Physical Therapy Timed Treatment Charges  Neuromusc re-ed, balance, coor, post minutes (CPT 20318): 10 minutes  Therapeutic exercise minutes (CPT 12747): 20 minutes      Pain rating (1-10) before treatment:  0  Pain rating (1-10) after treatment:  0    ASSESSMENT:   Response to treatment: Patient responded fair to therapy with an overall increase in fatigue with no increase in any pain. Patient will continue with balance training and procure a SPC.     PLAN/RECOMMENDATIONS:   Plan for treatment: therapy treatment to continue next visit.  Planned interventions for next visit: continue with current treatment.

## 2021-04-02 ENCOUNTER — PHYSICAL THERAPY (OUTPATIENT)
Dept: PHYSICAL THERAPY | Facility: REHABILITATION | Age: 76
End: 2021-04-02
Attending: FAMILY MEDICINE
Payer: MEDICARE

## 2021-04-02 ENCOUNTER — TELEPHONE (OUTPATIENT)
Dept: MEDICAL GROUP | Facility: PHYSICIAN GROUP | Age: 76
End: 2021-04-02

## 2021-04-02 DIAGNOSIS — G60.9 IDIOPATHIC PERIPHERAL NEUROPATHY: ICD-10-CM

## 2021-04-02 DIAGNOSIS — Z91.81 RISK FOR FALLS: ICD-10-CM

## 2021-04-02 DIAGNOSIS — R26.89 IMBALANCE: ICD-10-CM

## 2021-04-02 DIAGNOSIS — R42 LIGHTHEADEDNESS: ICD-10-CM

## 2021-04-02 PROCEDURE — 97112 NEUROMUSCULAR REEDUCATION: CPT

## 2021-04-02 PROCEDURE — 97110 THERAPEUTIC EXERCISES: CPT

## 2021-04-02 NOTE — TELEPHONE ENCOUNTER
Pt called stating during her last appt she was advised to have her thyroid labs checked.    It looks like pt had these done on 3/2/21.  Pt has an upcoming appt on 4/19, would you like pt to have thyroid labs done again prior to visit?

## 2021-04-02 NOTE — OP THERAPY DAILY TREATMENT
Outpatient Physical Therapy  DAILY TREATMENT     [facility] [place]  [address]  [place] [facility] [place] [facility] [address]  Phone:  [phone]  Fax:  [phone]    Date: 04/02/2021    Patient: Yasmin Zhong  YOB: 1945  MRN: [id number]     Time Calculation    Start time: 1500  Stop time: 1530 Time Calculation (min): 30 minutes         Chief Complaint: Difficulty Walking    Visit #: 8    SUBJECTIVE:  Patient reports that she is doing better today. Notes improved strength in her legs. Notes no falls.     OBJECTIVE:  Current objective measures:           Therapeutic Exercises (CPT 46068):     1. Nu Step, x10min L6    2. Shuttle, x2min 5c bilateral; SL x1 min ea 3c     3. Sit to stand , x5 without airex, x5 with airex, UE assist as needed, use of mirror for posture    Therapeutic Treatments and Modalities:     1. Neuromuscular Re-education (CPT 59081), Balance training with with airex pad NBOS/WBOS eyes open/closed; 1/2 foam roller a-p training with CGA; SPC training x10min focus on left being the weaker leg SPC on R    Time-based treatments/modalities:    Physical Therapy Timed Treatment Charges  Neuromusc re-ed, balance, coor, post minutes (CPT 78297): 10 minutes  Therapeutic exercise minutes (CPT 25736): 20 minutes      Pain rating (1-10) before treatment:  0  Pain rating (1-10) after treatment:  0    ASSESSMENT:   Response to treatment: Patient responded well to therapy with a slight improvement in balance tasks. Patient did perform better this visit compared to last.     PLAN/RECOMMENDATIONS:   Plan for treatment: therapy treatment to continue next visit.  Planned interventions for next visit: continue with current treatment.

## 2021-04-02 NOTE — TELEPHONE ENCOUNTER
Phone Number Called: [phone] (home)       Call outcome: Spoke to patient regarding message below.    Message: Pt notified of lab work ordered. Pt verbalized understanding.

## 2021-04-05 ENCOUNTER — OFFICE VISIT (OUTPATIENT)
Dept: OPHTHALMOLOGY | Facility: MEDICAL CENTER | Age: 76
End: 2021-04-05
Payer: MEDICARE

## 2021-04-05 DIAGNOSIS — H49.9 OPHTHALMOPLEGIA: ICD-10-CM

## 2021-04-05 DIAGNOSIS — H25.013 CORTICAL AGE-RELATED CATARACT OF BOTH EYES: ICD-10-CM

## 2021-04-05 DIAGNOSIS — H40.003 GLAUCOMA SUSPECT OF BOTH EYES: ICD-10-CM

## 2021-04-05 PROCEDURE — 92250 FUNDUS PHOTOGRAPHY W/I&R: CPT | Performed by: OPHTHALMOLOGY

## 2021-04-05 PROCEDURE — 99204 OFFICE O/P NEW MOD 45 MIN: CPT | Mod: 25 | Performed by: OPHTHALMOLOGY

## 2021-04-05 RX ORDER — ZOLPIDEM TARTRATE 5 MG/1
5 TABLET ORAL NIGHTLY PRN
COMMUNITY
End: 2021-04-15 | Stop reason: CLARIF

## 2021-04-05 ASSESSMENT — VISUAL ACUITY
OD_SC: 20/30-2
OS_PH_SC: 20/25-2
OD_PH_SC: 20/25-2
OS_CC: J1+
OS_SC: 20/40-2
METHOD: SNELLEN - LINEAR
OD_CC: J1

## 2021-04-05 ASSESSMENT — TONOMETRY
OD_IOP_MMHG: 18
OS_IOP_MMHG: 17

## 2021-04-05 ASSESSMENT — EXTERNAL EXAM - RIGHT EYE: OD_EXAM: NORMAL

## 2021-04-05 ASSESSMENT — REFRACTION
OS_CYLINDER: +1.50
OD_SPHERE: -0.25
OS_AXIS: 106
OD_AXIS: 106
OS_SPHERE: PLANO
OD_CYLINDER: +1.00

## 2021-04-05 ASSESSMENT — REFRACTION_WEARINGRX
OD_SPHERE: +2.75
OD_CYLINDER: SPHERE
OS_CYLINDER: SPHERE
SPECS_TYPE: SVL
OS_SPHERE: +2.75

## 2021-04-05 ASSESSMENT — CONF VISUAL FIELD
OS_NORMAL: 1
OD_NORMAL: 1

## 2021-04-05 ASSESSMENT — REFRACTION_MANIFEST
METHOD_AUTOREFRACTION: 1
OD_AXIS: 100
OD_CYLINDER: +0.75
OD_SPHERE: -0.25
OS_AXIS: 105
OS_CYLINDER: +1.00
OS_SPHERE: +0.50

## 2021-04-05 ASSESSMENT — CUP TO DISC RATIO
OS_RATIO: 0.2
OD_RATIO: 0.2

## 2021-04-05 ASSESSMENT — ENCOUNTER SYMPTOMS: DOUBLE VISION: 1

## 2021-04-05 ASSESSMENT — SLIT LAMP EXAM - LIDS
COMMENTS: NORMAL
COMMENTS: NORMAL

## 2021-04-05 ASSESSMENT — EXTERNAL EXAM - LEFT EYE: OS_EXAM: NORMAL

## 2021-04-05 NOTE — PROGRESS NOTES
Peds/Neuro Ophthalmology:   Keith Horne M.D.    Date & Time note created:    4/5/2021   3:52 PM     Referring MD / APRN:  Katherine Burton M.D., No att. providers found    Patient ID:  Name:             Yasmin Zhong   YOB: 1945  Age:                 75 y.o.  female   MRN:               [id number]    Chief Complaint/Reason for Visit:     Other (Strabismus/double vision)      History of Present Illness:    Yasmin Zhogn is a 75 y.o. female   Double vision suddenly back in 2014 while in hospital for pneumonia.Someone told pt her eye was crossing back in 2015 but seems worse past 1 year.No headaches or eye pain.Some eye redness occasionally with allergies.History of lasik 2008.MRI done last month for chest. Occasional double vision. Sometimes drives with one eye closed and reads with one eye closed. Saw ophthalmologist in [place].       Review of Systems:  Review of Systems   Eyes: Positive for double vision.   All other systems reviewed and are negative.      Past Medical History:   Past Medical History:   Diagnosis Date   • Anxiety    • Arrhythmia 11/18/2020   • Arthritis 11/18/2020   • Blood clotting disorder (HCC)     hx 2013 in lung   • Breath shortness 11/18/2020    no supplemental oxygen   • Cancer (HCC) 1990    basal cell per hx   • Cataract    • Depression    • Heart murmur    • History of respiratory failure     6-month hospitalization in 2014   • Hyperlipidemia    • Hypertension     denies   • Indigestion 11/18/2020    GERD   • Insomnia    • Pain 11/18/2020    back pain   • Peripheral neuropathy    • Pneumonia     hx 2013   • Pulmonary fibrosis (HCC)        Past Surgical History:  Past Surgical History:   Procedure Laterality Date   • PB LAP,ESOPHAGOGAST FUNDOPLASTY N/A 11/23/2020    Procedure: FUNDOPLICATION, NISSEN, LAPAROSCOPIC- FOR PARAESOPHAGEAL WITH MESH;  Surgeon: Pritesh Baptiste M.D.;  Location: SURGERY [facility];  Service: General   • HIP HEMIARTHROPLASTY   "2015    Performed by Raymond Lantigua M.D. at SURGERY [facility] ORS   • OTHER      breast reduction       Current Outpatient Medications:  Current Outpatient Medications   Medication Sig Dispense Refill   • zolpidem (AMBIEN) 5 MG Tab Take 5 mg by mouth at bedtime as needed for Sleep.     • cyanocobalamin (VITAMIN B-12) 100 MCG Tab Take 100 mcg by mouth every day.     • cetirizine (ZYRTEC) 10 MG Tab Take 10 mg by mouth as needed for Allergies.     • atorvastatin (LIPITOR) 20 MG Tab Take 1 Tab by mouth every day. 90 Tab 3   • busPIRone (BUSPAR) 10 MG Tab tablet Pt takes 10MG in AM and 20MG HS Take 10-20 mg by mouth 2 times a day. Pt takes 10MG in AM and 20MG  Tab 3   • Calcium Citrate-Vitamin D (CALCIUM + D PO) Take 1 Tab by mouth every day.     • MAGNESIUM PO Take 1 Cap by mouth every day.     • Cholecalciferol (VITAMIN D3 PO) Take 1 Cap by mouth every day.     • VITAMIN E PO Take 1 Cap by mouth every day.     • BIOTIN PO Take 1 Tab by mouth every day.       No current facility-administered medications for this visit.       Allergies:  Allergies   Allergen Reactions   • Pollen Extract      \"cough\"       Family History:  Family History   Problem Relation Age of Onset   • Cancer Mother         Bladder cancer, hx. of smoking   • Diabetes Mother    • Heart Attack Father    • Cancer Father         Colon    • Cancer Maternal Uncle         Lung   • Diabetes Maternal Uncle    • Diabetes Maternal Aunt        Social History:  Social History     Socioeconomic History   • Marital status: Single     Spouse name: Not on file   • Number of children: 0   • Years of education: Not on file   • Highest education level: Not on file   Occupational History   • Not on file   Tobacco Use   • Smoking status: Former Smoker     Packs/day: 0.25     Years: 15.00     Pack years: 3.75     Types: Cigarettes     Quit date: 3/11/1985     Years since quittin.0   • Smokeless tobacco: Never Used   • Tobacco comment: passive smoke " exposure her entire life   Substance and Sexual Activity   • Alcohol use: Not Currently     Alcohol/week: 0.0 oz   • Drug use: No   • Sexual activity: Not Currently   Other Topics Concern   • Not on file   Social History Narrative   • Not on file     Social Determinants of Health     Financial Resource Strain:    • Difficulty of Paying Living Expenses:    Food Insecurity:    • Worried About Running Out of Food in the Last Year:    • Ran Out of Food in the Last Year:    Transportation Needs:    • Lack of Transportation (Medical):    • Lack of Transportation (Non-Medical):    Physical Activity:    • Days of Exercise per Week:    • Minutes of Exercise per Session:    Stress:    • Feeling of Stress :    Social Connections:    • Frequency of Communication with Friends and Family:    • Frequency of Social Gatherings with Friends and Family:    • Attends [employer] Services:    • Active Member of Clubs or Organizations:    • Attends Club or Organization Meetings:    • Marital Status:    Intimate Partner Violence:    • Fear of Current or Ex-Partner:    • Emotionally Abused:    • Physically Abused:    • Sexually Abused:           Physical Exam:  Physical Exam    Oriented x 3  Weight/BMI: There is no height or weight on file to calculate BMI.  There were no vitals taken for this visit.    Base Eye Exam     Visual Acuity (Snellen - Linear)       Right Left    Dist sc 20/30-2 20/40-2    Dist ph sc 20/25-2 20/25-2    Near cc J1 J1+          Tonometry ( care, 2:11 PM)       Right Left    Pressure 18 17          Pupils       Pupils    Right PERRL    Left PERRL          Visual Fields       Right Left     Full Full          Neuro/Psych     Oriented x3: Yes    Mood/Affect: Normal          Dilation     Both eyes: Tropicamide (MYDRIACYL) 1% ophthalmic solution, Phenylephrine (NEOSYNEPHRINE) ophthalmic solution 2.5%, Cyclopentolate (CYCLOGYL) 1% ophthalmic solution @ 2:31 PM            Additional Tests     Color       Right Left     Elmer 7/9 9/9          Stereo     Fly: -    Animals: 0/3    Circles: 1/9            Strabismus Exam       0 0 -1   0 0 0                      ET 35 -1  0  ET 40 0  0  ET 40          LHypoT 2           0 0 0   0 0 0                   Slit Lamp and Fundus Exam     External Exam       Right Left    External Normal Normal          Slit Lamp Exam       Right Left    Lids/Lashes Normal Normal    Conjunctiva/Sclera White and quiet White and quiet    Cornea LASIK scar LASIK scar    Anterior Chamber Deep and quiet Deep and quiet    Iris Round and reactive Round and reactive    Lens Cortical cataract Cortical cataract    Vitreous Normal Normal          Fundus Exam       Right Left    Disc Tilted disc Tilted disc    C/D Ratio 0.2 0.2    Macula Normal Normal    Vessels Normal Arteriolar narrowing    Periphery Normal Normal            Refraction     Wearing Rx       Sphere Cylinder    Right +2.75 Sphere    Left +2.75 Sphere    Age: 2yrs    Type: SVL          Manifest Refraction (Auto)       Sphere Cylinder Axis    Right -0.25 +0.75 100    Left +0.50 +1.00 105          Cycloplegic Refraction (Auto)       Sphere Cylinder Axis    Right -0.25 +1.00 106    Left [place] +1.50 106          Final Rx       Sphere Cylinder Axis Add    Right -0.25 +0.75 105 +2.75    Left [place] +1.00 110 +2.75                Pertinent Lab/Test/Imaging Review:      Assessment and Plan:     Glaucoma suspect of both eyes  4/5/2021 - secondary to tilted discs from myopia. Oct NFL thickness 91 OD and 67 OS, bu no significant increased cupping and IOP good. 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 In PAT double and would not suppress or fuse. 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 Is now noticing some double vision because of cataracts and now vision better in the non dominant eye so is switching fixation.  Discussed at length options. These include removing only the cataract in the left eye which hopefully will make that eye dominant all the time or a PAT test to see if can begin fusing and the consider cataract extraction followed by strabismus repair. Will there froe precede with the latter. Gave rx to slightly improve the vision and will place 40 base out press on prism over the OD in  Pat to see if can start fusing. If so then could consider cataract extraction followed by strabismus repair.     Cortical age-related cataract of both eyes  4/5/2021 - bilateral cortical cataracts with vision worse in the left eye leading to switched fixation        Keith Horne M.D.

## 2021-04-05 NOTE — ASSESSMENT & PLAN NOTE
4/5/2021 - secondary to tilted discs from myopia. Oct NFL thickness 91 OD and 67 OS, bu no significant increased cupping and IOP good. Will monitor

## 2021-04-05 NOTE — ASSESSMENT & PLAN NOTE
4/5/2021 - bilateral cortical cataracts with vision worse in the left eye leading to switched fixation

## 2021-04-05 NOTE — ASSESSMENT & PLAN NOTE
4/5/2021 - Large angle esotropia measuring 40 diopters with small left hypotonia. In PAT double and would not suppress or fuse. 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 Is now noticing some double vision because of cataracts and now vision better in the non dominant eye so is switching fixation. Discussed at length options. These include removing only the cataract in the left eye which hopefully will make that eye dominant all the time or a PAT test to see if can begin fusing and the consider cataract extraction followed by strabismus repair. Will there froe precede with the latter. Gave rx to slightly improve the vision and will place 40 base out press on prism over the OD in  Pat to see if can start fusing. If so then could consider cataract extraction followed by strabismus repair.

## 2021-04-09 ENCOUNTER — PHYSICAL THERAPY (OUTPATIENT)
Dept: PHYSICAL THERAPY | Facility: REHABILITATION | Age: 76
End: 2021-04-09
Attending: FAMILY MEDICINE
Payer: MEDICARE

## 2021-04-09 DIAGNOSIS — R26.89 IMBALANCE: ICD-10-CM

## 2021-04-09 DIAGNOSIS — Z91.81 RISK FOR FALLS: ICD-10-CM

## 2021-04-09 DIAGNOSIS — G60.9 IDIOPATHIC PERIPHERAL NEUROPATHY: ICD-10-CM

## 2021-04-09 PROCEDURE — 97110 THERAPEUTIC EXERCISES: CPT

## 2021-04-09 NOTE — OP THERAPY DAILY TREATMENT
Outpatient Physical Therapy  DAILY TREATMENT     [facility] [place]  [address], [address]  [place] [address]  Phone:  [phone]  Fax:  [phone]    Date: 04/09/2021    Patient: Yasmin Zhong  YOB: 1945  MRN: [id number]     Time Calculation    Start time: 1330  Stop time: 1400 Time Calculation (min): 30 minutes         Chief Complaint: Difficulty Walking    Visit #: 9    SUBJECTIVE:  Patient reports that she feels like she is walking better. Notes an overall improvement in how steady she feels.     OBJECTIVE:  Current objective measures:   Noted lower than expected 02 sats during exercise  89-93% Sp02          Therapeutic Exercises (CPT 04789):     1. Nu Step, x10min L6    2. Shuttle, x2min 5c bilateral; SL x1 min ea 3c     3. Sit to stand , x5, UE assist as needed, use of mirror for posture    Therapeutic Treatments and Modalities:     1. Neuromuscular Re-education (CPT 48329), Balance training with with airex pad NBOS/WBOS eyes open/closed; 1/2 foam roller a-p training with CGA;     Time-based treatments/modalities:    Physical Therapy Timed Treatment Charges  Therapeutic exercise minutes (CPT 97707): 25 minutes      Pain rating (1-10) before treatment:  0  Pain rating (1-10) after treatment:  0    ASSESSMENT:   Response to treatment: Patient responded fair to therapy. Noted lower O2 sats during exercise. Will monitor and reassess next visit.     PLAN/RECOMMENDATIONS:   Plan for treatment: therapy treatment to continue next visit.  Planned interventions for next visit: continue with current treatment.

## 2021-04-14 ENCOUNTER — PHYSICAL THERAPY (OUTPATIENT)
Dept: PHYSICAL THERAPY | Facility: REHABILITATION | Age: 76
End: 2021-04-14
Attending: FAMILY MEDICINE
Payer: MEDICARE

## 2021-04-14 ENCOUNTER — HOSPITAL ENCOUNTER (OUTPATIENT)
Dept: LAB | Facility: MEDICAL CENTER | Age: 76
End: 2021-04-14
Attending: FAMILY MEDICINE
Payer: MEDICARE

## 2021-04-14 DIAGNOSIS — Z91.81 RISK FOR FALLS: ICD-10-CM

## 2021-04-14 DIAGNOSIS — R26.89 IMBALANCE: ICD-10-CM

## 2021-04-14 DIAGNOSIS — R42 LIGHTHEADEDNESS: ICD-10-CM

## 2021-04-14 DIAGNOSIS — G60.9 IDIOPATHIC PERIPHERAL NEUROPATHY: ICD-10-CM

## 2021-04-14 LAB
ALBUMIN SERPL BCP-MCNC: 4.3 G/DL (ref 3.2–4.9)
ALBUMIN/GLOB SERPL: 1.5 G/DL
ALP SERPL-CCNC: 82 U/L (ref 30–99)
ALT SERPL-CCNC: 19 U/L (ref 2–50)
ANION GAP SERPL CALC-SCNC: 14 MMOL/L (ref 7–16)
AST SERPL-CCNC: 27 U/L (ref 12–45)
BASOPHILS # BLD AUTO: 0.8 % (ref 0–1.8)
BASOPHILS # BLD: 0.03 K/UL (ref 0–0.12)
BILIRUB SERPL-MCNC: 0.6 MG/DL (ref 0.1–1.5)
BUN SERPL-MCNC: 8 MG/DL (ref 8–22)
CALCIUM SERPL-MCNC: 9.4 MG/DL (ref 8.5–10.5)
CHLORIDE SERPL-SCNC: 109 MMOL/L (ref 96–112)
CO2 SERPL-SCNC: 21 MMOL/L (ref 20–33)
CREAT SERPL-MCNC: 0.53 MG/DL (ref 0.5–1.4)
EOSINOPHIL # BLD AUTO: 0.07 K/UL (ref 0–0.51)
EOSINOPHIL NFR BLD: 2 % (ref 0–6.9)
ERYTHROCYTE [DISTWIDTH] IN BLOOD BY AUTOMATED COUNT: 65.5 FL (ref 35.9–50)
FASTING STATUS PATIENT QL REPORTED: NORMAL
GLOBULIN SER CALC-MCNC: 2.9 G/DL (ref 1.9–3.5)
GLUCOSE SERPL-MCNC: 64 MG/DL (ref 65–99)
HCT VFR BLD AUTO: 43 % (ref 37–47)
HGB BLD-MCNC: 13.8 G/DL (ref 12–16)
IMM GRANULOCYTES # BLD AUTO: 0.01 K/UL (ref 0–0.11)
IMM GRANULOCYTES NFR BLD AUTO: 0.3 % (ref 0–0.9)
LYMPHOCYTES # BLD AUTO: 1.16 K/UL (ref 1–4.8)
LYMPHOCYTES NFR BLD: 32.9 % (ref 22–41)
MCH RBC QN AUTO: 33.3 PG (ref 27–33)
MCHC RBC AUTO-ENTMCNC: 32.1 G/DL (ref 33.6–35)
MCV RBC AUTO: 103.6 FL (ref 81.4–97.8)
MONOCYTES # BLD AUTO: 0.42 K/UL (ref 0–0.85)
MONOCYTES NFR BLD AUTO: 11.9 % (ref 0–13.4)
NEUTROPHILS # BLD AUTO: 1.84 K/UL (ref 2–7.15)
NEUTROPHILS NFR BLD: 52.1 % (ref 44–72)
NRBC # BLD AUTO: 0 K/UL
NRBC BLD-RTO: 0 /100 WBC
PLATELET # BLD AUTO: 162 K/UL (ref 164–446)
PMV BLD AUTO: 9.3 FL (ref 9–12.9)
POTASSIUM SERPL-SCNC: 4 MMOL/L (ref 3.6–5.5)
PROT SERPL-MCNC: 7.2 G/DL (ref 6–8.2)
RBC # BLD AUTO: 4.15 M/UL (ref 4.2–5.4)
SODIUM SERPL-SCNC: 144 MMOL/L (ref 135–145)
WBC # BLD AUTO: 3.5 K/UL (ref 4.8–10.8)

## 2021-04-14 PROCEDURE — 36415 COLL VENOUS BLD VENIPUNCTURE: CPT

## 2021-04-14 PROCEDURE — 97112 NEUROMUSCULAR REEDUCATION: CPT

## 2021-04-14 PROCEDURE — 80053 COMPREHEN METABOLIC PANEL: CPT

## 2021-04-14 PROCEDURE — 85025 COMPLETE CBC W/AUTO DIFF WBC: CPT

## 2021-04-14 PROCEDURE — 97110 THERAPEUTIC EXERCISES: CPT

## 2021-04-14 NOTE — OP THERAPY DAILY TREATMENT
Outpatient Physical Therapy  DAILY TREATMENT     [facility] [place]  [address], [address]  [place] [address]  Phone:  [phone]  Fax:  [phone]    Date: 04/14/2021    Patient: Yasmin Zhong  YOB: 1945  MRN: [id number]     Time Calculation    Start time: 1300  Stop time: 1330 Time Calculation (min): 30 minutes         Chief Complaint: Difficulty Walking    Visit #: 10    SUBJECTIVE:  Patient reports no falls. Patient reports that her 02 sats are above 91 most times. Notes that she is feeling stronger.     OBJECTIVE:  Current objective measures:   Hip abd strength 4/5 B  No LOB with NBOS standing  Continued wide base ambulation   BBS not assessed today          Therapeutic Exercises (CPT 32998):     1. Nu Step, x10min L6    2. Shuttle, x2min 5c bilateral; SL x1 min ea 3c     3. Bridge , x15    4. Basic tra edu, x5min    5. Basic tra with LE lifts, x20    Therapeutic Treatments and Modalities:     1. Neuromuscular Re-education (CPT 08090), Balance training with with airex pad NBOS/WBOS eyes open/closed; 1/2 foam roller a-p training with CGA;     Time-based treatments/modalities:    Physical Therapy Timed Treatment Charges  Neuromusc re-ed, balance, coor, post minutes (CPT 32633): 10 minutes  Therapeutic exercise minutes (CPT 55213): 20 minutes      Pain rating (1-10) before treatment:  0  Pain rating (1-10) after treatment:  0    ASSESSMENT:   Response to treatment: Patient responded well to therapy with an overall improvement in balance on her 1/2 foam roller now able to hold for 20+ seconds    PLAN/RECOMMENDATIONS:   Plan for treatment: therapy treatment to continue next visit.  Planned interventions for next visit: continue with current treatment.

## 2021-04-14 NOTE — OP THERAPY PROGRESS SUMMARY
Outpatient Physical Therapy  PROGRESS SUMMARY NOTE      [facility] [place]  [address], [address]  [place] [address]  Phone:  [phone]  Fax:  [phone]    Date of Visit: 04/14/2021    Patient: Yasmin Zhong  YOB: 1945  MRN: [id number]     Referring Provider: Ramona Amaya M.D.  [address]  [place] [address]   Referring Diagnosis Hereditary and idiopathic neuropathy, unspecified [G60.9];Other abnormalities of gait and mobility [R26.89];History of falling [Z91.81]     Visit Diagnoses     ICD-10-CM   1. Idiopathic peripheral neuropathy  G60.9   2. Imbalance  R26.89   3. Risk for falls  Z91.81       Rehab Potential: fair    Progress Report Period: Eval to 4/13/21    Functional Assessment Used          Objective Findings and Assessment:   Patient progression towards goals: Patient has been seen for 10 visits. Patient reports that she is feeling stronger. Objective data supports this as well as a slight improvement in static balance. Patient is demonstrating that she is compliant with her HEP. Plan to continue with LE strength training and progress balance program as able.     Objective findings and assessment details: Hip abd strength 4/5 B  No LOB with NBOS standing  Continued wide base ambulation   BBS not assessed today    Goals:   Short Term Goals:   1) Patient's hip abd strength will improve by a half muscle grade to facilitate improved standing tolerance. MET  2) Patient's symptoms will improve to facilitate a TUG score under 12sec. Not assessed  Short term goal time span:  2-4 weeks      Long Term Goals:    1) Patient's symptoms will improve to allow for ambulation >1/4 mile. Not assessed  2) Patient's BBS will improve by 8 to demonstrate functional improvement Not assessed  Long term goal time span:  6-8 weeks    Plan:   Planned therapy interventions:  E Stim Unattended (CPT 63683), Manual Therapy (CPT 74554), Neuromuscular Re-education (CPT  77243) and Therapeutic Exercise (CPT 18917)  Frequency:  2x week  Duration in weeks:  8      Referring provider co-signature:  I have reviewed this plan of care and my co-signature certifies the need for services.     Certification Period: 04/14/2021 to 06/10/21    Physician Signature: ________________________________ Date: ______________

## 2021-04-15 DIAGNOSIS — F51.04 CHRONIC INSOMNIA: ICD-10-CM

## 2021-04-15 RX ORDER — ZOLPIDEM TARTRATE 10 MG/1
10 TABLET ORAL NIGHTLY PRN
Qty: 30 TABLET | Refills: 0 | Status: SHIPPED | OUTPATIENT
Start: 2021-04-15 | End: 2021-04-19 | Stop reason: SDUPTHER

## 2021-04-15 RX ORDER — ZOLPIDEM TARTRATE 5 MG/1
5 TABLET ORAL NIGHTLY PRN
Qty: 30 TABLET | Refills: 0 | Status: CANCELLED | OUTPATIENT
Start: 2021-04-15 | End: 2021-05-15

## 2021-04-16 ENCOUNTER — APPOINTMENT (OUTPATIENT)
Dept: PHYSICAL THERAPY | Facility: REHABILITATION | Age: 76
End: 2021-04-16
Attending: FAMILY MEDICINE
Payer: MEDICARE

## 2021-04-19 ENCOUNTER — OFFICE VISIT (OUTPATIENT)
Dept: MEDICAL GROUP | Facility: PHYSICIAN GROUP | Age: 76
End: 2021-04-19
Payer: MEDICARE

## 2021-04-19 VITALS
WEIGHT: 127 LBS | DIASTOLIC BLOOD PRESSURE: 70 MMHG | TEMPERATURE: 97.5 F | RESPIRATION RATE: 18 BRPM | SYSTOLIC BLOOD PRESSURE: 122 MMHG | HEIGHT: 64 IN | BODY MASS INDEX: 21.68 KG/M2 | OXYGEN SATURATION: 91 % | HEART RATE: 74 BPM

## 2021-04-19 DIAGNOSIS — G60.9 IDIOPATHIC PERIPHERAL NEUROPATHY: ICD-10-CM

## 2021-04-19 DIAGNOSIS — D70.8 OTHER NEUTROPENIA (HCC): ICD-10-CM

## 2021-04-19 DIAGNOSIS — F51.04 CHRONIC INSOMNIA: ICD-10-CM

## 2021-04-19 DIAGNOSIS — H93.90 LESION OF EAR: ICD-10-CM

## 2021-04-19 DIAGNOSIS — D75.89 MACROCYTOSIS: ICD-10-CM

## 2021-04-19 DIAGNOSIS — R53.83 OTHER FATIGUE: ICD-10-CM

## 2021-04-19 DIAGNOSIS — D69.6 THROMBOCYTOPENIA (HCC): ICD-10-CM

## 2021-04-19 DIAGNOSIS — R26.89 BALANCE DISORDER: ICD-10-CM

## 2021-04-19 DIAGNOSIS — J84.10 PULMONARY FIBROSIS (HCC): ICD-10-CM

## 2021-04-19 DIAGNOSIS — R91.1 LUNG NODULE: ICD-10-CM

## 2021-04-19 PROCEDURE — 99214 OFFICE O/P EST MOD 30 MIN: CPT | Performed by: FAMILY MEDICINE

## 2021-04-19 RX ORDER — ZOLPIDEM TARTRATE 10 MG/1
10 TABLET ORAL NIGHTLY PRN
Qty: 30 TABLET | Refills: 2 | Status: SHIPPED | OUTPATIENT
Start: 2021-04-19 | End: 2021-05-19

## 2021-04-19 ASSESSMENT — FIBROSIS 4 INDEX: FIB4 SCORE: 2.83

## 2021-04-19 NOTE — PROGRESS NOTES
"Subjective:   Yasmin Zhong is a 75 y.o. female here today for follow-up lung nodule and results.    Yasmin recently saw the pulmonologist for a virtual visit.  She had a CT scan to follow-up on a known lung nodule.  It was stable.    She is also here today to go over her lab results.  I let her know that there were some minor abnormalities in her white blood cell count, red blood cell size and platelet count.  She had an abnormal thyroid test that has improved.     She has a few spots on her skin that she would like me to look at today.    She is also requesting a refill of her zolpidem.  She has been taking zolpidem for many years to help with difficulty sleeping.  This medication has worked very well for her without adverse effect.  In the past, she was also on other medications to help with sleeping.  We have been able to take her off of most of her medications.  She has tried melatonin.     Objective:     Physical Exam:  /70 (BP Location: Right arm, Patient Position: Sitting, BP Cuff Size: Adult)   Pulse 74   Temp 36.4 °C (97.5 °F) (Temporal)   Resp 18   Ht 1.626 m (5' 4\")   Wt 57.6 kg (127 lb)   SpO2 91%  Body mass index is 21.8 kg/m².     Constitutional: Alert, no distress, well-groomed.  Skin: On right side of neck there is an elevated, hyperkeratotic and hyperpigmented papule with a \"stuck-on\" appearance.  On left pinna, there is a ~1cm in diameter flesh colored papule with central indentation.  Eye: Conjunctiva clear, no drainage.  ENMT: Wearing a mask.  Neck: Trachea midline, no masses, no thyromegaly.  Respiratory: Unlabored respiratory effort, no cough.  MSK:  Abnormal gait, moves all extremities.  Neuro: Grossly non-focal. No cranial nerve deficit.   Psych: Alert and oriented x3, normal affect and mood.    Assessment and Plan:     1. Lung nodule  Chronic and stable.  Patient recently had a follow-up CT scan which did not show any change in the lung nodule.  She will have another CT " scan in 6 months.  Continue follow-up with pulmonology.    2. Pulmonary fibrosis (HCC)  Chronic and stable.  Patient is a former  and had many years of passive smoke exposure.  She is part of an ongoing study at [facility].    3. Macrocytosis  4. Other neutropenia (HCC)  5. Thrombocytopenia (HCC)  These are acute issues.  Patient CBC shows several abnormalities.  These include decreased white blood cell count of 3.5, decreased ANC at 1.84, macrocytosis with MCV of 103 and thrombocytopenia with platelet count of 162.  Patient has had some of these abnormalities in the past, but not together at the same time and not to the levels that they were added on last week's lab draw.  She did not have thyroid blood work done this go round, but 6 weeks ago it did have thyroid labs which were within normal limits.  I let patient know that we can see some of these abnormalities with alcohol use.  She denies alcohol use to me today.  She has a history of heavy alcohol use in the past.    We had a good conversation about other causes and I let her know that I do want to follow-up on these labs as this could be an indication of a bone marrow issue, such as a blood cancer.  I have ordered repeat test for her to do in the next 2 to 3 months.  These include rechecking her CBC with differential, checking thyroid labs, and checking for vitamin deficiencies such as B12 and folic acid.  She is not having any fevers or other signs of illness.  We will continue to monitor closely.    - CBC WITH DIFFERENTIAL; Future  - TSH; Future  - FREE THYROXINE; Future  - VITAMIN B12; Future  - FOLATE; Future    6. Lesion of ear  This is an acute issue.  Patient mentioned that she has had a bump on her left ear for some time now.  The location and central indentation are concerning for possible skin cancer.  I referred her to a dermatologist for further evaluation.  I also provided reassurance that the mole on the right side of her neck is most  likely benign seborrheic keratosis.  - REFERRAL TO DERMATOLOGY    7. Chronic insomnia  This is a chronic and stable condition.  Please see my other notes for further information.  As recently as October of last year, Yasmin was on multiple medications to help her with sleep.  We had a good conversation during that visit and were able to stop many of these.  We stopped gabapentin (originally prescribed to help with rib fracture pain that has since resolved), trazodone (not effective), Seroquel.  We have tried melatonin 10 mg nightly to see if this would be sufficient to help her with sleep.  Unfortunately, it was not.  We also tried mirtazapine and this also was not effective.  She also had follow-up with her sleep medicine physician and discuss medications with them.  It was recommended that she continue using Ambien as benefits outweigh the risks and this has improved her quality of life.  I have reviewed her prescription medication history and it is up-to-date and appropriate.  Her last refill was in March.  I sent over refills of zolpidem for March, May and June.  - zolpidem (AMBIEN) 10 MG Tab; Take 1 tablet by mouth at bedtime as needed for Sleep for up to 30 days.  Dispense: 30 tablet; Refill: 2    8. Balance disorder  9. Other fatigue  10. Idiopathic peripheral neuropathy  These are chronic and slightly improved.  Patient has been going to physical therapy to help with her balance.  She finds this to be helpful.  Lab work has been ordered as discussed above.  - CBC WITH DIFFERENTIAL; Future  - TSH; Future  - FREE THYROXINE; Future  - VITAMIN B12; Future  - FOLATE; Future    Followup: Return in about 4 weeks (around 5/17/2021) for new patient, follow-up labs.         PLEASE NOTE: This dictation was created using voice recognition software. I have made every reasonable attempt to correct obvious errors, but I expect that there are errors of grammar and possibly content that I did not discover before finalizing the  note.

## 2021-04-20 ENCOUNTER — PHYSICAL THERAPY (OUTPATIENT)
Dept: PHYSICAL THERAPY | Facility: REHABILITATION | Age: 76
End: 2021-04-20
Attending: FAMILY MEDICINE
Payer: MEDICARE

## 2021-04-20 DIAGNOSIS — G60.9 IDIOPATHIC PERIPHERAL NEUROPATHY: ICD-10-CM

## 2021-04-20 PROCEDURE — 97112 NEUROMUSCULAR REEDUCATION: CPT

## 2021-04-20 PROCEDURE — 97110 THERAPEUTIC EXERCISES: CPT

## 2021-04-20 NOTE — OP THERAPY DAILY TREATMENT
Outpatient Physical Therapy  DAILY TREATMENT     [facility] [place]  [address], [address]  [place] [address]  Phone:  [phone]  Fax:  [phone]    Date: 04/20/2021    Patient: Yasmin Zhong  YOB: 1945  MRN: [id number]     Time Calculation    Start time: 1430  Stop time: 1500 Time Calculation (min): 30 minutes         Chief Complaint: Difficulty Walking    Visit #: 11    SUBJECTIVE:  Patient reports that she is feeling stronger getting out of a chair.     OBJECTIVE:  Current objective measures:           Therapeutic Exercises (CPT 02173):     1. Nu Step, x10min L6    2. Shuttle, x2min 5c bilateral; SL x1 min ea 3c     3. Bridge , x15    4. Basic tra edu, x5min    5. Basic tra with LE lifts, x20    Therapeutic Treatments and Modalities:     1. Neuromuscular Re-education (CPT 50940), in //bar with tandem walking ; backward walking eyes closed; standing on foam eyes closed; cross stepping all with CGA    Time-based treatments/modalities:    Physical Therapy Timed Treatment Charges  Neuromusc re-ed, balance, coor, post minutes (CPT 01017): 10 minutes  Therapeutic exercise minutes (CPT 73558): 20 minutes      Pain rating (1-10) before treatment:  0  Pain rating (1-10) after treatment:  0    ASSESSMENT:   Response to treatment: Patient responded well to therapy with improved control at higher gait velocity. Patient still struggles with low gait velocity.     PLAN/RECOMMENDATIONS:   Plan for treatment: therapy treatment to continue next visit.  Planned interventions for next visit: continue with current treatment.

## 2021-04-23 ENCOUNTER — PHYSICAL THERAPY (OUTPATIENT)
Dept: PHYSICAL THERAPY | Facility: REHABILITATION | Age: 76
End: 2021-04-23
Attending: FAMILY MEDICINE
Payer: MEDICARE

## 2021-04-23 DIAGNOSIS — Z91.81 RISK FOR FALLS: ICD-10-CM

## 2021-04-23 DIAGNOSIS — G60.9 IDIOPATHIC PERIPHERAL NEUROPATHY: ICD-10-CM

## 2021-04-23 DIAGNOSIS — R26.89 IMBALANCE: ICD-10-CM

## 2021-04-23 PROCEDURE — 97112 NEUROMUSCULAR REEDUCATION: CPT

## 2021-04-23 PROCEDURE — 97110 THERAPEUTIC EXERCISES: CPT

## 2021-04-23 NOTE — OP THERAPY DAILY TREATMENT
Outpatient Physical Therapy  DAILY TREATMENT     [facility] Outpatient Physical Therapy [place]  [address], [address]  [facility] [place] [facility] [address]  Phone:  [phone]  Fax:  [phone]    Date: 04/23/2021    Patient: Yasmin Zhong  YOB: 1945  MRN: [id number]     Time Calculation    Start time: 1430  Stop time: 1500 Time Calculation (min): 30 minutes         Chief Complaint: Difficulty Walking    Visit #: 12    SUBJECTIVE:  Patient reports no falls. States she is feeling stronger on her feet.     OBJECTIVE:  Current objective measures:           Therapeutic Exercises (CPT 51920):     1. Nu Step, x10min L6    2. Shuttle, x2min 5c bilateral; SL x1 min ea 3c     3. Bridge , x15, on ball    4. Basic tra edu, x5min    5. Basic tra with LE lifts, x20    Therapeutic Treatments and Modalities:     1. Neuromuscular Re-education (CPT 53570), seated on DD with U LE lifts CGA; progression to eyes closed; progression to B LE lift eyes open/closed    Time-based treatments/modalities:    Physical Therapy Timed Treatment Charges  Neuromusc re-ed, balance, coor, post minutes (CPT 83736): 10 minutes  Therapeutic exercise minutes (CPT 88881): 20 minutes      Pain rating (1-10) before treatment:  0  Pain rating (1-10) after treatment:  0    ASSESSMENT:   Response to treatment: Patient responded well to therapy with an overall increase in exercise tolerance and increase in fatigue.     PLAN/RECOMMENDATIONS:   Plan for treatment: therapy treatment to continue next visit.  Planned interventions for next visit: continue with current treatment.

## 2021-04-27 ENCOUNTER — APPOINTMENT (OUTPATIENT)
Dept: PHYSICAL THERAPY | Facility: REHABILITATION | Age: 76
End: 2021-04-27
Attending: FAMILY MEDICINE
Payer: MEDICARE

## 2021-04-27 DIAGNOSIS — G60.9 IDIOPATHIC PERIPHERAL NEUROPATHY: ICD-10-CM

## 2021-04-27 DIAGNOSIS — R26.89 IMBALANCE: ICD-10-CM

## 2021-04-27 DIAGNOSIS — Z91.81 RISK FOR FALLS: ICD-10-CM

## 2021-04-27 PROCEDURE — 97112 NEUROMUSCULAR REEDUCATION: CPT

## 2021-04-27 PROCEDURE — 97110 THERAPEUTIC EXERCISES: CPT

## 2021-04-27 NOTE — OP THERAPY DAILY TREATMENT
Outpatient Physical Therapy  DAILY TREATMENT     [facility] Outpatient Physical Therapy [place]  [address], [address]  [place] [facility] [address]  Phone:  [phone]  Fax:  [phone]    Date: 04/27/2021    Patient: Yasmin Zhong  YOB: 1945  MRN: [id number]     Time Calculation    Start time: 1400  Stop time: 1430 Time Calculation (min): 30 minutes         Chief Complaint: Difficulty Walking    Visit #: 13    SUBJECTIVE:  Patient reports no falls. States she is feeling stronger. Notes no difficulty with HEP.     OBJECTIVE:  Current objective measures:             Therapeutic Exercises (CPT 91876):     1. Nu Step, x10min L6    2. Shuttle, x2min 5c bilateral; SL x1 min ea 3c     3. Bridge , x15, with hip abd    4. Basic tra edu, x5min    5. Basic tra with LE lifts, x20    Therapeutic Treatments and Modalities:     1. Neuromuscular Re-education (CPT 94909), seated on DD with U LE lifts CGA; progression to eyes closed; progression to B LE lift eyes open/closed    Time-based treatments/modalities:    Physical Therapy Timed Treatment Charges  Neuromusc re-ed, balance, coor, post minutes (CPT 01415): 10 minutes  Therapeutic exercise minutes (CPT 90767): 20 minutes      Pain rating (1-10) before treatment:  0  Pain rating (1-10) after treatment:  0    ASSESSMENT:   Response to treatment: Patient responded well to therapy with an overall increase in fatigue with no increase in pain. Gait seems to have improved. Notes a more narrow JOSE and improved gait.     PLAN/RECOMMENDATIONS:   Plan for treatment: therapy treatment to continue next visit.  Planned interventions for next visit: continue with current treatment.

## 2021-05-05 ENCOUNTER — APPOINTMENT (OUTPATIENT)
Dept: PHYSICAL THERAPY | Facility: REHABILITATION | Age: 76
End: 2021-05-05
Attending: FAMILY MEDICINE
Payer: MEDICARE

## 2021-05-13 ENCOUNTER — PHYSICAL THERAPY (OUTPATIENT)
Dept: PHYSICAL THERAPY | Facility: REHABILITATION | Age: 76
End: 2021-05-13
Attending: FAMILY MEDICINE
Payer: MEDICARE

## 2021-05-13 DIAGNOSIS — R26.89 IMBALANCE: ICD-10-CM

## 2021-05-13 DIAGNOSIS — Z91.81 RISK FOR FALLS: ICD-10-CM

## 2021-05-13 DIAGNOSIS — G60.9 IDIOPATHIC PERIPHERAL NEUROPATHY: ICD-10-CM

## 2021-05-13 PROCEDURE — 97110 THERAPEUTIC EXERCISES: CPT

## 2021-05-13 PROCEDURE — 97112 NEUROMUSCULAR REEDUCATION: CPT

## 2021-05-14 NOTE — OP THERAPY DAILY TREATMENT
Outpatient Physical Therapy  DAILY TREATMENT     [facility] Outpatient Physical Therapy [place]  [address], [address]  [place] [facility] [address]  Phone:  [phone]  Fax:  [phone]    Date: 05/13/2021    Patient: Yasmin Zhong  YOB: 1945  MRN: [id number]     Time Calculation    Start time: 1430  Stop time: 1500 Time Calculation (min): 30 minutes         Chief Complaint: Difficulty Walking    Visit #: 14    SUBJECTIVE:  Patient returns to therapy after 2 weeks due to illness. Patient reports that she has gotten weaker. Notes that symptoms have returned and she is back to feeling unsteady.     OBJECTIVE:  Current objective measures:           Therapeutic Exercises (CPT 02074):     1. Nu Step, x10min L6    2. Shuttle, x2min 5c bilateral; SL x1 min ea 3c     3. Bridge , x15, with hip abd    4. Basic tra edu, x5min    5. Basic tra with LE lifts, x20    Therapeutic Treatments and Modalities:     1. Neuromuscular Re-education (CPT 20627), seated on DD with U LE lifts CGA; in //bar walking with emp on stance phase and proper weight shift    Time-based treatments/modalities:    Physical Therapy Timed Treatment Charges  Neuromusc re-ed, balance, coor, post minutes (CPT 00542): 10 minutes  Therapeutic exercise minutes (CPT 17447): 20 minutes      Pain rating (1-10) before treatment:  0  Pain rating (1-10) after treatment:  0    ASSESSMENT:   Response to treatment: Patient responded fair to therapy with increased fatigued and poor weight shifting that required CGA with asked to hold in SLS.     PLAN/RECOMMENDATIONS:   Plan for treatment: therapy treatment to continue next visit.  Planned interventions for next visit: continue with current treatment.

## 2021-05-18 ENCOUNTER — PHYSICAL THERAPY (OUTPATIENT)
Dept: PHYSICAL THERAPY | Facility: REHABILITATION | Age: 76
End: 2021-05-18
Attending: FAMILY MEDICINE
Payer: MEDICARE

## 2021-05-18 DIAGNOSIS — R26.89 IMBALANCE: ICD-10-CM

## 2021-05-18 DIAGNOSIS — Z91.81 RISK FOR FALLS: ICD-10-CM

## 2021-05-18 DIAGNOSIS — G60.9 IDIOPATHIC PERIPHERAL NEUROPATHY: ICD-10-CM

## 2021-05-18 PROCEDURE — 97110 THERAPEUTIC EXERCISES: CPT

## 2021-05-18 PROCEDURE — 97112 NEUROMUSCULAR REEDUCATION: CPT

## 2021-05-19 NOTE — OP THERAPY DAILY TREATMENT
Outpatient Physical Therapy  DAILY TREATMENT     [facility] Outpatient Physical Therapy [place]  [address]  [place] [facility] [place] [facility] [address]  Phone:  [phone]  Fax:  [phone]    Date: 05/18/2021    Patient: Yasmin Zhong  YOB: 1945  MRN: [id number]     Time Calculation    Start time: 1600  Stop time: 1630 Time Calculation (min): 30 minutes         Chief Complaint: Difficulty Walking    Visit #: 15    SUBJECTIVE:  Patient reports that she feels slightly more stable. Notes no pain or increased fatigue.     OBJECTIVE:  Current objective measures:   Now able to hold 3 sec on SLS with good weight shift          Therapeutic Exercises (CPT 01723):     1. Nu Step, x10min L6    2. Shuttle, x2min 5c bilateral; SL x1 min ea 3c     3. Bridge , x15, with hip abd    Therapeutic Treatments and Modalities:     1. Neuromuscular Re-education (CPT 85232), seated on DD with U LE lifts CGA; in //bar walking with emp on stance phase and proper weight shift now able to shift and hold 3 sec.     Time-based treatments/modalities:    Physical Therapy Timed Treatment Charges  Neuromusc re-ed, balance, coor, post minutes (CPT 92398): 15 minutes  Therapeutic exercise minutes (CPT 54165): 15 minutes      Pain rating (1-10) before treatment:  0  Pain rating (1-10) after treatment:  0    ASSESSMENT:   Response to treatment: Patient demonstrated an improved ability to weight shift and hold in stance phase for 3 seconds. Patient gait is returning to a more normal pattern. Plan to continue with neuro re ed: gait    PLAN/RECOMMENDATIONS:   Plan for treatment: therapy treatment to continue next visit.  Planned interventions for next visit: continue with current treatment.

## 2021-05-24 ENCOUNTER — HOSPITAL ENCOUNTER (OUTPATIENT)
Dept: LAB | Facility: MEDICAL CENTER | Age: 76
End: 2021-05-24
Attending: FAMILY MEDICINE
Payer: MEDICARE

## 2021-05-24 DIAGNOSIS — D75.89 MACROCYTOSIS: ICD-10-CM

## 2021-05-24 DIAGNOSIS — R53.83 OTHER FATIGUE: ICD-10-CM

## 2021-05-24 DIAGNOSIS — R26.89 BALANCE DISORDER: ICD-10-CM

## 2021-05-24 DIAGNOSIS — D70.8 OTHER NEUTROPENIA (HCC): ICD-10-CM

## 2021-05-24 DIAGNOSIS — D69.6 THROMBOCYTOPENIA (HCC): ICD-10-CM

## 2021-05-24 LAB
BASOPHILS # BLD AUTO: 1.4 % (ref 0–1.8)
BASOPHILS # BLD: 0.06 K/UL (ref 0–0.12)
EOSINOPHIL # BLD AUTO: 0.16 K/UL (ref 0–0.51)
EOSINOPHIL NFR BLD: 3.7 % (ref 0–6.9)
ERYTHROCYTE [DISTWIDTH] IN BLOOD BY AUTOMATED COUNT: 51.8 FL (ref 35.9–50)
FOLATE SERPL-MCNC: 11.2 NG/ML
HCT VFR BLD AUTO: 44.7 % (ref 37–47)
HGB BLD-MCNC: 14.6 G/DL (ref 12–16)
IMM GRANULOCYTES # BLD AUTO: 0.01 K/UL (ref 0–0.11)
IMM GRANULOCYTES NFR BLD AUTO: 0.2 % (ref 0–0.9)
LYMPHOCYTES # BLD AUTO: 1.81 K/UL (ref 1–4.8)
LYMPHOCYTES NFR BLD: 41.7 % (ref 22–41)
MCH RBC QN AUTO: 34 PG (ref 27–33)
MCHC RBC AUTO-ENTMCNC: 32.7 G/DL (ref 33.6–35)
MCV RBC AUTO: 104.2 FL (ref 81.4–97.8)
MONOCYTES # BLD AUTO: 0.67 K/UL (ref 0–0.85)
MONOCYTES NFR BLD AUTO: 15.4 % (ref 0–13.4)
NEUTROPHILS # BLD AUTO: 1.63 K/UL (ref 2–7.15)
NEUTROPHILS NFR BLD: 37.6 % (ref 44–72)
NRBC # BLD AUTO: 0 K/UL
NRBC BLD-RTO: 0 /100 WBC
PLATELET # BLD AUTO: 183 K/UL (ref 164–446)
PMV BLD AUTO: 9.3 FL (ref 9–12.9)
RBC # BLD AUTO: 4.29 M/UL (ref 4.2–5.4)
T4 FREE SERPL-MCNC: 0.97 NG/DL (ref 0.93–1.7)
TSH SERPL DL<=0.005 MIU/L-ACNC: 0.26 UIU/ML (ref 0.38–5.33)
VIT B12 SERPL-MCNC: 650 PG/ML (ref 211–911)
WBC # BLD AUTO: 4.3 K/UL (ref 4.8–10.8)

## 2021-05-24 PROCEDURE — 84439 ASSAY OF FREE THYROXINE: CPT

## 2021-05-24 PROCEDURE — 82746 ASSAY OF FOLIC ACID SERUM: CPT

## 2021-05-24 PROCEDURE — 85025 COMPLETE CBC W/AUTO DIFF WBC: CPT

## 2021-05-24 PROCEDURE — 36415 COLL VENOUS BLD VENIPUNCTURE: CPT

## 2021-05-24 PROCEDURE — 82607 VITAMIN B-12: CPT

## 2021-05-24 PROCEDURE — 84443 ASSAY THYROID STIM HORMONE: CPT

## 2021-05-25 ENCOUNTER — HOSPITAL ENCOUNTER (EMERGENCY)
Facility: MEDICAL CENTER | Age: 76
End: 2021-05-25
Attending: EMERGENCY MEDICINE
Payer: MEDICARE

## 2021-05-25 ENCOUNTER — APPOINTMENT (OUTPATIENT)
Dept: RADIOLOGY | Facility: MEDICAL CENTER | Age: 76
End: 2021-05-25
Attending: EMERGENCY MEDICINE
Payer: MEDICARE

## 2021-05-25 VITALS
WEIGHT: 125 LBS | OXYGEN SATURATION: 95 % | BODY MASS INDEX: 23 KG/M2 | SYSTOLIC BLOOD PRESSURE: 139 MMHG | HEART RATE: 92 BPM | TEMPERATURE: 97.1 F | HEIGHT: 62 IN | RESPIRATION RATE: 18 BRPM | DIASTOLIC BLOOD PRESSURE: 73 MMHG

## 2021-05-25 DIAGNOSIS — S01.81XA FACIAL LACERATION, INITIAL ENCOUNTER: ICD-10-CM
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
## 2021-05-25 DIAGNOSIS — S09.90XA CLOSED HEAD INJURY, INITIAL ENCOUNTER: ICD-10-CM

## 2021-05-25 PROBLEM — D69.6 THROMBOCYTOPENIA (HCC): Status: RESOLVED | Noted: 2021-04-19 | Resolved: 2021-05-25
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
## 2021-05-25 PROBLEM — D70.8 OTHER NEUTROPENIA (HCC): Chronic | Status: ACTIVE | Noted: 2021-04-19

## 2021-05-25 PROBLEM — F41.9 ANXIETY: Status: ACTIVE | Noted: 2021-05-25

## 2021-05-25 PROBLEM — E05.90 SUBCLINICAL HYPERTHYROIDISM: Status: ACTIVE | Noted: 2021-05-25

## 2021-05-25 PROBLEM — F41.9 ANXIETY: Chronic | Status: ACTIVE | Noted: 2021-05-25

## 2021-05-25 PROBLEM — E05.90 SUBCLINICAL HYPERTHYROIDISM: Chronic | Status: ACTIVE | Noted: 2021-05-25

## 2021-05-25 PROBLEM — E78.5 HYPERLIPIDEMIA: Chronic | Status: ACTIVE | Noted: 2017-07-21

## 2021-05-25 PROBLEM — E78.5 HYPERLIPIDEMIA: Status: ACTIVE | Noted: 2017-07-21

## 2021-05-25 PROCEDURE — 99284 EMERGENCY DEPT VISIT MOD MDM: CPT

## 2021-05-25 PROCEDURE — 70450 CT HEAD/BRAIN W/O DYE: CPT | Mod: MG

## 2021-05-25 PROCEDURE — 70486 CT MAXILLOFACIAL W/O DYE: CPT | Mod: MG

## 2021-05-25 PROCEDURE — 304999 HCHG REPAIR-SIMPLE/INTERMED LEVEL 1

## 2021-05-25 PROCEDURE — 303353 HCHG DERMABOND SKIN ADHESIVE

## 2021-05-25 ASSESSMENT — FIBROSIS 4 INDEX: FIB4 SCORE: 2.5

## 2021-05-25 NOTE — ED NOTES
Discharge teaching and paperwork provided regarding wound care and all questions/concerns answered. VSS, pain and neuro assessment stable and PIV removed. Patient discharged to the care of self and brother and wheeled out of the ED.

## 2021-05-25 NOTE — ED PROVIDER NOTES
ED Provider  Scribed for Humphrey Gongora D.O. by Sarah Howard. 5/25/2021  12:45 PM    Means of arrival:EMS  History obtained from:Patient  History limited by: None    CHIEF COMPLAINT  Chief Complaint   Patient presents with    T-5000 GLF     mechanical GLF while walking in parking lot. 3cm lac above L eyebrow. GCS 15        HPI  Yasmin Zhong is a 75 y.o. female who presents to the [facility] ED via ambulance for a fall onset today. EMS states the patient was getting out of her car when she forgot her mask, turned around, and fell at 11:00 AM this morning. Patient did not experience any loss of consciousness, neck pain, or back pain during the event, but hit her head resulting in trauma to the right side of her head. EMS was then called and brought the patient into the [facility] ED this afternoon for further evaluation. Upon arrival to the ED patient states she has bowel issues, tremors, and neuropathy at baseline. She reports she has been dealing with balance issues for the last 12 years and says her last fall was 1 year ago. Patient says she was incorrectly diagnosed with Parkinson's disease years ago, and had tests performed with a neurologist who recently diagnosed her with neuropathy. No alleviating or exacerbating factors were noted. Patient has associated headache and right sided facial pain, but denies dizziness or vision changes. She does not smoke, use drugs, or drink alcohol. Patient has no known drug allergies and does not take any daily medications. Her PCP is Dr. Gupta.     REVIEW OF SYSTEMS  See HPI for further details. All other systems are negative.     PAST MEDICAL HISTORY   has a past medical history of Anxiety, Arrhythmia (11/18/2020), Arthritis (11/18/2020), Blood clotting disorder (HCC), Breath shortness (11/18/2020), Cancer (HCC) (1990), Cataract, Depression, Heart murmur, History of respiratory failure, Hyperlipidemia, Hypertension, Indigestion (11/18/2020), Insomnia, Pain (11/18/2020),  "Peripheral neuropathy, Pneumonia, Pulmonary fibrosis (HCC), and Thrombocytopenia (HCC) (2021).    SOCIAL HISTORY  Social History     Tobacco Use    Smoking status: Former Smoker     Packs/day: 0.25     Years: 15.00     Pack years: 3.75     Types: Cigarettes     Quit date: 3/11/1985     Years since quittin.2    Smokeless tobacco: Never Used    Tobacco comment: passive smoke exposure her entire life   Vaping Use    Vaping Use: Never used   Substance and Sexual Activity    Alcohol use: Not Currently     Alcohol/week: 0.0 oz    Drug use: No    Sexual activity: Not Currently       SURGICAL HISTORY   has a past surgical history that includes other; hip hemiarthroplasty (2015); and lap,esophagogast fundoplasty (N/A, 2020).    CURRENT MEDICATIONS  Home Medications       Reviewed by Jacqueline Hamilton R.N. (Registered Nurse) on 21 at 1252  Med List Status: <None>     Medication Last Dose Status   atorvastatin (LIPITOR) 20 MG Tab  Active   BIOTIN PO  Active   busPIRone (BUSPAR) 10 MG Tab tablet  Active   Calcium Citrate-Vitamin D (CALCIUM + D PO)  Active   cetirizine (ZYRTEC) 10 MG Tab  Active   Cholecalciferol (VITAMIN D3 PO)  Active   cyanocobalamin (VITAMIN B-12) 100 MCG Tab  Active   MAGNESIUM PO  Active   VITAMIN E PO  Active                    ALLERGIES  Allergies   Allergen Reactions    Pollen Extract      \"cough\"       PHYSICAL EXAM  VITAL SIGNS: /71   Pulse 94   Temp 36.1 °C (97 °F) (Temporal)   Ht 1.575 m (5' 2\")   Wt 56.7 kg (125 lb)   SpO2 94%   BMI 22.86 kg/m²   Constitutional: Alert in no apparent distress.  HENT: Left lateral eyebrow swelling and laceration, mucous membranes are moist  Eyes: Mild strabismus that is chronic, Conjunctiva normal, Non-icteric.   Neck: Normal range of motion, No tenderness, Supple.  Lymphatic: No lymphadenopathy noted.   Cardiovascular: Regular rate and rhythm, no murmurs.   Thorax & Lungs: Normal breath sounds, No respiratory distress, No " wheezing, No chest tenderness.   Abdomen: Bowel sounds normal, Soft, No tenderness, No masses, No pulsatile masses. No peritoneal signs.  Skin: Warm, Dry, normal color.   Back: No bony tenderness, No CVA tenderness.   Extremities: No edema, No tenderness, No cyanosis  Musculoskeletal: Good range of motion in all major joints. No tenderness to palpation or major deformities noted.   Neurologic: Alert and oriented x4, Normal motor function, Normal sensory function, No focal deficits noted.   Psychiatric: Affect normal, Judgment normal, Mood normal.     Laceration Repair Procedure Note    Indication: Laceration    Procedure: The area was then cleansed with betadine and draped in a sterile fashion. The laceration was closed with Dermabond. There were no additional lacerations requiring repair.     Total repaired wound length: 1.5 cm subcutaneous.     Other Items: None    The patient tolerated the procedure well.    Complications: None       DIAGNOSTIC STUDIES / PROCEDURES    RADIOLOGY  CT-HEAD W/O   Final Result      1.  No acute intracranial process.      2.  Atrophy and small vessel ischemic change.      CT-MAXILLOFACIAL W/O   Final Result      No acute facial fracture.        The radiologist's interpretations of all radiological studies have been reviewed by me.    Films have been independently by me      COURSE  Pertinent Labs & Imaging studies reviewed. (See chart for details)    12:24 PM - Patient seen and examined at bedside. Discussed plan of care with patient. I informed them that imaging will be ordered to evaluate symptoms. The patient is understanding and agreeable with plan of care. Discussed plan of care. Ordered for CT-Maxillofacial w/o and CT-Head w/o to evaluate her symptoms.     1:30 PM Patient's wound was washed. Wound edges approximate well. Dermabond will be used to close the patient's wound.     1:56 PM Laceration repair procedure performed as noted above.     1:58 PM Patient was reevaluated at  bedside. Discussed radiology results with the patient and informed them that there were no abnormalities as noted above. The plan of care was discussed with the patient. She was encouraged to wash her wound and take Tylenol and Motrin to treat her pain. The patient is understanding and agreeable to the plan of care. Patient had the opportunity to ask any questions. The plan for discharge was discussed with the patient and she was told to to return for any new or worsening symptoms and to follow up with her PCP. Patient is understanding and agreeable to the plan for discharge.     MEDICAL DECISION MAKING  This is a 75 y.o. female who presents as a code TBI, patient was called today asking was initially evaluated on the paramedic [facility].  Patient was brought down for CT for CT head and face.  There was no fracture, and no brain injury.    The patient does have a history of coordination deficits, and she has had falls in the past last was 1 year ago.    She has no nausea no vomiting.  No other injuries are noted and she has no other complaints.  There is a laceration to the left lateral eyebrow which was closed here with Dermabond.      The patient will return for new or worsening symptoms and is stable at the time of discharge.    DISPOSITION:  Patient will be discharged home in stable condition.    FOLLOW UP:  Lucina Gupta M.D.  [address] [id number] [phone]    In 1 week        FINAL IMPRESSION  1. Fall, initial encounter    2. Closed head injury, initial encounter    3. Facial laceration, initial encounter         Sarah CARLSON (Bogdan), am scribing for, and in the presence of, Humphrey Gongora D.O..    Electronically signed by: Sarha Christiansen), 5/25/2021    Humphrey CARLSON D.O. personally performed the services described in this documentation, as scribed by Sarah Howard in my presence, and it is both accurate and complete. C    The note accurately reflects work and  decisions made by me.  Humphrey Gongora D.O.  5/25/2021  4:45 PM

## 2021-05-25 NOTE — ED TRIAGE NOTES
"Chief Complaint   Patient presents with   • T-5000 GLF     mechanical GLF while walking in parking lot. 3cm lac above L eyebrow. GCS 15      Pt BIB [facility] for above complaint as TBI alert. Pt reports for years she has had balance problems, tremors. Was misdiagnosed with parkinson's. hx neuropathy. A&Ox4, GCS 15. Bleeding controlled on lac. Neuro intact.       Medications given en route: None       /71   Pulse 94   Temp 36.1 °C (97 °F) (Temporal)   Ht 1.575 m (5' 2\")   Wt 56.7 kg (125 lb)   SpO2 94%   BMI 22.86 kg/m²         "

## 2021-05-28 ENCOUNTER — APPOINTMENT (OUTPATIENT)
Dept: PHYSICAL THERAPY | Facility: REHABILITATION | Age: 76
End: 2021-05-28
Attending: FAMILY MEDICINE
Payer: MEDICARE

## 2021-06-01 ENCOUNTER — PHYSICAL THERAPY (OUTPATIENT)
Dept: PHYSICAL THERAPY | Facility: REHABILITATION | Age: 76
End: 2021-06-01
Attending: FAMILY MEDICINE
Payer: MEDICARE

## 2021-06-01 DIAGNOSIS — R26.89 IMBALANCE: ICD-10-CM

## 2021-06-01 DIAGNOSIS — Z91.81 RISK FOR FALLS: ICD-10-CM

## 2021-06-01 DIAGNOSIS — G60.9 IDIOPATHIC PERIPHERAL NEUROPATHY: ICD-10-CM

## 2021-06-01 PROCEDURE — 97110 THERAPEUTIC EXERCISES: CPT

## 2021-06-01 PROCEDURE — 97112 NEUROMUSCULAR REEDUCATION: CPT

## 2021-06-01 NOTE — OP THERAPY DAILY TREATMENT
Outpatient Physical Therapy  DAILY TREATMENT     [facility] Outpatient Physical Therapy [place]  [address], [address]  [place] [address]  Phone:  [phone]  Fax:  [phone]    Date: 06/01/2021    Patient: Yasmin Zhong  YOB: 1945  MRN: [id number]     Time Calculation    Start time: 1330  Stop time: 1400 Time Calculation (min): 30 minutes         Chief Complaint: Neck Problem    Visit #: 16    SUBJECTIVE:  Patient reports that symptoms where worse. Notes a fall. States she turn back to get her mask and fell. Does not remember what caused fall. Denies any HA or LOC.     OBJECTIVE:  Current objective measures:           Therapeutic Exercises (CPT 48425):     1. Nu Step, x10min L6    Therapeutic Treatments and Modalities:     1. Neuromuscular Re-education (CPT 75254),  in //bar walking with emp on stance phase and proper weight shift now able to shift and hold 3 sec. / tandem stepping each leg cues for balance; turning 360deg B; quad cane training cues for step to /through pattern    Time-based treatments/modalities:    Physical Therapy Timed Treatment Charges  Neuromusc re-ed, balance, coor, post minutes (CPT 84916): 20 minutes  Therapeutic exercise minutes (CPT 45409): 10 minutes      Pain rating (1-10) before treatment:  0  Pain rating (1-10) after treatment:  0    ASSESSMENT:   Response to treatment: Patient responded well. Patient will now use a quad cane until balance improves.     PLAN/RECOMMENDATIONS:   Plan for treatment: therapy treatment to continue next visit.  Planned interventions for next visit: continue with current treatment.

## 2021-06-02 ENCOUNTER — OFFICE VISIT (OUTPATIENT)
Dept: MEDICAL GROUP | Facility: PHYSICIAN GROUP | Age: 76
End: 2021-06-02
Payer: MEDICARE

## 2021-06-02 VITALS
WEIGHT: 127 LBS | BODY MASS INDEX: 21.16 KG/M2 | OXYGEN SATURATION: 94 % | SYSTOLIC BLOOD PRESSURE: 104 MMHG | HEIGHT: 65 IN | RESPIRATION RATE: 12 BRPM | DIASTOLIC BLOOD PRESSURE: 58 MMHG | TEMPERATURE: 97.9 F | HEART RATE: 88 BPM

## 2021-06-02 DIAGNOSIS — D70.9 NEUTROPENIA, UNSPECIFIED TYPE (HCC): ICD-10-CM

## 2021-06-02 DIAGNOSIS — M85.89 OSTEOPENIA OF MULTIPLE SITES: ICD-10-CM

## 2021-06-02 DIAGNOSIS — J84.10 PULMONARY FIBROSIS (HCC): ICD-10-CM

## 2021-06-02 DIAGNOSIS — R91.1 LUNG NODULE: ICD-10-CM

## 2021-06-02 DIAGNOSIS — D70.8 OTHER NEUTROPENIA (HCC): Chronic | ICD-10-CM

## 2021-06-02 DIAGNOSIS — F41.9 ANXIETY: ICD-10-CM

## 2021-06-02 DIAGNOSIS — D75.89 MACROCYTOSIS: ICD-10-CM

## 2021-06-02 DIAGNOSIS — E78.2 MIXED HYPERLIPIDEMIA: ICD-10-CM

## 2021-06-02 DIAGNOSIS — E05.90 SUBCLINICAL HYPERTHYROIDISM: Chronic | ICD-10-CM
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
## 2021-06-02 PROCEDURE — 99214 OFFICE O/P EST MOD 30 MIN: CPT | Performed by: INTERNAL MEDICINE

## 2021-06-02 ASSESSMENT — FIBROSIS 4 INDEX: FIB4 SCORE: 2.5

## 2021-06-02 NOTE — PROGRESS NOTES
Subjective:     CC: Establish care    HPI:   Yasmin presents today to address the following issues:    Prior to her last visit on 2020 the patient had a fall from ground-level.  She sustained a laceration to her left eyebrow.  She was sent to the ED via EMS.  C-spine imaging was normal.       The patient states she has a longstanding history of alcohol use/abuse.  She works as a  and they would frequently go out for drinks.  8 years ago she moved to [place] which caused her to go into a deep depression.  This is when her alcohol abuse was at its worst.  Currently, she drinks approximately 2 or 3 alcoholic beverages per month.    With regard to her imbalance issues/frequent falls, she is currently receiving PT.  She also has an appointment scheduled with neuro-ophthalmology to see if her esotropia may be contributing to the issue.  She states that she has only had 1 fall in the last year, which occurred prior to our visit 1 week ago.        Past Medical History:   Diagnosis Date   • Anxiety    • Arrhythmia 2020   • Arthritis 2020   • Blood clotting disorder (HCC)     hx  in lung   • Breath shortness 2020    no supplemental oxygen   • Cancer (HCC)     basal cell per hx   • Cataract    • Depression    • Fall from ground level 2021   • Heart murmur    • History of respiratory failure     6-month hospitalization in    • Hyperlipidemia    • Hypertension     denies   • Indigestion 2020    GERD   • Insomnia    • Pain 2020    back pain   • Peripheral neuropathy    • Pneumonia     hx    • Pulmonary fibrosis (HCC)    • Recurrent major depressive disorder, in full remission ([facility]) 2017   • Thrombocytopenia ([facility]) 2021       Social History     Tobacco Use   • Smoking status: Former Smoker     Packs/day: 0.25     Years: 15.00     Pack years: 3.75     Types: Cigarettes     Quit date: 3/11/1985     Years since quittin.2   • Smokeless tobacco: Never Used  "  • Tobacco comment: passive smoke exposure her entire life   Vaping Use   • Vaping Use: Never used   Substance Use Topics   • Alcohol use: Not Currently     Alcohol/week: 0.0 oz   • Drug use: No       Current Outpatient Medications Ordered in Epic   Medication Sig Dispense Refill   • cyanocobalamin (VITAMIN B-12) 100 MCG Tab Take 100 mcg by mouth every day.     • cetirizine (ZYRTEC) 10 MG Tab Take 10 mg by mouth as needed for Allergies.     • atorvastatin (LIPITOR) 20 MG Tab Take 1 Tab by mouth every day. 90 Tab 3   • busPIRone (BUSPAR) 10 MG Tab tablet Pt takes 10MG in AM and 20MG HS Take 10-20 mg by mouth 2 times a day. Pt takes 10MG in AM and 20MG  Tab 3   • BIOTIN PO Take 1 Tab by mouth every day.     • Calcium Citrate-Vitamin D (CALCIUM + D PO) Take 1 Tab by mouth every day.     • MAGNESIUM PO Take 1 Cap by mouth every day.     • Cholecalciferol (VITAMIN D3 PO) Take 1 Cap by mouth every day.     • VITAMIN E PO Take 1 Cap by mouth every day.       No current [facility]-ordered facility-administered medications on file.       Allergies:  Pollen extract    Health Maintenance: Completed    ROS:   Gen: no fevers/chills  Pulm: no sob, no cough  CV: no chest pain, no palpitations  GI: no nausea/vomiting, no diarrhea  Neuro: no headaches      Objective:     Exam:  /58   Pulse 88   Temp 36.6 °C (97.9 °F)   Resp 12   Ht 1.651 m (5' 5\")   Wt 57.6 kg (127 lb)   SpO2 94%   BMI 21.13 kg/m²  Body mass index is 21.13 kg/m².    Gen: Alert and oriented, No apparent distress.  Lungs: Normal effort, CTA bilaterally, no wheezes, rhonchi, or rales  CV: Regular rate and rhythm. + murmur  Ext: No clubbing, cyanosis, edema.      Assessment & Plan:     75 y.o. female with the following -     Fall from ground level  This is an acute condition.  No LOC.  Patient reporting some mild neck pain on palpation.  No headache.  She does have a laceration on the left upper eyelid.  REMSA activated as I felt the patient should be " evaluated in the ED for C-spine imaging as well as laceration repair.  She appeared too tremulous to drive.  It is unclear why she was so tremulous.  She has a history of alcohol abuse, but denies active alcohol use.  She is requesting a refill of her zolpidem, which was prescribed only 10 days ago.  She is to contact me for follow-up appointment once she is at home and stabilized.    Primary insomnia  This is a chronic condition.  Stable.  The patient takes Ambien 10 mg nightly.  Review of the [facility] website shows that she was last given Ambien 10 mg on 5/15/2021, #30, with 5 refills.  She has taken this medication for many years.  She was also previously on a number of other medications, which she has been able to come off of with the help of Dr. Burton.  We did discuss that Ambien can further reduce her risk for falls.  She would like to continue using Ambien as benefits outweigh the risks and it has improved her quality of life.   -Continue Ambien 10 mg nightly    Pulmonary fibrosis (HCC)  Lung nodule  This is a chronic condition.  Stable.  She is followed by pulmonology. Dr. Amador.  CT chest on 3/1/2021 showed a stable left lower lobe nodule measuring 2.3 x 1.4 cm, and stable chronic interstitial lung disease/fibrosis.  She will need a repeat CT scan in 6 months.  -Repeat CT imaging 9/2021    Mixed hyperlipidemia  This is a chronic condition.  The patient is on atorvastatin 20 mg nightly.  Lipid panel from 6/20/2019 showed a total cholesterol 137, LDL 55, HDL 62, triglycerides 98.  -Continue atorvastatin 20 mg nightly  - Lipid Profile; Future    Anxiety  This is a chronic condition.  The patient is on buspirone 10 mg in the a.m. and 20 mg at night.  She feels her symptoms are adequately controlled on this regimen.  -Continue BuSpar 10 mg every morning, 20 mg every night.    Macrocytosis  This is a chronic condition.  Stable.  Labs from 5/24/2021 showed an elevated MCV of 104.2.  Folate is normal at 11.2 and B12 is  normal at 650  The patient denies active alcohol use.  - METHYLMALONIC ACID, SERUM; Future  - HOMOCYSTEINE; Future    Neutropenia, unspecified type (HCC)  This is a chronic condition.  Stable.  A new finding since 2020.  Labs from 5/24/2021 showed a reduced WBC count of 4.3 with reduced absolute neutrophil count of 1.63.  She also has a macrocytosis, together, concerning for alcohol use, but the patient denies this.  We will need to further evaluate this to assess for any bone marrow problems.  - PERIPHERAL SMEAR REVIEW; Future  - CBC WITH DIFFERENTIAL; Future    Subclinical hyperthyroidism  This is a chronic condition.  Stable.  Labs from 5/24/2021 showed a low TSH of 0.26 with a normal free T4 of 0.97.  -Continue to monitor    Osteopenia of multiple sites  This is a chronic condition.  Stable.  DEXA scan in 2019 showed osteopenia of the right femur with a T score of -1.4.  -Repeat bone density imaging in 2022    The patient is to follow-up when she will be due for her Ambien.    Please note that this dictation was created using voice recognition software. I have made every reasonable attempt to correct obvious errors, but I expect that there are errors of grammar and possibly content that I did not discover before finalizing the note.

## 2021-06-03 PROBLEM — M85.89 OSTEOPENIA OF MULTIPLE SITES: Chronic | Status: ACTIVE | Noted: 2018-05-23

## 2021-06-03 PROBLEM — F33.42 RECURRENT MAJOR DEPRESSIVE DISORDER, IN FULL REMISSION (HCC): Status: RESOLVED | Noted: 2017-07-21 | Resolved: 2021-06-03

## 2021-06-08 ENCOUNTER — PHYSICAL THERAPY (OUTPATIENT)
Dept: PHYSICAL THERAPY | Facility: REHABILITATION | Age: 76
End: 2021-06-08
Attending: FAMILY MEDICINE
Payer: MEDICARE

## 2021-06-08 DIAGNOSIS — Z91.81 RISK FOR FALLS: ICD-10-CM

## 2021-06-08 DIAGNOSIS — G60.9 IDIOPATHIC PERIPHERAL NEUROPATHY: ICD-10-CM

## 2021-06-08 DIAGNOSIS — R26.89 IMBALANCE: ICD-10-CM

## 2021-06-08 PROCEDURE — 97110 THERAPEUTIC EXERCISES: CPT

## 2021-06-08 PROCEDURE — 97112 NEUROMUSCULAR REEDUCATION: CPT

## 2021-06-08 NOTE — OP THERAPY DAILY TREATMENT
Outpatient Physical Therapy  DAILY TREATMENT     [facility] [place]  [address]  [place] [address]  Phone:  [phone]  Fax:  [phone]    Date: 06/08/2021    Patient: Yasmin Zhong  YOB: 1945  MRN: [id number]     Time Calculation    Start time: 1530  Stop time: 1600 Time Calculation (min): 30 minutes         Chief Complaint: Difficulty Walking    Visit #: 17    SUBJECTIVE:  Patient reports that symptoms have improved. Notes that overall symptoms are better than last session. Notes that gait is slightly more stable.     OBJECTIVE:  Current objective measures:           Therapeutic Exercises (CPT 56004):     1. Nu Step, x10min L6    Therapeutic Treatments and Modalities:     1. Neuromuscular Re-education (CPT 15782),  in //bar walking with emp on stance phase and proper weight shift now able to shift and hold 3 sec. / tandem stepping each leg cues for balance; turning 360deg B; quad cane training cues for step to /through pattern; cross over / tandem stepping in //bar with SBA    Time-based treatments/modalities:    Physical Therapy Timed Treatment Charges  Neuromusc re-ed, balance, coor, post minutes (CPT 58563): 20 minutes  Therapeutic exercise minutes (CPT 78609): 10 minutes      Pain rating (1-10) before treatment:  0  Pain rating (1-10) after treatment:  0    ASSESSMENT:   Response to treatment: Patient responded well to therapy with only minor LOB with decreased JOSE. Patient demonstrated improved SLS now 3sec.     PLAN/RECOMMENDATIONS:   Plan for treatment: therapy treatment to continue next visit.  Planned interventions for next visit: continue with current treatment.

## 2021-06-17 ENCOUNTER — PHYSICAL THERAPY (OUTPATIENT)
Dept: PHYSICAL THERAPY | Facility: REHABILITATION | Age: 76
End: 2021-06-17
Attending: FAMILY MEDICINE
Payer: MEDICARE

## 2021-06-17 DIAGNOSIS — R26.89 IMBALANCE: ICD-10-CM

## 2021-06-17 DIAGNOSIS — Z91.81 RISK FOR FALLS: ICD-10-CM

## 2021-06-17 DIAGNOSIS — G60.9 IDIOPATHIC PERIPHERAL NEUROPATHY: ICD-10-CM

## 2021-06-17 PROCEDURE — 97110 THERAPEUTIC EXERCISES: CPT

## 2021-06-17 PROCEDURE — 97112 NEUROMUSCULAR REEDUCATION: CPT

## 2021-06-17 NOTE — OP THERAPY DAILY TREATMENT
Outpatient Physical Therapy  DAILY TREATMENT     [facility] [place]  [address], [address]  [place] [address]  Phone:  [phone]  Fax:  [phone]    Date: 06/17/2021    Patient: Yasmin Zhong  YOB: 1945  MRN: [id number]     Time Calculation    Start time: 1430  Stop time: 1500 Time Calculation (min): 30 minutes         Chief Complaint: Difficulty Walking    Visit #: 18    SUBJECTIVE:  Patient reports that symptoms have continued to improve. Notes she feels more steady on her feet.     OBJECTIVE:  Current objective measures:           Therapeutic Exercises (CPT 98089):     1. Nu Step, x10min L6    2. Shuttle L6, x4min    Therapeutic Treatments and Modalities:     1. Neuromuscular Re-education (CPT 26081),  in //bar walking with emp on stance phase and proper weight shift now able to shift and hold 3 sec. / tandem stepping each leg cues for balance; turning 360deg B; quad cane training cues for step to /through pattern; cross over / tandem stepping in //bar with SBA    Time-based treatments/modalities:    Physical Therapy Timed Treatment Charges  Neuromusc re-ed, balance, coor, post minutes (CPT 56200): 15 minutes  Therapeutic exercise minutes (CPT 93003): 15 minutes      Pain rating (1-10) before treatment:  0  Pain rating (1-10) after treatment:  0    ASSESSMENT:   Response to treatment: Patient to now continue with HEP for 3 weeks. Patient will follow up at that time. Plan to continue to progress to full HEP.     PLAN/RECOMMENDATIONS:   Plan for treatment: therapy treatment to continue next visit.  Planned interventions for next visit: continue with current treatment.

## 2021-06-23 ENCOUNTER — APPOINTMENT (OUTPATIENT)
Dept: PHYSICAL THERAPY | Facility: REHABILITATION | Age: 76
End: 2021-06-23
Attending: FAMILY MEDICINE
Payer: MEDICARE

## 2021-06-24 ENCOUNTER — OFFICE VISIT (OUTPATIENT)
Dept: OPHTHALMOLOGY | Facility: MEDICAL CENTER | Age: 76
End: 2021-06-24
Payer: MEDICARE

## 2021-06-24 DIAGNOSIS — H40.003 GLAUCOMA SUSPECT OF BOTH EYES: ICD-10-CM

## 2021-06-24 DIAGNOSIS — H25.013 CORTICAL AGE-RELATED CATARACT OF BOTH EYES: ICD-10-CM

## 2021-06-24 DIAGNOSIS — H49.9 OPHTHALMOPLEGIA: ICD-10-CM

## 2021-06-24 PROCEDURE — 92012 INTRM OPH EXAM EST PATIENT: CPT | Performed by: OPHTHALMOLOGY

## 2021-06-24 PROCEDURE — 92060 SENSORIMOTOR EXAMINATION: CPT | Performed by: OPHTHALMOLOGY

## 2021-06-24 ASSESSMENT — REFRACTION_MANIFEST
OD_AXIS: 108
OS_SPHERE: +0.75
OS_AXIS: 101
OD_CYLINDER: +0.50
OD_SPHERE: +0.25
METHOD_AUTOREFRACTION: 1
OS_CYLINDER: +0.50

## 2021-06-24 ASSESSMENT — EXTERNAL EXAM - LEFT EYE: OS_EXAM: NORMAL

## 2021-06-24 ASSESSMENT — SLIT LAMP EXAM - LIDS
COMMENTS: NORMAL
COMMENTS: NORMAL

## 2021-06-24 ASSESSMENT — CONF VISUAL FIELD
OD_NORMAL: 1
OS_NORMAL: 1

## 2021-06-24 ASSESSMENT — TONOMETRY
OD_IOP_MMHG: 17
OS_IOP_MMHG: 17
IOP_METHOD: ICARE

## 2021-06-24 ASSESSMENT — REFRACTION_WEARINGRX
OS_SPHERE: PLANO
OS_HBASE: OUT
OD_VBASE: DOWN
OS_HPRISM: 1.5
OD_HPRISM: 4.0
SPECS_TYPE: TRIFOCAL
OD_AXIS: 098
OD_HBASE: OUT
OS_ADD: +2.50
OD_VPRISM: 1.5
OS_AXIS: 101
OD_ADD: +2.50
OD_CYLINDER: +0.75
OS_CYLINDER: +1.00
OD_SPHERE: -0.25

## 2021-06-24 ASSESSMENT — VISUAL ACUITY
OS_CC: 20/40+2
OD_PH_CC: 20/40
CORRECTION_TYPE: GLASSES
METHOD: SNELLEN - LINEAR
OD_CC: 20/50+2

## 2021-06-24 ASSESSMENT — CUP TO DISC RATIO
OS_RATIO: 0.2
OD_RATIO: 0.2

## 2021-06-24 ASSESSMENT — ENCOUNTER SYMPTOMS: DOUBLE VISION: 1

## 2021-06-24 ASSESSMENT — EXTERNAL EXAM - RIGHT EYE: OD_EXAM: NORMAL

## 2021-06-24 NOTE — ASSESSMENT & PLAN NOTE
4/5/2021 - secondary to tilted discs from myopia. Oct NFL thickness 91 OD and 67 OS, bu no significant increased cupping and IOP good. Will monitor\  6/24/2021 - IOP 17 OU today

## 2021-06-24 NOTE — ASSESSMENT & PLAN NOTE
4/5/2021 - Large angle esotropia measuring 40 diopters with small left hypotonia. In PAT double and would not suppress or fuse. 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 Is now noticing some double vision because of cataracts and now vision better in the non dominant eye so is switching fixation. Discussed at length options. These include removing only the cataract in the left eye which hopefully will make that eye dominant all the time or a PAT test to see if can begin fusing and the consider cataract extraction followed by strabismus repair. Will there froe precede with the latter. Gave rx to slightly improve the vision and will place 40 base out press on prism over the OD in  Pat to see if can start fusing. If so then could consider cataract extraction followed by strabismus repair.   6/24/2021 - Starting to fuse with the 40 base out prism OD. Diplopia significantly better if not resolved. Therefore discussed preceding with bilateral cataract extraction followed by bilateral medial rectus recession once healed from the cataract extraction. Will refer to Dr Swanson for the cataract extaction

## 2021-06-24 NOTE — PROGRESS NOTES
Peds/Neuro Ophthalmology:   Keith Horne M.D.    Date & Time note created:    6/24/2021   9:48 AM     Referring MD / APRN:  Lucina Gupta M.D., No att. providers found    Patient ID:  Name:             Yasmin Zhong   YOB: 1945  Age:                 75 y.o.  female   MRN:               [id number]    Chief Complaint/Reason for Visit:     Other (Opthalmoplegia)      History of Present Illness:    Yasmin Zhong is a 75 y.o. female   Follow up ophthalmoplegia.Double vision much less with new glasses.       Review of Systems:  Review of Systems   Eyes: Positive for double vision.   All other systems reviewed and are negative.      Past Medical History:   Past Medical History:   Diagnosis Date   • Anxiety    • Arrhythmia 11/18/2020   • Arthritis 11/18/2020   • Blood clotting disorder (HCC)     hx 2013 in lung   • Breath shortness 11/18/2020    no supplemental oxygen   • Cancer (HCC) 1990    basal cell per hx   • Cataract    • Depression    • Fall from ground level 5/25/2021   • Heart murmur    • History of respiratory failure     6-month hospitalization in 2014   • Hyperlipidemia    • Hypertension     denies   • Indigestion 11/18/2020    GERD   • Insomnia    • Pain 11/18/2020    back pain   • Peripheral neuropathy    • Pneumonia     hx 2013   • Pulmonary fibrosis (HCC)    • Recurrent major depressive disorder, in full remission (HCC) 7/21/2017   • Thrombocytopenia (HCC) 4/19/2021       Past Surgical History:  Past Surgical History:   Procedure Laterality Date   • PB LAP,ESOPHAGOGAST FUNDOPLASTY N/A 11/23/2020    Procedure: FUNDOPLICATION, NISSEN, LAPAROSCOPIC- FOR PARAESOPHAGEAL WITH MESH;  Surgeon: Pritesh Baptiste M.D.;  Location: [facility];  Service: General   • HIP HEMIARTHROPLASTY  4/25/2015    Performed by Raymond Lantigua M.D. at [facility]   • OTHER      breast reduction       Current Outpatient Medications:  Current Outpatient Medications  "  Medication Sig Dispense Refill   • cyanocobalamin (VITAMIN B-12) 100 MCG Tab Take 100 mcg by mouth every day.     • cetirizine (ZYRTEC) 10 MG Tab Take 10 mg by mouth as needed for Allergies.     • atorvastatin (LIPITOR) 20 MG Tab Take 1 Tab by mouth every day. 90 Tab 3   • busPIRone (BUSPAR) 10 MG Tab tablet Pt takes 10MG in AM and 20MG HS Take 10-20 mg by mouth 2 times a day. Pt takes 10MG in AM and 20MG  Tab 3   • BIOTIN PO Take 1 Tab by mouth every day.     • Calcium Citrate-Vitamin D (CALCIUM + D PO) Take 1 Tab by mouth every day.     • MAGNESIUM PO Take 1 Cap by mouth every day.     • Cholecalciferol (VITAMIN D3 PO) Take 1 Cap by mouth every day.     • VITAMIN E PO Take 1 Cap by mouth every day.       No current facility-administered medications for this visit.       Allergies:  Allergies   Allergen Reactions   • Pollen Extract      \"cough\"       Family History:  Family History   Problem Relation Age of Onset   • Cancer Mother         Bladder cancer, hx. of smoking   • Diabetes Mother    • Heart Attack Father    • Cancer Father         Colon    • Cancer Maternal Uncle         Lung   • Diabetes Maternal Uncle    • Diabetes Maternal Aunt        Social History:  Social History     Socioeconomic History   • Marital status: Single     Spouse name: Not on file   • Number of children: 0   • Years of education: Not on file   • Highest education level: Not on file   Occupational History   • Not on file   Tobacco Use   • Smoking status: Former Smoker     Packs/day: 0.25     Years: 15.00     Pack years: 3.75     Types: Cigarettes     Quit date: 3/11/1985     Years since quittin.3   • Smokeless tobacco: Never Used   • Tobacco comment: passive smoke exposure her entire life   Vaping Use   • Vaping Use: Never used   Substance and Sexual Activity   • Alcohol use: Not Currently     Alcohol/week: 0.0 oz   • Drug use: No   • Sexual activity: Not Currently   Other Topics Concern   • Not on file   Social History " Narrative   • Not on file     Social Determinants of Health     Financial Resource Strain:    • Difficulty of Paying Living Expenses:    Food Insecurity:    • Worried About Running Out of Food in the Last Year:    • Ran Out of Food in the Last Year:    Transportation Needs:    • Lack of Transportation (Medical):    • Lack of Transportation (Non-Medical):    Physical Activity:    • Days of Exercise per Week:    • Minutes of Exercise per Session:    Stress:    • Feeling of Stress :    Social Connections:    • Frequency of Communication with Friends and Family:    • Frequency of Social Gatherings with Friends and Family:    • Attends [employer] Services:    • Active Member of Clubs or Organizations:    • Attends Club or Organization Meetings:    • Marital Status:    Intimate Partner Violence:    • Fear of Current or Ex-Partner:    • Emotionally Abused:    • Physically Abused:    • Sexually Abused:           Physical Exam:  Physical Exam    Oriented x 3  Weight/BMI: There is no height or weight on file to calculate BMI.  There were no vitals taken for this visit.    Base Eye Exam     Visual Acuity (Snellen - Linear)       Right Left    Dist cc 20/50+2 20/40+2    Dist ph cc 20/40     Correction: Glasses          Tonometry (icare, 8:43 AM)       Right Left    Pressure 17 17          Pupils       Pupils    Right PERRL    Left PERRL          Visual Fields       Right Left     Full Full          Neuro/Psych     Oriented x3: Yes    Mood/Affect: Normal            Additional Tests     Color       Right Left    Ishihara 9/9 9/9          Stereo     Fly: -    Animals: 0/3    Circles: 0/9            Strabismus Exam       0 0 -1   0 0 0                      ET 35 -1  0  ET 40 0  0  ET 40          LHypoT 2           0 0 0   0 0 0                   Slit Lamp and Fundus Exam     External Exam       Right Left    External Normal Normal          Slit Lamp Exam       Right Left    Lids/Lashes Normal Normal    Conjunctiva/Sclera White and  quiet White and quiet    Cornea LASIK scar LASIK scar    Anterior Chamber Deep and quiet Deep and quiet    Iris Round and reactive Round and reactive    Lens Cortical cataract Cortical cataract    Vitreous Normal Normal          Fundus Exam       Right Left    Disc Tilted disc Tilted disc    C/D Ratio 0.2 0.2    Macula Normal Normal    Vessels Normal Arteriolar narrowing    Periphery Normal Normal            Refraction     Wearing Rx       Sphere Cylinder Axis Add Horz Prism Vert Prism    Right -0.25 +0.75 098 +2.50 4.0 out 1.5 down    Left [place] +1.00 101 +2.50 1.5 out     Age: 3m    Type: Trifocal          Manifest Refraction (Auto)       Sphere Cylinder Axis    Right +0.25 +0.50 108    Left +0.75 +0.50 101                Pertinent Lab/Test/Imaging Review:      Assessment and Plan:     Glaucoma suspect of both eyes  4/5/2021 - secondary to tilted discs from myopia. Oct NFL thickness 91 OD and 67 OS, bu no significant increased cupping and IOP good. Will monitor\  6/24/2021 - IOP 17 OU today    Ophthalmoplegia  4/5/2021 - Large angle esotropia measuring 40 diopters with small left hypotonia. In PAT double and would not suppress or fuse. 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 Is now noticing some double vision because of cataracts and now vision better in the non dominant eye so is switching fixation. Discussed at length options. These include removing only the cataract in the left eye which hopefully will make that eye dominant all the time or a PAT test to see if can begin fusing and the consider cataract extraction followed by strabismus repair. Will there froe precede with the latter. Gave rx to slightly improve the vision and will place 40 base out press on prism over the OD in  Pat to see if can start fusing. If so then could consider cataract extraction followed by strabismus repair.   6/24/2021 - Starting to fuse with the 40  base out prism OD. Diplopia significantly better if not resolved. Therefore discussed preceding with bilateral cataract extraction followed by bilateral medial rectus recession once healed from the cataract extraction. Will refer to Dr Swanson for the cataract extaction    Cortical age-related cataract of both eyes  4/5/2021 - bilateral cortical cataracts with vision worse in the left eye leading to switched fixation  6/24/2021 - Since can begin fusing with press on prism, will refer for bilateral cataract extraction        Keith Horne M.D.

## 2021-06-24 NOTE — ASSESSMENT & PLAN NOTE
4/5/2021 - bilateral cortical cataracts with vision worse in the left eye leading to switched fixation  6/24/2021 - Since can begin fusing with press on prism, will refer for bilateral cataract extraction   resolved

## 2021-06-25 ENCOUNTER — HOSPITAL ENCOUNTER (OUTPATIENT)
Dept: LAB | Facility: MEDICAL CENTER | Age: 76
End: 2021-06-25
Attending: INTERNAL MEDICINE
Payer: MEDICARE

## 2021-06-25 ENCOUNTER — APPOINTMENT (OUTPATIENT)
Dept: URGENT CARE | Facility: PHYSICIAN GROUP | Age: 76
End: 2021-06-25
Payer: MEDICARE

## 2021-06-25 DIAGNOSIS — D75.89 MACROCYTOSIS: ICD-10-CM

## 2021-06-25 DIAGNOSIS — E78.2 MIXED HYPERLIPIDEMIA: ICD-10-CM

## 2021-06-25 DIAGNOSIS — D70.9 NEUTROPENIA, UNSPECIFIED TYPE (HCC): ICD-10-CM

## 2021-06-25 LAB
BASOPHILS # BLD AUTO: 1.7 % (ref 0–1.8)
BASOPHILS # BLD: 0.11 K/UL (ref 0–0.12)
CHOLEST SERPL-MCNC: 139 MG/DL (ref 100–199)
EOSINOPHIL # BLD AUTO: 0.11 K/UL (ref 0–0.51)
EOSINOPHIL NFR BLD: 1.7 % (ref 0–6.9)
ERYTHROCYTE [DISTWIDTH] IN BLOOD BY AUTOMATED COUNT: 46.3 FL (ref 35.9–50)
FASTING STATUS PATIENT QL REPORTED: NORMAL
HCT VFR BLD AUTO: 43.3 % (ref 37–47)
HCYS SERPL-SCNC: 11.24 UMOL/L
HDLC SERPL-MCNC: 43 MG/DL
HGB BLD-MCNC: 14.6 G/DL (ref 12–16)
LDLC SERPL CALC-MCNC: 70 MG/DL
LYMPHOCYTES # BLD AUTO: 0.76 K/UL (ref 1–4.8)
LYMPHOCYTES NFR BLD: 12.1 % (ref 22–41)
MANUAL DIFF BLD: ABNORMAL
MCH RBC QN AUTO: 34.8 PG (ref 27–33)
MCHC RBC AUTO-ENTMCNC: 33.7 G/DL (ref 33.6–35)
MCV RBC AUTO: 103.3 FL (ref 81.4–97.8)
MONOCYTES # BLD AUTO: 0.49 K/UL (ref 0–0.85)
MONOCYTES NFR BLD AUTO: 7.8 % (ref 0–13.4)
MORPHOLOGY BLD-IMP: NORMAL
MORPHOLOGY BLD-IMP: NORMAL
NEUTROPHILS # BLD AUTO: 4.83 K/UL (ref 2–7.15)
NEUTROPHILS NFR BLD: 76.7 % (ref 44–72)
PLATELET # BLD AUTO: 220 K/UL (ref 164–446)
PMV BLD AUTO: 9.5 FL (ref 9–12.9)
RBC # BLD AUTO: 4.19 M/UL (ref 4.2–5.4)
TRIGL SERPL-MCNC: 132 MG/DL (ref 0–149)
WBC # BLD AUTO: 6.3 K/UL (ref 4.8–10.8)

## 2021-06-25 PROCEDURE — 85027 COMPLETE CBC AUTOMATED: CPT

## 2021-06-25 PROCEDURE — 36415 COLL VENOUS BLD VENIPUNCTURE: CPT

## 2021-06-25 PROCEDURE — 83921 ORGANIC ACID SINGLE QUANT: CPT

## 2021-06-25 PROCEDURE — 85007 BL SMEAR W/DIFF WBC COUNT: CPT

## 2021-06-25 PROCEDURE — 80061 LIPID PANEL: CPT

## 2021-06-25 PROCEDURE — 83090 ASSAY OF HOMOCYSTEINE: CPT

## 2021-06-29 LAB — METHYLMALONATE SERPL-SCNC: 0.15 UMOL/L (ref 0–0.4)

## 2021-07-08 ENCOUNTER — APPOINTMENT (OUTPATIENT)
Dept: PHYSICAL THERAPY | Facility: REHABILITATION | Age: 76
End: 2021-07-08
Payer: MEDICARE

## 2021-08-05 ENCOUNTER — OUTPATIENT INFUSION SERVICES (OUTPATIENT)
Dept: ONCOLOGY | Facility: MEDICAL CENTER | Age: 76
End: 2021-08-05
Attending: FAMILY MEDICINE
Payer: MEDICARE

## 2021-08-05 VITALS
OXYGEN SATURATION: 92 % | RESPIRATION RATE: 18 BRPM | SYSTOLIC BLOOD PRESSURE: 124 MMHG | DIASTOLIC BLOOD PRESSURE: 63 MMHG | HEIGHT: 63 IN | HEART RATE: 93 BPM | BODY MASS INDEX: 22.7 KG/M2 | TEMPERATURE: 98.9 F | WEIGHT: 128.09 LBS

## 2021-08-05 DIAGNOSIS — M85.89 OSTEOPENIA OF MULTIPLE SITES: ICD-10-CM

## 2021-08-05 LAB
CA-I BLD ISE-SCNC: 1.24 MMOL/L (ref 1.1–1.3)
CREAT BLD-MCNC: 0.7 MG/DL (ref 0.5–1.4)

## 2021-08-05 PROCEDURE — 36415 COLL VENOUS BLD VENIPUNCTURE: CPT

## 2021-08-05 PROCEDURE — 82565 ASSAY OF CREATININE: CPT

## 2021-08-05 PROCEDURE — 700111 HCHG RX REV CODE 636 W/ 250 OVERRIDE (IP): Mod: JG | Performed by: FAMILY MEDICINE

## 2021-08-05 PROCEDURE — 96372 THER/PROPH/DIAG INJ SC/IM: CPT

## 2021-08-05 PROCEDURE — 82330 ASSAY OF CALCIUM: CPT

## 2021-08-05 RX ADMIN — DENOSUMAB 60 MG: 60 INJECTION SUBCUTANEOUS at 14:46

## 2021-08-05 ASSESSMENT — FIBROSIS 4 INDEX: FIB4 SCORE: 2.08

## 2021-08-05 NOTE — PROGRESS NOTES
Patient arrived ambulatory to [facility] for Prolia.  Denies any s/s of infection, fevers, or recent oral surgery.  Labs drawn from right a/c, results within parameters to treat.  Prolia given to right back arm, patient tolerated well.  Next appointment scheduled and patient ambulated out of clinic in no apparent distress.

## 2021-08-09 ENCOUNTER — APPOINTMENT (OUTPATIENT)
Dept: RADIOLOGY | Facility: MEDICAL CENTER | Age: 76
End: 2021-08-09
Attending: INTERNAL MEDICINE
Payer: MEDICARE

## 2021-08-09 DIAGNOSIS — Z12.31 VISIT FOR SCREENING MAMMOGRAM: ICD-10-CM

## 2021-08-25 NOTE — PROGRESS NOTES
Subjective:     CC: Primary insomnia, controlled substance, follow-up on blood work    HPI:   Yasmin presents today to discuss the following issues:    The patient states she feels well.  She recently had a friend who  suddenly of an MI.  She wonders if it would be possible to get a cardiac CT score scan.  She just had cataract surgery on her right eye and is scheduled to have cataract surgery on her left eye next week.      Past Medical History:   Diagnosis Date   • Anxiety    • Arrhythmia 2020   • Arthritis 2020   • Blood clotting disorder (HCC)     hx  in lung   • Breath shortness 2020    no supplemental oxygen   • Cancer (HCC)     basal cell per hx   • Cataract    • Depression    • Fall from ground level 2021   • Heart murmur    • History of respiratory failure     6-month hospitalization in    • Hyperlipidemia    • Hypertension     denies   • Indigestion 2020    GERD   • Insomnia    • Pain 2020    back pain   • Peripheral neuropathy    • Pneumonia     hx    • Pulmonary fibrosis ([facility])    • Recurrent major depressive disorder, in full remission ([facility]) 2017   • Thrombocytopenia ([facility]) 2021       Social History     Tobacco Use   • Smoking status: Former Smoker     Packs/day: 0.25     Years: 15.00     Pack years: 3.75     Types: Cigarettes     Quit date: 3/11/1985     Years since quittin.4   • Smokeless tobacco: Never Used   • Tobacco comment: passive smoke exposure her entire life   Vaping Use   • Vaping Use: Never used   Substance Use Topics   • Alcohol use: Not Currently     Alcohol/week: 0.0 oz   • Drug use: No       Current Outpatient Medications Ordered in Epic   Medication Sig Dispense Refill   • prednisoLONE acetate (PRED FORTE) 1 % Suspension      • moxifloxacin (VIGAMOX) 0.5 % Solution      • [START ON 2021] zolpidem (AMBIEN) 10 MG Tab Take 1 Tablet by mouth at bedtime as needed for Sleep for up to 30 days. 30 Tablet 2   • fluticasone  "(FLONASE) 50 MCG/ACT nasal spray Administer 1 Spray into affected nostril(S) every day. 16 g 3   • cyanocobalamin (VITAMIN B-12) 100 MCG Tab Take 100 mcg by mouth every day.     • cetirizine (ZYRTEC) 10 MG Tab Take 10 mg by mouth as needed for Allergies.     • atorvastatin (LIPITOR) 20 MG Tab Take 1 Tab by mouth every day. 90 Tab 3   • busPIRone (BUSPAR) 10 MG Tab tablet Pt takes 10MG in AM and 20MG HS Take 10-20 mg by mouth 2 times a day. Pt takes 10MG in AM and 20MG  Tab 3   • BIOTIN PO Take 1 Tab by mouth every day.     • Calcium Citrate-Vitamin D (CALCIUM + D PO) Take 1 Tab by mouth every day.     • MAGNESIUM PO Take 1 Cap by mouth every day.     • Cholecalciferol (VITAMIN D3 PO) Take 1 Cap by mouth every day.     • VITAMIN E PO Take 1 Cap by mouth every day.       No current [facility]-ordered facility-administered medications on file.       Allergies:  Pollen extract    Health Maintenance: Completed    ROS:   Denies any recent fevers or chills. No nausea or vomiting. No chest pains or shortness of breath.      Objective:     Exam:  /70 (BP Location: Right arm, Patient Position: Sitting, BP Cuff Size: Adult)   Pulse 76   Temp 36.9 °C (98.5 °F) (Temporal)   Resp 16   Ht 1.626 m (5' 4\")   Wt 57.6 kg (127 lb)   SpO2 95%   BMI 21.80 kg/m²  Body mass index is 21.8 kg/m².    Gen: Alert and oriented, No apparent distress.  Lungs: Normal effort, CTA bilaterally, no wheezes, rhonchi, or rales  CV: Regular rate and rhythm. No murmurs, rubs, or gallops.  Ext: No clubbing, cyanosis, edema.      Assessment & Plan:     75 y.o. female with the following -     Primary insomnia  This is a chronic condition.  Stable.  The patient takes Ambien 10 mg nightly.  She has taken this medication for many years.  She was also previously on a number of other medications, which she has been able to come off of with the help of Dr. Burton.  We did discuss that Ambien can further increase her risk for falls.  She would like to " continue using Ambien as benefits outweigh the risks and it has improved her quality of life. Review of the  shows that the patient was last given a prescription of zolpidem 10 mg, dispo #30, on 08/12/2021. Obtained and reviewed patient utilization report from [facility] pharmacy database on 8/27/2021 7:42 AM  prior to writing prescription for controlled substance II, III or IV per [place] bill . Based on assessment of the report, the prescription is medically necessary.   -Continue Ambien 10 mg nightly  - Controlled Substance Treatment Agreement was signed and scanned into the patient's chart on 08/27/2021  - zolpidem (AMBIEN) 10 MG Tab; Take 1 Tablet by mouth at bedtime as needed for Sleep for up to 30 days.  Dispense: 30 Tablet; Refill: 2    Pulmonary fibrosis (HCC)  Lung nodule  This is a chronic condition.  Stable.  She is followed by pulmonology. Dr. Amador.    She has an appointment scheduled with them on 11/5/2021.  CT chest on 3/1/2021 showed a stable left lower lobe nodule measuring 2.3 x 1.4 cm, and stable chronic interstitial lung disease/fibrosis.  She will need a repeat CT scan in 6 months.  She is scheduled to have this scan before her follow-up visit with pulmonary.     Dyslipidemia  This is a chronic condition.  Well-controlled.  The patient is on atorvastatin 20 mg nightly.  Lipid panel from 06/25/2021 showed a total cholesterol 139, LDL 70, HDL 43, triglycerides 132.  -Continue atorvastatin 20 mg nightly  - CT-CARDIAC SCORING; Future     TIFFANY (generalized anxiety disorder)  This is a chronic condition.    Well-controlled.  The patient is on buspirone 10 mg in the a.m. and 20 mg at night.  She feels her symptoms are adequately controlled on this regimen.  -Continue BuSpar 10 mg every morning, 20 mg every night.     Macrocytosis  This is a chronic condition.  Stable.    Labs from 6/25/2021 showed an elevated MCV of 103.3, stable from previous value of 104.2. Folate is normal at 11.2 and B12 is  normal at 650 the patient's methylmalonic acid was normal at 0.15 on 6/25/2021.  Homocysteine was just slightly elevated at 11.24.  The patient denies active alcohol use.  -Continue to monitor     Neutropenia, unspecified type (HCC)  This is a chronic condition.    Resolved.  Recent CBC from 6/25/2021 showed a normal WBC count of 6.3 and no evidence of neutropenia.      Subclinical hyperthyroidism  This is a chronic condition.  Stable.  Labs from 5/24/2021 showed a low TSH of 0.26 with a normal free T4 of 0.97.  -Continue to monitor     Osteopenia of multiple sites  This is a chronic condition.  Stable.  DEXA scan in 2019 showed osteopenia of the right femur with a T score of -1.4.  -Repeat bone density imaging in 2022      Return in about 3 months (around 11/27/2021) for Medicare Wellness, Controlled Substance.    Please note that this dictation was created using voice recognition software. I have made every reasonable attempt to correct obvious errors, but I expect that there are errors of grammar and possibly content that I did not discover before finalizing the note.

## 2021-08-27 ENCOUNTER — OFFICE VISIT (OUTPATIENT)
Dept: MEDICAL GROUP | Facility: PHYSICIAN GROUP | Age: 76
End: 2021-08-27
Payer: MEDICARE

## 2021-08-27 VITALS
TEMPERATURE: 98.5 F | OXYGEN SATURATION: 95 % | RESPIRATION RATE: 16 BRPM | DIASTOLIC BLOOD PRESSURE: 70 MMHG | BODY MASS INDEX: 21.68 KG/M2 | WEIGHT: 127 LBS | HEART RATE: 76 BPM | HEIGHT: 64 IN | SYSTOLIC BLOOD PRESSURE: 108 MMHG

## 2021-08-27 DIAGNOSIS — E78.5 DYSLIPIDEMIA: ICD-10-CM

## 2021-08-27 DIAGNOSIS — D75.89 MACROCYTOSIS: Chronic | ICD-10-CM

## 2021-08-27 DIAGNOSIS — J84.10 PULMONARY FIBROSIS (HCC): ICD-10-CM

## 2021-08-27 DIAGNOSIS — F51.01 PRIMARY INSOMNIA: Chronic | ICD-10-CM

## 2021-08-27 DIAGNOSIS — E05.90 SUBCLINICAL HYPERTHYROIDISM: Chronic | ICD-10-CM

## 2021-08-27 DIAGNOSIS — M85.89 OSTEOPENIA OF MULTIPLE SITES: Chronic | ICD-10-CM

## 2021-08-27 DIAGNOSIS — D70.8 OTHER NEUTROPENIA (HCC): Chronic | ICD-10-CM

## 2021-08-27 DIAGNOSIS — R91.1 LUNG NODULE: ICD-10-CM

## 2021-08-27 DIAGNOSIS — F41.1 GAD (GENERALIZED ANXIETY DISORDER): ICD-10-CM

## 2021-08-27 PROCEDURE — 99214 OFFICE O/P EST MOD 30 MIN: CPT | Performed by: INTERNAL MEDICINE

## 2021-08-27 RX ORDER — ZOLPIDEM TARTRATE 10 MG/1
10 TABLET ORAL NIGHTLY PRN
Qty: 30 TABLET | Refills: 2 | Status: SHIPPED | OUTPATIENT
Start: 2021-09-12 | End: 2021-09-08 | Stop reason: SDUPTHER

## 2021-08-27 RX ORDER — MOXIFLOXACIN 5 MG/ML
SOLUTION/ DROPS OPHTHALMIC
COMMUNITY
Start: 2021-08-25 | End: 2021-11-15

## 2021-08-27 RX ORDER — PREDNISOLONE ACETATE 10 MG/ML
SUSPENSION/ DROPS OPHTHALMIC
COMMUNITY
Start: 2021-08-25 | End: 2021-11-15

## 2021-08-27 RX ORDER — FLUTICASONE PROPIONATE 50 MCG
1 SPRAY, SUSPENSION (ML) NASAL DAILY
Qty: 16 G | Refills: 3 | Status: SHIPPED | OUTPATIENT
Start: 2021-08-27 | End: 2022-11-22

## 2021-08-27 RX ORDER — ZOLPIDEM TARTRATE 10 MG/1
TABLET ORAL
COMMUNITY
Start: 2021-08-12 | End: 2021-08-27

## 2021-08-27 ASSESSMENT — FIBROSIS 4 INDEX: FIB4 SCORE: 2.08

## 2021-08-27 ASSESSMENT — PATIENT HEALTH QUESTIONNAIRE - PHQ9: CLINICAL INTERPRETATION OF PHQ2 SCORE: 0

## 2021-09-08 DIAGNOSIS — F51.01 PRIMARY INSOMNIA: Chronic | ICD-10-CM

## 2021-09-08 RX ORDER — ZOLPIDEM TARTRATE 10 MG/1
10 TABLET ORAL NIGHTLY PRN
Qty: 30 TABLET | Refills: 2 | Status: SHIPPED | OUTPATIENT
Start: 2021-09-12 | End: 2021-10-12

## 2021-10-05 ENCOUNTER — TELEPHONE (OUTPATIENT)
Dept: PHYSICAL THERAPY | Facility: REHABILITATION | Age: 76
End: 2021-10-05

## 2021-10-05 NOTE — OP THERAPY DISCHARGE SUMMARY
Outpatient Physical Therapy  DISCHARGE SUMMARY NOTE      [facility]  [address], [address]  [place] [address]  Phone:  [phone]  Fax:  [phone]    Date of Visit: 10/05/2021    Patient: Yasmin Zhong  YOB: 1945  MRN: [id number]     Referring Provider: Ramona Amaya M.D.   Referring Diagnosis Idiopathic peripheral neuropathy [G60.9];Imbalance [R26.89];Risk for falls [Z91.81]           Your patient is being discharged from Physical Therapy with the following comments:   · Progress plateau    Comments:  Yasmin Zhong has been discharged due to a lapse in care greater than 30 days. Thank you for the opportunity to assist you and your patient.     Limitations Remaining:  Near max improvement    Recommendations:  Discharge to [facility]    Nilesh Garcia, PT, DPT    Date: 10/5/2021

## 2021-10-18 ENCOUNTER — OFFICE VISIT (OUTPATIENT)
Dept: OPHTHALMOLOGY | Facility: MEDICAL CENTER | Age: 76
End: 2021-10-18
Payer: MEDICARE

## 2021-10-18 DIAGNOSIS — H49.9 OPHTHALMOPLEGIA: ICD-10-CM

## 2021-10-18 DIAGNOSIS — H40.003 GLAUCOMA SUSPECT OF BOTH EYES: ICD-10-CM

## 2021-10-18 DIAGNOSIS — H25.013 CORTICAL AGE-RELATED CATARACT OF BOTH EYES: ICD-10-CM

## 2021-10-18 PROCEDURE — 99213 OFFICE O/P EST LOW 20 MIN: CPT | Performed by: OPHTHALMOLOGY

## 2021-10-18 PROCEDURE — 92060 SENSORIMOTOR EXAMINATION: CPT | Performed by: OPHTHALMOLOGY

## 2021-10-18 ASSESSMENT — REFRACTION_MANIFEST
OS_SPHERE: -0.75
OD_SPHERE: -1.00
OS_AXIS: 074
OD_AXIS: 085
OS_CYLINDER: +1.25
METHOD_AUTOREFRACTION: 1
OD_CYLINDER: +0.75

## 2021-10-18 ASSESSMENT — VISUAL ACUITY
OD_SC: 20/30+2
OS_SC: 20/30
OS_PH_SC: 20/25+1
METHOD: SNELLEN - LINEAR

## 2021-10-18 ASSESSMENT — TONOMETRY
OS_IOP_MMHG: 13
OD_IOP_MMHG: 14

## 2021-10-18 ASSESSMENT — ENCOUNTER SYMPTOMS: DOUBLE VISION: 1

## 2021-10-18 ASSESSMENT — EXTERNAL EXAM - LEFT EYE: OS_EXAM: NORMAL

## 2021-10-18 ASSESSMENT — SLIT LAMP EXAM - LIDS
COMMENTS: NORMAL
COMMENTS: NORMAL

## 2021-10-18 ASSESSMENT — REFRACTION_WEARINGRX
OS_SPHERE: +2.50
OD_CYLINDER: SPHERE
OD_SPHERE: +2.50
OS_CYLINDER: SPHERE

## 2021-10-18 ASSESSMENT — EXTERNAL EXAM - RIGHT EYE: OD_EXAM: NORMAL

## 2021-10-18 NOTE — ASSESSMENT & PLAN NOTE
4/5/2021 - bilateral cortical cataracts with vision worse in the left eye leading to switched fixation  6/24/2021 - Since can begin fusing with press on prism, will refer for bilateral cataract extraction  10/18/2021 - SP bilateral cataract extraction

## 2021-10-18 NOTE — PROGRESS NOTES
Peds/Neuro Ophthalmology:   Keith Horne M.D.    Date & Time note created:    10/18/2021   9:40 AM     Referring MD / APRN:  Lucina Gupta M.D., No att. providers found    Patient ID:  Name:             Yasmin Zhong   YOB: 1945  Age:                 75 y.o.  female   MRN:               [id number]    Chief Complaint/Reason for Visit:     Other (Ophthalmoplegia and glaucoma suspect)      History of Present Illness:    Yasmin Zhong is a 75 y.o. female   Follow up for Ophthalmoplegia with double vision and also glaucoma suspect.Vision improved,Colors brighter post cataract surgery by Dr Swanson but double vision still present.      Review of Systems:  Review of Systems   Eyes: Positive for double vision.   All other systems reviewed and are negative.      Past Medical History:   Past Medical History:   Diagnosis Date   • Anxiety    • Arrhythmia 11/18/2020   • Arthritis 11/18/2020   • Blood clotting disorder (HCC)     hx 2013 in lung   • Breath shortness 11/18/2020    no supplemental oxygen   • Cancer (HCC) 1990    basal cell per hx   • Cataract    • Depression    • Fall from ground level 5/25/2021   • Heart murmur    • History of respiratory failure     6-month hospitalization in 2014   • Hyperlipidemia    • Hypertension     denies   • Indigestion 11/18/2020    GERD   • Insomnia    • Pain 11/18/2020    back pain   • Peripheral neuropathy    • Pneumonia     hx 2013   • Pulmonary fibrosis (HCC)    • Recurrent major depressive disorder, in full remission ([facility]) 7/21/2017   • Thrombocytopenia ([facility]) 4/19/2021       Past Surgical History:  Past Surgical History:   Procedure Laterality Date   • PB LAP,ESOPHAGOGAST FUNDOPLASTY N/A 11/23/2020    Procedure: FUNDOPLICATION, NISSEN, LAPAROSCOPIC- FOR PARAESOPHAGEAL WITH MESH;  Surgeon: Pritesh Baptiste M.D.;  Location: SURGERY [facility];  Service: General   • HIP HEMIARTHROPLASTY  4/25/2015    Performed by Raymond Lantigua M.D. at  "S[facility]ERY [facility]   • OTHER      breast reduction       Current Outpatient Medications:  Current Outpatient Medications   Medication Sig Dispense Refill   • prednisoLONE acetate (PRED FORTE) 1 % Suspension      • fluticasone (FLONASE) 50 MCG/ACT nasal spray Administer 1 Spray into affected nostril(S) every day. 16 g 3   • cyanocobalamin (VITAMIN B-12) 100 MCG Tab Take 100 mcg by mouth every day.     • atorvastatin (LIPITOR) 20 MG Tab Take 1 Tab by mouth every day. 90 Tab 3   • busPIRone (BUSPAR) 10 MG Tab tablet Pt takes 10MG in AM and 20MG HS Take 10-20 mg by mouth 2 times a day. Pt takes 10MG in AM and 20MG  Tab 3   • BIOTIN PO Take 1 Tab by mouth every day.     • Calcium Citrate-Vitamin D (CALCIUM + D PO) Take 1 Tab by mouth every day.     • MAGNESIUM PO Take 1 Cap by mouth every day.     • Cholecalciferol (VITAMIN D3 PO) Take 1 Cap by mouth every day.     • VITAMIN E PO Take 1 Cap by mouth every day.     • moxifloxacin (VIGAMOX) 0.5 % Solution  (Patient not taking: Reported on 10/18/2021)     • cetirizine (ZYRTEC) 10 MG Tab Take 10 mg by mouth as needed for Allergies. (Patient not taking: Reported on 10/18/2021)       No current facility-administered medications for this visit.       Allergies:  Allergies   Allergen Reactions   • Pollen Extract      \"cough\"       Family History:  Family History   Problem Relation Age of Onset   • Cancer Mother         Bladder cancer, hx. of smoking   • Diabetes Mother    • Heart Attack Father    • Cancer Father         Colon    • Cancer Maternal Uncle         Lung   • Diabetes Maternal Uncle    • Diabetes Maternal Aunt        Social History:  Social History     Socioeconomic History   • Marital status: Single     Spouse name: Not on file   • Number of children: 0   • Years of education: Not on file   • Highest education level: Not on file   Occupational History   • Not on file   Tobacco Use   • Smoking status: Former Smoker     Packs/day: 0.25     Years: 15.00     " Pack years: 3.75     Types: Cigarettes     Quit date: 3/11/1985     Years since quittin.6   • Smokeless tobacco: Never Used   • Tobacco comment: passive smoke exposure her entire life   Vaping Use   • Vaping Use: Never used   Substance and Sexual Activity   • Alcohol use: Not Currently     Alcohol/week: 0.0 oz   • Drug use: No   • Sexual activity: Not Currently   Other Topics Concern   • Not on file   Social History Narrative   • Not on file     Social Determinants of Health     Financial Resource Strain:    • Difficulty of Paying Living Expenses:    Food Insecurity:    • Worried About Running Out of Food in the Last Year:    • Ran Out of Food in the Last Year:    Transportation Needs:    • Lack of Transportation (Medical):    • Lack of Transportation (Non-Medical):    Physical Activity:    • Days of Exercise per Week:    • Minutes of Exercise per Session:    Stress:    • Feeling of Stress :    Social Connections:    • Frequency of Communication with Friends and Family:    • Frequency of Social Gatherings with Friends and Family:    • Attends [employer] Services:    • Active Member of Clubs or Organizations:    • Attends Club or Organization Meetings:    • Marital Status:    Intimate Partner Violence:    • Fear of Current or Ex-Partner:    • Emotionally Abused:    • Physically Abused:    • Sexually Abused:           Physical Exam:  Physical Exam    Oriented x 3  Weight/BMI: There is no height or weight on file to calculate BMI.  There were no vitals taken for this visit.    Base Eye Exam     Visual Acuity (Snellen - Linear)       Right Left    Dist sc 20/30+2 20/30    Dist ph sc NI 20/25+1          Tonometry ( care, 9:16 AM)       Right Left    Pressure 14 13          Pupils       Pupils    Right PERRL    Left PERRL          Neuro/Psych     Oriented x3: Yes    Mood/Affect: Normal            Additional Tests     Color       Right Left    Ishihara 7/9 8/9          Stereo     Fly: -    Animals: 0/3    Circles: 0/9             Strabismus Exam     Correction: sc    Distance Near Near +3DS N Bifocals                    0 0 -1   0 0 0                      ET 35 -1  0  ET 40 0  0  ET 40          LHypoT 2           0 0 0   0 0 0                   Slit Lamp and Fundus Exam     External Exam       Right Left    External Normal Normal          Slit Lamp Exam       Right Left    Lids/Lashes Normal Normal    Conjunctiva/Sclera White and quiet White and quiet    Cornea Clear Clear    Anterior Chamber Deep and quiet Deep and quiet    Iris Round and reactive Round and reactive    Lens Posterior chamber intraocular lens Posterior chamber intraocular lens    Vitreous Normal Normal          Fundus Exam       Right Left    Disc Tilted disc Tilted disc    Macula Normal Normal    Vessels Normal Normal    Periphery Normal Normal            Refraction     Wearing Rx       Sphere Cylinder    Right +2.50 Sphere    Left +2.50 Sphere          Manifest Refraction (Auto)       Sphere Cylinder Axis    Right -1.00 +0.75 085    Left -0.75 +1.25 074                Pertinent Lab/Test/Imaging Review:      Assessment and Plan:     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 In PAT double and would not suppress or fuse. 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 Is now noticing some double vision because of cataracts and now vision better in the non dominant eye so is switching fixation. Discussed at length options. These include removing only the cataract in the left eye which hopefully will make that eye dominant all the time or a PAT test to see if can begin fusing and the consider cataract extraction followed by strabismus repair. Will there froe precede with the latter. Gave rx to slightly improve the vision and will place 40 base out press on prism over the OD in  Pat to see if can start fusing. If so then could consider  cataract extraction followed by strabismus repair.   6/24/2021 - Starting to fuse with the 40 base out prism OD. Diplopia significantly better if not resolved. Therefore discussed preceding with bilateral cataract extraction followed by bilateral medial rectus recession once healed from the cataract extraction. Will refer to Dr Swanson for the cataract extraction  10/18/2021 - SP bilateral cataract extraction by Dr Swanson. IP PAT 35 to 40 ET with small 4 diopter left hypo. Thus discussed the risks and benefits of BMR recession for 40 with the OD on adjustable. Discussed that might need prisms or further surgery for the vertical. Has had prism glasses in the past.     Cortical age-related cataract of both eyes  4/5/2021 - bilateral cortical cataracts with vision worse in the left eye leading to switched fixation  6/24/2021 - Since can begin fusing with press on prism, will refer for bilateral cataract extraction  10/18/2021 - SP bilateral cataract extraction    Glaucoma suspect of both eyes  4/5/2021 - secondary to tilted discs from myopia. Oct NFL thickness 91 OD and 67 OS, bu no significant increased cupping and IOP good. Will monitor\  6/24/2021 - IOP 17 OU today  10/18/2021 - IOP 14 OD and 13 OS        Keith Horne M.D.

## 2021-10-18 NOTE — ASSESSMENT & PLAN NOTE
4/5/2021 - Large angle esotropia measuring 40 diopters with small left hypotonia. In PAT double and would not suppress or fuse. 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 Is now noticing some double vision because of cataracts and now vision better in the non dominant eye so is switching fixation. Discussed at length options. These include removing only the cataract in the left eye which hopefully will make that eye dominant all the time or a PAT test to see if can begin fusing and the consider cataract extraction followed by strabismus repair. Will there froe precede with the latter. Gave rx to slightly improve the vision and will place 40 base out press on prism over the OD in  Pat to see if can start fusing. If so then could consider cataract extraction followed by strabismus repair.   6/24/2021 - Starting to fuse with the 40 base out prism OD. Diplopia significantly better if not resolved. Therefore discussed preceding with bilateral cataract extraction followed by bilateral medial rectus recession once healed from the cataract extraction. Will refer to Dr Swanson for the cataract extraction  10/18/2021 - SP bilateral cataract extraction by Dr Swanson. IP PAT 35 to 40 ET with small 4 diopter left hypo. Thus discussed the risks and benefits of BMR recession for 40 with the OD on adjustable. Discussed that might need prisms or further surgery for the vertical. Has had prism glasses in the past.

## 2021-10-18 NOTE — ASSESSMENT & PLAN NOTE
4/5/2021 - secondary to tilted discs from myopia. Oct NFL thickness 91 OD and 67 OS, bu no significant increased cupping and IOP good. Will monitor\  6/24/2021 - IOP 17 OU today  10/18/2021 - IOP 14 OD and 13 OS

## 2021-10-28 ENCOUNTER — APPOINTMENT (OUTPATIENT)
Dept: RADIOLOGY | Facility: MEDICAL CENTER | Age: 76
End: 2021-10-28
Attending: INTERNAL MEDICINE
Payer: MEDICARE

## 2021-11-01 ENCOUNTER — HOSPITAL ENCOUNTER (OUTPATIENT)
Dept: RADIOLOGY | Facility: MEDICAL CENTER | Age: 76
End: 2021-11-01
Attending: INTERNAL MEDICINE
Payer: MEDICARE

## 2021-11-02 ENCOUNTER — APPOINTMENT (OUTPATIENT)
Dept: RADIOLOGY | Facility: MEDICAL CENTER | Age: 76
End: 2021-11-02
Attending: INTERNAL MEDICINE
Payer: MEDICARE

## 2021-11-03 ENCOUNTER — APPOINTMENT (OUTPATIENT)
Dept: RADIOLOGY | Facility: MEDICAL CENTER | Age: 76
End: 2021-11-03
Attending: INTERNAL MEDICINE
Payer: MEDICARE

## 2021-11-04 ENCOUNTER — APPOINTMENT (OUTPATIENT)
Dept: RADIOLOGY | Facility: MEDICAL CENTER | Age: 76
End: 2021-11-04
Attending: INTERNAL MEDICINE
Payer: MEDICARE

## 2021-11-04 RX ORDER — BUSPIRONE HYDROCHLORIDE 10 MG/1
TABLET ORAL
Qty: 270 TABLET | Refills: 3 | Status: SHIPPED | OUTPATIENT
Start: 2021-11-04 | End: 2022-11-22

## 2021-11-04 RX ORDER — ATORVASTATIN CALCIUM 20 MG/1
20 TABLET, FILM COATED ORAL DAILY
Qty: 90 TABLET | Refills: 3 | Status: SHIPPED | OUTPATIENT
Start: 2021-11-04 | End: 2021-11-05 | Stop reason: SDUPTHER

## 2021-11-05 ENCOUNTER — APPOINTMENT (OUTPATIENT)
Dept: SLEEP MEDICINE | Facility: MEDICAL CENTER | Age: 76
End: 2021-11-05
Payer: MEDICARE

## 2021-11-15 ENCOUNTER — PRE-ADMISSION TESTING (OUTPATIENT)
Dept: ADMISSIONS | Facility: MEDICAL CENTER | Age: 76
End: 2021-11-15
Attending: OPHTHALMOLOGY
Payer: MEDICARE

## 2021-11-15 DIAGNOSIS — Z01.810 PRE-OPERATIVE CARDIOVASCULAR EXAMINATION: ICD-10-CM

## 2021-11-15 DIAGNOSIS — Z01.812 PRE-OPERATIVE LABORATORY EXAMINATION: ICD-10-CM

## 2021-11-15 LAB
EKG IMPRESSION: NORMAL
ERYTHROCYTE [DISTWIDTH] IN BLOOD BY AUTOMATED COUNT: 53.9 FL (ref 35.9–50)
HCT VFR BLD AUTO: 41.6 % (ref 37–47)
HGB BLD-MCNC: 14.1 G/DL (ref 12–16)
MCH RBC QN AUTO: 34.8 PG (ref 27–33)
MCHC RBC AUTO-ENTMCNC: 33.9 G/DL (ref 33.6–35)
MCV RBC AUTO: 102.7 FL (ref 81.4–97.8)
PLATELET # BLD AUTO: 156 K/UL (ref 164–446)
PMV BLD AUTO: 9 FL (ref 9–12.9)
RBC # BLD AUTO: 4.05 M/UL (ref 4.2–5.4)
WBC # BLD AUTO: 6.3 K/UL (ref 4.8–10.8)

## 2021-11-15 PROCEDURE — U0005 INFEC AGEN DETEC AMPLI PROBE: HCPCS

## 2021-11-15 PROCEDURE — 93010 ELECTROCARDIOGRAM REPORT: CPT | Performed by: INTERNAL MEDICINE

## 2021-11-15 PROCEDURE — 36415 COLL VENOUS BLD VENIPUNCTURE: CPT

## 2021-11-15 PROCEDURE — U0003 INFECTIOUS AGENT DETECTION BY NUCLEIC ACID (DNA OR RNA); SEVERE ACUTE RESPIRATORY SYNDROME CORONAVIRUS 2 (SARS-COV-2) (CORONAVIRUS DISEASE [COVID-19]), AMPLIFIED PROBE TECHNIQUE, MAKING USE OF HIGH THROUGHPUT TECHNOLOGIES AS DESCRIBED BY CMS-2020-01-R: HCPCS

## 2021-11-15 PROCEDURE — 85027 COMPLETE CBC AUTOMATED: CPT

## 2021-11-15 PROCEDURE — 93005 ELECTROCARDIOGRAM TRACING: CPT

## 2021-11-15 PROCEDURE — C9803 HOPD COVID-19 SPEC COLLECT: HCPCS

## 2021-11-15 RX ORDER — ZOLPIDEM TARTRATE 10 MG/1
10 TABLET ORAL
COMMUNITY
Start: 2021-11-07 | End: 2021-12-01

## 2021-11-16 LAB
SARS-COV-2 RNA RESP QL NAA+PROBE: NOTDETECTED
SPECIMEN SOURCE: NORMAL

## 2021-11-17 RX ORDER — NEOMYCIN SULFATE, POLYMYXIN B SULFATE, AND DEXAMETHASONE 3.5; 10000; 1 MG/G; [USP'U]/G; MG/G
0.25 OINTMENT OPHTHALMIC 4 TIMES DAILY
Qty: 3.5 G | Refills: 2 | Status: ON HOLD
Start: 2021-11-17 | End: 2022-01-01

## 2021-11-19 ENCOUNTER — HOSPITAL ENCOUNTER (OUTPATIENT)
Facility: MEDICAL CENTER | Age: 76
End: 2021-11-19
Attending: OPHTHALMOLOGY | Admitting: OPHTHALMOLOGY
Payer: MEDICARE

## 2021-11-19 ENCOUNTER — ANESTHESIA EVENT (OUTPATIENT)
Dept: SURGERY | Facility: MEDICAL CENTER | Age: 76
End: 2021-11-19
Payer: MEDICARE

## 2021-11-19 ENCOUNTER — ANESTHESIA (OUTPATIENT)
Dept: SURGERY | Facility: MEDICAL CENTER | Age: 76
End: 2021-11-19
Payer: MEDICARE

## 2021-11-19 VITALS
HEIGHT: 64 IN | SYSTOLIC BLOOD PRESSURE: 113 MMHG | HEART RATE: 89 BPM | WEIGHT: 130.51 LBS | BODY MASS INDEX: 22.28 KG/M2 | RESPIRATION RATE: 20 BRPM | OXYGEN SATURATION: 94 % | TEMPERATURE: 97.2 F | DIASTOLIC BLOOD PRESSURE: 65 MMHG

## 2021-11-19 PROCEDURE — 700105 HCHG RX REV CODE 258: Performed by: OPHTHALMOLOGY

## 2021-11-19 PROCEDURE — 160002 HCHG RECOVERY MINUTES (STAT): Performed by: OPHTHALMOLOGY

## 2021-11-19 PROCEDURE — 160041 HCHG SURGERY MINUTES - EA ADDL 1 MIN LEVEL 4: Performed by: OPHTHALMOLOGY

## 2021-11-19 PROCEDURE — 67335 EYE SUTURE DURING SURGERY: CPT | Mod: RT | Performed by: OPHTHALMOLOGY

## 2021-11-19 PROCEDURE — 700111 HCHG RX REV CODE 636 W/ 250 OVERRIDE (IP): Performed by: ANESTHESIOLOGY

## 2021-11-19 PROCEDURE — A6410 STERILE EYE PAD: HCPCS | Performed by: OPHTHALMOLOGY

## 2021-11-19 PROCEDURE — 501836 HCHG SUTURE EYE: Performed by: OPHTHALMOLOGY

## 2021-11-19 PROCEDURE — 160035 HCHG PACU - 1ST 60 MINS PHASE I: Performed by: OPHTHALMOLOGY

## 2021-11-19 PROCEDURE — 160047 HCHG PACU  - EA ADDL 30 MINS PHASE II: Performed by: OPHTHALMOLOGY

## 2021-11-19 PROCEDURE — 160029 HCHG SURGERY MINUTES - 1ST 30 MINS LEVEL 4: Performed by: OPHTHALMOLOGY

## 2021-11-19 PROCEDURE — 67331 EYE SURGERY FOLLOW-UP ADD-ON: CPT | Mod: 50 | Performed by: OPHTHALMOLOGY

## 2021-11-19 PROCEDURE — 502585 HCHG PACK, MUSCLE: Performed by: OPHTHALMOLOGY

## 2021-11-19 PROCEDURE — 67311 REVISE EYE MUSCLE: CPT | Mod: 50 | Performed by: OPHTHALMOLOGY

## 2021-11-19 PROCEDURE — 501838 HCHG SUTURE GENERAL: Performed by: OPHTHALMOLOGY

## 2021-11-19 PROCEDURE — 160009 HCHG ANES TIME/MIN: Performed by: OPHTHALMOLOGY

## 2021-11-19 PROCEDURE — 700101 HCHG RX REV CODE 250: Performed by: ANESTHESIOLOGY

## 2021-11-19 PROCEDURE — 160046 HCHG PACU - 1ST 60 MINS PHASE II: Performed by: OPHTHALMOLOGY

## 2021-11-19 PROCEDURE — 700101 HCHG RX REV CODE 250: Performed by: OPHTHALMOLOGY

## 2021-11-19 PROCEDURE — 160025 RECOVERY II MINUTES (STATS): Performed by: OPHTHALMOLOGY

## 2021-11-19 PROCEDURE — 160048 HCHG OR STATISTICAL LEVEL 1-5: Performed by: OPHTHALMOLOGY

## 2021-11-19 RX ORDER — HALOPERIDOL 5 MG/ML
1 INJECTION INTRAMUSCULAR
Status: DISCONTINUED | OUTPATIENT
Start: 2021-11-19 | End: 2021-11-19 | Stop reason: HOSPADM

## 2021-11-19 RX ORDER — DIPHENHYDRAMINE HYDROCHLORIDE 50 MG/ML
12.5 INJECTION INTRAMUSCULAR; INTRAVENOUS
Status: DISCONTINUED | OUTPATIENT
Start: 2021-11-19 | End: 2021-11-19 | Stop reason: HOSPADM

## 2021-11-19 RX ORDER — HYDRALAZINE HYDROCHLORIDE 20 MG/ML
5 INJECTION INTRAMUSCULAR; INTRAVENOUS
Status: DISCONTINUED | OUTPATIENT
Start: 2021-11-19 | End: 2021-11-19 | Stop reason: HOSPADM

## 2021-11-19 RX ORDER — TETRACAINE HYDROCHLORIDE 5 MG/ML
SOLUTION OPHTHALMIC
Status: DISCONTINUED | OUTPATIENT
Start: 2021-11-19 | End: 2021-11-19 | Stop reason: HOSPADM

## 2021-11-19 RX ORDER — SODIUM CHLORIDE, SODIUM LACTATE, POTASSIUM CHLORIDE, CALCIUM CHLORIDE 600; 310; 30; 20 MG/100ML; MG/100ML; MG/100ML; MG/100ML
INJECTION, SOLUTION INTRAVENOUS CONTINUOUS
Status: DISCONTINUED | OUTPATIENT
Start: 2021-11-19 | End: 2021-11-19

## 2021-11-19 RX ORDER — BALANCED SALT SOLUTION 6.4; .75; .48; .3; 3.9; 1.7 MG/ML; MG/ML; MG/ML; MG/ML; MG/ML; MG/ML
SOLUTION OPHTHALMIC
Status: DISCONTINUED | OUTPATIENT
Start: 2021-11-19 | End: 2021-11-19 | Stop reason: HOSPADM

## 2021-11-19 RX ORDER — HYDROMORPHONE HYDROCHLORIDE 1 MG/ML
0.1 INJECTION, SOLUTION INTRAMUSCULAR; INTRAVENOUS; SUBCUTANEOUS
Status: DISCONTINUED | OUTPATIENT
Start: 2021-11-19 | End: 2021-11-19 | Stop reason: HOSPADM

## 2021-11-19 RX ORDER — SODIUM CHLORIDE 9 MG/ML
500 INJECTION, SOLUTION INTRAVENOUS
Status: DISCONTINUED | OUTPATIENT
Start: 2021-11-19 | End: 2021-11-19 | Stop reason: HOSPADM

## 2021-11-19 RX ORDER — LIDOCAINE HYDROCHLORIDE 20 MG/ML
INJECTION, SOLUTION EPIDURAL; INFILTRATION; INTRACAUDAL; PERINEURAL PRN
Status: DISCONTINUED | OUTPATIENT
Start: 2021-11-19 | End: 2021-11-19 | Stop reason: SURG

## 2021-11-19 RX ORDER — MEPERIDINE HYDROCHLORIDE 25 MG/ML
6.25 INJECTION INTRAMUSCULAR; INTRAVENOUS; SUBCUTANEOUS
Status: DISCONTINUED | OUTPATIENT
Start: 2021-11-19 | End: 2021-11-19 | Stop reason: HOSPADM

## 2021-11-19 RX ORDER — ONDANSETRON 2 MG/ML
INJECTION INTRAMUSCULAR; INTRAVENOUS PRN
Status: DISCONTINUED | OUTPATIENT
Start: 2021-11-19 | End: 2021-11-19 | Stop reason: SURG

## 2021-11-19 RX ORDER — TOBRAMYCIN AND DEXAMETHASONE 3; 1 MG/ML; MG/ML
SUSPENSION/ DROPS OPHTHALMIC
Status: DISCONTINUED
Start: 2021-11-19 | End: 2021-11-19 | Stop reason: HOSPADM

## 2021-11-19 RX ORDER — TOBRAMYCIN AND DEXAMETHASONE 3; 1 MG/ML; MG/ML
SUSPENSION/ DROPS OPHTHALMIC
Status: DISCONTINUED | OUTPATIENT
Start: 2021-11-19 | End: 2021-11-19 | Stop reason: HOSPADM

## 2021-11-19 RX ORDER — DEXAMETHASONE SODIUM PHOSPHATE 4 MG/ML
INJECTION, SOLUTION INTRA-ARTICULAR; INTRALESIONAL; INTRAMUSCULAR; INTRAVENOUS; SOFT TISSUE PRN
Status: DISCONTINUED | OUTPATIENT
Start: 2021-11-19 | End: 2021-11-19 | Stop reason: SURG

## 2021-11-19 RX ORDER — OXYCODONE HCL 5 MG/5 ML
5 SOLUTION, ORAL ORAL
Status: DISCONTINUED | OUTPATIENT
Start: 2021-11-19 | End: 2021-11-19 | Stop reason: HOSPADM

## 2021-11-19 RX ORDER — CEFAZOLIN SODIUM 1 G/3ML
INJECTION, POWDER, FOR SOLUTION INTRAMUSCULAR; INTRAVENOUS
Status: DISCONTINUED
Start: 2021-11-19 | End: 2021-11-19 | Stop reason: HOSPADM

## 2021-11-19 RX ORDER — SODIUM CHLORIDE, SODIUM LACTATE, POTASSIUM CHLORIDE, CALCIUM CHLORIDE 600; 310; 30; 20 MG/100ML; MG/100ML; MG/100ML; MG/100ML
INJECTION, SOLUTION INTRAVENOUS CONTINUOUS
Status: DISCONTINUED | OUTPATIENT
Start: 2021-11-19 | End: 2021-11-19 | Stop reason: HOSPADM

## 2021-11-19 RX ORDER — PHENYLEPHRINE HYDROCHLORIDE 25 MG/ML
SOLUTION/ DROPS OPHTHALMIC
Status: DISCONTINUED
Start: 2021-11-19 | End: 2021-11-19 | Stop reason: HOSPADM

## 2021-11-19 RX ORDER — PHENYLEPHRINE HYDROCHLORIDE 25 MG/ML
SOLUTION/ DROPS OPHTHALMIC
Status: DISCONTINUED | OUTPATIENT
Start: 2021-11-19 | End: 2021-11-19 | Stop reason: HOSPADM

## 2021-11-19 RX ORDER — DEXMEDETOMIDINE HYDROCHLORIDE 100 UG/ML
INJECTION, SOLUTION INTRAVENOUS PRN
Status: DISCONTINUED | OUTPATIENT
Start: 2021-11-19 | End: 2021-11-19 | Stop reason: SURG

## 2021-11-19 RX ORDER — PHENYLEPHRINE HCL IN 0.9% NACL 0.5 MG/5ML
SYRINGE (ML) INTRAVENOUS PRN
Status: DISCONTINUED | OUTPATIENT
Start: 2021-11-19 | End: 2021-11-19 | Stop reason: SURG

## 2021-11-19 RX ORDER — LABETALOL HYDROCHLORIDE 5 MG/ML
5 INJECTION, SOLUTION INTRAVENOUS
Status: DISCONTINUED | OUTPATIENT
Start: 2021-11-19 | End: 2021-11-19 | Stop reason: HOSPADM

## 2021-11-19 RX ORDER — METOPROLOL TARTRATE 1 MG/ML
1 INJECTION, SOLUTION INTRAVENOUS
Status: DISCONTINUED | OUTPATIENT
Start: 2021-11-19 | End: 2021-11-19 | Stop reason: HOSPADM

## 2021-11-19 RX ORDER — OXYCODONE HCL 5 MG/5 ML
10 SOLUTION, ORAL ORAL
Status: DISCONTINUED | OUTPATIENT
Start: 2021-11-19 | End: 2021-11-19 | Stop reason: HOSPADM

## 2021-11-19 RX ORDER — HYDROMORPHONE HYDROCHLORIDE 1 MG/ML
0.2 INJECTION, SOLUTION INTRAMUSCULAR; INTRAVENOUS; SUBCUTANEOUS
Status: DISCONTINUED | OUTPATIENT
Start: 2021-11-19 | End: 2021-11-19 | Stop reason: HOSPADM

## 2021-11-19 RX ORDER — LORAZEPAM 2 MG/ML
0.5 INJECTION INTRAMUSCULAR
Status: DISCONTINUED | OUTPATIENT
Start: 2021-11-19 | End: 2021-11-19 | Stop reason: HOSPADM

## 2021-11-19 RX ORDER — HYDROMORPHONE HYDROCHLORIDE 1 MG/ML
0.4 INJECTION, SOLUTION INTRAMUSCULAR; INTRAVENOUS; SUBCUTANEOUS
Status: DISCONTINUED | OUTPATIENT
Start: 2021-11-19 | End: 2021-11-19 | Stop reason: HOSPADM

## 2021-11-19 RX ORDER — ONDANSETRON 2 MG/ML
4 INJECTION INTRAMUSCULAR; INTRAVENOUS
Status: DISCONTINUED | OUTPATIENT
Start: 2021-11-19 | End: 2021-11-19 | Stop reason: HOSPADM

## 2021-11-19 RX ADMIN — PROPOFOL 150 MG: 10 INJECTION, EMULSION INTRAVENOUS at 09:41

## 2021-11-19 RX ADMIN — EPHEDRINE SULFATE 10 MG: 50 INJECTION INTRAMUSCULAR; INTRAVENOUS; SUBCUTANEOUS at 10:37

## 2021-11-19 RX ADMIN — ONDANSETRON 4 MG: 2 INJECTION INTRAMUSCULAR; INTRAVENOUS at 10:23

## 2021-11-19 RX ADMIN — FENTANYL CITRATE 100 MCG: 50 INJECTION, SOLUTION INTRAMUSCULAR; INTRAVENOUS at 10:23

## 2021-11-19 RX ADMIN — LIDOCAINE HYDROCHLORIDE 50 MG: 20 INJECTION, SOLUTION EPIDURAL; INFILTRATION; INTRACAUDAL at 09:41

## 2021-11-19 RX ADMIN — FENTANYL CITRATE 50 MCG: 50 INJECTION, SOLUTION INTRAMUSCULAR; INTRAVENOUS at 09:41

## 2021-11-19 RX ADMIN — DEXMEDETOMIDINE 20 MCG: 200 INJECTION, SOLUTION INTRAVENOUS at 10:50

## 2021-11-19 RX ADMIN — DEXAMETHASONE SODIUM PHOSPHATE 8 MG: 4 INJECTION, SOLUTION INTRA-ARTICULAR; INTRALESIONAL; INTRAMUSCULAR; INTRAVENOUS; SOFT TISSUE at 09:49

## 2021-11-19 RX ADMIN — Medication 100 MCG: at 09:41

## 2021-11-19 RX ADMIN — PROPOFOL 50 MG: 10 INJECTION, EMULSION INTRAVENOUS at 10:04

## 2021-11-19 RX ADMIN — FENTANYL CITRATE 50 MCG: 50 INJECTION, SOLUTION INTRAMUSCULAR; INTRAVENOUS at 09:57

## 2021-11-19 RX ADMIN — DEXMEDETOMIDINE 20 MCG: 200 INJECTION, SOLUTION INTRAVENOUS at 10:51

## 2021-11-19 RX ADMIN — EPHEDRINE SULFATE 5 MG: 50 INJECTION INTRAMUSCULAR; INTRAVENOUS; SUBCUTANEOUS at 09:41

## 2021-11-19 RX ADMIN — SODIUM CHLORIDE, POTASSIUM CHLORIDE, SODIUM LACTATE AND CALCIUM CHLORIDE: 600; 310; 30; 20 INJECTION, SOLUTION INTRAVENOUS at 09:38

## 2021-11-19 ASSESSMENT — FIBROSIS 4 INDEX: FIB4 SCORE: 2.94

## 2021-11-19 NOTE — ANESTHESIA PREPROCEDURE EVALUATION
Case: [id number] Date/Time: 11/19/21 1000    Procedure: STRABISMUS SURGERY - ESOTROPIA STRABISMUS PROCEDURE, ONE HORIZONTAL MUSCLE - BILATERAL, PROCEDURE FOR SCARRING OF EXTRAOCULAR MUSCLE AND ADJUSTABLE SUTURE PLACEMENT - RIGHT (Eye)    Anesthesia type: General    Pre-op diagnosis: ALTERNATING EXOTROPIA    Location: [facility] ROOM 24 / SURGERY SAME DAY [facility]    Surgeons: Keith Horne M.D.          Relevant Problems   CARDIAC   (positive) Nonrheumatic mitral valve regurgitation      ENDO   (positive) Subclinical hyperthyroidism       Physical Exam    Airway   Mallampati: III  TM distance: >3 FB  Neck ROM: full       Cardiovascular - normal exam  Rhythm: regular  Rate: normal  (+) murmur     Dental - normal exam           Pulmonary - normal exam  Breath sounds clear to auscultation     Abdominal    Neurological - normal exam                 Anesthesia Plan    ASA 3   ASA physical status 3 criteria: moderate reduction of ejection fraction and other (comment)    Plan - general       Airway plan will be LMA    (74 yo with diastolic heart dysfunction, mitral regurgitation, hx of critical illness for 6 mo requiring tracheostomy (now removed) - here for strabismus surgery under [place])    Plan Factors:   Patient was previously instructed to abstain from smoking on day of procedure.  Patient did not smoke on day of procedure.      Induction: intravenous    Postoperative Plan: Postoperative administration of opioids is intended.    Pertinent diagnostic labs and testing reviewed    Informed Consent:    Anesthetic plan and risks discussed with patient.    Use of blood products discussed with: patient whom consented to blood products.

## 2021-11-19 NOTE — OR SURGEON
Immediate Post OP Note    PreOp Diagnosis: esotropia prior ocular surgery      PostOp Diagnosis: esotropia prior ocular surgery      Procedure(s):  STRABISMUS SURGERY - ESOTROPIA STRABISMUS PROCEDURE, ONE HORIZONTAL MUSCLE - BILATERAL, PROCEDURE FOR SCARRING OF EXTRAOCULAR MUSCLE AND ADJUSTABLE SUTURE PLACEMENT - RIGHT - Wound Class: Clean    Surgeon(s):  Keith Horne M.D.    Anesthesiologist/Type of Anesthesia:  Anesthesiologist: Ginny Adorno M.D./General    Surgical Staff:  Circulator: Ana Irvin R.N.  Scrub Person: Kim Hodgson    Specimens removed if any:  * No specimens in log *    Estimated Blood Loss: < 1cc    Findings: esotropia, prior ocular surgery    Complications: None        11/19/2021 10:52 AM Keith Horne M.D.

## 2021-11-19 NOTE — OP REPORT
DATE OF SERVICE:  11/19/2021     PREOPERATIVE DIAGNOSIS:  Esotropia in patient with prior ocular surgery.     POSTOPERATIVE DIAGNOSIS:  Esotropia in patient with prior ocular surgery.     PROCEDURE:  Bilateral medial rectus recession in patient with prior ocular   surgery with the right eye on adjustable suture.     COMPLICATIONS:  None.     ANESTHESIA:  LMA anesthesia.     SURGEON:  Keith Horne MD     DESCRIPTION OF PROCEDURE:  The patient was brought to the operating room and   under LMA anesthesia, was prepped and draped about both eyes in sterile   fashion.  Attention was first made to the left eye where a wire speculum was   placed and a 4-0 silk traction suture placed in superior inferior corneal   limbal junction.  The eye was then abducted and attention made to the region   of the left medial rectus muscle where conjunctival peritomy was performed.    Of note, the patient had prior cataract extraction in the eye.  The left   medial rectus muscle was isolated using a muscle hook and cleaned of its check   ligaments and intermuscular septum.  Using a 5-0 Vicryl suture and spatulated   needle, this was placed in a weaving-type fashion through the muscle near its   insertion and locked at both ends.  The muscle was excised.  Hemostasis was   obtained with hot cautery.  The muscle was then reinserted to the scleral   location 5.5 mm posterior to the insertion.  This was done using   partial-thickness scleral bites, tied and found to be in the appropriate   position.  TobraDex ophthalmic solution was placed in the eye and the   conjunctiva reapproximated using interrupted 8-0 Vicryl suture.  Traction   suture and wire speculum was removed.  Attention was then made to the right   eye where a similar procedure was performed; however, the medial rectus muscle   was tied at the original insertion and allowed to hang back to be 5.5 mm.    This was tied in looped knots, Steri-Stripped to the skin to be  adjusted in   postop recovery.  TobraDex ophthalmic solution was placed in the eye and the   conjunctiva reapproximated using interrupted 8-0 Vicryl sutures as well were   Steri-Stripped to the skin to be tied in postop recovery.  Traction suture and   wire speculum was removed.  Pressure dressing was placed over the right eye.    The patient was extubated and transported to postop recovery room for further   adjustment.     In postop recovery when the patient was awake and alert, the eye was found to   be slightly esotropic, so the medial rectus muscle was adjusted to hang back   of approximately 7.5 mm posterior to the insertion on the right eye.  At this   point, the eye was fairly orthotropic.  The muscle was tied at this location.    The conjunctivae were reapproximated.  Tetracaine as well as TobraDex   ophthalmic ointment were placed in both eyes and the patient was then   discharged.        ______________________________  Keith Horne MD MBS/TONIO    DD:  11/19/2021 13:08  DT:  11/19/2021 13:44    Job#:  [id number]

## 2021-11-19 NOTE — ANESTHESIA TIME REPORT
Anesthesia Start and Stop Event Times     Date Time Event    11/19/2021 0932 Ready for Procedure     0938 Anesthesia Start     1055 Anesthesia Stop        Responsible Staff  11/19/21    Name Role Begin End    Ginny Adorno M.D. Anesth 0938 1055        Preop Diagnosis (Free Text):  Pre-op Diagnosis     ALTERNATING EXOTROPIA        Preop Diagnosis (Codes):    Premium Reason  Non-Premium    Comments:

## 2021-11-19 NOTE — OR NURSING
1059 - Report received from DAMARIS Belle     1114 - Ice pack applied to eyes per order from MD Horne     1138 - Discharge instructions given to patient and patient nieceSenait. All questions answered     1147 - Criteria met to transition patient to phase II recovery     1330 - MD Adorno notified of patient oxygen saturation fluctuating between 80-89% on RA. Patient in recliner, IS provided. Demonstrates proper use of IS. Nasal pulse ox applied to patient per order from MD Adorno     1415 - MD Adorno notified of oxygen saturation maintaining between 90-94% for >15min w/ nasal probe.   Ok to discharge patient per MD Adorno     1420 - IV removed. Patient escorted out by staff in stable condition w/ all belongings.   Patient discharged

## 2021-11-19 NOTE — ANESTHESIA POSTPROCEDURE EVALUATION
Patient: Yasmin Zhong    Procedure Summary     Date: 11/19/21 Room / Location: [facility] ROOM 24 / SURGERY SAME DAY [facility]    Anesthesia Start: 0938 Anesthesia Stop: 1055    Procedure: STRABISMUS SURGERY - ESOTROPIA STRABISMUS PROCEDURE, ONE HORIZONTAL MUSCLE - BILATERAL, PROCEDURE FOR SCARRING OF EXTRAOCULAR MUSCLE AND ADJUSTABLE SUTURE PLACEMENT - RIGHT (Bilateral Eye) Diagnosis: (ALTERNATING EXOTROPIA)    Surgeons: Keith Horne M.D. Responsible Provider: Ginny Adorno M.D.    Anesthesia Type: general ASA Status: 3          Final Anesthesia Type: general  Last vitals  BP   Blood Pressure : 110/67    Temp   36.2 °C (97.2 °F)    Pulse   84   Resp   20    SpO2   93 %      Anesthesia Post Evaluation    Patient location during evaluation: PACU  Patient participation: complete - patient participated  Level of consciousness: awake and alert    Airway patency: patent  Anesthetic complications: no  Cardiovascular status: hemodynamically stable  Respiratory status: acceptable  Hydration status: euvolemic    PONV: none          No complications documented.     Nurse Pain Score: 0 (NPRS)

## 2021-11-19 NOTE — DISCHARGE INSTRUCTIONS
ACTIVITY: Rest and take it easy for the first 24 hours.  A responsible adult is recommended to remain with you during that time.  It is normal to feel sleepy.  We encourage you to not do anything that requires balance, judgment or coordination.    MILD FLU-LIKE SYMPTOMS ARE NORMAL. YOU MAY EXPERIENCE GENERALIZED MUSCLE ACHES, THROAT IRRITATION, HEADACHE AND/OR SOME NAUSEA.    FOR 24 HOURS DO NOT:  Drive, operate machinery or run household appliances.  Drink beer or alcoholic beverages.   Make important decisions or sign legal documents.    SPECIAL INSTRUCTIONS: Bloody tears and red eyes are normal. Blot tears dry and avoid rubbing eyes as much as possible       DIET: To avoid nausea, slowly advance diet as tolerated, avoiding spicy or greasy foods for the first day.  Add more substantial food to your diet according to your physician's instructions. INCREASE FLUIDS AND FIBER TO AVOID CONSTIPATION.    SURGICAL DRESSING/BATHING: Ok to bathe tomorrow, avoid getting water in eyes as much as possible      FOLLOW-UP APPOINTMENT:  A follow-up appointment should be arranged with Dr. Horne - [phone]; call to schedule.    You should CALL YOUR PHYSICIAN if you develop:  Fever greater than 101 degrees F.  Pain not relieved by medication, or persistent nausea or vomiting.  Excessive bleeding (blood soaking through dressing) or unexpected drainage from the wound.  Extreme redness or swelling around the incision site, drainage of pus or foul smelling drainage.  Inability to urinate or empty your bladder within 8 hours.  Problems with breathing or chest pain.    You should call 911 if you develop problems with breathing or chest pain.  If you are unable to contact your doctor or surgical center, you should go to the nearest emergency room or urgent care center.    Physician's telephone #: Dr. Horne [phone]    If any questions arise, call your doctor.  If your doctor is not available, please feel free to call  the Surgical Center at [phone]. The Contact Center is open Monday through Friday 7AM to 5PM and may speak to a nurse at [phone], or toll free at [phone].     A registered nurse may call you a few days after your surgery to see how you are doing after your procedure.    MEDICATIONS: Resume taking daily medication.  Take prescribed pain medication with food.  If no medication is prescribed, you may take non-aspirin pain medication if needed.  PAIN MEDICATION CAN BE VERY CONSTIPATING.  Take a stool softener or laxative such as senokot, pericolace, or milk of magnesia if needed.    Prescription given for Maxitrol - eye ointment        Depression / Suicide Risk    As you are discharged from this [facility] facility, it is important to learn how to keep safe from harming yourself.    Recognize the warning signs:  · Abrupt changes in personality, positive or negative- including increase in energy   · Giving away possessions  · Change in eating patterns- significant weight changes-  positive or negative  · Change in sleeping patterns- unable to sleep or sleeping all the time   · Unwillingness or inability to communicate  · Depression  · Unusual sadness, discouragement and loneliness  · Talk of wanting to die  · Neglect of personal appearance   · Rebelliousness- reckless behavior  · Withdrawal from people/activities they love  · Confusion- inability to concentrate     If you or a loved one observes any of these behaviors or has concerns about self-harm, here's what you can do:  · Talk about it- your feelings and reasons for harming yourself  · Remove any means that you might use to hurt yourself (examples: pills, rope, extension cords, firearm)  · Get professional help from the community (Mental Health, Substance Abuse, psychological counseling)  · Do not be alone:Call your Safe Contact- someone whom you trust who will be there for you.  · Call your local CRISIS HOTLINE 390-6094 or 520-063-3634  · Call  your local Children's Mobile Crisis Response Team [place] (159) 717-8053 or www.[employer]  · Call the toll free National Suicide Prevention Hotlines   · National Suicide Prevention Lifeline 150-276-RCJO (3689)  · National Hope Line Network 800-SUICIDE (951-6807)

## 2021-11-19 NOTE — ANESTHESIA PROCEDURE NOTES
Airway    Date/Time: 11/19/2021 9:43 AM  Performed by: Ginny Adorno M.D.  Authorized by: Ginny Adorno M.D.     Location:  OR  Urgency:  Elective  Indications for Airway Management:  Anesthesia      Spontaneous Ventilation: absent    Sedation Level:  Deep  Preoxygenated: Yes    Mask Difficulty Assessment:  0 - not attempted  Final Airway Type:  Supraglottic airway  Final Supraglottic Airway:  Standard LMA    SGA Size:  4  Number of Attempts at Approach:  1

## 2021-11-19 NOTE — OR NURSING
1052 pt arrived from Or. Report received. Pt arouses to voice, restless. On 4L mask. Eye patch and dressing to right eye. Per RN sutures remain, Dr Horne to return to adjust.     1058 report to Prieto OCAMPO

## 2021-11-23 ENCOUNTER — OFFICE VISIT (OUTPATIENT)
Dept: OPHTHALMOLOGY | Facility: MEDICAL CENTER | Age: 76
End: 2021-11-23
Payer: MEDICARE

## 2021-11-23 DIAGNOSIS — H49.9 OPHTHALMOPLEGIA: ICD-10-CM

## 2021-11-23 PROCEDURE — 99024 POSTOP FOLLOW-UP VISIT: CPT | Performed by: OPHTHALMOLOGY

## 2021-11-23 ASSESSMENT — EXTERNAL EXAM - LEFT EYE: OS_EXAM: NORMAL

## 2021-11-23 ASSESSMENT — SLIT LAMP EXAM - LIDS
COMMENTS: NORMAL
COMMENTS: NORMAL

## 2021-11-23 ASSESSMENT — REFRACTION_MANIFEST
OD_CYLINDER: +0.75
OS_SPHERE: -0.75
METHOD_AUTOREFRACTION: 1
OD_SPHERE: -1.00
OS_CYLINDER: +1.25
OS_AXIS: 069
OD_AXIS: 102

## 2021-11-23 ASSESSMENT — ENCOUNTER SYMPTOMS: EYE PAIN: 1

## 2021-11-23 ASSESSMENT — VISUAL ACUITY
OS_SC: 20/40
OS_PH_SC+: -1
OS_PH_SC: 20/30
METHOD: SNELLEN - LINEAR
OD_SC: 20/40

## 2021-11-23 ASSESSMENT — TONOMETRY
IOP_METHOD: I-CARE
OS_IOP_MMHG: 16
OD_IOP_MMHG: 15

## 2021-11-23 ASSESSMENT — CONF VISUAL FIELD
OS_NORMAL: 1
OD_NORMAL: 1

## 2021-11-23 ASSESSMENT — EXTERNAL EXAM - RIGHT EYE: OD_EXAM: NORMAL

## 2021-11-23 NOTE — PROGRESS NOTES
Peds/Neuro Ophthalmology:   Keith Horne M.D.    Date & Time note created:    11/23/2021   11:08 AM     Referring MD / APRN:  Lucina Gupta M.D., No att. providers found    Patient ID:  Name:             Yasmin Zhong   YOB: 1945  Age:                 76 y.o.  female   MRN:               [id number]    Chief Complaint/Reason for Visit:     Post Operative Strabismus Surgery (4 day post op)      History of Present Illness:    Yasmin Zhong is a 76 y.o. female   Pt is here for 4 day post op for strabismus surgery OU. Pt state vision is better can tell when she is reading she does not have to close her right eye to read. Pt states eye are still soar and tender. Pt is using Maxitrol QID OU.      Review of Systems:  Review of Systems   Eyes: Positive for pain.   All other systems reviewed and are negative.      Past Medical History:   Past Medical History:   Diagnosis Date   • Anxiety    • Arrhythmia 11/18/2020   • Arthritis 11/18/2020   • Blood clotting disorder (HCC)     hx 2013 in lung   • Breath shortness 11/18/2020    no supplemental oxygen   • Cancer (HCC) 1990    basal cell per hx   • Cataract     meg IOL    • Depression    • Fall from ground level 5/25/2021   • Heart murmur    • History of respiratory failure     6-month hospitalization in 2014   • Hyperlipidemia    • Indigestion 11/18/2020    GERD   • Insomnia    • Pain 11/18/2020    back pain   • Peripheral neuropathy    • Pneumonia     hx 2013   • Pulmonary fibrosis (HCC)    • Recurrent major depressive disorder, in full remission (HCC) 7/21/2017   • Thrombocytopenia (HCC) 4/19/2021       Past Surgical History:  Past Surgical History:   Procedure Laterality Date   • STRABISMUS REPAIR Bilateral 11/19/2021    Procedure: STRABISMUS SURGERY - ESOTROPIA STRABISMUS PROCEDURE, ONE HORIZONTAL MUSCLE - BILATERAL, PROCEDURE FOR SCARRING OF EXTRAOCULAR MUSCLE AND ADJUSTABLE SUTURE PLACEMENT - RIGHT;  Surgeon: Keith KEYES  SALIMA Horne;  Location: SURGERY SAME DAY [facility];  Service: Ophthalmology   • EYE SURGERY Bilateral 11/19/2021    Bilateral medial rectus recession in patient with prior ocular Bilateral medial rectus recession in patient with prior ocular    • PB LAP,ESOPHAGOGAST FUNDOPLASTY N/A 11/23/2020    Procedure: FUNDOPLICATION, NISSEN, LAPAROSCOPIC- FOR PARAESOPHAGEAL WITH MESH;  Surgeon: Pritesh Baptiste M.D.;  Location: SURGERY [facility];  Service: General   • HIP HEMIARTHROPLASTY  4/25/2015    Performed by Raymond Lantigua M.D. at SURGERY [facility] ORS   • CATARACT EXTRACTION WITH IOL Bilateral    • OTHER      breast reduction       Current Outpatient Medications:  Current Outpatient Medications   Medication Sig Dispense Refill   • neomycin-polymixin-dexamethasone (MAXITROL) 3.5-69469-4.1 Ointment ophthalmic ointment Apply 0.25 Inches to right eye 4 times a day. To be used post operative 3.5 g 2   • zolpidem (AMBIEN) 10 MG Tab Take 10 mg by mouth at bedtime.     • Doxylamine Succinate, Sleep, (SLEEP AID PO) Take  by mouth as needed. Pt unsure of dose     • atorvastatin (LIPITOR) 20 MG Tab Take 1 Tablet by mouth every evening. 90 Tablet 3   • busPIRone (BUSPAR) 10 MG Tab tablet Pt takes 10MG in AM and 20MG HS Take 10-20 mg by mouth 2 times a day. Pt takes 10MG in AM and 20MG  Tablet 3   • fluticasone (FLONASE) 50 MCG/ACT nasal spray Administer 1 Spray into affected nostril(S) every day. (Patient taking differently: Administer 1 Spray into affected nostril(S) every morning.) 16 g 3   • cyanocobalamin (VITAMIN B-12) 100 MCG Tab Take 100 mcg by mouth every morning.     • cetirizine (ZYRTEC) 10 MG Tab Take 10 mg by mouth as needed for Allergies.     • BIOTIN PO Take 1 Tablet by mouth every morning. Pt unsure of dose     • Calcium Citrate-Vitamin D (CALCIUM + D PO) Take 1 Tablet by mouth every morning.     • MAGNESIUM PO Take 1 Capsule by mouth every morning. Pt unsure of dose     • Cholecalciferol (VITAMIN D3  "PO) Take 1 Capsule by mouth every morning. Pt unsure of dose     • VITAMIN E PO Take 1 Capsule by mouth every morning. Pt unsure of dose       No current facility-administered medications for this visit.       Allergies:  Allergies   Allergen Reactions   • Pollen Extract      \"cough\"       Family History:  Family History   Problem Relation Age of Onset   • Cancer Mother         Bladder cancer, hx. of smoking   • Diabetes Mother    • Heart Attack Father    • Cancer Father         Colon    • Cancer Maternal Uncle         Lung   • Diabetes Maternal Uncle    • Diabetes Maternal Aunt        Social History:  Social History     Socioeconomic History   • Marital status: Single     Spouse name: Not on file   • Number of children: 0   • Years of education: Not on file   • Highest education level: Not on file   Occupational History   • Not on file   Tobacco Use   • Smoking status: Former Smoker     Packs/day: 0.25     Years: 15.00     Pack years: 3.75     Types: Cigarettes     Quit date: 3/11/1985     Years since quittin.7   • Smokeless tobacco: Never Used   • Tobacco comment: passive smoke exposure her entire life   Vaping Use   • Vaping Use: Never used   Substance and Sexual Activity   • Alcohol use: Not Currently     Alcohol/week: 0.0 oz   • Drug use: No   • Sexual activity: Not Currently   Other Topics Concern   • Not on file   Social History Narrative   • Not on file     Social Determinants of Health     Financial Resource Strain:    • Difficulty of Paying Living Expenses: Not on file   Food Insecurity:    • Worried About Running Out of Food in the Last Year: Not on file   • Ran Out of Food in the Last Year: Not on file   Transportation Needs:    • Lack of Transportation (Medical): Not on file   • Lack of Transportation (Non-Medical): Not on file   Physical Activity:    • Days of Exercise per Week: Not on file   • Minutes of Exercise per Session: Not on file   Stress:    • Feeling of Stress : Not on file   Social " Connections:    • Frequency of Communication with Friends and Family: Not on file   • Frequency of Social Gatherings with Friends and Family: Not on file   • Attends Zoroastrian Services: Not on file   • Active Member of Clubs or Organizations: Not on file   • Attends Club or Organization Meetings: Not on file   • Marital Status: Not on file   Intimate Partner Violence:    • Fear of Current or Ex-Partner: Not on file   • Emotionally Abused: Not on file   • Physically Abused: Not on file   • Sexually Abused: Not on file   Housing Stability:    • Unable to Pay for Housing in the Last Year: Not on file   • Number of Places Lived in the Last Year: Not on file   • Unstable Housing in the Last Year: Not on file          Physical Exam:  Physical Exam    Oriented x 3  Weight/BMI: There is no height or weight on file to calculate BMI.  There were no vitals taken for this visit.    Base Eye Exam     Visual Acuity (Snellen - Linear)       Right Left    Dist sc 20/40 20/40    Dist ph sc NI 20/30 -1          Tonometry (I-care, 9:50 AM)       Right Left    Pressure 15 16          Pupils       Pupils    Right PERRL    Left PERRL          Visual Fields       Right Left     Full Full          Neuro/Psych     Oriented x3: Yes    Mood/Affect: Normal            Strabismus Exam       0 0 0   0 0 0                       0  0  E(T) 6 0  0                       0 0 0   0 0 0                   Slit Lamp and Fundus Exam     External Exam       Right Left    External Normal Normal          Slit Lamp Exam       Right Left    Lids/Lashes Normal Normal    Conjunctiva/Sclera Injection Injection    Cornea Clear Clear    Anterior Chamber Deep and quiet Deep and quiet    Iris Round and reactive Round and reactive    Lens Posterior chamber intraocular lens Posterior chamber intraocular lens    Vitreous Normal Normal          Fundus Exam       Right Left    Disc Tilted disc Tilted disc    Macula Normal Normal    Vessels Normal Normal    Periphery Normal  Normal            Refraction     Manifest Refraction (Auto)       Sphere Cylinder Axis    Right -1.00 +0.75 102    Left -0.75 +1.25 069                Pertinent Lab/Test/Imaging Review:      Assessment and Plan:     Ophthalmoplegia  4/5/2021 - Large angle esotropia measuring 40 diopters with small left hypotonia. In PAT double and would not suppress or fuse. 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 Is now noticing some double vision because of cataracts and now vision better in the non dominant eye so is switching fixation. Discussed at length options. These include removing only the cataract in the left eye which hopefully will make that eye dominant all the time or a PAT test to see if can begin fusing and the consider cataract extraction followed by strabismus repair. Will there froe precede with the latter. Gave rx to slightly improve the vision and will place 40 base out press on prism over the OD in  Pat to see if can start fusing. If so then could consider cataract extraction followed by strabismus repair.   6/24/2021 - Starting to fuse with the 40 base out prism OD. Diplopia significantly better if not resolved. Therefore discussed preceding with bilateral cataract extraction followed by bilateral medial rectus recession once healed from the cataract extraction. Will refer to Dr Swanson for the cataract extraction  10/18/2021 - SP bilateral cataract extraction by Dr Swanson. IP PAT 35 to 40 ET with small 4 diopter left hypo. Thus discussed the risks and benefits of BMR recession for 40 with the OD on adjustable. Discussed that might need prisms or further surgery for the vertical. Has had prism glasses in the past.   11/23/2023 - POST op day number 4 following RMR recess / adjust, LMR recesson. Some flick ET but no double vision and motility full. Overall healing well. Taper maxitrol. Follow up 3 months. Discussed could take  out sutures in two weeks if becomes bothersome.         Keith Horne M.D.   (1) More than 48 hours/None

## 2021-11-23 NOTE — ASSESSMENT & PLAN NOTE
4/5/2021 - Large angle esotropia measuring 40 diopters with small left hypotonia. In PAT double and would not suppress or fuse. 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 Is now noticing some double vision because of cataracts and now vision better in the non dominant eye so is switching fixation. Discussed at length options. These include removing only the cataract in the left eye which hopefully will make that eye dominant all the time or a PAT test to see if can begin fusing and the consider cataract extraction followed by strabismus repair. Will there froe precede with the latter. Gave rx to slightly improve the vision and will place 40 base out press on prism over the OD in  Pat to see if can start fusing. If so then could consider cataract extraction followed by strabismus repair.   6/24/2021 - Starting to fuse with the 40 base out prism OD. Diplopia significantly better if not resolved. Therefore discussed preceding with bilateral cataract extraction followed by bilateral medial rectus recession once healed from the cataract extraction. Will refer to Dr Swanson for the cataract extraction  10/18/2021 - SP bilateral cataract extraction by Dr Swanson. IP PAT 35 to 40 ET with small 4 diopter left hypo. Thus discussed the risks and benefits of BMR recession for 40 with the OD on adjustable. Discussed that might need prisms or further surgery for the vertical. Has had prism glasses in the past.   11/23/2023 - POST op day number 4 following RMR recess / adjust, LMR recesson. Some flick ET but no double vision and motility full. Overall healing well. Taper maxitrol. Follow up 3 months. Discussed could take out sutures in two weeks if becomes bothersome.

## 2021-12-01 ENCOUNTER — HOSPITAL ENCOUNTER (OUTPATIENT)
Dept: RADIOLOGY | Facility: MEDICAL CENTER | Age: 76
End: 2021-12-01
Attending: INTERNAL MEDICINE
Payer: MEDICARE

## 2021-12-01 ENCOUNTER — OFFICE VISIT (OUTPATIENT)
Dept: MEDICAL GROUP | Facility: PHYSICIAN GROUP | Age: 76
End: 2021-12-01
Payer: MEDICARE

## 2021-12-01 VITALS
BODY MASS INDEX: 22.5 KG/M2 | TEMPERATURE: 98.7 F | RESPIRATION RATE: 18 BRPM | HEART RATE: 97 BPM | HEIGHT: 63 IN | OXYGEN SATURATION: 92 % | SYSTOLIC BLOOD PRESSURE: 108 MMHG | WEIGHT: 127 LBS | DIASTOLIC BLOOD PRESSURE: 70 MMHG

## 2021-12-01 DIAGNOSIS — R91.1 SOLITARY PULMONARY NODULE: ICD-10-CM

## 2021-12-01 DIAGNOSIS — F51.01 PRIMARY INSOMNIA: Chronic | ICD-10-CM

## 2021-12-01 DIAGNOSIS — D75.89 MACROCYTOSIS: Chronic | ICD-10-CM

## 2021-12-01 DIAGNOSIS — F41.1 GAD (GENERALIZED ANXIETY DISORDER): ICD-10-CM

## 2021-12-01 DIAGNOSIS — E05.90 SUBCLINICAL HYPERTHYROIDISM: Chronic | ICD-10-CM

## 2021-12-01 DIAGNOSIS — H35.30 MACULAR DEGENERATION, UNSPECIFIED LATERALITY, UNSPECIFIED TYPE: ICD-10-CM

## 2021-12-01 DIAGNOSIS — D70.8 OTHER NEUTROPENIA (HCC): Chronic | ICD-10-CM

## 2021-12-01 DIAGNOSIS — Z00.00 ENCOUNTER FOR MEDICARE ANNUAL WELLNESS EXAM: ICD-10-CM

## 2021-12-01 DIAGNOSIS — H25.013 CORTICAL AGE-RELATED CATARACT OF BOTH EYES: ICD-10-CM

## 2021-12-01 DIAGNOSIS — R91.1 LUNG NODULE: ICD-10-CM

## 2021-12-01 DIAGNOSIS — H49.9 OPHTHALMOPLEGIA: ICD-10-CM

## 2021-12-01 DIAGNOSIS — M85.859 OSTEOPENIA OF NECK OF FEMUR, UNSPECIFIED LATERALITY: ICD-10-CM

## 2021-12-01 DIAGNOSIS — E78.5 DYSLIPIDEMIA: ICD-10-CM

## 2021-12-01 DIAGNOSIS — J84.10 PULMONARY FIBROSIS (HCC): ICD-10-CM

## 2021-12-01 PROCEDURE — 71250 CT THORAX DX C-: CPT

## 2021-12-01 PROCEDURE — G0439 PPPS, SUBSEQ VISIT: HCPCS | Performed by: INTERNAL MEDICINE

## 2021-12-01 RX ORDER — ZOLPIDEM TARTRATE 10 MG/1
10 TABLET ORAL NIGHTLY PRN
Qty: 30 TABLET | Refills: 2 | Status: ON HOLD | OUTPATIENT
Start: 2021-12-04 | End: 2022-01-05

## 2021-12-01 ASSESSMENT — PATIENT HEALTH QUESTIONNAIRE - PHQ9: CLINICAL INTERPRETATION OF PHQ2 SCORE: 0

## 2021-12-01 ASSESSMENT — ENCOUNTER SYMPTOMS: GENERAL WELL-BEING: GOOD

## 2021-12-01 ASSESSMENT — ACTIVITIES OF DAILY LIVING (ADL): BATHING_REQUIRES_ASSISTANCE: 0

## 2021-12-01 ASSESSMENT — FIBROSIS 4 INDEX: FIB4 SCORE: 2.98

## 2021-12-01 NOTE — PROGRESS NOTES
Chief Complaint   Patient presents with   • Annual Wellness Visit         HPI:  Yasmin is a 76 y.o. 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   • Cortical age-related cataract of both eyes 04/05/2021   • Aortic valve sclerosis 11/16/2020   • Lung nodule 10/26/2020   • Osteopenia of neck of femur 05/23/2018   • Peripheral neuropathy 01/23/2018   • Overactive bladder 11/22/2017   • Macular degeneration 07/21/2017   • Dyslipidemia 07/21/2017   • Nonrheumatic mitral valve regurgitation 07/21/2017   • Primary insomnia 04/25/2015   • Pulmonary fibrosis (HCC) 03/11/2015   • Macrocytosis 07/01/2014       Current Outpatient Medications   Medication Sig Dispense Refill   • [START ON 12/4/2021] zolpidem (AMBIEN) 10 MG Tab Take 1 Tablet by mouth at bedtime as needed for Sleep for up to 30 days. 30 Tablet 2   • neomycin-polymixin-dexamethasone (MAXITROL) 3.5-80510-4.1 Ointment ophthalmic ointment Apply 0.25 Inches to right eye 4 times a day. To be used post operative 3.5 g 2   • Doxylamine Succinate, Sleep, (SLEEP AID PO) Take  by mouth as needed. Pt unsure of dose     • atorvastatin (LIPITOR) 20 MG Tab Take 1 Tablet by mouth every evening. 90 Tablet 3   • busPIRone (BUSPAR) 10 MG Tab tablet Pt takes 10MG in AM and 20MG HS Take 10-20 mg by mouth 2 times a day. Pt takes 10MG in AM and 20MG  Tablet 3   • fluticasone (FLONASE) 50 MCG/ACT nasal spray Administer 1 Spray into affected nostril(S) every day. (Patient taking differently: Administer 1 Spray into affected nostril(S) every morning.) 16 g 3   • cyanocobalamin (VITAMIN B-12) 100 MCG Tab Take 100 mcg by mouth every morning.     • cetirizine (ZYRTEC) 10 MG Tab Take 10 mg by mouth as needed for Allergies.     •  BIOTIN PO Take 1 Tablet by mouth every morning. Pt unsure of dose     • Calcium Citrate-Vitamin D (CALCIUM + D PO) Take 1 Tablet by mouth every morning.     • MAGNESIUM PO Take 1 Capsule by mouth every morning. Pt unsure of dose     • Cholecalciferol (VITAMIN D3 PO) Take 1 Capsule by mouth every morning. Pt unsure of dose     • VITAMIN E PO Take 1 Capsule by mouth every morning. Pt unsure of dose       No current facility-administered medications for this visit.        Patient is taking medications as noted in medication list.  Current supplements as per medication list.     Allergies: Pollen extract    Current social contact/activities:None  Is patient current with immunizations? No, due for SHINGRIX (Shingles). Patient is interested in receiving SHINGRIX (Shingles) today.    She  reports that she quit smoking about 36 years ago. Her smoking use included cigarettes. She has a 3.75 pack-year smoking history. She has never used smokeless tobacco. She reports previous alcohol use. She reports that she does not use drugs.  Counseling given: Not Answered  Comment: passive smoke exposure her entire life        DPA/Advanced directive: Patient has Advanced Directive on file.     ROS:    Gait: Uses a cane   Ostomy: No   Other tubes: No   Amputations: No   Chronic oxygen use No   Last eye exam 10/2021  Wears hearing aids: No   : Denies any urinary leakage during the last 6 months      Screening:      Depression Screening    Little interest or pleasure in doing things?  0 - not at all  Feeling down, depressed, or hopeless? 0 - not at all  Patient Health Questionnaire Score: 0    If depressive symptoms identified deferred to follow up visit unless specifically addressed in assessment and plan.    Interpretation of PHQ-9 Total Score   Score Severity   1-4 No Depression   5-9 Mild Depression   10-14 Moderate Depression   15-19 Moderately Severe Depression   20-27 Severe Depression    Screening for Cognitive Impairment    Three  Minute Recall (captain, garden, picture)  3/3    Peter clock face with all 12 numbers and set the hands to show 5 past 8.  No    If cognitive concerns identified, deferred for follow up unless specifically addressed in assessment and plan.    Fall Risk Assessment    Has the patient had two or more falls in the last year or any fall with injury in the last year?  No  If fall risk identified, deferred for follow up unless specifically addressed in assessment and plan.    Safety Assessment    Throw rugs on floor.  No  Handrails on all stairs.  Yes  Good lighting in all hallways.  Yes  Difficulty hearing.  Yes  Patient counseled about all safety risks that were identified.    Functional Assessment ADLs    Are there any barriers preventing you from cooking for yourself or meeting nutritional needs?  No.    Are there any barriers preventing you from driving safely or obtaining transportation?  No.    Are there any barriers preventing you from using a telephone or calling for help?  No.    Are there any barriers preventing you from shopping?  No.    Are there any barriers preventing you from taking care of your own finances?  No.    Are there any barriers preventing you from managing your medications?  No.    Are there any barriers preventing you from showering, bathing or dressing yourself?  No.    Are you currently engaging in any exercise or physical activity?  Yes.     What is your perception of your health?  Good.    Health Maintenance Summary          Overdue - IMM ZOSTER VACCINES (1 of 2) Overdue - never done    No completion history exists for this topic.          Ordered - MAMMOGRAM (Yearly) Ordered on 6/28/2021 05/07/2020  [id number]    08/27/2018  MA-MAMMO SCREENING BILAT W/KENTON W/CAD    08/25/2017  MA-MAMMO SCREENING BILAT W/KENTON W/CAD    07/01/2016  MA-SCREEN MAMMO W/CAD-BILAT    07/01/2016  [id number]    Only the first 5 history entries have been loaded, but more history exists.           Annual Wellness Visit (Every 366 Days) Next due on 12/2/2022 12/01/2021  Visit Dx: Encounter for Medicare annual wellness exam    12/01/2021  Subsequent Annual Wellness Visit - Includes PPPS ()    05/23/2018  Visit Dx: Medicare annual wellness visit, initial          BONE DENSITY (Every 5 Years) Tentatively due on 1/7/2024 01/07/2019  DS-BONE DENSITY STUDY (DEXA)    06/19/2015  DS-BONE DENSITY STUDY (DEXA)          IMM DTaP/Tdap/Td Vaccine (2 - Td or Tdap) Next due on 3/27/2029    03/27/2019  Imm Admin: Tdap Vaccine          HEPATITIS C SCREENING  Completed    12/14/2013  HEPATITIS PANEL ACUTE(4 COMPONENTS)          IMM PNEUMOCOCCAL VACCINE: 65+ Years (Series Information) Completed    10/04/2018  Imm Admin: Pneumococcal polysaccharide vaccine (PPSV-23)    11/22/2017  Imm Admin: Pneumococcal Conjugate Vaccine (Prevnar/PCV-13)    12/16/2013  Imm Admin: Pneumococcal polysaccharide vaccine (PPSV-23)          IMM INFLUENZA (Series Information) Completed    10/28/2021  Imm Admin: Influenza Vaccine Adult HD    10/26/2020  Imm Admin: Influenza Vaccine Adult HD    10/02/2019  Imm Admin: Influenza Vaccine Adult HD    10/04/2018  Imm Admin: Influenza Vaccine Adult HD    11/22/2017  Imm Admin: Influenza Vaccine Adult HD    Only the first 5 history entries have been loaded, but more history exists.          COVID-19 Vaccine (Series Information) Completed    10/28/2021  Imm Admin: Moderna SARS-CoV-2 Vaccine    02/25/2021  Imm Admin: Moderna SARS-CoV-2 Vaccine    01/27/2021  Imm Admin: Moderna SARS-CoV-2 Vaccine          IMM HEP B VACCINE (Series Information) Aged Out    No completion history exists for this topic.          IMM MENINGOCOCCAL VACCINE (MCV4) (Series Information) Aged Out    No completion history exists for this topic.          Discontinued - COLORECTAL CANCER SCREENING  Discontinued    01/29/2019  REFERRAL TO GI FOR COLONOSCOPY    08/29/2016  REFERRAL TO GI FOR COLONOSCOPY                Patient  Care Team:  Lucina Gupta M.D. as PCP - General (Internal Medicine)  Onur Cheng, PT as Physical Therapist (Physical Therapy)  Onur Cheng PT as Physical Therapist (Physical Therapy)  Sebastian Dao M.D. as Consulting Physician (Gastroenterology)  [facility] as Home Health Provider  Pritesh Baptiste M.D. (Surgery)    Social History     Tobacco Use   • Smoking status: Former Smoker     Packs/day: 0.25     Years: 15.00     Pack years: 3.75     Types: Cigarettes     Quit date: 3/11/1985     Years since quittin.7   • Smokeless tobacco: Never Used   • Tobacco comment: passive smoke exposure her entire life   Vaping Use   • Vaping Use: Never used   Substance Use Topics   • Alcohol use: Not Currently     Alcohol/week: 0.0 oz   • Drug use: No     Family History   Problem Relation Age of Onset   • Cancer Mother         Bladder cancer, hx. of smoking   • Diabetes Mother    • Heart Attack Father    • Cancer Father         Colon    • Cancer Maternal Uncle         Lung   • Diabetes Maternal Uncle    • Diabetes Maternal Aunt      She  has a past medical history of Anxiety, Arrhythmia (2020), Arthritis (2020), Blood clotting disorder ([facility]), Breath shortness (2020), Cancer (HCC) (), Cataract, Depression, Fall from ground level (2021), Heart murmur, History of respiratory failure, Hyperlipidemia, Indigestion (2020), Insomnia, Pain (2020), Peripheral neuropathy, Pneumonia, Pulmonary fibrosis (HCC), Recurrent major depressive disorder, in full remission (HCC) (2017), and Thrombocytopenia ([facility]) (2021).   Past Surgical History:   Procedure Laterality Date   • STRABISMUS REPAIR Bilateral 2021    Procedure: STRABISMUS SURGERY - ESOTROPIA STRABISMUS PROCEDURE, ONE HORIZONTAL MUSCLE - BILATERAL, PROCEDURE FOR SCARRING OF EXTRAOCULAR MUSCLE AND ADJUSTABLE SUTURE PLACEMENT - RIGHT;  Surgeon: Keith Horne M.D.;  Location: SURGERY SAME DAY [facility];   "Service: Ophthalmology   • EYE SURGERY Bilateral 11/19/2021    Bilateral medial rectus recession in patient with prior ocular Bilateral medial rectus recession in patient with prior ocular    • PB LAP,ESOPHAGOGAST FUNDOPLASTY N/A 11/23/2020    Procedure: FUNDOPLICATION, NISSEN, LAPAROSCOPIC- FOR PARAESOPHAGEAL WITH MESH;  Surgeon: Pritesh Baptiste M.D.;  Location: SURGERY [facility];  Service: General   • HIP HEMIARTHROPLASTY  4/25/2015    Performed by Raymond Lantigua M.D. at SURGERY [facility] ORS   • CATARACT EXTRACTION WITH IOL Bilateral    • OTHER      breast reduction           Exam:     /70   Pulse 97   Temp 37.1 °C (98.7 °F)   Resp 18   Ht 1.6 m (5' 3\")   Wt 57.6 kg (127 lb)   SpO2 92%  Body mass index is 22.5 kg/m².    Hearing poor.    Dentition good  Alert, oriented in no acute distress.  Eye contact is good, speech goal directed, affect calm      Assessment and Plan. The following treatment and monitoring plan is recommended:      Encounter for Medicare annual wellness exam  - Subsequent Annual Wellness Visit - Includes PPPS ()    Primary insomnia  This is a chronic condition.  Stable.  The patient takes Ambien 10 mg nightly.  She has taken this medication for many years.  She was also previously on a number of other medications, which she has been able to come off of with the help of Dr. Burton.  We did discuss that Ambien can further increase her risk for falls.  She would like to continue using Ambien as benefits outweigh the risks and it has improved her quality of life.   Review of the  shows that the patient was last given a prescription of zolpidem 10 mg, dispo #30, on 011/8//2021. Obtained and reviewed patient utilization report from [facility] pharmacy database on 12/1/2021 6:13 AM  prior to writing prescription for controlled substance II, III or IV per [place] bill . Based on assessment of the report, the prescription is medically necessary.   -Continue Ambien 10 mg " nightly  - Controlled Substance Treatment Agreement was signed and scanned into the patient's chart on 08/27/2021  - zolpidem (AMBIEN) 10 MG Tab; Take 1 Tablet by mouth at bedtime as needed for Sleep for up to 30 days.  Dispense: 30 Tablet; Refill: 2     Pulmonary fibrosis (HCC)  Lung nodule  This is a chronic condition.  Stable.  CT chest on 3/1/2021 showed a stable left lower lobe nodule measuring 2.3 x 1.4 cm, and stable chronic interstitial lung disease/fibrosis.  The patient has a follow-up visit scheduled in January, her CT chest is scheduled for today.     Dyslipidemia  This is a chronic condition.  Well-controlled.  The patient is on atorvastatin 20 mg nightly.  Lipid panel from 06/25/2021 showed a total cholesterol 139, LDL 70, HDL 43, triglycerides 132.  CT cardiac score imaging has been ordered, but not yet performed.  -Continue atorvastatin 20 mg nightly     TIFFANY (generalized anxiety disorder)  This is a chronic condition.    Well-controlled.  The patient is on buspirone 10 mg in the a.m. and 20 mg at night.  She feels her symptoms are adequately controlled on this regimen.  -Continue buspirone 10 mg every morning, 20 mg every night.     Macrocytosis  This is a chronic condition.  Stable.    Labs from 6/25/2021 showed an elevated MCV of 103.3, stable from previous value of 104.2. Folate is normal at 11.2 and B12 is normal at 650 the patient's methylmalonic acid was normal at 0.15 on 6/25/2021. Homocysteine was just slightly elevated at 11.24. The patient denies active alcohol use.  - CBC WITH DIFFERENTIAL; Future    Neutropenia, unspecified type (HCC)  This is a chronic condition.    Resolved.  Recent CBC from 6/25/2021 showed a normal WBC count of 6.3 and no evidence of neutropenia.     - CBC WITH DIFFERENTIAL; Future    Subclinical hyperthyroidism  This is a chronic condition.  Stable.  Labs from 5/24/2021 showed a low TSH of 0.26 with a normal free T4 of 0.97.  - TSH; Future  - FREE THYROXINE;  Future  - TRIIDOTHYRONINE; Future  - TSI; Future  - ANTITHYROGLOBULIN AB; Future     Osteopenia of neck of femur, unspecified laterality  This is a chronic condition.  Stable.  Bone density study on 1/7/2019 showed osteopenia of the femoral region with a T score of -1.4, and a normal lumbar region with a T score of 1.5.  FRAX 10-year probability of a major osteoporotic fracture was 24.1% and hip fracture 7.4%.   -Continue calcium and vitamin D supplementation as well as daily weight bearing exercises    Ophthalmoplegia  Macular degeneration, unspecified laterality, unspecified type  Cortical age-related cataract of both eyes  These are chronic conditions.  Stable.  The patient is followed by ophthalmology, Dr. Horne.  The patient had strabismus surgery on 11/19/2021.  She had bilateral cataract extraction on 10/18/2021.      Services suggested: No services needed at this time  Health Care Screening recommendations as per orders if indicated.  Referrals offered: PT/OT/Nutrition counseling/Behavioral Health/Smoking cessation as per orders if indicated.    Discussion today about general wellness and lifestyle habits:    · Prevent falls and reduce trip hazards; Cautioned about securing or removing rugs.  · Have a working fire alarm and carbon monoxide detector;   · Engage in regular physical activity and social activities       Follow-up: Return in about 3 months (around 3/1/2022) for Controlled Substance.

## 2021-12-01 NOTE — PROGRESS NOTES
Subjective:     CC:     HPI:   Yasmin presents today to discuss the following issues:        Past Medical History:   Diagnosis Date   • Anxiety    • Arrhythmia 2020   • Arthritis 2020   • Blood clotting disorder (HCC)     hx  in lung   • Breath shortness 2020    no supplemental oxygen   • Cancer (HCC)     basal cell per hx   • Cataract     mge IOL    • Depression    • Fall from ground level 2021   • Heart murmur    • History of respiratory failure     6-month hospitalization in    • Hyperlipidemia    • Indigestion 2020    GERD   • Insomnia    • Pain 2020    back pain   • Peripheral neuropathy    • Pneumonia     hx    • Pulmonary fibrosis (HCC)    • Recurrent major depressive disorder, in full remission (HCC) 2017   • Thrombocytopenia (HCC) 2021       Social History     Tobacco Use   • Smoking status: Former Smoker     Packs/day: 0.25     Years: 15.00     Pack years: 3.75     Types: Cigarettes     Quit date: 3/11/1985     Years since quittin.7   • Smokeless tobacco: Never Used   • Tobacco comment: passive smoke exposure her entire life   Vaping Use   • Vaping Use: Never used   Substance Use Topics   • Alcohol use: Not Currently     Alcohol/week: 0.0 oz   • Drug use: No       Current Outpatient Medications Ordered in Epic   Medication Sig Dispense Refill   • neomycin-polymixin-dexamethasone (MAXITROL) 3.5-96983-3.1 Ointment ophthalmic ointment Apply 0.25 Inches to right eye 4 times a day. To be used post operative 3.5 g 2   • Doxylamine Succinate, Sleep, (SLEEP AID PO) Take  by mouth as needed. Pt unsure of dose     • atorvastatin (LIPITOR) 20 MG Tab Take 1 Tablet by mouth every evening. 90 Tablet 3   • busPIRone (BUSPAR) 10 MG Tab tablet Pt takes 10MG in AM and 20MG HS Take 10-20 mg by mouth 2 times a day. Pt takes 10MG in AM and 20MG  Tablet 3   • fluticasone (FLONASE) 50 MCG/ACT nasal spray Administer 1 Spray into affected nostril(S) every day.  "(Patient taking differently: Administer 1 Spray into affected nostril(S) every morning.) 16 g 3   • cyanocobalamin (VITAMIN B-12) 100 MCG Tab Take 100 mcg by mouth every morning.     • cetirizine (ZYRTEC) 10 MG Tab Take 10 mg by mouth as needed for Allergies.     • BIOTIN PO Take 1 Tablet by mouth every morning. Pt unsure of dose     • Calcium Citrate-Vitamin D (CALCIUM + D PO) Take 1 Tablet by mouth every morning.     • MAGNESIUM PO Take 1 Capsule by mouth every morning. Pt unsure of dose     • Cholecalciferol (VITAMIN D3 PO) Take 1 Capsule by mouth every morning. Pt unsure of dose     • VITAMIN E PO Take 1 Capsule by mouth every morning. Pt unsure of dose       No current Epic-ordered facility-administered medications on file.       Allergies:  Pollen extract    Health Maintenance: Completed    ROS:   Denies any recent fevers or chills. No nausea or vomiting. No chest pains or shortness of breath.    Objective:       Exam:  /70   Pulse 97   Temp 37.1 °C (98.7 °F)   Resp 18   Ht 1.6 m (5' 3\")   Wt 57.6 kg (127 lb)   SpO2 92%   BMI 22.50 kg/m²  Body mass index is 22.5 kg/m².    Gen: Alert and oriented, No apparent distress.  Lungs: Normal effort, CTA bilaterally, no wheezes, rhonchi, or rales  CV: Regular rate and rhythm. No murmurs, rubs, or gallops.  Ext: No clubbing, cyanosis, edema.      Assessment & Plan:     76 y.o. female with the following -     Can do Medicare wellness visit    Primary insomnia  This is a chronic condition.  Stable.  The patient takes Ambien 10 mg nightly.  She has taken this medication for many years.  She was also previously on a number of other medications, which she has been able to come off of with the help of Dr. Burton.  We did discuss that Ambien can further increase her risk for falls.  She would like to continue using Ambien as benefits outweigh the risks and it has improved her quality of life.   Review of the  shows that the patient was last given a prescription of " zolpidem 10 mg, dispo #30, on 011/8//2021. Obtained and reviewed patient utilization report from [facility] pharmacy database on 12/1/2021 6:13 AM  prior to writing prescription for controlled substance II, III or IV per [place] bill . Based on assessment of the report, the prescription is medically necessary.   -Continue Ambien 10 mg nightly  - Controlled Substance Treatment Agreement was signed and scanned into the patient's chart on 08/27/2021       Pulmonary fibrosis (HCC)  Lung nodule  This is a chronic condition.  Stable.  She is followed by pulmonology. Dr. Amador.    She has an appointment scheduled with them on 11/5/2021.  CT chest on 3/1/2021 showed a stable left lower lobe nodule measuring 2.3 x 1.4 cm, and stable chronic interstitial lung disease/fibrosis.  She will need a repeat CT scan in 6 months.  She is scheduled to have this scan before her follow-up visit with pulmonary.     Dyslipidemia  This is a chronic condition.  Well-controlled.  The patient is on atorvastatin 20 mg nightly.  Lipid panel from 06/25/2021 showed a total cholesterol 139, LDL 70, HDL 43, triglycerides 132.  CT cardiac score imaging has been ordered, but not yet performed.  -Continue atorvastatin 20 mg nightly     TIFFANY (generalized anxiety disorder)  This is a chronic condition.    Well-controlled.  The patient is on buspirone 10 mg in the a.m. and 20 mg at night.  She feels her symptoms are adequately controlled on this regimen.  -Continue buspirone 10 mg every morning, 20 mg every night.     Macrocytosis  This is a chronic condition.  Stable.    Labs from 6/25/2021 showed an elevated MCV of 103.3, stable from previous value of 104.2. Folate is normal at 11.2 and B12 is normal at 650 the patient's methylmalonic acid was normal at 0.15 on 6/25/2021. Homocysteine was just slightly elevated at 11.24. The patient denies active alcohol use.  -Continue to monitor     Neutropenia, unspecified type (HCC)  This is a chronic condition.     Resolved.  Recent CBC from 6/25/2021 showed a normal WBC count of 6.3 and no evidence of neutropenia.       Subclinical hyperthyroidism  This is a chronic condition.  Stable.  Labs from 5/24/2021 showed a low TSH of 0.26 with a normal free T4 of 0.97.  -Continue to monitor     Osteopenia of neck of femur, unspecified laterality  This is a chronic condition.  Stable.  Bone density study on 1/7/2019 showed osteopenia of the femoral region with a T score of -1.4, and a normal lumbar region with a T score of 1.5.  FRAX 10-year probability of a major osteoporotic fracture was 24.1% and hip fracture 7.4%.   -Continue calcium and vitamin D supplementation as well as daily weight bearing exercises    Ophthalmoplegia  Macular degeneration, unspecified laterality, unspecified type  Cortical age-related cataract of both eyes  These are chronic conditions.  Stable.  The patient is followed by ophthalmology, Dr. Horne.  The patient had strabismus surgery on 11/19/2021.  She had bilateral cataract extraction on 10/18/2021.    I spent a total of *** minutes with record review, exam, communication with the patient, communication with other providers, and documentation of this encounter.      No follow-ups on file.    Please note that this dictation was created using voice recognition software. I have made every reasonable attempt to correct obvious errors, but I expect that there are errors of grammar and possibly content that I did not discover before finalizing the note.

## 2021-12-31 ENCOUNTER — APPOINTMENT (OUTPATIENT)
Dept: RADIOLOGY | Facility: MEDICAL CENTER | Age: 76
End: 2021-12-31
Attending: EMERGENCY MEDICINE
Payer: MEDICARE

## 2021-12-31 ENCOUNTER — HOSPITAL ENCOUNTER (EMERGENCY)
Facility: MEDICAL CENTER | Age: 76
End: 2022-01-01
Attending: EMERGENCY MEDICINE
Payer: MEDICARE

## 2021-12-31 DIAGNOSIS — S32.401A CLOSED DISPLACED FRACTURE OF RIGHT ACETABULUM, UNSPECIFIED PORTION OF ACETABULUM, INITIAL ENCOUNTER (HCC): ICD-10-CM

## 2021-12-31 LAB
ALBUMIN SERPL BCP-MCNC: 4.3 G/DL (ref 3.2–4.9)
ALBUMIN/GLOB SERPL: 1.6 G/DL
ALP SERPL-CCNC: 82 U/L (ref 30–99)
ALT SERPL-CCNC: 20 U/L (ref 2–50)
ANION GAP SERPL CALC-SCNC: 16 MMOL/L (ref 7–16)
APTT PPP: 24.9 SEC (ref 24.7–36)
AST SERPL-CCNC: 26 U/L (ref 12–45)
BASOPHILS # BLD AUTO: 0.4 % (ref 0–1.8)
BASOPHILS # BLD: 0.04 K/UL (ref 0–0.12)
BILIRUB SERPL-MCNC: 0.4 MG/DL (ref 0.1–1.5)
BUN SERPL-MCNC: 17 MG/DL (ref 8–22)
CALCIUM SERPL-MCNC: 9.4 MG/DL (ref 8.4–10.2)
CHLORIDE SERPL-SCNC: 102 MMOL/L (ref 96–112)
CO2 SERPL-SCNC: 22 MMOL/L (ref 20–33)
CREAT SERPL-MCNC: 0.74 MG/DL (ref 0.5–1.4)
EKG IMPRESSION: NORMAL
EOSINOPHIL # BLD AUTO: 0.05 K/UL (ref 0–0.51)
EOSINOPHIL NFR BLD: 0.5 % (ref 0–6.9)
ERYTHROCYTE [DISTWIDTH] IN BLOOD BY AUTOMATED COUNT: 51 FL (ref 35.9–50)
GLOBULIN SER CALC-MCNC: 2.7 G/DL (ref 1.9–3.5)
GLUCOSE SERPL-MCNC: 113 MG/DL (ref 65–99)
HCT VFR BLD AUTO: 43.5 % (ref 37–47)
HGB BLD-MCNC: 14.3 G/DL (ref 12–16)
IMM GRANULOCYTES # BLD AUTO: 0.05 K/UL (ref 0–0.11)
IMM GRANULOCYTES NFR BLD AUTO: 0.5 % (ref 0–0.9)
INR PPP: 1.03 (ref 0.87–1.13)
LYMPHOCYTES # BLD AUTO: 1.46 K/UL (ref 1–4.8)
LYMPHOCYTES NFR BLD: 14.9 % (ref 22–41)
MCH RBC QN AUTO: 35 PG (ref 27–33)
MCHC RBC AUTO-ENTMCNC: 32.9 G/DL (ref 33.6–35)
MCV RBC AUTO: 106.4 FL (ref 81.4–97.8)
MONOCYTES # BLD AUTO: 0.95 K/UL (ref 0–0.85)
MONOCYTES NFR BLD AUTO: 9.7 % (ref 0–13.4)
NEUTROPHILS # BLD AUTO: 7.25 K/UL (ref 2–7.15)
NEUTROPHILS NFR BLD: 74 % (ref 44–72)
NRBC # BLD AUTO: 0 K/UL
NRBC BLD-RTO: 0 /100 WBC
PLATELET # BLD AUTO: 180 K/UL (ref 164–446)
PMV BLD AUTO: 8.8 FL (ref 9–12.9)
POTASSIUM SERPL-SCNC: 4.5 MMOL/L (ref 3.6–5.5)
PROT SERPL-MCNC: 7 G/DL (ref 6–8.2)
PROTHROMBIN TIME: 12.7 SEC (ref 12–14.6)
RBC # BLD AUTO: 4.09 M/UL (ref 4.2–5.4)
SODIUM SERPL-SCNC: 140 MMOL/L (ref 135–145)
WBC # BLD AUTO: 9.8 K/UL (ref 4.8–10.8)

## 2021-12-31 PROCEDURE — 96374 THER/PROPH/DIAG INJ IV PUSH: CPT

## 2021-12-31 PROCEDURE — 700111 HCHG RX REV CODE 636 W/ 250 OVERRIDE (IP)

## 2021-12-31 PROCEDURE — 700111 HCHG RX REV CODE 636 W/ 250 OVERRIDE (IP): Performed by: EMERGENCY MEDICINE

## 2021-12-31 PROCEDURE — 73501 X-RAY EXAM HIP UNI 1 VIEW: CPT | Mod: RT

## 2021-12-31 PROCEDURE — 71045 X-RAY EXAM CHEST 1 VIEW: CPT

## 2021-12-31 PROCEDURE — 85730 THROMBOPLASTIN TIME PARTIAL: CPT

## 2021-12-31 PROCEDURE — 85610 PROTHROMBIN TIME: CPT

## 2021-12-31 PROCEDURE — 85025 COMPLETE CBC W/AUTO DIFF WBC: CPT

## 2021-12-31 PROCEDURE — 36415 COLL VENOUS BLD VENIPUNCTURE: CPT

## 2021-12-31 PROCEDURE — 99285 EMERGENCY DEPT VISIT HI MDM: CPT

## 2021-12-31 PROCEDURE — 80053 COMPREHEN METABOLIC PANEL: CPT

## 2021-12-31 PROCEDURE — 93005 ELECTROCARDIOGRAM TRACING: CPT | Performed by: EMERGENCY MEDICINE

## 2021-12-31 PROCEDURE — 96376 TX/PRO/DX INJ SAME DRUG ADON: CPT

## 2021-12-31 PROCEDURE — 87426 SARSCOV CORONAVIRUS AG IA: CPT

## 2021-12-31 RX ADMIN — FENTANYL CITRATE 50 MCG: 50 INJECTION, SOLUTION INTRAMUSCULAR; INTRAVENOUS at 23:21

## 2021-12-31 RX ADMIN — FENTANYL CITRATE 50 MCG: 50 INJECTION, SOLUTION INTRAMUSCULAR; INTRAVENOUS at 21:00

## 2021-12-31 ASSESSMENT — PAIN DESCRIPTION - PAIN TYPE: TYPE: ACUTE PAIN

## 2021-12-31 ASSESSMENT — FIBROSIS 4 INDEX: FIB4 SCORE: 2.42

## 2022-01-01 ENCOUNTER — HOSPITAL ENCOUNTER (INPATIENT)
Facility: MEDICAL CENTER | Age: 77
LOS: 6 days | DRG: 958 | End: 2022-01-07
Attending: INTERNAL MEDICINE | Admitting: INTERNAL MEDICINE
Payer: MEDICARE

## 2022-01-01 VITALS
BODY MASS INDEX: 22.2 KG/M2 | RESPIRATION RATE: 17 BRPM | OXYGEN SATURATION: 90 % | SYSTOLIC BLOOD PRESSURE: 118 MMHG | WEIGHT: 130 LBS | HEART RATE: 89 BPM | TEMPERATURE: 98.2 F | DIASTOLIC BLOOD PRESSURE: 76 MMHG | HEIGHT: 64 IN

## 2022-01-01 DIAGNOSIS — E05.90 SUBCLINICAL HYPERTHYROIDISM: Chronic | ICD-10-CM

## 2022-01-01 DIAGNOSIS — S32.401A CLOSED DISPLACED FRACTURE OF RIGHT ACETABULUM, UNSPECIFIED PORTION OF ACETABULUM, INITIAL ENCOUNTER (HCC): ICD-10-CM

## 2022-01-01 DIAGNOSIS — D75.89 MACROCYTOSIS: Chronic | ICD-10-CM

## 2022-01-01 DIAGNOSIS — Z87.81 S/P RIGHT HIP FRACTURE: ICD-10-CM

## 2022-01-01 DIAGNOSIS — F51.01 PRIMARY INSOMNIA: Chronic | ICD-10-CM

## 2022-01-01 PROBLEM — F41.9 ANXIETY: Status: ACTIVE | Noted: 2022-01-01

## 2022-01-01 LAB
ERYTHROCYTE [DISTWIDTH] IN BLOOD BY AUTOMATED COUNT: 50.8 FL (ref 35.9–50)
FERRITIN SERPL-MCNC: 168 NG/ML (ref 10–291)
FOLATE SERPL-MCNC: >40 NG/ML
HCT VFR BLD AUTO: 36.8 % (ref 37–47)
HGB BLD-MCNC: 12.7 G/DL (ref 12–16)
HGB RETIC QN AUTO: 39.3 PG/CELL (ref 29–35)
IMM RETICS NFR: 15.7 % (ref 9.3–17.4)
IRON SATN MFR SERPL: 33 % (ref 15–55)
IRON SERPL-MCNC: 90 UG/DL (ref 40–170)
MAGNESIUM SERPL-MCNC: 2 MG/DL (ref 1.5–2.5)
MCH RBC QN AUTO: 36.4 PG (ref 27–33)
MCHC RBC AUTO-ENTMCNC: 34.5 G/DL (ref 33.6–35)
MCV RBC AUTO: 105.4 FL (ref 81.4–97.8)
PLATELET # BLD AUTO: 156 K/UL (ref 164–446)
PMV BLD AUTO: 9.2 FL (ref 9–12.9)
RBC # BLD AUTO: 3.49 M/UL (ref 4.2–5.4)
RETICS # AUTO: 0.06 M/UL (ref 0.04–0.06)
RETICS/RBC NFR: 1.8 % (ref 0.8–2.1)
SARS-COV+SARS-COV-2 AG RESP QL IA.RAPID: NOTDETECTED
SPECIMEN SOURCE: NORMAL
TIBC SERPL-MCNC: 273 UG/DL (ref 250–450)
UIBC SERPL-MCNC: 183 UG/DL (ref 110–370)
VIT B12 SERPL-MCNC: 1164 PG/ML (ref 211–911)
WBC # BLD AUTO: 5.8 K/UL (ref 4.8–10.8)

## 2022-01-01 PROCEDURE — 700105 HCHG RX REV CODE 258: Performed by: INTERNAL MEDICINE

## 2022-01-01 PROCEDURE — 700102 HCHG RX REV CODE 250 W/ 637 OVERRIDE(OP): Performed by: INTERNAL MEDICINE

## 2022-01-01 PROCEDURE — 96376 TX/PRO/DX INJ SAME DRUG ADON: CPT | Mod: XU

## 2022-01-01 PROCEDURE — 36415 COLL VENOUS BLD VENIPUNCTURE: CPT

## 2022-01-01 PROCEDURE — 700111 HCHG RX REV CODE 636 W/ 250 OVERRIDE (IP): Performed by: EMERGENCY MEDICINE

## 2022-01-01 PROCEDURE — 99223 1ST HOSP IP/OBS HIGH 75: CPT | Mod: AI | Performed by: INTERNAL MEDICINE

## 2022-01-01 PROCEDURE — 82746 ASSAY OF FOLIC ACID SERUM: CPT

## 2022-01-01 PROCEDURE — 700111 HCHG RX REV CODE 636 W/ 250 OVERRIDE (IP)

## 2022-01-01 PROCEDURE — 700111 HCHG RX REV CODE 636 W/ 250 OVERRIDE (IP): Performed by: INTERNAL MEDICINE

## 2022-01-01 PROCEDURE — 770001 HCHG ROOM/CARE - MED/SURG/GYN PRIV*

## 2022-01-01 PROCEDURE — A9270 NON-COVERED ITEM OR SERVICE: HCPCS | Performed by: INTERNAL MEDICINE

## 2022-01-01 PROCEDURE — 83540 ASSAY OF IRON: CPT

## 2022-01-01 PROCEDURE — 72192 CT PELVIS W/O DYE: CPT | Mod: ME

## 2022-01-01 PROCEDURE — 83550 IRON BINDING TEST: CPT

## 2022-01-01 PROCEDURE — 83735 ASSAY OF MAGNESIUM: CPT

## 2022-01-01 PROCEDURE — 85046 RETICYTE/HGB CONCENTRATE: CPT

## 2022-01-01 PROCEDURE — 82728 ASSAY OF FERRITIN: CPT

## 2022-01-01 PROCEDURE — 85027 COMPLETE CBC AUTOMATED: CPT

## 2022-01-01 PROCEDURE — 96375 TX/PRO/DX INJ NEW DRUG ADDON: CPT | Mod: XU

## 2022-01-01 PROCEDURE — 82607 VITAMIN B-12: CPT

## 2022-01-01 RX ORDER — NEOMYCIN SULFATE, POLYMYXIN B SULFATE, AND DEXAMETHASONE 3.5; 10000; 1 MG/G; [USP'U]/G; MG/G
0.25 OINTMENT OPHTHALMIC 4 TIMES DAILY
Status: DISCONTINUED | OUTPATIENT
Start: 2022-01-01 | End: 2022-01-01

## 2022-01-01 RX ORDER — MORPHINE SULFATE 4 MG/ML
4 INJECTION INTRAVENOUS ONCE
Status: COMPLETED | OUTPATIENT
Start: 2022-01-01 | End: 2022-01-01

## 2022-01-01 RX ORDER — BUSPIRONE HYDROCHLORIDE 10 MG/1
5 TABLET ORAL 2 TIMES DAILY
Status: DISCONTINUED | OUTPATIENT
Start: 2022-01-01 | End: 2022-01-07 | Stop reason: HOSPADM

## 2022-01-01 RX ORDER — ZOLPIDEM TARTRATE 5 MG/1
5 TABLET ORAL NIGHTLY PRN
Status: DISCONTINUED | OUTPATIENT
Start: 2022-01-01 | End: 2022-01-06

## 2022-01-01 RX ORDER — OXYCODONE HYDROCHLORIDE 5 MG/1
2.5 TABLET ORAL
Status: DISCONTINUED | OUTPATIENT
Start: 2022-01-01 | End: 2022-01-07 | Stop reason: HOSPADM

## 2022-01-01 RX ORDER — BISACODYL 10 MG
10 SUPPOSITORY, RECTAL RECTAL
Status: DISCONTINUED | OUTPATIENT
Start: 2022-01-01 | End: 2022-01-07 | Stop reason: HOSPADM

## 2022-01-01 RX ORDER — HYDROMORPHONE HYDROCHLORIDE 1 MG/ML
0.25 INJECTION, SOLUTION INTRAMUSCULAR; INTRAVENOUS; SUBCUTANEOUS
Status: DISCONTINUED | OUTPATIENT
Start: 2022-01-01 | End: 2022-01-07 | Stop reason: HOSPADM

## 2022-01-01 RX ORDER — FOLIC ACID 1 MG/1
2 TABLET ORAL DAILY
Status: DISCONTINUED | OUTPATIENT
Start: 2022-01-01 | End: 2022-01-07 | Stop reason: HOSPADM

## 2022-01-01 RX ORDER — OXYCODONE HYDROCHLORIDE 5 MG/1
5 TABLET ORAL
Status: DISCONTINUED | OUTPATIENT
Start: 2022-01-01 | End: 2022-01-07 | Stop reason: HOSPADM

## 2022-01-01 RX ORDER — SODIUM CHLORIDE 9 MG/ML
INJECTION, SOLUTION INTRAVENOUS CONTINUOUS
Status: ACTIVE | OUTPATIENT
Start: 2022-01-01 | End: 2022-01-01

## 2022-01-01 RX ORDER — ONDANSETRON 2 MG/ML
4 INJECTION INTRAMUSCULAR; INTRAVENOUS ONCE
Status: COMPLETED | OUTPATIENT
Start: 2022-01-01 | End: 2022-01-01

## 2022-01-01 RX ORDER — ATORVASTATIN CALCIUM 20 MG/1
20 TABLET, FILM COATED ORAL EVERY EVENING
Status: DISCONTINUED | OUTPATIENT
Start: 2022-01-01 | End: 2022-01-07 | Stop reason: HOSPADM

## 2022-01-01 RX ORDER — ACETAMINOPHEN 325 MG/1
650 TABLET ORAL EVERY 6 HOURS PRN
Status: DISCONTINUED | OUTPATIENT
Start: 2022-01-01 | End: 2022-01-04

## 2022-01-01 RX ORDER — POLYETHYLENE GLYCOL 3350 17 G/17G
1 POWDER, FOR SOLUTION ORAL
Status: DISCONTINUED | OUTPATIENT
Start: 2022-01-01 | End: 2022-01-07 | Stop reason: HOSPADM

## 2022-01-01 RX ORDER — ONDANSETRON 2 MG/ML
4 INJECTION INTRAMUSCULAR; INTRAVENOUS EVERY 4 HOURS PRN
Status: DISCONTINUED | OUTPATIENT
Start: 2022-01-01 | End: 2022-01-07 | Stop reason: HOSPADM

## 2022-01-01 RX ORDER — LABETALOL HYDROCHLORIDE 5 MG/ML
10 INJECTION, SOLUTION INTRAVENOUS EVERY 4 HOURS PRN
Status: DISCONTINUED | OUTPATIENT
Start: 2022-01-01 | End: 2022-01-07 | Stop reason: HOSPADM

## 2022-01-01 RX ORDER — AMOXICILLIN 250 MG
2 CAPSULE ORAL 2 TIMES DAILY
Status: DISCONTINUED | OUTPATIENT
Start: 2022-01-01 | End: 2022-01-07 | Stop reason: HOSPADM

## 2022-01-01 RX ORDER — ONDANSETRON 4 MG/1
4 TABLET, ORALLY DISINTEGRATING ORAL EVERY 4 HOURS PRN
Status: DISCONTINUED | OUTPATIENT
Start: 2022-01-01 | End: 2022-01-07 | Stop reason: HOSPADM

## 2022-01-01 RX ORDER — ALPRAZOLAM 0.25 MG/1
0.25 TABLET ORAL ONCE
Status: COMPLETED | OUTPATIENT
Start: 2022-01-01 | End: 2022-01-01

## 2022-01-01 RX ORDER — HEPARIN SODIUM 5000 [USP'U]/ML
5000 INJECTION, SOLUTION INTRAVENOUS; SUBCUTANEOUS EVERY 8 HOURS
Status: DISCONTINUED | OUTPATIENT
Start: 2022-01-01 | End: 2022-01-07 | Stop reason: HOSPADM

## 2022-01-01 RX ORDER — FLUTICASONE PROPIONATE 50 MCG
1 SPRAY, SUSPENSION (ML) NASAL EVERY MORNING
Status: DISCONTINUED | OUTPATIENT
Start: 2022-01-01 | End: 2022-01-07 | Stop reason: HOSPADM

## 2022-01-01 RX ADMIN — MORPHINE SULFATE 4 MG: 4 INJECTION INTRAVENOUS at 00:13

## 2022-01-01 RX ADMIN — BUSPIRONE HYDROCHLORIDE 5 MG: 10 TABLET ORAL at 16:22

## 2022-01-01 RX ADMIN — MORPHINE SULFATE 4 MG: 4 INJECTION INTRAVENOUS at 01:38

## 2022-01-01 RX ADMIN — HEPARIN SODIUM 5000 UNITS: 5000 INJECTION, SOLUTION INTRAVENOUS; SUBCUTANEOUS at 21:12

## 2022-01-01 RX ADMIN — BUSPIRONE HYDROCHLORIDE 5 MG: 10 TABLET ORAL at 08:54

## 2022-01-01 RX ADMIN — ONDANSETRON 4 MG: 2 INJECTION INTRAMUSCULAR; INTRAVENOUS at 01:27

## 2022-01-01 RX ADMIN — OXYCODONE 5 MG: 5 TABLET ORAL at 16:21

## 2022-01-01 RX ADMIN — HEPARIN SODIUM 5000 UNITS: 5000 INJECTION, SOLUTION INTRAVENOUS; SUBCUTANEOUS at 14:37

## 2022-01-01 RX ADMIN — OXYCODONE 5 MG: 5 TABLET ORAL at 22:51

## 2022-01-01 RX ADMIN — ATORVASTATIN CALCIUM 20 MG: 20 TABLET, FILM COATED ORAL at 21:12

## 2022-01-01 RX ADMIN — THIAMINE HYDROCHLORIDE 100 MG: 100 INJECTION, SOLUTION INTRAMUSCULAR; INTRAVENOUS at 06:39

## 2022-01-01 RX ADMIN — DOCUSATE SODIUM 50 MG AND SENNOSIDES 8.6 MG 2 TABLET: 8.6; 5 TABLET, FILM COATED ORAL at 16:22

## 2022-01-01 RX ADMIN — ALPRAZOLAM 0.25 MG: 0.25 TABLET ORAL at 14:36

## 2022-01-01 RX ADMIN — FOLIC ACID 2 MG: 1 TABLET ORAL at 04:52

## 2022-01-01 RX ADMIN — ZOLPIDEM TARTRATE 5 MG: 5 TABLET ORAL at 21:12

## 2022-01-01 RX ADMIN — SODIUM CHLORIDE: 9 INJECTION, SOLUTION INTRAVENOUS at 04:20

## 2022-01-01 RX ADMIN — OXYCODONE 5 MG: 5 TABLET ORAL at 04:52

## 2022-01-01 RX ADMIN — ZOLPIDEM TARTRATE 5 MG: 5 TABLET ORAL at 03:51

## 2022-01-01 RX ADMIN — OXYCODONE 5 MG: 5 TABLET ORAL at 08:54

## 2022-01-01 ASSESSMENT — ENCOUNTER SYMPTOMS
STRIDOR: 0
DOUBLE VISION: 0
BACK PAIN: 0
SPUTUM PRODUCTION: 0
NAUSEA: 0
NERVOUS/ANXIOUS: 1
SPEECH CHANGE: 0
HEMOPTYSIS: 0
INSOMNIA: 0
SORE THROAT: 0
COUGH: 0
FEVER: 0
CONSTIPATION: 0
VOMITING: 0
BLURRED VISION: 0
FALLS: 1
HALLUCINATIONS: 0
DIARRHEA: 0
TINGLING: 0
NECK PAIN: 0
CHILLS: 0
ORTHOPNEA: 0
WEIGHT LOSS: 0
EYE PAIN: 0
PALPITATIONS: 0
DEPRESSION: 0
BRUISES/BLEEDS EASILY: 0
SHORTNESS OF BREATH: 0
PHOTOPHOBIA: 0
MYALGIAS: 0
FOCAL WEAKNESS: 0
DIZZINESS: 0
TREMORS: 0
MYALGIAS: 1
HEADACHES: 0
ABDOMINAL PAIN: 0
SENSORY CHANGE: 0

## 2022-01-01 ASSESSMENT — FIBROSIS 4 INDEX
FIB4 SCORE: 2.42
FIB4 SCORE: 2.42

## 2022-01-01 ASSESSMENT — PAIN DESCRIPTION - PAIN TYPE
TYPE: ACUTE PAIN
TYPE: ACUTE PAIN
TYPE: ACUTE PAIN;SURGICAL PAIN
TYPE: ACUTE PAIN

## 2022-01-01 ASSESSMENT — LIFESTYLE VARIABLES
DOES PATIENT WANT TO STOP DRINKING: NO
EVER HAD A DRINK FIRST THING IN THE MORNING TO STEADY YOUR NERVES TO GET RID OF A HANGOVER: NO
TOTAL SCORE: 0
CONSUMPTION TOTAL: NEGATIVE
HAVE PEOPLE ANNOYED YOU BY CRITICIZING YOUR DRINKING: NO
EVER FELT BAD OR GUILTY ABOUT YOUR DRINKING: NO
ON A TYPICAL DAY WHEN YOU DRINK ALCOHOL HOW MANY DRINKS DO YOU HAVE: 0
HAVE YOU EVER FELT YOU SHOULD CUT DOWN ON YOUR DRINKING: NO
AVERAGE NUMBER OF DAYS PER WEEK YOU HAVE A DRINK CONTAINING ALCOHOL: 0
HOW MANY TIMES IN THE PAST YEAR HAVE YOU HAD 5 OR MORE DRINKS IN A DAY: 0
TOTAL SCORE: 0
TOTAL SCORE: 0
SUBSTANCE_ABUSE: 0
ALCOHOL_USE: YES

## 2022-01-01 NOTE — CARE PLAN
Problem: Knowledge Deficit - Standard  Goal: Patient and family/care givers will demonstrate understanding of plan of care, disease process/condition, diagnostic tests and medications  Outcome: Progressing     Problem: Fall Risk  Goal: Patient will remain free from falls  Outcome: Progressing   The patient is Stable - Low risk of patient condition declining or worsening    Shift Goals  Clinical Goals: comfort; pain management; NPO  Patient Goals: pain control    Progress made toward(s) clinical / shift goals:  Pt resting in bed with call light in reach, bed frame alarm activated.  Pt skin intact and pt turns self from side to side.  Pt educated on NPO status and informed will be updated when surgery is scheduled.  Pain controlled with scheduled and PRN medications.      Patient is not progressing towards the following goals:

## 2022-01-01 NOTE — ASSESSMENT & PLAN NOTE
Secondary to mechanical slip and fall, symptomatic management, follow-up orthopedic surgery consult.  S/p surgery with Dr. Zamora 1/3  Continue to provider pain control and monitor adverse effect of pain medications.  Follow up with PT/OT

## 2022-01-01 NOTE — ASSESSMENT & PLAN NOTE
Unclear cause,   TSH low in the past. She will need to consider endo chronology referral as OP (subclinical hyperthyroidism, T4 nl on 5/2021)  b12 normal   Needs age appropriate malignancy screening as OP with PCP

## 2022-01-01 NOTE — H&P
Hospital Medicine History & Physical Note    Date of Service  1/1/2022    Primary Care Physician  Lucina Gupta M.D.    Consultants  orthopedics    Specialist Names: Dr. Rivas    Code Status  Full Code    Chief Complaint  Right hip pain    History of Presenting Illness  Yasmin Zhong is a 76 y.o. female who presented 1/1/2022 as transfer from [facility] with right hip pain and an acetabular fracture.  After a mechanical slip and fall striking her right pelvis.  She cannot ambulate due to severe pain in the right hip.  She does not have any loss of function to the right foot but she does have severe pain with movement at the hip.  She is unaware of any other injuries.  She did not strike her head or sustain subsequent shortness of breath or chest pain.  She does not take any anticoagulants.  She is otherwise active not limited by shortness of breath or chest pain with daily activities.  I discussed the plan of care with Dr. Green.    Review of Systems  Review of Systems   Constitutional: Negative for fever, malaise/fatigue and weight loss.   HENT: Negative for sore throat and tinnitus.    Eyes: Negative for blurred vision and double vision.   Respiratory: Negative for cough, hemoptysis and stridor.    Cardiovascular: Negative for chest pain and palpitations.   Gastrointestinal: Negative for nausea and vomiting.   Genitourinary: Negative for dysuria and urgency.   Musculoskeletal: Negative for myalgias and neck pain.   Skin: Negative for itching and rash.   Neurological: Negative for dizziness and headaches.   Endo/Heme/Allergies: Does not bruise/bleed easily.   Psychiatric/Behavioral: Negative for depression. The patient does not have insomnia.        Past Medical History   has a past medical history of Anxiety, Arrhythmia (11/18/2020), Arthritis (11/18/2020), Blood clotting disorder ([facility]), Breath shortness (11/18/2020), Cancer ([facility]) (1990), Cataract, Depression, Fall from ground level  "(5/25/2021), Heart murmur, History of respiratory failure, Hyperlipidemia, Indigestion (11/18/2020), Insomnia, Pain (11/18/2020), Peripheral neuropathy, Pneumonia, Pulmonary fibrosis (HCC), Recurrent major depressive disorder, in full remission (HCC) (7/21/2017), and Thrombocytopenia (HCC) (4/19/2021).    Surgical History   has a past surgical history that includes other; hip hemiarthroplasty (4/25/2015); pr lap,esophagogast fundoplasty (N/A, 11/23/2020); cataract extraction with iol (Bilateral); strabismus repair (Bilateral, 11/19/2021); and eye surgery (Bilateral, 11/19/2021).     Family History  family history includes Cancer in her father, maternal uncle, and mother; Diabetes in her maternal aunt, maternal uncle, and mother; Heart Attack in her father.   Family history reviewed with patient. There is no family history that is pertinent to the chief complaint.     Social History   reports that she quit smoking about 36 years ago. Her smoking use included cigarettes. She has a 3.75 pack-year smoking history. She has never used smokeless tobacco. She reports previous alcohol use. She reports that she does not use drugs.    Allergies  Allergies   Allergen Reactions   • Pollen Extract      \"cough\"       Medications  Prior to Admission Medications   Prescriptions Last Dose Informant Patient Reported? Taking?   BIOTIN PO 12/30/2021 at Unknown time Patient Yes No   Sig: Take 1 Tablet by mouth every morning. Pt unsure of dose   Calcium Citrate-Vitamin D (CALCIUM + D PO) 12/30/2021 Patient Yes No   Sig: Take 1 Tablet by mouth every morning.   Cholecalciferol (VITAMIN D3 PO) 12/30/2021 Patient Yes No   Sig: Take 1 Capsule by mouth every morning. Pt unsure of dose   Doxylamine Succinate, Sleep, (SLEEP AID PO) 12/30/2021 Patient Yes No   Sig: Take  by mouth as needed. Pt unsure of dose   MAGNESIUM PO 12/30/2021 Patient Yes No   Sig: Take 1 Capsule by mouth every morning. Pt unsure of dose   VITAMIN E PO 12/30/2021 Patient " Yes No   Sig: Take 1 Capsule by mouth every morning. Pt unsure of dose   atorvastatin (LIPITOR) 20 MG Tab 12/30/2021 at Unknown time Patient No No   Sig: Take 1 Tablet by mouth every evening.   busPIRone (BUSPAR) 10 MG Tab tablet 12/30/2021 at Unknown time Patient No No   Sig: Pt takes 10MG in AM and 20MG HS Take 10-20 mg by mouth 2 times a day. Pt takes 10MG in AM and 20MG HS   cetirizine (ZYRTEC) 10 MG Tab 12/30/2021 Patient Yes No   Sig: Take 10 mg by mouth as needed for Allergies.   cyanocobalamin (VITAMIN B-12) 100 MCG Tab 12/30/2021 Patient Yes No   Sig: Take 100 mcg by mouth every morning.   fluticasone (FLONASE) 50 MCG/ACT nasal spray 12/30/2021 Patient No No   Sig: Administer 1 Spray into affected nostril(S) every day.   Patient taking differently: Administer 1 Spray into affected nostril(S) every morning.   neomycin-polymixin-dexamethasone (MAXITROL) 3.5-30678-4.1 Ointment ophthalmic ointment 12/30/2021  No No   Sig: Apply 0.25 Inches to right eye 4 times a day. To be used post operative   zolpidem (AMBIEN) 10 MG Tab 12/30/2021  No No   Sig: Take 1 Tablet by mouth at bedtime as needed for Sleep for up to 30 days.      Facility-Administered Medications: None       Physical Exam  Temp:  [36.8 °C (98.2 °F)] 36.8 °C (98.2 °F)  Pulse:  [89-94] 89  Resp:  [17-18] 17  BP: (105-118)/(60-76) 118/76  SpO2:  [90 %-94 %] 90 %                          Physical Exam  Vitals and nursing note reviewed.   Constitutional:       General: She is not in acute distress.     Appearance: Normal appearance. She is normal weight. She is not toxic-appearing.   HENT:      Head: Normocephalic and atraumatic.      Nose: Nose normal. No congestion or rhinorrhea.      Mouth/Throat:      Mouth: Mucous membranes are moist.      Pharynx: Oropharynx is clear.   Eyes:      Extraocular Movements: Extraocular movements intact.      Conjunctiva/sclera: Conjunctivae normal.      Pupils: Pupils are equal, round, and reactive to light.   Neck:       Vascular: No carotid bruit.   Cardiovascular:      Rate and Rhythm: Normal rate and regular rhythm.      Pulses: Normal pulses.      Heart sounds: Murmur heard.   No gallop.       Comments: Murmur of aortic sclerosis  Pulmonary:      Effort: No respiratory distress.      Breath sounds: Normal breath sounds. No wheezing or rales.   Abdominal:      General: Abdomen is flat. Bowel sounds are normal. There is no distension.      Palpations: Abdomen is soft. There is no mass.      Tenderness: There is no abdominal tenderness.      Hernia: No hernia is present.   Musculoskeletal:         General: Swelling and signs of injury present. No tenderness.      Cervical back: Normal range of motion and neck supple. No muscular tenderness.      Comments: Right lower extremity range of motion/hip flexion limited by pain   Lymphadenopathy:      Cervical: No cervical adenopathy.   Skin:     Capillary Refill: Capillary refill takes less than 2 seconds.      Coloration: Skin is not jaundiced or pale.      Findings: No bruising.   Neurological:      General: No focal deficit present.      Mental Status: She is alert and oriented to person, place, and time. Mental status is at baseline.      Cranial Nerves: No cranial nerve deficit.      Motor: No weakness.      Coordination: Coordination normal.   Psychiatric:         Mood and Affect: Mood normal.         Thought Content: Thought content normal.         Judgment: Judgment normal.         Laboratory:  Recent Labs     12/31/21  2100   WBC 9.8   RBC 4.09*   HEMOGLOBIN 14.3   HEMATOCRIT 43.5   .4*   MCH 35.0*   MCHC 32.9*   RDW 51.0*   PLATELETCT 180   MPV 8.8*     Recent Labs     12/31/21  2100   SODIUM 140   POTASSIUM 4.5   CHLORIDE 102   CO2 22   GLUCOSE 113*   BUN 17   CREATININE 0.74   CALCIUM 9.4     Recent Labs     12/31/21  2100   ALTSGPT 20   ASTSGOT 26   ALKPHOSPHAT 82   TBILIRUBIN 0.4   GLUCOSE 113*     Recent Labs     12/31/21  2100   APTT 24.9   INR 1.03     No results  for input(s): NTPROBNP in the last 72 hours.      No results for input(s): TROPONINT in the last 72 hours.    Imaging:  No orders to display       X-Ray:  I have personally reviewed the images and compared with prior images.    Assessment/Plan:  I anticipate this patient will require at least two midnights for appropriate medical management, necessitating inpatient admission.    * S/P right hip fracture- (present on admission)  Assessment & Plan  Secondary to mechanical slip and fall, symptomatic management, follow-up orthopedic surgery consult.  No immediately reversible cardiopulmonary risk factors present justifying delay in surgery.    Aortic valve sclerosis- (present on admission)  Assessment & Plan  Sclerosis without stenosis verified but echocardiogram 11/20      Macrocytosis- (present on admission)  Assessment & Plan  Unclear cause, follow-up anemia labs, empiric thiamine and folate      VTE prophylaxis: SCDs/TEDs

## 2022-01-01 NOTE — ED TRIAGE NOTES
"Chief Complaint   Patient presents with     Cough       Initial Pulse 125  Temp 100.8  F (38.2  C) (Temporal)  Ht 3' 0.25\" (0.921 m)  Wt 32 lb 1.6 oz (14.6 kg)  SpO2 96%  BMI 17.17 kg/m2 Estimated body mass index is 17.17 kg/(m^2) as calculated from the following:    Height as of this encounter: 3' 0.25\" (0.921 m).    Weight as of this encounter: 32 lb 1.6 oz (14.6 kg).  Medication Reconciliation: complete     Bernie Turk MA      " Pt bib [facility] c/o right hip pain after MGLF. No LOC, denies hitting head.

## 2022-01-01 NOTE — ED NOTES
NAD noted. Calm and cooperative. No respiratory distress noted. Report right hip pain and nausea. Patient appears anxious.

## 2022-01-01 NOTE — PROGRESS NOTES
4 Eyes Skin Assessment Completed by DAMARIS Matt and DAMARIS Mckeon.    Head WDL  Ears WDL  Nose WDL  Mouth WDL  Neck WDL  Breast/Chest WDL  Shoulder Blades WDL  Spine WDL  (R) Arm/Elbow/Hand Abrasion  (L) Arm/Elbow/Hand WDL  Abdomen WDL  Groin WDL  Scrotum/Coccyx/Buttocks Redness and Blanching  (R) Leg Bruising  (L) Leg WDL  (R) Heel/Foot/Toe WDL  (L) Heel/Foot/Toe WDL          Devices In Places Pulse Ox and Nasal Cannula      Interventions In Place Pressure Redistribution Mattress    Possible Skin Injury No    Pictures Uploaded Into Epic N/A  Wound Consult Placed N/A  RN Wound Prevention Protocol Ordered No

## 2022-01-01 NOTE — PROGRESS NOTES
Right acetabulum fx, displaced  Will need surgery, likely Monday/Tuesday  Needs pressure ulcer precautions and DVT proph  Discussed with patient

## 2022-01-01 NOTE — ED PROVIDER NOTES
ED Provider Note    CHIEF COMPLAINT  Chief Complaint   Patient presents with   • GLF   • Hip Pain     right side       HPI  Yasmin Zhong is a 76 y.o. female who presents with right hip pain.  The patient slipped striking her right pelvic region.  She cannot ambulate due to severe pain in the right hip.  She does not have any loss of function to the right foot but she does have severe pain with movement at the hip.  She is unaware of any other injuries.  She did not strike her head.  She does not take any anticoagulants.    REVIEW OF SYSTEMS  See HPI for further details. All other systems are negative.     PAST MEDICAL HISTORY  Past Medical History:   Diagnosis Date   • Fall from ground level 2021   • Thrombocytopenia (HCC) 2021   • Indigestion 2020    GERD   • Arrhythmia 2020   • Pain 2020    back pain   • Arthritis 2020   • Breath shortness 2020    no supplemental oxygen   • Recurrent major depressive disorder, in full remission (HCC) 2017   • Cancer ([facility])     basal cell per hx   • Anxiety    • Blood clotting disorder (HCC)     hx  in lung   • Cataract     meg IOL    • Depression    • Heart murmur    • History of respiratory failure     6-month hospitalization in    • Hyperlipidemia    • Insomnia    • Peripheral neuropathy    • Pneumonia     hx    • Pulmonary fibrosis (HCC)        FAMILY HISTORY  [unfilled]    SOCIAL HISTORY  Social History     Socioeconomic History   • Marital status: Single     Spouse name: Not on file   • Number of children: 0   • Years of education: Not on file   • Highest education level: Not on file   Occupational History   • Not on file   Tobacco Use   • Smoking status: Former Smoker     Packs/day: 0.25     Years: 15.00     Pack years: 3.75     Types: Cigarettes     Quit date: 3/11/1985     Years since quittin.8   • Smokeless tobacco: Never Used   • Tobacco comment: passive smoke exposure her entire life   Vaping Use   •  Vaping Use: Never used   Substance and Sexual Activity   • Alcohol use: Not Currently     Alcohol/week: 0.0 oz   • Drug use: No   • Sexual activity: Not Currently   Other Topics Concern   • Not on file   Social History Narrative   • Not on file     Social Determinants of Health     Financial Resource Strain:    • Difficulty of Paying Living Expenses: Not on file   Food Insecurity:    • Worried About Running Out of Food in the Last Year: Not on file   • Ran Out of Food in the Last Year: Not on file   Transportation Needs:    • Lack of Transportation (Medical): Not on file   • Lack of Transportation (Non-Medical): Not on file   Physical Activity:    • Days of Exercise per Week: Not on file   • Minutes of Exercise per Session: Not on file   Stress:    • Feeling of Stress : Not on file   Social Connections:    • Frequency of Communication with Friends and Family: Not on file   • Frequency of Social Gatherings with Friends and Family: Not on file   • Attends Zoroastrian Services: Not on file   • Active Member of Clubs or Organizations: Not on file   • Attends Club or Organization Meetings: Not on file   • Marital Status: Not on file   Intimate Partner Violence:    • Fear of Current or Ex-Partner: Not on file   • Emotionally Abused: Not on file   • Physically Abused: Not on file   • Sexually Abused: Not on file   Housing Stability:    • Unable to Pay for Housing in the Last Year: Not on file   • Number of Places Lived in the Last Year: Not on file   • Unstable Housing in the Last Year: Not on file       SURGICAL HISTORY  Past Surgical History:   Procedure Laterality Date   • STRABISMUS REPAIR Bilateral 11/19/2021    Procedure: STRABISMUS SURGERY - ESOTROPIA STRABISMUS PROCEDURE, ONE HORIZONTAL MUSCLE - BILATERAL, PROCEDURE FOR SCARRING OF EXTRAOCULAR MUSCLE AND ADJUSTABLE SUTURE PLACEMENT - RIGHT;  Surgeon: Keith Horne M.D.;  Location: SURGERY SAME DAY [facility];  Service: Ophthalmology   • EYE SURGERY Bilateral  "11/19/2021    Bilateral medial rectus recession in patient with prior ocular Bilateral medial rectus recession in patient with prior ocular    • PB LAP,ESOPHAGOGAST FUNDOPLASTY N/A 11/23/2020    Procedure: FUNDOPLICATION, NISSEN, LAPAROSCOPIC- FOR PARAESOPHAGEAL WITH MESH;  Surgeon: Pritesh Baptiste M.D.;  Location: SURGERY [facility];  Service: General   • HIP HEMIARTHROPLASTY  4/25/2015    Performed by Raymond Lantigua M.D. at SURGERY [facility] ORS   • CATARACT EXTRACTION WITH IOL Bilateral    • OTHER      breast reduction       CURRENT MEDICATIONS  Home Medications    **Home medications have not yet been reviewed for this encounter**         ALLERGIES  Allergies   Allergen Reactions   • Pollen Extract      \"cough\"       PHYSICAL EXAM  VITAL SIGNS: /73   Pulse 93   Resp 18   SpO2 93%       Constitutional: In acute distress, Non-toxic appearance.   HENT: Normocephalic, Atraumatic, Bilateral external ears normal, Oropharynx moist, No oral exudates, Nose normal.   Eyes: PERRLA, EOMI, Conjunctiva normal, No discharge.   Neck: Normal range of motion, No tenderness, Supple, No stridor.   Lymphatic: No lymphadenopathy noted.   Cardiovascular: Normal heart rate, Normal rhythm, No murmurs, No rubs, No gallops.   Thorax & Lungs: Normal breath sounds, No respiratory distress, No wheezing, No chest tenderness.   Abdomen: Bowel sounds normal, Soft, No tenderness, No masses, No pulsatile masses.   Skin: Warm, Dry, No erythema, No rash.   Back: No tenderness, No CVA tenderness.   Extremities: Pain with internal extra rotation at the right hip with no obvious deformities.  Extremities otherwise intact distal pulses, No edema, No tenderness, No cyanosis, No clubbing.   Neurologic: GCS of 15  Psychiatric: Affect normal, Judgment normal, Mood normal.     Results for orders placed or performed during the hospital encounter of 12/31/21   CBC WITH DIFFERENTIAL   Result Value Ref Range    WBC 9.8 4.8 - 10.8 K/uL    RBC 4.09 " (L) 4.20 - 5.40 M/uL    Hemoglobin 14.3 12.0 - 16.0 g/dL    Hematocrit 43.5 37.0 - 47.0 %    .4 (H) 81.4 - 97.8 fL    MCH 35.0 (H) 27.0 - 33.0 pg    MCHC 32.9 (L) 33.6 - 35.0 g/dL    RDW 51.0 (H) 35.9 - 50.0 fL    Platelet Count 180 164 - 446 K/uL    MPV 8.8 (L) 9.0 - 12.9 fL    Neutrophils-Polys 74.00 (H) 44.00 - 72.00 %    Lymphocytes 14.90 (L) 22.00 - 41.00 %    Monocytes 9.70 0.00 - 13.40 %    Eosinophils 0.50 0.00 - 6.90 %    Basophils 0.40 0.00 - 1.80 %    Immature Granulocytes 0.50 0.00 - 0.90 %    Nucleated RBC 0.00 /100 WBC    Neutrophils (Absolute) 7.25 (H) 2.00 - 7.15 K/uL    Lymphs (Absolute) 1.46 1.00 - 4.80 K/uL    Monos (Absolute) 0.95 (H) 0.00 - 0.85 K/uL    Eos (Absolute) 0.05 0.00 - 0.51 K/uL    Baso (Absolute) 0.04 0.00 - 0.12 K/uL    Immature Granulocytes (abs) 0.05 0.00 - 0.11 K/uL    NRBC (Absolute) 0.00 K/uL   COMP METABOLIC PANEL   Result Value Ref Range    Sodium 140 135 - 145 mmol/L    Potassium 4.5 3.6 - 5.5 mmol/L    Chloride 102 96 - 112 mmol/L    Co2 22 20 - 33 mmol/L    Anion Gap 16.0 7.0 - 16.0    Glucose 113 (H) 65 - 99 mg/dL    Bun 17 8 - 22 mg/dL    Creatinine 0.74 0.50 - 1.40 mg/dL    Calcium 9.4 8.4 - 10.2 mg/dL    AST(SGOT) 26 12 - 45 U/L    ALT(SGPT) 20 2 - 50 U/L    Alkaline Phosphatase 82 30 - 99 U/L    Total Bilirubin 0.4 0.1 - 1.5 mg/dL    Albumin 4.3 3.2 - 4.9 g/dL    Total Protein 7.0 6.0 - 8.2 g/dL    Globulin 2.7 1.9 - 3.5 g/dL    A-G Ratio 1.6 g/dL   PROTHROMBIN TIME   Result Value Ref Range    PT 12.7 12.0 - 14.6 sec    INR 1.03 0.87 - 1.13   APTT   Result Value Ref Range    APTT 24.9 24.7 - 36.0 sec   ESTIMATED GFR   Result Value Ref Range    GFR If African American >60 >60 mL/min/1.73 m 2    GFR If Non African American >60 >60 mL/min/1.73 m 2   EKG   Result Value Ref Range    Report       [facility]    Test Date:  2021-12-31  Pt Name:    KELLY DAVIS                 Department: [facility]  MRN:        [id number]                       [facility]  Gender:     Female                       Technician: CINDA  :        [id number]                   Requested By:JOHN GREEN  Order #:    [id number]                    Reading MD: JOHN GREEN MD    Measurements  Intervals                                Axis  Rate:       96                           P:          53  WY:         144                          QRS:        13  QRSD:       76                           T:          34  QT:         340  QTc:        430    Interpretive Statements  Twelve-lead EKG shows a normal sinus rhythm with a ventricular to 96, normal  QRS, normal intervals, no ST segment elevation or depression, normal T waves.  Electronically Signed On 2021 23:14:12 PST by JOHN GREEN MD           RADIOLOGY/PROCEDURES  DX-HIP-UNILATERAL-WITH PELVIS-1 VIEW RIGHT    (Results Pending)   CT-HIP W/O PLUS RECONS RIGHT    (Results Pending)   DX-CHEST-PORTABLE (1 VIEW)    (Results Pending)         COURSE & MEDICAL DECISION MAKING  Pertinent Labs & Imaging studies reviewed. (See chart for details)  This a 76-year-old female who presents after a fall.  Unfortunately imaging shows a blowout acetabular fracture.  I did speak with orthopedics and they want a CT scan of the hip and states the patient needs to be transferred to [facility] for surgical intervention.  Preoperative work-up has been obtained including a Covid swab.  On repeat exam I do not see any other evidence of injury.  The patient has remained hemodynamically stable.      FINAL IMPRESSION  1.  Dislocation of the right hip with comminuted acetabular fracture    Disposition  Patient will be transferred for surgical intervention         Electronically signed by: John Green M.D., 2021 8:43 PM

## 2022-01-01 NOTE — ED NOTES
Med rec complete per pt at bedside.  Allergies reviewed and updated.  Confirmed patient's home pharmacy preference.    Pt reports no ABX in the last 30 days.

## 2022-01-01 NOTE — ED NOTES
Patient reports that she is having pain and will have to be moved for CT scan. MD notified. Verbal order 50 mcg of fentanyl IV.

## 2022-01-02 PROBLEM — R50.9 FEVER: Status: ACTIVE | Noted: 2022-01-02

## 2022-01-02 LAB
ANION GAP SERPL CALC-SCNC: 8 MMOL/L (ref 7–16)
BASOPHILS # BLD AUTO: 0.4 % (ref 0–1.8)
BASOPHILS # BLD: 0.02 K/UL (ref 0–0.12)
BUN SERPL-MCNC: 13 MG/DL (ref 8–22)
CALCIUM SERPL-MCNC: 8.5 MG/DL (ref 8.5–10.5)
CHLORIDE SERPL-SCNC: 105 MMOL/L (ref 96–112)
CO2 SERPL-SCNC: 26 MMOL/L (ref 20–33)
CREAT SERPL-MCNC: 0.72 MG/DL (ref 0.5–1.4)
EOSINOPHIL # BLD AUTO: 0.11 K/UL (ref 0–0.51)
EOSINOPHIL NFR BLD: 2.1 % (ref 0–6.9)
ERYTHROCYTE [DISTWIDTH] IN BLOOD BY AUTOMATED COUNT: 51.4 FL (ref 35.9–50)
GLUCOSE SERPL-MCNC: 106 MG/DL (ref 65–99)
HCT VFR BLD AUTO: 34.2 % (ref 37–47)
HGB BLD-MCNC: 11.1 G/DL (ref 12–16)
IMM GRANULOCYTES # BLD AUTO: 0.02 K/UL (ref 0–0.11)
IMM GRANULOCYTES NFR BLD AUTO: 0.4 % (ref 0–0.9)
LYMPHOCYTES # BLD AUTO: 1.73 K/UL (ref 1–4.8)
LYMPHOCYTES NFR BLD: 33.8 % (ref 22–41)
MCH RBC QN AUTO: 35 PG (ref 27–33)
MCHC RBC AUTO-ENTMCNC: 32.5 G/DL (ref 33.6–35)
MCV RBC AUTO: 107.9 FL (ref 81.4–97.8)
MONOCYTES # BLD AUTO: 0.88 K/UL (ref 0–0.85)
MONOCYTES NFR BLD AUTO: 17.2 % (ref 0–13.4)
NEUTROPHILS # BLD AUTO: 2.36 K/UL (ref 2–7.15)
NEUTROPHILS NFR BLD: 46.1 % (ref 44–72)
NRBC # BLD AUTO: 0 K/UL
NRBC BLD-RTO: 0 /100 WBC
PLATELET # BLD AUTO: 128 K/UL (ref 164–446)
PMV BLD AUTO: 9.3 FL (ref 9–12.9)
POTASSIUM SERPL-SCNC: 4.1 MMOL/L (ref 3.6–5.5)
RBC # BLD AUTO: 3.17 M/UL (ref 4.2–5.4)
SODIUM SERPL-SCNC: 139 MMOL/L (ref 135–145)
WBC # BLD AUTO: 5.1 K/UL (ref 4.8–10.8)

## 2022-01-02 PROCEDURE — 700102 HCHG RX REV CODE 250 W/ 637 OVERRIDE(OP): Performed by: INTERNAL MEDICINE

## 2022-01-02 PROCEDURE — 700111 HCHG RX REV CODE 636 W/ 250 OVERRIDE (IP): Performed by: INTERNAL MEDICINE

## 2022-01-02 PROCEDURE — A9270 NON-COVERED ITEM OR SERVICE: HCPCS | Performed by: INTERNAL MEDICINE

## 2022-01-02 PROCEDURE — 36415 COLL VENOUS BLD VENIPUNCTURE: CPT

## 2022-01-02 PROCEDURE — 80048 BASIC METABOLIC PNL TOTAL CA: CPT

## 2022-01-02 PROCEDURE — 99233 SBSQ HOSP IP/OBS HIGH 50: CPT | Performed by: INTERNAL MEDICINE

## 2022-01-02 PROCEDURE — 85025 COMPLETE CBC W/AUTO DIFF WBC: CPT

## 2022-01-02 PROCEDURE — 770001 HCHG ROOM/CARE - MED/SURG/GYN PRIV*

## 2022-01-02 RX ORDER — SODIUM CHLORIDE 9 MG/ML
INJECTION, SOLUTION INTRAVENOUS CONTINUOUS
Status: ACTIVE | OUTPATIENT
Start: 2022-01-02 | End: 2022-01-03

## 2022-01-02 RX ORDER — HYDROXYZINE 50 MG/1
50 TABLET, FILM COATED ORAL 3 TIMES DAILY PRN
Status: DISCONTINUED | OUTPATIENT
Start: 2022-01-02 | End: 2022-01-07 | Stop reason: HOSPADM

## 2022-01-02 RX ADMIN — ATORVASTATIN CALCIUM 20 MG: 20 TABLET, FILM COATED ORAL at 17:45

## 2022-01-02 RX ADMIN — OXYCODONE 5 MG: 5 TABLET ORAL at 20:49

## 2022-01-02 RX ADMIN — BUSPIRONE HYDROCHLORIDE 5 MG: 10 TABLET ORAL at 17:46

## 2022-01-02 RX ADMIN — ACETAMINOPHEN 650 MG: 325 TABLET, FILM COATED ORAL at 00:11

## 2022-01-02 RX ADMIN — OXYCODONE 5 MG: 5 TABLET ORAL at 17:46

## 2022-01-02 RX ADMIN — OXYCODONE 5 MG: 5 TABLET ORAL at 08:57

## 2022-01-02 RX ADMIN — FLUTICASONE PROPIONATE 50 MCG: 50 SPRAY, METERED NASAL at 09:04

## 2022-01-02 RX ADMIN — ACETAMINOPHEN 650 MG: 325 TABLET, FILM COATED ORAL at 08:56

## 2022-01-02 RX ADMIN — FOLIC ACID 2 MG: 1 TABLET ORAL at 08:58

## 2022-01-02 RX ADMIN — THIAMINE HYDROCHLORIDE 100 MG: 100 INJECTION, SOLUTION INTRAMUSCULAR; INTRAVENOUS at 11:07

## 2022-01-02 RX ADMIN — HEPARIN SODIUM 5000 UNITS: 5000 INJECTION, SOLUTION INTRAVENOUS; SUBCUTANEOUS at 09:05

## 2022-01-02 RX ADMIN — DOCUSATE SODIUM 50 MG AND SENNOSIDES 8.6 MG 2 TABLET: 8.6; 5 TABLET, FILM COATED ORAL at 17:45

## 2022-01-02 RX ADMIN — OXYCODONE 5 MG: 5 TABLET ORAL at 14:50

## 2022-01-02 RX ADMIN — HEPARIN SODIUM 5000 UNITS: 5000 INJECTION, SOLUTION INTRAVENOUS; SUBCUTANEOUS at 14:39

## 2022-01-02 RX ADMIN — DOCUSATE SODIUM 50 MG AND SENNOSIDES 8.6 MG 2 TABLET: 8.6; 5 TABLET, FILM COATED ORAL at 08:58

## 2022-01-02 RX ADMIN — ZOLPIDEM TARTRATE 5 MG: 5 TABLET ORAL at 21:47

## 2022-01-02 RX ADMIN — BUSPIRONE HYDROCHLORIDE 5 MG: 10 TABLET ORAL at 08:58

## 2022-01-02 ASSESSMENT — PAIN DESCRIPTION - PAIN TYPE
TYPE: ACUTE PAIN

## 2022-01-02 ASSESSMENT — ENCOUNTER SYMPTOMS
TREMORS: 0
TINGLING: 0
ORTHOPNEA: 0
HALLUCINATIONS: 0
ABDOMINAL PAIN: 0
MYALGIAS: 1
SPEECH CHANGE: 0
WEIGHT LOSS: 0
NERVOUS/ANXIOUS: 1
CONSTIPATION: 0
HEADACHES: 0
NECK PAIN: 0
COUGH: 0
CHILLS: 0
PALPITATIONS: 0
BLURRED VISION: 0
FEVER: 0
SPUTUM PRODUCTION: 0
SENSORY CHANGE: 0
VOMITING: 0
NAUSEA: 0
DIARRHEA: 0
FOCAL WEAKNESS: 0
DOUBLE VISION: 0
PHOTOPHOBIA: 0
EYE PAIN: 0
DIZZINESS: 0
SHORTNESS OF BREATH: 0
BACK PAIN: 0
FALLS: 1

## 2022-01-02 ASSESSMENT — FIBROSIS 4 INDEX: FIB4 SCORE: 3.41

## 2022-01-02 ASSESSMENT — LIFESTYLE VARIABLES: SUBSTANCE_ABUSE: 0

## 2022-01-02 NOTE — CARE PLAN
The patient is Stable - Low risk of patient condition declining or worsening    Shift Goals  Clinical Goals: pain control  Patient Goals: comfort  Family Goals: REMBERTO    Progress made toward(s) clinical / shift goals:  Taught pt 0-10 pain scale and  non pharmacological method of pain mgt, encouraged to verbalize when in pain. Administered PRN pain med as needed.     Encouraged on deep breathing exercise and the use of incentive spirometer,  on 1L of O2 sat  97%

## 2022-01-02 NOTE — PROGRESS NOTES
Paged at [phone]. Spoke to Dr. King at [phone]. BP of 90/53. MD updated, no new orders at this time. Per MD, it is appropriate to give pain medication if MAP is grater than 65.

## 2022-01-02 NOTE — CARE PLAN
The patient is Stable - Low risk of patient condition declining or worsening    Shift Goals: Comfort, Safety, Pain Control   Clinical Goals: Comfort, Safety, Pain control   Patient Goals: Pain control, Sleep, Bed bath   Family Goals: REMBERTO    Progress made toward(s) clinical / shift goals:  PRN pain and sleep medication given per MAR. Fall precautions in place.     Patient is not progressing towards the following goals: N/A      Problem: Pain - Standard  Goal: Alleviation of pain or a reduction in pain to the patient’s comfort goal  Outcome: Progressing  Pain assessed using verbal and nonverbal scales. PRN pain medication given as needed and as ordered. Education provided on pain management.       Problem: Fall Risk  Goal: Patient will remain free from falls  Outcome: Progressing  Bed in lowest and locked position. Bed alarm in use. Treaded socks on pt. Belongings and call light in reach. Hourly rounding in place. Provided safety education.

## 2022-01-02 NOTE — PROGRESS NOTES
Hospital Medicine Daily Progress Note    Date of Service  1/2/2022    Chief Complaint  Yasmin Zhong is a 76 y.o. female admitted 1/1/2022 with right hip pain    Hospital Course    Yasmin Zhong is a 76 y.o. female who presented 1/1/2022 as transfer from [facility] with right hip pain and an acetabular fracture.  After a mechanical slip and fall striking her right pelvis.  She cannot ambulate due to severe pain in the right hip.  Orthopedic surgery evaluated her and recommended surgical intervention.    Interval Problem Update  I evaluated and examined her at the bedside.  She reported that she is feeling pain on her right lower extremity and she has been difficulty moving her right lower extremity.  She also reported she feels very anxious and she takes risperidone at home for anxiety.  I ordered 1 dose of Xanax with holding parameters.  Orthopedic surgery evaluated her and recommended surgical intervention possibly on Monday or on Tuesday.  I discussed plan of care with patient and I answered all her questions.  Discussed plan of care with bedside RN.    1/2- she did have one episode of fever last night. Feeling well. No cough, urinary symptoms infection. She little nervous, and she agreed to try hydroxyzine if she is more worried overnight. She is also on ambien for insomnia. Pain meds PRN. Defer to surgery when to test for Covid screening. If one more time of fever pls orders stat blood cultures, UA, CXR, Covid screening     I have personally seen and examined the patient at bedside. I discussed the plan of care with patient and bedside RN.    Consultants/Specialty  orthopedics    Code Status  Full Code    Disposition  Patient is not medically cleared for discharge.   Anticipate discharge to to home with organized home healthcare and close outpatient follow-up.  I have placed the appropriate orders for post-discharge needs.    Review of Systems  Review of Systems   Constitutional: Negative for  chills, fever and weight loss.   HENT: Negative for hearing loss and tinnitus.    Eyes: Negative for blurred vision, double vision, photophobia and pain.   Respiratory: Negative for cough, sputum production and shortness of breath.    Cardiovascular: Negative for chest pain, palpitations, orthopnea and leg swelling.   Gastrointestinal: Negative for abdominal pain, constipation, diarrhea, nausea and vomiting.   Genitourinary: Negative for dysuria, frequency and urgency.   Musculoskeletal: Positive for falls, joint pain and myalgias. Negative for back pain and neck pain.   Skin: Negative for rash.   Neurological: Negative for dizziness, tingling, tremors, sensory change, speech change, focal weakness and headaches.   Psychiatric/Behavioral: Negative for hallucinations and substance abuse. The patient is nervous/anxious.    All other systems reviewed and are negative.       Physical Exam  Temp:  [37.1 °C (98.7 °F)-38.2 °C (100.8 °F)] 37.3 °C (99.1 °F)  Pulse:  [75-92] 78  Resp:  [16-18] 18  BP: ()/(52-64) 102/63  SpO2:  [93 %-97 %] 97 %    Physical Exam  Vitals reviewed.   Constitutional:       General: She is not in acute distress.     Appearance: Normal appearance. She is not ill-appearing.   HENT:      Head: Normocephalic and atraumatic.      Nose: No congestion.   Eyes:      General:         Right eye: No discharge.         Left eye: No discharge.      Pupils: Pupils are equal, round, and reactive to light.   Cardiovascular:      Rate and Rhythm: Normal rate and regular rhythm.      Pulses: Normal pulses.      Heart sounds: Normal heart sounds. No murmur heard.      Pulmonary:      Effort: Pulmonary effort is normal. No respiratory distress.      Breath sounds: Normal breath sounds. No stridor.   Abdominal:      General: Bowel sounds are normal. There is no distension.      Palpations: Abdomen is soft.      Tenderness: There is no abdominal tenderness.   Musculoskeletal:         General: No swelling or  tenderness.      Cervical back: Normal range of motion. No rigidity.      Comments: Decreased range of motion on right lower extremity.   Skin:     General: Skin is warm.      Capillary Refill: Capillary refill takes less than 2 seconds.      Coloration: Skin is not jaundiced or pale.      Findings: No bruising.   Neurological:      General: No focal deficit present.      Mental Status: She is alert and oriented to person, place, and time.      Cranial Nerves: No cranial nerve deficit.   Psychiatric:         Mood and Affect: Mood is anxious.         Behavior: Behavior normal.         Fluids    Intake/Output Summary (Last 24 hours) at 1/2/2022 1532  Last data filed at 1/2/2022 0500  Gross per 24 hour   Intake 100 ml   Output 900 ml   Net -800 ml       Laboratory  Recent Labs     12/31/21  2100 01/01/22  0752 01/02/22  0644   WBC 9.8 5.8 5.1   RBC 4.09* 3.49* 3.17*   HEMOGLOBIN 14.3 12.7 11.1*   HEMATOCRIT 43.5 36.8* 34.2*   .4* 105.4* 107.9*   MCH 35.0* 36.4* 35.0*   MCHC 32.9* 34.5 32.5*   RDW 51.0* 50.8* 51.4*   PLATELETCT 180 156* 128*   MPV 8.8* 9.2 9.3     Recent Labs     12/31/21  2100 01/02/22  0644   SODIUM 140 139   POTASSIUM 4.5 4.1   CHLORIDE 102 105   CO2 22 26   GLUCOSE 113* 106*   BUN 17 13   CREATININE 0.74 0.72   CALCIUM 9.4 8.5     Recent Labs     12/31/21 2100   APTT 24.9   INR 1.03               Imaging  No orders to display        Assessment/Plan  * S/P right hip fracture- (present on admission)  Assessment & Plan  Secondary to mechanical slip and fall, symptomatic management, follow-up orthopedic surgery consult.  No immediately reversible cardiopulmonary risk factors present justifying delay in surgery.  Orthopedic surgery evaluated her and recommended surgical intervention most likely on Monday or Tuesday.  Continue to provider pain control and monitor adverse effect of pain medications.  Defer to ortho about pre-op work up, Covid screening       Fever- (present on  admission)  Assessment & Plan  Unclear etiology. One episode of it on 1/1  No signs of infection. WBC normal  Continue to monitor for now     Anxiety- (present on admission)  Assessment & Plan  Continue BuSpar.  Hydroxyzine prn   Continue to monitor.    Aortic valve sclerosis- (present on admission)  Assessment & Plan  Sclerosis without stenosis verified but echocardiogram 11/20      Primary insomnia- (present on admission)  Assessment & Plan  Continue ambien prn     Macrocytosis- (present on admission)  Assessment & Plan  Unclear cause,   TSH low in the past. She will need to consider endo chronology referral as OP (subclinical hyperthyroidism, T4 nl on 5/2021)  b12 normal   Needs age appropriate malignancy screening as OP with PCP          VTE prophylaxis: heparin ppx

## 2022-01-02 NOTE — CARE PLAN
Problem: Knowledge Deficit - Standard  Goal: Patient and family/care givers will demonstrate understanding of plan of care, disease process/condition, diagnostic tests and medications  Outcome: Progressing     Problem: Pain - Standard  Goal: Alleviation of pain or a reduction in pain to the patient’s comfort goal  Outcome: Progressing     Problem: Skin Integrity  Goal: Skin integrity is maintained or improved  Outcome: Progressing   The patient is Stable - Low risk of patient condition declining or worsening    Shift Goals  Clinical Goals: comfort; pain management;  Patient Goals: pain control    Progress made toward(s) clinical / shift goals:  Pt educated for pain as prescribed. Pt educated on POC. All questions answered at this time.    Patient is not progressing towards the following goals:

## 2022-01-02 NOTE — PROGRESS NOTES
Hospital Medicine Daily Progress Note    Date of Service  1/1/2022    Chief Complaint  Yasmin Zhong is a 76 y.o. female admitted 1/1/2022 with right hip pain    Hospital Course    Yasmin Zhong is a 76 y.o. female who presented 1/1/2022 as transfer from [facility] with right hip pain and an acetabular fracture.  After a mechanical slip and fall striking her right pelvis.  She cannot ambulate due to severe pain in the right hip.  Orthopedic surgery evaluated her and recommended surgical intervention.    Interval Problem Update  I evaluated and examined her at the bedside.  She reported that she is feeling pain on her right lower extremity and she has been difficulty moving her right lower extremity.  She also reported she feels very anxious and she takes risperidone at home for anxiety.  I ordered 1 dose of Xanax with holding parameters.  Orthopedic surgery evaluated her and recommended surgical intervention possibly on Monday or on Tuesday.  I discussed plan of care with patient and I answered all her questions.  Discussed plan of care with bedside RN.    I have personally seen and examined the patient at bedside. I discussed the plan of care with patient and bedside RN.    Consultants/Specialty  orthopedics    Code Status  Full Code    Disposition  Patient is not medically cleared for discharge.   Anticipate discharge to to home with organized home healthcare and close outpatient follow-up.  I have placed the appropriate orders for post-discharge needs.    Review of Systems  Review of Systems   Constitutional: Negative for chills, fever and weight loss.   HENT: Negative for hearing loss and tinnitus.    Eyes: Negative for blurred vision, double vision, photophobia and pain.   Respiratory: Negative for cough, sputum production and shortness of breath.    Cardiovascular: Negative for chest pain, palpitations, orthopnea and leg swelling.   Gastrointestinal: Negative for abdominal pain, constipation,  diarrhea, nausea and vomiting.   Genitourinary: Negative for dysuria, frequency and urgency.   Musculoskeletal: Positive for falls, joint pain and myalgias. Negative for back pain and neck pain.   Skin: Negative for rash.   Neurological: Negative for dizziness, tingling, tremors, sensory change, speech change, focal weakness and headaches.   Psychiatric/Behavioral: Negative for hallucinations and substance abuse. The patient is nervous/anxious.    All other systems reviewed and are negative.       Physical Exam  Temp:  [36.8 °C (98.2 °F)-36.8 °C (98.3 °F)] 36.8 °C (98.3 °F)  Pulse:  [84-94] 84  Resp:  [17-18] 17  BP: (101-118)/(60-76) 101/61  SpO2:  [90 %-95 %] 95 %    Physical Exam  Vitals reviewed.   Constitutional:       General: She is not in acute distress.     Appearance: Normal appearance. She is not ill-appearing.   HENT:      Head: Normocephalic and atraumatic.      Nose: No congestion.   Eyes:      General:         Right eye: No discharge.         Left eye: No discharge.      Pupils: Pupils are equal, round, and reactive to light.   Cardiovascular:      Rate and Rhythm: Normal rate and regular rhythm.      Pulses: Normal pulses.      Heart sounds: Normal heart sounds. No murmur heard.      Pulmonary:      Effort: Pulmonary effort is normal. No respiratory distress.      Breath sounds: Normal breath sounds. No stridor.   Abdominal:      General: Bowel sounds are normal. There is no distension.      Palpations: Abdomen is soft.      Tenderness: There is no abdominal tenderness.   Musculoskeletal:         General: No swelling or tenderness.      Cervical back: Normal range of motion. No rigidity.      Comments: Decreased range of motion on right lower extremity.   Skin:     General: Skin is warm.      Capillary Refill: Capillary refill takes less than 2 seconds.      Coloration: Skin is not jaundiced or pale.      Findings: No bruising.   Neurological:      General: No focal deficit present.      Mental  Status: She is alert and oriented to person, place, and time.      Cranial Nerves: No cranial nerve deficit.   Psychiatric:         Mood and Affect: Mood is anxious.         Behavior: Behavior normal.         Fluids    Intake/Output Summary (Last 24 hours) at 1/1/2022 1655  Last data filed at 1/1/2022 1000  Gross per 24 hour   Intake 240 ml   Output --   Net 240 ml       Laboratory  Recent Labs     12/31/21  2100 01/01/22  0752   WBC 9.8 5.8   RBC 4.09* 3.49*   HEMOGLOBIN 14.3 12.7   HEMATOCRIT 43.5 36.8*   .4* 105.4*   MCH 35.0* 36.4*   MCHC 32.9* 34.5   RDW 51.0* 50.8*   PLATELETCT 180 156*   MPV 8.8* 9.2     Recent Labs     12/31/21  2100   SODIUM 140   POTASSIUM 4.5   CHLORIDE 102   CO2 22   GLUCOSE 113*   BUN 17   CREATININE 0.74   CALCIUM 9.4     Recent Labs     12/31/21  2100   APTT 24.9   INR 1.03               Imaging  No orders to display        Assessment/Plan  * S/P right hip fracture- (present on admission)  Assessment & Plan  Secondary to mechanical slip and fall, symptomatic management, follow-up orthopedic surgery consult.  No immediately reversible cardiopulmonary risk factors present justifying delay in surgery.  Orthopedic surgery evaluated her and recommended surgical intervention most likely on Monday or Tuesday.  Continue to provider pain control and monitor adverse effect of pain medications.      Anxiety  Assessment & Plan  Continue BuSpar.  Ordered 1 dose of Xanax with holding parameter  Continue to monitor.    Aortic valve sclerosis- (present on admission)  Assessment & Plan  Sclerosis without stenosis verified but echocardiogram 11/20      Macrocytosis- (present on admission)  Assessment & Plan  Unclear cause, follow-up anemia labs, empiric thiamine and folate         VTE prophylaxis: heparin ppx

## 2022-01-02 NOTE — ASSESSMENT & PLAN NOTE
Unclear etiology. One episode of it on 1/1  No signs of infection. WBC normal  Continue to monitor for now   1/3- no more fever. Continue to monitor

## 2022-01-03 ENCOUNTER — ANESTHESIA (OUTPATIENT)
Dept: SURGERY | Facility: MEDICAL CENTER | Age: 77
DRG: 958 | End: 2022-01-03
Payer: MEDICARE

## 2022-01-03 ENCOUNTER — APPOINTMENT (OUTPATIENT)
Dept: RADIOLOGY | Facility: MEDICAL CENTER | Age: 77
DRG: 958 | End: 2022-01-03
Attending: ORTHOPAEDIC SURGERY
Payer: MEDICARE

## 2022-01-03 ENCOUNTER — ANESTHESIA EVENT (OUTPATIENT)
Dept: SURGERY | Facility: MEDICAL CENTER | Age: 77
DRG: 958 | End: 2022-01-03
Payer: MEDICARE

## 2022-01-03 LAB
ANION GAP SERPL CALC-SCNC: 9 MMOL/L (ref 7–16)
BASOPHILS # BLD AUTO: 0.4 % (ref 0–1.8)
BASOPHILS # BLD: 0.02 K/UL (ref 0–0.12)
BUN SERPL-MCNC: 7 MG/DL (ref 8–22)
CALCIUM SERPL-MCNC: 8.2 MG/DL (ref 8.5–10.5)
CHLORIDE SERPL-SCNC: 106 MMOL/L (ref 96–112)
CO2 SERPL-SCNC: 23 MMOL/L (ref 20–33)
CREAT SERPL-MCNC: 0.5 MG/DL (ref 0.5–1.4)
EOSINOPHIL # BLD AUTO: 0.05 K/UL (ref 0–0.51)
EOSINOPHIL NFR BLD: 1.1 % (ref 0–6.9)
ERYTHROCYTE [DISTWIDTH] IN BLOOD BY AUTOMATED COUNT: 49.4 FL (ref 35.9–50)
GLUCOSE SERPL-MCNC: 112 MG/DL (ref 65–99)
HCT VFR BLD AUTO: 32.4 % (ref 37–47)
HGB BLD-MCNC: 10.8 G/DL (ref 12–16)
IMM GRANULOCYTES # BLD AUTO: 0.01 K/UL (ref 0–0.11)
IMM GRANULOCYTES NFR BLD AUTO: 0.2 % (ref 0–0.9)
LYMPHOCYTES # BLD AUTO: 1.19 K/UL (ref 1–4.8)
LYMPHOCYTES NFR BLD: 25.9 % (ref 22–41)
MCH RBC QN AUTO: 35.3 PG (ref 27–33)
MCHC RBC AUTO-ENTMCNC: 33.3 G/DL (ref 33.6–35)
MCV RBC AUTO: 105.9 FL (ref 81.4–97.8)
MONOCYTES # BLD AUTO: 0.69 K/UL (ref 0–0.85)
MONOCYTES NFR BLD AUTO: 15 % (ref 0–13.4)
NEUTROPHILS # BLD AUTO: 2.63 K/UL (ref 2–7.15)
NEUTROPHILS NFR BLD: 57.4 % (ref 44–72)
NRBC # BLD AUTO: 0 K/UL
NRBC BLD-RTO: 0 /100 WBC
PLATELET # BLD AUTO: 126 K/UL (ref 164–446)
PMV BLD AUTO: 8.9 FL (ref 9–12.9)
POTASSIUM SERPL-SCNC: 3.9 MMOL/L (ref 3.6–5.5)
RBC # BLD AUTO: 3.06 M/UL (ref 4.2–5.4)
SODIUM SERPL-SCNC: 138 MMOL/L (ref 135–145)
WBC # BLD AUTO: 4.6 K/UL (ref 4.8–10.8)

## 2022-01-03 PROCEDURE — 160036 HCHG PACU - EA ADDL 30 MINS PHASE I: Performed by: ORTHOPAEDIC SURGERY

## 2022-01-03 PROCEDURE — 700102 HCHG RX REV CODE 250 W/ 637 OVERRIDE(OP): Performed by: INTERNAL MEDICINE

## 2022-01-03 PROCEDURE — 72190 X-RAY EXAM OF PELVIS: CPT

## 2022-01-03 PROCEDURE — 27227 TREAT HIP FRACTURE(S): CPT | Mod: 80ROC,RT | Performed by: STUDENT IN AN ORGANIZED HEALTH CARE EDUCATION/TRAINING PROGRAM

## 2022-01-03 PROCEDURE — 0QS404Z REPOSITION RIGHT ACETABULUM WITH INTERNAL FIXATION DEVICE, OPEN APPROACH: ICD-10-PCS | Performed by: ORTHOPAEDIC SURGERY

## 2022-01-03 PROCEDURE — A9270 NON-COVERED ITEM OR SERVICE: HCPCS | Performed by: INTERNAL MEDICINE

## 2022-01-03 PROCEDURE — 700111 HCHG RX REV CODE 636 W/ 250 OVERRIDE (IP): Performed by: STUDENT IN AN ORGANIZED HEALTH CARE EDUCATION/TRAINING PROGRAM

## 2022-01-03 PROCEDURE — 770001 HCHG ROOM/CARE - MED/SURG/GYN PRIV*

## 2022-01-03 PROCEDURE — 700111 HCHG RX REV CODE 636 W/ 250 OVERRIDE (IP): Performed by: ORTHOPAEDIC SURGERY

## 2022-01-03 PROCEDURE — 501838 HCHG SUTURE GENERAL: Performed by: ORTHOPAEDIC SURGERY

## 2022-01-03 PROCEDURE — 27227 TREAT HIP FRACTURE(S): CPT | Mod: RT | Performed by: ORTHOPAEDIC SURGERY

## 2022-01-03 PROCEDURE — 700105 HCHG RX REV CODE 258: Performed by: STUDENT IN AN ORGANIZED HEALTH CARE EDUCATION/TRAINING PROGRAM

## 2022-01-03 PROCEDURE — C1713 ANCHOR/SCREW BN/BN,TIS/BN: HCPCS | Performed by: ORTHOPAEDIC SURGERY

## 2022-01-03 PROCEDURE — 160035 HCHG PACU - 1ST 60 MINS PHASE I: Performed by: ORTHOPAEDIC SURGERY

## 2022-01-03 PROCEDURE — 700105 HCHG RX REV CODE 258: Performed by: INTERNAL MEDICINE

## 2022-01-03 PROCEDURE — 85025 COMPLETE CBC W/AUTO DIFF WBC: CPT

## 2022-01-03 PROCEDURE — 700101 HCHG RX REV CODE 250: Performed by: STUDENT IN AN ORGANIZED HEALTH CARE EDUCATION/TRAINING PROGRAM

## 2022-01-03 PROCEDURE — 502578 HCHG PACK, TOTAL HIP: Performed by: ORTHOPAEDIC SURGERY

## 2022-01-03 PROCEDURE — 160031 HCHG SURGERY MINUTES - 1ST 30 MINS LEVEL 5: Performed by: ORTHOPAEDIC SURGERY

## 2022-01-03 PROCEDURE — 700102 HCHG RX REV CODE 250 W/ 637 OVERRIDE(OP): Performed by: STUDENT IN AN ORGANIZED HEALTH CARE EDUCATION/TRAINING PROGRAM

## 2022-01-03 PROCEDURE — 80048 BASIC METABOLIC PNL TOTAL CA: CPT

## 2022-01-03 PROCEDURE — 36415 COLL VENOUS BLD VENIPUNCTURE: CPT

## 2022-01-03 PROCEDURE — 160002 HCHG RECOVERY MINUTES (STAT): Performed by: ORTHOPAEDIC SURGERY

## 2022-01-03 PROCEDURE — 99233 SBSQ HOSP IP/OBS HIGH 50: CPT | Performed by: INTERNAL MEDICINE

## 2022-01-03 PROCEDURE — 160042 HCHG SURGERY MINUTES - EA ADDL 1 MIN LEVEL 5: Performed by: ORTHOPAEDIC SURGERY

## 2022-01-03 PROCEDURE — A9270 NON-COVERED ITEM OR SERVICE: HCPCS | Performed by: STUDENT IN AN ORGANIZED HEALTH CARE EDUCATION/TRAINING PROGRAM

## 2022-01-03 PROCEDURE — 500367 HCHG DRAIN KIT, HEMOVAC: Performed by: ORTHOPAEDIC SURGERY

## 2022-01-03 PROCEDURE — 160048 HCHG OR STATISTICAL LEVEL 1-5: Performed by: ORTHOPAEDIC SURGERY

## 2022-01-03 PROCEDURE — 700111 HCHG RX REV CODE 636 W/ 250 OVERRIDE (IP): Performed by: INTERNAL MEDICINE

## 2022-01-03 PROCEDURE — 500382 HCHG DRAIN, PENROSE 1X18: Performed by: ORTHOPAEDIC SURGERY

## 2022-01-03 PROCEDURE — 160009 HCHG ANES TIME/MIN: Performed by: ORTHOPAEDIC SURGERY

## 2022-01-03 DEVICE — IMPLANTABLE DEVICE: Type: IMPLANTABLE DEVICE | Site: HIP | Status: FUNCTIONAL

## 2022-01-03 DEVICE — SCREW MPS CORT 3.5X30MM ST - (2TX6=12): Type: IMPLANTABLE DEVICE | Site: HIP | Status: FUNCTIONAL

## 2022-01-03 DEVICE — SCREW MPS CORT 3.5X60MM ST - (2TX6=12): Type: IMPLANTABLE DEVICE | Site: HIP | Status: FUNCTIONAL

## 2022-01-03 DEVICE — SCREW MPS CORT 3.5X36MM ST - (2TX6=12): Type: IMPLANTABLE DEVICE | Site: HIP | Status: FUNCTIONAL

## 2022-01-03 DEVICE — SCREW MPS CORT 3.5X50MM ST - (2TX6=12): Type: IMPLANTABLE DEVICE | Site: HIP | Status: FUNCTIONAL

## 2022-01-03 DEVICE — SCREW MPS CORT 3.5X75MM ST - (2TX6=12): Type: IMPLANTABLE DEVICE | Site: HIP | Status: FUNCTIONAL

## 2022-01-03 RX ORDER — DEXAMETHASONE SODIUM PHOSPHATE 4 MG/ML
INJECTION, SOLUTION INTRA-ARTICULAR; INTRALESIONAL; INTRAMUSCULAR; INTRAVENOUS; SOFT TISSUE PRN
Status: DISCONTINUED | OUTPATIENT
Start: 2022-01-03 | End: 2022-01-03 | Stop reason: SURG

## 2022-01-03 RX ORDER — SODIUM CHLORIDE, SODIUM LACTATE, POTASSIUM CHLORIDE, CALCIUM CHLORIDE 600; 310; 30; 20 MG/100ML; MG/100ML; MG/100ML; MG/100ML
INJECTION, SOLUTION INTRAVENOUS CONTINUOUS
Status: DISCONTINUED | OUTPATIENT
Start: 2022-01-03 | End: 2022-01-03 | Stop reason: HOSPADM

## 2022-01-03 RX ORDER — OXYCODONE HCL 5 MG/5 ML
10 SOLUTION, ORAL ORAL
Status: COMPLETED | OUTPATIENT
Start: 2022-01-03 | End: 2022-01-03

## 2022-01-03 RX ORDER — HYDROMORPHONE HYDROCHLORIDE 1 MG/ML
0.4 INJECTION, SOLUTION INTRAMUSCULAR; INTRAVENOUS; SUBCUTANEOUS
Status: DISCONTINUED | OUTPATIENT
Start: 2022-01-03 | End: 2022-01-03 | Stop reason: HOSPADM

## 2022-01-03 RX ORDER — CEFAZOLIN SODIUM 2 G/100ML
2 INJECTION, SOLUTION INTRAVENOUS EVERY 8 HOURS
Status: COMPLETED | OUTPATIENT
Start: 2022-01-03 | End: 2022-01-04

## 2022-01-03 RX ORDER — MEPERIDINE HYDROCHLORIDE 25 MG/ML
12.5 INJECTION INTRAMUSCULAR; INTRAVENOUS; SUBCUTANEOUS
Status: DISCONTINUED | OUTPATIENT
Start: 2022-01-03 | End: 2022-01-03 | Stop reason: HOSPADM

## 2022-01-03 RX ORDER — SODIUM CHLORIDE, SODIUM LACTATE, POTASSIUM CHLORIDE, CALCIUM CHLORIDE 600; 310; 30; 20 MG/100ML; MG/100ML; MG/100ML; MG/100ML
INJECTION, SOLUTION INTRAVENOUS
Status: DISCONTINUED | OUTPATIENT
Start: 2022-01-03 | End: 2022-01-03 | Stop reason: SURG

## 2022-01-03 RX ORDER — HALOPERIDOL 5 MG/ML
1 INJECTION INTRAMUSCULAR
Status: DISCONTINUED | OUTPATIENT
Start: 2022-01-03 | End: 2022-01-03 | Stop reason: HOSPADM

## 2022-01-03 RX ORDER — DIPHENHYDRAMINE HYDROCHLORIDE 50 MG/ML
12.5 INJECTION INTRAMUSCULAR; INTRAVENOUS
Status: DISCONTINUED | OUTPATIENT
Start: 2022-01-03 | End: 2022-01-03 | Stop reason: HOSPADM

## 2022-01-03 RX ORDER — OXYCODONE HCL 5 MG/5 ML
5 SOLUTION, ORAL ORAL
Status: COMPLETED | OUTPATIENT
Start: 2022-01-03 | End: 2022-01-03

## 2022-01-03 RX ORDER — ONDANSETRON 2 MG/ML
INJECTION INTRAMUSCULAR; INTRAVENOUS PRN
Status: DISCONTINUED | OUTPATIENT
Start: 2022-01-03 | End: 2022-01-03 | Stop reason: SURG

## 2022-01-03 RX ORDER — TRANEXAMIC ACID 100 MG/ML
INJECTION, SOLUTION INTRAVENOUS PRN
Status: DISCONTINUED | OUTPATIENT
Start: 2022-01-03 | End: 2022-01-03 | Stop reason: SURG

## 2022-01-03 RX ORDER — CEFAZOLIN SODIUM 1 G/3ML
INJECTION, POWDER, FOR SOLUTION INTRAMUSCULAR; INTRAVENOUS PRN
Status: DISCONTINUED | OUTPATIENT
Start: 2022-01-03 | End: 2022-01-03 | Stop reason: SURG

## 2022-01-03 RX ORDER — PROPOFOL 10 MG/ML
INJECTION, EMULSION INTRAVENOUS PRN
Status: DISCONTINUED | OUTPATIENT
Start: 2022-01-03 | End: 2022-01-03 | Stop reason: SURG

## 2022-01-03 RX ORDER — LIDOCAINE HYDROCHLORIDE 20 MG/ML
INJECTION, SOLUTION EPIDURAL; INFILTRATION; INTRACAUDAL; PERINEURAL PRN
Status: DISCONTINUED | OUTPATIENT
Start: 2022-01-03 | End: 2022-01-03 | Stop reason: SURG

## 2022-01-03 RX ORDER — HYDROMORPHONE HYDROCHLORIDE 1 MG/ML
0.2 INJECTION, SOLUTION INTRAMUSCULAR; INTRAVENOUS; SUBCUTANEOUS
Status: DISCONTINUED | OUTPATIENT
Start: 2022-01-03 | End: 2022-01-03 | Stop reason: HOSPADM

## 2022-01-03 RX ORDER — ROCURONIUM BROMIDE 10 MG/ML
INJECTION, SOLUTION INTRAVENOUS PRN
Status: DISCONTINUED | OUTPATIENT
Start: 2022-01-03 | End: 2022-01-03 | Stop reason: SURG

## 2022-01-03 RX ORDER — ALPRAZOLAM 0.5 MG/1
0.5 TABLET ORAL
Status: DISCONTINUED | OUTPATIENT
Start: 2022-01-03 | End: 2022-01-07 | Stop reason: HOSPADM

## 2022-01-03 RX ORDER — ONDANSETRON 2 MG/ML
4 INJECTION INTRAMUSCULAR; INTRAVENOUS
Status: DISCONTINUED | OUTPATIENT
Start: 2022-01-03 | End: 2022-01-03 | Stop reason: HOSPADM

## 2022-01-03 RX ORDER — HYDROMORPHONE HYDROCHLORIDE 1 MG/ML
0.1 INJECTION, SOLUTION INTRAMUSCULAR; INTRAVENOUS; SUBCUTANEOUS
Status: DISCONTINUED | OUTPATIENT
Start: 2022-01-03 | End: 2022-01-03 | Stop reason: HOSPADM

## 2022-01-03 RX ADMIN — DOCUSATE SODIUM 50 MG AND SENNOSIDES 8.6 MG 2 TABLET: 8.6; 5 TABLET, FILM COATED ORAL at 18:20

## 2022-01-03 RX ADMIN — CEFAZOLIN 2 G: 10 INJECTION, POWDER, FOR SOLUTION INTRAVENOUS at 18:19

## 2022-01-03 RX ADMIN — SODIUM CHLORIDE: 9 INJECTION, SOLUTION INTRAVENOUS at 00:00

## 2022-01-03 RX ADMIN — CEFAZOLIN 2 G: 330 INJECTION, POWDER, FOR SOLUTION INTRAMUSCULAR; INTRAVENOUS at 10:49

## 2022-01-03 RX ADMIN — FENTANYL CITRATE 50 MCG: 50 INJECTION, SOLUTION INTRAMUSCULAR; INTRAVENOUS at 11:23

## 2022-01-03 RX ADMIN — ROCURONIUM BROMIDE 10 MG: 10 INJECTION, SOLUTION INTRAVENOUS at 12:22

## 2022-01-03 RX ADMIN — OXYCODONE 5 MG: 5 TABLET ORAL at 23:14

## 2022-01-03 RX ADMIN — BUSPIRONE HYDROCHLORIDE 5 MG: 10 TABLET ORAL at 18:20

## 2022-01-03 RX ADMIN — DOCUSATE SODIUM 50 MG AND SENNOSIDES 8.6 MG 2 TABLET: 8.6; 5 TABLET, FILM COATED ORAL at 06:25

## 2022-01-03 RX ADMIN — ATORVASTATIN CALCIUM 20 MG: 20 TABLET, FILM COATED ORAL at 18:20

## 2022-01-03 RX ADMIN — FENTANYL CITRATE 50 MCG: 50 INJECTION, SOLUTION INTRAMUSCULAR; INTRAVENOUS at 13:05

## 2022-01-03 RX ADMIN — ROCURONIUM BROMIDE 50 MG: 10 INJECTION, SOLUTION INTRAVENOUS at 10:44

## 2022-01-03 RX ADMIN — OXYCODONE 5 MG: 5 TABLET ORAL at 18:24

## 2022-01-03 RX ADMIN — FOLIC ACID 2 MG: 1 TABLET ORAL at 06:25

## 2022-01-03 RX ADMIN — FENTANYL CITRATE 25 MCG: 50 INJECTION INTRAMUSCULAR; INTRAVENOUS at 13:49

## 2022-01-03 RX ADMIN — SUGAMMADEX 200 MG: 100 INJECTION, SOLUTION INTRAVENOUS at 13:01

## 2022-01-03 RX ADMIN — TRANEXAMIC ACID 1000 MG: 100 INJECTION, SOLUTION INTRAVENOUS at 11:22

## 2022-01-03 RX ADMIN — FENTANYL CITRATE 25 MCG: 50 INJECTION INTRAMUSCULAR; INTRAVENOUS at 13:56

## 2022-01-03 RX ADMIN — THIAMINE HYDROCHLORIDE 100 MG: 100 INJECTION, SOLUTION INTRAMUSCULAR; INTRAVENOUS at 09:17

## 2022-01-03 RX ADMIN — SODIUM CHLORIDE, POTASSIUM CHLORIDE, SODIUM LACTATE AND CALCIUM CHLORIDE: 600; 310; 30; 20 INJECTION, SOLUTION INTRAVENOUS at 10:38

## 2022-01-03 RX ADMIN — PROPOFOL 120 MG: 10 INJECTION, EMULSION INTRAVENOUS at 10:44

## 2022-01-03 RX ADMIN — BUSPIRONE HYDROCHLORIDE 5 MG: 10 TABLET ORAL at 06:25

## 2022-01-03 RX ADMIN — ONDANSETRON 4 MG: 2 INJECTION INTRAMUSCULAR; INTRAVENOUS at 12:53

## 2022-01-03 RX ADMIN — LIDOCAINE HYDROCHLORIDE 60 MG: 20 INJECTION, SOLUTION EPIDURAL; INFILTRATION; INTRACAUDAL at 10:44

## 2022-01-03 RX ADMIN — FENTANYL CITRATE 50 MCG: 50 INJECTION, SOLUTION INTRAMUSCULAR; INTRAVENOUS at 11:09

## 2022-01-03 RX ADMIN — OXYCODONE HYDROCHLORIDE 5 MG: 5 SOLUTION ORAL at 13:25

## 2022-01-03 RX ADMIN — FENTANYL CITRATE 50 MCG: 50 INJECTION, SOLUTION INTRAMUSCULAR; INTRAVENOUS at 10:40

## 2022-01-03 RX ADMIN — ROCURONIUM BROMIDE 10 MG: 10 INJECTION, SOLUTION INTRAVENOUS at 12:02

## 2022-01-03 RX ADMIN — FENTANYL CITRATE 50 MCG: 50 INJECTION, SOLUTION INTRAMUSCULAR; INTRAVENOUS at 12:54

## 2022-01-03 RX ADMIN — HEPARIN SODIUM 5000 UNITS: 5000 INJECTION, SOLUTION INTRAVENOUS; SUBCUTANEOUS at 21:48

## 2022-01-03 RX ADMIN — HYDROXYZINE HYDROCHLORIDE 50 MG: 50 TABLET, FILM COATED ORAL at 19:49

## 2022-01-03 RX ADMIN — OXYCODONE 5 MG: 5 TABLET ORAL at 06:27

## 2022-01-03 RX ADMIN — DEXAMETHASONE SODIUM PHOSPHATE 4 MG: 4 INJECTION, SOLUTION INTRA-ARTICULAR; INTRALESIONAL; INTRAMUSCULAR; INTRAVENOUS; SOFT TISSUE at 10:49

## 2022-01-03 RX ADMIN — ZOLPIDEM TARTRATE 5 MG: 5 TABLET ORAL at 21:48

## 2022-01-03 RX ADMIN — FLUTICASONE PROPIONATE 50 MCG: 50 SPRAY, METERED NASAL at 06:24

## 2022-01-03 ASSESSMENT — ENCOUNTER SYMPTOMS
DIZZINESS: 0
PHOTOPHOBIA: 0
CHILLS: 0
EYE PAIN: 0
SENSORY CHANGE: 0
WEIGHT LOSS: 0
NAUSEA: 0
VOMITING: 0
SHORTNESS OF BREATH: 0
DOUBLE VISION: 0
FOCAL WEAKNESS: 0
BACK PAIN: 0
HEADACHES: 0
FALLS: 1
TREMORS: 0
CONSTIPATION: 0
TINGLING: 0
NECK PAIN: 0
ABDOMINAL PAIN: 0
MYALGIAS: 1
ORTHOPNEA: 0
BLURRED VISION: 0
HALLUCINATIONS: 0
SPEECH CHANGE: 0
FEVER: 0
NERVOUS/ANXIOUS: 1
DIARRHEA: 0
COUGH: 0
PALPITATIONS: 0
SPUTUM PRODUCTION: 0

## 2022-01-03 ASSESSMENT — LIFESTYLE VARIABLES: SUBSTANCE_ABUSE: 0

## 2022-01-03 ASSESSMENT — PAIN DESCRIPTION - PAIN TYPE
TYPE: ACUTE PAIN;SURGICAL PAIN
TYPE: SURGICAL PAIN
TYPE: ACUTE PAIN

## 2022-01-03 NOTE — ANESTHESIA PREPROCEDURE EVALUATION
Case: [id number] Date/Time: 01/03/22 1032    Procedure: OPEN REDUCTION AND INTERNAL FIXATION, FRACTURE, ACETABULUM (Right )    Location: [place] [address] / SURGERY [facility]    Surgeons: Pritesh Zamora M.D.      H/o pulmonary fibrosis.  No inhalers or home O2.  METS > 4.     Relevant Problems   NEURO   (positive) S/P right hip fracture      CARDIAC   (positive) Nonrheumatic mitral valve regurgitation      ENDO   (positive) Subclinical hyperthyroidism       Physical Exam    Airway   Mallampati: II  TM distance: >3 FB  Neck ROM: full       Cardiovascular - normal exam  Rhythm: regular  Rate: normal  (-) murmur     Dental - normal exam           Pulmonary - normal exam  Breath sounds clear to auscultation     Abdominal    Neurological - normal exam                 Anesthesia Plan    ASA 3   ASA physical status 3 criteria: respiratory insufficiency or compromise    Plan - general       Airway plan will be ETT          Induction: intravenous    Postoperative Plan: Postoperative administration of opioids is intended.    Pertinent diagnostic labs and testing reviewed    Informed Consent:    Anesthetic plan and risks discussed with patient.    Use of blood products discussed with: patient whom consented to blood products.

## 2022-01-03 NOTE — CARE PLAN
Problem: Knowledge Deficit - Standard  Goal: Patient and family/care givers will demonstrate understanding of plan of care, disease process/condition, diagnostic tests and medications  Outcome: Progressing     Problem: Pain - Standard  Goal: Alleviation of pain or a reduction in pain to the patient’s comfort goal  Outcome: Progressing     Problem: Skin Integrity  Goal: Skin integrity is maintained or improved  Outcome: Progressing     Problem: Fall Risk  Goal: Patient will remain free from falls  Outcome: Progressing   The patient is Stable - Low risk of patient condition declining or worsening    Shift Goals  Clinical Goals: NPO at midnight; comfort; painmanagemnt  Patient Goals: rest; comfort  Family Goals: REMBERTO    Progress made toward(s) clinical / shift goals:  Pt NPO and tolerating valentin's traction.  Pt pain controlled with Prn and scheduled pain medications.      Patient is not progressing towards the following goals:

## 2022-01-03 NOTE — PROGRESS NOTES
"   Orthopaedic Progress Note    Interval changes:  [place] traction 10 lbs RLE initiated  NPO at midnight for possible surgery tomorrow  Nursing to run fluids at 75ml hour starting at midnight  SCDs on at all times    ROS - Patient denies any new issues.  Pain well controlled.    BP (!) 97/58   Pulse 83   Temp 37.2 °C (99 °F) (Temporal)   Resp 16   Ht 1.626 m (5' 4\")   Wt 59 kg (130 lb)   SpO2 96%       Patient seen and examined  No acute distress  Breathing non labored  RRR  BLE DNVI, cap refill <2 sec.     Recent Labs     12/31/21  2100 01/01/22  0752 01/02/22  0644   WBC 9.8 5.8 5.1   RBC 4.09* 3.49* 3.17*   HEMOGLOBIN 14.3 12.7 11.1*   HEMATOCRIT 43.5 36.8* 34.2*   .4* 105.4* 107.9*   MCH 35.0* 36.4* 35.0*   MCHC 32.9* 34.5 32.5*   RDW 51.0* 50.8* 51.4*   PLATELETCT 180 156* 128*   MPV 8.8* 9.2 9.3       Active Hospital Problems    Diagnosis    • Fever [R50.9]    • S/P right hip fracture [Z87.81]    • Anxiety [F41.9]    • Aortic valve sclerosis [I35.8]    • Primary insomnia [F51.01]    • Macrocytosis [D75.89]        Assessment/Plan:  [place] traction 10 lbs RLE initiated  NPO at midnight for possible surgery tomorrow  Nursing to run fluids at 75ml hour starting at midnight  SCDs on at all times  "

## 2022-01-03 NOTE — OP REPORT
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     ASSISTANT: Aroldo Mcgrath MD    ANESTHESIA: General    SPECIMEN: None    ESTIMATED BLOOD LOSS: 400 mL    IMPLANTS: Luis Miguel Keenan acetabular plates and screws      INDICATIONS: The patient is a 76 y.o. female who presented with a right acetabular fracture that occurred after a ground-level fall.  She has a history of osteoporosis as well as a left femoral neck fracture previously treated with a hip hemiarthroplasty.  Based on the amount of protrusio I recommended open reduction internal fixation to reestablish more stable acetabular socket and prevent further medial migration of the femoral head.  She does have significant amounts of marginal impaction and multiple different planes surrounding the hip.  Based on these findings she is a high risk for posttraumatic arthritis but fixation would allow for a more routine hip replacement if necessary in the future.  I discussed the risks and benefits of the procedure which include but are not limited to risks of infection, wound healing complication, neurovascular injury, pain, malunion, non-union, posttraumatic arthritis, stiffness, possible need for additional procedures near future, and the medical risks of anesthesia including MI, stroke, and death.  Alternatives to surgery were also discussed, including non-operative management, which I did not recommend.  The patient was in agreement with the plan to proceed, and the informed consent was signed and documented.  I met with the patient pre-operatively and marked the operative extremity with their agreement.  We proceeded to the operating room.     DESCRIPTION OF PROCEDURE:  Patient was seen in the preoperative holding area on the day of surgery.  The operative site was marked with my initials.  she was taken to the operating room and placed supine on the operative table.  Anesthesia was induced.  A Murry catheter was inserted the operative extremity was prepped and draped in the normal sterile fashion.  Operative pause was conducted and the correct patient, site, side, procedure, and surgeon's initials on extremity were identified.  A Pfannenstiel incision was made centered over the pubis roughly centimeter proximal to this staying out of the pubic hair as possible.  This is dissected down through the subcutaneous tissue down to the level of the fascia.  The midline of the fascia was found and an incision was made through this.  This allowed for further dissection of the midline using Bovie cautery while protecting underlying structures.  Both rectus heads were identified and split in their midline.  The right rectus was also elevated off the pubis to allow for further visualization.  A bladder blade was used to retract the bladder during all dissection and mobilization of the rectus as well as later in the case for treatment of the acetabulum.  A Hohmann retractor was placed retracting the right rectus head.  This was pinned in place using a 3 2 wire.  We then worked along the pubis to the level of the corona mortise.  A large collateral artery was seen at this location.  This was doubly clipped and then transected with Bovie cautery.  Dissection then continued along the pelvic brim.  The peritoneal fascia was incised off the pelvic brim and mobilized.  A second retractor was placed over the pubic eminence.  Care was taken to dissect and mobilize the soft tissues above this, thereby protecting the neurovascular structures.  The leg was also over a triangle to relax the structures.  The protrudes to acetabular quadrilateral plate was remobilized while traction was placed on the hip.  A footed tamp was then used to further reduce this.  The marginal impaction  of the superior dome was also reduced using a osteotome while visualizing this on imaging.  Once this was reduced traction was continue to be held.  A Rossford Cabrera suprapectoral nail quadrilateral plate was utilized.  Care was taken to ensure that the tab was placed over the quadrilateral plate and not impinging on the obturator nerve.  This was fixated first distally at the ramus and the plate was further reduced and compressed to the pelvic brim.  This further reduced and maintained a concentric reduction around the hip joint.  3 screws were placed proximal to the fracture into the distal and the ramus.  All of these had excellent bicortical purchase.  Based off her anatomy the screw holes within the tab were not able to be utilized as these set just posterior to her posterior column.  Once all hardware secured we took our final fluoroscopic images including 5 views of the pelvis and right acetabulum.  The hip was well concentric at this point without subluxation.  The wounds were then thoroughly irrigated and then closed in layered fashion with 0 Vicryl for the deep fascia and abdominal musculature while protecting the bladder, additional 0 Vicryl in the subcutaneous fat and then 2-0 Vicryl in the subcutaneous tissues followed by staples for the skin.  Sterile dressing was applied and the patient was allowed to wake in the operating room taken to PACU in stable condition    POSTOPERATIVE PLAN: Toe-touch weight bearing right lower extremity.  Mobilize with physical and occupational therapies.  DVT prophylaxis with SCDs and Lovenox until mobilizing independently and then can be switched to aspirin for 4 weeks.  The patient will follow up in clinic in 2 weeks to check wounds and remove sutures/staples.      ____________________________________   Pritesh Zamora M.D.   DD: 1/3/2022  1:23 PM

## 2022-01-03 NOTE — ANESTHESIA POSTPROCEDURE EVALUATION
Patient: Yasmin Zhong    Procedure Summary     Date: 01/03/22 Room / Location: [place] [address] / SURGERY [facility]    Anesthesia Start: 1038 Anesthesia Stop: 1308    Procedure: OPEN REDUCTION AND INTERNAL FIXATION, FRACTURE, ACETABULUM (Right Hip) Diagnosis: (Right hip pain)    Surgeons: Pritesh Zamora M.D. Responsible Provider: Prem Chavez M.D.    Anesthesia Type: general ASA Status: 3          Final Anesthesia Type: general  Last vitals  BP   Blood Pressure : 121/59    Temp   35.9 °C (96.6 °F)    Pulse   81   Resp   (!) 27    SpO2   99 %      Anesthesia Post Evaluation    Patient location during evaluation: PACU  Patient participation: complete - patient participated  Level of consciousness: awake and alert    Airway patency: patent  Anesthetic complications: no  Cardiovascular status: hemodynamically stable  Respiratory status: acceptable  Hydration status: euvolemic    PONV: none          No complications documented.     Nurse Pain Score: 5 (NPRS)

## 2022-01-03 NOTE — ANESTHESIA PROCEDURE NOTES
Airway    Date/Time: 1/3/2022 10:45 AM  Performed by: Prem Chavez M.D.  Authorized by: Prem Chavez M.D.     Location:  OR  Urgency:  Elective  Indications for Airway Management:  Anesthesia      Spontaneous Ventilation: absent    Sedation Level:  Deep  Preoxygenated: Yes    Patient Position:  Sniffing  Final Airway Type:  Endotracheal airway  Final Endotracheal Airway:  ETT  Cuffed: Yes    Technique Used for Successful ETT Placement:  Direct laryngoscopy    Insertion Site:  Oral  Blade Type:  Irving  Laryngoscope Blade/Videolaryngoscope Blade Size:  3  ETT Size (mm):  7.0  Measured from:  Teeth  ETT to Teeth (cm):  22  Placement Verified by: auscultation and capnometry    Cormack-Lehane Classification:  Grade I - full view of glottis  Number of Attempts at Approach:  1

## 2022-01-03 NOTE — CONSULTS
DATE:  1/1/2022    REQUESTING PHYSICIAN: John Green MD    CHIEF COMPLAINT:  Right hip pain    HISTORY OF PRESENT ILLNESS: Yasmin is a 76 year old woman with bilateral lower extremity neuropathy. She normally walks with a walker because of this, but was not using the walker last night and fell, injuring her right hip. She had pain and could not move, was seen in the emergency room and found to have an acetabular fracture. She was transferred to [facility] for anticipated future surgery.    ALLERGIES: No known drug allergies    MEDICATIONS: Calcium, vitamin D, doxylamine, atorvastatin, buspirone, cetirizine, vitamin B, fluticasone, zolpidem    PAST MEDICAL HISTORY:  Anxiety, depression, hyperlipidemia, peripheral neuropathy, pulmonary fibrosis    PAST SURGICAL HISTORY:  Left hip hemiarthroplasty, cataract surgery    SOCIAL HISTORY:  She does not currently drink, smoke or use drugs.    FAMILY HISTORY:  Cancer in both parents, diabetes in multiple relatives    REVIEW OF SYSTEMS:  No headaches, nausea, vomiting, diarrhea, constipation, polyuria, dysuria, fevers, chills, weight loss, weight gain, abdominal pain, chest pain, shortness of breath. She has neuropathy in both feet.    EXAMINATION:    General:  No acute distress  Vitals:  Blood pressure 118/76, heart rate 89, respirations 17, temperature 98.2  HEENT: Normocephalic, atraumatic  Neck: Nontender  Chest:  Nontender  Lungs:  No labored breathing  Heart: Regular  Abdomen:  Soft  Extremities:  No obvious deformities. Pain with any movement of right leg.  Vascular:  Positive dorsalis pedis pulses  Neurological: Able to dorsiflex and plantarflex bilaterally    LAB:  White blood cell count 9,800, hematocrit 43.5%, platelet count 180,000. Electrolytes are normal, creatinine 0.74    IMAGING:  Radiographs and CT scan of the pelvis demostrate a comminuted right acetabular fracture with medial dome impaction and central subluxation of the femoral head.    ASSESSMENT: Right  acetabulum fracture    PLAN:  Recommend surgical treatment with ORIF to reduce the hip and reconstitute bone stock. Discussed possibility of future total hip arthroplasty. Will plan for surgery Monday or Tuesday, by myself or other orthopaedic trauma surgeons, when adequate assistance is available. For now, DVT prophylaxis with SCDs and lovenox, analgesia, and pressure ulcer precautions.

## 2022-01-03 NOTE — PROGRESS NOTES
Hospital Medicine Daily Progress Note    Date of Service  1/3/2022    Chief Complaint  Yasmin Zhong is a 76 y.o. female admitted 1/1/2022 with right hip pain    Hospital Course    Yasmin Zhong is a 76 y.o. female who presented 1/1/2022 as transfer from [facility] with right hip pain and an acetabular fracture.  After a mechanical slip and fall striking her right pelvis.  She cannot ambulate due to severe pain in the right hip.  Orthopedic surgery evaluated her and recommended surgical intervention.    Interval Problem Update  I evaluated and examined her at the bedside.  She reported that she is feeling pain on her right lower extremity and she has been difficulty moving her right lower extremity.  She also reported she feels very anxious and she takes risperidone at home for anxiety.  I ordered 1 dose of Xanax with holding parameters.  Orthopedic surgery evaluated her and recommended surgical intervention possibly on Monday or on Tuesday.  I discussed plan of care with patient and I answered all her questions.  Discussed plan of care with bedside RN.    1/2- she did have one episode of fever last night. Feeling well. No cough, urinary symptoms infection. She little nervous, and she agreed to try hydroxyzine if she is more worried overnight. She is also on ambien for insomnia. Pain meds PRN. Defer to surgery when to test for Covid screening. If one more time of fever pls orders stat blood cultures, UA, CXR, Covid screening     1/3- seen post surgery. Slightly sedated from surgery. Sister at the bedside. Pt reports that pain is manageable. Continue to follow up with ortho. Stable at this time.     I have personally seen and examined the patient at bedside. I discussed the plan of care with patient and bedside RN.    Consultants/Specialty  orthopedics    Code Status  Full Code    Disposition  Patient is not medically cleared for discharge.   Anticipate discharge to to home with organized home  healthcare and close outpatient follow-up.  I have placed the appropriate orders for post-discharge needs.    Review of Systems  Review of Systems   Constitutional: Negative for chills, fever and weight loss.   HENT: Negative for hearing loss and tinnitus.    Eyes: Negative for blurred vision, double vision, photophobia and pain.   Respiratory: Negative for cough, sputum production and shortness of breath.    Cardiovascular: Negative for chest pain, palpitations, orthopnea and leg swelling.   Gastrointestinal: Negative for abdominal pain, constipation, diarrhea, nausea and vomiting.   Genitourinary: Negative for dysuria, frequency and urgency.   Musculoskeletal: Positive for falls, joint pain and myalgias. Negative for back pain and neck pain.   Skin: Negative for rash.   Neurological: Negative for dizziness, tingling, tremors, sensory change, speech change, focal weakness and headaches.   Psychiatric/Behavioral: Negative for hallucinations and substance abuse. The patient is nervous/anxious.         Physical Exam  Temp:  [35.9 °C (96.6 °F)-37.2 °C (99 °F)] 36.7 °C (98 °F)  Pulse:  [80-90] 87  Resp:  [11-27] 16  BP: ()/(47-63) 100/49  SpO2:  [85 %-100 %] 95 %    Physical Exam  Vitals reviewed.   Constitutional:       General: She is not in acute distress.     Appearance: Normal appearance. She is not ill-appearing.   HENT:      Head: Normocephalic and atraumatic.      Nose: No congestion.   Eyes:      General:         Right eye: No discharge.         Left eye: No discharge.      Pupils: Pupils are equal, round, and reactive to light.   Cardiovascular:      Rate and Rhythm: Normal rate and regular rhythm.      Pulses: Normal pulses.      Heart sounds: Normal heart sounds. No murmur heard.      Pulmonary:      Effort: Pulmonary effort is normal. No respiratory distress.      Breath sounds: Normal breath sounds. No stridor.   Abdominal:      General: Bowel sounds are normal. There is no distension.       Palpations: Abdomen is soft.      Tenderness: There is no abdominal tenderness.   Musculoskeletal:         General: No swelling or tenderness.      Cervical back: Normal range of motion. No rigidity.      Comments: Decreased range of motion on right lower extremity.   Skin:     General: Skin is warm.      Capillary Refill: Capillary refill takes less than 2 seconds.      Coloration: Skin is not jaundiced or pale.      Findings: No bruising.   Neurological:      General: No focal deficit present.      Mental Status: She is alert and oriented to person, place, and time.      Cranial Nerves: No cranial nerve deficit.   Psychiatric:         Mood and Affect: Mood is anxious.         Behavior: Behavior normal.         Fluids    Intake/Output Summary (Last 24 hours) at 1/3/2022 1538  Last data filed at 1/3/2022 1332  Gross per 24 hour   Intake 2300 ml   Output 850 ml   Net 1450 ml       Laboratory  Recent Labs     01/01/22  0752 01/02/22  0644 01/03/22  0710   WBC 5.8 5.1 4.6*   RBC 3.49* 3.17* 3.06*   HEMOGLOBIN 12.7 11.1* 10.8*   HEMATOCRIT 36.8* 34.2* 32.4*   .4* 107.9* 105.9*   MCH 36.4* 35.0* 35.3*   MCHC 34.5 32.5* 33.3*   RDW 50.8* 51.4* 49.4   PLATELETCT 156* 128* 126*   MPV 9.2 9.3 8.9*     Recent Labs     12/31/21  2100 01/02/22  0644 01/03/22  0710   SODIUM 140 139 138   POTASSIUM 4.5 4.1 3.9   CHLORIDE 102 105 106   CO2 22 26 23   GLUCOSE 113* 106* 112*   BUN 17 13 7*   CREATININE 0.74 0.72 0.50   CALCIUM 9.4 8.5 8.2*     Recent Labs     12/31/21  2100   APTT 24.9   INR 1.03               Imaging  DX-PELVIS-TRAUMA SERIES  3-   Final Result      Portable intraoperative imaging with findings as described above.      DX-PORTABLE FLUORO > 1 HOUR   Final Result      Portable fluoroscopy utilized for 54 seconds.         INTERPRETING LOCATION: [address] [place] [address], [facility]           Assessment/Plan  * S/P right hip fracture- (present on admission)  Assessment & Plan  Secondary to mechanical slip and fall,  symptomatic management, follow-up orthopedic surgery consult.  S/p surgery with Dr. Zamora 1/3  Continue to provider pain control and monitor adverse effect of pain medications.  Follow up with PT/OT      Fever- (present on admission)  Assessment & Plan  Unclear etiology. One episode of it on 1/1  No signs of infection. WBC normal  Continue to monitor for now   1/3- no more fever. Continue to monitor     Anxiety- (present on admission)  Assessment & Plan  Continue BuSpar.  Hydroxyzine prn   Continue to monitor.    Aortic valve sclerosis- (present on admission)  Assessment & Plan  Sclerosis without stenosis verified but echocardiogram 11/20      Primary insomnia- (present on admission)  Assessment & Plan  Continue ambien prn     Macrocytosis- (present on admission)  Assessment & Plan  Unclear cause,   TSH low in the past. She will need to consider endo chronology referral as OP (subclinical hyperthyroidism, T4 nl on 5/2021)  b12 normal   Needs age appropriate malignancy screening as OP with PCP          VTE prophylaxis: heparin ppx

## 2022-01-03 NOTE — PROGRESS NOTES
09:29 patient down to surgery, report given, CHG bath complete, patient wearing hospital gown, personal belongings with mahamed Sapp.

## 2022-01-04 PROBLEM — R50.9 FEVER: Status: RESOLVED | Noted: 2022-01-02 | Resolved: 2022-01-04

## 2022-01-04 LAB
ANION GAP SERPL CALC-SCNC: 11 MMOL/L (ref 7–16)
BASOPHILS # BLD AUTO: 0.3 % (ref 0–1.8)
BASOPHILS # BLD: 0.02 K/UL (ref 0–0.12)
BUN SERPL-MCNC: 7 MG/DL (ref 8–22)
CALCIUM SERPL-MCNC: 7.6 MG/DL (ref 8.5–10.5)
CHLORIDE SERPL-SCNC: 101 MMOL/L (ref 96–112)
CO2 SERPL-SCNC: 22 MMOL/L (ref 20–33)
CORTIS SERPL-MCNC: 11.9 UG/DL (ref 0–23)
CREAT SERPL-MCNC: 0.62 MG/DL (ref 0.5–1.4)
EOSINOPHIL # BLD AUTO: 0.01 K/UL (ref 0–0.51)
EOSINOPHIL NFR BLD: 0.2 % (ref 0–6.9)
ERYTHROCYTE [DISTWIDTH] IN BLOOD BY AUTOMATED COUNT: 48.4 FL (ref 35.9–50)
GLUCOSE SERPL-MCNC: 156 MG/DL (ref 65–99)
HCT VFR BLD AUTO: 28.8 % (ref 37–47)
HGB BLD-MCNC: 9.6 G/DL (ref 12–16)
IMM GRANULOCYTES # BLD AUTO: 0.02 K/UL (ref 0–0.11)
IMM GRANULOCYTES NFR BLD AUTO: 0.3 % (ref 0–0.9)
LYMPHOCYTES # BLD AUTO: 1.13 K/UL (ref 1–4.8)
LYMPHOCYTES NFR BLD: 17.6 % (ref 22–41)
MCH RBC QN AUTO: 35.4 PG (ref 27–33)
MCHC RBC AUTO-ENTMCNC: 33.3 G/DL (ref 33.6–35)
MCV RBC AUTO: 106.3 FL (ref 81.4–97.8)
MONOCYTES # BLD AUTO: 1.14 K/UL (ref 0–0.85)
MONOCYTES NFR BLD AUTO: 17.8 % (ref 0–13.4)
NEUTROPHILS # BLD AUTO: 4.1 K/UL (ref 2–7.15)
NEUTROPHILS NFR BLD: 63.8 % (ref 44–72)
NRBC # BLD AUTO: 0 K/UL
NRBC BLD-RTO: 0 /100 WBC
PLATELET # BLD AUTO: 128 K/UL (ref 164–446)
PMV BLD AUTO: 9.2 FL (ref 9–12.9)
POTASSIUM SERPL-SCNC: 3.7 MMOL/L (ref 3.6–5.5)
RBC # BLD AUTO: 2.71 M/UL (ref 4.2–5.4)
SODIUM SERPL-SCNC: 134 MMOL/L (ref 135–145)
WBC # BLD AUTO: 6.4 K/UL (ref 4.8–10.8)

## 2022-01-04 PROCEDURE — 700105 HCHG RX REV CODE 258: Performed by: STUDENT IN AN ORGANIZED HEALTH CARE EDUCATION/TRAINING PROGRAM

## 2022-01-04 PROCEDURE — 700111 HCHG RX REV CODE 636 W/ 250 OVERRIDE (IP): Performed by: INTERNAL MEDICINE

## 2022-01-04 PROCEDURE — 97162 PT EVAL MOD COMPLEX 30 MIN: CPT

## 2022-01-04 PROCEDURE — 99024 POSTOP FOLLOW-UP VISIT: CPT | Performed by: STUDENT IN AN ORGANIZED HEALTH CARE EDUCATION/TRAINING PROGRAM

## 2022-01-04 PROCEDURE — 85025 COMPLETE CBC W/AUTO DIFF WBC: CPT

## 2022-01-04 PROCEDURE — 99232 SBSQ HOSP IP/OBS MODERATE 35: CPT | Performed by: HOSPITALIST

## 2022-01-04 PROCEDURE — 700102 HCHG RX REV CODE 250 W/ 637 OVERRIDE(OP): Performed by: STUDENT IN AN ORGANIZED HEALTH CARE EDUCATION/TRAINING PROGRAM

## 2022-01-04 PROCEDURE — 36415 COLL VENOUS BLD VENIPUNCTURE: CPT

## 2022-01-04 PROCEDURE — 700102 HCHG RX REV CODE 250 W/ 637 OVERRIDE(OP): Performed by: INTERNAL MEDICINE

## 2022-01-04 PROCEDURE — A9270 NON-COVERED ITEM OR SERVICE: HCPCS | Performed by: STUDENT IN AN ORGANIZED HEALTH CARE EDUCATION/TRAINING PROGRAM

## 2022-01-04 PROCEDURE — A9270 NON-COVERED ITEM OR SERVICE: HCPCS | Performed by: INTERNAL MEDICINE

## 2022-01-04 PROCEDURE — 80048 BASIC METABOLIC PNL TOTAL CA: CPT

## 2022-01-04 PROCEDURE — 700111 HCHG RX REV CODE 636 W/ 250 OVERRIDE (IP): Performed by: ORTHOPAEDIC SURGERY

## 2022-01-04 PROCEDURE — 770001 HCHG ROOM/CARE - MED/SURG/GYN PRIV*

## 2022-01-04 PROCEDURE — 97535 SELF CARE MNGMENT TRAINING: CPT

## 2022-01-04 PROCEDURE — 97166 OT EVAL MOD COMPLEX 45 MIN: CPT

## 2022-01-04 PROCEDURE — 700101 HCHG RX REV CODE 250: Performed by: STUDENT IN AN ORGANIZED HEALTH CARE EDUCATION/TRAINING PROGRAM

## 2022-01-04 PROCEDURE — 82533 TOTAL CORTISOL: CPT

## 2022-01-04 RX ORDER — TRAZODONE HYDROCHLORIDE 50 MG/1
50 TABLET ORAL
Status: DISCONTINUED | OUTPATIENT
Start: 2022-01-04 | End: 2022-01-07 | Stop reason: HOSPADM

## 2022-01-04 RX ORDER — ACETAMINOPHEN 500 MG
1000 TABLET ORAL EVERY 6 HOURS
Status: DISCONTINUED | OUTPATIENT
Start: 2022-01-04 | End: 2022-01-07 | Stop reason: HOSPADM

## 2022-01-04 RX ORDER — MIDODRINE HYDROCHLORIDE 5 MG/1
5 TABLET ORAL ONCE
Status: COMPLETED | OUTPATIENT
Start: 2022-01-04 | End: 2022-01-04

## 2022-01-04 RX ORDER — LIDOCAINE 50 MG/G
1 PATCH TOPICAL EVERY 24 HOURS
Status: DISCONTINUED | OUTPATIENT
Start: 2022-01-04 | End: 2022-01-07 | Stop reason: HOSPADM

## 2022-01-04 RX ORDER — SODIUM CHLORIDE 9 MG/ML
500 INJECTION, SOLUTION INTRAVENOUS ONCE
Status: COMPLETED | OUTPATIENT
Start: 2022-01-04 | End: 2022-01-04

## 2022-01-04 RX ORDER — ACETAMINOPHEN 500 MG
1000 TABLET ORAL EVERY 6 HOURS PRN
Status: DISCONTINUED | OUTPATIENT
Start: 2022-01-09 | End: 2022-01-07 | Stop reason: HOSPADM

## 2022-01-04 RX ORDER — SODIUM CHLORIDE 9 MG/ML
INJECTION, SOLUTION INTRAVENOUS CONTINUOUS
Status: DISCONTINUED | OUTPATIENT
Start: 2022-01-04 | End: 2022-01-07 | Stop reason: HOSPADM

## 2022-01-04 RX ADMIN — HEPARIN SODIUM 5000 UNITS: 5000 INJECTION, SOLUTION INTRAVENOUS; SUBCUTANEOUS at 22:18

## 2022-01-04 RX ADMIN — MIDODRINE HYDROCHLORIDE 5 MG: 5 TABLET ORAL at 07:56

## 2022-01-04 RX ADMIN — BUSPIRONE HYDROCHLORIDE 5 MG: 10 TABLET ORAL at 18:26

## 2022-01-04 RX ADMIN — HEPARIN SODIUM 5000 UNITS: 5000 INJECTION, SOLUTION INTRAVENOUS; SUBCUTANEOUS at 14:52

## 2022-01-04 RX ADMIN — OXYCODONE 5 MG: 5 TABLET ORAL at 22:46

## 2022-01-04 RX ADMIN — ATORVASTATIN CALCIUM 20 MG: 20 TABLET, FILM COATED ORAL at 18:26

## 2022-01-04 RX ADMIN — OXYCODONE 5 MG: 5 TABLET ORAL at 11:24

## 2022-01-04 RX ADMIN — DOCUSATE SODIUM 50 MG AND SENNOSIDES 8.6 MG 2 TABLET: 8.6; 5 TABLET, FILM COATED ORAL at 18:27

## 2022-01-04 RX ADMIN — LIDOCAINE 1 PATCH: 50 PATCH TOPICAL at 21:02

## 2022-01-04 RX ADMIN — OXYCODONE 5 MG: 5 TABLET ORAL at 14:55

## 2022-01-04 RX ADMIN — FLUTICASONE PROPIONATE 50 MCG: 50 SPRAY, METERED NASAL at 05:37

## 2022-01-04 RX ADMIN — ACETAMINOPHEN 1000 MG: 500 TABLET ORAL at 21:01

## 2022-01-04 RX ADMIN — POLYETHYLENE GLYCOL 3350 1 PACKET: 17 POWDER, FOR SOLUTION ORAL at 05:37

## 2022-01-04 RX ADMIN — HEPARIN SODIUM 5000 UNITS: 5000 INJECTION, SOLUTION INTRAVENOUS; SUBCUTANEOUS at 05:37

## 2022-01-04 RX ADMIN — OXYCODONE 5 MG: 5 TABLET ORAL at 02:20

## 2022-01-04 RX ADMIN — FOLIC ACID 2 MG: 1 TABLET ORAL at 05:37

## 2022-01-04 RX ADMIN — HYDROMORPHONE HYDROCHLORIDE 0.25 MG: 1 INJECTION, SOLUTION INTRAMUSCULAR; INTRAVENOUS; SUBCUTANEOUS at 12:34

## 2022-01-04 RX ADMIN — SODIUM CHLORIDE 500 ML: 9 INJECTION, SOLUTION INTRAVENOUS at 07:57

## 2022-01-04 RX ADMIN — ZOLPIDEM TARTRATE 5 MG: 5 TABLET ORAL at 21:01

## 2022-01-04 RX ADMIN — BUSPIRONE HYDROCHLORIDE 5 MG: 10 TABLET ORAL at 05:37

## 2022-01-04 RX ADMIN — DOCUSATE SODIUM 50 MG AND SENNOSIDES 8.6 MG 2 TABLET: 8.6; 5 TABLET, FILM COATED ORAL at 05:37

## 2022-01-04 RX ADMIN — OXYCODONE 5 MG: 5 TABLET ORAL at 18:26

## 2022-01-04 RX ADMIN — SODIUM CHLORIDE 500 ML: 9 INJECTION, SOLUTION INTRAVENOUS at 05:34

## 2022-01-04 RX ADMIN — SODIUM CHLORIDE: 9 INJECTION, SOLUTION INTRAVENOUS at 21:01

## 2022-01-04 RX ADMIN — CEFAZOLIN 2 G: 10 INJECTION, POWDER, FOR SOLUTION INTRAVENOUS at 02:20

## 2022-01-04 RX ADMIN — ALPRAZOLAM 0.5 MG: 0.5 TABLET ORAL at 00:15

## 2022-01-04 ASSESSMENT — PAIN DESCRIPTION - PAIN TYPE
TYPE: SURGICAL PAIN
TYPE: SURGICAL PAIN
TYPE: SURGICAL PAIN;ACUTE PAIN
TYPE: SURGICAL PAIN

## 2022-01-04 ASSESSMENT — ACTIVITIES OF DAILY LIVING (ADL): TOILETING: INDEPENDENT

## 2022-01-04 ASSESSMENT — COGNITIVE AND FUNCTIONAL STATUS - GENERAL
WALKING IN HOSPITAL ROOM: TOTAL
HELP NEEDED FOR BATHING: A LOT
MOVING FROM LYING ON BACK TO SITTING ON SIDE OF FLAT BED: UNABLE
TOILETING: TOTAL
DAILY ACTIVITIY SCORE: 15
MOBILITY SCORE: 6
SUGGESTED CMS G CODE MODIFIER DAILY ACTIVITY: CK
DRESSING REGULAR UPPER BODY CLOTHING: A LITTLE
STANDING UP FROM CHAIR USING ARMS: TOTAL
CLIMB 3 TO 5 STEPS WITH RAILING: TOTAL
DRESSING REGULAR LOWER BODY CLOTHING: TOTAL
SUGGESTED CMS G CODE MODIFIER MOBILITY: CN
TURNING FROM BACK TO SIDE WHILE IN FLAT BAD: UNABLE
MOVING TO AND FROM BED TO CHAIR: UNABLE

## 2022-01-04 ASSESSMENT — ENCOUNTER SYMPTOMS
COUGH: 0
CHILLS: 0
PALPITATIONS: 0
DIZZINESS: 0
SHORTNESS OF BREATH: 0
ABDOMINAL PAIN: 0
NAUSEA: 0
FALLS: 1
HEADACHES: 0
VOMITING: 0
FEVER: 0

## 2022-01-04 ASSESSMENT — GAIT ASSESSMENTS: GAIT LEVEL OF ASSIST: UNABLE TO PARTICIPATE

## 2022-01-04 NOTE — PROGRESS NOTES
A&Ox 4 expressing anxiety and visually upset, Pain 10/10 given medication per MAR with little relief. Denies any new numbness or tingling. +BS last BM 12/31/21, castorena in place with adequate urine output. Dressing to abdomen is CDI with no visible drainage. Unable to assess ambulation at this time due to patient refusing due to increased pain. Discussed POC with pt-pt verbalized understanding. Call light within reach, bed in lowest position, Bed/chair alarm on.

## 2022-01-04 NOTE — PROGRESS NOTES
Paged at [phone]. Spoke to Dr. King at [phone]. BP of 93/46 with MAP of 62. Requesting pain medication. MD placed order for 500 mL bolus. Per MD, hold off on giving pain medication until bolus is complete and SBP is in the 100s.     Paged at [phone]. Spoke to Dr. King at [phone]. Following bolus BP is 89/49. MD placed orders for 500 mL bolus, 5 mg PO Midodrine once, and Cortisol lab. Report given to day RNMena.

## 2022-01-04 NOTE — PROGRESS NOTES
A&Ox 4, Pain 9/10 with movement, resting pain 5/10, baseline numbness to lower extremities noted. +BS last BM 12/31/21, adequate urine out via purwick. Skin intact with small scrap to right elbow. Unable to ambulate at this time due to [place] traction boot inplace. Discussed POC with pt-pt verbalized understanding. Call light within reach, bed in lowest position, Bed/chair alarm on. Awaiting surgery

## 2022-01-04 NOTE — PROGRESS NOTES
14:30 report received from PACU, patient back to the floor, pain tolerable, dressng to lower abdomen is clean dry and intact, resting comfortably.

## 2022-01-04 NOTE — THERAPY
Missed Therapy     Patient Name: Yasimn Villegas Zhong  Age:  76 y.o., Sex:  female  Medical Record #: [id number]  Today's Date: 1/4/2022    Discussed missed therapy with RN       01/04/22 0809   Interdisciplinary Plan of Care Collaboration   Collaboration Comments OT order received. Eval attempted. RN request hold this morning due to hypotension. Will attempt again.

## 2022-01-04 NOTE — PROGRESS NOTES
[facility] Medicine Daily Progress Note    Date of Service  1/4/2022    Chief Complaint  Yasmin Zhong is a 76 y.o. female admitted 1/1/2022 with right hip pain    Hospital Course    Yasmin Zhong is a 76 y.o. female who presented 1/1/2022 as transfer from [facility] with right hip pain and an acetabular fracture after a mechanical slip and fall striking her right pelvis.  She underwent open reduction internal fixation of right acetabular fracture on 1/3.    Interval Problem Update  Her blood pressure has been low overnight  She denies any lightheadedness, dizziness  Hemoglobin noted to be dropping, like acutely post procedure related  She is still having significant right hip pain  She is tired and did not sleep well last night    I have personally seen and examined the patient at bedside. I discussed the plan of care with patient and bedside RN.    Consultants/Specialty  orthopedics    Code Status  Full Code    Disposition  Patient is not medically cleared for discharge.   Anticipate discharge to to home with organized home healthcare and close outpatient follow-up.  I have placed the appropriate orders for post-discharge needs.    Review of Systems  Review of Systems   Constitutional: Negative for chills and fever.   Respiratory: Negative for cough and shortness of breath.    Cardiovascular: Negative for chest pain and palpitations.   Gastrointestinal: Negative for abdominal pain, nausea and vomiting.   Genitourinary: Negative for dysuria and urgency.   Musculoskeletal: Positive for falls and joint pain.   Neurological: Negative for dizziness and headaches.   All other systems reviewed and are negative.       Physical Exam  Temp:  [35.9 °C (96.6 °F)-37.4 °C (99.3 °F)] 36.4 °C (97.6 °F)  Pulse:  [] 90  Resp:  [11-27] 16  BP: ()/(46-68) 100/53  SpO2:  [85 %-100 %] 96 %    Physical Exam  Vitals and nursing note reviewed.   Constitutional:       Appearance: Normal appearance.    Cardiovascular:      Rate and Rhythm: Normal rate and regular rhythm.   Pulmonary:      Effort: Pulmonary effort is normal.      Breath sounds: Normal breath sounds.   Musculoskeletal:         General: Tenderness (right hip) present.   Skin:     General: Skin is warm and dry.   Neurological:      General: No focal deficit present.      Mental Status: She is alert and oriented to person, place, and time.         Fluids    Intake/Output Summary (Last 24 hours) at 1/4/2022 1204  Last data filed at 1/4/2022 0400  Gross per 24 hour   Intake 2700 ml   Output 2650 ml   Net 50 ml       Laboratory  Recent Labs     01/02/22  0644 01/03/22  0710 01/04/22  0624   WBC 5.1 4.6* 6.4   RBC 3.17* 3.06* 2.71*   HEMOGLOBIN 11.1* 10.8* 9.6*   HEMATOCRIT 34.2* 32.4* 28.8*   .9* 105.9* 106.3*   MCH 35.0* 35.3* 35.4*   MCHC 32.5* 33.3* 33.3*   RDW 51.4* 49.4 48.4   PLATELETCT 128* 126* 128*   MPV 9.3 8.9* 9.2     Recent Labs     01/02/22  0644 01/03/22  0710 01/04/22  0624   SODIUM 139 138 134*   POTASSIUM 4.1 3.9 3.7   CHLORIDE 105 106 101   CO2 26 23 22   GLUCOSE 106* 112* 156*   BUN 13 7* 7*   CREATININE 0.72 0.50 0.62   CALCIUM 8.5 8.2* 7.6*                   Imaging  DX-PELVIS-TRAUMA SERIES  3-   Final Result      Portable intraoperative imaging with findings as described above.      DX-PORTABLE FLUORO > 1 HOUR   Final Result      Portable fluoroscopy utilized for 54 seconds.         INTERPRETING LOCATION: [address] [place] [address], [address]           Assessment/Plan  * S/P right hip fracture- (present on admission)  Assessment & Plan  Secondary to mechanical slip and fall, symptomatic management, follow-up orthopedic surgery consult.  S/p surgery with Dr. Zamora 1/3  Continue to provider pain control and monitor adverse effect of pain medications.  Follow up with PT/OT      Anxiety- (present on admission)  Assessment & Plan  Continue BuSpar.  Hydroxyzine prn   Continue to monitor.    Aortic valve sclerosis- (present on  admission)  Assessment & Plan  Sclerosis without stenosis verified but echocardiogram 11/20      Primary insomnia- (present on admission)  Assessment & Plan  Continue ambien prn     Macrocytosis- (present on admission)  Assessment & Plan  Unclear cause,   TSH low in the past. She will need to consider endo chronology referral as OP (subclinical hyperthyroidism, T4 nl on 5/2021)  b12 normal   Needs age appropriate malignancy screening as OP with PCP          VTE prophylaxis: heparin ppx

## 2022-01-04 NOTE — PROGRESS NOTES
POD 1 s/p right acetabulum ORIF  Pain controlled this am  Dressings c/d/i  Sensation intact dp/sp/t and obturator nerves  Ankle DF/PF intact  Foot warm    Plan   TTWB RLE  PT eval today  DVT ppx taylorx    Aroldo Mcgrath MD  Orthopedic Trauma Fellow

## 2022-01-04 NOTE — CARE PLAN
Problem: Knowledge Deficit - Standard  Goal: Patient and family/care givers will demonstrate understanding of plan of care, disease process/condition, diagnostic tests and medications  Outcome: Progressing     Problem: Pain - Standard  Goal: Alleviation of pain or a reduction in pain to the patient’s comfort goal  Outcome: Progressing     The patient is Stable - Low risk of patient condition declining or worsening    Shift Goals  Clinical Goals: NPO at midnight; comfort; painmanagemnt  Patient Goals: rest; comfort  Family Goals: REMBERTO    Progress made toward(s) clinical / shift goals: pain manageable with medication and rest. POC discussed with pt-pt verbalized understanding.    Patient is not progressing towards the following goals:

## 2022-01-04 NOTE — PROGRESS NOTES
"   Orthopaedic Progress Note    Interval changes:  Patient doing well post op  Pending placement  Cleared for DC by ortho pending medicine clearance    ROS - Patient denies any new issues.  Pain well controlled.    /78   Pulse 89   Temp 36.8 °C (98.3 °F) (Temporal)   Resp 16   Ht 1.626 m (5' 4\")   Wt 79.4 kg (175 lb 0.7 oz)   SpO2 95%       Patient seen and examined  No acute distress  Breathing non labored  RRR  Right hip dressing CDI, DNVI has prior neuropathy with no change from prior, moves all toes, cap refill <2 sec.     Recent Labs     01/02/22  0644 01/03/22  0710 01/04/22  0624   WBC 5.1 4.6* 6.4   RBC 3.17* 3.06* 2.71*   HEMOGLOBIN 11.1* 10.8* 9.6*   HEMATOCRIT 34.2* 32.4* 28.8*   .9* 105.9* 106.3*   MCH 35.0* 35.3* 35.4*   MCHC 32.5* 33.3* 33.3*   RDW 51.4* 49.4 48.4   PLATELETCT 128* 126* 128*   MPV 9.3 8.9* 9.2       Active Hospital Problems    Diagnosis    • S/P right hip fracture [Z87.81]    • Anxiety [F41.9]    • Aortic valve sclerosis [I35.8]    • Primary insomnia [F51.01]    • Macrocytosis [D75.89]        Assessment/Plan:  Patient doing well post op  Pending placement  POD#1 S/P Open reduction internal fixation of right acetabular fracture  Wt bearing status - TTWB RLE  Wound care/Drains - dressings changed every other day by nursing  Future Procedures - none planned   Sutures/Staples out- 14 days post operatively  PT/OT-initiated  Antibiotics: perioperative completed  DVT Prophylaxis- TEDS/SCDs/Foot pumps  Murry-none  Case Coordination for Discharge Planning - Disposition SNF vs rehab   "

## 2022-01-04 NOTE — CARE PLAN
Problem: Knowledge Deficit - Standard  Goal: Patient and family/care givers will demonstrate understanding of plan of care, disease process/condition, diagnostic tests and medications  Outcome: Progressing     Problem: Pain - Standard  Goal: Alleviation of pain or a reduction in pain to the patient’s comfort goal  Outcome: Progressing     The patient is Stable - Low risk of patient condition declining or worsening    Shift Goals  Clinical Goals: pain control, PT/OT  Patient Goals: pain control, rest  Family Goals: REMBERTO    Progress made toward(s) clinical / shift goals: pain slowly being managed with medication, cold therapy, and rest. POC discussed with pt-pt verbalized understanding    Patient is not progressing towards the following goals:

## 2022-01-04 NOTE — CARE PLAN
The patient is Stable - Low risk of patient condition declining or worsening    Shift Goals: Safety, Pain and anxiety control   Clinical Goals: Safety, Pain and anxiety control   Patient Goals: Sleep, Pain control   Family Goals: REMBERTO    Progress made toward(s) clinical / shift goals:  Fall precautions in place. PRN pain and anxiety medication given per MAR.     Patient is not progressing towards the following goals: N/A    Problem: Knowledge Deficit - Standard  Goal: Patient and family/care givers will demonstrate understanding of plan of care, disease process/condition, diagnostic tests and medications  Outcome: Progressing  PRN anxiety medication given per MAR.      Problem: Pain - Standard  Goal: Alleviation of pain or a reduction in pain to the patient’s comfort goal  Outcome: Progressing  Pain assessed using verbal and nonverbal scales. PRN pain medication given as needed and as ordered. Education provided on pain management.       Problem: Fall Risk  Goal: Patient will remain free from falls  Outcome: Progressing  Bed in lowest and locked position. Bed alarm in use. Treaded socks on pt. Belongings and call light in reach. Hourly rounding in place. Provided safety education.

## 2022-01-05 LAB
ERYTHROCYTE [DISTWIDTH] IN BLOOD BY AUTOMATED COUNT: 47.8 FL (ref 35.9–50)
FLUAV RNA SPEC QL NAA+PROBE: NEGATIVE
FLUBV RNA SPEC QL NAA+PROBE: NEGATIVE
HCT VFR BLD AUTO: 26 % (ref 37–47)
HGB BLD-MCNC: 8.7 G/DL (ref 12–16)
MCH RBC QN AUTO: 35.4 PG (ref 27–33)
MCHC RBC AUTO-ENTMCNC: 33.5 G/DL (ref 33.6–35)
MCV RBC AUTO: 105.7 FL (ref 81.4–97.8)
PLATELET # BLD AUTO: 134 K/UL (ref 164–446)
PMV BLD AUTO: 9 FL (ref 9–12.9)
RBC # BLD AUTO: 2.46 M/UL (ref 4.2–5.4)
RSV RNA SPEC QL NAA+PROBE: NEGATIVE
SARS-COV-2 RNA RESP QL NAA+PROBE: NOTDETECTED
SPECIMEN SOURCE: NORMAL
WBC # BLD AUTO: 5.6 K/UL (ref 4.8–10.8)

## 2022-01-05 PROCEDURE — 770001 HCHG ROOM/CARE - MED/SURG/GYN PRIV*

## 2022-01-05 PROCEDURE — 700111 HCHG RX REV CODE 636 W/ 250 OVERRIDE (IP): Performed by: INTERNAL MEDICINE

## 2022-01-05 PROCEDURE — 0241U HCHG SARS-COV-2 COVID-19 NFCT DS RESP RNA 4 TRGT MIC: CPT

## 2022-01-05 PROCEDURE — 700102 HCHG RX REV CODE 250 W/ 637 OVERRIDE(OP): Performed by: INTERNAL MEDICINE

## 2022-01-05 PROCEDURE — 36415 COLL VENOUS BLD VENIPUNCTURE: CPT

## 2022-01-05 PROCEDURE — A9270 NON-COVERED ITEM OR SERVICE: HCPCS | Performed by: INTERNAL MEDICINE

## 2022-01-05 PROCEDURE — C9803 HOPD COVID-19 SPEC COLLECT: HCPCS | Performed by: HOSPITALIST

## 2022-01-05 PROCEDURE — 700102 HCHG RX REV CODE 250 W/ 637 OVERRIDE(OP): Performed by: STUDENT IN AN ORGANIZED HEALTH CARE EDUCATION/TRAINING PROGRAM

## 2022-01-05 PROCEDURE — 700105 HCHG RX REV CODE 258: Performed by: STUDENT IN AN ORGANIZED HEALTH CARE EDUCATION/TRAINING PROGRAM

## 2022-01-05 PROCEDURE — 99233 SBSQ HOSP IP/OBS HIGH 50: CPT | Performed by: HOSPITALIST

## 2022-01-05 PROCEDURE — A9270 NON-COVERED ITEM OR SERVICE: HCPCS | Performed by: STUDENT IN AN ORGANIZED HEALTH CARE EDUCATION/TRAINING PROGRAM

## 2022-01-05 PROCEDURE — 700101 HCHG RX REV CODE 250: Performed by: STUDENT IN AN ORGANIZED HEALTH CARE EDUCATION/TRAINING PROGRAM

## 2022-01-05 PROCEDURE — A9270 NON-COVERED ITEM OR SERVICE: HCPCS | Performed by: HOSPITALIST

## 2022-01-05 PROCEDURE — 700102 HCHG RX REV CODE 250 W/ 637 OVERRIDE(OP): Performed by: HOSPITALIST

## 2022-01-05 PROCEDURE — 85027 COMPLETE CBC AUTOMATED: CPT

## 2022-01-05 RX ORDER — ZOLPIDEM TARTRATE 10 MG/1
10 TABLET ORAL NIGHTLY PRN
Qty: 3 TABLET | Refills: 0 | Status: SHIPPED | OUTPATIENT
Start: 2022-01-05 | End: 2022-01-05

## 2022-01-05 RX ORDER — HEPARIN SODIUM 5000 [USP'U]/ML
5000 INJECTION, SOLUTION INTRAVENOUS; SUBCUTANEOUS EVERY 8 HOURS
Refills: 0 | Status: SHIPPED
Start: 2022-01-05 | End: 2022-03-02

## 2022-01-05 RX ORDER — ZOLPIDEM TARTRATE 10 MG/1
10 TABLET ORAL NIGHTLY PRN
Qty: 3 TABLET | Refills: 0 | Status: SHIPPED | OUTPATIENT
Start: 2022-01-05 | End: 2022-01-08

## 2022-01-05 RX ORDER — FOLIC ACID 1 MG/1
2 TABLET ORAL DAILY
Qty: 30 TABLET | Status: SHIPPED
Start: 2022-01-06 | End: 2022-03-02

## 2022-01-05 RX ORDER — OXYCODONE HYDROCHLORIDE 5 MG/1
5 TABLET ORAL
Qty: 18 TABLET | Refills: 0 | Status: SHIPPED | OUTPATIENT
Start: 2022-01-05 | End: 2022-01-08

## 2022-01-05 RX ORDER — OXYCODONE HYDROCHLORIDE 5 MG/1
5 TABLET ORAL
Qty: 18 TABLET | Refills: 0 | Status: SHIPPED | OUTPATIENT
Start: 2022-01-05 | End: 2022-01-05

## 2022-01-05 RX ORDER — LIDOCAINE 50 MG/G
1 PATCH TOPICAL EVERY 24 HOURS
Qty: 10 PATCH | Status: SHIPPED
Start: 2022-01-05 | End: 2022-03-02

## 2022-01-05 RX ORDER — ACETAMINOPHEN 500 MG
1000 TABLET ORAL EVERY 6 HOURS
Qty: 30 TABLET | Refills: 0 | Status: SHIPPED
Start: 2022-01-05 | End: 2022-08-26

## 2022-01-05 RX ADMIN — TRAZODONE HYDROCHLORIDE 50 MG: 50 TABLET ORAL at 20:49

## 2022-01-05 RX ADMIN — ONDANSETRON 4 MG: 2 INJECTION INTRAMUSCULAR; INTRAVENOUS at 08:31

## 2022-01-05 RX ADMIN — BUSPIRONE HYDROCHLORIDE 5 MG: 10 TABLET ORAL at 05:37

## 2022-01-05 RX ADMIN — FOLIC ACID 2 MG: 1 TABLET ORAL at 05:37

## 2022-01-05 RX ADMIN — OXYCODONE 5 MG: 5 TABLET ORAL at 15:41

## 2022-01-05 RX ADMIN — OXYCODONE 5 MG: 5 TABLET ORAL at 11:17

## 2022-01-05 RX ADMIN — ACETAMINOPHEN 1000 MG: 500 TABLET ORAL at 00:12

## 2022-01-05 RX ADMIN — MAGNESIUM HYDROXIDE 30 ML: 400 SUSPENSION ORAL at 05:37

## 2022-01-05 RX ADMIN — ZOLPIDEM TARTRATE 5 MG: 5 TABLET ORAL at 20:49

## 2022-01-05 RX ADMIN — FLUTICASONE PROPIONATE 50 MCG: 50 SPRAY, METERED NASAL at 05:36

## 2022-01-05 RX ADMIN — ACETAMINOPHEN 1000 MG: 500 TABLET ORAL at 11:17

## 2022-01-05 RX ADMIN — ACETAMINOPHEN 1000 MG: 500 TABLET ORAL at 05:37

## 2022-01-05 RX ADMIN — HEPARIN SODIUM 5000 UNITS: 5000 INJECTION, SOLUTION INTRAVENOUS; SUBCUTANEOUS at 05:37

## 2022-01-05 RX ADMIN — DOCUSATE SODIUM 50 MG AND SENNOSIDES 8.6 MG 2 TABLET: 8.6; 5 TABLET, FILM COATED ORAL at 05:37

## 2022-01-05 RX ADMIN — LIDOCAINE 1 PATCH: 50 PATCH TOPICAL at 20:49

## 2022-01-05 RX ADMIN — HEPARIN SODIUM 5000 UNITS: 5000 INJECTION, SOLUTION INTRAVENOUS; SUBCUTANEOUS at 15:42

## 2022-01-05 RX ADMIN — SODIUM CHLORIDE: 9 INJECTION, SOLUTION INTRAVENOUS at 05:36

## 2022-01-05 RX ADMIN — ATORVASTATIN CALCIUM 20 MG: 20 TABLET, FILM COATED ORAL at 17:09

## 2022-01-05 RX ADMIN — DOCUSATE SODIUM 50 MG AND SENNOSIDES 8.6 MG 2 TABLET: 8.6; 5 TABLET, FILM COATED ORAL at 17:09

## 2022-01-05 RX ADMIN — HEPARIN SODIUM 5000 UNITS: 5000 INJECTION, SOLUTION INTRAVENOUS; SUBCUTANEOUS at 22:26

## 2022-01-05 RX ADMIN — BUSPIRONE HYDROCHLORIDE 5 MG: 10 TABLET ORAL at 17:09

## 2022-01-05 RX ADMIN — ACETAMINOPHEN 1000 MG: 500 TABLET ORAL at 17:09

## 2022-01-05 RX ADMIN — SODIUM CHLORIDE: 9 INJECTION, SOLUTION INTRAVENOUS at 22:28

## 2022-01-05 ASSESSMENT — PATIENT HEALTH QUESTIONNAIRE - PHQ9
2. FEELING DOWN, DEPRESSED, IRRITABLE, OR HOPELESS: NOT AT ALL
1. LITTLE INTEREST OR PLEASURE IN DOING THINGS: NOT AT ALL
SUM OF ALL RESPONSES TO PHQ9 QUESTIONS 1 AND 2: 0

## 2022-01-05 ASSESSMENT — PAIN DESCRIPTION - PAIN TYPE
TYPE: SURGICAL PAIN

## 2022-01-05 ASSESSMENT — ENCOUNTER SYMPTOMS
FALLS: 1
SHORTNESS OF BREATH: 0
VOMITING: 0
COUGH: 0
CHILLS: 0
ABDOMINAL PAIN: 0
NAUSEA: 0
FEVER: 0
DIZZINESS: 0
HEADACHES: 0
PALPITATIONS: 0

## 2022-01-05 ASSESSMENT — LIFESTYLE VARIABLES: REASON UNABLE TO ASSESS: COMPLETED

## 2022-01-05 NOTE — CARE PLAN
The patient is Watcher - Medium risk of patient condition declining or worsening    Shift Goals: Pain control, Sleep, Maintain BP  Clinical Goals: Pain control, Sleep, Maintain BP  Patient Goals: Pain control, Sleep  Family Goals: REMBERTO    Progress made toward(s) clinical / shift goals: PRN pain medication given per MAR. Frequent BP monitoring and collaboration with MD.     Patient is not progressing towards the following goals: N/A      Problem: Knowledge Deficit - Standard  Goal: Patient and family/care givers will demonstrate understanding of plan of care, disease process/condition, diagnostic tests and medications  Outcome: Progressing  Plan of care reviewed. Call light in reach. Educated to call with any needs.      Problem: Pain - Standard  Goal: Alleviation of pain or a reduction in pain to the patient’s comfort goal  Outcome: Progressing  Pain assessed using verbal and nonverbal scales. PRN pain medication given as needed and as ordered. Education provided on pain management.       Problem: Fall Risk  Goal: Patient will remain free from falls  Outcome: Progressing  Bed in lowest and locked position. Bed alarm in use. Treaded socks on pt. Belongings and call light in reach. Hourly rounding in place. Provided safety education.        Problem: Skin Integrity  Goal: Skin integrity is maintained or improved  Outcome: Progressing  Pillows and heel float boots. Repositioning as needed and as tolerated.

## 2022-01-05 NOTE — PROGRESS NOTES
Resting comfortably and doing well.  Overall nervous but has no complaints.  Has constipation and no bowel gas currently    Neurovascular exam is intact and at baseline  Dressings intact and dry    Plan:  Toetouch weight-bearing right lower extremity. Range of motion as tolerated  PT/OT.  Lovenox for DVT prophylaxis.  Discharge planning.   Follow up in clinic in 2 weeks time for repeat AP and Judet x-rays and suture  removal

## 2022-01-05 NOTE — PROGRESS NOTES
Sandi at 1941 and 2008. Spoke to Dr. Hayward at 2021. BP of 88/51. Rates pain at 10/10. Updated MD, new order for continuous fluids of NS at 125 mL/ hr, scheduled Tylenol 1000 mg Q6H, and a Lidocaine patch. MD placed additional orders. Per MD, it is still appropriate to give pain and sleep medications at this time as long as medications are spaced one hour apart. Per MD, it is appropriate to give newly scheduled Tylenol at this time per order.

## 2022-01-05 NOTE — DISCHARGE PLANNING
Received Choice form at 1159  Agency/Facility Name: [facility](1), [facility](2), [facility](3)  Referral sent per Choice form @ 1159      CM informed

## 2022-01-05 NOTE — PROGRESS NOTES
[facility] Medicine Daily Progress Note    Date of Service  1/5/2022    Chief Complaint  Yasmin Zhong is a 76 y.o. female admitted 1/1/2022 with right hip pain    Hospital Course    Yasmin Zhong is a 76 y.o. female who presented 1/1/2022 as transfer from [facility] with right hip pain and an acetabular fracture after a mechanical slip and fall striking her right pelvis.  She underwent open reduction internal fixation of right acetabular fracture on 1/3.    Interval Problem Update  BP still low, denies lightheadedness or dizziness  Hip pain has improved  hgb still trending down, likely post op. Repeat CBC tomorrow  Her niece is at bedside and all questions answered    I have personally seen and examined the patient at bedside. I discussed the plan of care with patient and bedside RN.    Consultants/Specialty  orthopedics    Code Status  Full Code    Disposition  Patient is medically cleared for discharge.   Anticipate discharge to to skilled nursing facility.  I have placed the appropriate orders for post-discharge needs.    Review of Systems  Review of Systems   Constitutional: Negative for chills and fever.   Respiratory: Negative for cough and shortness of breath.    Cardiovascular: Negative for chest pain and palpitations.   Gastrointestinal: Negative for abdominal pain, nausea and vomiting.   Genitourinary: Negative for dysuria and urgency.   Musculoskeletal: Positive for falls and joint pain.   Neurological: Negative for dizziness and headaches.   All other systems reviewed and are negative.       Physical Exam  Temp:  [36.1 °C (97 °F)-36.7 °C (98 °F)] 36.7 °C (98 °F)  Pulse:  [72-96] 72  Resp:  [15-17] 16  BP: ()/(44-70) 91/51  SpO2:  [91 %-96 %] 96 %    Physical Exam  Vitals and nursing note reviewed.   Constitutional:       Appearance: Normal appearance.   Cardiovascular:      Rate and Rhythm: Normal rate and regular rhythm.   Pulmonary:      Effort: Pulmonary effort is normal.       Breath sounds: Normal breath sounds.   Musculoskeletal:         General: Tenderness (right hip) present.   Skin:     General: Skin is warm and dry.   Neurological:      General: No focal deficit present.      Mental Status: She is alert and oriented to person, place, and time.         Fluids    Intake/Output Summary (Last 24 hours) at 1/5/2022 1235  Last data filed at 1/5/2022 0800  Gross per 24 hour   Intake 800 ml   Output 1500 ml   Net -700 ml       Laboratory  Recent Labs     01/03/22  0710 01/04/22  0624 01/05/22  0625   WBC 4.6* 6.4 5.6   RBC 3.06* 2.71* 2.46*   HEMOGLOBIN 10.8* 9.6* 8.7*   HEMATOCRIT 32.4* 28.8* 26.0*   .9* 106.3* 105.7*   MCH 35.3* 35.4* 35.4*   MCHC 33.3* 33.3* 33.5*   RDW 49.4 48.4 47.8   PLATELETCT 126* 128* 134*   MPV 8.9* 9.2 9.0     Recent Labs     01/03/22  0710 01/04/22  0624   SODIUM 138 134*   POTASSIUM 3.9 3.7   CHLORIDE 106 101   CO2 23 22   GLUCOSE 112* 156*   BUN 7* 7*   CREATININE 0.50 0.62   CALCIUM 8.2* 7.6*                   Imaging  DX-PELVIS-TRAUMA SERIES  3-   Final Result      Portable intraoperative imaging with findings as described above.      DX-PORTABLE FLUORO > 1 HOUR   Final Result      Portable fluoroscopy utilized for 54 seconds.         INTERPRETING LOCATION: [address] [place] [address], [facility]           Assessment/Plan  * S/P right hip fracture- (present on admission)  Assessment & Plan  Secondary to mechanical slip and fall, symptomatic management, follow-up orthopedic surgery consult.  S/p surgery with Dr. Zamora 1/3  Continue to provider pain control and monitor adverse effect of pain medications.  Follow up with PT/OT      Anxiety- (present on admission)  Assessment & Plan  Continue BuSpar.  Hydroxyzine prn   Continue to monitor.    Aortic valve sclerosis- (present on admission)  Assessment & Plan  Sclerosis without stenosis verified but echocardiogram 11/20      Primary insomnia- (present on admission)  Assessment & Plan  Continue ambien prn      Macrocytosis- (present on admission)  Assessment & Plan  Unclear cause,   TSH low in the past. She will need to consider endo chronology referral as OP (subclinical hyperthyroidism, T4 nl on 5/2021)  b12 normal   Needs age appropriate malignancy screening as OP with PCP     Anemia  Assessment & Plan  Likely post op blood loss  hgb trending down  Repeat cbc in am  If hgb <7 will transfuse         VTE prophylaxis: heparin ppx

## 2022-01-05 NOTE — DISCHARGE PLANNING
Agency/Facility Name: [facility]  Spoke To: Carmne  Outcome: Pt accepted. Bed available 1/6.  We need neg COVID result and transport if Hgb stable.      CM informed

## 2022-01-05 NOTE — PROGRESS NOTES
"   Orthopaedic Progress Note    Interval changes:  Patient doing well post op  Clear for disposition (home/SNF/IRF) from Orthopaedic team standpoint.    ROS - Patient denies any new issues.  Pain well controlled.    BP (!) 91/51   Pulse 72   Temp 36.7 °C (98 °F) (Temporal)   Resp 16   Ht 1.626 m (5' 4\")   Wt 79.4 kg (175 lb 0.7 oz)   SpO2 96%     Patient seen and examined  No acute distress  Breathing non labored  RRR  Right hip dressing CDI, DNVI has prior neuropathy with no change from prior, moves all toes, cap refill <2 sec.     Recent Labs     01/03/22  0710 01/04/22  0624 01/05/22  0625   WBC 4.6* 6.4 5.6   RBC 3.06* 2.71* 2.46*   HEMOGLOBIN 10.8* 9.6* 8.7*   HEMATOCRIT 32.4* 28.8* 26.0*   .9* 106.3* 105.7*   MCH 35.3* 35.4* 35.4*   MCHC 33.3* 33.3* 33.5*   RDW 49.4 48.4 47.8   PLATELETCT 126* 128* 134*   MPV 8.9* 9.2 9.0       Active Hospital Problems    Diagnosis    • S/P right hip fracture [Z87.81]    • Anxiety [F41.9]    • Aortic valve sclerosis [I35.8]    • Primary insomnia [F51.01]    • Macrocytosis [D75.89]        Assessment/Plan:  Patient doing well post op  Pending placement  POD#2 S/P Open reduction internal fixation of right acetabular fracture  Wt bearing status - TTWB RLE  Wound care/Drains - dressings changed every other day by nursing  Future Procedures - none planned   Sutures/Staples out- 14 days post operatively  PT/OT-initiated  Antibiotics: perioperative completed  DVT Prophylaxis- TEDS/SCDs/Foot pumps  Murry-none  Case Coordination for Discharge Planning - Disposition SNF vs rehab likely  "

## 2022-01-05 NOTE — PROGRESS NOTES
Report received from RN, assumed care at 0700  Pt is A0X4, and responds appropriately   Pt declines any SOB, chest pain, new onset of numbness/ tingiling  Pt rates pain at 7/10, on a scale of 1-10, pt medicated per MAR  Pt's castorena removed this AM, pt educated she has 6 hours to void post castorena removal  Pt has - flatus, + bowel sounds, - BM PTA 12/31/2021 will advance bowel protocol   Pt is a MAX assist to EOB, pt states she has lots of pain with movement, and requires lots of encouragement, pt is TTWB to RLE  Pt is tolerating a regular diet, pt denies any nausea/vomiting  Pt has incision to lower abdomen, dressing is clean, dry and intact       Plan of care discussed, all questions answered. Explained importance of calling before getting OOB and pt verbalizes understanding. Explained importance of oral care. Call light is within reach, treaded slipper socks on, bed in lowest/ locked position, hourly rounding in place, all needs met at this time

## 2022-01-05 NOTE — THERAPY
"Occupational Therapy   Initial Evaluation     Patient Name: Yasmin Rondonman  Age:  76 y.o., Sex:  female  Medical Record #: [id number]  Today's Date: 1/4/2022     Precautions: Fall Risk,Toe Touch Weight Bearing Right Lower Extremity    Assessment    Patient is 76 y.o. female with h/o L hemiarthroplasty, cancer, anxiety/depression, neuropathy, pulmonary fibrosis, admitted following GLF with R acetabular fx. Pt underwent ORIF, now TTWB. Pt seen for OT eval. Pt pre-medicated for pain, but still required max encouragement to participate. Provided extensive education on pathology of bedrest, including PNA prophylaxis. Trained on pillow splinting to abdomen for increased pain control. Pt required total A to mobilize to EOB. Once balance achieved, able to sit with SBA. Total A for LB dressing. Unable to stand or scoot laterally due to pain. Pt lives alone with limited support available from family. She is limited by: pain, fear, motor incoordination, generalized weakness, activity intolerance. Will benefit from acute OT to maximize functional independence and safety.     Plan    Recommend Occupational Therapy 4 times per week until therapy goals are met for the following treatments:  Adaptive Equipment, Neuro Re-Education / Balance, Self Care/Activities of Daily Living, Therapeutic Activities and Therapeutic Exercises.    DC Equipment Recommendations: Unable to determine at this time  Discharge Recommendations: Recommend post-acute placement for additional occupational therapy services prior to discharge home     Subjective    \"This is so different from my hip surgery. It's more painful.\"     Objective       01/04/22 1553   Prior Living Situation   Prior Services Housekeeping / Homemaker Services   Housing / Facility 2 Story Apartment / Condo   Steps Into Home 0   Steps In Home   (full flight with chair lift)   Bathroom Set up Bathtub / Shower Combination;Grab Bars;Shower Chair   Equipment Owned Tub / Shower Seat;Grab " "Bar(s) In Tub / Shower;Scooter;Front-Wheel Walker;4-Wheel Walker   Lives with - Patient's Self Care Capacity Alone and Able to Care For Self   Comments Pt lives alone. Reports local family can provide some assist on DC.    Prior Level of ADL Function   Comments Pt was independent with BADL PTA   Prior Level of IADL Function   Medication Management Independent   Laundry Independent   Kitchen Mobility Independent   Finances Independent   Home Management Requires Assist   Shopping Other (Comments)  (generally has groceries delivered)   Prior Level Of Mobility Independent With Device in Home;Independent With Device in Community  (SPC in home, SPC or 4WW in community )   Driving / Transportation Driving Independent   Occupation (Pre-Hospital Vocational) Retired Due To Age  (Former  )   Leisure Interests Pets   Cognition    Level of Consciousness Alert   Comments Pt expresses high anxiety and demos decreased insight to post-acute needs    Balance Assessment   Sitting Balance (Static) Fair -   Sitting Balance (Dynamic) Poor +   Weight Shift Sitting Absent   Comments Pt initially required posterior support sitting EOB, then able to sit with SBA; pt unable to stand due to pain/fear   Bed Mobility    Supine to Sit Total Assist   Sit to Supine Total Assist   ADL Assessment   Grooming Supervision;Seated  (hair brushing EOB; oral care declined due to already done )   Lower Body Dressing Total Assist  (manage socks )   Toileting   (NT; Murry, no BM)   Functional Mobility   Sit to Stand Unable to Participate   Bed, Chair, Wheelchair Transfer Unable to Participate   Mobility Supine > < EOB, sitting balance    Visual Perception   Comments Noted dysconjugate gaze (R eye medially deviated). Pt states \"I just had surgery to correct that\". Denies diplopia or other visual deficits.    Short Term Goals   Short Term Goal # 1 Pt will complete ADL transfer with mod A   Short Term Goal # 2 Pt will complete LB dressing with mod " A with AE   Short Term Goal # 3 Pt will complete toileting with mod A

## 2022-01-05 NOTE — DISCHARGE PLANNING
Anticipated Discharge Disposition: SNF    Action: Met with pt to discuss SNF. Has been to [facility] in the past. States sister has been to facility and directed me to contact sister for that. Yasmin states she was in [facility], now [place]. Pt chose [facility] and [facility] that order.    Barriers to Discharge: Pending SNF accept.    Plan: F/U on referrals.

## 2022-01-05 NOTE — DISCHARGE PLANNING
Care Transition Team Assessment    In the case of an emergency, pt's legal NOK is Senait Zhong     RNCM met with pt at bedside and obtained the information used in this assessment. Pt verified accuracy of facesheet. Pt lives in a 2 story condo alone, has stair lift.  Pt uses [facility]. Prior to current hospitalization, pt was completely independent in ADLS/IADLS. Pt drives and is able to attend necessary MD appointments.  Pt has a good support system. Pt denies any hx of substance use and denies any dx of mh.  Information Source: pt    Orientation Level: Oriented X4  Information Given By: Patient  Informant's Name:  (Yasmin)  Who is responsible for making decisions for patient? : Patient         Elopement Risk  Legal Hold: No  Ambulatory or Self Mobile in Wheelchair: No-Not an Elopement Risk  Disoriented: No  Psychiatric Symptoms: None  History of Wandering: No  Elopement this Admit: No  Vocalizing Wanting to Leave: No  Displays Behaviors, Body Language Wanting to Leave: No-Not at Risk for Elopement  Elopement Risk: Not at Risk for Elopement    Interdisciplinary Discharge Planning  Does Admitting Nurse Feel This Could be a Complex Discharge?: No  Primary Care Physician:  (Dr. Lucina Gupta)  Lives with - Patient's Self Care Capacity: Alone and Able to Care For Self  Patient or legal guardian wants to designate a caregiver: No  Support Systems: Family Member(s)  Housing / Facility: 2 Story Apartment / Condo  Do You Take your Prescribed Medications Regularly: Yes  Mobility Issues: No  Prior Services: None    Discharge Preparedness  What is your plan after discharge?: Skilled nursing facility  What are your discharge supports?: Sibling  Prior Functional Level: Ambulatory,Drives Self,Independent with Activities of Daily Living,Independent with Medication Management  Difficulity with ADLs: None  Difficulity with IADLs: None    Functional Assesment  Prior Functional Level: Ambulatory,Drives  Self,Independent with Activities of Daily Living,Independent with Medication Management    Finances  Prescription Coverage: Yes    Vision / Hearing Impairment  Vision Impairment : No (wears reading glasses)  Right Eye Vision: Impaired,Wears Glasses  Left Eye Vision: Impaired,Wears Glasses  Hearing Impairment : Yes  Hearing Impairment: Both Ears,Hearing Device Not Available  Does Pt Need Special Equipment for the Hearing Impaired?: No              Domestic Abuse  Have you ever been the victim of abuse or violence?: No  Physical Abuse or Sexual Abuse: No  Verbal Abuse or Emotional Abuse: No  Possible Abuse/Neglect Reported to:: Not Applicable    Psychological Assessment  History of Substance Abuse: None  History of Psychiatric Problems: No         Anticipated Discharge Information  Discharge Disposition: D/T to SNF with Medicare cert in anticipation of skilled care (03)

## 2022-01-05 NOTE — THERAPY
"Physical Therapy   Initial Evaluation     Patient Name: Yasmin Zhong  Age:  76 y.o., Sex:  female  Medical Record #: [id number]  Today's Date: 1/4/2022     Precautions  Precautions: Fall Risk;Toe Touch Weight Bearing Right Lower Extremity    Assessment  Patient is 76 y.o. female with history of L hemiarthroplasty, cancer, anxiety/depression, neuropathy, pulmonary fibrosis, admitted following GLF with R acetabular fx. Pt underwent ORIF, now TTWB. Pt was premedicated but very anxious for mobility and required encouragement to participate. Pt required total assist for bed mobility but once EOB, pt able to sit unsupported with B UE. Pt performed gentle R heel slides while EOB. Throughout eval pt appeared to have very poor awareness of deficits and restrictions despite education and demonstration of TTWB. Pt very focused on not wanting to go to SNF and wanting to be able to walk her dog and  after her. PT will cont while in acute to address pain, strength, ROM, balance, activity tolerance, and post op precautions.     Plan    Recommend Physical Therapy 4 times per week until therapy goals are met for the following treatments:  Bed Mobility, Gait Training, Neuro Re-Education / Balance, Self Care/Home Evaluation, Therapeutic Activities and Therapeutic Exercises    DC Equipment Recommendations: Unable to determine at this time  Discharge Recommendations: Recommend post-acute placement for additional physical therapy services prior to discharge home       Subjective    \"I want to be able to walk my dog, she's 14\"       01/04/22 1552   Vitals   O2 (LPM) 1.5   O2 Delivery Device Nasal Cannula   Prior Living Situation   Prior Services Housekeeping / Homemaker Services   Housing / Facility 2 Story Apartment / Condo   Steps Into Home 0   Steps In Home   (chair lift)   Bathroom Set up Bathtub / Shower Combination;Grab Bars;Shower Chair   Equipment Owned Tub / Shower Seat;Grab Bar(s) In Tub / Shower;Scooter;Front-Wheel " Walker;4-Wheel Walker   Lives with - Patient's Self Care Capacity Alone and Able to Care For Self   Comments lives alone but reports goo support   Prior Level of Functional Mobility   Bed Mobility Independent   Transfer Status Independent   Ambulation Independent   Distance Ambulation (Feet)   (community)   Assistive Devices Used 4-Wheel Walker   Comments pt has stair lift to enter apartment   History of Falls   History of Falls Yes   Cognition    Level of Consciousness Alert   Comments very high anxiety, poor insight   Active ROM Lower Body    Active ROM Lower Body  X   Comments R LE limited by pain, fear, and weakness   Strength Lower Body   Lower Body Strength  X   Comments R LE grossly 2/5   Sensation Lower Body   Lower Extremity Sensation   WDL   Balance Assessment   Sitting Balance (Static) Fair -   Sitting Balance (Dynamic) Poor +   Weight Shift Sitting Absent   Comments very fearful   Gait Analysis   Gait Level Of Assist Unable to Participate   Weight Bearing Status TTWB R LE   Bed Mobility    Supine to Sit Total Assist   Sit to Supine Total Assist   Scooting Maximal Assist   Comments HOB raised   Functional Mobility   Sit to Stand Unable to Participate   Bed, Chair, Wheelchair Transfer Unable to Participate   Mobility EOB only   Short Term Goals    Short Term Goal # 1 pt will be able to complete supine<>Sitting with bed controls and min assist in 6tx in order to progres with therapy   Short Term Goal # 2 pt will be able to complete functional transfers with FWW and min assist in 6tx in order to progress with therapy   Short Term Goal # 3 pt will be able to ambulate 15ft with FWW and min assist in 6tx in order to progress with therapy   Short Term Goal # 4 pt will be able to demonstrate 100ft of WC mobility with SPV in 6tx in order to increase independence   Education Group   Education Provided Role of Physical Therapist;Weight Bearing Status   Role of Physical Therapist Patient Response  Patient;Acceptance;Explanation;Action Demonstration;Reinforcement Needed   Weight Bearing Status Patient Response Patient;Acceptance;Demonstration;Verbal Demonstration   Anticipated Discharge Equipment and Recommendations   DC Equipment Recommendations Unable to determine at this time   Discharge Recommendations Recommend post-acute placement for additional physical therapy services prior to discharge home

## 2022-01-05 NOTE — PROGRESS NOTES
[place] at 2388. Spoke to Dr. Yin at 4974. BP of 86/47. 9/10 pain. Denies being symptomatic or light headed. Continuous fluids in use. Updated MD, per MD it is appropriate to give one dose of Oxycodone 5 mg now for pain.

## 2022-01-06 ENCOUNTER — APPOINTMENT (OUTPATIENT)
Dept: RADIOLOGY | Facility: MEDICAL CENTER | Age: 77
DRG: 958 | End: 2022-01-06
Attending: HOSPITALIST
Payer: MEDICARE

## 2022-01-06 LAB
ERYTHROCYTE [DISTWIDTH] IN BLOOD BY AUTOMATED COUNT: 48.2 FL (ref 35.9–50)
HCT VFR BLD AUTO: 26.9 % (ref 37–47)
HGB BLD-MCNC: 8.9 G/DL (ref 12–16)
MCH RBC QN AUTO: 35.5 PG (ref 27–33)
MCHC RBC AUTO-ENTMCNC: 33.1 G/DL (ref 33.6–35)
MCV RBC AUTO: 107.2 FL (ref 81.4–97.8)
PLATELET # BLD AUTO: 151 K/UL (ref 164–446)
PMV BLD AUTO: 9.5 FL (ref 9–12.9)
RBC # BLD AUTO: 2.51 M/UL (ref 4.2–5.4)
WBC # BLD AUTO: 5.5 K/UL (ref 4.8–10.8)

## 2022-01-06 PROCEDURE — 700102 HCHG RX REV CODE 250 W/ 637 OVERRIDE(OP): Performed by: INTERNAL MEDICINE

## 2022-01-06 PROCEDURE — A9270 NON-COVERED ITEM OR SERVICE: HCPCS | Performed by: HOSPITALIST

## 2022-01-06 PROCEDURE — 700105 HCHG RX REV CODE 258: Performed by: STUDENT IN AN ORGANIZED HEALTH CARE EDUCATION/TRAINING PROGRAM

## 2022-01-06 PROCEDURE — A9270 NON-COVERED ITEM OR SERVICE: HCPCS | Performed by: STUDENT IN AN ORGANIZED HEALTH CARE EDUCATION/TRAINING PROGRAM

## 2022-01-06 PROCEDURE — 700102 HCHG RX REV CODE 250 W/ 637 OVERRIDE(OP): Performed by: STUDENT IN AN ORGANIZED HEALTH CARE EDUCATION/TRAINING PROGRAM

## 2022-01-06 PROCEDURE — 770001 HCHG ROOM/CARE - MED/SURG/GYN PRIV*

## 2022-01-06 PROCEDURE — 700101 HCHG RX REV CODE 250: Performed by: STUDENT IN AN ORGANIZED HEALTH CARE EDUCATION/TRAINING PROGRAM

## 2022-01-06 PROCEDURE — A9270 NON-COVERED ITEM OR SERVICE: HCPCS | Performed by: INTERNAL MEDICINE

## 2022-01-06 PROCEDURE — 36415 COLL VENOUS BLD VENIPUNCTURE: CPT

## 2022-01-06 PROCEDURE — 700111 HCHG RX REV CODE 636 W/ 250 OVERRIDE (IP): Performed by: INTERNAL MEDICINE

## 2022-01-06 PROCEDURE — 74018 RADEX ABDOMEN 1 VIEW: CPT

## 2022-01-06 PROCEDURE — 99239 HOSP IP/OBS DSCHRG MGMT >30: CPT | Performed by: HOSPITALIST

## 2022-01-06 PROCEDURE — 700101 HCHG RX REV CODE 250: Performed by: HOSPITALIST

## 2022-01-06 PROCEDURE — 700102 HCHG RX REV CODE 250 W/ 637 OVERRIDE(OP): Performed by: HOSPITALIST

## 2022-01-06 PROCEDURE — 85027 COMPLETE CBC AUTOMATED: CPT

## 2022-01-06 RX ORDER — ENEMA 19; 7 G/133ML; G/133ML
1 ENEMA RECTAL ONCE
Status: COMPLETED | OUTPATIENT
Start: 2022-01-06 | End: 2022-01-06

## 2022-01-06 RX ORDER — ZOLPIDEM TARTRATE 5 MG/1
5 TABLET ORAL ONCE
Status: COMPLETED | OUTPATIENT
Start: 2022-01-06 | End: 2022-01-06

## 2022-01-06 RX ORDER — ZOLPIDEM TARTRATE 5 MG/1
10 TABLET ORAL NIGHTLY PRN
COMMUNITY
End: 2022-03-01

## 2022-01-06 RX ORDER — ZOLPIDEM TARTRATE 5 MG/1
10 TABLET ORAL NIGHTLY PRN
Status: DISCONTINUED | OUTPATIENT
Start: 2022-01-07 | End: 2022-01-07 | Stop reason: HOSPADM

## 2022-01-06 RX ADMIN — BUSPIRONE HYDROCHLORIDE 5 MG: 10 TABLET ORAL at 05:00

## 2022-01-06 RX ADMIN — SODIUM CHLORIDE: 9 INJECTION, SOLUTION INTRAVENOUS at 14:20

## 2022-01-06 RX ADMIN — ZOLPIDEM TARTRATE 5 MG: 5 TABLET ORAL at 23:18

## 2022-01-06 RX ADMIN — POLYETHYLENE GLYCOL 3350 1 PACKET: 17 POWDER, FOR SOLUTION ORAL at 12:58

## 2022-01-06 RX ADMIN — ACETAMINOPHEN 1000 MG: 500 TABLET ORAL at 16:23

## 2022-01-06 RX ADMIN — BUSPIRONE HYDROCHLORIDE 5 MG: 10 TABLET ORAL at 16:23

## 2022-01-06 RX ADMIN — ZOLPIDEM TARTRATE 5 MG: 5 TABLET ORAL at 20:01

## 2022-01-06 RX ADMIN — OXYCODONE 5 MG: 5 TABLET ORAL at 09:48

## 2022-01-06 RX ADMIN — FOLIC ACID 2 MG: 1 TABLET ORAL at 05:01

## 2022-01-06 RX ADMIN — BISACODYL 10 MG: 10 SUPPOSITORY RECTAL at 10:40

## 2022-01-06 RX ADMIN — LIDOCAINE 1 PATCH: 50 PATCH TOPICAL at 19:59

## 2022-01-06 RX ADMIN — HEPARIN SODIUM 5000 UNITS: 5000 INJECTION, SOLUTION INTRAVENOUS; SUBCUTANEOUS at 05:01

## 2022-01-06 RX ADMIN — ACETAMINOPHEN 1000 MG: 500 TABLET ORAL at 12:53

## 2022-01-06 RX ADMIN — DOCUSATE SODIUM 50 MG AND SENNOSIDES 8.6 MG 2 TABLET: 8.6; 5 TABLET, FILM COATED ORAL at 16:24

## 2022-01-06 RX ADMIN — ACETAMINOPHEN 1000 MG: 500 TABLET ORAL at 05:00

## 2022-01-06 RX ADMIN — ACETAMINOPHEN 1000 MG: 500 TABLET ORAL at 23:19

## 2022-01-06 RX ADMIN — ATORVASTATIN CALCIUM 20 MG: 20 TABLET, FILM COATED ORAL at 16:23

## 2022-01-06 RX ADMIN — ALPRAZOLAM 0.5 MG: 0.5 TABLET ORAL at 23:18

## 2022-01-06 RX ADMIN — HEPARIN SODIUM 5000 UNITS: 5000 INJECTION, SOLUTION INTRAVENOUS; SUBCUTANEOUS at 20:01

## 2022-01-06 RX ADMIN — OXYCODONE 5 MG: 5 TABLET ORAL at 12:53

## 2022-01-06 RX ADMIN — TRAZODONE HYDROCHLORIDE 50 MG: 50 TABLET ORAL at 20:01

## 2022-01-06 RX ADMIN — HEPARIN SODIUM 5000 UNITS: 5000 INJECTION, SOLUTION INTRAVENOUS; SUBCUTANEOUS at 12:53

## 2022-01-06 RX ADMIN — SODIUM PHOSPHATE 133 ML: 7; 19 ENEMA RECTAL at 20:00

## 2022-01-06 RX ADMIN — DOCUSATE SODIUM 50 MG AND SENNOSIDES 8.6 MG 2 TABLET: 8.6; 5 TABLET, FILM COATED ORAL at 05:00

## 2022-01-06 RX ADMIN — FLUTICASONE PROPIONATE 50 MCG: 50 SPRAY, METERED NASAL at 05:02

## 2022-01-06 ASSESSMENT — PAIN DESCRIPTION - PAIN TYPE
TYPE: ACUTE PAIN;SURGICAL PAIN
TYPE: SURGICAL PAIN
TYPE: SURGICAL PAIN
TYPE: ACUTE PAIN;SURGICAL PAIN
TYPE: ACUTE PAIN;SURGICAL PAIN
TYPE: SURGICAL PAIN
TYPE: SURGICAL PAIN

## 2022-01-06 NOTE — DISCHARGE PLANNING
DC Transport Scheduled    Received request at: 0945    Transport Company Scheduled:  [employer]      Scheduled Date: 01/06/22  Scheduled Time: 1400    Destination: [facility] [place] [id number] [address] [facility] [place]    Notified care team of scheduled transport via Voalte.     If there are any changes needed to the DC transportation scheduled, please contact [facility] at ext. [phone] between the hours of 6661-0487 Mon-Fri. If outside those hours, contact the ED Case Manager at ext. [phone].

## 2022-01-06 NOTE — DISCHARGE PLANNING
Agency[facility]Facility Name: [facility]  Spoke To: Sharona  Outcome: Pt accepted. Bed and transport available 1/6 @ 1600      CM informed

## 2022-01-06 NOTE — DISCHARGE PLANNING
Anticipated Discharge Disposition: SNF    Action: [facility] CAN accept pt. They will transport pt at 1600 today.    Barriers to Discharge: none identified.    Plan:D/C to [facility]

## 2022-01-06 NOTE — DISCHARGE PLANNING
Agency[facility]Facility Name: [facility]  Spoke To: Sharona  Outcome: Sent referral again. Pt changed her mind on facility.      CM informed

## 2022-01-06 NOTE — DISCHARGE PLANNING
DAMARIS Mcnulty cancelled transport @ 2855. Pt no longer going to SNF. No transport is scheduled at this time

## 2022-01-06 NOTE — PROGRESS NOTES
"   Orthopaedic Progress Note    Interval changes:  Patient doing well post op - [facility] today ~ 1600  Clear for disposition (home/SNF/IRF) from Orthopaedic team standpoint.    ROS - Patient denies any new issues.  Pain well controlled.    /50   Pulse 75   Temp 36.7 °C (98 °F) (Temporal)   Resp 16   Ht 1.626 m (5' 4\")   Wt 79.4 kg (175 lb 0.7 oz)   SpO2 95%     Patient seen and examined  No acute distress  Breathing non labored  RRR  Right hip dressing CDI, DNVI has prior neuropathy with no change from prior, moves all toes, cap refill <2 sec.     Recent Labs     01/04/22  0624 01/05/22  0625 01/06/22  0348   WBC 6.4 5.6 5.5   RBC 2.71* 2.46* 2.51*   HEMOGLOBIN 9.6* 8.7* 8.9*   HEMATOCRIT 28.8* 26.0* 26.9*   .3* 105.7* 107.2*   MCH 35.4* 35.4* 35.5*   MCHC 33.3* 33.5* 33.1*   RDW 48.4 47.8 48.2   PLATELETCT 128* 134* 151*   MPV 9.2 9.0 9.5       Active Hospital Problems    Diagnosis    • Anemia [D64.9]      Priority: Medium   • S/P right hip fracture [Z87.81]    • Anxiety [F41.9]    • Aortic valve sclerosis [I35.8]    • Primary insomnia [F51.01]    • Macrocytosis [D75.89]        Assessment/Plan:  Patient doing well post op  Pending placement  POD#3 S/P Open reduction internal fixation of right acetabular fracture  Wt bearing status - TTWB RLE  Wound care/Drains - dressings changed every other day by nursing  Future Procedures - none planned   Sutures/Staples out- 14 days post operatively  PT/OT-initiated  Antibiotics: perioperative completed  DVT Prophylaxis- TEDS/SCDs/Foot pumps  Murry-none  Case Coordination for Discharge Planning - Disposition SNF  Follow-Up: needs appointment with Dr. Zamora at [facility] at 10-14 days post-op for re-evaluation, staple removal and radiographs.    "

## 2022-01-06 NOTE — DOCUMENTATION QUERY
"                                                                         [facility]                                                                       Query Response Note      PATIENT:               KELLY DAVIS  ACCT #:                  [id number]  MRN:                     [id number]  :                      1945  ADMIT DATE:       2022 2:28 AM  DISCH DATE:          RESPONDING  PROVIDER #:        [id number]           QUERY TEXT:    Anemia due to unspecified blood loss is documented in the medical record.  Please specify the acuity of the blood loss.     NOTE:  If an appropriate response is not listed below, please respond with a new note.    If you have any questions please contact:  Valerie Middleton CDI [facility]  [email or web address]  Valerie Middleton [address] [email or web address]          The patient's Clinical Indicators include:   Tianna \"Anemia Likely post op blood loss hgb trending down\"     HGB 12.7   HGB 8.7    Risk factors: ORIF of acetabular fracture EBL 400mL  Treatment: Daily labs and monitoring  Options provided:   -- Acute blood loss anemia   -- Acute post operative blood loss anemia   -- Chronic blood loss   -- Unable to determine      Query created by: Valerie Middleton on 2022 1:46 PM    RESPONSE TEXT:    Acute post operative blood loss anemia          Electronically signed by:  CINDY RICHMOND MD 2022 10:05 AM              "

## 2022-01-06 NOTE — DISCHARGE PLANNING
[facility]ility Name: [facility]  Spoke To: Carmen  Outcome: Advised that pt changed her mind. Pt wants [facility]. Cancelled transfer      CM informed

## 2022-01-06 NOTE — DISCHARGE SUMMARY
Discharge Summary    CHIEF COMPLAINT ON ADMISSION  No chief complaint on file.      Reason for Admission  Acetabulum fracture     CODE STATUS  Full Code    HPI & HOSPITAL COURSE  Yasmin Zhong is a 76 y.o. female who presented 1/1/2022 as transfer from [facility] with right hip pain and an acetabular fracture after a mechanical slip and fall striking her right pelvis.  She underwent open reduction internal fixation of right acetabular fracture on 1/3. She tolerated procedure well. She did have some post procedural drop in hemoglobin which has now stabilized.      Therefore, she is discharged in good and stable condition to home with close outpatient follow-up.    The patient met 2-midnight criteria for an inpatient stay at the time of discharge.      FOLLOW UP ITEMS POST DISCHARGE  Sutures out 14 days post op    DISCHARGE DIAGNOSES  Principal Problem:    S/P right hip fracture POA: Yes  Active Problems:    Anemia POA: Unknown    Macrocytosis (Chronic) POA: Yes    Primary insomnia (Chronic) POA: Yes    Aortic valve sclerosis POA: Yes    Anxiety POA: Yes  Resolved Problems:    Fever POA: Yes      FOLLOW UP  Future Appointments   Date Time Provider Department Center   1/18/2022  2:40 PM Alyssia Ace P.A.-C. [facility] None   2/7/2022  1[facility]30 [facility]   3/2/2022  2:00 PM Keith Horne M.D. [facility] None     Pritesh Zamora M.D.  [address] [facility] [id number] [phone]    Schedule an appointment as soon as possible for a visit in 2 weeks  Follow-Up    Lucina Gupta M.D.  [address] [facility] [id number] [phone]      As needed    [facility]  [address] [facility] [address]  [phone]          MEDICATIONS ON DISCHARGE     Medication List      START taking these medications      Instructions   acetaminophen 500 MG Tabs  Commonly known as: TYLENOL   Take 2 Tablets by mouth every 6 hours.  Dose: 1,000 mg     folic acid 1  "MG Tabs  Commonly known as: FOLVITE   Take 2 Tablets by mouth every day.  Dose: 2 mg     heparin 5000 UNIT/ML Soln   Inject 1 mL under the skin every 8 hours.  Dose: 5,000 Units     lidocaine 5 % Ptch  Commonly known as: LIDODERM   Place 1 Patch on the skin every 24 hours.  Dose: 1 Patch     oxyCODONE immediate-release 5 MG Tabs  Commonly known as: ROXICODONE   Take 1 Tablet by mouth every 3 hours as needed for Severe Pain for up to 3 days.  Dose: 5 mg        CHANGE how you take these medications      Instructions   fluticasone 50 MCG/ACT nasal spray  What changed: when to take this  Commonly known as: FLONASE   Administer 1 Spray into affected nostril(S) every day.  Dose: 1 Spray        CONTINUE taking these medications      Instructions   atorvastatin 20 MG Tabs  Commonly known as: LIPITOR   Take 1 Tablet by mouth every evening.  Dose: 20 mg     BIOTIN PO   Take 1 Tablet by mouth every morning. Pt unsure of dose  Dose: 1 Tablet     busPIRone 10 MG Tabs tablet  Commonly known as: BUSPAR   Pt takes 10MG in AM and 20MG HS Take 10-20 mg by mouth 2 times a day. Pt takes 10MG in AM and 20MG HS     CALCIUM + D PO   Take 1 Tablet by mouth every morning.  Dose: 1 Tablet     cyanocobalamin 100 MCG Tabs  Commonly known as: VITAMIN B-12   Take 100 mcg by mouth every morning.  Dose: 100 mcg     MAGNESIUM PO   Take 1 Capsule by mouth every morning. Pt unsure of dose  Dose: 1 Capsule     VITAMIN D3 PO   Take 1 Capsule by mouth every morning. Pt unsure of dose  Dose: 1 Capsule     VITAMIN E PO   Take 1 Capsule by mouth every morning. Pt unsure of dose  Dose: 1 Capsule     zolpidem 10 MG Tabs  Commonly known as: AMBIEN   Take 1 Tablet by mouth at bedtime as needed for Sleep for up to 3 days.  Dose: 10 mg            Allergies  Allergies   Allergen Reactions   • Pollen Extract      \"cough\"       DIET  Orders Placed This Encounter   Procedures   • Diet Order Diet: Regular     Standing Status:   Standing     Number of Occurrences:   1 "     Order Specific Question:   Diet:     Answer:   Regular [1]       ACTIVITY  As tolerated and directed by skilled nursing.  Weight bearing as tolerated    LINES, DRAINS, AND WOUNDS  This is an automated list. Peripheral IVs will be removed prior to discharge.  Peripheral IV 12/31/21 22 G Anterior;Left Forearm (Active)   Site Assessment Clean;Dry;Intact 01/05/22 2049   Dressing Type Transparent 01/05/22 2049   Line Status Scrubbed the hub prior to access;Flushed;Infusing;No blood return 01/05/22 2049   Dressing Status Clean;Dry;Intact 01/05/22 2049   Dressing Intervention N/A 01/05/22 2049   Dressing Change Due 01/07/22 01/05/22 2049   Date Primary Tubing Changed 01/04/22 01/05/22 2049   NEXT Primary Tubing Change  01/08/22 01/05/22 2049   Infiltration Grading ([facility]) 0 01/05/22 2049   Phlebitis Scale ([facility] Only) 0 01/05/22 2049       Wound 11/23/20 Incision Abdomen dermabond X 5 (Active)       Wound 01/03/22 Incision Abdomen Right (Active)   Site Assessment REMBERTO 01/05/22 2049   Periwound Assessment REMBERTO 01/05/22 2049   Margins REMBERTO 01/05/22 2049   Closure REMBERTO 01/05/22 2049   Drainage Amount None 01/05/22 2049   Drainage Description REMBERTO 01/04/22 2107   Dressing Options Mepilex Silver 01/05/22 2049   Dressing Changed Observed 01/05/22 2049   Dressing Status Clean;Dry;Intact 01/05/22 2049       Peripheral IV 12/31/21 22 G Anterior;Left Forearm (Active)   Site Assessment Clean;Dry;Intact 01/05/22 2049   Dressing Type Transparent 01/05/22 2049   Line Status Scrubbed the hub prior to access;Flushed;Infusing;No blood return 01/05/22 2049   Dressing Status Clean;Dry;Intact 01/05/22 2049   Dressing Intervention N/A 01/05/22 2049   Dressing Change Due 01/07/22 01/05/22 2049   Date Primary Tubing Changed 01/04/22 01/05/22 2049   NEXT Primary Tubing Change  01/08/22 01/05/22 2049   Infiltration Grading ([facility]) 0 01/05/22 2049   Phlebitis Scale ([facility] Only) 0 01/05/22 2049               MENTAL STATUS ON  TRANSFER             CONSULTATIONS  orthopedics    PROCEDURES  ORIF of right acetabular fracture    LABORATORY  Lab Results   Component Value Date    SODIUM 134 (L) 01/04/2022    POTASSIUM 3.7 01/04/2022    CHLORIDE 101 01/04/2022    CO2 22 01/04/2022    GLUCOSE 156 (H) 01/04/2022    BUN 7 (L) 01/04/2022    CREATININE 0.62 01/04/2022        Lab Results   Component Value Date    WBC 5.5 01/06/2022    HEMOGLOBIN 8.9 (L) 01/06/2022    HEMATOCRIT 26.9 (L) 01/06/2022    PLATELETCT 151 (L) 01/06/2022        Total time of the discharge process exceeds 32 minutes.

## 2022-01-06 NOTE — PROGRESS NOTES
0925 New order received and acknowledged from Dr Wynn for the patient to be discharged.    1457  Report given to Gisel Nguyen LPN at [facility] over the phone. Per the said Nurse patient needs a large BM or KUB before they can have the patient be transferred to [facility].    1505 Notified Pat,  that per [facility] patient needs a large BM or KUB before the patient can be transferred.     1513 Placed a call to Dr Wynn, awaiting for response.      1529 Placed a call to Dr Wynn, awaiting for response.     1546 Placed a call to Dr Wynn, awaiting for response.    1607 Placed a call to Dr Wynn, awaiting for response.    1610 Received a call from Dr Wynn. Notified the said MD that per [facility] patient needs a large BM or KUB.    1611 Sussy,  notified Primary Nurse that patient will be discharged tomorrow @1200 t [facility].     1611 New order received and acknowledged from Dr Wynn - Dx - Abdomen 1 view.    1743 Patient left for X Ray via gurney accompanied by one female Transport.    1810 Patient came back from X Ray via w/c accompanied by one female Transport.    1837 Placed a call to Dr Hayward, awaiting for response.    1842 Received a call from Dr Hayward and notified of the  Violeta area swelling, no redness nor bruising. MD visited. Patient POC discussed with the patient and notified of the abdominal X Ray result,    1935  Patient's in bed. No changes in status. Bedside report given to Tamara Callaway).

## 2022-01-06 NOTE — CARE PLAN
Problem: Knowledge Deficit - Standard  Goal: Patient and family/care givers will demonstrate understanding of plan of care, disease process/condition, diagnostic tests and medications  Outcome: Progressing     Problem: Pain - Standard  Goal: Alleviation of pain or a reduction in pain to the patient’s comfort goal  Outcome: Progressing     The patient is Stable - Low risk of patient condition declining or worsening    Shift Goals  Clinical Goals: (P) pain control  Patient Goals: (P) comfort  Family Goals: REMBERTO    Progress made toward(s) clinical / shift goals: pain managed with medication and rest. POC discussed with pt-pt verbalized understanding.    Patient is not progressing towards the following goals:

## 2022-01-06 NOTE — DISCHARGE INSTRUCTIONS
Discharge Instructions    Discharged to other [facility]  by [facility] transportation with escort. Discharged via wheelchair, hospital escort: Yes.  Special equipment needed: Not Applicable    Be sure to schedule a follow-up appointment with your primary care doctor or any specialists as instructed.     Discharge Plan:   Influenza Vaccine Indication: Not indicated: Previously immunized this influenza season and > 8 years of age    I understand that a diet low in cholesterol, fat, and sodium is recommended for good health. Unless I have been given specific instructions below for another diet, I accept this instruction as my diet prescription.   Other diet: Regular    Special Instructions:   Toetouch weight-bearing right lower extremity. Range of motion as tolerated  PT/OT.  Follow up in clinic in 2 weeks time for repeat AP and Judet x-rays and suture  removal         Discharge instructions for the Orthopedic Patient    Follow up with Primary Care Physician within 2 weeks of discharge to home, regarding:  Review of medications and diagnostic testing.  Surveillance for medical complications.  Workup and treatment of osteoporosis, if appropriate.     -Is this a Hip/Knee/Shoulder Joint Replacement patient? No    -Is this patient being discharged with medication to prevent blood clots?  Yes, Heparin Heparin injection  What is this medicine?  HEPARIN (HEP a rin) is an anticoagulant. It is used to treat or prevent clots in the veins, arteries, lungs, or heart. It stops clots from forming or getting bigger. This medicine prevents clotting during open-heart surgery, dialysis, or in patients who are confined to bed.  This medicine may be used for other purposes; ask your health care provider or pharmacist if you have questions.  COMMON BRAND NAME(S): Hep-Lock, Hep-Lock U/P, Hepflush-10, Monoject Prefill Advanced Heparin Lock Flush, SASH Normal Saline and Heparin  What should I tell my health care  provider before I take this medicine?  They need to know if you have any of these conditions:  · bleeding disorders, such as hemophilia or low blood platelets  · bowel disease or diverticulitis  · endocarditis  · high blood pressure  · liver disease  · recent surgery or delivery of a baby  · stomach ulcers  · an unusual or allergic reaction to heparin, benzyl alcohol, sulfites, other medicines, foods, dyes, or preservatives  · pregnant or trying to get pregnant  · breast-feeding  How should I use this medicine?  This medicine is given by injection or infusion into a vein. It can also be given by injection of small amounts under the skin. It is usually given by a health care professional in a hospital or clinic setting.  If you get this medicine at home, you will be taught how to prepare and give this medicine. Use exactly as directed. Take your medicine at regular intervals. Do not take it more often than directed. Do not stop taking except on your doctor's advice. Stopping this medicine may increase your risk of a blot clot. Be sure to refill your prescription before you run out of medicine.  It is important that you put your used needles and syringes in a special sharps container. Do not put them in a trash can. If you do not have a sharps container, call your pharmacist or healthcare provider to get one.  Talk to your pediatrician regarding the use of this medicine in children. While this medicine may be prescribed for children for selected conditions, precautions do apply.  Overdosage: If you think you have taken too much of this medicine contact a poison control center or emergency room at once.  NOTE: This medicine is only for you. Do not share this medicine with others.  What if I miss a dose?  If you miss a dose, take it as soon as you can. If it is almost time for your next dose, take only that dose. Do not take double or extra doses.  What may interact with this medicine?  Do not take this medicine with  any of the following medications:  · aspirin and aspirin-like drugs  · mifepristone  · medicines that treat or prevent blood clots like warfarin, enoxaparin, and dalteparin  · palifermin  · protamine  This medicine may also interact with the following medications:  · dextran  · digoxin  · hydroxychloroquine  · medicines for treating colds or allergies  · nicotine  · NSAIDs, medicines for pain and inflammation, like ibuprofen or naproxen  · phenylbutazone  · tetracycline antibiotics  This list may not describe all possible interactions. Give your health care provider a list of all the medicines, herbs, non-prescription drugs, or dietary supplements you use. Also tell them if you smoke, drink alcohol, or use illegal drugs. Some items may interact with your medicine.  What should I watch for while using this medicine?  Visit your healthcare professional for regular checks on your progress. You may need blood work done while you are taking this medicine. Your condition will be monitored carefully while you are receiving this medicine. It is important not to miss any appointments.  Wear a medical ID bracelet or chain, and carry a card that describes your disease and details of your medicine and dosage times.  Notify your doctor or healthcare professional at once if you have cold, blue hands or feet.  If you are going to need surgery or other procedure, tell your healthcare professional that you are using this medicine.  Avoid sports and activities that might cause injury while you are using this medicine. Severe falls or injuries can cause unseen bleeding. Be careful when using sharp tools or knives. Consider using an electric razor. Take special care brushing or flossing your teeth. Report any injuries, bruising, or red spots on the skin to your healthcare professional.  Using this medicine for a long time may weaken your bones and increase the risk of bone fractures.  You should make sure that you get enough calcium  and vitamin D while you are taking this medicine. Discuss the foods you eat and the vitamins you take with your healthcare professional.  Wear a medical ID bracelet or chain. Carry a card that describes your disease and details of your medicine and dosage times.  What side effects may I notice from receiving this medicine?  Side effects that you should report to your doctor or health care professional as soon as possible:  · allergic reactions like skin rash, itching or hives, swelling of the face, lips, or tongue  · bone pain  · fever, chills  · nausea, vomiting  · signs and symptoms of bleeding such as bloody or black, tarry stools; red or dark-brown urine; spitting up blood or brown material that looks like coffee grounds; red spots on the skin; unusual bruising or bleeding from the eye, gums, or nose  · signs and symptoms of a blood clot such as chest pain; shortness of breath; pain, swelling, or warmth in the leg  · signs and symptoms of a stroke such as changes in vision; confusion; trouble speaking or understanding; severe headaches; sudden numbness or weakness of the face, arm or leg; trouble walking; dizziness; loss of coordination  Side effects that usually do not require medical attention (report to your doctor or health care professional if they continue or are bothersome):  · hair loss  · pain, redness, or irritation at site where injected  This list may not describe all possible side effects. Call your doctor for medical advice about side effects. You may report side effects to FDA at 3-760-FDA-1132.  Where should I keep my medicine?  Keep out of the reach of children.  Store unopened vials at room temperature between 15 and 30 degrees C (59 and 86 degrees F). Do not freeze. Do not use if solution is discolored or particulate matter is present. Throw away any unused medicine after the expiration date.  NOTE: This sheet is a summary. It may not cover all possible information. If you have questions about  this medicine, talk to your doctor, pharmacist, or health care provider.  © 2020 [employer]/Gold Standard (2018-12-13 11:26:57)      · Is patient discharged on Warfarin / Coumadin?   No     Depression / Suicide Risk    As you are discharged from this [facility] facility, it is important to learn how to keep safe from harming yourself.    Recognize the warning signs:  · Abrupt changes in personality, positive or negative- including increase in energy   · Giving away possessions  · Change in eating patterns- significant weight changes-  positive or negative  · Change in sleeping patterns- unable to sleep or sleeping all the time   · Unwillingness or inability to communicate  · Depression  · Unusual sadness, discouragement and loneliness  · Talk of wanting to die  · Neglect of personal appearance   · Rebelliousness- reckless behavior  · Withdrawal from people/activities they love  · Confusion- inability to concentrate     If you or a loved one observes any of these behaviors or has concerns about self-harm, here's what you can do:  · Talk about it- your feelings and reasons for harming yourself  · Remove any means that you might use to hurt yourself (examples: pills, rope, extension cords, firearm)  · Get professional help from the community (Mental Health, Substance Abuse, psychological counseling)  · Do not be alone:Call your Safe Contact- someone whom you trust who will be there for you.  · Call your local CRISIS HOTLINE 770-1910 or 878-969-5141  · Call your local Children's Mobile Crisis Response Team [place] (052) 254-7221 or www.NewsWhip  · Call the toll free National Suicide Prevention Hotlines   · National Suicide Prevention Lifeline 954-645-TGNE (8680)  · National Hope Line Network 800-SUICIDE (243-9708)

## 2022-01-06 NOTE — DISCHARGE PLANNING
"[facility] refusing to accept. BM today was \"too small.\" They are requesting KUB. Bedside RN attempting to contact MD.   "

## 2022-01-06 NOTE — DISCHARGE PLANNING
[facility]  Spoke To: Carmen  Outcome: Bed confirmed for 1/6. We need to set up transport for 1400.      CM informed

## 2022-01-06 NOTE — DISCHARGE PLANNING
Anticipated Discharge Disposition: SNF    Action: Pt has been accepted to first choice, [facility]. Pt has now decided that her first choice is [facility] , [facility], and [facility] choice. [facility] possibly has bed, will be able to verify in next 2 hrs. Planning for transer to [facility] IF they have bed today, if not, will go to [facility]. [facility] does not have bed.     Barriers to Discharge: Determination of SNF and BM pending.    Plan: SNF transfer.

## 2022-01-06 NOTE — PROGRESS NOTES
A&Ox 4, Pain 9/10 given medication per MAR with + results. ALFORD 5/5. +BS last BM 12/31/21, adequate urine output via purwick. Surgical site to lower abdomen is dressed with a silver mepilex and is clean dry and intact. Ambulation with FWW x1. Discussed POC with pt-pt verbalized understanding. Call light within reach, bed in lowest position, Bed/chair alarm on.

## 2022-01-07 VITALS
HEIGHT: 64 IN | SYSTOLIC BLOOD PRESSURE: 100 MMHG | OXYGEN SATURATION: 97 % | TEMPERATURE: 97.1 F | BODY MASS INDEX: 29.88 KG/M2 | HEART RATE: 79 BPM | WEIGHT: 175.04 LBS | DIASTOLIC BLOOD PRESSURE: 59 MMHG | RESPIRATION RATE: 15 BRPM

## 2022-01-07 PROCEDURE — 700105 HCHG RX REV CODE 258: Performed by: STUDENT IN AN ORGANIZED HEALTH CARE EDUCATION/TRAINING PROGRAM

## 2022-01-07 PROCEDURE — A9270 NON-COVERED ITEM OR SERVICE: HCPCS | Performed by: STUDENT IN AN ORGANIZED HEALTH CARE EDUCATION/TRAINING PROGRAM

## 2022-01-07 PROCEDURE — 700102 HCHG RX REV CODE 250 W/ 637 OVERRIDE(OP): Performed by: STUDENT IN AN ORGANIZED HEALTH CARE EDUCATION/TRAINING PROGRAM

## 2022-01-07 PROCEDURE — A9270 NON-COVERED ITEM OR SERVICE: HCPCS | Performed by: INTERNAL MEDICINE

## 2022-01-07 PROCEDURE — 700111 HCHG RX REV CODE 636 W/ 250 OVERRIDE (IP): Performed by: INTERNAL MEDICINE

## 2022-01-07 PROCEDURE — 99239 HOSP IP/OBS DSCHRG MGMT >30: CPT | Performed by: HOSPITALIST

## 2022-01-07 PROCEDURE — 700102 HCHG RX REV CODE 250 W/ 637 OVERRIDE(OP): Performed by: INTERNAL MEDICINE

## 2022-01-07 RX ADMIN — SODIUM CHLORIDE: 9 INJECTION, SOLUTION INTRAVENOUS at 02:27

## 2022-01-07 RX ADMIN — OXYCODONE 5 MG: 5 TABLET ORAL at 11:37

## 2022-01-07 RX ADMIN — ACETAMINOPHEN 1000 MG: 500 TABLET ORAL at 05:51

## 2022-01-07 RX ADMIN — BUSPIRONE HYDROCHLORIDE 5 MG: 10 TABLET ORAL at 05:52

## 2022-01-07 RX ADMIN — FLUTICASONE PROPIONATE 50 MCG: 50 SPRAY, METERED NASAL at 05:52

## 2022-01-07 RX ADMIN — FOLIC ACID 2 MG: 1 TABLET ORAL at 05:52

## 2022-01-07 RX ADMIN — HEPARIN SODIUM 5000 UNITS: 5000 INJECTION, SOLUTION INTRAVENOUS; SUBCUTANEOUS at 08:17

## 2022-01-07 RX ADMIN — DOCUSATE SODIUM 50 MG AND SENNOSIDES 8.6 MG 2 TABLET: 8.6; 5 TABLET, FILM COATED ORAL at 05:51

## 2022-01-07 ASSESSMENT — PAIN DESCRIPTION - PAIN TYPE
TYPE: ACUTE PAIN
TYPE: ACUTE PAIN

## 2022-01-07 NOTE — DISCHARGE SUMMARY
Discharge Summary    CHIEF COMPLAINT ON ADMISSION  No chief complaint on file.      Reason for Admission  Acetabulum fracture     Admission Date  1/1/2022    CODE STATUS  Full Code    HPI & HOSPITAL COURSE  This is a 76 y.o. female medical history significant for insomnia, aortic sclerosis was  transferred from [facility] on 1/1/2022 after she was complaining of hip pain.  Patient reported see had a mechanical of fall and landed on the right side.  Imaging surgical may need to right acetabular fracture with extension into the superior pubic rami and superiorly into the iliac bone.    Orthopedic surgery was consulted, patient underwent open reduction internal fixation of right acetabular fracture on 1/3/2022.  PT/OT has evaluated the patient, recommended post acute.  At this time patient medically stable to be discharged to skilled nursing facility.    Her hospital course was complicated with post operative anemia, currently her hemoglobin is noted to be 8.9.  She noted to have macrocytosis, vitamin B12 normal.    She couldn't be discharged on 1/6/2022 as he did not have any bowel movement.  At this time patient is medically stable to be discharged to skilled nursing facility.    Therefore, she is discharged in good and stable condition to skilled nursing facility.    The patient met 2-midnight criteria for an inpatient stay at the time of discharge.    Discharge Date  1/7/2022    FOLLOW UP ITEMS POST DISCHARGE  With orthopedic surgery as an outpatient.    DISCHARGE DIAGNOSES  Principal Problem:    S/P right hip fracture POA: Yes  Active Problems:    Anemia POA: Unknown    Macrocytosis (Chronic) POA: Yes    Primary insomnia (Chronic) POA: Yes    Aortic valve sclerosis POA: Yes    Anxiety POA: Yes  Resolved Problems:    Fever POA: Yes      FOLLOW UP  Future Appointments   Date Time Provider Department Center   1/18/2022  2:40 PM Alyssia Ace P.A.-C. [facility] None   2/7/2022  1:30 PM [facility]  [facility]   3/2/2022  2:00 PM Keith Horne M.D. [facility] None     Pritesh Zamora M.D.  [address] [facility] [id number] [phone]    Schedule an appointment as soon as possible for a visit in 2 weeks  Follow-Up    Lucina Gupta M.D.  [address]  [facility] [id number] [phone]      As needed    [facility] [address] [id number] [phone]        Pritesh Zamora M.D.  [address] [facility] [address] [phone]      For suture removal and X-rays      MEDICATIONS ON DISCHARGE     Medication List      START taking these medications      Instructions   acetaminophen 500 MG Tabs  Commonly known as: TYLENOL   Take 2 Tablets by mouth every 6 hours.  Dose: 1,000 mg     folic acid 1 MG Tabs  Commonly known as: FOLVITE   Take 2 Tablets by mouth every day.  Dose: 2 mg     heparin 5000 UNIT/ML Soln   Inject 1 mL under the skin every 8 hours.  Dose: 5,000 Units     lidocaine 5 % Ptch  Commonly known as: LIDODERM   Place 1 Patch on the skin every 24 hours.  Dose: 1 Patch     oxyCODONE immediate-release 5 MG Tabs  Commonly known as: ROXICODONE   Take 1 Tablet by mouth every 3 hours as needed for Severe Pain for up to 3 days.  Dose: 5 mg        CHANGE how you take these medications      Instructions   fluticasone 50 MCG/ACT nasal spray  What changed: when to take this  Commonly known as: FLONASE   Administer 1 Spray into affected nostril(S) every day.  Dose: 1 Spray        CONTINUE taking these medications      Instructions   atorvastatin 20 MG Tabs  Commonly known as: LIPITOR   Take 1 Tablet by mouth every evening.  Dose: 20 mg     BIOTIN PO   Take 1 Tablet by mouth every morning. Pt unsure of dose  Dose: 1 Tablet     busPIRone 10 MG Tabs tablet  Commonly known as: BUSPAR   Pt takes 10MG in AM and 20MG HS Take 10-20 mg by mouth 2 times a day. Pt takes 10MG in AM and 20MG HS     CALCIUM + D PO   Take 1 Tablet by mouth every  "morning.  Dose: 1 Tablet     cyanocobalamin 100 MCG Tabs  Commonly known as: VITAMIN B-12   Take 100 mcg by mouth every morning.  Dose: 100 mcg     MAGNESIUM PO   Take 1 Capsule by mouth every morning. Pt unsure of dose  Dose: 1 Capsule     VITAMIN D3 PO   Take 1 Capsule by mouth every morning. Pt unsure of dose  Dose: 1 Capsule     VITAMIN E PO   Take 1 Capsule by mouth every morning. Pt unsure of dose  Dose: 1 Capsule        ASK your doctor about these medications      Instructions   * zolpidem 5 MG Tabs  Commonly known as: AMBIEN  Ask about: Which instructions should I use?   Take 10 mg by mouth at bedtime as needed for Sleep.  Dose: 10 mg     * zolpidem 10 MG Tabs  Commonly known as: AMBIEN  Ask about: Which instructions should I use?   Take 1 Tablet by mouth at bedtime as needed for Sleep for up to 3 days.  Dose: 10 mg         * This list has 2 medication(s) that are the same as other medications prescribed for you. Read the directions carefully, and ask your doctor or other care provider to review them with you.                Allergies  Allergies   Allergen Reactions   • Pollen Extract      \"cough\"       DIET  Orders Placed This Encounter   Procedures   • Diet Order Diet: Regular     Standing Status:   Standing     Number of Occurrences:   1     Order Specific Question:   Diet:     Answer:   Regular [1]       ACTIVITY  As tolerated and directed by skilled nursing.  Toe touch RIGHT leg    CONSULTATIONS  ortho    PROCEDURES    PROCEDURE PERFORMED: Open reduction internal fixation of right acetabular fracture    LABORATORY  Lab Results   Component Value Date    SODIUM 134 (L) 01/04/2022    POTASSIUM 3.7 01/04/2022    CHLORIDE 101 01/04/2022    CO2 22 01/04/2022    GLUCOSE 156 (H) 01/04/2022    BUN 7 (L) 01/04/2022    CREATININE 0.62 01/04/2022        Lab Results   Component Value Date    WBC 5.5 01/06/2022    HEMOGLOBIN 8.9 (L) 01/06/2022    HEMATOCRIT 26.9 (L) 01/06/2022    PLATELETCT 151 (L) 01/06/2022    "     Total time of the discharge process exceeds 35 minutes.

## 2022-01-07 NOTE — CARE PLAN
The patient is Stable - Low risk of patient condition declining or worsening    Shift Goals  Clinical Goals: pain control  Patient Goals: pain control, to be discharged today  Family Goals: n/a    Progress made toward(s) clinical / shift goals:      Patient is not progressing towards the following goals:    Problem: Knowledge Deficit - Standard  Goal: Patient and family/care givers will demonstrate understanding of plan of care, disease process/condition, diagnostic tests and medications  Outcome: Progressing    POC discussed with the patient. Questions answered. Verbalized understanding.     Problem: Pain - Standard  Goal: Alleviation of pain or a reduction in pain to the patient’s comfort goal  Outcome: Progressing    Educated on pain scale. Encouraged to verbalize pain. Will medicate as per MAR.

## 2022-01-07 NOTE — DISCHARGE PLANNING
Anticipated Discharge Disposition: SNF.     Action: Patient medically clear to transfer to SNF and confirmed that patient has large BM and KUB yesterday evening. [facility] and are able to accept patient today. Transportation arranged by facility for 1200 via [facility] w[facility]c van. LSW met with patient at bedside who is agreeable to discharge plan and signed COBRA. Attending nurse notified of discharge time.     Barriers to Discharge: None identified.     Plan: [facility] to [facility] at 1200 [facility].

## 2022-01-07 NOTE — DISCHARGE PLANNING
Agency/Facility Name: [facility]  Spoke To: Sharona  Outcome: Pt had KUB last night and BM. Clear for DC to [facility] 1/7 @ 1200      CM informed

## 2022-01-07 NOTE — DISCHARGE PLANNING
Agency/Facility Name: [facility]  Spoke To: Sharona  Outcome: Facility now insisting on KUB before they will take.      CM informed

## 2022-01-07 NOTE — PROGRESS NOTES
0720 Patient's in bed. Bedside report received from JANETTE Padilla RN at the beginning of the shift.    0743 New order received and acknowledged from Dr Enciso for the patient to be discharged.    0810 Patient's sitting up in bed. Noted piv pulled out accidentally by the patient. Educated on the importance/use of IS at least 10x every hour while awake, able to reach 1500. Fall protocol in effect. Call light within reach. Reminded patient to call for assist. Assessment completed. No distress noted. Plan of care reviewed with the patient. Verbalized understanding.    0940 Patient's in bed. No distress noted.    1043  Report given to Gisel Nguyen LPN at [facility].    1137 Medicated with Oxycodone (see MAR) for c/o's abdominal pain (surgical site), rates pain 7/10.    1220  Patient was discharged with patient's belongings, prescriptions for Ambien and Oxycodone (showed the prescriptions to Derek)., d/c packet via w/c accompanied by one                        male [facility].

## 2022-01-07 NOTE — DISCHARGE PLANNING
Agency[facility]Facility Name: [facility]  Spoke To: Sharona  Outcome: Transport cancelled for 1/6. Pt needs to have larger BM or KUB. Rescheduled for 1/7 @1200      CM informed

## 2022-01-10 PROBLEM — F51.02 ADJUSTMENT INSOMNIA: Status: ACTIVE | Noted: 2022-01-10

## 2022-01-12 PROBLEM — G89.18 POST-OP PAIN: Status: ACTIVE | Noted: 2022-01-12

## 2022-01-18 ENCOUNTER — APPOINTMENT (OUTPATIENT)
Dept: SLEEP MEDICINE | Facility: MEDICAL CENTER | Age: 77
End: 2022-01-18
Payer: MEDICARE

## 2022-01-25 PROBLEM — F41.9 ANXIETY: Chronic | Status: ACTIVE | Noted: 2022-01-01

## 2022-01-28 DIAGNOSIS — M80.00XD AGE-RELATED OSTEOPOROSIS WITH CURRENT PATHOLOGICAL FRACTURE WITH ROUTINE HEALING: ICD-10-CM

## 2022-02-01 RX ORDER — DIPHENHYDRAMINE HYDROCHLORIDE 50 MG/ML
50 INJECTION INTRAMUSCULAR; INTRAVENOUS PRN
Status: CANCELLED | OUTPATIENT
Start: 2022-02-01

## 2022-02-01 RX ORDER — METHYLPREDNISOLONE SODIUM SUCCINATE 125 MG/2ML
125 INJECTION, POWDER, LYOPHILIZED, FOR SOLUTION INTRAMUSCULAR; INTRAVENOUS PRN
Status: CANCELLED | OUTPATIENT
Start: 2022-02-01

## 2022-02-01 RX ORDER — EPINEPHRINE 1 MG/ML(1)
0.5 AMPUL (ML) INJECTION PRN
Status: CANCELLED | OUTPATIENT
Start: 2022-02-01

## 2022-02-09 ENCOUNTER — TELEPHONE (OUTPATIENT)
Dept: SCHEDULING | Facility: IMAGING CENTER | Age: 77
End: 2022-02-09

## 2022-02-09 NOTE — TELEPHONE ENCOUNTER
Pt was contacted informed Ct scan was completed 12/1/21, patient is scheduled to see Dr. Aguilar 4/4/22.
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
## 2022-02-09 NOTE — TELEPHONE ENCOUNTER
Pt was last seen 3/3/21 with Dr. Aguilar   The patient was to follow up per Dr. Aguilar Return in about 6 months (around 9/3/2021) for follow up visit with physician.  Pt completed CT scan 12/1/21

## 2022-02-22 PROBLEM — S32.401A CLOSED DISPLACED FRACTURE OF RIGHT ACETABULUM (HCC): Status: ACTIVE | Noted: 2022-02-22

## 2022-03-01 ENCOUNTER — OFFICE VISIT (OUTPATIENT)
Dept: MEDICAL GROUP | Facility: PHYSICIAN GROUP | Age: 77
End: 2022-03-01
Payer: MEDICARE

## 2022-03-01 VITALS
WEIGHT: 125.25 LBS | BODY MASS INDEX: 21.38 KG/M2 | HEIGHT: 64 IN | RESPIRATION RATE: 20 BRPM | HEART RATE: 94 BPM | SYSTOLIC BLOOD PRESSURE: 110 MMHG | TEMPERATURE: 99.5 F | DIASTOLIC BLOOD PRESSURE: 70 MMHG | OXYGEN SATURATION: 95 %

## 2022-03-01 DIAGNOSIS — R91.1 LUNG NODULE: ICD-10-CM

## 2022-03-01 DIAGNOSIS — E05.90 SUBCLINICAL HYPERTHYROIDISM: ICD-10-CM

## 2022-03-01 DIAGNOSIS — J84.10 PULMONARY FIBROSIS (HCC): ICD-10-CM

## 2022-03-01 DIAGNOSIS — F51.01 PRIMARY INSOMNIA: ICD-10-CM

## 2022-03-01 DIAGNOSIS — F41.1 GAD (GENERALIZED ANXIETY DISORDER): ICD-10-CM

## 2022-03-01 DIAGNOSIS — S32.491D OTHER CLOSED FRACTURE OF RIGHT ACETABULUM WITH ROUTINE HEALING, SUBSEQUENT ENCOUNTER: Chronic | ICD-10-CM

## 2022-03-01 DIAGNOSIS — D75.89 MACROCYTOSIS: ICD-10-CM

## 2022-03-01 DIAGNOSIS — E78.2 MIXED HYPERLIPIDEMIA: ICD-10-CM

## 2022-03-01 PROCEDURE — 99214 OFFICE O/P EST MOD 30 MIN: CPT | Performed by: INTERNAL MEDICINE

## 2022-03-01 RX ORDER — ZOLPIDEM TARTRATE 10 MG/1
10 TABLET ORAL NIGHTLY PRN
Qty: 30 TABLET | Refills: 2 | Status: SHIPPED | OUTPATIENT
Start: 2022-03-01 | End: 2022-03-31

## 2022-03-01 ASSESSMENT — FIBROSIS 4 INDEX: FIB4 SCORE: 2.89

## 2022-03-01 NOTE — PROGRESS NOTES
Subjective:     CC:   Chief Complaint   Patient presents with   • Medication Refill         HPI:   Yasmin presents today to discuss the following issues:    In the interim since her last visit, the patient was admitted 2021 for a right acetabular fracture and dislocation status post ORIF on 1/3/2022.  The patient was discharged to a skilled nursing facility on 1/10/2022 for PT/OT.  The patient was then discharged home with home health on 2022.  She is doing well.  She still reports significant pain.  The patient was last seen by Ortho on 2022.  She will need a right hip arthroplasty, date is not yet set.        Past Medical History:   Diagnosis Date   • Anxiety    • Arrhythmia 2020   • Arthritis 2020   • Blood clotting disorder (HCC)     hx  in lung   • Breath shortness 2020    no supplemental oxygen   • Cancer (HCC)     basal cell per hx   • Cataract     meg IOL    • Depression    • Fall from ground level 2021   • Heart murmur    • History of respiratory failure     6-month hospitalization in    • Hyperlipidemia    • Indigestion 2020    GERD   • Insomnia    • Pain 2020    back pain   • Peripheral neuropathy    • Pneumonia     hx    • Pulmonary fibrosis (HCC)    • Recurrent major depressive disorder, in full remission ([facility]) 2017   • Thrombocytopenia ([facility]) 2021       Social History     Tobacco Use   • Smoking status: Former Smoker     Packs/day: 0.25     Years: 15.00     Pack years: 3.75     Types: Cigarettes     Quit date: 3/11/1985     Years since quittin.0   • Smokeless tobacco: Never Used   • Tobacco comment: passive smoke exposure her entire life   Vaping Use   • Vaping Use: Never used   Substance Use Topics   • Alcohol use: Not Currently     Alcohol/week: 0.0 oz   • Drug use: No       Current Outpatient Medications Ordered in Epic   Medication Sig Dispense Refill   • zolpidem (AMBIEN) 10 MG Tab Take 1 Tablet by mouth at bedtime as  "needed for Sleep for up to 30 days. 30 Tablet 2   • oxyCODONE immediate-release (ROXICODONE) 5 MG Tab Take 1 Tablet by mouth every four hours as needed for up to 7 days. 40 Tablet 0   • acetaminophen (TYLENOL) 500 MG Tab Take 2 Tablets by mouth every 6 hours. 30 Tablet 0   • atorvastatin (LIPITOR) 20 MG Tab Take 1 Tablet by mouth every evening. 90 Tablet 3   • busPIRone (BUSPAR) 10 MG Tab tablet Pt takes 10MG in AM and 20MG HS Take 10-20 mg by mouth 2 times a day. Pt takes 10MG in AM and 20MG  Tablet 3   • fluticasone (FLONASE) 50 MCG/ACT nasal spray Administer 1 Spray into affected nostril(S) every day. (Patient taking differently: Administer 1 Spray into affected nostril(S) every morning.) 16 g 3   • cyanocobalamin (VITAMIN B-12) 100 MCG Tab Take 100 mcg by mouth every morning.     • BIOTIN PO Take 1 Tablet by mouth every morning. Pt unsure of dose     • Calcium Citrate-Vitamin D (CALCIUM + D PO) Take 1 Tablet by mouth every morning.     • MAGNESIUM PO Take 1 Capsule by mouth every morning. Pt unsure of dose     • Cholecalciferol (VITAMIN D3 PO) Take 1 Capsule by mouth every morning. Pt unsure of dose     • VITAMIN E PO Take 1 Capsule by mouth every morning. Pt unsure of dose       No current Epic-ordered facility-administered medications on file.       Allergies:  Pollen extract    Health Maintenance: Completed    ROS:   Denies any recent fevers or chills. No nausea or vomiting. No chest pains or shortness of breath.      Objective:       Exam:  /70 (BP Location: Left arm, Patient Position: Sitting, BP Cuff Size: Adult)   Pulse 94   Temp 37.5 °C (99.5 °F) (Temporal)   Resp 20   Ht 1.626 m (5' 4\")   Wt 56.8 kg (125 lb 4 oz)   SpO2 95%   BMI 21.50 kg/m²  Body mass index is 21.5 kg/m².    Gen: Alert and oriented, No apparent distress.      Assessment & Plan:     76 y.o. female with the following -     Other closed fracture of right acetabulum with routine healing, subsequent encounter  This was an " acute condition.  The patient was admitted 12/31/2021 for a right acetabular fracture and dislocation status post ORIF on 1/3/2022.  The patient was discharged to a skilled nursing facility on 1/10/2022 for PT/OT.  The patient was then discharged home with home health on 2/4/2022.  She is doing well.  She still reports significant pain.  The patient was last seen by Ortho on 2/24/2022.  She will need a right hip arthroplasty, date is not yet set.    Primary insomnia  This is a chronic medical condition. Well-controlled.  The patient takes zolpidem 10 mg nightly. She has previously been on a number of other sleep medications, but has weaned herself down to just zolpidem. We did discuss that zolpidem can further increase her risk for falls.  She would like to continue using Ambien as benefits outweigh the risks and it has improved her quality of life. Review of the patient's  shows that the patient was last given a prescription of zolpidem 10 mg, dispense #30, on 2/6/2022. Obtained and reviewed patient utilization report from [facility] pharmacy database on 3/2/2022 4:18 AM  prior to writing prescription for controlled substance II, III or IV per [place] bill . Based on assessment of the report, the prescription is medically necessary.   -Continue zolpidem 10 mg nightly  - Controlled Substance Treatment Agreement was signed and scanned into the patient's chart on 08/27/2021  - zolpidem (AMBIEN) 10 MG Tab; Take 1 Tablet by mouth at bedtime as needed for Sleep for up to 30 days.  Dispense: 30 Tablet; Refill: 2     Pulmonary fibrosis (HCC)  Lung nodule  This is a chronic medical condition.  Stable. The patient is followed by pulmonary. Chest CT 12/1/2021 showed a stable left lower lobe pulmonary nodule with central calcification and negative PET/CT, possibly resulting hamartoma or large calcified granuloma, emphysema with interstitial lung disease, coronary artery calcifications.  -Repeat chest CT  12/2022    Mixed hyperlipidemia  This is a chronic medical condition.  Well-controlled.  The patient is on atorvastatin 20 mg nightly.  Lipid panel from 06/25/2021 showed a total cholesterol 139, LDL 70, HDL 43, triglycerides 132.  CT cardiac score imaging has been ordered, but not yet performed.  -Continue atorvastatin 20 mg nightly     TIFFANY (generalized anxiety disorder)  This is a chronic medical condition.    Well-controlled.  The patient is on buspirone 10 mg in the a.m. and 20 mg at night.  She feels her symptoms are adequately controlled on this regimen.  -Continue buspirone 10 mg every morning, 20 mg every night.     Macrocytosis  This is a chronic medical condition.  Stable. Labs from 1/6/2022 showed an elevated MCV of 107.2. Folate is normal at 11.2 and B12 is normal at 650 the patient's methylmalonic acid was normal at 0.15 on 6/25/2021. Homocysteine was just slightly elevated at 11.24. The patient denies active alcohol use.  - CBC WITH DIFFERENTIAL; Future     Subclinical hyperthyroidism  This is a chronic medical condition.  Stable.  Labs from 5/24/2021 showed a low TSH of 0.26 with a normal free T4 of 0.97.  - TSH; Future  - FREE THYROXINE; Future  - TRIIDOTHYRONINE; Future  - TSI; Future  - ANTITHYROGLOBULIN AB; Future     Osteopenia of neck of femur, unspecified laterality  This is a chronic condition.  Stable.  Bone density study on 1/7/2019 showed osteopenia of the femoral region with a T score of -1.4, and a normal lumbar region with a T score of 1.5.  FRAX 10-year probability of a major osteoporotic fracture was 24.1% and hip fracture 7.4%. The patient is on Prolia 60 mg every 6 months.  -Continue Prolia 60 mg every 6 months  -Continue calcium and vitamin D supplementation as well as daily weight bearing exercises  -Repeat bone density 1/2023, postponed a year due to patient's fracture    Return in about 3 months (around 6/1/2022) for Controlled Substance.    Please note that this dictation was  created using voice recognition software. I have made every reasonable attempt to correct obvious errors, but I expect that there are errors of grammar and possibly content that I did not discover before finalizing the note.

## 2022-03-02 PROBLEM — E78.2 MIXED HYPERLIPIDEMIA: Status: ACTIVE | Noted: 2017-07-21

## 2022-03-09 ENCOUNTER — HOSPITAL ENCOUNTER (OUTPATIENT)
Dept: LAB | Facility: MEDICAL CENTER | Age: 77
End: 2022-03-09
Attending: INTERNAL MEDICINE
Payer: MEDICARE

## 2022-03-09 ENCOUNTER — HOSPITAL ENCOUNTER (OUTPATIENT)
Dept: LAB | Facility: MEDICAL CENTER | Age: 77
End: 2022-03-09
Attending: PHYSICIAN ASSISTANT
Payer: MEDICARE

## 2022-03-09 ENCOUNTER — HOSPITAL ENCOUNTER (OUTPATIENT)
Dept: RADIOLOGY | Facility: MEDICAL CENTER | Age: 77
End: 2022-03-09
Attending: PHYSICIAN ASSISTANT
Payer: MEDICARE

## 2022-03-09 DIAGNOSIS — D75.89 MACROCYTOSIS: ICD-10-CM

## 2022-03-09 DIAGNOSIS — S32.401A CLOSED DISPLACED FRACTURE OF RIGHT ACETABULUM, UNSPECIFIED PORTION OF ACETABULUM, INITIAL ENCOUNTER (HCC): ICD-10-CM

## 2022-03-09 DIAGNOSIS — E05.90 SUBCLINICAL HYPERTHYROIDISM: ICD-10-CM

## 2022-03-09 DIAGNOSIS — E78.2 MIXED HYPERLIPIDEMIA: ICD-10-CM

## 2022-03-09 LAB
ALBUMIN SERPL BCP-MCNC: 4.5 G/DL (ref 3.2–4.9)
ALBUMIN/GLOB SERPL: 2 G/DL
ALP SERPL-CCNC: 117 U/L (ref 30–99)
ALT SERPL-CCNC: 12 U/L (ref 2–50)
ANION GAP SERPL CALC-SCNC: 15 MMOL/L (ref 7–16)
AST SERPL-CCNC: 19 U/L (ref 12–45)
BASOPHILS # BLD AUTO: 1 % (ref 0–1.8)
BASOPHILS # BLD: 0.05 K/UL (ref 0–0.12)
BILIRUB SERPL-MCNC: 0.6 MG/DL (ref 0.1–1.5)
BUN SERPL-MCNC: 11 MG/DL (ref 8–22)
CALCIUM SERPL-MCNC: 9.9 MG/DL (ref 8.5–10.5)
CHLORIDE SERPL-SCNC: 109 MMOL/L (ref 96–112)
CHOLEST SERPL-MCNC: 152 MG/DL (ref 100–199)
CO2 SERPL-SCNC: 21 MMOL/L (ref 20–33)
CREAT SERPL-MCNC: 0.59 MG/DL (ref 0.5–1.4)
CRP SERPL HS-MCNC: <0.3 MG/DL (ref 0–0.75)
EOSINOPHIL # BLD AUTO: 0.09 K/UL (ref 0–0.51)
EOSINOPHIL NFR BLD: 1.8 % (ref 0–6.9)
ERYTHROCYTE [DISTWIDTH] IN BLOOD BY AUTOMATED COUNT: 46 FL (ref 35.9–50)
ERYTHROCYTE [SEDIMENTATION RATE] IN BLOOD BY WESTERGREN METHOD: 11 MM/HOUR (ref 0–25)
FASTING STATUS PATIENT QL REPORTED: NORMAL
GLOBULIN SER CALC-MCNC: 2.2 G/DL (ref 1.9–3.5)
GLUCOSE SERPL-MCNC: 97 MG/DL (ref 65–99)
HCT VFR BLD AUTO: 40.8 % (ref 37–47)
HDLC SERPL-MCNC: 59 MG/DL
HGB BLD-MCNC: 13.1 G/DL (ref 12–16)
IMM GRANULOCYTES # BLD AUTO: 0.01 K/UL (ref 0–0.11)
IMM GRANULOCYTES NFR BLD AUTO: 0.2 % (ref 0–0.9)
LDLC SERPL CALC-MCNC: 65 MG/DL
LYMPHOCYTES # BLD AUTO: 1.38 K/UL (ref 1–4.8)
LYMPHOCYTES NFR BLD: 27.5 % (ref 22–41)
MCH RBC QN AUTO: 31.7 PG (ref 27–33)
MCHC RBC AUTO-ENTMCNC: 32.1 G/DL (ref 33.6–35)
MCV RBC AUTO: 98.8 FL (ref 81.4–97.8)
MONOCYTES # BLD AUTO: 0.38 K/UL (ref 0–0.85)
MONOCYTES NFR BLD AUTO: 7.6 % (ref 0–13.4)
NEUTROPHILS # BLD AUTO: 3.1 K/UL (ref 2–7.15)
NEUTROPHILS NFR BLD: 61.9 % (ref 44–72)
NRBC # BLD AUTO: 0 K/UL
NRBC BLD-RTO: 0 /100 WBC
PLATELET # BLD AUTO: 254 K/UL (ref 164–446)
PMV BLD AUTO: 9.4 FL (ref 9–12.9)
POTASSIUM SERPL-SCNC: 3.9 MMOL/L (ref 3.6–5.5)
PROT SERPL-MCNC: 6.7 G/DL (ref 6–8.2)
RBC # BLD AUTO: 4.13 M/UL (ref 4.2–5.4)
SODIUM SERPL-SCNC: 145 MMOL/L (ref 135–145)
T3 SERPL-MCNC: 124 NG/DL (ref 60–181)
T4 FREE SERPL-MCNC: 1.23 NG/DL (ref 0.93–1.7)
TRIGL SERPL-MCNC: 142 MG/DL (ref 0–149)
TSH SERPL DL<=0.005 MIU/L-ACNC: 0.05 UIU/ML (ref 0.38–5.33)
WBC # BLD AUTO: 5 K/UL (ref 4.8–10.8)

## 2022-03-09 PROCEDURE — 80053 COMPREHEN METABOLIC PANEL: CPT

## 2022-03-09 PROCEDURE — 86800 THYROGLOBULIN ANTIBODY: CPT

## 2022-03-09 PROCEDURE — 86140 C-REACTIVE PROTEIN: CPT

## 2022-03-09 PROCEDURE — 36415 COLL VENOUS BLD VENIPUNCTURE: CPT

## 2022-03-09 PROCEDURE — 84445 ASSAY OF TSI GLOBULIN: CPT

## 2022-03-09 PROCEDURE — 84443 ASSAY THYROID STIM HORMONE: CPT

## 2022-03-09 PROCEDURE — 84439 ASSAY OF FREE THYROXINE: CPT

## 2022-03-09 PROCEDURE — 80061 LIPID PANEL: CPT

## 2022-03-09 PROCEDURE — 85025 COMPLETE CBC W/AUTO DIFF WBC: CPT

## 2022-03-09 PROCEDURE — 84480 ASSAY TRIIODOTHYRONINE (T3): CPT

## 2022-03-09 PROCEDURE — 72192 CT PELVIS W/O DYE: CPT | Mod: MG

## 2022-03-09 PROCEDURE — 85652 RBC SED RATE AUTOMATED: CPT

## 2022-03-13 LAB
THYROGLOB AB SERPL-ACNC: <0.9 IU/ML (ref 0–4)
TSI SER-ACNC: <0.1 IU/L

## 2022-03-18 ENCOUNTER — TELEPHONE (OUTPATIENT)
Dept: SCHEDULING | Facility: IMAGING CENTER | Age: 77
End: 2022-03-18
Payer: MEDICARE

## 2022-03-18 DIAGNOSIS — R91.8 PULMONARY NODULES: ICD-10-CM

## 2022-03-18 DIAGNOSIS — R91.8 ABNORMAL CT SCAN OF LUNG: ICD-10-CM

## 2022-03-18 DIAGNOSIS — S32.491D OTHER CLOSED FRACTURE OF RIGHT ACETABULUM WITH ROUTINE HEALING, SUBSEQUENT ENCOUNTER: ICD-10-CM

## 2022-03-18 RX ORDER — HYDROCODONE BITARTRATE AND ACETAMINOPHEN 5; 325 MG/1; MG/1
1 TABLET ORAL EVERY 6 HOURS PRN
Qty: 30 TABLET | Refills: 0 | Status: SHIPPED | OUTPATIENT
Start: 2022-03-18 | End: 2022-03-28

## 2022-03-18 NOTE — TELEPHONE ENCOUNTER
Hello,    Pt would like a call back at [phone] to discuss if she needs to get another chest image done or if the last one she did is current enough.    Thank you,  Alia SANCHEZ

## 2022-03-20 ENCOUNTER — APPOINTMENT (OUTPATIENT)
Dept: ONCOLOGY | Facility: MEDICAL CENTER | Age: 77
End: 2022-03-20
Attending: FAMILY MEDICINE
Payer: MEDICARE

## 2022-03-21 ENCOUNTER — PRE-ADMISSION TESTING (OUTPATIENT)
Dept: ADMISSIONS | Facility: MEDICAL CENTER | Age: 77
End: 2022-03-21
Attending: ORTHOPAEDIC SURGERY
Payer: MEDICARE

## 2022-03-21 DIAGNOSIS — S32.401A CLOSED DISPLACED FRACTURE OF RIGHT ACETABULUM, UNSPECIFIED PORTION OF ACETABULUM, INITIAL ENCOUNTER (HCC): ICD-10-CM

## 2022-03-21 LAB
ANION GAP SERPL CALC-SCNC: 14 MMOL/L (ref 7–16)
BUN SERPL-MCNC: 16 MG/DL (ref 8–22)
CALCIUM SERPL-MCNC: 10.2 MG/DL (ref 8.4–10.2)
CHLORIDE SERPL-SCNC: 104 MMOL/L (ref 96–112)
CO2 SERPL-SCNC: 22 MMOL/L (ref 20–33)
CREAT SERPL-MCNC: 0.65 MG/DL (ref 0.5–1.4)
ERYTHROCYTE [DISTWIDTH] IN BLOOD BY AUTOMATED COUNT: 49.7 FL (ref 35.9–50)
GFR SERPLBLD CREATININE-BSD FMLA CKD-EPI: 92 ML/MIN/1.73 M 2
GLUCOSE SERPL-MCNC: 89 MG/DL (ref 65–99)
HCT VFR BLD AUTO: 42.2 % (ref 37–47)
HGB BLD-MCNC: 13.5 G/DL (ref 12–16)
INR PPP: 1.04 (ref 0.87–1.13)
MCH RBC QN AUTO: 31.7 PG (ref 27–33)
MCHC RBC AUTO-ENTMCNC: 32 G/DL (ref 33.6–35)
MCV RBC AUTO: 99.1 FL (ref 81.4–97.8)
PLATELET # BLD AUTO: 262 K/UL (ref 164–446)
PMV BLD AUTO: 9 FL (ref 9–12.9)
POTASSIUM SERPL-SCNC: 3.7 MMOL/L (ref 3.6–5.5)
PROTHROMBIN TIME: 12.8 SEC (ref 12–14.6)
RBC # BLD AUTO: 4.26 M/UL (ref 4.2–5.4)
SCCMEC + MECA PNL NOSE NAA+PROBE: NEGATIVE
SCCMEC + MECA PNL NOSE NAA+PROBE: NEGATIVE
SODIUM SERPL-SCNC: 140 MMOL/L (ref 135–145)
WBC # BLD AUTO: 6.8 K/UL (ref 4.8–10.8)

## 2022-03-21 PROCEDURE — 80048 BASIC METABOLIC PNL TOTAL CA: CPT

## 2022-03-21 PROCEDURE — 87640 STAPH A DNA AMP PROBE: CPT | Mod: XU

## 2022-03-21 PROCEDURE — 36415 COLL VENOUS BLD VENIPUNCTURE: CPT

## 2022-03-21 PROCEDURE — 85027 COMPLETE CBC AUTOMATED: CPT

## 2022-03-21 PROCEDURE — 87641 MR-STAPH DNA AMP PROBE: CPT

## 2022-03-21 PROCEDURE — 85610 PROTHROMBIN TIME: CPT

## 2022-03-21 ASSESSMENT — FIBROSIS 4 INDEX: FIB4 SCORE: 1.62

## 2022-03-21 NOTE — PREPROCEDURE INSTRUCTIONS
Pre-admit appointment completed. Pt states all instructions given are understood and to call pre-admit or Dr's office for additional questions or any symptoms of illness/covid develop prior to DOS. Medications the patient will take the morning of surgery per anesthesia protocol: Buspar, Norco, and Tylenol if needed        Surgery preparation checklist for Joint Replacement Program given to Pt with verbal instructions.     Denies anesthesia complications

## 2022-03-22 NOTE — DISCHARGE PLANNING
DISCHARGE PLANNING NOTE - TOTAL JOINT    Procedure: Procedure(s):  RIGHT ARTHROPLASTY, HIP, TOTAL  Procedure Date: 4/4/2022  Insurance: Payor: MEDICARE / Plan: MEDICARE PART A & B / Product Type: *No Product type* /    Equipment currently available at home?  front-wheel walker  Steps into the home? 0  Steps within the home? One flight but pt has a chair lift.  Toilet height? comfort height  Type of shower? tub-shower  Who will be with you during your recovery? Niece and her friend, Lynn.  Is Outpatient Physical Therapy set up after surgery? Yes  Did you take the Total Joint Class and where? Yes  Planning same day discharge?Yes     This writer met with pt during her preadmission appointment. Pt states she has all needed equipment.  Home safety checklist reviewed and copy given to pt. Pt educated to dc criteria. All questions answered and verbalizes understanding of all instructions. No dc needs identified at this time. Anticipate dc to home without barriers.

## 2022-03-27 ENCOUNTER — OUTPATIENT INFUSION SERVICES (OUTPATIENT)
Dept: ONCOLOGY | Facility: MEDICAL CENTER | Age: 77
End: 2022-03-27
Attending: FAMILY MEDICINE
Payer: MEDICARE

## 2022-03-27 VITALS
OXYGEN SATURATION: 94 % | HEIGHT: 59 IN | TEMPERATURE: 97.7 F | RESPIRATION RATE: 18 BRPM | BODY MASS INDEX: 24.62 KG/M2 | HEART RATE: 83 BPM | SYSTOLIC BLOOD PRESSURE: 103 MMHG | WEIGHT: 122.14 LBS | DIASTOLIC BLOOD PRESSURE: 61 MMHG

## 2022-03-27 DIAGNOSIS — M80.00XD AGE-RELATED OSTEOPOROSIS WITH CURRENT PATHOLOGICAL FRACTURE WITH ROUTINE HEALING: ICD-10-CM

## 2022-03-27 DIAGNOSIS — M85.859 OSTEOPENIA OF NECK OF FEMUR, UNSPECIFIED LATERALITY: ICD-10-CM

## 2022-03-27 PROCEDURE — 96372 THER/PROPH/DIAG INJ SC/IM: CPT

## 2022-03-27 PROCEDURE — 700111 HCHG RX REV CODE 636 W/ 250 OVERRIDE (IP): Mod: JG | Performed by: INTERNAL MEDICINE

## 2022-03-27 RX ORDER — METHYLPREDNISOLONE SODIUM SUCCINATE 125 MG/2ML
125 INJECTION, POWDER, LYOPHILIZED, FOR SOLUTION INTRAMUSCULAR; INTRAVENOUS PRN
Status: CANCELLED | OUTPATIENT
Start: 2022-09-23

## 2022-03-27 RX ORDER — DIPHENHYDRAMINE HYDROCHLORIDE 50 MG/ML
50 INJECTION INTRAMUSCULAR; INTRAVENOUS PRN
Status: CANCELLED | OUTPATIENT
Start: 2022-09-23

## 2022-03-27 RX ORDER — EPINEPHRINE 1 MG/ML(1)
0.5 AMPUL (ML) INJECTION PRN
Status: CANCELLED | OUTPATIENT
Start: 2022-09-23

## 2022-03-27 RX ADMIN — DENOSUMAB 60 MG: 60 INJECTION SUBCUTANEOUS at 15:13

## 2022-03-27 ASSESSMENT — FIBROSIS 4 INDEX: FIB4 SCORE: 1.57

## 2022-03-27 NOTE — PROGRESS NOTES
Patient to [facility] for Prolia injection. Patients labs previously done and patient meets parameters for injection. Prolia injected into left back of arm. Patient has future appointment. Patient to home in care of self.

## 2022-03-28 ENCOUNTER — APPOINTMENT (OUTPATIENT)
Dept: ADMISSIONS | Facility: MEDICAL CENTER | Age: 77
End: 2022-03-28
Attending: ORTHOPAEDIC SURGERY
Payer: MEDICARE

## 2022-03-28 DIAGNOSIS — Z01.812 PRE-OPERATIVE LABORATORY EXAMINATION: ICD-10-CM

## 2022-03-31 ENCOUNTER — HOSPITAL ENCOUNTER (OUTPATIENT)
Dept: RADIOLOGY | Facility: MEDICAL CENTER | Age: 77
End: 2022-03-31
Payer: MEDICARE

## 2022-03-31 DIAGNOSIS — R91.8 ABNORMAL CT SCAN OF LUNG: ICD-10-CM

## 2022-03-31 DIAGNOSIS — R91.8 PULMONARY NODULES: ICD-10-CM

## 2022-03-31 PROCEDURE — 71250 CT THORAX DX C-: CPT | Mod: MC

## 2022-03-31 NOTE — H&P (VIEW-ONLY)
Yasmin Zhong is a 76 y.o. female  here today for further discussion of their posttraumatic right hip arthritis and right hip pain.  Patient had an acetabular fracture several months ago that was treated with open reduction internal fixation with Dr. Zamora.  She unfortunately has developed significant posttraumatic arthritis and has significant pain and difficulty with walking.  She is can have a fairly complex right hip replacement especially at the acetabular component.  They have tried and failed most all conservative measures and are ready to move forward with surgery.  They have obtained the appropriate clearances if necessary.  They have pain on a daily basis, pain at night, pain that affects her activities of daily living.  They have a history of blood clots after a prolonged hospitalization in the past and will plan on using Xarelto for DVT prophylaxis.  They are ready to move forward with surgery.  We are planning on using Luis Miguel Accolade versus restoration modular dual mobility.  We will have revision cups with augments and bone grafting available as their implants.  They will be having their surgery at [facility].  ESR and her that were within normal limits as well as a CT scan of her pelvis with 3D reconstruction for surgical planning.    Past Medical History:   Diagnosis Date   • Anxiety    • Arrhythmia 11/18/2020   • Arthritis 11/18/2020   • Blood clotting disorder (HCC)     hx 2013 in lung   • Breath shortness 11/18/2020    no supplemental oxygen   • Cancer (HCC) 1990    basal cell per hx   • Cataract     meg IOL    • Depression    • Fall from ground level 5/25/2021   • Heart murmur    • History of respiratory failure     6-month hospitalization in 2014   • Hyperlipidemia    • Indigestion 11/18/2020    GERD   • Insomnia    • Pain 11/18/2020    back pain   • Peripheral neuropathy    • Pneumonia     hx 2013   • Pulmonary fibrosis (HCC)    • Recurrent major depressive disorder, in  full remission (HCC) 7/21/2017   • Thrombocytopenia (HCC) 4/19/2021     Past Surgical History:   Procedure Laterality Date   • ORIF, FRACTURE, ACETABULUM Right 1/3/2022    Procedure: OPEN REDUCTION AND INTERNAL FIXATION, FRACTURE, ACETABULUM;  Surgeon: Pritesh Zamora M.D.;  Location: SURGERY [facility];  Service: Orthopedics   • STRABISMUS REPAIR Bilateral 11/19/2021    Procedure: STRABISMUS SURGERY - ESOTROPIA STRABISMUS PROCEDURE, ONE HORIZONTAL MUSCLE - BILATERAL, PROCEDURE FOR SCARRING OF EXTRAOCULAR MUSCLE AND ADJUSTABLE SUTURE PLACEMENT - RIGHT;  Surgeon: Keith Horne M.D.;  Location: SURGERY SAME DAY [facility];  Service: Ophthalmology   • EYE SURGERY Bilateral 11/19/2021    Bilateral medial rectus recession in patient with prior ocular Bilateral medial rectus recession in patient with prior ocular    • TN LAP,ESOPHAGOGAST FUNDOPLASTY N/A 11/23/2020    Procedure: FUNDOPLICATION, NISSEN, LAPAROSCOPIC- FOR PARAESOPHAGEAL WITH MESH;  Surgeon: Pritesh Baptiste M.D.;  Location: SURGERY [facility];  Service: General   • HIP HEMIARTHROPLASTY  4/25/2015    Performed by Raymond Lantigua M.D. at SURGERY [facility]   • CATARACT EXTRACTION WITH IOL Bilateral    • OTHER      breast reduction     Social Connections: Not on file       Current Outpatient Medications:   •  zolpidem, 10 mg, Oral, HS PRN, Taking  •  acetaminophen, 1,000 mg, Oral, Q6HRS, Taking  •  atorvastatin, 20 mg, Oral, Q EVENING, Taking  •  busPIRone, Pt takes 10MG in AM and 20MG HS Take 10-20 mg by mouth 2 times a day. Pt takes 10MG in AM and 20MG HS, Taking  •  fluticasone, 1 Spray, Nasal, DAILY (Patient taking differently: 1 Spray, Nasal, EVERY MORNING), Taking  •  cyanocobalamin, 100 mcg, Oral, QAM, Taking  •  BIOTIN PO, 1 Tablet, Oral, QAM, Taking  •  Calcium Citrate-Vitamin D (CALCIUM + D PO), 1 Tablet, Oral, QAM, Taking  •  MAGNESIUM PO, 1 Capsule, Oral, QAM, Taking  •  Cholecalciferol (VITAMIN D3 PO), 1 Capsule, Oral, QAM,  Taking  •  VITAMIN E PO, 1 Capsule, Oral, QAM, Taking  Pollen extract      Past medical history social history surgical history review of systems reviewed and is otherwise noncontributory except for above    Exam    Alert and oriented x3, no apparent distress  Normocephalic atraumatic, trachea midline  Breathing is nonlabored  Abdomen is nonacute  Neurovascularly intact in bilateral lower extremities.  Soft Calf Compartments without signs of DVT.  Right lower extremity: SILT L4-5, S1. PF, DF, EHL.  Good DP Pulse. Soft Calf Compartments without signs of DVT.  Her right acetabular fracture incision is in the suprapubic region is fully healed without evidence of erythema, induration, discharge.  She has tenderness throughout any range of motion of her right hip with flexion, internal, external rotation and logroll.  Relation she is able to perform straight leg raise but is significantly painful.  Left Lower Extremity: SILT L4-5, S1. PF, DF, EHL.  Good DP Pulse. Soft Calf Compartments without signs of DVT.  She has painless passive range of motion of her left hip.    Images    DX-HIP-COMPLETE - UNILATERAL 2+ RIGHT  AP of the pelvis, AP of the right hip, crosstable lateral of the right hip   shows hardware in place consistent with her previous acetabular fracture   with a protrusio deformity.  She has noted posttraumatic arthritic changes   throughout her right hip with joint space narrowing, osteophyte formation,   subchondral sclerosis.  She has a hemiarthroplasty on the left side which   is unchanged from previous.        Impression    1) posttraumatic arthritis and protrusio deformity status post acetabular fracture with open reduction internal fixation with Dr. Zamora in December 2021    Plan    All the risk benefits and side effects of surgery were discussed including pain, bleeding, infection, injury to surrounding organs vessels, heart attack, stroke, fracture, dislocation, need for further surgery, and possibly  "death.  We will plan on using Xarelto for DVT prophylaxis.  Patient will get all of their postoperative prescriptions today.  Patient will plan on going home the following day.  We will plan on using Luis Miguel Accolade versus restoration modular, dual mobility, revision cups with augments, bone grafting material as their surgical implants.  All of their questions were answered and they agreed expressed full understanding.  We will see them on the day of surgery.  They will get a call from our surgical team about start time and arrival time of surgery.    I have again reinforced with the patient the complexity of this operation.  I did discuss that she would be nonweightbearing for 4 to 6 weeks and may need a rehab facility versus home physical therapy and home health.  She is asked about the long-term expectations of when she would be back to \"normal\".  I told her this would take 6 months to a year before she would be improved to a baseline and she expressed full understanding of this.  She understands she cannot drive afterward she may not be able to bear weight.  She should do limited activities.  "

## 2022-04-01 ENCOUNTER — APPOINTMENT (OUTPATIENT)
Dept: ADMISSIONS | Facility: MEDICAL CENTER | Age: 77
End: 2022-04-01
Attending: ORTHOPAEDIC SURGERY
Payer: MEDICARE

## 2022-04-01 DIAGNOSIS — Z01.812 PRE-OPERATIVE LABORATORY EXAMINATION: ICD-10-CM

## 2022-04-01 PROCEDURE — C9803 HOPD COVID-19 SPEC COLLECT: HCPCS

## 2022-04-01 PROCEDURE — U0003 INFECTIOUS AGENT DETECTION BY NUCLEIC ACID (DNA OR RNA); SEVERE ACUTE RESPIRATORY SYNDROME CORONAVIRUS 2 (SARS-COV-2) (CORONAVIRUS DISEASE [COVID-19]), AMPLIFIED PROBE TECHNIQUE, MAKING USE OF HIGH THROUGHPUT TECHNOLOGIES AS DESCRIBED BY CMS-2020-01-R: HCPCS

## 2022-04-01 PROCEDURE — U0005 INFEC AGEN DETEC AMPLI PROBE: HCPCS

## 2022-04-02 LAB
SARS-COV-2 RNA RESP QL NAA+PROBE: NOTDETECTED
SPECIMEN SOURCE: NORMAL

## 2022-04-04 ENCOUNTER — APPOINTMENT (OUTPATIENT)
Dept: RADIOLOGY | Facility: MEDICAL CENTER | Age: 77
End: 2022-04-04
Attending: PHYSICIAN ASSISTANT
Payer: MEDICARE

## 2022-04-04 ENCOUNTER — HOSPITAL ENCOUNTER (OUTPATIENT)
Facility: MEDICAL CENTER | Age: 77
LOS: 1 days | End: 2022-04-05
Attending: ORTHOPAEDIC SURGERY | Admitting: ORTHOPAEDIC SURGERY
Payer: MEDICARE

## 2022-04-04 ENCOUNTER — ANESTHESIA (OUTPATIENT)
Dept: SURGERY | Facility: MEDICAL CENTER | Age: 77
End: 2022-04-04
Payer: MEDICARE

## 2022-04-04 ENCOUNTER — ANESTHESIA EVENT (OUTPATIENT)
Dept: SURGERY | Facility: MEDICAL CENTER | Age: 77
End: 2022-04-04
Payer: MEDICARE

## 2022-04-04 DIAGNOSIS — Z87.81 S/P RIGHT HIP FRACTURE: ICD-10-CM

## 2022-04-04 DIAGNOSIS — S32.401A CLOSED DISPLACED FRACTURE OF RIGHT ACETABULUM, UNSPECIFIED PORTION OF ACETABULUM, INITIAL ENCOUNTER (HCC): ICD-10-CM

## 2022-04-04 DIAGNOSIS — M16.11 ARTHRITIS OF RIGHT HIP: ICD-10-CM

## 2022-04-04 LAB
ABO GROUP BLD: NORMAL
BLD GP AB SCN SERPL QL: NORMAL
RH BLD: NORMAL

## 2022-04-04 PROCEDURE — 160009 HCHG ANES TIME/MIN: Performed by: ORTHOPAEDIC SURGERY

## 2022-04-04 PROCEDURE — 700101 HCHG RX REV CODE 250: Performed by: PHYSICIAN ASSISTANT

## 2022-04-04 PROCEDURE — 72170 X-RAY EXAM OF PELVIS: CPT

## 2022-04-04 PROCEDURE — 96375 TX/PRO/DX INJ NEW DRUG ADDON: CPT

## 2022-04-04 PROCEDURE — 86850 RBC ANTIBODY SCREEN: CPT

## 2022-04-04 PROCEDURE — 700111 HCHG RX REV CODE 636 W/ 250 OVERRIDE (IP): Performed by: ANESTHESIOLOGY

## 2022-04-04 PROCEDURE — 96376 TX/PRO/DX INJ SAME DRUG ADON: CPT

## 2022-04-04 PROCEDURE — C1713 ANCHOR/SCREW BN/BN,TIS/BN: HCPCS | Performed by: ORTHOPAEDIC SURGERY

## 2022-04-04 PROCEDURE — 160031 HCHG SURGERY MINUTES - 1ST 30 MINS LEVEL 5: Performed by: ORTHOPAEDIC SURGERY

## 2022-04-04 PROCEDURE — 27132 TOTAL HIP ARTHROPLASTY: CPT | Mod: ASROC,RT | Performed by: PHYSICIAN ASSISTANT

## 2022-04-04 PROCEDURE — 502000 HCHG MISC OR IMPLANTS RC 0278: Performed by: ORTHOPAEDIC SURGERY

## 2022-04-04 PROCEDURE — 160048 HCHG OR STATISTICAL LEVEL 1-5: Performed by: ORTHOPAEDIC SURGERY

## 2022-04-04 PROCEDURE — 160035 HCHG PACU - 1ST 60 MINS PHASE I: Performed by: ORTHOPAEDIC SURGERY

## 2022-04-04 PROCEDURE — 503339 HCHG DRESSSING PICO: Performed by: ORTHOPAEDIC SURGERY

## 2022-04-04 PROCEDURE — 36415 COLL VENOUS BLD VENIPUNCTURE: CPT

## 2022-04-04 PROCEDURE — 86900 BLOOD TYPING SEROLOGIC ABO: CPT

## 2022-04-04 PROCEDURE — C1776 JOINT DEVICE (IMPLANTABLE): HCPCS | Performed by: ORTHOPAEDIC SURGERY

## 2022-04-04 PROCEDURE — 01202 ANES ARTHROSCOPIC PX HIP JT: CPT | Performed by: ANESTHESIOLOGY

## 2022-04-04 PROCEDURE — 700111 HCHG RX REV CODE 636 W/ 250 OVERRIDE (IP): Performed by: ORTHOPAEDIC SURGERY

## 2022-04-04 PROCEDURE — 86901 BLOOD TYPING SEROLOGIC RH(D): CPT

## 2022-04-04 PROCEDURE — 700105 HCHG RX REV CODE 258: Performed by: ORTHOPAEDIC SURGERY

## 2022-04-04 PROCEDURE — 700102 HCHG RX REV CODE 250 W/ 637 OVERRIDE(OP): Performed by: ANESTHESIOLOGY

## 2022-04-04 PROCEDURE — 27132 TOTAL HIP ARTHROPLASTY: CPT | Mod: RT | Performed by: ORTHOPAEDIC SURGERY

## 2022-04-04 PROCEDURE — 501838 HCHG SUTURE GENERAL: Performed by: ORTHOPAEDIC SURGERY

## 2022-04-04 PROCEDURE — 700101 HCHG RX REV CODE 250: Performed by: ORTHOPAEDIC SURGERY

## 2022-04-04 PROCEDURE — 99100 ANES PT EXTEME AGE<1 YR&>70: CPT | Performed by: ANESTHESIOLOGY

## 2022-04-04 PROCEDURE — 700111 HCHG RX REV CODE 636 W/ 250 OVERRIDE (IP): Performed by: PHYSICIAN ASSISTANT

## 2022-04-04 PROCEDURE — A9270 NON-COVERED ITEM OR SERVICE: HCPCS | Performed by: ANESTHESIOLOGY

## 2022-04-04 PROCEDURE — G0378 HOSPITAL OBSERVATION PER HR: HCPCS

## 2022-04-04 PROCEDURE — 160036 HCHG PACU - EA ADDL 30 MINS PHASE I: Performed by: ORTHOPAEDIC SURGERY

## 2022-04-04 PROCEDURE — A9270 NON-COVERED ITEM OR SERVICE: HCPCS | Performed by: PHYSICIAN ASSISTANT

## 2022-04-04 PROCEDURE — 96365 THER/PROPH/DIAG IV INF INIT: CPT

## 2022-04-04 PROCEDURE — 160002 HCHG RECOVERY MINUTES (STAT): Performed by: ORTHOPAEDIC SURGERY

## 2022-04-04 PROCEDURE — 700101 HCHG RX REV CODE 250: Performed by: ANESTHESIOLOGY

## 2022-04-04 PROCEDURE — 160042 HCHG SURGERY MINUTES - EA ADDL 1 MIN LEVEL 5: Performed by: ORTHOPAEDIC SURGERY

## 2022-04-04 PROCEDURE — 700102 HCHG RX REV CODE 250 W/ 637 OVERRIDE(OP): Performed by: PHYSICIAN ASSISTANT

## 2022-04-04 DEVICE — IMPLANTABLE DEVICE: Type: IMPLANTABLE DEVICE | Site: HIP | Status: FUNCTIONAL

## 2022-04-04 DEVICE — BONE CHIPS 30CC FREEZE DRIED CANCELLOUS CRUSHED: Type: IMPLANTABLE DEVICE | Site: HIP | Status: FUNCTIONAL

## 2022-04-04 RX ORDER — OXYCODONE HYDROCHLORIDE 10 MG/1
10 TABLET ORAL
Status: DISCONTINUED | OUTPATIENT
Start: 2022-04-04 | End: 2022-04-05 | Stop reason: HOSPADM

## 2022-04-04 RX ORDER — ACETAMINOPHEN 325 MG/1
650 TABLET ORAL EVERY 6 HOURS PRN
Status: DISCONTINUED | OUTPATIENT
Start: 2022-04-09 | End: 2022-04-05 | Stop reason: HOSPADM

## 2022-04-04 RX ORDER — BUPIVACAINE HYDROCHLORIDE AND EPINEPHRINE 2.5; 5 MG/ML; UG/ML
INJECTION, SOLUTION EPIDURAL; INFILTRATION; INTRACAUDAL; PERINEURAL
Status: DISCONTINUED | OUTPATIENT
Start: 2022-04-04 | End: 2022-04-04 | Stop reason: HOSPADM

## 2022-04-04 RX ORDER — BUSPIRONE HYDROCHLORIDE 5 MG/1
10 TABLET ORAL 2 TIMES DAILY
Status: DISCONTINUED | OUTPATIENT
Start: 2022-04-04 | End: 2022-04-05 | Stop reason: HOSPADM

## 2022-04-04 RX ORDER — LIDOCAINE HYDROCHLORIDE 40 MG/ML
SOLUTION TOPICAL PRN
Status: DISCONTINUED | OUTPATIENT
Start: 2022-04-04 | End: 2022-04-04 | Stop reason: SURG

## 2022-04-04 RX ORDER — DEXAMETHASONE SODIUM PHOSPHATE 4 MG/ML
10 INJECTION, SOLUTION INTRA-ARTICULAR; INTRALESIONAL; INTRAMUSCULAR; INTRAVENOUS; SOFT TISSUE ONCE
Status: COMPLETED | OUTPATIENT
Start: 2022-04-05 | End: 2022-04-05

## 2022-04-04 RX ORDER — AMOXICILLIN 250 MG
1 CAPSULE ORAL NIGHTLY
Status: DISCONTINUED | OUTPATIENT
Start: 2022-04-04 | End: 2022-04-05 | Stop reason: HOSPADM

## 2022-04-04 RX ORDER — HYDROMORPHONE HYDROCHLORIDE 1 MG/ML
0.4 INJECTION, SOLUTION INTRAMUSCULAR; INTRAVENOUS; SUBCUTANEOUS
Status: DISCONTINUED | OUTPATIENT
Start: 2022-04-04 | End: 2022-04-04 | Stop reason: HOSPADM

## 2022-04-04 RX ORDER — SODIUM CHLORIDE 9 MG/ML
INJECTION, SOLUTION INTRAMUSCULAR; INTRAVENOUS; SUBCUTANEOUS
Status: DISCONTINUED | OUTPATIENT
Start: 2022-04-04 | End: 2022-04-04 | Stop reason: HOSPADM

## 2022-04-04 RX ORDER — ACETAMINOPHEN 325 MG/1
650 TABLET ORAL EVERY 6 HOURS
Status: DISCONTINUED | OUTPATIENT
Start: 2022-04-04 | End: 2022-04-05 | Stop reason: HOSPADM

## 2022-04-04 RX ORDER — HALOPERIDOL 5 MG/ML
1 INJECTION INTRAMUSCULAR EVERY 6 HOURS PRN
Status: DISCONTINUED | OUTPATIENT
Start: 2022-04-04 | End: 2022-04-05 | Stop reason: HOSPADM

## 2022-04-04 RX ORDER — ONDANSETRON 2 MG/ML
4 INJECTION INTRAMUSCULAR; INTRAVENOUS EVERY 4 HOURS PRN
Status: DISCONTINUED | OUTPATIENT
Start: 2022-04-04 | End: 2022-04-05 | Stop reason: HOSPADM

## 2022-04-04 RX ORDER — ENEMA 19; 7 G/133ML; G/133ML
1 ENEMA RECTAL
Status: DISCONTINUED | OUTPATIENT
Start: 2022-04-04 | End: 2022-04-05 | Stop reason: HOSPADM

## 2022-04-04 RX ORDER — OXYCODONE HCL 5 MG/5 ML
10 SOLUTION, ORAL ORAL
Status: COMPLETED | OUTPATIENT
Start: 2022-04-04 | End: 2022-04-04

## 2022-04-04 RX ORDER — TAMSULOSIN HYDROCHLORIDE 0.4 MG/1
0.4 CAPSULE ORAL
Status: DISCONTINUED | OUTPATIENT
Start: 2022-04-04 | End: 2022-04-05 | Stop reason: HOSPADM

## 2022-04-04 RX ORDER — HYDRALAZINE HYDROCHLORIDE 20 MG/ML
5 INJECTION INTRAMUSCULAR; INTRAVENOUS
Status: DISCONTINUED | OUTPATIENT
Start: 2022-04-04 | End: 2022-04-04 | Stop reason: HOSPADM

## 2022-04-04 RX ORDER — ATORVASTATIN CALCIUM 20 MG/1
20 TABLET, FILM COATED ORAL EVERY EVENING
Status: DISCONTINUED | OUTPATIENT
Start: 2022-04-04 | End: 2022-04-05 | Stop reason: HOSPADM

## 2022-04-04 RX ORDER — SODIUM CHLORIDE, SODIUM LACTATE, POTASSIUM CHLORIDE, CALCIUM CHLORIDE 600; 310; 30; 20 MG/100ML; MG/100ML; MG/100ML; MG/100ML
INJECTION, SOLUTION INTRAVENOUS CONTINUOUS
Status: ACTIVE | OUTPATIENT
Start: 2022-04-04 | End: 2022-04-04

## 2022-04-04 RX ORDER — HYDROMORPHONE HYDROCHLORIDE 1 MG/ML
0.1 INJECTION, SOLUTION INTRAMUSCULAR; INTRAVENOUS; SUBCUTANEOUS
Status: DISCONTINUED | OUTPATIENT
Start: 2022-04-04 | End: 2022-04-04 | Stop reason: HOSPADM

## 2022-04-04 RX ORDER — DIPHENHYDRAMINE HCL 25 MG
25 TABLET ORAL EVERY 6 HOURS PRN
Status: DISCONTINUED | OUTPATIENT
Start: 2022-04-04 | End: 2022-04-05 | Stop reason: HOSPADM

## 2022-04-04 RX ORDER — DIPHENHYDRAMINE HYDROCHLORIDE 50 MG/ML
25 INJECTION INTRAMUSCULAR; INTRAVENOUS EVERY 6 HOURS PRN
Status: DISCONTINUED | OUTPATIENT
Start: 2022-04-04 | End: 2022-04-05 | Stop reason: HOSPADM

## 2022-04-04 RX ORDER — ZOLPIDEM TARTRATE 5 MG/1
10 TABLET ORAL NIGHTLY PRN
COMMUNITY
End: 2022-05-24

## 2022-04-04 RX ORDER — OMEPRAZOLE 20 MG/1
20 CAPSULE, DELAYED RELEASE ORAL 2 TIMES DAILY PRN
Status: DISCONTINUED | OUTPATIENT
Start: 2022-04-04 | End: 2022-04-05 | Stop reason: HOSPADM

## 2022-04-04 RX ORDER — KETOROLAC TROMETHAMINE 30 MG/ML
15 INJECTION, SOLUTION INTRAMUSCULAR; INTRAVENOUS EVERY 6 HOURS
Status: DISCONTINUED | OUTPATIENT
Start: 2022-04-04 | End: 2022-04-05 | Stop reason: HOSPADM

## 2022-04-04 RX ORDER — TRAMADOL HYDROCHLORIDE 50 MG/1
50 TABLET ORAL EVERY 4 HOURS PRN
Status: DISCONTINUED | OUTPATIENT
Start: 2022-04-04 | End: 2022-04-05 | Stop reason: HOSPADM

## 2022-04-04 RX ORDER — ONDANSETRON 4 MG/1
4 TABLET, ORALLY DISINTEGRATING ORAL EVERY 4 HOURS PRN
Status: DISCONTINUED | OUTPATIENT
Start: 2022-04-04 | End: 2022-04-05 | Stop reason: HOSPADM

## 2022-04-04 RX ORDER — TRANEXAMIC ACID 100 MG/ML
INJECTION, SOLUTION INTRAVENOUS PRN
Status: DISCONTINUED | OUTPATIENT
Start: 2022-04-04 | End: 2022-04-04 | Stop reason: SURG

## 2022-04-04 RX ORDER — ACETAMINOPHEN 500 MG
1000 TABLET ORAL EVERY 6 HOURS PRN
Status: DISCONTINUED | OUTPATIENT
Start: 2022-04-04 | End: 2022-04-04 | Stop reason: HOSPADM

## 2022-04-04 RX ORDER — DIPHENHYDRAMINE HCL 25 MG
25 TABLET ORAL NIGHTLY PRN
Status: DISCONTINUED | OUTPATIENT
Start: 2022-04-05 | End: 2022-04-05 | Stop reason: HOSPADM

## 2022-04-04 RX ORDER — CEFAZOLIN SODIUM 1 G/3ML
2 INJECTION, POWDER, FOR SOLUTION INTRAMUSCULAR; INTRAVENOUS ONCE
Status: DISCONTINUED | OUTPATIENT
Start: 2022-04-04 | End: 2022-04-04 | Stop reason: HOSPADM

## 2022-04-04 RX ORDER — MEPERIDINE HYDROCHLORIDE 25 MG/ML
12.5 INJECTION INTRAMUSCULAR; INTRAVENOUS; SUBCUTANEOUS
Status: DISCONTINUED | OUTPATIENT
Start: 2022-04-04 | End: 2022-04-04 | Stop reason: HOSPADM

## 2022-04-04 RX ORDER — DIPHENHYDRAMINE HYDROCHLORIDE 50 MG/ML
6.25 INJECTION INTRAMUSCULAR; INTRAVENOUS
Status: DISCONTINUED | OUTPATIENT
Start: 2022-04-04 | End: 2022-04-04 | Stop reason: HOSPADM

## 2022-04-04 RX ORDER — DEXAMETHASONE SODIUM PHOSPHATE 4 MG/ML
INJECTION, SOLUTION INTRA-ARTICULAR; INTRALESIONAL; INTRAMUSCULAR; INTRAVENOUS; SOFT TISSUE PRN
Status: DISCONTINUED | OUTPATIENT
Start: 2022-04-04 | End: 2022-04-04 | Stop reason: SURG

## 2022-04-04 RX ORDER — OXYCODONE HCL 5 MG/5 ML
5 SOLUTION, ORAL ORAL
Status: COMPLETED | OUTPATIENT
Start: 2022-04-04 | End: 2022-04-04

## 2022-04-04 RX ORDER — ONDANSETRON 2 MG/ML
4 INJECTION INTRAMUSCULAR; INTRAVENOUS
Status: DISCONTINUED | OUTPATIENT
Start: 2022-04-04 | End: 2022-04-04 | Stop reason: HOSPADM

## 2022-04-04 RX ORDER — HALOPERIDOL 5 MG/ML
1 INJECTION INTRAMUSCULAR
Status: DISCONTINUED | OUTPATIENT
Start: 2022-04-04 | End: 2022-04-04 | Stop reason: HOSPADM

## 2022-04-04 RX ORDER — LIDOCAINE HYDROCHLORIDE 20 MG/ML
INJECTION, SOLUTION EPIDURAL; INFILTRATION; INTRACAUDAL; PERINEURAL PRN
Status: DISCONTINUED | OUTPATIENT
Start: 2022-04-04 | End: 2022-04-04 | Stop reason: SURG

## 2022-04-04 RX ORDER — KETOROLAC TROMETHAMINE 30 MG/ML
INJECTION, SOLUTION INTRAMUSCULAR; INTRAVENOUS
Status: DISCONTINUED | OUTPATIENT
Start: 2022-04-04 | End: 2022-04-04 | Stop reason: HOSPADM

## 2022-04-04 RX ORDER — HYDROMORPHONE HYDROCHLORIDE 1 MG/ML
0.5 INJECTION, SOLUTION INTRAMUSCULAR; INTRAVENOUS; SUBCUTANEOUS
Status: DISCONTINUED | OUTPATIENT
Start: 2022-04-04 | End: 2022-04-05 | Stop reason: HOSPADM

## 2022-04-04 RX ORDER — DOCUSATE SODIUM 100 MG/1
100 CAPSULE, LIQUID FILLED ORAL 2 TIMES DAILY
Status: DISCONTINUED | OUTPATIENT
Start: 2022-04-04 | End: 2022-04-05 | Stop reason: HOSPADM

## 2022-04-04 RX ORDER — DEXAMETHASONE SODIUM PHOSPHATE 4 MG/ML
4 INJECTION, SOLUTION INTRA-ARTICULAR; INTRALESIONAL; INTRAMUSCULAR; INTRAVENOUS; SOFT TISSUE
Status: DISCONTINUED | OUTPATIENT
Start: 2022-04-04 | End: 2022-04-05 | Stop reason: HOSPADM

## 2022-04-04 RX ORDER — ROCURONIUM BROMIDE 10 MG/ML
INJECTION, SOLUTION INTRAVENOUS PRN
Status: DISCONTINUED | OUTPATIENT
Start: 2022-04-04 | End: 2022-04-04 | Stop reason: SURG

## 2022-04-04 RX ORDER — HYDROMORPHONE HYDROCHLORIDE 2 MG/ML
INJECTION, SOLUTION INTRAMUSCULAR; INTRAVENOUS; SUBCUTANEOUS PRN
Status: DISCONTINUED | OUTPATIENT
Start: 2022-04-04 | End: 2022-04-04 | Stop reason: SURG

## 2022-04-04 RX ORDER — AMOXICILLIN 250 MG
1 CAPSULE ORAL
Status: DISCONTINUED | OUTPATIENT
Start: 2022-04-04 | End: 2022-04-05 | Stop reason: HOSPADM

## 2022-04-04 RX ORDER — VANCOMYCIN HYDROCHLORIDE 1 G/20ML
INJECTION, POWDER, LYOPHILIZED, FOR SOLUTION INTRAVENOUS
Status: COMPLETED | OUTPATIENT
Start: 2022-04-04 | End: 2022-04-04

## 2022-04-04 RX ORDER — HYDROMORPHONE HYDROCHLORIDE 1 MG/ML
0.2 INJECTION, SOLUTION INTRAMUSCULAR; INTRAVENOUS; SUBCUTANEOUS
Status: DISCONTINUED | OUTPATIENT
Start: 2022-04-04 | End: 2022-04-04 | Stop reason: HOSPADM

## 2022-04-04 RX ORDER — SCOLOPAMINE TRANSDERMAL SYSTEM 1 MG/1
1 PATCH, EXTENDED RELEASE TRANSDERMAL
Status: DISCONTINUED | OUTPATIENT
Start: 2022-04-04 | End: 2022-04-05 | Stop reason: HOSPADM

## 2022-04-04 RX ORDER — ONDANSETRON 2 MG/ML
INJECTION INTRAMUSCULAR; INTRAVENOUS PRN
Status: DISCONTINUED | OUTPATIENT
Start: 2022-04-04 | End: 2022-04-04 | Stop reason: SURG

## 2022-04-04 RX ORDER — OXYCODONE HYDROCHLORIDE 5 MG/1
5 TABLET ORAL
Status: DISCONTINUED | OUTPATIENT
Start: 2022-04-04 | End: 2022-04-05 | Stop reason: HOSPADM

## 2022-04-04 RX ORDER — CEFAZOLIN SODIUM IN 0.9 % NACL 2 G/100 ML
2 PLASTIC BAG, INJECTION (ML) INTRAVENOUS ONCE
Status: COMPLETED | OUTPATIENT
Start: 2022-04-04 | End: 2022-04-04

## 2022-04-04 RX ORDER — GABAPENTIN 300 MG/1
300 CAPSULE ORAL 3 TIMES DAILY
Status: DISCONTINUED | OUTPATIENT
Start: 2022-04-04 | End: 2022-04-05 | Stop reason: HOSPADM

## 2022-04-04 RX ORDER — CEFAZOLIN SODIUM 1 G/3ML
INJECTION, POWDER, FOR SOLUTION INTRAMUSCULAR; INTRAVENOUS PRN
Status: DISCONTINUED | OUTPATIENT
Start: 2022-04-04 | End: 2022-04-04 | Stop reason: SURG

## 2022-04-04 RX ORDER — BISACODYL 10 MG
10 SUPPOSITORY, RECTAL RECTAL
Status: DISCONTINUED | OUTPATIENT
Start: 2022-04-04 | End: 2022-04-05 | Stop reason: HOSPADM

## 2022-04-04 RX ORDER — SODIUM CHLORIDE, SODIUM LACTATE, POTASSIUM CHLORIDE, CALCIUM CHLORIDE 600; 310; 30; 20 MG/100ML; MG/100ML; MG/100ML; MG/100ML
INJECTION, SOLUTION INTRAVENOUS CONTINUOUS
Status: DISCONTINUED | OUTPATIENT
Start: 2022-04-04 | End: 2022-04-04 | Stop reason: HOSPADM

## 2022-04-04 RX ORDER — IBUPROFEN 400 MG/1
800 TABLET ORAL 3 TIMES DAILY PRN
Status: DISCONTINUED | OUTPATIENT
Start: 2022-04-07 | End: 2022-04-05 | Stop reason: HOSPADM

## 2022-04-04 RX ORDER — DEXTROSE MONOHYDRATE, SODIUM CHLORIDE, AND POTASSIUM CHLORIDE 50; 1.49; 4.5 G/1000ML; G/1000ML; G/1000ML
INJECTION, SOLUTION INTRAVENOUS CONTINUOUS
Status: DISPENSED | OUTPATIENT
Start: 2022-04-04 | End: 2022-04-04

## 2022-04-04 RX ORDER — POLYETHYLENE GLYCOL 3350 17 G/17G
1 POWDER, FOR SOLUTION ORAL 2 TIMES DAILY PRN
Status: DISCONTINUED | OUTPATIENT
Start: 2022-04-04 | End: 2022-04-05 | Stop reason: HOSPADM

## 2022-04-04 RX ADMIN — PROPOFOL 140 MG: 10 INJECTION, EMULSION INTRAVENOUS at 12:16

## 2022-04-04 RX ADMIN — FENTANYL CITRATE 50 MCG: 50 INJECTION, SOLUTION INTRAMUSCULAR; INTRAVENOUS at 13:50

## 2022-04-04 RX ADMIN — CEFAZOLIN 2 G: 330 INJECTION, POWDER, FOR SOLUTION INTRAMUSCULAR; INTRAVENOUS at 12:16

## 2022-04-04 RX ADMIN — MIDAZOLAM HYDROCHLORIDE 1 MG: 1 INJECTION, SOLUTION INTRAMUSCULAR; INTRAVENOUS at 13:48

## 2022-04-04 RX ADMIN — DOCUSATE SODIUM 100 MG: 100 CAPSULE, LIQUID FILLED ORAL at 17:30

## 2022-04-04 RX ADMIN — CEFAZOLIN 2 G: 1 INJECTION, POWDER, FOR SOLUTION INTRAVENOUS at 15:51

## 2022-04-04 RX ADMIN — OXYCODONE HYDROCHLORIDE 10 MG: 5 SOLUTION ORAL at 14:04

## 2022-04-04 RX ADMIN — EPHEDRINE SULFATE 10 MG: 50 INJECTION INTRAMUSCULAR; INTRAVENOUS; SUBCUTANEOUS at 13:11

## 2022-04-04 RX ADMIN — SUGAMMADEX 150 MG: 100 INJECTION, SOLUTION INTRAVENOUS at 13:21

## 2022-04-04 RX ADMIN — TRANEXAMIC ACID 1000 MG: 100 INJECTION, SOLUTION INTRAVENOUS at 12:21

## 2022-04-04 RX ADMIN — LIDOCAINE HYDROCHLORIDE 50 MG: 20 INJECTION, SOLUTION EPIDURAL; INFILTRATION; INTRACAUDAL; PERINEURAL at 12:16

## 2022-04-04 RX ADMIN — TAMSULOSIN HYDROCHLORIDE 0.4 MG: 0.4 CAPSULE ORAL at 15:51

## 2022-04-04 RX ADMIN — ONDANSETRON 4 MG: 2 INJECTION INTRAMUSCULAR; INTRAVENOUS at 13:12

## 2022-04-04 RX ADMIN — EPHEDRINE SULFATE 10 MG: 50 INJECTION INTRAMUSCULAR; INTRAVENOUS; SUBCUTANEOUS at 12:24

## 2022-04-04 RX ADMIN — DEXTROSE MONOHYDRATE, SODIUM CHLORIDE, AND POTASSIUM CHLORIDE: 50; 4.5; 1.49 INJECTION, SOLUTION INTRAVENOUS at 15:53

## 2022-04-04 RX ADMIN — ATORVASTATIN CALCIUM 20 MG: 20 TABLET, FILM COATED ORAL at 20:19

## 2022-04-04 RX ADMIN — FENTANYL CITRATE 50 MCG: 50 INJECTION, SOLUTION INTRAMUSCULAR; INTRAVENOUS at 14:02

## 2022-04-04 RX ADMIN — DEXAMETHASONE SODIUM PHOSPHATE 8 MG: 4 INJECTION, SOLUTION INTRAMUSCULAR; INTRAVENOUS at 12:23

## 2022-04-04 RX ADMIN — ROCURONIUM BROMIDE 10 MG: 10 INJECTION, SOLUTION INTRAVENOUS at 12:52

## 2022-04-04 RX ADMIN — HYDROMORPHONE HYDROCHLORIDE 0.5 MG: 2 INJECTION INTRAMUSCULAR; INTRAVENOUS; SUBCUTANEOUS at 12:16

## 2022-04-04 RX ADMIN — BUSPIRONE HYDROCHLORIDE 10 MG: 5 TABLET ORAL at 20:20

## 2022-04-04 RX ADMIN — GABAPENTIN 300 MG: 300 CAPSULE ORAL at 17:30

## 2022-04-04 RX ADMIN — SENNOSIDES AND DOCUSATE SODIUM 1 TABLET: 50; 8.6 TABLET ORAL at 20:19

## 2022-04-04 RX ADMIN — KETOROLAC TROMETHAMINE 15 MG: 30 INJECTION, SOLUTION INTRAMUSCULAR at 17:31

## 2022-04-04 RX ADMIN — LIDOCAINE HYDROCHLORIDE 4 ML: 40 SOLUTION TOPICAL at 12:16

## 2022-04-04 RX ADMIN — HYDROMORPHONE HYDROCHLORIDE 0.4 MG: 1 INJECTION, SOLUTION INTRAMUSCULAR; INTRAVENOUS; SUBCUTANEOUS at 14:38

## 2022-04-04 RX ADMIN — SODIUM CHLORIDE, POTASSIUM CHLORIDE, SODIUM LACTATE AND CALCIUM CHLORIDE: 600; 310; 30; 20 INJECTION, SOLUTION INTRAVENOUS at 12:11

## 2022-04-04 RX ADMIN — HYDROMORPHONE HYDROCHLORIDE 0.5 MG: 2 INJECTION INTRAMUSCULAR; INTRAVENOUS; SUBCUTANEOUS at 12:52

## 2022-04-04 RX ADMIN — TRANEXAMIC ACID 1000 MG: 100 INJECTION, SOLUTION INTRAVENOUS at 13:12

## 2022-04-04 RX ADMIN — ROCURONIUM BROMIDE 40 MG: 10 INJECTION, SOLUTION INTRAVENOUS at 12:16

## 2022-04-04 RX ADMIN — ACETAMINOPHEN 650 MG: 325 TABLET, FILM COATED ORAL at 17:30

## 2022-04-04 RX ADMIN — MIDAZOLAM HYDROCHLORIDE 1 MG: 1 INJECTION, SOLUTION INTRAMUSCULAR; INTRAVENOUS at 14:02

## 2022-04-04 ASSESSMENT — COGNITIVE AND FUNCTIONAL STATUS - GENERAL
MOBILITY SCORE: 17
DRESSING REGULAR LOWER BODY CLOTHING: A LITTLE
MOVING FROM LYING ON BACK TO SITTING ON SIDE OF FLAT BED: A LITTLE
WALKING IN HOSPITAL ROOM: A LITTLE
TURNING FROM BACK TO SIDE WHILE IN FLAT BAD: A LITTLE
CLIMB 3 TO 5 STEPS WITH RAILING: A LOT
MOVING TO AND FROM BED TO CHAIR: A LITTLE
SUGGESTED CMS G CODE MODIFIER MOBILITY: CK
DAILY ACTIVITIY SCORE: 22
SUGGESTED CMS G CODE MODIFIER DAILY ACTIVITY: CJ
STANDING UP FROM CHAIR USING ARMS: A LITTLE
HELP NEEDED FOR BATHING: A LITTLE

## 2022-04-04 ASSESSMENT — PAIN DESCRIPTION - PAIN TYPE
TYPE: SURGICAL PAIN

## 2022-04-04 ASSESSMENT — LIFESTYLE VARIABLES
TOTAL SCORE: 0
HAVE PEOPLE ANNOYED YOU BY CRITICIZING YOUR DRINKING: NO
AVERAGE NUMBER OF DAYS PER WEEK YOU HAVE A DRINK CONTAINING ALCOHOL: 0
HAVE YOU EVER FELT YOU SHOULD CUT DOWN ON YOUR DRINKING: NO
HOW MANY TIMES IN THE PAST YEAR HAVE YOU HAD 5 OR MORE DRINKS IN A DAY: 0
ALCOHOL_USE: YES
TOTAL SCORE: 0
EVER FELT BAD OR GUILTY ABOUT YOUR DRINKING: NO
TOTAL SCORE: 0
CONSUMPTION TOTAL: NEGATIVE
ON A TYPICAL DAY WHEN YOU DRINK ALCOHOL HOW MANY DRINKS DO YOU HAVE: 1
EVER HAD A DRINK FIRST THING IN THE MORNING TO STEADY YOUR NERVES TO GET RID OF A HANGOVER: NO

## 2022-04-04 ASSESSMENT — PATIENT HEALTH QUESTIONNAIRE - PHQ9
1. LITTLE INTEREST OR PLEASURE IN DOING THINGS: NOT AT ALL
SUM OF ALL RESPONSES TO PHQ9 QUESTIONS 1 AND 2: 0
SUM OF ALL RESPONSES TO PHQ9 QUESTIONS 1 AND 2: 0
2. FEELING DOWN, DEPRESSED, IRRITABLE, OR HOPELESS: NOT AT ALL
2. FEELING DOWN, DEPRESSED, IRRITABLE, OR HOPELESS: NOT AT ALL
1. LITTLE INTEREST OR PLEASURE IN DOING THINGS: NOT AT ALL

## 2022-04-04 ASSESSMENT — FIBROSIS 4 INDEX: FIB4 SCORE: 1.57

## 2022-04-04 NOTE — ANESTHESIA PROCEDURE NOTES
Airway    Date/Time: 4/4/2022 12:16 PM  Performed by: Shivam Corona M.D.  Authorized by: Shivam Corona M.D.     Location:  OR  Urgency:  Elective  Difficult Airway: No    Indications for Airway Management:  Anesthesia      Spontaneous Ventilation: absent    Sedation Level:  Deep  Preoxygenated: Yes    Patient Position:  Sniffing  Mask Difficulty Assessment:  1 - vent by mask  Final Airway Type:  Endotracheal airway  Final Endotracheal Airway:  ETT  Cuffed: Yes    Technique Used for Successful ETT Placement:  Direct laryngoscopy    Insertion Site:  Oral  Blade Type:  Irving  Laryngoscope Blade/Videolaryngoscope Blade Size:  3  ETT Size (mm):  6.5  Measured from:  Teeth  ETT to Teeth (cm):  21  Placement Verified by: auscultation and capnometry    Cormack-Lehane Classification:  Grade I - full view of glottis  Number of Attempts at Approach:  1

## 2022-04-04 NOTE — OR NURSING
1430- Report from Azul OCAMPO, assumed care of pt at this time.   1435- Pt reporting pain at 7-8/10. RN and pt agree with plan for pain medication. States 5/10 would be more tolerable.   1438- See MAR for medication administration.  1442- Update to pt's niece regarding plan.  1448 Pt visibly more comfortable. Reports pain is at 4/10 when asked by RN.  1457 Pt reports pain as tolerable. Meets criteria for transfer to [facility]  1501 Report to Cecile OCAMPO.   1517 Pt to room with transport via bed. Tank has 408 L at time of departure. Pt belongings with family, none transported with pt.

## 2022-04-04 NOTE — ANESTHESIA TIME REPORT
Anesthesia Start and Stop Event Times     Date Time Event    4/4/2022 1144 Ready for Procedure     1211 Anesthesia Start     1342 Anesthesia Stop        Responsible Staff  04/04/22    Name Role Begin End    Shivam Corona M.D. Anesth 1211 1342        Preop Diagnosis (Free Text):  Pre-op Diagnosis     Acetabular fracture (HCC) [S32.409A]        Preop Diagnosis (Codes):  Diagnosis Information     Diagnosis Code(s): Acetabular fracture (HCC) [S32.409A]        Premium Reason  Non-Premium    Comments:                                                                       
21-Jan-2018 22:59

## 2022-04-04 NOTE — OP REPORT
Date of Surgery:  4-4-22  Pre-operative Diagnosis:  right hip post-traumatic arthritis     Post-operative Diagnosis:  right hip post-traumatic arthritis    Procedure:  1. Conversion of previous right hip surgery to total hip arthroplasty  2. Retroacetabular bone grafting    Implants used:  A Luis Miguel Dual Mobility total hip arthroplasty system with the following components  Acetabulum: 54 mm Trident II Multi-Hole  Femur: Size 3 standard Insignia  Bearing: MDM Alpha Code E  Head: 28 mm +4 Biolox Delta inner Head with Alpha Code E XLPE outer head  Screws: 3  Bone Graft: 30 cc Cancellous chips    Surgeon:  Mauricio Rose MD    1st Assistant:   LARRY Kirk    Anesthesiologist:   WALI Corona MD    EBL:  200 cc    Specimen:  none    Findings:  Arthritic changes, healed acetabulum    Indications for procedure:  This patient is a 76 y.o. female who had a acetabular/pelvic fracture treated with an open reduction and internal fixation.  He went on to posttraumatic arthritis, and was sent to our office for an evaluation for a conversion to a total hip arthroplasty.  An infection work-up was negative.. TThe patient was indicated for a right total hip replacement. The patient expressed an understanding of the risks of pain, bleeding, infection, dislocation,  need for further surgery, damage to surrounding structures, neurovascular compromise, blood clots, leg-length discrepancy both apparent and actual, anesthetic complications, and serious medical consequences including but not limited to heart attack, stroke or even death. It was explained that the implants are man-made products and thus subject to possible failure.  The patient expressed an understanding and wished to proceed. Specific risks based on the patient's medical history were addressed. A clearance was obtained by the medical physicians and the patient was taken to the operating room on the day of surgery for the above named procedure.  We did discuss the fact that her  weightbearing would be limited, and she would be required to use an assistive device.    Description of procedure:  The patient was met in the pre-operative holding area. The right hip was marked as the appropriate surgical site with indelible ink. They were taken to the operating room where the anesthesia department started a general for intraoperative and post-operative anesthesia and pain control. The patient was turned with the operative hip facing up. All bony prominences were padded appropriately. The operative hip was prepped and draped in a standard sterile surgical fashion. A time out procedure was called to verify the side and site of surgery, the proposed surgical procedure, and the administration of pre-operative antibiotics. After successful completion of the time out procedure, our attention was turned to the right hip.     A straight incision was made centered on the greater trochanter. This was carried down through the subcutaneous tissue with the assistance of the Bovie electrocautery and the self-retaining retractors. The fascia was identified and cleared with a Griggs elevator. This was incised in line with its fibers and the Charnley self-retaining retractor was placed. The hip was internally rotated and the external rotators were taken down. The piriformis was identified and tagged for later repair. The abductor were retracted anteriorly and protected. The posterior capsule was identified, cleared, and incised in an L-capsulotomy fashion. The corner of the L was tagged for later repair. The hip was able to be dislocated. We noted evidence of severe arthritic changes including cartilage loss and osteophytic overgrowth. The femoral neck was cleared and our neck cut was made in line with our previously templated images using a femoral neck cutting guide. The femoral head was removed, the femur was elevated out of the wound and we began our preparation of the femur, first with a box osteotome followed  by a canal finder by hand then a lateralizing reamer on power. The femur was then broached to a size 3 at which point we noted excellent calcar fit and rotational stability. The broach was removed and the femoral canal was packed with a baby lap sponge. The femur was retracted anteriorly and our attention was turned to the acetabulum.    The acetabulum was exposed and soft tissue was removed from its rim.  We noted a healed acetabular fracture, with erosion of the cartilage in the weightbearing dome.  The straight reamer was used to obtain proper medialization and then the offset reamer was used to ream up to a size 54 at which point we noted an appropriate bony bed for implantation. The acetabulum was irrigated.  30 cc of cancellous bone chips were reversed reamed in order to bone graft the acetabular defects that were present.  A size 54 mm acetabular component was then opened in a sterile fashion and impacted into place in the proper amount of abduction and anteversion.  3 acetabular bone screws were placed for adjunctive fixation. The MDM bearing surface was inserted and impacted into place. This was tested for proper seating and the retractors were removed and our attention was turned back to the femur.    The femur was elevated and exposed properly. The baby lap sponge was removed and the femoral canal was thoroughly irrigated. A trial stem was assembled, and we trialed both the +0 and the +4 heads.  With the +4 head in place we noted excellent restoration of leg length, offset and stability. The hip was stable past 90 degrees of flexion and past 60 degrees of internal rotation. It did not dislocate with extension and external rotation. Abductor tension was appropriate. At this point the hip was dislocated, the trial components were removed and the femur was irrigated. The real stem was opened in a sterile fashion on the back table and then impacted into the femur. Another trial reduction was made and again we  noted excellent restoration of leg length, offset and stability. At this point the real head was opened and impacted onto the taper, which had been cleaned and dried thoroughly. Another reduction was made and again we noted excellent restoration of leg length, offset and stability.    Therefore a dilute betadine solution was instilled for 3 minutes before being pulse-lavaged out. The posterior capsule was closed using the previously placed tag stitches. The piriformis was closed as a separate structure. The fascia was closed with #1 Quill. The deep subcutaneous tissue was closed with a running #1 PDS. The deep dermal layer was closed with 2-0 Vicryl in a buried interrupted fashion. The skin was closed with running 3-0 Monocryl and a sterile Negative Pressure wound dressing was applied. The patient is currently in the operating room awaiting reversal of anesthesia. Appropriate DVT prophylaxis and perioperative antibiotics will be ordered. All sponge and instrument counts were correct prior to final wound closure.   Plan:  Admission to [facility]  Touchdown weightbearing with strict posterior hip precautions and use of a walker for the right leg

## 2022-04-04 NOTE — ANESTHESIA PREPROCEDURE EVALUATION
Case: [id number] Date/Time: 04/04/22 1145    Procedure: RIGHT ARTHROPLASTY, HIP, TOTAL (Right )    Diagnosis: Acetabular fracture (HCC) [S32.409A]    Pre-op diagnosis: Acetabular fracture (HCC) [S32.409A]    Location:  OR  / SURGERY [facility]    Surgeons: Mauricio Rose M.D.          Relevant Problems   NEURO   (positive) S/P right hip fracture      CARDIAC   (positive) Nonrheumatic mitral valve regurgitation      ENDO   (positive) Subclinical hyperthyroidism      Other   (positive) Anxiety   (positive) Peripheral neuropathy   (positive) Pulmonary fibrosis (HCC)       Physical Exam    Airway   Mallampati: II  TM distance: >3 FB  Neck ROM: full       Cardiovascular - normal exam  Rhythm: regular  Rate: normal  (-) murmur     Dental - normal exam           Pulmonary - normal exam  Breath sounds clear to auscultation     Abdominal    Neurological - normal exam                 Anesthesia Plan    ASA 3   ASA physical status 3 criteria: respiratory insufficiency or compromise    Plan - general       Airway plan will be ETT          Induction: intravenous    Postoperative Plan: Postoperative administration of opioids is intended.    Pertinent diagnostic labs and testing reviewed    Informed Consent:    Anesthetic plan and risks discussed with patient.    Use of blood products discussed with: patient whom consented to blood products.

## 2022-04-04 NOTE — OR NURSING
Procedure, patient allergies, and NPO status verified.  Home med rec completed and belongings secured. Patient verbalizes understanding of pain scale, expected course of stay and plan of care.  Surgical site verified with pt. IV access established.  SCD placed on L hip.

## 2022-04-04 NOTE — PROGRESS NOTES
8810-Received report from PACU RN.   1600-  Pt arrived to floor via hospital bed..  Assumed care of pt.   Assessment and chart check complete.   Pt is AAOX4, no signs of distress, denies nausea/vomiting.   Kristina vac dressing CDI with dorsiflex and plantar flex both feet, palpable pulses on both LE.   SCD's and polar ice use.   Fall precautions in place, treaded socks on pt, bed in low position.   Call light within reach.   Educated pt to call if needing anything.   Hourly rounding.

## 2022-04-04 NOTE — LETTER
March 15, 2022    Patient Name: Yasmin Zhong  Surgeon Name: Mauricio Rose M.D.  Surgery Facility: [facility] ([id number] [facility])  Surgery Date: 4/4/2022    The time of your surgery is not final and may change up to and until the day of your surgery. You will be contacted 24-48 hours prior to your surgery date with your check-in and surgery time.    If you will not be at one of the below numbers please call/text the surgery scheduler at [phone]  Preferred Phone: [phone]    BEFORE YOUR SURGERY  Pre Registration and/or Lab Work must be done within and no earlier than 28 days prior to your surgery date. Please call [facility] at [phone] for an appointment as soon as possible.     COVID test required 4-7 days prior to surgery, failure to do so can result in a cancellation.    The following locations offer COVID testing:    Approved facilities for COVID testing, if scheduled at [facility]:  · [facility] from 7:30am-3:30pm at [address] [place] [address]  · [facility] [phone] (Please call for an appointment)  · Your local pharmacy    Not scheduled at [facility] contact the scheduled facility for approved testing facilities.    Pre op Appointment:   Date: 3/31/22   Time: 0815   Provider: Mauricio Rsoe M.D.   Location: [address] [place] [id number]  Instructions: Bring a list of all medications you are taking including the dosing and frequency.    - Please  your non-narcotic prescriptions prior to surgery at the pharmacy you provided us. DON'T START them until after your surgery.    Please refrain from smoking any substance after midnight prior to surgery. Smoking may interfere with the anesthetic and frequently produces nausea during the recovery period.    Continue taking all lifesaving medications. Including the morning of your surgery with small sip of water.    Please read the  MEDICATION INSTRUCTIONS below completely.    DAY OF YOUR SURGERY  Nothing to eat or drink after midnight     Please arrive at the hospital/surgery center at the check-in time provided.     An adult will need to bring you and take you home after your surgery.     AFTER YOUR SURGERY  Post op Appointment:   Date: 4/14/22   Time: 1:00   With: Mauricio Rose M.D.   Location: [address] [place] [id number]    - Therapy- Your first appointment should be 1  week(s) after your surgery. For your convenience we have 4 Physical Therapy locations: [facility], [place], and [facility]. Call our office ASAP to schedule an appointment at [phone] or take the enclosed Therapy Prescription to a facility of your choice.  - No dental work for 3-6 months after your surgery.  - You must have someone provide transportation post surgery and someone to monitor you for at least 24 hours post-surgery. If you don't have either of these your appointment will be canceled.    TIME OFF WORK  FMLA or Disability forms can be faxed directly to: [phone] or you may drop them off at [address] [place] [address]. Our office charges a $35.00 fee per form. Forms will be completed within 10 business days of drop off and payment received. For the status of your forms you may contact our disability office directly [phone].    MEDICATION INSTRUCTIONS  The following medications should be stopped a minimum of 10 days prior to surgery:  All over the counter, Supplements & Herbal medications    Anorectics: Phentermine (Adipex-P, Lomaira and Suprenza), Phentermine-topiramate (Qsymia), Bupropion-naltrexone (Contrave)    Opiod Partial Agonists/Opioid Antagonists: Buprenorphine (Subocone, Belbuca, Butrans, Probuphine Implant, Sublocade), Naltrexone (ReVia, Vivitrol), Naloxone    Amphetamines: Dextroamphetamine/Amphetamine (Adderall, Mydayis), Methylphenidate Hydrochloride (Concerta, Metadate, Methylin, Ritalin)    The  following medications should be stopped 5 days prior to surgery:  Blood Thinners: Any Aspirin, Aspirin products, anti-inflammatories such as ibuprofen and any blood thinners such as Coumadin and Plavix. Please consult your prescribing physician if you are on life saving blood thinners, in regards to when to stop medications prior to surgery.     The following medications should be stopped a minimum of 3 days prior to surgery:  PDE-5 inhibitors: Sildenafil (Viagra), Tadalafil (Cialis), Vardenafil (Levitra), Avanafil (Stendra)    MAO Inhibitors: Rasagiline (Azilect), Selegiline (Eldepryl, Emsam, Selapar), Isocarboxazid (Marplan), Phenelzine (Nardil)

## 2022-04-04 NOTE — ANESTHESIA POSTPROCEDURE EVALUATION
Patient: Yasmin Zhong    Procedure Summary     Date: 04/04/22 Room / Location:  OR 06 / SURGERY [facility]    Anesthesia Start: 1211 Anesthesia Stop: 1342    Procedure: RIGHT ARTHROPLASTY, HIP, TOTAL (Right Hip) Diagnosis:       Acetabular fracture (HCC)      (Acetabular fracture (HCC) [S32.409A])    Surgeons: Mauricio Rose M.D. Responsible Provider: Shivam Corona M.D.    Anesthesia Type: general ASA Status: 3          Final Anesthesia Type: general  Last vitals  BP   Blood Pressure : 133/69    Temp   37.6 °C (99.6 °F)    Pulse   99   Resp   18    SpO2   98 %      Anesthesia Post Evaluation    Patient location during evaluation: PACU  Patient participation: complete - patient participated  Level of consciousness: awake and alert    Airway patency: patent  Anesthetic complications: no  Cardiovascular status: hemodynamically stable  Respiratory status: acceptable  Hydration status: euvolemic    PONV: none          No complications documented.     Nurse Pain Score: 3 (NPRS)

## 2022-04-05 VITALS
TEMPERATURE: 98.2 F | WEIGHT: 122.58 LBS | HEIGHT: 64 IN | SYSTOLIC BLOOD PRESSURE: 92 MMHG | OXYGEN SATURATION: 98 % | HEART RATE: 92 BPM | RESPIRATION RATE: 16 BRPM | DIASTOLIC BLOOD PRESSURE: 52 MMHG | BODY MASS INDEX: 20.93 KG/M2

## 2022-04-05 LAB
HCT VFR BLD AUTO: 32.8 % (ref 37–47)
HGB BLD-MCNC: 10.6 G/DL (ref 12–16)

## 2022-04-05 PROCEDURE — 700102 HCHG RX REV CODE 250 W/ 637 OVERRIDE(OP): Performed by: PHYSICIAN ASSISTANT

## 2022-04-05 PROCEDURE — 97535 SELF CARE MNGMENT TRAINING: CPT

## 2022-04-05 PROCEDURE — 85018 HEMOGLOBIN: CPT

## 2022-04-05 PROCEDURE — 96367 TX/PROPH/DG ADDL SEQ IV INF: CPT

## 2022-04-05 PROCEDURE — G0378 HOSPITAL OBSERVATION PER HR: HCPCS

## 2022-04-05 PROCEDURE — 97165 OT EVAL LOW COMPLEX 30 MIN: CPT

## 2022-04-05 PROCEDURE — 700111 HCHG RX REV CODE 636 W/ 250 OVERRIDE (IP): Performed by: ORTHOPAEDIC SURGERY

## 2022-04-05 PROCEDURE — 700111 HCHG RX REV CODE 636 W/ 250 OVERRIDE (IP): Performed by: PHYSICIAN ASSISTANT

## 2022-04-05 PROCEDURE — 97161 PT EVAL LOW COMPLEX 20 MIN: CPT

## 2022-04-05 PROCEDURE — 36415 COLL VENOUS BLD VENIPUNCTURE: CPT

## 2022-04-05 PROCEDURE — 96376 TX/PRO/DX INJ SAME DRUG ADON: CPT

## 2022-04-05 PROCEDURE — A9270 NON-COVERED ITEM OR SERVICE: HCPCS | Performed by: PHYSICIAN ASSISTANT

## 2022-04-05 PROCEDURE — 85014 HEMATOCRIT: CPT

## 2022-04-05 PROCEDURE — 96375 TX/PRO/DX INJ NEW DRUG ADDON: CPT

## 2022-04-05 RX ADMIN — BUSPIRONE HYDROCHLORIDE 10 MG: 5 TABLET ORAL at 04:44

## 2022-04-05 RX ADMIN — GABAPENTIN 300 MG: 300 CAPSULE ORAL at 11:57

## 2022-04-05 RX ADMIN — KETOROLAC TROMETHAMINE 15 MG: 30 INJECTION, SOLUTION INTRAMUSCULAR at 04:45

## 2022-04-05 RX ADMIN — DOCUSATE SODIUM 100 MG: 100 CAPSULE, LIQUID FILLED ORAL at 04:45

## 2022-04-05 RX ADMIN — RIVAROXABAN 10 MG: 10 TABLET, FILM COATED ORAL at 05:46

## 2022-04-05 RX ADMIN — ACETAMINOPHEN 650 MG: 325 TABLET, FILM COATED ORAL at 00:22

## 2022-04-05 RX ADMIN — KETOROLAC TROMETHAMINE 15 MG: 30 INJECTION, SOLUTION INTRAMUSCULAR at 00:22

## 2022-04-05 RX ADMIN — ACETAMINOPHEN 650 MG: 325 TABLET, FILM COATED ORAL at 04:44

## 2022-04-05 RX ADMIN — OXYCODONE HYDROCHLORIDE 5 MG: 5 TABLET ORAL at 13:47

## 2022-04-05 RX ADMIN — VANCOMYCIN HYDROCHLORIDE 1000 MG: 1 INJECTION, POWDER, LYOPHILIZED, FOR SOLUTION INTRAVENOUS at 00:24

## 2022-04-05 RX ADMIN — DEXAMETHASONE SODIUM PHOSPHATE 10 MG: 4 INJECTION, SOLUTION INTRA-ARTICULAR; INTRALESIONAL; INTRAMUSCULAR; INTRAVENOUS; SOFT TISSUE at 05:46

## 2022-04-05 RX ADMIN — GABAPENTIN 300 MG: 300 CAPSULE ORAL at 04:45

## 2022-04-05 RX ADMIN — ACETAMINOPHEN 650 MG: 325 TABLET, FILM COATED ORAL at 11:57

## 2022-04-05 ASSESSMENT — PAIN DESCRIPTION - PAIN TYPE
TYPE: SURGICAL PAIN
TYPE: SURGICAL PAIN
TYPE: ACUTE PAIN;SURGICAL PAIN
TYPE: SURGICAL PAIN
TYPE: SURGICAL PAIN

## 2022-04-05 ASSESSMENT — COGNITIVE AND FUNCTIONAL STATUS - GENERAL
CLIMB 3 TO 5 STEPS WITH RAILING: A LITTLE
DRESSING REGULAR LOWER BODY CLOTHING: A LITTLE
TOILETING: A LITTLE
SUGGESTED CMS G CODE MODIFIER DAILY ACTIVITY: CJ
HELP NEEDED FOR BATHING: A LITTLE
SUGGESTED CMS G CODE MODIFIER MOBILITY: CI
MOBILITY SCORE: 23
DAILY ACTIVITIY SCORE: 21

## 2022-04-05 ASSESSMENT — GAIT ASSESSMENTS
ASSISTIVE DEVICE: FRONT WHEEL WALKER
DISTANCE (FEET): 15
DEVIATION: ANTALGIC;OTHER (COMMENT)
GAIT LEVEL OF ASSIST: STANDBY ASSIST

## 2022-04-05 ASSESSMENT — ACTIVITIES OF DAILY LIVING (ADL): TOILETING: INDEPENDENT

## 2022-04-05 NOTE — DISCHARGE PLANNING
Received Choice form at [id number]  Agency[facility]Facility Name[facility]  Referral sent per Choice form @ [id number]

## 2022-04-05 NOTE — FACE TO FACE
Face to Face Supporting Documentation - Home Health    The encounter with this patient was in whole or in part the primary reason for home health admission.    Date of encounter:   Patient:                    MRN:                       YOB: 2021  Yasmin Zhong  [id number]  1945     Home health to see patient for:  Skilled Nursing care for assessment, interventions & education, Physical Therapy evaluation and treatment and Occupational therapy evaluation and treatment    Skilled need for:  Surgical Aftercare s/p right KAYLA    Skilled nursing interventions to include:  Wound Care: keep HARPREET in place, reinforce as needed    Homebound status evidenced by:  Need the aid of supportive devices such as crutches, canes, wheelchairs or walkers or Needs the assistance of another person in order to leave the home. Leaving home requires a considerable and taxing effort. There is a normal inability to leave the home.    Community Physician to provide follow up care: Lucina Gupta M.D.     Optional Interventions? No      I certify the face to face encounter for this home health care referral meets the CMS requirements and the encounter/clinical assessment with the patient was, in whole, or in part, for the medical condition(s) listed above, which is the primary reason for home health care. Based on my clinical findings: the service(s) are medically necessary, support the need for home health care, and the homebound criteria are met.  I certify that this patient has had a face to face encounter by myself.  Mauricio Rose M.D. - NPI: [id number]

## 2022-04-05 NOTE — THERAPY
Physical Therapy   Initial Evaluation     Patient Name: Yasmin Zhong  Age:  76 y.o., Sex:  female  Medical Record #: [id number]  Today's Date: 4/5/2022     Precautions  Precautions: Posterior Hip Precautions;Fall Risk;Toe Touch Weight Bearing Right Lower Extremity    Assessment  Patient is 76 y.o. female who was recently seen s/p conversion of previous R hip surgery to R KAYLA with retroacetabular bone grafting. Pt is ordered to be TTWB for R LE and maintain posterior hip precautions. Pt was agreeable to therapy evaluation and was able to demonstrate SBA for all upright mobility. Pt was educated on supine therapeutic exercises, elevation, icing, and positioning. Pt was provided with education regarding posterior hip precautions and was able to maintain them throughout the session. Pt was able to go up/down 1 step with safe mechanics with the use of FWW. Pt encouraged to obtain a w/c to mobilize within home when she is home alone. Pt states she will have friend to assist for one week and after she will primarily be home alone. Pt was agreeable to using a w/c to reduce risk of falling and improve functional independence. Recommend home health transitional care for continued physical therapy services.     Plan    Recommend Physical Therapy for Evaluation only     DC Equipment Recommendations: Front-Wheel Walker,Wheelchair  Discharge Recommendations: Recommend home health for continued physical therapy services     Objective       04/05/22 1120   Initial Contact Note    Initial Contact Note Order Received and Verified, Evaluation Only - Patient Does Not Require Further Acute Physical Therapy at this Time.  However, May Benefit from Post Acute Therapy for Higher Level Functional Deficits.   Precautions   Precautions Posterior Hip Precautions;Fall Risk;Toe Touch Weight Bearing Right Lower Extremity   Pain 0 - 10 Group   Location Hip   Location Orientation Right   Description Sharp   Therapist Pain Assessment Prior to  Activity;During Activity;Post Activity;Nurse Notified;3   Prior Living Situation   Prior Services Home-Independent;Housekeeping / Homemaker Services   Housing / Facility 2 Story Apartment / Condo  (chair lift)   Bathroom Set up Bathtub / Shower Combination;Shower Chair   Equipment Owned Front-Wheel Walker;4-Wheel Walker;Tub / Shower Seat   Lives with - Patient's Self Care Capacity Alone and Able to Care For Self   Comments pt will have a friend to come assist for 1 week and will have neice who can check up on her and assist with transportation   Prior Level of Functional Mobility   Bed Mobility Independent   Transfer Status Independent   Ambulation Independent   Distance Ambulation (Feet)   (household distances)   Assistive Devices Used Front-Wheel Walker   Stairs Independent   Comments reports of an IPLOF and is familiar with TTWB mechanics   History of Falls   History of Falls Yes   Date of Last Fall   (reports of fall in jan 2022)   Cognition    Cognition / Consciousness WDL   Level of Consciousness Alert   Comments pleasant/cooperative   Passive ROM Lower Body   Passive ROM Lower Body X   Comments limited in R LE due to pain and posterior hip precautions; able to demo functional PROM for B LE   Active ROM Lower Body    Active ROM Lower Body  X   Comments same as above   Strength Lower Body   Lower Body Strength  X   Comments limited due to pain; able to demo functional strength for B LE   Sensation Lower Body   Lower Extremity Sensation   WDL   Lower Body Muscle Tone   Lower Body Muscle Tone  WDL   Strength Upper Body   Upper Body Strength  WDL   Upper Body Muscle Tone   Upper Body Muscle Tone  WDL   Neurological Concerns   Neurological Concerns No   Coordination Upper Body   Coordination WDL   Coordination Lower Body    Coordination Lower Body  WDL   Balance Assessment   Sitting Balance (Static) Good   Sitting Balance (Dynamic) Fair +   Standing Balance (Static) Fair   Standing Balance (Dynamic) Fair   Weight  Shift Sitting Fair   Weight Shift Standing Absent  (TTWB R L E)   Comments w/fww   Gait Analysis   Gait Level Of Assist Standby Assist   Assistive Device Front Wheel Walker   Distance (Feet) 15   # of Times Distance was Traveled 1   Deviation Antalgic;Other (Comment)  (hop to mechanics)   # of Stairs Climbed 1   Level of Assist with Stairs Standby Assist   Weight Bearing Status TTWB R LE   Comments pt provided with cues and demo to maintain TTWB with ambulation and steps; able to return demo safe mechanics after cues and demo; initally demonstrated slight WB on R foot   Bed Mobility    Supine to Sit Standby Assist   Sit to Supine Standby Assist   Scooting Supervised   Comments HOB elevated and rails up   Functional Mobility   Sit to Stand Standby Assist   Bed, Chair, Wheelchair Transfer Standby Assist   Transfer Method Stand Step   Mobility EOB, sit<>stand, ambulation, steps, back to bed   Comments cues for handplacement and positioning of R LE upon standing   How much difficulty does the patient currently have...   Turning over in bed (including adjusting bedclothes, sheets and blankets)? 4   Sitting down on and standing up from a chair with arms (e.g., wheelchair, bedside commode, etc.) 4   Moving from lying on back to sitting on the side of the bed? 4   How much help from another person does the patient currently need...   Moving to and from a bed to a chair (including a wheelchair)? 4   Need to walk in a hospital room? 4   Climbing 3-5 steps with a railing? 3   6 clicks Mobility Score 23   Activity Tolerance   Sitting in Chair NT   Sitting Edge of Bed 5 mins   Standing 6 mins   Comments no adverse events noted   Edema / Skin Assessment   Edema / Skin  Not Assessed   Education Group   Education Provided Gait Training;Role of Physical Therapist;Use of Assistive Device;Hip Precautions Posterior   Hip Precautions Posterior Patient Response Patient;Acceptance;Explanation;Demonstration;Handout;Verbal  Demonstration;Action Demonstration   Role of Physical Therapist Patient Response Patient;Acceptance;Explanation;Demonstration;Verbal Demonstration;Action Demonstration   Gait Training Patient Response Patient;Acceptance;Explanation;Demonstration;Verbal Demonstration;Action Demonstration   Use of Assistive Device Patient Response Patient;Acceptance;Explanation;Demonstration;Verbal Demonstration;Action Demonstration   Anticipated Discharge Equipment and Recommendations   DC Equipment Recommendations Front-Wheel Walker;Wheelchair   Discharge Recommendations Recommend home health for continued physical therapy services   Interdisciplinary Plan of Care Collaboration   IDT Collaboration with  Nursing   Patient Position at End of Therapy In Bed;Call Light within Reach;Tray Table within Reach;Phone within Reach   Collaboration Comments aware of visit and recs   Session Information   Date / Session Number  4/5 eval only   Priority 0

## 2022-04-05 NOTE — PROGRESS NOTES
4 Eyes Skin Assessment Completed by DAMARIS Galeas and DAMARIS Esteban.    Head WDL  Ears WDL  Nose WDL  Mouth WDL  Neck WDL  Breast/Chest WDL  Shoulder Blades WDL  Spine WDL  (R) Arm/Elbow/Hand WDL  (L) Arm/Elbow/Hand WDL  Abdomen WDL  Groin WDL  Scrotum/Coccyx/Buttocks WDL  (R) Leg Incision post KAYLA  (L) Leg WDL  (R) Heel/Foot/Toe WDL  (L) Heel/Foot/Toe WDL          Devices In Places Blood Pressure Cuff, Pulse Ox and SCD's      Interventions In Place Pillows    Possible Skin Injury No    Pictures Uploaded Into Epic N/A  Wound Consult Placed N/A  RN Wound Prevention Protocol Ordered No

## 2022-04-05 NOTE — PROGRESS NOTES
Received bedside patient report from day shift RN. Patient resting in bed. Patient is awake, A&Ox4.Safety precautions in place.

## 2022-04-05 NOTE — CARE PLAN
The patient is Stable - Low risk of patient condition declining or worsening    Shift Goals  Clinical Goals: Pain will be 3/10  Patient Goals: Rest    Progress made toward(s) clinical / shift goals:  Patient medicated per MAR. Patient verbalized understanding of plan of care.    Patient is not progressing towards the following goals: N/A      Problem: Knowledge Deficit - Standard  Goal: Patient and family/care givers will demonstrate understanding of plan of care, disease process/condition, diagnostic tests and medications  Outcome: Progressing     Problem: Fall Risk  Goal: Patient will remain free from falls  Outcome: Progressing     Problem: Post-Operative Hip Replacement  Goal: Patient's neurovascular status will be maintained or improve  Outcome: Progressing  Goal: Early mobilization post surgery  Outcome: Progressing     Problem: Pain - Post Surgery  Goal: Alleviation or reduction of pain post surgery  Outcome: Progressing     Problem: Incision Care  Goal: Optimal post surgical incision care  Outcome: Progressing

## 2022-04-05 NOTE — PROGRESS NOTES
S:  s/p complex right KAYLA  Pain controlled  No N/V  No Chest Pain/SOB  Afebrile  Exam:    NAD A& O x3    RLE: +DF/PF/EHL SILT L4/L5/S1  LLE:  +DF/PF/EHL SILT L4/L5/S1    Plan:  Continue standard plan of care  Weight bearing: touchdown weight bearing with walker and posterior hip precautions  DVT prophylaxis: SCD/Teds + Xarelto  Dispo: home with home health vs SNF pending PT eval

## 2022-04-05 NOTE — DISCHARGE PLANNING
Anticipated Discharge Disposition: Home with HH    Action: LMSW completed chart review. Per chart review, MD ordered HH.     LSW attempted to meet with pt at bedside to discuss HH. Pt on the phone and requested this LMSW come back.     Barriers to Discharge: HH choice    Plan: LMSW to follow up with pt for HH choice.

## 2022-04-05 NOTE — DISCHARGE SUMMARY
Yasmin Zhong was admitted on 4/4/2022 for Closed bilateral acetabular fractures, with nonunion, subsequent encounter [S32.401K, S32.402K]  Arthritis of right hip [M16.11]  Patient was diagnosed with severe degenerative arthritis in the right hip and underwent a right total hip arthroplasty by Dr. Mauricio Rose on the date of admission. Please see dictated operative note for further information.    Hospital course: standard    The patient has done well, with no complications.  Patient denies chest pain, calf pain or shortness of breath.   Pain is well-controlled at present.  Patient is mobilizing with the use of an assistive device, and progressing in physical therapy.   Patient is neurologically and vascularly intact with palpable pedal pulses bilaterally.   Narcotic dosages were chosen by taking into account the the patient's previous history of opoid use and to ensure proper pain control after surgery    Discharge date: 4-5-22    Patient is being discharged to home after am physical therapy.     Allergies:  Pollen extract       Medication List      CHANGE how you take these medications      Instructions   fluticasone 50 MCG/ACT nasal spray  What changed: when to take this  Commonly known as: FLONASE   Administer 1 Spray into affected nostril(S) every day.  Dose: 1 Spray        CONTINUE taking these medications      Instructions   acetaminophen 500 MG Tabs  Commonly known as: TYLENOL   Take 2 Tablets by mouth every 6 hours.  Dose: 1,000 mg     atorvastatin 20 MG Tabs  Commonly known as: LIPITOR   Take 1 Tablet by mouth every evening.  Dose: 20 mg     BIOTIN PO   Take 1 Tablet by mouth every morning. Pt unsure of dose  Dose: 1 Tablet     busPIRone 10 MG Tabs tablet  Commonly known as: BUSPAR   Pt takes 10MG in AM and 20MG HS Take 10-20 mg by mouth 2 times a day. Pt takes 10MG in AM and 20MG HS     CALCIUM + D PO   Take 1 Tablet by mouth every morning.  Dose: 1 Tablet     cyanocobalamin 100 MCG Tabs  Commonly  known as: VITAMIN B-12   Take 100 mcg by mouth every morning.  Dose: 100 mcg     docusate sodium 100 MG Caps  Commonly known as: Colace   Take 1 Capsule by mouth 2 times a day for 30 days.  Dose: 100 mg     MAGNESIUM PO   Take 1 Capsule by mouth every morning. Pt unsure of dose  Dose: 1 Capsule     ondansetron 4 MG Tabs tablet  Commonly known as: Zofran   Take 1 Tablet by mouth every four hours as needed for Nausea/Vomiting for up to 30 days.  Dose: 4 mg     oxyCODONE immediate-release 5 MG Tabs  Commonly known as: ROXICODONE   Take 1 Tablet by mouth every four hours as needed for Severe Pain for up to 7 days.  Dose: 5 mg     rivaroxaban 10 MG Tabs tablet  Commonly known as: Xarelto   Take 1 Tablet by mouth with dinner.  Dose: 10 mg     traMADol 50 MG Tabs  Commonly known as: ULTRAM   Take 1 Tablet by mouth every 6 hours as needed for Severe Pain (ALTERNATING WITH OXYCODONE) for up to 10 days.  Dose: 50 mg     VITAMIN D3 PO   Take 1 Capsule by mouth every morning. Pt unsure of dose  Dose: 1 Capsule     VITAMIN E PO   Take 1 Capsule by mouth every morning. Pt unsure of dose  Dose: 1 Capsule     zolpidem 5 MG Tabs  Commonly known as: AMBIEN   Take 10 mg by mouth at bedtime as needed for Sleep.  Dose: 10 mg            Discharge Instructions:     Patient is instructed to ambulate and remain touchdown weight bearing with the use of an assistive device, and to continue physical therapy exercises given during this hospital stay. Strict posterior hip precautions are to be observed for patients who underwent a total hip replacement.   Patient is to ice and elevate the surgical leg regularly, with pillows under the ankle, nothing is to be placed under the knee.   Patient was given detailed wound care instructions, and will leave the Incisional vac or silver dressing on until first post-op visit.   Xarelto daily for DVT prophylaxis.  Patient is to follow up with Dr. Rose's office in 1-2 weeks.

## 2022-04-05 NOTE — DISCHARGE PLANNING
Anticipated Discharge Disposition: Home with HH     Action: Spoke to pt at bedside. [facility] Form faxed to [facility].     Barriers to Discharge: HH acceptance     Plan: f/u with [facility] regarding HH acceptance

## 2022-04-05 NOTE — OR NURSING
Pt discharged per MD orders.   Pt meeting discharge criteria.   VSS.   Pain controlled.   IV removed.   Pt would like to discharge prior to acceptance from . Will call with any issues.   Discharge instructions and prescriptions reviewed with pt. Pt verbalized understanding.

## 2022-04-05 NOTE — THERAPY
"Occupational Therapy   Initial Evaluation     Patient Name: Yasmin Zhong  Age:  76 y.o., Sex:  female  Medical Record #: [id number]  Today's Date: 4/5/2022     Precautions  Precautions: Posterior Hip Precautions,Toe Touch Weight Bearing Right Lower Extremity,Fall Risk    Assessment  Patient is a 76 y.o. female with h/o acetabular fracture and resultant posttraumatic R hip arthritis. Pt seen for OT eval following posterior KAYLA 4/4. Pt lives alone at baseline but will have a friend stay with her for a week to assist. Pt is familiar with her TTWB precautions as she reports she has had that precaution since January. Pt educated on posterior hip precautions and how they impact basic ADLs as well as IADLs and transfers. Pt educated on use of adaptive equipment and where to purchase. She verbalized and return demonstrated understanding of all education. Pt with no further OT needs in acute care.      Plan    Recommend Occupational Therapy for Evaluation only     DC Equipment Recommendations: Raised Toilet Seat with Arms (reacher, long handled shoe horn, sock aid)  Discharge Recommendations: Recommend home health for continued occupational therapy services     Subjective    \"I didn't know I was going to have these movement precautions.\"      Objective       04/05/22 1002   Prior Living Situation   Prior Services Home-Independent;Housekeeping / Homemaker Services   Housing / Facility 2 Story Apartment / Condo  (has a chair lift)   Bathroom Set up Bathtub / Shower Combination;Shower Chair   Equipment Owned Front-Wheel Walker;4-Wheel Walker;Tub / Shower Seat, high toilet  (Pt has a back brush to use to wash her feet/lower legs)   Lives with - Patient's Self Care Capacity Alone and Able to Care For Self  (has a dog at home)   Comments Pt has a friend flying into town this afternoon who will stay ofr a week to assist. Pt has a niece in town who can help with transportation   Prior Level of ADL Function   Self Feeding " Independent   Grooming / Hygiene Independent   Bathing Independent, reports that she sits and swings her legs into the tub to maintain TTWB   Dressing Independent   Toileting Independent   Prior Level of IADL Function   Medication Management Independent   Laundry Independent   Kitchen Mobility Independent   Home Management Independent  (has a  for heavy cleaning)   Shopping Independent  (uses online grocery ordering and delivery)   Prior Level Of Mobility Independent With Device in Home;Independent With Device in Community  (has been TTWB since January)   Driving / Transportation Relatives / Others Provide Transportation;Driving Independent  (typically drives independently but has needed assist due to TTWB RLE)   History of Falls   History of Falls Yes   Date of Last Fall   (reports fall in Jan 2022)   Precautions   Precautions Posterior Hip Precautions;Toe Touch Weight Bearing Right Lower Extremity;Fall Risk   Vitals   O2 Delivery Device None - Room Air   Pain 0 - 10 Group   Location Hip   Location Orientation Right   Therapist Pain Assessment During Activity;Nurse Notified  (reports mild pain R hip)   Cognition    Cognition / Consciousness WDL   Level of Consciousness Alert   Comments Pt verbalized and demonstrated understanding of posterior precautions following education   Active ROM Upper Body   Active ROM Upper Body  WDL   Strength Upper Body   Upper Body Strength  WDL   Sensation Upper Body   Comments Denies numbness/tingling in UEs, reports neuropathy in her feet   Upper Body Muscle Tone   Upper Body Muscle Tone  WDL   Coordination Upper Body   Coordination WDL   Balance Assessment   Sitting Balance (Static) Good   Sitting Balance (Dynamic) Fair +   Standing Balance (Static) Fair   Standing Balance (Dynamic) Fair -   Weight Shift Sitting Fair   Weight Shift Standing   (TTWB RLE)   Comments standing with FWW   Bed Mobility    Supine to Sit Standby Assist   Sit to Supine Standby Assist   Scooting  Supervised   ADL Assessment   Grooming Supervision;Standing  (wash hands and brush teeth)   Lower Body Dressing Contact Guard Assist  (maxA without AE, CGA socks and underpants with AE)   Toileting Supervision   How much help from another person does the patient currently need...   Putting on and taking off regular lower body clothing? 3   Bathing (including washing, rinsing, and drying)? 3   Toileting, which includes using a toilet, bedpan, or urinal? 3   Putting on and taking off regular upper body clothing? 4   Taking care of personal grooming such as brushing teeth? 4   Eating meals? 4   6 Clicks Daily Activity Score 21   Functional Mobility   Sit to Stand Standby Assist   Bed, Chair, Wheelchair Transfer Standby Assist   Toilet Transfers Standby Assist   Mobility walked to/from bathroom with FWW, able to maintain TTWB   Visual Perception   Visual Perception  WDL  (has had eye surgery)   Education Group   Education Provided Hip Precautions;Role of Occupational Therapist;Activities of Daily Living;Adaptive Equipment;Transfers;Weight Bearing Precautions   Role of Occupational Therapist Patient Response Patient;Acceptance;Explanation;Verbal Demonstration   Hip Precautions Patient Response Patient;Acceptance;Explanation;Demonstration;Handout;Verbal Demonstration;Action Demonstration   Transfers Patient Response Patient;Acceptance;Explanation;Demonstration;Verbal Demonstration;Action Demonstration   ADL Patient Response Patient;Acceptance;Explanation;Demonstration;Verbal Demonstration;Action Demonstration   Adaptive Equipment Patient Response Patient;Acceptance;Explanation;Demonstration;Verbal Demonstration;Action Demonstration  (reacher, shoe horn, sock aid)

## 2022-04-05 NOTE — CARE PLAN
The patient is Stable - Low risk of patient condition declining or worsening    Shift Goals  Clinical Goals: Pt will be able to walk and void after surgery  Patient Goals: Pt will have adequate pain control    Progress made toward(s) clinical / shift goals:  Pt able to walk to bathroom using FWW with steady gait. Void successfully. Pain is controlled with polar ice and schedule medication.    Patient is not progressing towards the following goals:      Problem: Fall Risk  Goal: Patient will remain free from falls  Outcome: Progressing     Problem: Post-Operative Hip Replacement  Goal: Patient's neurovascular status will be maintained or improve  Outcome: Progressing  Goal: Early mobilization post surgery  Outcome: Progressing     Problem: Pain - Post Surgery  Goal: Alleviation or reduction of pain post surgery  Outcome: Progressing     Problem: Incision Care  Goal: Optimal post surgical incision care  Outcome: Progressing

## 2022-04-05 NOTE — PROGRESS NOTES
4 Eyes Skin Assessment Completed by DAMARIS Esteban and Bri RN.    Head WDL  Ears WDL  Nose WDL  Mouth WDL  Neck WDL  Breast/Chest WDL  Shoulder Blades WDL  Spine WDL  (R) Arm/Elbow/Hand WDL  (L) Arm/Elbow/Hand WDL  Abdomen WDL  Groin WDL  Scrotum/Coccyx/Buttocks WDL  (R) Leg post total hip arthroplasty  (L) Leg WDL  (R) Heel/Foot/Toe WDL  (L) Heel/Foot/Toe WDL          Devices In Places Pulse Ox and SCD's      Interventions In Place Pillows and Pressure Redistribution Mattress    Possible Skin Injury No    Pictures Uploaded Into Epic N/A  Wound Consult Placed N/A  RN Wound Prevention Protocol Ordered No

## 2022-05-02 ENCOUNTER — OFFICE VISIT (OUTPATIENT)
Dept: OPHTHALMOLOGY | Facility: MEDICAL CENTER | Age: 77
End: 2022-05-02
Payer: MEDICARE

## 2022-05-02 DIAGNOSIS — H25.013 CORTICAL AGE-RELATED CATARACT OF BOTH EYES: ICD-10-CM

## 2022-05-02 DIAGNOSIS — H49.9 OPHTHALMOPLEGIA: ICD-10-CM

## 2022-05-02 DIAGNOSIS — H52.13 MYOPIA OF BOTH EYES: ICD-10-CM

## 2022-05-02 DIAGNOSIS — H40.003 GLAUCOMA SUSPECT OF BOTH EYES: ICD-10-CM

## 2022-05-02 DIAGNOSIS — H35.371 EPIRETINAL MEMBRANE (ERM) OF RIGHT EYE: ICD-10-CM

## 2022-05-02 PROCEDURE — 92014 COMPRE OPH EXAM EST PT 1/>: CPT | Mod: 25 | Performed by: OPHTHALMOLOGY

## 2022-05-02 PROCEDURE — 92060 SENSORIMOTOR EXAMINATION: CPT | Performed by: OPHTHALMOLOGY

## 2022-05-02 PROCEDURE — 92250 FUNDUS PHOTOGRAPHY W/I&R: CPT | Performed by: OPHTHALMOLOGY

## 2022-05-02 PROCEDURE — 92015 DETERMINE REFRACTIVE STATE: CPT | Performed by: OPHTHALMOLOGY

## 2022-05-02 ASSESSMENT — REFRACTION_WEARINGRX
OD_SPHERE: +3.50
OD_AXIS: 174
OS_SPHERE: +3.75
OD_CYLINDER: +0.25
OS_CYLINDER: +0.00
OS_AXIS: 000

## 2022-05-02 ASSESSMENT — ENCOUNTER SYMPTOMS: BLURRED VISION: 1

## 2022-05-02 ASSESSMENT — EXTERNAL EXAM - RIGHT EYE: OD_EXAM: NORMAL

## 2022-05-02 ASSESSMENT — VISUAL ACUITY
OD_CC: J1
METHOD: SNELLEN - LINEAR
OD_PH_SC+: -2
OS_SC: 20/30
OD_SC: 20/50
OS_CC: J1
OS_SC+: -1
OD_PH_SC: 20/40
OD_SC+: +2

## 2022-05-02 ASSESSMENT — SLIT LAMP EXAM - LIDS
COMMENTS: NORMAL
COMMENTS: NORMAL

## 2022-05-02 ASSESSMENT — REFRACTION
OS_AXIS: 072
OD_CYLINDER: +0.50
OD_SPHERE: -0.75
OS_CYLINDER: +1.25
OD_AXIS: 092
OS_SPHERE: -0.75

## 2022-05-02 ASSESSMENT — CONF VISUAL FIELD
OS_NORMAL: 1
OD_NORMAL: 1

## 2022-05-02 ASSESSMENT — EXTERNAL EXAM - LEFT EYE: OS_EXAM: NORMAL

## 2022-05-02 ASSESSMENT — REFRACTION_MANIFEST
OD_AXIS: 122
OD_SPHERE: -0.50
OD_SPHERE: -0.50
OS_SPHERE: -0.50
OS_SPHERE: -0.50
OS_CYLINDER: +1.25
OS_AXIS: 065
OS_CYLINDER: +0.75
OS_AXIS: 065
OD_CYLINDER: +0.25
METHOD_AUTOREFRACTION: 1

## 2022-05-02 ASSESSMENT — TONOMETRY
IOP_METHOD: ICARE
OD_IOP_MMHG: 11
OS_IOP_MMHG: 13

## 2022-05-02 NOTE — ASSESSMENT & PLAN NOTE
4/5/2021 - bilateral cortical cataracts with vision worse in the left eye leading to switched fixation  6/24/2021 - Since can begin fusing with press on prism, will refer for bilateral cataract extraction  10/18/2021 - SP bilateral cataract extraction  5/1/2022 - some residual myopia and astigmatism. Will give rx with bifocal

## 2022-05-02 NOTE — PROGRESS NOTES
Peds/Neuro Ophthalmology:   Keith Horne M.D.    Date & Time note created:    5/2/2022   11:25 AM     Referring MD / APRN:  Lucina Gupta M.D., No att. providers found    Patient ID:  Name:             Yasmin Zhong   YOB: 1945  Age:                 76 y.o.  female   MRN:               [id number]    Chief Complaint/Reason for Visit:     Other (6 month follow up on starbismus surgery)      History of Present Illness:    Yasmin Zhong is a 76 y.o. female   Follow up post strabismus surgery right eye.No eye crossing or double vision since surgery.Patient states she is happy post strabismus repair.Wonders if she needs distance glasses?      Review of Systems:  Review of Systems   Eyes: Positive for blurred vision.   All other systems reviewed and are negative.      Past Medical History:   Past Medical History:   Diagnosis Date   • Anxiety    • Arrhythmia 11/18/2020   • Arthritis 11/18/2020   • Blood clotting disorder (HCC)     hx 2013 in lung   • Breath shortness 11/18/2020    no supplemental oxygen   • Cancer (HCC) 1990    basal cell per hx   • Cataract     meg IOL    • Depression    • Fall from ground level 5/25/2021   • Heart murmur    • History of respiratory failure     6-month hospitalization in 2014   • Hyperlipidemia    • Indigestion 11/18/2020    GERD   • Insomnia    • Pain 11/18/2020    back pain   • Peripheral neuropathy    • Pneumonia     hx 2013   • Pulmonary fibrosis (HCC)    • Recurrent major depressive disorder, in full remission ([facility]) 7/21/2017   • Thrombocytopenia ([facility]) 4/19/2021       Past Surgical History:  Past Surgical History:   Procedure Laterality Date   • ND TOTAL HIP ARTHROPLASTY Right 4/4/2022    Procedure: RIGHT ARTHROPLASTY, HIP, TOTAL;  Surgeon: Mauricio Rose M.D.;  Location: SURGERY [facility];  Service: Orthopedics   • ORIF, FRACTURE, ACETABULUM Right 1/3/2022    Procedure: OPEN REDUCTION AND INTERNAL FIXATION, FRACTURE, ACETABULUM;   Surgeon: Pritesh Zamora M.D.;  Location: SURGERY [facility];  Service: Orthopedics   • STRABISMUS REPAIR Bilateral 11/19/2021    Procedure: STRABISMUS SURGERY - ESOTROPIA STRABISMUS PROCEDURE, ONE HORIZONTAL MUSCLE - BILATERAL, PROCEDURE FOR SCARRING OF EXTRAOCULAR MUSCLE AND ADJUSTABLE SUTURE PLACEMENT - RIGHT;  Surgeon: Keith Horne M.D.;  Location: SURGERY SAME DAY [facility];  Service: Ophthalmology   • EYE SURGERY Bilateral 11/19/2021    Bilateral medial rectus recession in patient with prior ocular Bilateral medial rectus recession in patient with prior ocular    • AK LAP,ESOPHAGOGAST FUNDOPLASTY N/A 11/23/2020    Procedure: FUNDOPLICATION, NISSEN, LAPAROSCOPIC- FOR PARAESOPHAGEAL WITH MESH;  Surgeon: Pritesh Baptiste M.D.;  Location: SURGERY [facility];  Service: General   • HIP HEMIARTHROPLASTY  4/25/2015    Performed by Raymond Lantigau M.D. at SURGERY [facility] ORS   • CATARACT EXTRACTION WITH IOL Bilateral    • OTHER      breast reduction       Current Outpatient Medications:  Current Outpatient Medications   Medication Sig Dispense Refill   • zolpidem (AMBIEN) 5 MG Tab Take 10 mg by mouth at bedtime as needed for Sleep.     • rivaroxaban (XARELTO) 10 MG Tab tablet Take 1 Tablet by mouth with dinner. 30 Tablet 0   • acetaminophen (TYLENOL) 500 MG Tab Take 2 Tablets by mouth every 6 hours. 30 Tablet 0   • atorvastatin (LIPITOR) 20 MG Tab Take 1 Tablet by mouth every evening. 90 Tablet 3   • busPIRone (BUSPAR) 10 MG Tab tablet Pt takes 10MG in AM and 20MG HS Take 10-20 mg by mouth 2 times a day. Pt takes 10MG in AM and 20MG  Tablet 3   • fluticasone (FLONASE) 50 MCG/ACT nasal spray Administer 1 Spray into affected nostril(S) every day. (Patient taking differently: Administer 1 Spray into affected nostril(S) every morning.) 16 g 3   • cyanocobalamin (VITAMIN B-12) 100 MCG Tab Take 100 mcg by mouth every morning.     • BIOTIN PO Take 1 Tablet by mouth every morning. Pt unsure of dose    "  • Calcium Citrate-Vitamin D (CALCIUM + D PO) Take 1 Tablet by mouth every morning.     • MAGNESIUM PO Take 1 Capsule by mouth every morning. Pt unsure of dose     • Cholecalciferol (VITAMIN D3 PO) Take 1 Capsule by mouth every morning. Pt unsure of dose     • VITAMIN E PO Take 1 Capsule by mouth every morning. Pt unsure of dose       No current facility-administered medications for this visit.       Allergies:  Allergies   Allergen Reactions   • Pollen Extract      \"cough\"       Family History:  Family History   Problem Relation Age of Onset   • Cancer Mother         Bladder cancer, hx. of smoking   • Diabetes Mother    • Heart Attack Father    • Cancer Father         Colon    • Cancer Maternal Uncle         Lung   • Diabetes Maternal Uncle    • Diabetes Maternal Aunt        Social History:  Social History     Socioeconomic History   • Marital status: Single     Spouse name: Not on file   • Number of children: 0   • Years of education: Not on file   • Highest education level: Not on file   Occupational History   • Not on file   Tobacco Use   • Smoking status: Former Smoker     Packs/day: 0.25     Years: 15.00     Pack years: 3.75     Types: Cigarettes     Quit date: 3/11/1985     Years since quittin.1   • Smokeless tobacco: Never Used   • Tobacco comment: passive smoke exposure her entire life   Vaping Use   • Vaping Use: Never used   Substance and Sexual Activity   • Alcohol use: Not Currently     Alcohol/week: 0.0 oz   • Drug use: No   • Sexual activity: Not Currently   Other Topics Concern   • Not on file   Social History Narrative   • Not on file     Social Determinants of Health     Financial Resource Strain: Not on file   Food Insecurity: Not on file   Transportation Needs: Not on file   Physical Activity: Not on file   Stress: Not on file   Social Connections: Not on file   Intimate Partner Violence: Not on file   Housing Stability: Not on file          Physical Exam:  Physical Exam    Oriented x " 3  Weight/BMI: There is no height or weight on file to calculate BMI.  There were no vitals taken for this visit.    Base Eye Exam     Visual Acuity (Snellen - Linear)       Right Left    Dist sc 20/50 +2 20/30 -1    Dist ph sc 20/40 -2 NI    Near cc J1 J1          Tonometry ([facility], 8:49 AM)       Right Left    Pressure 11 13          Pupils       Pupils    Right PERRL    Left PERRL          Visual Fields       Right Left     Full Full          Neuro/Psych     Oriented x3: Yes    Mood/Affect: Normal            Additional Tests     Stereo     Fly: +    Animals: 3/3    Circles: 9/9            Strabismus Exam       0 0 0   0 0 0                       0  0  ET 12 0  0                       0 0 0   0 0 0                   Slit Lamp and Fundus Exam     External Exam       Right Left    External Normal Normal          Slit Lamp Exam       Right Left    Lids/Lashes Normal Normal    Conjunctiva/Sclera Injection Injection    Cornea Clear Clear    Anterior Chamber Deep and quiet Deep and quiet    Iris Round and reactive Round and reactive    Lens Posterior chamber intraocular lens Posterior chamber intraocular lens    Vitreous Normal Normal          Fundus Exam       Right Left    Disc Tilted disc Tilted disc    Macula Normal Normal    Vessels Normal Normal    Periphery Normal Normal            Refraction     Wearing Rx       Sphere Cylinder Axis    Right +3.50 +0.25 174    Left +3.75 +0.00 000          Manifest Refraction (Auto)       Sphere Cylinder Axis Dist VA    Right -0.50 +0.25 122     Left -0.50 +1.25 065           Manifest Refraction #2       Sphere Cylinder Axis Dist VA    Right -0.50   20/30    Left -0.50 +0.75 065 20/25          Cycloplegic Refraction (Auto)       Sphere Cylinder Axis    Right -0.75 +0.50 092    Left -0.75 +1.25 072          Final Rx       Sphere Cylinder Axis Add    Right -0.50   +3.00    Left -0.50 +0.75 065 +3.00                Pertinent Lab/Test/Imaging Review:      Assessment and Plan:      Glaucoma suspect of both eyes  4/5/2021 - secondary to tilted discs from myopia. Oct NFL thickness 91 OD and 67 OS, bu no significant increased cupping and IOP good. Will monitor\  6/24/2021 - IOP 17 OU today  10/18/2021 - IOP 14 OD and 13 OS  5/2/2022 - IOP stable, OCT NFL thickness 102 OD and 82 OS    Ophthalmoplegia  4/5/2021 - Large angle esotropia measuring 40 diopters with small left hypotonia. In PAT double and would not suppress or fuse. 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 Is now noticing some double vision because of cataracts and now vision better in the non dominant eye so is switching fixation. Discussed at length options. These include removing only the cataract in the left eye which hopefully will make that eye dominant all the time or a PAT test to see if can begin fusing and the consider cataract extraction followed by strabismus repair. Will there froe precede with the latter. Gave rx to slightly improve the vision and will place 40 base out press on prism over the OD in  Pat to see if can start fusing. If so then could consider cataract extraction followed by strabismus repair.   6/24/2021 - Starting to fuse with the 40 base out prism OD. Diplopia significantly better if not resolved. Therefore discussed preceding with bilateral cataract extraction followed by bilateral medial rectus recession once healed from the cataract extraction. Will refer to Dr Swanson for the cataract extraction  10/18/2021 - SP bilateral cataract extraction by Dr Swanson. IP PAT 35 to 40 ET with small 4 diopter left hypo. Thus discussed the risks and benefits of BMR recession for 40 with the OD on adjustable. Discussed that might need prisms or further surgery for the vertical. Has had prism glasses in the past.   11/23/2023 - POST op day number 4 following RMR recess / adjust, LMR recesson. Some flick ET but no double vision and  motility full. Overall healing well. Taper maxitrol. Follow up 3 months. Discussed could take out sutures in two weeks if becomes bothersome.   5/2/2022 - Some progressive ET, but no double vision    Cortical age-related cataract of both eyes  4/5/2021 - bilateral cortical cataracts with vision worse in the left eye leading to switched fixation  6/24/2021 - Since can begin fusing with press on prism, will refer for bilateral cataract extraction  10/18/2021 - SP bilateral cataract extraction  5/1/2022 - some residual myopia and astigmatism. Will give rx with bifocal    Epiretinal membrane (ERM) of right eye  5/2/2022 - ERM OD, probably leading to some of the decrease in vision. Will monitor and if progressive will refer to retina    Myopia of both eyes  5/2/2022 - Some residual myopia and astigmatism post cataract extraction, worse in OS. Since now left eye dominant will give glasses rx.         Keith Horne M.D.

## 2022-05-02 NOTE — ASSESSMENT & PLAN NOTE
4/5/2021 - Large angle esotropia measuring 40 diopters with small left hypotonia. In PAT double and would not suppress or fuse. 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 Is now noticing some double vision because of cataracts and now vision better in the non dominant eye so is switching fixation. Discussed at length options. These include removing only the cataract in the left eye which hopefully will make that eye dominant all the time or a PAT test to see if can begin fusing and the consider cataract extraction followed by strabismus repair. Will there froe precede with the latter. Gave rx to slightly improve the vision and will place 40 base out press on prism over the OD in  Pat to see if can start fusing. If so then could consider cataract extraction followed by strabismus repair.   6/24/2021 - Starting to fuse with the 40 base out prism OD. Diplopia significantly better if not resolved. Therefore discussed preceding with bilateral cataract extraction followed by bilateral medial rectus recession once healed from the cataract extraction. Will refer to Dr Swanson for the cataract extraction  10/18/2021 - SP bilateral cataract extraction by Dr Swanson. IP PAT 35 to 40 ET with small 4 diopter left hypo. Thus discussed the risks and benefits of BMR recession for 40 with the OD on adjustable. Discussed that might need prisms or further surgery for the vertical. Has had prism glasses in the past.   11/23/2023 - POST op day number 4 following RMR recess / adjust, LMR recesson. Some flick ET but no double vision and motility full. Overall healing well. Taper maxitrol. Follow up 3 months. Discussed could take out sutures in two weeks if becomes bothersome.   5/2/2022 - Some progressive ET, but no double vision

## 2022-05-02 NOTE — ASSESSMENT & PLAN NOTE
5/2/2022 - Some residual myopia and astigmatism post cataract extraction, worse in OS. Since now left eye dominant will give glasses rx.

## 2022-05-02 NOTE — ASSESSMENT & PLAN NOTE
5/2/2022 - ERM OD, probably leading to some of the decrease in vision. Will monitor and if progressive will refer to retina

## 2022-05-02 NOTE — ASSESSMENT & PLAN NOTE
4/5/2021 - secondary to tilted discs from myopia. Oct NFL thickness 91 OD and 67 OS, bu no significant increased cupping and IOP good. Will monitor\  6/24/2021 - IOP 17 OU today  10/18/2021 - IOP 14 OD and 13 OS  5/2/2022 - IOP stable, OCT NFL thickness 102 OD and 82 OS

## 2022-05-25 ENCOUNTER — OFFICE VISIT (OUTPATIENT)
Dept: MEDICAL GROUP | Facility: PHYSICIAN GROUP | Age: 77
End: 2022-05-25
Payer: MEDICARE

## 2022-05-25 VITALS
DIASTOLIC BLOOD PRESSURE: 60 MMHG | HEART RATE: 82 BPM | SYSTOLIC BLOOD PRESSURE: 112 MMHG | TEMPERATURE: 99 F | OXYGEN SATURATION: 98 % | RESPIRATION RATE: 18 BRPM | BODY MASS INDEX: 21 KG/M2 | HEIGHT: 64 IN | WEIGHT: 123 LBS

## 2022-05-25 DIAGNOSIS — F51.01 PRIMARY INSOMNIA: ICD-10-CM

## 2022-05-25 DIAGNOSIS — S32.491D OTHER CLOSED FRACTURE OF RIGHT ACETABULUM WITH ROUTINE HEALING, SUBSEQUENT ENCOUNTER: ICD-10-CM

## 2022-05-25 DIAGNOSIS — Z96.641 S/P TOTAL RIGHT HIP ARTHROPLASTY: ICD-10-CM

## 2022-05-25 PROCEDURE — 99214 OFFICE O/P EST MOD 30 MIN: CPT | Performed by: INTERNAL MEDICINE

## 2022-05-25 RX ORDER — ZOLPIDEM TARTRATE 10 MG/1
10 TABLET ORAL NIGHTLY PRN
Qty: 30 TABLET | Refills: 2 | Status: SHIPPED | OUTPATIENT
Start: 2022-06-01 | End: 2022-08-30

## 2022-05-25 ASSESSMENT — FIBROSIS 4 INDEX: FIB4 SCORE: 1.57

## 2022-05-25 NOTE — PROGRESS NOTES
422 subjective:     CC:   Chief Complaint   Patient presents with   • Follow-Up         HPI:   Yasmni presents today to discuss the following issues:    The patient was admitted 2021 for a right acetabular fracture and dislocation status post ORIF on 1/3/2022.  The patient was discharged to a skilled nursing facility on 1/10/2022 then home health on 2022 for PT/OT.  She is subsequently underwent total right hip arthroplasty on 2022.  She is doing well.  She reports an improvement in the pain.  She still reports limited range of motion with some stiffness and is requesting referral for additional PT.        Past Medical History:   Diagnosis Date   • Anxiety    • Arrhythmia 2020   • Arthritis 2020   • Blood clotting disorder (HCC)     hx  in lung   • Breath shortness 2020    no supplemental oxygen   • Cancer (HCC) 1990    basal cell per hx   • Cataract     meg IOL    • Depression    • Fall from ground level 2021   • Heart murmur    • History of respiratory failure     6-month hospitalization in    • Hyperlipidemia    • Indigestion 2020    GERD   • Insomnia    • Pain 2020    back pain   • Peripheral neuropathy    • Pneumonia     hx    • Pulmonary fibrosis (HCC)    • Recurrent major depressive disorder, in full remission ([facility]) 2017   • Thrombocytopenia ([facility]) 2021       Social History     Tobacco Use   • Smoking status: Former Smoker     Packs/day: 0.25     Years: 15.00     Pack years: 3.75     Types: Cigarettes     Quit date: 3/11/1985     Years since quittin.2   • Smokeless tobacco: Never Used   • Tobacco comment: passive smoke exposure her entire life   Vaping Use   • Vaping Use: Never used   Substance Use Topics   • Alcohol use: Not Currently     Alcohol/week: 0.0 oz   • Drug use: No       Current Outpatient Medications Ordered in Epic   Medication Sig Dispense Refill   • [START ON 2022] zolpidem (AMBIEN) 10 MG Tab Take 1 Tablet by mouth at  "bedtime as needed for Sleep for up to 90 days. 30 Tablet 2   • zolpidem (AMBIEN) 10 MG Tab      • acetaminophen (TYLENOL) 500 MG Tab Take 2 Tablets by mouth every 6 hours. 30 Tablet 0   • atorvastatin (LIPITOR) 20 MG Tab Take 1 Tablet by mouth every evening. 90 Tablet 3   • busPIRone (BUSPAR) 10 MG Tab tablet Pt takes 10MG in AM and 20MG HS Take 10-20 mg by mouth 2 times a day. Pt takes 10MG in AM and 20MG  Tablet 3   • fluticasone (FLONASE) 50 MCG/ACT nasal spray Administer 1 Spray into affected nostril(S) every day. (Patient taking differently: Administer 1 Spray into affected nostril(S) every morning.) 16 g 3   • cyanocobalamin (VITAMIN B-12) 100 MCG Tab Take 100 mcg by mouth every morning.     • BIOTIN PO Take 1 Tablet by mouth every morning. Pt unsure of dose     • Calcium Citrate-Vitamin D (CALCIUM + D PO) Take 1 Tablet by mouth every morning.     • MAGNESIUM PO Take 1 Capsule by mouth every morning. Pt unsure of dose     • Cholecalciferol (VITAMIN D3 PO) Take 1 Capsule by mouth every morning. Pt unsure of dose     • VITAMIN E PO Take 1 Capsule by mouth every morning. Pt unsure of dose       No current Epic-ordered facility-administered medications on file.       Allergies:  Pollen extract    Health Maintenance: Completed    ROS:   Denies any recent fevers or chills. No nausea or vomiting. No chest pains or shortness of breath.    Objective:       Exam:  /60 (BP Location: Left arm, Patient Position: Sitting, BP Cuff Size: Adult)   Pulse 82   Temp 37.2 °C (99 °F) (Temporal)   Resp 18   Ht 1.626 m (5' 4\")   Wt 55.8 kg (123 lb)   SpO2 98%   BMI 21.11 kg/m²  Body mass index is 21.11 kg/m².    Gen: Alert and oriented, No apparent distress.  Lungs: Normal effort, CTA bilaterally, no wheezes, rhonchi, or rales  CV: Regular rate and rhythm. No murmurs, rubs, or gallops.  Ext: No clubbing, cyanosis, edema.        Assessment & Plan:     76 y.o. female with the following -     Other closed fracture of " right acetabulum with routine healing, subsequent encounter  S/P total right hip arthroplasty  Chronic medical condition.    Improved.  The patient was admitted 12/31/2021 for a right acetabular fracture and dislocation status post ORIF on 1/3/2022.  The patient was discharged to a skilled nursing facility on 1/10/2022 then home health on 2/4/2022 for PT/OT.  She is subsequently underwent total right hip arthroplasty on 4/4/2022.  She is doing well.  She reports an improvement in the pain.  She still reports limited range of motion with some stiffness and is requesting referral for additional PT.  - Referral to Physical Therapy     Primary insomnia  Chronic medical condition. Well-controlled.  The patient takes zolpidem 10 mg nightly. She has previously been on a number of other sleep medications, but has weaned herself down to just zolpidem. We did discuss that zolpidem can further increase her risk for falls.  She would like to continue using Ambien as benefits outweigh the risks and it has improved her quality of life. Review of the patient's  shows that the patient was last given a prescription of zolpidem 10 mg, dispense #30, on 5/4/2022. Obtained and reviewed patient utilization report from [facility] pharmacy database on 5/25/2022 6:58 AM  prior to writing prescription for controlled substance II, III or IV per [place] bill . Based on assessment of the report, the prescription is medically necessary.   -Continue zolpidem 10 mg nightly  - Controlled Substance Treatment Agreement was signed and scanned into the patient's chart on 08/27/2021  - zolpidem (AMBIEN) 10 MG Tab; Take 1 Tablet by mouth at bedtime as needed for Sleep for up to 90 days.  Dispense: 30 Tablet; Refill: 2      Return in about 3 months (around 8/25/2022) for Controlled Substance.    Please note that this dictation was created using voice recognition software. I have made every reasonable attempt to correct obvious errors, but I  expect that there are errors of grammar and possibly content that I did not discover before finalizing the note.

## 2022-05-26 PROBLEM — Z96.641 S/P TOTAL RIGHT HIP ARTHROPLASTY: Status: ACTIVE | Noted: 2022-05-26

## 2022-06-06 ENCOUNTER — OFFICE VISIT (OUTPATIENT)
Dept: SLEEP MEDICINE | Facility: MEDICAL CENTER | Age: 77
End: 2022-06-06
Payer: MEDICARE

## 2022-06-06 VITALS
OXYGEN SATURATION: 93 % | BODY MASS INDEX: 20.49 KG/M2 | WEIGHT: 120 LBS | HEIGHT: 64 IN | SYSTOLIC BLOOD PRESSURE: 112 MMHG | DIASTOLIC BLOOD PRESSURE: 72 MMHG | HEART RATE: 104 BPM

## 2022-06-06 DIAGNOSIS — J84.10 PULMONARY FIBROSIS (HCC): ICD-10-CM

## 2022-06-06 DIAGNOSIS — R93.89 ABNORMAL CHEST CT: ICD-10-CM

## 2022-06-06 DIAGNOSIS — R06.02 SOB (SHORTNESS OF BREATH): ICD-10-CM

## 2022-06-06 DIAGNOSIS — R91.1 SOLITARY PULMONARY NODULE: ICD-10-CM

## 2022-06-06 PROCEDURE — 99214 OFFICE O/P EST MOD 30 MIN: CPT | Performed by: INTERNAL MEDICINE

## 2022-06-06 ASSESSMENT — FIBROSIS 4 INDEX: FIB4 SCORE: 1.57

## 2022-06-07 NOTE — PROGRESS NOTES
Chief Complaint   Patient presents with   • Results     CT results.       HPI:  Yasmin Zhong is a 76 y.o. female presenting for evaluation and management of an abnormal CT scan of the chest.  The patient has a left lower lobe nodule that was first documented in 2013.  At that time the size was noted to be 19 x 13 mm.  She had a repeat CT scan in August 2020 after a fall that fractured ribs.  The nodule is now 22 x 15 mm.  A PET scan showed no FDG avidity but felt that adenocarcinoma in situ could not be ruled out.  Repeat CT on 3/1/2021 measures the nodule 23 x 14 mm.  It was felt to be stable.    She had a follow-up CT scan on 3/31/2022 no change in her existing nodule and no new nodules.  She does have a background of fibrotic changes which have been present since her severe pneumonia in 2014.  At that time she had a severe aspiration pneumonia and required intubation and mechanical ventilation.  Her CT scan during that hospitalization showed diffuse infiltrates and scarring which improved but never resolved.  Pulmonary function testing in 2015 showed an FEV1 of 1.58 L which was 62% of predicted with a total lung capacity of 73% predicted and a DLCO 45% of predicted.  Repeat pulmonary function testing in September 2015 showed some improvement with an FEV1 of 1.83 L (72% predicted), total lung capacity 77% predicted and DLCO 59% predicted.    At this time she is not complaining of any significant shortness of breath, cough or respiratory issues.    She recently had a right hip fracture which was complicated and after an initial repair she had to return for a right total hip replacement.  She still has some mobility issues after that surgery.    Past Medical History:   Diagnosis Date   • Anxiety    • Arrhythmia 11/18/2020   • Arthritis 11/18/2020   • Blood clotting disorder (HCC)     hx 2013 in lung   • Breath shortness 11/18/2020    no supplemental oxygen   • Cancer (HCC) 1990    basal cell per hx   •  Cataract     meg IOL    • Depression    • Fall from ground level 2021   • Heart murmur    • History of respiratory failure     6-month hospitalization in 2014   • Hyperlipidemia    • Indigestion 2020    GERD   • Insomnia    • Pain 2020    back pain   • Peripheral neuropathy    • Pneumonia     hx 2013   • Pulmonary fibrosis (HCC)    • Recurrent major depressive disorder, in full remission (formerly [facility]) 2017   • Thrombocytopenia (formerly [facility]) 2021       ROS:   Constitutional: Denies fevers, chills, night sweats, fatigue or weight loss  Eyes: Denies vision loss, pain, drainage, double vision  Ears, Nose, Throat: Denies earache, tinnitus, hoarseness  Cardiovascular: Denies chest pain, tightness, palpitations  Respiratory: Denies shortness of breath, sputum production, cough, hemoptysis  Sleep: Denies, snoring, apnea  GI: Denies abdominal pain, nausea, vomiting, diarrhea  : Denies frequent urination, hematuria, painful urination  Musculoskeletal: Status post right total hip  Neurological: Denies headaches, seizures  Skin: Denies rashes, color changes  Psychiatric: Denies depression or thoughts of suicide  Hematologic: Denies bleeding tendency or clotting tendency  Allergic/Immunologic: Denies rhinitis, skin sensitivity    Social History     Socioeconomic History   • Marital status: Single     Spouse name: Not on file   • Number of children: 0   • Years of education: Not on file   • Highest education level: Not on file   Occupational History   • Not on file   Tobacco Use   • Smoking status: Former Smoker     Packs/day: 0.25     Years: 15.00     Pack years: 3.75     Types: Cigarettes     Quit date: 3/11/1985     Years since quittin.2   • Smokeless tobacco: Never Used   • Tobacco comment: passive smoke exposure her entire life   Vaping Use   • Vaping Use: Never used   Substance and Sexual Activity   • Alcohol use: Not Currently     Alcohol/week: 0.0 oz   • Drug use: No   • Sexual activity: Not Currently  "  Other Topics Concern   • Not on file   Social History Narrative   • Not on file     Social Determinants of Health     Financial Resource Strain: Not on file   Food Insecurity: Not on file   Transportation Needs: Not on file   Physical Activity: Not on file   Stress: Not on file   Social Connections: Not on file   Intimate Partner Violence: Not on file   Housing Stability: Not on file     Pollen extract  Current Outpatient Medications on File Prior to Visit   Medication Sig Dispense Refill   • zolpidem (AMBIEN) 10 MG Tab Take 1 Tablet by mouth at bedtime as needed for Sleep for up to 90 days. 30 Tablet 2   • atorvastatin (LIPITOR) 20 MG Tab Take 1 Tablet by mouth every evening. 90 Tablet 3   • busPIRone (BUSPAR) 10 MG Tab tablet Pt takes 10MG in AM and 20MG HS Take 10-20 mg by mouth 2 times a day. Pt takes 10MG in AM and 20MG  Tablet 3   • fluticasone (FLONASE) 50 MCG/ACT nasal spray Administer 1 Spray into affected nostril(S) every day. (Patient taking differently: Administer 1 Spray into affected nostril(S) every morning.) 16 g 3   • cyanocobalamin (VITAMIN B-12) 100 MCG Tab Take 100 mcg by mouth every morning.     • BIOTIN PO Take 1 Tablet by mouth every morning. Pt unsure of dose     • Calcium Citrate-Vitamin D (CALCIUM + D PO) Take 1 Tablet by mouth every morning.     • MAGNESIUM PO Take 1 Capsule by mouth every morning. Pt unsure of dose     • Cholecalciferol (VITAMIN D3 PO) Take 1 Capsule by mouth every morning. Pt unsure of dose     • VITAMIN E PO Take 1 Capsule by mouth every morning. Pt unsure of dose     • zolpidem (AMBIEN) 10 MG Tab      • acetaminophen (TYLENOL) 500 MG Tab Take 2 Tablets by mouth every 6 hours. 30 Tablet 0     No current facility-administered medications on file prior to visit.     /72 (BP Location: Right arm, Patient Position: Sitting, BP Cuff Size: Adult)   Pulse (!) 104   Ht 1.626 m (5' 4\")   Wt 54.4 kg (120 lb)   SpO2 93%   Family History   Problem Relation Age of " Onset   • Cancer Mother         Bladder cancer, hx. of smoking   • Diabetes Mother    • Heart Attack Father    • Cancer Father         Colon    • Cancer Maternal Uncle         Lung   • Diabetes Maternal Uncle    • Diabetes Maternal Aunt        Physical Exam:  No distress at rest on room air  HEENT: PERRLA, EOMI, no scleral icterus, no nasal or oral lesions  Neck: No thyromegaly, no adenopathy, no bruits  Mallampatti: Grade II  Lungs: Equal breath sounds, no wheezes or crackles on today's exam  Heart: Regular rate and rhythm, no gallops or murmurs  Abdomen: Soft, benign, no organomegaly  Extremities: No clubbing, cyanosis, or edema  Neurologic: Cranial nerve, motor, and sensory exam are normal    1. SOB (shortness of breath)    2. Abnormal chest CT    3. Solitary pulmonary nodule    4. Pulmonary fibrosis (HCC)        In addition to her stable nodule she does have evidence of underlying fibrotic lung disease as a result of her significant aspiration pneumonia in 2014.  She has not had significant progression of her fibrosis on the CTs that are following her nodule.  She has not had repeat pulmonary function testing in several years.  I do not think she needs any further CT scanning to follow her nodule which has been essentially stable since 2013.  However we will bring her back in 6 months with pulmonary function testing at that time to assess any progression of her underlying restrictive disease and fibrosis.

## 2022-07-13 DIAGNOSIS — Z96.641 S/P TOTAL RIGHT HIP ARTHROPLASTY: ICD-10-CM

## 2022-08-25 NOTE — PROGRESS NOTES
Subjective:     CC:   Chief Complaint   Patient presents with    Follow-Up     Follow up after 3 months         HPI:   Yasmin presents today for follow-up visit for medication refill.  The patient is recovering from a right acetabular fracture sustained on 2021 status post ORIF on 1/3/2022.  She then underwent right total hip arthroplasty on 2022.  The pain has improved, she still has some stiffness of the joint with limited range of motion.  She continues to ambulate with a cane.        Past Medical History:   Diagnosis Date    Anxiety     Arrhythmia 2020    Arthritis 2020    Blood clotting disorder (HCC)     hx  in lung    Breath shortness 2020    no supplemental oxygen    Cancer (HCC)     basal cell per hx    Cataract     meg IOL     Depression     Fall from ground level 2021    Heart murmur     History of respiratory failure     6-month hospitalization in     Hyperlipidemia     Indigestion 2020    GERD    Insomnia     Pain 2020    back pain    Peripheral neuropathy     Pneumonia     hx     Pulmonary fibrosis ([facility])     Recurrent major depressive disorder, in full remission ([facility]) 2017    Thrombocytopenia ([facility]) 2021       Social History     Tobacco Use    Smoking status: Former     Packs/day: 0.25     Years: 15.00     Pack years: 3.75     Types: Cigarettes     Quit date: 3/11/1985     Years since quittin.5    Smokeless tobacco: Never    Tobacco comments:     passive smoke exposure her entire life   Vaping Use    Vaping Use: Never used   Substance Use Topics    Alcohol use: Not Currently     Alcohol/week: 0.0 oz    Drug use: No       Current Outpatient Medications Ordered in Epic   Medication Sig Dispense Refill    zolpidem (AMBIEN) 10 MG Tab Take 1 Tablet by mouth at bedtime as needed for Sleep for up to 30 days. 90 Tablet 0    atorvastatin (LIPITOR) 20 MG Tab Take 1 Tablet by mouth every evening. 90 Tablet 3    busPIRone (BUSPAR) 10 MG Tab  "tablet Pt takes 10MG in AM and 20MG HS Take 10-20 mg by mouth 2 times a day. Pt takes 10MG in AM and 20MG  Tablet 3    fluticasone (FLONASE) 50 MCG/ACT nasal spray Administer 1 Spray into affected nostril(S) every day. (Patient taking differently: Administer 1 Spray into affected nostril(S) every morning.) 16 g 3    cyanocobalamin (VITAMIN B-12) 100 MCG Tab Take 100 mcg by mouth every morning.      BIOTIN PO Take 1 Tablet by mouth every morning. Pt unsure of dose      Calcium Citrate-Vitamin D (CALCIUM + D PO) Take 1 Tablet by mouth every morning.      MAGNESIUM PO Take 1 Capsule by mouth every morning. Pt unsure of dose      Cholecalciferol (VITAMIN D3 PO) Take 1 Capsule by mouth every morning. Pt unsure of dose      VITAMIN E PO Take 1 Capsule by mouth every morning. Pt unsure of dose      [START ON 9/29/2022] zolpidem (AMBIEN) 10 MG Tab Take 1 Tablet by mouth at bedtime as needed for Sleep for up to 30 days. 30 Tablet 2     No current Epic-ordered facility-administered medications on file.       Allergies:  Pollen extract    Health Maintenance: Completed    Review of Systems:   Denies any recent fevers or chills. No nausea or vomiting. No chest pains or shortness of breath.      Objective:       Exam:  /58 (BP Location: Right arm, Patient Position: Sitting, BP Cuff Size: Adult)   Pulse 94   Temp 37.6 °C (99.6 °F) (Temporal)   Resp 20   Ht 1.626 m (5' 4\")   Wt 58.5 kg (129 lb)   SpO2 91%   BMI 22.14 kg/m²  Body mass index is 22.14 kg/m².    Gen: Alert and oriented, No apparent distress.  Lungs: Normal effort, CTA bilaterally, no wheezes, rhonchi, or rales  CV: Regular rate and rhythm. No murmurs, rubs, or gallops.  Ext: No clubbing, cyanosis, edema.        Assessment & Plan:     76 y.o. female with the following -     Primary insomnia  Chronic medical condition. Well-controlled.    The patient takes zolpidem 10 mg nightly.   Review of the patient's  shows that the patient was last given a " prescription of zolpidem 10 mg, dispense #30, on 7/30/2022.   Obtained and reviewed patient utilization report from [facility] database on 8/26/2022 6:36 AM  prior to writing prescription for controlled substance II, III or IV per [place] bill . Based on assessment of the report, the prescription is medically necessary.   Controlled Substance Treatment Agreement was signed and scanned into the patient's chart on 8/26/2022.    - zolpidem (AMBIEN) 10 MG Tab; Take 1 Tablet by mouth at bedtime as needed for Sleep for up to 90 days.  Dispense: 30 Tablet; Refill: 2    Subclinical hyperthyroidism  Chronic medical condition.  Stable.  Labs from 3/9/2022 showed a reduced TSH of 0.05 with a normal free T4 of 1.23.  TSI was negative at <0.10.  The patient is clinically euthyroid.  - TSH; Future  - FREE THYROXINE; Future  - T3 FREE; Future    Mixed hyperlipidemia  Chronic medical condition.  Well-controlled.  Lipid panel from 3/9/2022 showed a total cholesterol 152, LDL 65, HDL 59, triglycerides 142.    The patient denies statin associated myalgias.  Continue current regimen of atorvastatin 20 mg nightly.  - Comp Metabolic Panel; Future  - Lipid Profile; Future    Screening for deficiency anemia  - CBC WITH DIFFERENTIAL; Future    Return in about 3 months (around 11/26/2022) for Controlled Substance.    Please note that this dictation was created using voice recognition software. I have made every reasonable attempt to correct obvious errors, but I expect that there are errors of grammar and possibly content that I did not discover before finalizing the note.

## 2022-08-26 ENCOUNTER — OFFICE VISIT (OUTPATIENT)
Dept: MEDICAL GROUP | Facility: PHYSICIAN GROUP | Age: 77
End: 2022-08-26
Payer: MEDICARE

## 2022-08-26 VITALS
HEART RATE: 94 BPM | DIASTOLIC BLOOD PRESSURE: 58 MMHG | RESPIRATION RATE: 20 BRPM | HEIGHT: 64 IN | SYSTOLIC BLOOD PRESSURE: 110 MMHG | BODY MASS INDEX: 22.02 KG/M2 | TEMPERATURE: 99.6 F | WEIGHT: 129 LBS | OXYGEN SATURATION: 91 %

## 2022-08-26 DIAGNOSIS — E78.2 MIXED HYPERLIPIDEMIA: ICD-10-CM

## 2022-08-26 DIAGNOSIS — E05.90 SUBCLINICAL HYPERTHYROIDISM: ICD-10-CM

## 2022-08-26 DIAGNOSIS — Z13.0 SCREENING FOR DEFICIENCY ANEMIA: ICD-10-CM

## 2022-08-26 DIAGNOSIS — F51.01 PRIMARY INSOMNIA: ICD-10-CM

## 2022-08-26 PROCEDURE — 99214 OFFICE O/P EST MOD 30 MIN: CPT | Performed by: INTERNAL MEDICINE

## 2022-08-26 RX ORDER — ZOLPIDEM TARTRATE 10 MG/1
10 TABLET ORAL NIGHTLY PRN
Qty: 90 TABLET | Refills: 0 | Status: SHIPPED | OUTPATIENT
Start: 2022-08-26 | End: 2022-09-25

## 2022-08-26 ASSESSMENT — FIBROSIS 4 INDEX: FIB4 SCORE: 1.57

## 2022-08-30 ENCOUNTER — PHYSICAL THERAPY (OUTPATIENT)
Dept: PHYSICAL THERAPY | Facility: REHABILITATION | Age: 77
End: 2022-08-30
Attending: INTERNAL MEDICINE
Payer: MEDICARE

## 2022-08-30 DIAGNOSIS — Z96.641 S/P TOTAL RIGHT HIP ARTHROPLASTY: ICD-10-CM

## 2022-08-30 DIAGNOSIS — R26.89 IMBALANCE: ICD-10-CM

## 2022-08-30 PROCEDURE — 97110 THERAPEUTIC EXERCISES: CPT

## 2022-08-30 PROCEDURE — 97161 PT EVAL LOW COMPLEX 20 MIN: CPT

## 2022-08-30 SDOH — ECONOMIC STABILITY: GENERAL: QUALITY OF LIFE: FAIR

## 2022-08-30 ASSESSMENT — ENCOUNTER SYMPTOMS
PAIN SCALE AT LOWEST: 0
PAIN SCALE AT HIGHEST: 0
PAIN SCALE: 0

## 2022-08-30 NOTE — OP THERAPY EVALUATION
Outpatient Physical Therapy  INITIAL EVALUATION    [facility] [place]  [address], [address]  [place] [address]  Phone:  [phone]  Fax:  [phone]    Date of Evaluation: 2022    Patient: Yasmin Zhong  YOB: 1945  MRN: [id number]     Referring Provider: Lucina Gupta M.D.  [address] [place] [address]   Referring Diagnosis S/P total right hip arthroplasty [Z96.641]     Time Calculation  Start time: 1545  Stop time: 1630 Time Calculation (min): 45 minutes         Chief Complaint: Post-Op Pain and Difficulty Walking    Visit Diagnoses     ICD-10-CM   1. S/P total right hip arthroplasty  Z96.641   2. Imbalance  R26.89       Date of onset of impairment: 2022    Subjective:   History of Present Illness:     Date of surgery:  2022  Quality of life:  Fair  Prior level of function:  New onset hip surgery ; continued balance deficits  Pain:     Current pain ratin    At best pain ratin    At worst pain ratin  Activities of Daily Living:     Patient reported ADL status: Limited mobility  Falls    Patient Goals:     Patient goals for therapy:  Increased strength, improved balance and increased motion  Patient is a 76 y.o. female that presents to therapy status post KAYLA. States that the surgery was need due to due to GLF. Reports the pain is not limiting her function but states her main problem is balance. Reports that she is falling. Reports that symptoms now worsening. Denies red flags.   Past Medical History:   Diagnosis Date    Anxiety     Arrhythmia 2020    Arthritis 2020    Blood clotting disorder (HCC)     hx  in lung    Breath shortness 2020    no supplemental oxygen    Cancer (HCC)     basal cell per hx    Cataract     meg IOL     Depression     Fall from ground level 2021    Heart murmur     History of respiratory failure     6-month hospitalization in 2014    Hyperlipidemia      Indigestion 2020    GERD    Insomnia     Pain 2020    back pain    Peripheral neuropathy     Pneumonia     hx 2013    Pulmonary fibrosis (HCC)     Recurrent major depressive disorder, in full remission (HCC) 2017    Thrombocytopenia (HCC) 2021     Past Surgical History:   Procedure Laterality Date    [place] TOTAL HIP ARTHROPLASTY Right 2022    Procedure: RIGHT ARTHROPLASTY, HIP, TOTAL;  Surgeon: Mauricio Rose M.D.;  Location: SURGERY [facility];  Service: Orthopedics    ORIF, FRACTURE, ACETABULUM Right 1/3/2022    Procedure: OPEN REDUCTION AND INTERNAL FIXATION, FRACTURE, ACETABULUM;  Surgeon: Pritesh Zamora M.D.;  Location: SURGERY [facility];  Service: Orthopedics    STRABISMUS REPAIR Bilateral 2021    Procedure: STRABISMUS SURGERY - ESOTROPIA STRABISMUS PROCEDURE, ONE HORIZONTAL MUSCLE - BILATERAL, PROCEDURE FOR SCARRING OF EXTRAOCULAR MUSCLE AND ADJUSTABLE SUTURE PLACEMENT - RIGHT;  Surgeon: Keith Horne M.D.;  Location: SURGERY SAME DAY [facility];  Service: Ophthalmology    EYE SURGERY Bilateral 2021    Bilateral medial rectus recession in patient with prior ocular Bilateral medial rectus recession in patient with prior ocular     KY LAP,ESOPHAGOGAST FUNDOPLASTY N/A 2020    Procedure: FUNDOPLICATION, NISSEN, LAPAROSCOPIC- FOR PARAESOPHAGEAL WITH MESH;  Surgeon: Pritesh Baptiste M.D.;  Location: SURGERY [facility];  Service: General    HIP HEMIARTHROPLASTY  2015    Performed by Raymond Lantigua M.D. at SURGERY [facility] ORS    CATARACT EXTRACTION WITH IOL Bilateral     OTHER      breast reduction     Social History     Tobacco Use    Smoking status: Former     Packs/day: 0.25     Years: 15.00     Pack years: 3.75     Types: Cigarettes     Quit date: 3/11/1985     Years since quittin.4    Smokeless tobacco: Never    Tobacco comments:     passive smoke exposure her entire life   Substance Use Topics    Alcohol use: Not Currently      Alcohol/week: 0.0 oz     Family and Occupational History     Socioeconomic History    Marital status: Single     Spouse name: Not on file    Number of children: 0    Years of education: Not on file    Highest education level: Not on file   Occupational History    Not on file       Objective     Postural Observations  Seated posture: poor  Standing posture: poor      Neurological Testing     Reflexes   Left   Patellar (L4): normal (2+)  Achilles (S1): normal (2+)  Ankle clonus reflex: negative  Babinski sign: negative    Right   Patellar (L4): normal (2+)  Achilles (S1): normal (2+)  Ankle clonus reflex: negative  Babinski sign: negative    Myotome testing   Lumbar (left)   L1 (hip flexors): 4-  L2 (hip flexors): 4-  L3 (knee extensors): 4  L4 (ankle dorsiflexors): 5  L5 (great toe extension): 4-  S1 (ankle plantar flexors): 4    Lumbar (right)   L1 (hip flexors): 4-  L2 (hip flexors): 4-  L3 (knee extensors): 4  L4 (ankle dorsiflexors): 5  L5 (great toe extension): 4+  S1 (ankle plantar flexors): 4    Dermatome testing   Lumbar (left)   All left lumbar dermatomes intact    Lumbar (right)   All right lumbar dermatomes intact    Additional Neurological Details  Impaired to small movement at the ankles    Active Range of Motion   Left Hip   Flexion: 90 degrees   External rotation (90/90): 33 degrees   Internal rotation (90/90): 18 degrees     Right Hip   Flexion: WFL  External rotation (90/90): 40 degrees   Internal rotation (90/90): 12 degrees     Strength:      Left Hip   Planes of Motion   Abduction: 3-  Adduction: 3+    Right Hip   Planes of Motion   Abduction: 3-  Adduction: 3+  Ambulation     Observational Gait   Gait: antalgic   Decreased walking speed and stride length.     Additional Observational Gait Details  SPC with antalgic and almost ataxic gait      Therapeutic Exercises (CPT 17606):     1. Seated hip abd, x20 blue    2. standing march, x20    Time-based treatments/modalities:    Physical Therapy Timed  Treatment Charges  Therapeutic exercise minutes (CPT 53533): 10 minutes      Assessment, Response and Plan:   Impairments: abnormal or restricted ROM, activity intolerance, impaired balance and impaired physical strength    Assessment details:  Patient presents with signs and symptoms consistent with a post op condition and a balance deficit. Patient limitations include weakness, decreased ROM, and pain. Patient will benefit from skilled therapy to improve the aforementioned deficits and decrease further functional decline.   Prognosis: fair    Goals:   Short Term Goals:   1) Patient's hip abd will improve by a half muscle grade to facilitate improved stability.  2) Patient's calf strength will improve by a half muscle grade to facilitate improves gait.   Short term goal time span:  2-4 weeks      Long Term Goals:    1) Patient's symptoms will improve to allow for ambulation full time with SPC.  2) Patient's BBS will improve by 10 to demonstrate functional improvement   Long term goal time span:  6-8 weeks    Plan:   Therapy options:  Physical therapy treatment to continue  Planned therapy interventions:  E Stim Unattended (CPT 24256), Manual Therapy (CPT 35846), Neuromuscular Re-education (CPT 72872), Therapeutic Exercise (CPT 30234) and Hot or Cold Pack Therapy (CPT 72879)  Frequency:  2x week  Duration in weeks:  8  Discussed with:  Patient    Functional Assessment Used  PT Functional Assessment Tool Used: BBS/LEFS  PT Functional Assessment Score: 38/37     Referring provider co-signature:  I have reviewed this plan of care and my co-signature certifies the need for services.    Certification Period: 08/30/2022 to  10/26/22    Physician Signature: ________________________________ Date: ______________

## 2022-08-31 ASSESSMENT — ACTIVITIES OF DAILY LIVING (ADL): POOR_BALANCE: 1

## 2022-09-01 ENCOUNTER — TELEPHONE (OUTPATIENT)
Dept: MEDICAL GROUP | Facility: PHYSICIAN GROUP | Age: 77
End: 2022-09-01
Payer: MEDICARE

## 2022-09-01 ENCOUNTER — APPOINTMENT (OUTPATIENT)
Dept: PHYSICAL THERAPY | Facility: REHABILITATION | Age: 77
End: 2022-09-01
Attending: INTERNAL MEDICINE
Payer: MEDICARE

## 2022-09-01 DIAGNOSIS — F51.01 PRIMARY INSOMNIA: ICD-10-CM

## 2022-09-01 RX ORDER — ZOLPIDEM TARTRATE 10 MG/1
10 TABLET ORAL NIGHTLY PRN
Qty: 30 TABLET | Refills: 2 | Status: SHIPPED | OUTPATIENT
Start: 2022-09-29 | End: 2022-10-29

## 2022-09-02 NOTE — TELEPHONE ENCOUNTER
Caller Name: Yasmin Zhong    Call Back Number: [phone] (home)       How would the patient prefer to be contacted with a response: Phone call do NOT leave a detailed message    PT call and say that she wants her refill on this medication change zolpidem (AMBIEN) 10 MG Tab.   I call the pharmacy and they say the prescription need to say 30 for 30 days. She  one prescription for this month.

## 2022-09-06 ENCOUNTER — APPOINTMENT (OUTPATIENT)
Dept: PHYSICAL THERAPY | Facility: REHABILITATION | Age: 77
End: 2022-09-06
Payer: MEDICARE

## 2022-09-13 ENCOUNTER — APPOINTMENT (OUTPATIENT)
Dept: PHYSICAL THERAPY | Facility: REHABILITATION | Age: 77
End: 2022-09-13
Payer: MEDICARE

## 2022-09-19 DIAGNOSIS — Z78.0 POST-MENOPAUSAL: ICD-10-CM

## 2022-09-20 ENCOUNTER — APPOINTMENT (OUTPATIENT)
Dept: PHYSICAL THERAPY | Facility: REHABILITATION | Age: 77
End: 2022-09-20
Payer: MEDICARE

## 2022-09-27 ENCOUNTER — PHYSICAL THERAPY (OUTPATIENT)
Dept: PHYSICAL THERAPY | Facility: REHABILITATION | Age: 77
End: 2022-09-27
Attending: INTERNAL MEDICINE
Payer: MEDICARE

## 2022-09-27 DIAGNOSIS — R26.89 IMBALANCE: ICD-10-CM

## 2022-09-27 DIAGNOSIS — Z96.641 S/P TOTAL RIGHT HIP ARTHROPLASTY: ICD-10-CM

## 2022-09-27 DIAGNOSIS — Z91.81 RISK FOR FALLS: ICD-10-CM

## 2022-09-27 DIAGNOSIS — G60.9 IDIOPATHIC PERIPHERAL NEUROPATHY: ICD-10-CM

## 2022-09-27 PROCEDURE — 97110 THERAPEUTIC EXERCISES: CPT

## 2022-09-27 NOTE — OP THERAPY DAILY TREATMENT
Outpatient Physical Therapy  DAILY TREATMENT     [facility] [place]  [address]  [place] [place] [address]  Phone:  [phone]  Fax:  [phone]    Date: 09/27/2022    Patient: Yasmin Zhong  YOB: 1945  MRN: [id number]     Time Calculation    Start time: 1500  Stop time: 1545 Time Calculation (min): 45 minutes         Chief Complaint: Hip Problem and Difficulty Walking    Visit #: 2    SUBJECTIVE:  Patient report that symptoms have improved a little.     OBJECTIVE:  Current objective measures:   O2: 88%-93%          Therapeutic Exercises (CPT 46498):     1. Nu step, x10min    2. Shuttle, DL x4min 4c    3. Shuttle, SL x2min 2c    4. Seated hip abd, x20    5. Supine hip abd, x20    6. sit to stand, x20    7. Calf stretch, NT    8. Balance on foam, x5min    9. bridge, 2x10    Time-based treatments/modalities:    Physical Therapy Timed Treatment Charges  Therapeutic exercise minutes (CPT 79804): 40 minutes      Pain rating (1-10) before treatment:  0  Pain rating (1-10) after treatment:  0    ASSESSMENT:   Response to treatment: Patient responded fair to therapy with an overall good tolerance physically for exercise. Patient did have a drop in SpO2 during exercise.     PLAN/RECOMMENDATIONS:   Plan for treatment: therapy treatment to continue next visit.  Planned interventions for next visit: continue with current treatment.

## 2022-10-05 ENCOUNTER — PHYSICAL THERAPY (OUTPATIENT)
Dept: PHYSICAL THERAPY | Facility: REHABILITATION | Age: 77
End: 2022-10-05
Attending: FAMILY MEDICINE
Payer: MEDICARE

## 2022-10-05 DIAGNOSIS — Z91.81 RISK FOR FALLS: ICD-10-CM

## 2022-10-05 DIAGNOSIS — Z96.641 S/P TOTAL RIGHT HIP ARTHROPLASTY: ICD-10-CM

## 2022-10-05 DIAGNOSIS — G60.9 IDIOPATHIC PERIPHERAL NEUROPATHY: ICD-10-CM

## 2022-10-05 DIAGNOSIS — R26.89 IMBALANCE: ICD-10-CM

## 2022-10-05 PROCEDURE — 97110 THERAPEUTIC EXERCISES: CPT

## 2022-10-05 NOTE — OP THERAPY DAILY TREATMENT
Outpatient Physical Therapy  DAILY TREATMENT     [facility] [place]  [address]  [place] [facility] [address]  Phone:  [phone]  Fax:  [phone]    Date: 10/05/2022    Patient: Yasmin Zhong  YOB: 1945  MRN: [id number]     Time Calculation    Start time: 1500  Stop time: 1540 Time Calculation (min): 40 minutes         Chief Complaint: Difficulty Walking and Hip Problem    Visit #: 3    SUBJECTIVE:  Patient reports no falls and no soreness.     OBJECTIVE:  Current objective measures:           Therapeutic Exercises (CPT 32740):     1. Nu step, x10min    2. Shuttle, DL x4min 4c    3. Shuttle, SL x2min 2c    4. Seated hip abd, x20    5. Supine hip abd, x20, SL and DL    6. sit to stand, x20    7. Calf stretch, NT    8. Balance on foam, x5min    9. bridge, 2x10, on roller    10. supine ball roll to 90deg, x30    Time-based treatments/modalities:    Physical Therapy Timed Treatment Charges  Therapeutic exercise minutes (CPT 97465): 40 minutes      Pain rating (1-10) before treatment:  0  Pain rating (1-10) after treatment:  0    ASSESSMENT:   Response to treatment: Patient responded well to therapy with an increase in fatigue with no increase in pain. Plan to continue with current POC.     PLAN/RECOMMENDATIONS:   Plan for treatment: therapy treatment to continue next visit.  Planned interventions for next visit: continue with current treatment.

## 2022-10-11 ENCOUNTER — APPOINTMENT (OUTPATIENT)
Dept: PHYSICAL THERAPY | Facility: REHABILITATION | Age: 77
End: 2022-10-11
Attending: FAMILY MEDICINE
Payer: MEDICARE

## 2022-10-13 ENCOUNTER — HOSPITAL ENCOUNTER (OUTPATIENT)
Dept: RADIOLOGY | Facility: MEDICAL CENTER | Age: 77
End: 2022-10-13
Attending: INTERNAL MEDICINE
Payer: MEDICARE

## 2022-10-13 ENCOUNTER — HOSPITAL ENCOUNTER (OUTPATIENT)
Dept: LAB | Facility: MEDICAL CENTER | Age: 77
End: 2022-10-13
Attending: INTERNAL MEDICINE
Payer: MEDICARE

## 2022-10-13 DIAGNOSIS — E78.2 MIXED HYPERLIPIDEMIA: ICD-10-CM

## 2022-10-13 DIAGNOSIS — Z78.0 POST-MENOPAUSAL: ICD-10-CM

## 2022-10-13 DIAGNOSIS — E05.90 SUBCLINICAL HYPERTHYROIDISM: ICD-10-CM

## 2022-10-13 DIAGNOSIS — Z13.0 SCREENING FOR DEFICIENCY ANEMIA: ICD-10-CM

## 2022-10-13 LAB
ALBUMIN SERPL BCP-MCNC: 4.3 G/DL (ref 3.2–4.9)
ALBUMIN/GLOB SERPL: 1.3 G/DL
ALP SERPL-CCNC: 98 U/L (ref 30–99)
ALT SERPL-CCNC: 10 U/L (ref 2–50)
ANION GAP SERPL CALC-SCNC: 12 MMOL/L (ref 7–16)
AST SERPL-CCNC: 13 U/L (ref 12–45)
BASOPHILS # BLD AUTO: 0.8 % (ref 0–1.8)
BASOPHILS # BLD: 0.05 K/UL (ref 0–0.12)
BILIRUB SERPL-MCNC: 0.3 MG/DL (ref 0.1–1.5)
BUN SERPL-MCNC: 17 MG/DL (ref 8–22)
CALCIUM SERPL-MCNC: 10.2 MG/DL (ref 8.5–10.5)
CHLORIDE SERPL-SCNC: 104 MMOL/L (ref 96–112)
CHOLEST SERPL-MCNC: 138 MG/DL (ref 100–199)
CO2 SERPL-SCNC: 25 MMOL/L (ref 20–33)
CREAT SERPL-MCNC: 0.62 MG/DL (ref 0.5–1.4)
EOSINOPHIL # BLD AUTO: 0.22 K/UL (ref 0–0.51)
EOSINOPHIL NFR BLD: 3.3 % (ref 0–6.9)
ERYTHROCYTE [DISTWIDTH] IN BLOOD BY AUTOMATED COUNT: 45.8 FL (ref 35.9–50)
GFR SERPLBLD CREATININE-BSD FMLA CKD-EPI: 92 ML/MIN/1.73 M 2
GLOBULIN SER CALC-MCNC: 3.3 G/DL (ref 1.9–3.5)
GLUCOSE SERPL-MCNC: 77 MG/DL (ref 65–99)
HCT VFR BLD AUTO: 42.1 % (ref 37–47)
HDLC SERPL-MCNC: 41 MG/DL
HGB BLD-MCNC: 14 G/DL (ref 12–16)
IMM GRANULOCYTES # BLD AUTO: 0.01 K/UL (ref 0–0.11)
IMM GRANULOCYTES NFR BLD AUTO: 0.2 % (ref 0–0.9)
LDLC SERPL CALC-MCNC: 58 MG/DL
LYMPHOCYTES # BLD AUTO: 2.39 K/UL (ref 1–4.8)
LYMPHOCYTES NFR BLD: 36.3 % (ref 22–41)
MCH RBC QN AUTO: 34.1 PG (ref 27–33)
MCHC RBC AUTO-ENTMCNC: 33.3 G/DL (ref 33.6–35)
MCV RBC AUTO: 102.7 FL (ref 81.4–97.8)
MONOCYTES # BLD AUTO: 0.62 K/UL (ref 0–0.85)
MONOCYTES NFR BLD AUTO: 9.4 % (ref 0–13.4)
NEUTROPHILS # BLD AUTO: 3.3 K/UL (ref 2–7.15)
NEUTROPHILS NFR BLD: 50 % (ref 44–72)
NRBC # BLD AUTO: 0 K/UL
NRBC BLD-RTO: 0 /100 WBC
PLATELET # BLD AUTO: 305 K/UL (ref 164–446)
PMV BLD AUTO: 9.6 FL (ref 9–12.9)
POTASSIUM SERPL-SCNC: 4.4 MMOL/L (ref 3.6–5.5)
PROT SERPL-MCNC: 7.6 G/DL (ref 6–8.2)
RBC # BLD AUTO: 4.1 M/UL (ref 4.2–5.4)
SODIUM SERPL-SCNC: 141 MMOL/L (ref 135–145)
T3FREE SERPL-MCNC: 3.4 PG/ML (ref 2–4.4)
T4 FREE SERPL-MCNC: 1.12 NG/DL (ref 0.93–1.7)
TRIGL SERPL-MCNC: 195 MG/DL (ref 0–149)
TSH SERPL DL<=0.005 MIU/L-ACNC: 0.04 UIU/ML (ref 0.38–5.33)
WBC # BLD AUTO: 6.6 K/UL (ref 4.8–10.8)

## 2022-10-13 PROCEDURE — 85025 COMPLETE CBC W/AUTO DIFF WBC: CPT

## 2022-10-13 PROCEDURE — 36415 COLL VENOUS BLD VENIPUNCTURE: CPT

## 2022-10-13 PROCEDURE — 84439 ASSAY OF FREE THYROXINE: CPT

## 2022-10-13 PROCEDURE — 84481 FREE ASSAY (FT-3): CPT

## 2022-10-13 PROCEDURE — 77080 DXA BONE DENSITY AXIAL: CPT

## 2022-10-13 PROCEDURE — 80061 LIPID PANEL: CPT

## 2022-10-13 PROCEDURE — 80053 COMPREHEN METABOLIC PANEL: CPT

## 2022-10-13 PROCEDURE — 84443 ASSAY THYROID STIM HORMONE: CPT

## 2022-10-18 ENCOUNTER — APPOINTMENT (OUTPATIENT)
Dept: PHYSICAL THERAPY | Facility: REHABILITATION | Age: 77
End: 2022-10-18
Attending: FAMILY MEDICINE
Payer: MEDICARE

## 2022-10-25 ENCOUNTER — APPOINTMENT (OUTPATIENT)
Dept: PHYSICAL THERAPY | Facility: REHABILITATION | Age: 77
End: 2022-10-25
Attending: FAMILY MEDICINE
Payer: MEDICARE

## 2022-11-03 ENCOUNTER — PATIENT MESSAGE (OUTPATIENT)
Dept: HEALTH INFORMATION MANAGEMENT | Facility: OTHER | Age: 77
End: 2022-11-03

## 2022-11-08 ENCOUNTER — OFFICE VISIT (OUTPATIENT)
Dept: OPHTHALMOLOGY | Facility: MEDICAL CENTER | Age: 77
End: 2022-11-08
Payer: MEDICARE

## 2022-11-08 DIAGNOSIS — H25.013 CORTICAL AGE-RELATED CATARACT OF BOTH EYES: ICD-10-CM

## 2022-11-08 DIAGNOSIS — H40.003 GLAUCOMA SUSPECT OF BOTH EYES: ICD-10-CM

## 2022-11-08 DIAGNOSIS — H52.13 MYOPIA OF BOTH EYES: ICD-10-CM

## 2022-11-08 DIAGNOSIS — H35.371 EPIRETINAL MEMBRANE (ERM) OF RIGHT EYE: ICD-10-CM

## 2022-11-08 DIAGNOSIS — H49.9 OPHTHALMOPLEGIA: ICD-10-CM

## 2022-11-08 PROCEDURE — 99214 OFFICE O/P EST MOD 30 MIN: CPT | Mod: 25 | Performed by: OPHTHALMOLOGY

## 2022-11-08 PROCEDURE — 92250 FUNDUS PHOTOGRAPHY W/I&R: CPT | Performed by: OPHTHALMOLOGY

## 2022-11-08 PROCEDURE — 92060 SENSORIMOTOR EXAMINATION: CPT | Performed by: OPHTHALMOLOGY

## 2022-11-08 ASSESSMENT — REFRACTION_WEARINGRX
OS_SPHERE: -0.50
OS_ADD: +3.00
OS_CYLINDER: +0.75
OS_AXIS: 065
OD_ADD: +3.00
OD_SPHERE: -0.50

## 2022-11-08 ASSESSMENT — CONF VISUAL FIELD
OS_NORMAL: 1
OD_INFERIOR_NASAL_RESTRICTION: 0
OD_SUPERIOR_TEMPORAL_RESTRICTION: 0
OD_INFERIOR_TEMPORAL_RESTRICTION: 0
OS_SUPERIOR_TEMPORAL_RESTRICTION: 0
OS_INFERIOR_NASAL_RESTRICTION: 0
OS_INFERIOR_TEMPORAL_RESTRICTION: 0
OD_NORMAL: 1
OD_SUPERIOR_NASAL_RESTRICTION: 0
OS_SUPERIOR_NASAL_RESTRICTION: 0

## 2022-11-08 ASSESSMENT — REFRACTION_MANIFEST
METHOD_AUTOREFRACTION: 1
OD_CYLINDER: +0.75
OD_SPHERE: -0.50
OD_AXIS: 091
OS_CYLINDER: +2.00
OS_AXIS: 059
OS_SPHERE: -1.75

## 2022-11-08 ASSESSMENT — EXTERNAL EXAM - LEFT EYE: OS_EXAM: NORMAL

## 2022-11-08 ASSESSMENT — TONOMETRY
OD_IOP_MMHG: 10
OS_IOP_MMHG: 12

## 2022-11-08 ASSESSMENT — VISUAL ACUITY
OS_SC: 20/40-1
METHOD: SNELLEN - LINEAR
OD_SC: 20/30+2

## 2022-11-08 ASSESSMENT — ENCOUNTER SYMPTOMS: DOUBLE VISION: 1

## 2022-11-08 ASSESSMENT — SLIT LAMP EXAM - LIDS
COMMENTS: NORMAL
COMMENTS: NORMAL

## 2022-11-08 ASSESSMENT — EXTERNAL EXAM - RIGHT EYE: OD_EXAM: NORMAL

## 2022-11-08 NOTE — PROGRESS NOTES
Peds/Neuro Ophthalmology:   Keith Horne M.D.    Date & Time note created:    11/8/2022   2:52 PM     Referring MD / APRN:  Lucina Gupta M.D., No att. providers found    Patient ID:  Name:             Yasmin Zhong   YOB: 1945  Age:                 76 y.o.  female   MRN:               [id number]    Chief Complaint/Reason for Visit:     Diplopia      History of Present Illness:    Yasmin Zhong is a 76 y.o. female   Follow up ophthalmoplegia with double vision which was improved with surgery.Patient would like a new RX for glasses.Now only occasional double vision.      Review of Systems:  Review of Systems   Eyes:  Positive for double vision.   All other systems reviewed and are negative.    Past Medical History:   Past Medical History:   Diagnosis Date    Anxiety     Arrhythmia 11/18/2020    Arthritis 11/18/2020    Blood clotting disorder (HCC)     hx 2013 in lung    Breath shortness 11/18/2020    no supplemental oxygen    Cancer (HCC) 1990    basal cell per hx    Cataract     meg IOL     Depression     Fall from ground level 5/25/2021    Heart murmur     History of respiratory failure     6-month hospitalization in 2014    Hyperlipidemia     Indigestion 11/18/2020    GERD    Insomnia     Pain 11/18/2020    back pain    Peripheral neuropathy     Pneumonia     hx 2013    Pulmonary fibrosis (HCC)     Recurrent major depressive disorder, in full remission (HCC) 7/21/2017    Thrombocytopenia ([facility]) 4/19/2021       Past Surgical History:  Past Surgical History:   Procedure Laterality Date    ND TOTAL HIP ARTHROPLASTY Right 4/4/2022    Procedure: RIGHT ARTHROPLASTY, HIP, TOTAL;  Surgeon: Mauricio Rose M.D.;  Location: SURGERY [facility];  Service: Orthopedics    ORIF, FRACTURE, ACETABULUM Right 1/3/2022    Procedure: OPEN REDUCTION AND INTERNAL FIXATION, FRACTURE, ACETABULUM;  Surgeon: Pritesh Zamora M.D.;  Location: SURGERY [facility];  Service: Orthopedics     STRABISMUS REPAIR Bilateral 11/19/2021    Procedure: STRABISMUS SURGERY - ESOTROPIA STRABISMUS PROCEDURE, ONE HORIZONTAL MUSCLE - BILATERAL, PROCEDURE FOR SCARRING OF EXTRAOCULAR MUSCLE AND ADJUSTABLE SUTURE PLACEMENT - RIGHT;  Surgeon: Keith Horne M.D.;  Location: SURGERY SAME DAY [facility];  Service: Ophthalmology    EYE SURGERY Bilateral 11/19/2021    Bilateral medial rectus recession in patient with prior ocular Bilateral medial rectus recession in patient with prior ocular     DC LAP,ESOPHAGOGAST FUNDOPLASTY N/A 11/23/2020    Procedure: FUNDOPLICATION, NISSEN, LAPAROSCOPIC- FOR PARAESOPHAGEAL WITH MESH;  Surgeon: Pritesh Baptiste M.D.;  Location: SURGERY [facility];  Service: General    HIP HEMIARTHROPLASTY  4/25/2015    Performed by Raymond Lantigua M.D. at SURGERY [facility] ORS    CATARACT EXTRACTION WITH IOL Bilateral     OTHER      breast reduction       Current Outpatient Medications:  Current Outpatient Medications   Medication Sig Dispense Refill    atorvastatin (LIPITOR) 20 MG Tab Take 1 Tablet by mouth every evening. 90 Tablet 3    busPIRone (BUSPAR) 10 MG Tab tablet Pt takes 10MG in AM and 20MG HS Take 10-20 mg by mouth 2 times a day. Pt takes 10MG in AM and 20MG  Tablet 3    fluticasone (FLONASE) 50 MCG/ACT nasal spray Administer 1 Spray into affected nostril(S) every day. (Patient taking differently: Administer 1 Spray into affected nostril(S) every morning.) 16 g 3    cyanocobalamin (VITAMIN B-12) 100 MCG Tab Take 1 Tablet by mouth every morning.      BIOTIN PO Take 1 Tablet by mouth every morning. Pt unsure of dose      Calcium Citrate-Vitamin D (CALCIUM + D PO) Take 1 Tablet by mouth every morning.      MAGNESIUM PO Take 1 Capsule by mouth every morning. Pt unsure of dose      Cholecalciferol (VITAMIN D3 PO) Take 1 Capsule by mouth every morning. Pt unsure of dose      VITAMIN E PO Take 1 Capsule by mouth every morning. Pt unsure of dose       No current facility-administered  "medications for this visit.       Allergies:  Allergies   Allergen Reactions    Pollen Extract      \"cough\"       Family History:  Family History   Problem Relation Age of Onset    Cancer Mother         Bladder cancer, hx. of smoking    Diabetes Mother     Heart Attack Father     Cancer Father         Colon     Cancer Maternal Uncle         Lung    Diabetes Maternal Uncle     Diabetes Maternal Aunt        Social History:  Social History     Socioeconomic History    Marital status: Single     Spouse name: Not on file    Number of children: 0    Years of education: Not on file    Highest education level: Not on file   Occupational History    Not on file   Tobacco Use    Smoking status: Former     Packs/day: 0.25     Years: 15.00     Pack years: 3.75     Types: Cigarettes     Quit date: 3/11/1985     Years since quittin.6    Smokeless tobacco: Never    Tobacco comments:     passive smoke exposure her entire life   Vaping Use    Vaping Use: Never used   Substance and Sexual Activity    Alcohol use: Not Currently     Alcohol/week: 0.0 oz    Drug use: No    Sexual activity: Not Currently   Other Topics Concern    Not on file   Social History Narrative    Not on file     Social Determinants of Health     Financial Resource Strain: Not on file   Food Insecurity: Not on file   Transportation Needs: Not on file   Physical Activity: Not on file   Stress: Not on file   Social Connections: Not on file   Intimate Partner Violence: Not on file   Housing Stability: Not on file          Physical Exam:  Physical Exam    Oriented x 3  Weight/BMI: There is no height or weight on file to calculate BMI.  There were no vitals taken for this visit.    Base Eye Exam       Visual Acuity (Snellen - Linear)         Right Left    Dist sc 20/30+2 20/40-1              Tonometry ( care, 1:40 PM)         Right Left    Pressure 10 12              Pupils         Pupils    Right PERRL    Left PERRL              Visual Fields         Right Left "     Full Full              Neuro/Psych       Oriented x3: Yes    Mood/Affect: Normal              Dilation       Both eyes: able to view without dilation                   Additional Tests       Color         Right Left    Ishihara 9/9 9/9              Stereo       Fly: -    Animals: 0/3    Circles: 0/9                  Strabismus Exam         0 0 0   0 0 0                       0  0  ET 20 0  0                       0 0 0   0 0 0                       Slit Lamp and Fundus Exam       External Exam         Right Left    External Normal Normal              Slit Lamp Exam         Right Left    Lids/Lashes Normal Normal    Conjunctiva/Sclera Injection Injection    Cornea Clear Clear    Anterior Chamber Deep and quiet Deep and quiet    Iris Round and reactive Round and reactive    Lens Posterior chamber intraocular lens Posterior chamber intraocular lens    Vitreous Normal Normal              Fundus Exam         Right Left    Disc Tilted disc Tilted disc    Macula ERM Normal    Vessels Normal Normal    Periphery Normal Normal                  Refraction       Wearing Rx         Sphere Cylinder Axis Add    Right -0.50   +3.00    Left -0.50 +0.75 065 +3.00              Manifest Refraction (Auto)         Sphere Cylinder Axis    Right -0.50 +0.75 091    Left -1.75 +2.00 059              Final Rx         Sphere Cylinder Axis Add    Right -0.50   +3.00    Left -0.50 +0.75 065 +3.00                    Pertinent Lab/Test/Imaging Review:      Assessment and Plan:     Glaucoma suspect of both eyes  4/5/2021 - secondary to tilted discs from myopia. Oct NFL thickness 91 OD and 67 OS, bu no significant increased cupping and IOP good. 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 In PAT double and would not suppress or fuse. 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 Is now noticing some double vision because of cataracts and now vision better in the non dominant eye so is switching fixation. Discussed at length options. These include removing only the cataract in the left eye which hopefully will make that eye dominant all the time or a PAT test to see if can begin fusing and the consider cataract extraction followed by strabismus repair. Will there froe precede with the latter. Gave rx to slightly improve the vision and will place 40 base out press on prism over the OD in  Pat to see if can start fusing. If so then could consider cataract extraction followed by strabismus repair.   6/24/2021 - Starting to fuse with the 40 base out prism OD. Diplopia significantly better if not resolved. Therefore discussed preceding with bilateral cataract extraction followed by bilateral medial rectus recession once healed from the cataract extraction. Will refer to Dr Swanson for the cataract extraction  10/18/2021 - SP bilateral cataract extraction by Dr Swanson. IP PAT 35 to 40 ET with small 4 diopter left hypo. Thus discussed the risks and benefits of BMR recession for 40 with the OD on adjustable. Discussed that might need prisms or further surgery for the vertical. Has had prism glasses in the past.   11/23/2021 - POST op day number 4 following RMR recess / adjust, LMR resect. Some flick ET but no double vision and motility full. Overall healing well. Taper maxitrol. Follow up 3 months. Discussed could take out sutures in two weeks if becomes bothersome.   5/2/2022 - Some progressive ET, but no double vision  11/8/2022 -some progressive ET to now 20 diopters despite a medial rectus muscle recess and right lateral rectus muscle resect.  Spent time reviewing old CT scans and  in 2021 it was unremarkable of the orbits.  However in 2015 she has some posterior sphenoid sinus fractures with air involving the cavernous sinus.  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  If progressive might need further strabismus repair.    Myopia of both eyes  5/2/2022 - Some residual myopia and astigmatism post cataract extraction, worse in OS. Since now left eye dominant will give glasses rx.   11/8/2022 -we will give Rx    Epiretinal membrane (ERM) of right eye  5/2/2022 - ERM OD, probably leading to some of the decrease in vision. Will monitor and if progressive will refer to retina  11/8/2022 -slight worsening of ERM OD with some papular macular traction.  OCT nerve fiber layer thickness is increased OD in the region to 112 stable left eye 78.  Her visual acuity is fairly good so we will continue to monitor.  Progressive decrease and will refer to retina.    Cortical age-related cataract of both eyes  4/5/2021 - bilateral cortical cataracts with vision worse in the left eye leading to switched fixation  6/24/2021 - Since can begin fusing with press on prism, will refer for bilateral cataract extraction  10/18/2021 - SP bilateral cataract extraction  5/1/2022 - some residual myopia and astigmatism. Will give rx with bifocal  11/8/2022-never got glasses Rx will give Rx        Keith Horne M.D.

## 2022-11-08 NOTE — ASSESSMENT & PLAN NOTE
5/2/2022 - Some residual myopia and astigmatism post cataract extraction, worse in OS. Since now left eye dominant will give glasses rx.   11/8/2022 -we will give Rx

## 2022-11-08 NOTE — ASSESSMENT & PLAN NOTE
4/5/2021 - Large angle esotropia measuring 40 diopters with small left hypotonia. In PAT double and would not suppress or fuse. 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 Is now noticing some double vision because of cataracts and now vision better in the non dominant eye so is switching fixation. Discussed at length options. These include removing only the cataract in the left eye which hopefully will make that eye dominant all the time or a PAT test to see if can begin fusing and the consider cataract extraction followed by strabismus repair. Will there froe precede with the latter. Gave rx to slightly improve the vision and will place 40 base out press on prism over the OD in  Pat to see if can start fusing. If so then could consider cataract extraction followed by strabismus repair.   6/24/2021 - Starting to fuse with the 40 base out prism OD. Diplopia significantly better if not resolved. Therefore discussed preceding with bilateral cataract extraction followed by bilateral medial rectus recession once healed from the cataract extraction. Will refer to Dr Swanson for the cataract extraction  10/18/2021 - SP bilateral cataract extraction by Dr Swanson. IP PAT 35 to 40 ET with small 4 diopter left hypo. Thus discussed the risks and benefits of BMR recession for 40 with the OD on adjustable. Discussed that might need prisms or further surgery for the vertical. Has had prism glasses in the past.   11/23/2021 - POST op day number 4 following RMR recess / adjust, LMR resect. Some flick ET but no double vision and motility full. Overall healing well. Taper maxitrol. Follow up 3 months. Discussed could take out sutures in two weeks if becomes bothersome.   5/2/2022 - Some progressive ET, but no double vision  11/8/2022 -some progressive ET to now 20 diopters despite a medial rectus muscle recess and right lateral rectus muscle resect.   Spent time reviewing old CT scans and in 2021 it was unremarkable of the orbits.  However in 2015 she has some posterior sphenoid sinus fractures with air involving the cavernous sinus.  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  If progressive might need further strabismus repair.

## 2022-11-08 NOTE — ASSESSMENT & PLAN NOTE
5/2/2022 - ERM OD, probably leading to some of the decrease in vision. Will monitor and if progressive will refer to retina  11/8/2022 -slight worsening of ERM OD with some papular macular traction.  OCT nerve fiber layer thickness is increased OD in the region to 112 stable left eye 78.  Her visual acuity is fairly good so we will continue to monitor.  Progressive decrease and will refer to retina.

## 2022-11-08 NOTE — ASSESSMENT & PLAN NOTE
4/5/2021 - secondary to tilted discs from myopia. Oct NFL thickness 91 OD and 67 OS, bu no significant increased cupping and IOP good. Will monitor\  6/24/2021 - IOP 17 OU today  10/18/2021 - IOP 14 OD and 13 OS  5/2/2022 - IOP stable, OCT NFL thickness 102 OD and 82 OS  11/8/2022 -IOP stable OCT nerve fiber layer thickness slightly increased OD secondary to papillary macular traction

## 2022-11-08 NOTE — PROCEDURES
Cranial nerves OU with epiretinal membrane OD small hemorrhage of the superior arcade.  Right eye.

## 2022-11-22 ENCOUNTER — APPOINTMENT (OUTPATIENT)
Dept: SLEEP MEDICINE | Facility: MEDICAL CENTER | Age: 77
End: 2022-11-22
Attending: INTERNAL MEDICINE
Payer: MEDICARE

## 2022-11-22 RX ORDER — FLUTICASONE PROPIONATE 50 MCG
1 SPRAY, SUSPENSION (ML) NASAL EVERY MORNING
Qty: 16 G | Refills: 0 | Status: SHIPPED | OUTPATIENT
Start: 2022-11-22 | End: 2023-04-13 | Stop reason: SDUPTHER

## 2022-11-22 RX ORDER — BUSPIRONE HYDROCHLORIDE 10 MG/1
TABLET ORAL
Qty: 270 TABLET | Refills: 3 | Status: SHIPPED | OUTPATIENT
Start: 2022-11-22 | End: 2023-11-14 | Stop reason: SDUPTHER

## 2022-12-08 ENCOUNTER — OFFICE VISIT (OUTPATIENT)
Dept: MEDICAL GROUP | Facility: PHYSICIAN GROUP | Age: 77
End: 2022-12-08
Payer: MEDICARE

## 2022-12-08 VITALS
WEIGHT: 126.1 LBS | SYSTOLIC BLOOD PRESSURE: 112 MMHG | TEMPERATURE: 98.7 F | BODY MASS INDEX: 21.53 KG/M2 | HEIGHT: 64 IN | DIASTOLIC BLOOD PRESSURE: 60 MMHG | RESPIRATION RATE: 18 BRPM | OXYGEN SATURATION: 92 % | HEART RATE: 62 BPM

## 2022-12-08 DIAGNOSIS — E78.2 MIXED HYPERLIPIDEMIA: ICD-10-CM

## 2022-12-08 DIAGNOSIS — Z87.81 S/P RIGHT HIP FRACTURE: ICD-10-CM

## 2022-12-08 DIAGNOSIS — E05.90 SUBCLINICAL HYPERTHYROIDISM: Chronic | ICD-10-CM

## 2022-12-08 DIAGNOSIS — F51.01 PRIMARY INSOMNIA: Chronic | ICD-10-CM

## 2022-12-08 DIAGNOSIS — Z96.641 S/P TOTAL RIGHT HIP ARTHROPLASTY: ICD-10-CM

## 2022-12-08 PROCEDURE — 99214 OFFICE O/P EST MOD 30 MIN: CPT | Performed by: INTERNAL MEDICINE

## 2022-12-08 RX ORDER — ZOLPIDEM TARTRATE 10 MG/1
10 TABLET ORAL NIGHTLY PRN
Qty: 30 TABLET | Refills: 2 | Status: SHIPPED | OUTPATIENT
Start: 2022-12-20 | End: 2023-01-19

## 2022-12-08 RX ORDER — ZOLPIDEM TARTRATE 10 MG/1
TABLET ORAL
COMMUNITY
Start: 2022-11-22 | End: 2023-06-12 | Stop reason: SDUPTHER

## 2022-12-08 ASSESSMENT — FIBROSIS 4 INDEX: FIB4 SCORE: 1.02

## 2022-12-08 NOTE — PROGRESS NOTES
Subjective:     CC:   Chief Complaint   Patient presents with    Lab Results    Medication Refill         HPI:   Yasmin presents today for follow-up visit for medication refill and review of lab results.  She feels well and has no acute medical complaints.  She needs a new referral to PT following her hip fracture and repair.  We reviewed her lab results which were normal.      Past Medical History:   Diagnosis Date    Anxiety     Arrhythmia 2020    Arthritis 2020    Blood clotting disorder (HCC)     hx  in lung    Breath shortness 2020    no supplemental oxygen    Cancer (HCC)     basal cell per hx    Cataract     meg IOL     Depression     Fall from ground level 2021    Heart murmur     History of respiratory failure     6-month hospitalization in     Hyperlipidemia     Indigestion 2020    GERD    Insomnia     Pain 2020    back pain    Peripheral neuropathy     Pneumonia     hx     Pulmonary fibrosis (HCC)     Recurrent major depressive disorder, in full remission ([facility]) 2017    Thrombocytopenia ([facility]) 2021       Social History     Tobacco Use    Smoking status: Former     Packs/day: 0.25     Years: 15.00     Pack years: 3.75     Types: Cigarettes     Quit date: 3/11/1985     Years since quittin.7    Smokeless tobacco: Never    Tobacco comments:     passive smoke exposure her entire life   Vaping Use    Vaping Use: Never used   Substance Use Topics    Alcohol use: Not Currently     Alcohol/week: 0.0 oz    Drug use: No       Current Outpatient Medications Ordered in Epic   Medication Sig Dispense Refill    zolpidem (AMBIEN) 10 MG Tab       [START ON 2022] zolpidem (AMBIEN) 10 MG Tab Take 1 Tablet by mouth at bedtime as needed for Sleep for up to 30 days. 30 Tablet 2    busPIRone (BUSPAR) 10 MG Tab tablet TAKE ONE TABLET BY MOUTH EVERY MORNING AND TAKE TWO TABLETS BY MOUTH EVERY NIGHT AT BEDTIME 270 Tablet 3    fluticasone (FLONASE) 50 MCG/ACT nasal  "spray Administer 1 Spray into affected nostril(S) every morning. 16 g 0    atorvastatin (LIPITOR) 20 MG Tab Take 1 Tablet by mouth every evening. 90 Tablet 3    cyanocobalamin (VITAMIN B-12) 100 MCG Tab Take 1 Tablet by mouth every morning.      BIOTIN PO Take 1 Tablet by mouth every morning. Pt unsure of dose      Calcium Citrate-Vitamin D (CALCIUM + D PO) Take 1 Tablet by mouth every morning.      MAGNESIUM PO Take 1 Capsule by mouth every morning. Pt unsure of dose      Cholecalciferol (VITAMIN D3 PO) Take 1 Capsule by mouth every morning. Pt unsure of dose      VITAMIN E PO Take 1 Capsule by mouth every morning. Pt unsure of dose       No current Epic-ordered facility-administered medications on file.       Allergies:  Pollen extract    Health Maintenance: Completed    Review of Systems:  No fevers or chills. No cough, chest pain, or shortness of breath.       Objective:       Exam:  /60 (BP Location: Left arm, Patient Position: Sitting, BP Cuff Size: Adult)   Pulse 62   Temp 37.1 °C (98.7 °F) (Temporal)   Resp 18   Ht 1.626 m (5' 4\")   Wt 57.2 kg (126 lb 1.6 oz)   SpO2 92%   BMI 21.65 kg/m²  Body mass index is 21.65 kg/m².    Gen: Alert and oriented, No apparent distress.  Lungs: Normal effort, CTA bilaterally, no wheezes, rhonchi, or rales  CV: Regular rate and rhythm. No murmurs, rubs, or gallops.  Ext: No clubbing, cyanosis, edema.      Assessment & Plan:     77 y.o. female with the following -     S/P right hip fracture  S/P total right hip arthroplasty  Chronic medical condition.    Improved.  The patient was admitted 12/31/2021 for a right acetabular fracture and dislocation status post ORIF on 1/3/2022.  The patient was discharged to a skilled nursing facility on 1/10/2022 then home health on 2/4/2022 for PT/OT.  She subsequently underwent total right hip arthroplasty on 4/4/2022.      - Referral to Physical Therapy    Primary insomnia  Chronic medical condition. Well-controlled.  Patient " takes zolpidem 10 mg nightly.  Reviewed the patient's  shows that the patient was last given a prescription of zolpidem 10 mg, dispo #30, on 11/22/2022. Obtained and reviewed patient utilization report from [facility] pharmacy database on 12/8/2022 3:02 AM  prior to writing prescription for controlled substance II, III or IV per [place] bill . Based on assessment of the report, the prescription is medically necessary.   -Controlled Substance Treatment Agreement was signed and scanned into the patient's chart on 8/26/2022.    -Continue zolpidem 10 mg nightly for insomnia  - zolpidem (AMBIEN) 10 MG Tab; Take 1 Tablet by mouth at bedtime as needed for Sleep for up to 30 days.  Dispense: 30 Tablet; Refill: 2    Subclinical hyperthyroidism  Chronic medical condition.  Stable.  Labs on 10/13/2022 showed a reduced TSH of 0.04, normal free T4 1.12, and normal free T3 of 3.40. TSI was negative at <0.10.  The patient is clinically euthyroid.   -Continue to monitor, consider 5-hour uptake scan for any changes in her labs     Mixed hyperlipidemia  Chronic medical condition.  Ongoing and well-controlled.  The patient is taking atorvastatin 20 mg nightly.  She denies statin associated myalgias.  Lipid panel from 10/13/2022 showed a total cholesterol 138, LDL 50, HDL 41, triglycerides 195.  -Continue atorvastatin 20 mg nightly       Return in about 3 months (around 3/8/2023) for Controlled Substance.    Please note that this dictation was created using voice recognition software. I have made every reasonable attempt to correct obvious errors, but I expect that there are errors of grammar and possibly content that I did not discover before finalizing the note.

## 2023-01-20 RX ORDER — ATORVASTATIN CALCIUM 20 MG/1
20 TABLET, FILM COATED ORAL EVERY EVENING
Qty: 90 TABLET | Refills: 3 | Status: SHIPPED | OUTPATIENT
Start: 2023-01-20 | End: 2023-10-22 | Stop reason: SDUPTHER

## 2023-02-26 ENCOUNTER — OUTPATIENT INFUSION SERVICES (OUTPATIENT)
Dept: ONCOLOGY | Facility: MEDICAL CENTER | Age: 78
End: 2023-02-26
Attending: INTERNAL MEDICINE
Payer: MEDICARE

## 2023-02-26 VITALS
HEIGHT: 63 IN | OXYGEN SATURATION: 98 % | RESPIRATION RATE: 18 BRPM | SYSTOLIC BLOOD PRESSURE: 123 MMHG | DIASTOLIC BLOOD PRESSURE: 69 MMHG | BODY MASS INDEX: 22.15 KG/M2 | WEIGHT: 125 LBS | HEART RATE: 98 BPM | TEMPERATURE: 98.4 F

## 2023-02-26 DIAGNOSIS — M85.859 OSTEOPENIA OF NECK OF FEMUR, UNSPECIFIED LATERALITY: ICD-10-CM

## 2023-02-26 DIAGNOSIS — M80.00XD AGE-RELATED OSTEOPOROSIS WITH CURRENT PATHOLOGICAL FRACTURE WITH ROUTINE HEALING: ICD-10-CM

## 2023-02-26 LAB
CA-I BLD ISE-SCNC: 1.24 MMOL/L (ref 1.1–1.3)
CREAT BLD-MCNC: 0.7 MG/DL (ref 0.5–1.4)

## 2023-02-26 PROCEDURE — 82330 ASSAY OF CALCIUM: CPT

## 2023-02-26 PROCEDURE — 82565 ASSAY OF CREATININE: CPT

## 2023-02-26 PROCEDURE — 700111 HCHG RX REV CODE 636 W/ 250 OVERRIDE (IP): Mod: JG | Performed by: INTERNAL MEDICINE

## 2023-02-26 PROCEDURE — 96372 THER/PROPH/DIAG INJ SC/IM: CPT

## 2023-02-26 PROCEDURE — 36415 COLL VENOUS BLD VENIPUNCTURE: CPT

## 2023-02-26 RX ORDER — DIPHENHYDRAMINE HYDROCHLORIDE 50 MG/ML
50 INJECTION INTRAMUSCULAR; INTRAVENOUS PRN
Status: CANCELLED | OUTPATIENT
Start: 2023-03-22

## 2023-02-26 RX ORDER — METHYLPREDNISOLONE SODIUM SUCCINATE 125 MG/2ML
125 INJECTION, POWDER, LYOPHILIZED, FOR SOLUTION INTRAMUSCULAR; INTRAVENOUS PRN
Status: CANCELLED | OUTPATIENT
Start: 2023-03-22

## 2023-02-26 RX ORDER — EPINEPHRINE 1 MG/ML(1)
0.5 AMPUL (ML) INJECTION PRN
Status: CANCELLED | OUTPATIENT
Start: 2023-03-22

## 2023-02-26 RX ADMIN — DENOSUMAB 60 MG: 60 INJECTION SUBCUTANEOUS at 15:56

## 2023-02-26 ASSESSMENT — FIBROSIS 4 INDEX: FIB4 SCORE: 1.02

## 2023-02-27 ENCOUNTER — TELEPHONE (OUTPATIENT)
Dept: MEDICAL GROUP | Facility: PHYSICIAN GROUP | Age: 78
End: 2023-02-27
Payer: MEDICARE

## 2023-02-27 NOTE — PROGRESS NOTES
Pt in clinic for Prolia injection, pt arrive with cane and ambulatory in in apparent distress.  Labs drawn and verified, Prolia administered in R upper arm subcutaneous, band aid applied, next appointment scheduled.  Pt left clinic stable.

## 2023-03-16 ENCOUNTER — OFFICE VISIT (OUTPATIENT)
Dept: MEDICAL GROUP | Facility: PHYSICIAN GROUP | Age: 78
End: 2023-03-16
Payer: MEDICARE

## 2023-03-16 VITALS
OXYGEN SATURATION: 94 % | WEIGHT: 122 LBS | BODY MASS INDEX: 21.62 KG/M2 | HEIGHT: 63 IN | SYSTOLIC BLOOD PRESSURE: 122 MMHG | TEMPERATURE: 99 F | DIASTOLIC BLOOD PRESSURE: 64 MMHG | HEART RATE: 90 BPM

## 2023-03-16 DIAGNOSIS — J30.2 SEASONAL ALLERGIES: ICD-10-CM

## 2023-03-16 DIAGNOSIS — R91.1 LUNG NODULE: ICD-10-CM

## 2023-03-16 DIAGNOSIS — J84.10 PULMONARY FIBROSIS (HCC): ICD-10-CM

## 2023-03-16 DIAGNOSIS — F51.01 PRIMARY INSOMNIA: Chronic | ICD-10-CM

## 2023-03-16 PROCEDURE — 99214 OFFICE O/P EST MOD 30 MIN: CPT | Performed by: INTERNAL MEDICINE

## 2023-03-16 RX ORDER — ZOLPIDEM TARTRATE 10 MG/1
10 TABLET ORAL NIGHTLY PRN
Qty: 30 TABLET | Refills: 2 | Status: SHIPPED | OUTPATIENT
Start: 2023-03-16 | End: 2023-06-12

## 2023-03-16 RX ORDER — TRIAMCINOLONE ACETONIDE 40 MG/ML
40 INJECTION, SUSPENSION INTRA-ARTICULAR; INTRAMUSCULAR ONCE
Status: COMPLETED | OUTPATIENT
Start: 2023-03-16 | End: 2023-03-16

## 2023-03-16 RX ADMIN — TRIAMCINOLONE ACETONIDE 40 MG: 40 INJECTION, SUSPENSION INTRA-ARTICULAR; INTRAMUSCULAR at 16:30

## 2023-03-16 ASSESSMENT — FIBROSIS 4 INDEX: FIB4 SCORE: 1.02

## 2023-03-16 ASSESSMENT — PATIENT HEALTH QUESTIONNAIRE - PHQ9: CLINICAL INTERPRETATION OF PHQ2 SCORE: 0

## 2023-03-16 NOTE — PROGRESS NOTES
Subjective:     CC:   Diagnoses of Primary insomnia, Seasonal allergies, Lung nodule, and Pulmonary fibrosis (HCC) were pertinent to this visit.       HPI:   Yasmin presents today for follow-up visit and to discuss the following issues:    Primary insomnia  The patient takes zolpidem 10 mg nightly and is due for a 3-month refill.  She states the zolpidem continues to control her insomnia adequately.      Lung nodule  Pulmonary fibrosis (HCC)  Reviewed most recent CT chest 3/31/2022 which showed a stable left lower lobe nodule, stable peripheral fibrotic changes, and no new suspicious pulmonary nodules.  The patient is without new pulmonary symptoms such as cough, chest pain, shortness of breath.     Seasonal allergies  The patient is reporting worsening allergy symptoms including itchy eyes, runny nose, and sinus congestion.  She has been using an intranasal corticosteroid as well as an oral antihistamine as needed.  She has previously benefited from Kenalog injections.        Past Medical History:   Diagnosis Date    Anxiety     Arrhythmia 2020    Arthritis 2020    Blood clotting disorder (HCC)     hx  in lung    Breath shortness 2020    no supplemental oxygen    Cancer (HCC)     basal cell per hx    Cataract     meg IOL     Depression     Fall from ground level 2021    Heart murmur     History of respiratory failure     6-month hospitalization in     Hyperlipidemia     Indigestion 2020    GERD    Insomnia     Pain 2020    back pain    Peripheral neuropathy     Pneumonia     hx     Pulmonary fibrosis (HCC)     Recurrent major depressive disorder, in full remission ([facility]) 2017    Thrombocytopenia ([facility]) 2021       Social History     Tobacco Use    Smoking status: Former     Packs/day: 0.25     Years: 15.00     Pack years: 3.75     Types: Cigarettes     Quit date: 3/11/1985     Years since quittin.0    Smokeless tobacco: Never    Tobacco comments:      "passive smoke exposure her entire life   Vaping Use    Vaping Use: Never used   Substance Use Topics    Alcohol use: Not Currently     Alcohol/week: 0.0 oz    Drug use: No       Current Outpatient Medications Ordered in Epic   Medication Sig Dispense Refill    zolpidem (AMBIEN) 10 MG Tab Take 1 Tablet by mouth at bedtime as needed for Sleep for up to 90 days. 30 Tablet 2    atorvastatin (LIPITOR) 20 MG Tab Take 1 Tablet by mouth every evening. 90 Tablet 3    zolpidem (AMBIEN) 10 MG Tab       busPIRone (BUSPAR) 10 MG Tab tablet TAKE ONE TABLET BY MOUTH EVERY MORNING AND TAKE TWO TABLETS BY MOUTH EVERY NIGHT AT BEDTIME 270 Tablet 3    fluticasone (FLONASE) 50 MCG/ACT nasal spray Administer 1 Spray into affected nostril(S) every morning. 16 g 0    cyanocobalamin (VITAMIN B-12) 100 MCG Tab Take 1 Tablet by mouth every morning.      BIOTIN PO Take 1 Tablet by mouth every morning. Pt unsure of dose      Calcium Citrate-Vitamin D (CALCIUM + D PO) Take 1 Tablet by mouth every morning.      MAGNESIUM PO Take 1 Capsule by mouth every morning. Pt unsure of dose      Cholecalciferol (VITAMIN D3 PO) Take 1 Capsule by mouth every morning. Pt unsure of dose      VITAMIN E PO Take 1 Capsule by mouth every morning. Pt unsure of dose       No current Epic-ordered facility-administered medications on file.       Allergies:  Pollen extract    Health Maintenance: Completed    Review of Systems:  No fevers or chills. No cough, chest pain, or shortness of breath.       Objective:       Exam:  /64 (BP Location: Right arm, Patient Position: Sitting, BP Cuff Size: Adult)   Pulse 90   Temp 37.2 °C (99 °F) (Temporal)   Ht 1.6 m (5' 3\")   Wt 55.3 kg (122 lb)   SpO2 94%   BMI 21.61 kg/m²  Body mass index is 21.61 kg/m².    Gen: Alert and oriented, No apparent distress.  Lungs: Normal effort, CTA bilaterally, no wheezes, rhonchi, or rales  CV: Regular rate and rhythm. No murmurs, rubs, or gallops.  Ext: No clubbing, cyanosis, " edema.      Assessment & Plan:     77 y.o. female with the following -     Primary insomnia  Chronic medical condition. Well-controlled.  The patient takes zolpidem 10 mg nightly.  This controls her symptoms adequately.  She has had no request for dose escalation.  Reviewed the patient's  shows that the patient was last given a prescription of zolpidem 10 mg, dispo #30, on 2/17/2023. Obtained and reviewed patient utilization report from [facility] pharmacy database on 3/16/2023 2:04 PM  prior to writing prescription for controlled substance II, III or IV per [place] bill . Based on assessment of the report, the prescription is medically necessary.   -Controlled Substance Treatment Agreement was signed and scanned into the patient's chart on 8/26/2022.    -Continue zolpidem 10 mg nightly for insomnia  - zolpidem (AMBIEN) 10 MG Tab; Take 1 Tablet by mouth at bedtime as needed for Sleep for up to 30 days.  Dispense: 30 Tablet; Refill: 2    Lung nodule  Pulmonary fibrosis (HCC)  Chronic medical condition.  Stable.  Most recent CT chest 3/31/2022 showed a stable left lower lobe nodule, stable peripheral fibrotic changes, and no new suspicious pulmonary nodules.  The patient is without new pulmonary symptoms such as cough, chest pain, shortness of breath.  -Continue surveillance CT imaging     Seasonal allergies  Chronic medical condition.  Recurrent and progressive.  The patient is reporting worsening allergy symptoms including itchy eyes, runny nose, and sinus congestion.  She has been using an intranasal corticosteroid as well as an oral antihistamine as needed.  She has previously benefited from Kenalog injections.  -Kenalog 40 mg injection given at today's visit, patient to continue intranasal corticosteroids and oral antihistamine as needed  - triamcinolone acetonide (KENALOG-40) injection 40 mg      Return in about 3 months (around 6/16/2023) for Controlled Substance.    Please note that this dictation was  created using voice recognition software. I have made every reasonable attempt to correct obvious errors, but I expect that there are errors of grammar and possibly content that I did not discover before finalizing the note.

## 2023-04-14 RX ORDER — FLUTICASONE PROPIONATE 50 MCG
1 SPRAY, SUSPENSION (ML) NASAL EVERY MORNING
Qty: 16 G | Refills: 3 | Status: SHIPPED | OUTPATIENT
Start: 2023-04-14

## 2023-04-28 ENCOUNTER — TELEPHONE (OUTPATIENT)
Dept: HEALTH INFORMATION MANAGEMENT | Facility: OTHER | Age: 78
End: 2023-04-28

## 2023-05-03 ENCOUNTER — OFFICE VISIT (OUTPATIENT)
Dept: OPHTHALMOLOGY | Facility: MEDICAL CENTER | Age: 78
End: 2023-05-03
Payer: MEDICARE

## 2023-05-03 DIAGNOSIS — H40.003 GLAUCOMA SUSPECT OF BOTH EYES: ICD-10-CM

## 2023-05-03 DIAGNOSIS — H35.371 EPIRETINAL MEMBRANE (ERM) OF RIGHT EYE: ICD-10-CM

## 2023-05-03 DIAGNOSIS — H52.13 MYOPIA OF BOTH EYES: ICD-10-CM

## 2023-05-03 DIAGNOSIS — Z96.1 PSEUDOPHAKIA OF BOTH EYES: ICD-10-CM

## 2023-05-03 DIAGNOSIS — H25.013 CORTICAL AGE-RELATED CATARACT OF BOTH EYES: ICD-10-CM

## 2023-05-03 DIAGNOSIS — H49.9 OPHTHALMOPLEGIA: ICD-10-CM

## 2023-05-03 PROCEDURE — 92250 FUNDUS PHOTOGRAPHY W/I&R: CPT | Performed by: OPHTHALMOLOGY

## 2023-05-03 PROCEDURE — 92060 SENSORIMOTOR EXAMINATION: CPT | Performed by: OPHTHALMOLOGY

## 2023-05-03 PROCEDURE — 99214 OFFICE O/P EST MOD 30 MIN: CPT | Mod: 25 | Performed by: OPHTHALMOLOGY

## 2023-05-03 ASSESSMENT — REFRACTION_WEARINGRX
OD_SPHERE: +2.75
SPECS_TYPE: SVL
OD_AXIS: 0
OS_AXIS: 065
OD_CYLINDER: +0.00
OS_CYLINDER: +0.00
OS_CYLINDER: +0.75
OS_ADD: +3.00
OD_ADD: +3.00
OS_SPHERE: +2.75
OD_SPHERE: -0.50
OS_AXIS: 0
OS_SPHERE: -0.50

## 2023-05-03 ASSESSMENT — VISUAL ACUITY
OD_SC: 20/30
OD_SC+: -1
METHOD: SNELLEN - LINEAR
OS_SC: 20/30

## 2023-05-03 ASSESSMENT — SLIT LAMP EXAM - LIDS
COMMENTS: NORMAL
COMMENTS: NORMAL

## 2023-05-03 ASSESSMENT — REFRACTION_MANIFEST
METHOD_AUTOREFRACTION: 1
OS_AXIS: 066
OD_AXIS: 083
OS_SPHERE: -0.50
OD_CYLINDER: +0.50
OD_SPHERE: -0.25
OS_CYLINDER: +1.25

## 2023-05-03 ASSESSMENT — CONF VISUAL FIELD
OD_SUPERIOR_NASAL_RESTRICTION: 0
OD_INFERIOR_NASAL_RESTRICTION: 0
OS_NORMAL: 1
OD_SUPERIOR_TEMPORAL_RESTRICTION: 0
OS_SUPERIOR_TEMPORAL_RESTRICTION: 0
OD_INFERIOR_TEMPORAL_RESTRICTION: 0
OS_SUPERIOR_NASAL_RESTRICTION: 0
OS_INFERIOR_NASAL_RESTRICTION: 0
OS_INFERIOR_TEMPORAL_RESTRICTION: 0
OD_NORMAL: 1

## 2023-05-03 ASSESSMENT — EXTERNAL EXAM - RIGHT EYE: OD_EXAM: NORMAL

## 2023-05-03 ASSESSMENT — TONOMETRY
OS_IOP_MMHG: 14
OD_IOP_MMHG: 14
IOP_METHOD: I-CARE

## 2023-05-03 ASSESSMENT — EXTERNAL EXAM - LEFT EYE: OS_EXAM: NORMAL

## 2023-05-03 NOTE — ASSESSMENT & PLAN NOTE
5/2/2022 - ERM OD, probably leading to some of the decrease in vision. Will monitor and if progressive will refer to retina  11/8/2022 -slight worsening of ERM OD with some vitreo macular traction.  OCT nerve fiber layer thickness is increased OD in the region to 112 stable left eye 78.  Her visual acuity is fairly good so we will continue to monitor.  Progressive decrease and will refer to retina.  5/3/20-23 -some progression of her vitreal macular traction.  Although her acuity appears stable.  The left eye appears to have  without induction of a macular hole.  Will refer to [place] retina.

## 2023-05-03 NOTE — ASSESSMENT & PLAN NOTE
4/5/2021 - Large angle esotropia measuring 40 diopters with small left hypotonia. In PAT double and would not suppress or fuse. 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 Is now noticing some double vision because of cataracts and now vision better in the non dominant eye so is switching fixation. Discussed at length options. These include removing only the cataract in the left eye which hopefully will make that eye dominant all the time or a PAT test to see if can begin fusing and the consider cataract extraction followed by strabismus repair. Will there froe precede with the latter. Gave rx to slightly improve the vision and will place 40 base out press on prism over the OD in  Pat to see if can start fusing. If so then could consider cataract extraction followed by strabismus repair.   6/24/2021 - Starting to fuse with the 40 base out prism OD. Diplopia significantly better if not resolved. Therefore discussed preceding with bilateral cataract extraction followed by bilateral medial rectus recession once healed from the cataract extraction. Will refer to Dr Swanson for the cataract extraction  10/18/2021 - SP bilateral cataract extraction by Dr Swanson. IP PAT 35 to 40 ET with small 4 diopter left hypo. Thus discussed the risks and benefits of BMR recession for 40 with the OD on adjustable. Discussed that might need prisms or further surgery for the vertical. Has had prism glasses in the past.   11/23/2021 - POST op day number 4 following RMR recess / adjust, LMR resect. Some flick ET but no double vision and motility full. Overall healing well. Taper maxitrol. Follow up 3 months. Discussed could take out sutures in two weeks if becomes bothersome.   5/2/2022 - Some progressive ET, but no double vision  11/8/2022 -some progressive ET to now 20 diopters despite a medial rectus muscle recess and right lateral rectus muscle resect.   Spent time reviewing old CT scans and in 2021 it was unremarkable of the orbits.  However in 2015 she has some posterior sphenoid sinus fractures with air involving the cavernous sinus.  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  If progressive might need further strabismus repair.  5/3/2023 -overall stable residual right ET.  No complaints of double vision.

## 2023-05-03 NOTE — PROGRESS NOTES
Peds/Neuro Ophthalmology:   Keith Horne M.D.    Date & Time note created:    5/3/2023   3:47 PM     Referring MD / APRN:  Lucina Gupta M.D., No att. providers found    Patient ID:  Name:             Yasmin Zhong   YOB: 1945  Age:                 77 y.o.  female   MRN:               [id number]    Chief Complaint/Reason for Visit:     Other (6 month f.v. Glaucoma suspect of both eye )      History of Present Illness:    Yasmin Zhong is a 77 y.o. female   6 month follow up for Glaucoma suspect OU, Esotropia, Vitreomacular traction right eye. No headaches. No pain or discomfort. Pt states that her vision is stable. Uses readers daily.       Review of Systems:  Review of Systems   Eyes:         Glaucoma suspect OU   All other systems reviewed and are negative.    Past Medical History:   Past Medical History:   Diagnosis Date    Anxiety     Arrhythmia 11/18/2020    Arthritis 11/18/2020    Blood clotting disorder (HCC)     hx 2013 in lung    Breath shortness 11/18/2020    no supplemental oxygen    Cancer (HCC) 1990    basal cell per hx    Cataract     meg IOL     Depression     Fall from ground level 5/25/2021    Heart murmur     History of respiratory failure     6-month hospitalization in 2014    Hyperlipidemia     Indigestion 11/18/2020    GERD    Insomnia     Pain 11/18/2020    back pain    Peripheral neuropathy     Pneumonia     hx 2013    Pulmonary fibrosis (HCC)     Recurrent major depressive disorder, in full remission (HCC) 7/21/2017    Thrombocytopenia (HCC) 4/19/2021       Past Surgical History:  Past Surgical History:   Procedure Laterality Date    [place] TOTAL HIP ARTHROPLASTY Right 4/4/2022    Procedure: RIGHT ARTHROPLASTY, HIP, TOTAL;  Surgeon: Mauricio Rose M.D.;  Location: SURGERY [facility];  Service: Orthopedics    ORIF, FRACTURE, ACETABULUM Right 1/3/2022    Procedure: OPEN REDUCTION AND INTERNAL FIXATION, FRACTURE, ACETABULUM;  Surgeon: Pritesh Zamora,  M.D.;  Location: SURGERY [facility];  Service: Orthopedics    STRABISMUS REPAIR Bilateral 11/19/2021    Procedure: STRABISMUS SURGERY - ESOTROPIA STRABISMUS PROCEDURE, ONE HORIZONTAL MUSCLE - BILATERAL, PROCEDURE FOR SCARRING OF EXTRAOCULAR MUSCLE AND ADJUSTABLE SUTURE PLACEMENT - RIGHT;  Surgeon: Keith Horne M.D.;  Location: SURGERY SAME DAY [facility];  Service: Ophthalmology    EYE SURGERY Bilateral 11/19/2021    Bilateral medial rectus recession in patient with prior ocular Bilateral medial rectus recession in patient with prior ocular     UT LAP,ESOPHAGOGAST FUNDOPLASTY N/A 11/23/2020    Procedure: FUNDOPLICATION, NISSEN, LAPAROSCOPIC- FOR PARAESOPHAGEAL WITH MESH;  Surgeon: Prtiesh Baptiste M.D.;  Location: SURGERY [facility];  Service: General    HIP HEMIARTHROPLASTY  4/25/2015    Performed by Raymond Lantigua M.D. at SURGERY [facility]S    CATARACT EXTRACTION WITH IOL Bilateral     OTHER      breast reduction       Current Outpatient Medications:  Current Outpatient Medications   Medication Sig Dispense Refill    fluticasone (FLONASE) 50 MCG/ACT nasal spray Administer 1 Spray into affected nostril(S) every morning. 16 g 3    zolpidem (AMBIEN) 10 MG Tab Take 1 Tablet by mouth at bedtime as needed for Sleep for up to 90 days. 30 Tablet 2    atorvastatin (LIPITOR) 20 MG Tab Take 1 Tablet by mouth every evening. 90 Tablet 3    zolpidem (AMBIEN) 10 MG Tab       busPIRone (BUSPAR) 10 MG Tab tablet TAKE ONE TABLET BY MOUTH EVERY MORNING AND TAKE TWO TABLETS BY MOUTH EVERY NIGHT AT BEDTIME 270 Tablet 3    cyanocobalamin (VITAMIN B-12) 100 MCG Tab Take 1 Tablet by mouth every morning.      BIOTIN PO Take 1 Tablet by mouth every morning. Pt unsure of dose      Calcium Citrate-Vitamin D (CALCIUM + D PO) Take 1 Tablet by mouth every morning.      MAGNESIUM PO Take 1 Capsule by mouth every morning. Pt unsure of dose      Cholecalciferol (VITAMIN D3 PO) Take 1 Capsule by mouth every morning. Pt unsure of  "dose      VITAMIN E PO Take 1 Capsule by mouth every morning. Pt unsure of dose       No current facility-administered medications for this visit.       Allergies:  Allergies   Allergen Reactions    Pollen Extract      \"cough\"       Family History:  Family History   Problem Relation Age of Onset    Cancer Mother         Bladder cancer, hx. of smoking    Diabetes Mother     Heart Attack Father     Cancer Father         Colon     Cancer Maternal Uncle         Lung    Diabetes Maternal Uncle     Diabetes Maternal Aunt        Social History:  Social History     Socioeconomic History    Marital status: Single     Spouse name: Not on file    Number of children: 0    Years of education: Not on file    Highest education level: Not on file   Occupational History    Not on file   Tobacco Use    Smoking status: Former     Packs/day: 0.25     Years: 15.00     Pack years: 3.75     Types: Cigarettes     Quit date: 3/11/1985     Years since quittin.1    Smokeless tobacco: Never    Tobacco comments:     passive smoke exposure her entire life   Vaping Use    Vaping Use: Never used   Substance and Sexual Activity    Alcohol use: Not Currently     Alcohol/week: 0.0 oz    Drug use: No    Sexual activity: Not Currently   Other Topics Concern    Not on file   Social History Narrative    Not on file     Social Determinants of Health     Financial Resource Strain: Not on file   Food Insecurity: Not on file   Transportation Needs: Not on file   Physical Activity: Not on file   Stress: Not on file   Social Connections: Not on file   Intimate Partner Violence: Not on file   Housing Stability: Not on file          Physical Exam:  Physical Exam    Oriented x 3  Weight/BMI: There is no height or weight on file to calculate BMI.  There were no vitals taken for this visit.    Base Eye Exam       Visual Acuity (Snellen - Linear)         Right Left    Dist sc 20/30 -1 20/30              Tonometry (i-care, 1:55 PM)         Right Left    Pressure " 14 14              Pupils         Pupils React    Right PERRL Minimal    Left PERRL Minimal              Visual Fields         Right Left     Full Full              Neuro/Psych       Oriented x3: Yes    Mood/Affect: Normal                  Additional Tests       Color         Right Left    [place]sh[place]ara 12/12 10/12              Stereo       Fly: -    Animals: 0/3    Circles: 1/9                  Strabismus Exam         0 0 0   0 0 0                       0  0  ET 20 0  0                       0 0 0   0 0 0                       Slit Lamp and Fundus Exam       External Exam         Right Left    External Normal Normal              Slit Lamp Exam         Right Left    Lids/Lashes Normal Normal    Conjunctiva/Sclera Injection Injection    Cornea Clear Clear    Anterior Chamber Deep and quiet Deep and quiet    Iris Round and reactive Round and reactive    Lens Posterior chamber intraocular lens Posterior chamber intraocular lens    Vitreous Normal Normal              Fundus Exam         Right Left    Disc Tilted disc Tilted disc    Macula ERM Normal    Vessels Normal Normal    Periphery Normal Normal                  Refraction       Wearing Rx         Sphere Cylinder Axis Add    Right +2.75 +0.00 0     Left +2.75 +0.00 0       Age: 3yrs    Type: SVL              Wearing Rx #2         Sphere Cylinder Axis Add    Right -0.50   +3.00    Left -0.50 +0.75 065 +3.00              Manifest Refraction (Auto)         Sphere Cylinder Axis    Right -0.25 +0.50 083    Left -0.50 +1.25 066                    Pertinent Lab/Test/Imaging Review:      Assessment and Plan:     Cortical age-related cataract of both eyes  4/5/2021 - bilateral cortical cataracts with vision worse in the left eye leading to switched fixation  6/24/2021 - Since can begin fusing with press on prism, will refer for bilateral cataract extraction  10/18/2021 - SP bilateral cataract extraction  5/1/2022 - some residual myopia and astigmatism. Will give rx with  bifocal  11/8/2022-never got glasses Rx will give Rx  5/3/2023 -     Epiretinal membrane (ERM) of right eye  5/2/2022 - ERM OD, probably leading to some of the decrease in vision. Will monitor and if progressive will refer to retina  11/8/2022 -slight worsening of ERM OD with some vitreo macular traction.  OCT nerve fiber layer thickness is increased OD in the region to 112 stable left eye 78.  Her visual acuity is fairly good so we will continue to monitor.  Progressive decrease and will refer to retina.  5/3/20-23 -some progression of her vitreal macular traction.  Although her acuity appears stable.  The left eye appears to have  without induction of a macular hole.  Will refer to [place] [facility].      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 In PAT double and would not suppress or fuse. 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 Is now noticing some double vision because of cataracts and now vision better in the non dominant eye so is switching fixation. Discussed at length options. These include removing only the cataract in the left eye which hopefully will make that eye dominant all the time or a PAT test to see if can begin fusing and the consider cataract extraction followed by strabismus repair. Will there froe precede with the latter. Gave rx to slightly improve the vision and will place 40 base out press on prism over the OD in  Pat to see if can start fusing. If so then could consider cataract extraction followed by strabismus repair.   6/24/2021 - Starting to fuse with the 40 base out prism OD. Diplopia significantly better if not resolved. Therefore discussed preceding with bilateral cataract extraction followed by bilateral medial rectus recession once healed from the cataract extraction. Will refer to Dr Swanson for the cataract extraction  10/18/2021 - SP  bilateral cataract extraction by Dr Swanson. IP PAT 35 to 40 ET with small 4 diopter left hypo. Thus discussed the risks and benefits of BMR recession for 40 with the OD on adjustable. Discussed that might need prisms or further surgery for the vertical. Has had prism glasses in the past.   11/23/2021 - POST op day number 4 following RMR recess / adjust, LMR resect. Some flick ET but no double vision and motility full. Overall healing well. Taper maxitrol. Follow up 3 months. Discussed could take out sutures in two weeks if becomes bothersome.   5/2/2022 - Some progressive ET, but no double vision  11/8/2022 -some progressive ET to now 20 diopters despite a medial rectus muscle recess and right lateral rectus muscle resect.  Spent time reviewing old CT scans and in 2021 it was unremarkable of the orbits.  However in 2015 she has some posterior sphenoid sinus fractures with air involving the cavernous sinus.  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  If progressive might need further strabismus repair.  5/3/2023 -overall stable residual right ET.  No complaints of double vision.    Myopia of both eyes  5/2/2022 - Some residual myopia and astigmatism post cataract extraction, worse in OS. Since now left eye dominant will give glasses rx.   11/8/2022 -we will give Rx  5/3/2023-never got Rx.  Manifest no change today.    Pseudophakia of both eyes  5/3/2023-IOL intact.  Bilateral    Glaucoma suspect of both eyes  4/5/2021 - secondary to tilted discs from myopia. Oct NFL thickness 91 OD and 67 OS, bu no significant increased cupping and IOP good. Will monitor\  6/24/2021 - IOP 17 OU today  10/18/2021 - IOP 14 OD and 13 OS  5/2/2022 - IOP stable, OCT NFL thickness 102 OD and 82 OS  11/8/2022 -IOP stable OCT nerve fiber layer thickness slightly increased OD secondary to papillary macular traction  5/3/2023-IOP stable.  OCT neurofibrillary thickness 118 OD 78 OS.  Stable cup-to-disc  ratio    Referral to [facility] Leiva at [facility] for progressive vitreal macular traction right eye    Keith Horne M.D.

## 2023-05-03 NOTE — ASSESSMENT & PLAN NOTE
4/5/2021 - secondary to tilted discs from myopia. Oct NFL thickness 91 OD and 67 OS, bu no significant increased cupping and IOP good. Will monitor\  6/24/2021 - IOP 17 OU today  10/18/2021 - IOP 14 OD and 13 OS  5/2/2022 - IOP stable, OCT NFL thickness 102 OD and 82 OS  11/8/2022 -IOP stable OCT nerve fiber layer thickness slightly increased OD secondary to papillary macular traction  5/3/2023-IOP stable.  OCT neurofibrillary thickness 118 OD 78 OS.  Stable cup-to-disc ratio

## 2023-05-03 NOTE — ASSESSMENT & PLAN NOTE
4/5/2021 - bilateral cortical cataracts with vision worse in the left eye leading to switched fixation  6/24/2021 - Since can begin fusing with press on prism, will refer for bilateral cataract extraction  10/18/2021 - SP bilateral cataract extraction  5/1/2022 - some residual myopia and astigmatism. Will give rx with bifocal  11/8/2022-never got glasses Rx will give Rx  5/3/2023 -

## 2023-05-03 NOTE — ASSESSMENT & PLAN NOTE
5/2/2022 - Some residual myopia and astigmatism post cataract extraction, worse in OS. Since now left eye dominant will give glasses rx.   11/8/2022 -we will give Rx  5/3/2023-never got Rx.  Manifest no change today.

## 2023-05-05 DIAGNOSIS — M48.54XS COMPRESSION FRACTURE OF THORACIC SPINE, NON-TRAUMATIC, SEQUELA: ICD-10-CM

## 2023-05-17 DIAGNOSIS — R11.10 VOMITING, UNSPECIFIED VOMITING TYPE, UNSPECIFIED WHETHER NAUSEA PRESENT: ICD-10-CM

## 2023-05-17 DIAGNOSIS — R13.10 DYSPHAGIA, UNSPECIFIED TYPE: ICD-10-CM

## 2023-05-17 PROCEDURE — 1170F FXNL STATUS ASSESSED: CPT | Performed by: INTERNAL MEDICINE

## 2023-06-12 ENCOUNTER — OFFICE VISIT (OUTPATIENT)
Dept: MEDICAL GROUP | Facility: PHYSICIAN GROUP | Age: 78
End: 2023-06-12
Payer: MEDICARE

## 2023-06-12 VITALS
OXYGEN SATURATION: 90 % | WEIGHT: 123.2 LBS | SYSTOLIC BLOOD PRESSURE: 106 MMHG | DIASTOLIC BLOOD PRESSURE: 64 MMHG | HEIGHT: 63 IN | TEMPERATURE: 99.1 F | HEART RATE: 101 BPM | BODY MASS INDEX: 21.83 KG/M2 | RESPIRATION RATE: 18 BRPM

## 2023-06-12 DIAGNOSIS — G47.09 OTHER INSOMNIA: ICD-10-CM

## 2023-06-12 DIAGNOSIS — F51.01 PRIMARY INSOMNIA: Chronic | ICD-10-CM

## 2023-06-12 PROCEDURE — 1170F FXNL STATUS ASSESSED: CPT | Performed by: FAMILY MEDICINE

## 2023-06-12 PROCEDURE — 3078F DIAST BP <80 MM HG: CPT | Performed by: FAMILY MEDICINE

## 2023-06-12 PROCEDURE — 3074F SYST BP LT 130 MM HG: CPT | Performed by: FAMILY MEDICINE

## 2023-06-12 PROCEDURE — 99214 OFFICE O/P EST MOD 30 MIN: CPT | Performed by: FAMILY MEDICINE

## 2023-06-12 RX ORDER — ZOLPIDEM TARTRATE 10 MG/1
10 TABLET ORAL NIGHTLY PRN
Qty: 30 TABLET | Refills: 0 | Status: SHIPPED | OUTPATIENT
Start: 2023-06-12 | End: 2023-07-03 | Stop reason: SDUPTHER

## 2023-06-12 RX ORDER — ZOLPIDEM TARTRATE 10 MG/1
10 TABLET ORAL NIGHTLY PRN
Qty: 30 TABLET | Refills: 0 | OUTPATIENT
Start: 2023-06-12 | End: 2023-09-10

## 2023-06-12 ASSESSMENT — FIBROSIS 4 INDEX: FIB4 SCORE: 1.02

## 2023-06-12 NOTE — PROGRESS NOTES
Subjective:     CC:   Chief Complaint   Patient presents with    Follow-Up       HPI:   Yasmin presents today requesting a refill of her ambien patient states she needs to take it every night in order to sleep.  Patient normally does see Dr. Gupta for her care.  Patient states she is feeling good otherwise    Past Medical History:   Diagnosis Date    Anxiety     Arrhythmia 2020    Arthritis 2020    Blood clotting disorder (HCC)     hx  in lung    Breath shortness 2020    no supplemental oxygen    Cancer (HCC)     basal cell per hx    Cataract     meg IOL     Depression     Fall from ground level 2021    Heart murmur     History of respiratory failure     6-month hospitalization in     Hyperlipidemia     Indigestion 2020    GERD    Insomnia     Pain 2020    back pain    Peripheral neuropathy     Pneumonia     hx     Pulmonary fibrosis (HCC)     Recurrent major depressive disorder, in full remission ([facility]) 2017    Thrombocytopenia ([facility]) 2021       Social History     Tobacco Use    Smoking status: Former     Packs/day: 0.25     Years: 15.00     Pack years: 3.75     Types: Cigarettes     Quit date: 3/11/1985     Years since quittin.2    Smokeless tobacco: Never    Tobacco comments:     passive smoke exposure her entire life   Vaping Use    Vaping Use: Never used   Substance Use Topics    Alcohol use: Not Currently     Alcohol/week: 0.0 oz    Drug use: No       Current Outpatient Medications Ordered in Epic   Medication Sig Dispense Refill    fluticasone (FLONASE) 50 MCG/ACT nasal spray Administer 1 Spray into affected nostril(S) every morning. 16 g 3    zolpidem (AMBIEN) 10 MG Tab Take 1 Tablet by mouth at bedtime as needed for Sleep for up to 90 days. 30 Tablet 2    atorvastatin (LIPITOR) 20 MG Tab Take 1 Tablet by mouth every evening. 90 Tablet 3    zolpidem (AMBIEN) 10 MG Tab       busPIRone (BUSPAR) 10 MG Tab tablet TAKE ONE TABLET BY MOUTH EVERY  "MORNING AND TAKE TWO TABLETS BY MOUTH EVERY NIGHT AT BEDTIME 270 Tablet 3    cyanocobalamin (VITAMIN B-12) 100 MCG Tab Take 1 Tablet by mouth every morning.      BIOTIN PO Take 1 Tablet by mouth every morning. Pt unsure of dose      Calcium Citrate-Vitamin D (CALCIUM + D PO) Take 1 Tablet by mouth every morning.      MAGNESIUM PO Take 1 Capsule by mouth every morning. Pt unsure of dose      Cholecalciferol (VITAMIN D3 PO) Take 1 Capsule by mouth every morning. Pt unsure of dose      VITAMIN E PO Take 1 Capsule by mouth every morning. Pt unsure of dose       No current Epic-ordered facility-administered medications on file.       Allergies:  Pollen extract    Health Maintenance: Completed    ROS:  Gen: no fevers/chills, no changes in weight  Eyes: no changes in vision  ENT: no sore throat, no hearing loss, no bloody nose  Pulm: no sob, no cough  CV: no chest pain, no palpitations  GI: no nausea/vomiting, no diarrhea  Neuro: no headaches, no numbness/tingling  Heme/Lymph: no easy bruising    Objective:     Exam:  /64 (BP Location: Left arm, Patient Position: Sitting, BP Cuff Size: Adult)   Pulse (!) 101   Temp 37.3 °C (99.1 °F) (Temporal)   Resp 18   Ht 1.6 m (5' 3\")   Wt 55.9 kg (123 lb 3.2 oz)   SpO2 90%   BMI 21.82 kg/m²  Body mass index is 21.82 kg/m².    Gen: Alert and oriented, No apparent distress.  Skin: Warm and dry.  No obvious lesions.  Eyes: Sclera wnl Pupils normal in size  Lungs: Normal effort, CTA bilaterally, no wheezes, rhonchi, or rales  CV: Regular rate and rhythm.  Patient does have a very loud murmur at 3/6 that I can hear on both sides of the second intercostal space.  I did ask her if she sees cardiology patients that she is always have this murmur  Musculoskeletal: Normal gait. No extremity cyanosis, clubbing, or edema.  Neuro: Oriented to person, place and time  Psych: Mood is wnl       Assessment & Plan:     77 y.o. female with the following -     1. Other insomnia  Patient does " have a controlled substance contract with Dr. Gupta.  Went ahead and just wrote for 30 tablets had my MA schedule her appointment for follow-up with Dr. Gupta before she runs out.       Return in about 3 weeks (around 7/3/2023), or if symptoms worsen or fail to improve.    Please note that this dictation was created using voice recognition software. I have made every reasonable attempt to correct obvious errors, but I expect that there are errors of grammar and possibly content that I did not discover before finalizing the note.

## 2023-06-28 ENCOUNTER — TELEPHONE (OUTPATIENT)
Dept: MEDICAL GROUP | Facility: PHYSICIAN GROUP | Age: 78
End: 2023-06-28
Payer: MEDICARE

## 2023-06-28 NOTE — TELEPHONE ENCOUNTER
Yasmin left a voicemail stating she needs a 3 month supply but did not leave any further details. I tried to call her back and could not leave a voicemail both time call was just dropped.

## 2023-07-03 ENCOUNTER — OFFICE VISIT (OUTPATIENT)
Dept: MEDICAL GROUP | Facility: PHYSICIAN GROUP | Age: 78
End: 2023-07-03
Payer: MEDICARE

## 2023-07-03 VITALS
OXYGEN SATURATION: 94 % | DIASTOLIC BLOOD PRESSURE: 60 MMHG | SYSTOLIC BLOOD PRESSURE: 116 MMHG | RESPIRATION RATE: 20 BRPM | WEIGHT: 120 LBS | BODY MASS INDEX: 21.26 KG/M2 | TEMPERATURE: 98.9 F | HEART RATE: 90 BPM | HEIGHT: 63 IN

## 2023-07-03 DIAGNOSIS — F41.1 GAD (GENERALIZED ANXIETY DISORDER): ICD-10-CM

## 2023-07-03 DIAGNOSIS — E78.2 MIXED HYPERLIPIDEMIA: ICD-10-CM

## 2023-07-03 DIAGNOSIS — G47.09 OTHER INSOMNIA: ICD-10-CM

## 2023-07-03 PROCEDURE — 99214 OFFICE O/P EST MOD 30 MIN: CPT | Performed by: INTERNAL MEDICINE

## 2023-07-03 PROCEDURE — 3078F DIAST BP <80 MM HG: CPT | Performed by: INTERNAL MEDICINE

## 2023-07-03 PROCEDURE — 1170F FXNL STATUS ASSESSED: CPT | Performed by: INTERNAL MEDICINE

## 2023-07-03 PROCEDURE — 3074F SYST BP LT 130 MM HG: CPT | Performed by: INTERNAL MEDICINE

## 2023-07-03 RX ORDER — ZOLPIDEM TARTRATE 10 MG/1
10 TABLET ORAL NIGHTLY PRN
Qty: 30 TABLET | Refills: 2 | Status: SHIPPED | OUTPATIENT
Start: 2023-07-03 | End: 2023-07-12

## 2023-07-03 ASSESSMENT — FIBROSIS 4 INDEX: FIB4 SCORE: 1.02

## 2023-07-03 NOTE — ASSESSMENT & PLAN NOTE
Chronic ongoing condition.  The patient has been taking Ambien 10 mg daily.  Patient may have medication requesting refills.  Patient tolerating medication well no significant side effects reported.    In prescribing controlled substances to this patient, I certify that I have obtained and reviewed the medical history of the patient. I have also made a good eryn effort to obtain applicable records from other providers who have treated the patient.     I have reviewed patient's prescription history as maintained by the [place] Prescription Monitoring Program.      I have reviewed the medical records and have determined that a controlled substance treatment is medically indicated. I believe the benefits of controlled substance therapy outweigh the risks.      I have discussed the risks and benefits of treatment plan, and alternative therapies with the patient.  Also discussed with the patient regarding potential drug interactions with other medications.     Sent form signed by the patient today.

## 2023-07-03 NOTE — ASSESSMENT & PLAN NOTE
Chronic ongoing condition.  The patient taking Lipitor 20 Mg daily.  Patient tolerating medication well no significant side effects reported.

## 2023-07-03 NOTE — ASSESSMENT & PLAN NOTE
This is a chronic ongoing condition.  Patient being treated with BuSpar 10 mg.  The patient reported that symptoms are well controlled.  No significant side effects reported.

## 2023-07-03 NOTE — PROGRESS NOTES
PRIMARY CARE CLINIC VISIT    Chief complaint:    Follow-up insomnia.  Request refill for Ambien  Hyperlipidemia   anxiety      Pcp    Lucina Gupta M.D.         History of Present Illness     Adjustment insomnia  Chronic ongoing condition.  The patient has been taking Ambien 10 mg daily.  Patient may have medication requesting refills.  Patient tolerating medication well no significant side effects reported.    In prescribing controlled substances to this patient, I certify that I have obtained and reviewed the medical history of the patient. I have also made a good eryn effort to obtain applicable records from other providers who have treated the patient.     I have reviewed patient's prescription history as maintained by the [place] Prescription Monitoring Program.      I have reviewed the medical records and have determined that a controlled substance treatment is medically indicated. I believe the benefits of controlled substance therapy outweigh the risks.      I have discussed the risks and benefits of treatment plan, and alternative therapies with the patient.  Also discussed with the patient regarding potential drug interactions with other medications.     Sent form signed by the patient today.    Mixed hyperlipidemia  Chronic ongoing condition.  The patient taking Lipitor 20 Mg daily.  Patient tolerating medication well no significant side effects reported.    TIFFANY (generalized anxiety disorder)  This is a chronic ongoing condition.  Patient being treated with BuSpar 10 mg.  The patient reported that symptoms are well controlled.  No significant side effects reported.    Current Outpatient Medications on File Prior to Visit   Medication Sig Dispense Refill    fluticasone (FLONASE) 50 MCG/ACT nasal spray Administer 1 Spray into affected nostril(S) every morning. 16 g 3    atorvastatin (LIPITOR) 20 MG Tab Take 1 Tablet by mouth every evening. 90 Tablet 3    busPIRone (BUSPAR) 10 MG Tab tablet TAKE ONE  TABLET BY MOUTH EVERY MORNING AND TAKE TWO TABLETS BY MOUTH EVERY NIGHT AT BEDTIME 270 Tablet 3    cyanocobalamin (VITAMIN B-12) 100 MCG Tab Take 1 Tablet by mouth every morning.      BIOTIN PO Take 1 Tablet by mouth every morning. Pt unsure of dose      Calcium Citrate-Vitamin D (CALCIUM + D PO) Take 1 Tablet by mouth every morning.      MAGNESIUM PO Take 1 Capsule by mouth every morning. Pt unsure of dose      Cholecalciferol (VITAMIN D3 PO) Take 1 Capsule by mouth every morning. Pt unsure of dose      VITAMIN E PO Take 1 Capsule by mouth every morning. Pt unsure of dose       No current facility-administered medications on file prior to visit.        Allergies: Pollen extract    Current Outpatient Medications Ordered in Epic   Medication Sig Dispense Refill    zolpidem (AMBIEN) 10 MG Tab Take 1 Tablet by mouth at bedtime as needed for Sleep for up to 90 days. 30 Tablet 2    fluticasone (FLONASE) 50 MCG/ACT nasal spray Administer 1 Spray into affected nostril(S) every morning. 16 g 3    atorvastatin (LIPITOR) 20 MG Tab Take 1 Tablet by mouth every evening. 90 Tablet 3    busPIRone (BUSPAR) 10 MG Tab tablet TAKE ONE TABLET BY MOUTH EVERY MORNING AND TAKE TWO TABLETS BY MOUTH EVERY NIGHT AT BEDTIME 270 Tablet 3    cyanocobalamin (VITAMIN B-12) 100 MCG Tab Take 1 Tablet by mouth every morning.      BIOTIN PO Take 1 Tablet by mouth every morning. Pt unsure of dose      Calcium Citrate-Vitamin D (CALCIUM + D PO) Take 1 Tablet by mouth every morning.      MAGNESIUM PO Take 1 Capsule by mouth every morning. Pt unsure of dose      Cholecalciferol (VITAMIN D3 PO) Take 1 Capsule by mouth every morning. Pt unsure of dose      VITAMIN E PO Take 1 Capsule by mouth every morning. Pt unsure of dose       No current Epic-ordered facility-administered medications on file.       Past Medical History:   Diagnosis Date    Anxiety     Arrhythmia 11/18/2020    Arthritis 11/18/2020    Blood clotting disorder (HCC)     hx 2013 in lung     Breath shortness 11/18/2020    no supplemental oxygen    Cancer (HCC) 1990    basal cell per hx    Cataract     meg IOL     Depression     Fall from ground level 5/25/2021    Heart murmur     History of respiratory failure     6-month hospitalization in 2014    Hyperlipidemia     Indigestion 11/18/2020    GERD    Insomnia     Pain 11/18/2020    back pain    Peripheral neuropathy     Pneumonia     hx 2013    Pulmonary fibrosis (HCC)     Recurrent major depressive disorder, in full remission (HCC) 7/21/2017    Thrombocytopenia (HCC) 4/19/2021       Past Surgical History:   Procedure Laterality Date    SD TOTAL HIP ARTHROPLASTY Right 4/4/2022    Procedure: RIGHT ARTHROPLASTY, HIP, TOTAL;  Surgeon: Mauricio Rose M.D.;  Location: SURGERY [facility];  Service: Orthopedics    ORIF, FRACTURE, ACETABULUM Right 1/3/2022    Procedure: OPEN REDUCTION AND INTERNAL FIXATION, FRACTURE, ACETABULUM;  Surgeon: Pritesh Zamora M.D.;  Location: SURGERY [facility];  Service: Orthopedics    STRABISMUS REPAIR Bilateral 11/19/2021    Procedure: STRABISMUS SURGERY - ESOTROPIA STRABISMUS PROCEDURE, ONE HORIZONTAL MUSCLE - BILATERAL, PROCEDURE FOR SCARRING OF EXTRAOCULAR MUSCLE AND ADJUSTABLE SUTURE PLACEMENT - RIGHT;  Surgeon: Keith Horne M.D.;  Location: SURGERY SAME DAY [facility];  Service: Ophthalmology    EYE SURGERY Bilateral 11/19/2021    Bilateral medial rectus recession in patient with prior ocular Bilateral medial rectus recession in patient with prior ocular     SD LAP,ESOPHAGOGAST FUNDOPLASTY N/A 11/23/2020    Procedure: FUNDOPLICATION, NISSEN, LAPAROSCOPIC- FOR PARAESOPHAGEAL WITH MESH;  Surgeon: Pritesh Baptiste M.D.;  Location: SURGERY [facility];  Service: General    HIP HEMIARTHROPLASTY  4/25/2015    Performed by Raymond Lantigua M.D. at [facility] ORS    CATARACT EXTRACTION WITH IOL Bilateral     OTHER      breast reduction       Family History   Problem Relation Age of Onset    Cancer Mother       "   Bladder cancer, hx. of smoking    Diabetes Mother     Heart Attack Father     Cancer Father         Colon     Cancer Maternal Uncle         Lung    Diabetes Maternal Uncle     Diabetes Maternal Aunt        Social History     Tobacco Use   Smoking Status Former    Packs/day: 0.25    Years: 15.00    Pack years: 3.75    Types: Cigarettes    Quit date: 3/11/1985    Years since quittin.3   Smokeless Tobacco Never   Tobacco Comments    passive smoke exposure her entire life   Vaping Use    Vaping Use: Never used       Social History     Substance and Sexual Activity   Alcohol Use Not Currently    Alcohol/week: 0.0 oz       Review of systems.  As per HPI above. All other systems reviewed and negative.      Past Medical, Social, and Family history reviewed and updated in EPIC     Objective     /60 (BP Location: Left arm, Patient Position: Sitting, BP Cuff Size: Adult)   Pulse 90   Temp 37.2 °C (98.9 °F) (Temporal)   Resp 20   Ht 1.6 m (5' 3\")   Wt 54.4 kg (120 lb)   SpO2 94%    Body mass index is 21.26 kg/m².    General: alert in no apparent distress.  Cardiovascular: regular rate and rhythm  Pulmonary: lungs : no wheezing   Gastrointestinal: BS present. No obvious mass noted        Lab Results   Component Value Date/Time    HBA1C 5.1 2020 04:42 AM    HBA1C 5.2 2013 11:04 AM       Lab Results   Component Value Date/Time    WBC 6.6 10/13/2022 04:06 PM    HEMOGLOBIN 14.0 10/13/2022 04:06 PM    HEMATOCRIT 42.1 10/13/2022 04:06 PM    .7 (H) 10/13/2022 04:06 PM    PLATELETCT 305 10/13/2022 04:06 PM         Lab Results   Component Value Date/Time    SODIUM 141 10/13/2022 04:06 PM    POTASSIUM 4.4 10/13/2022 04:06 PM    GLUCOSE 77 10/13/2022 04:06 PM    BUN 17 10/13/2022 04:06 PM    CREATININE 0.62 10/13/2022 04:06 PM       Lab Results   Component Value Date/Time    CHOLSTRLTOT 138 10/13/2022 04:06 PM    TRIGLYCERIDE 195 (H) 10/13/2022 04:06 PM    HDL 41 10/13/2022 04:06 PM    LDL 58 " 10/13/2022 04:06 PM       Lab Results   Component Value Date/Time    ALTSGPT 10 10/13/2022 04:06 PM             Assessment and Plan     1. Other insomnia  - zolpidem (AMBIEN) 10 MG Tab; Take 1 Tablet by mouth at bedtime as needed for Sleep for up to 90 days.  Dispense: 30 Tablet; Refill: 2  Chronic condition.  Stable.  Rx refill sent to the pharmacy.  conSent form signed by the patient today.  Potential side effects of the medication discussed with patient.  Patient to follow-up with PCP as directed.    2. Mixed hyperlipidemia  Chronic condition.  Recent lipid panel result reviewed.  Advised the patient to continue with atorvastatin 20 Mg daily.  Patient to follow-up with PCP.    4. TIFFANY (generalized anxiety disorder)  Chronic stable condition.  Continue to take BuSpar 10 mg 3 times daily.  Patient to follow-up with PCP                                   Please note that this dictation was created using voice recognition software. I have made every reasonable attempt to correct obvious errors, but I expect that there are errors of grammar and possibly content that I did not discover before finalizing the note.    Luigi Talley MD  Internal Medicine  [facility]

## 2023-07-07 ENCOUNTER — APPOINTMENT (OUTPATIENT)
Dept: MEDICAL GROUP | Facility: PHYSICIAN GROUP | Age: 78
End: 2023-07-07
Payer: MEDICARE

## 2023-07-11 ENCOUNTER — HOSPITAL ENCOUNTER (OUTPATIENT)
Dept: RADIOLOGY | Facility: MEDICAL CENTER | Age: 78
End: 2023-07-11
Attending: NEUROLOGICAL SURGERY
Payer: MEDICARE

## 2023-07-11 DIAGNOSIS — M54.16 RADICULOPATHY, LUMBAR REGION: ICD-10-CM

## 2023-07-11 PROCEDURE — 72148 MRI LUMBAR SPINE W/O DYE: CPT

## 2023-07-11 PROCEDURE — 72131 CT LUMBAR SPINE W/O DYE: CPT

## 2023-08-27 ENCOUNTER — TELEPHONE (OUTPATIENT)
Dept: ONCOLOGY | Facility: MEDICAL CENTER | Age: 78
End: 2023-08-27
Payer: MEDICARE

## 2023-08-27 ENCOUNTER — APPOINTMENT (OUTPATIENT)
Dept: ONCOLOGY | Facility: MEDICAL CENTER | Age: 78
End: 2023-08-27
Attending: INTERNAL MEDICINE
Payer: MEDICARE

## 2023-09-04 ENCOUNTER — TELEPHONE (OUTPATIENT)
Dept: ONCOLOGY | Facility: MEDICAL CENTER | Age: 78
End: 2023-09-04
Payer: MEDICARE

## 2023-09-07 ENCOUNTER — APPOINTMENT (OUTPATIENT)
Dept: MEDICAL GROUP | Facility: PHYSICIAN GROUP | Age: 78
End: 2023-09-07
Payer: MEDICARE

## 2023-09-20 ENCOUNTER — APPOINTMENT (OUTPATIENT)
Dept: ONCOLOGY | Facility: MEDICAL CENTER | Age: 78
End: 2023-09-20
Attending: INTERNAL MEDICINE
Payer: MEDICARE

## 2023-10-03 NOTE — PROGRESS NOTES
Subjective:     CC: No chief complaint on file.        HPI:   Yasmin presents today for follow-up visit and to discuss the following issues:        Past Medical History:   Diagnosis Date    Anxiety     Arrhythmia 2020    Arthritis 2020    Blood clotting disorder (HCC)     hx  in lung    Breath shortness 2020    no supplemental oxygen    Cancer (HCC)     basal cell per hx    Cataract     meg IOL     Depression     Fall from ground level 2021    Heart murmur     History of respiratory failure     6-month hospitalization in     Hyperlipidemia     Indigestion 2020    GERD    Insomnia     Pain 2020    back pain    Peripheral neuropathy     Pneumonia     hx     Pulmonary fibrosis (HCC)     Recurrent major depressive disorder, in full remission (HCC) 2017    Thrombocytopenia (HCC) 2021       Social History     Tobacco Use    Smoking status: Former     Current packs/day: 0.00     Average packs/day: 0.3 packs/day for 15.0 years (3.8 ttl pk-yrs)     Types: Cigarettes     Start date: 3/11/1970     Quit date: 3/11/1985     Years since quittin.5    Smokeless tobacco: Never    Tobacco comments:     passive smoke exposure her entire life   Vaping Use    Vaping Use: Never used   Substance Use Topics    Alcohol use: Not Currently     Alcohol/week: 0.0 oz    Drug use: No       Current Outpatient Medications Ordered in Epic   Medication Sig Dispense Refill    fluticasone (FLONASE) 50 MCG/ACT nasal spray Administer 1 Spray into affected nostril(S) every morning. 16 g 3    atorvastatin (LIPITOR) 20 MG Tab Take 1 Tablet by mouth every evening. 90 Tablet 3    busPIRone (BUSPAR) 10 MG Tab tablet TAKE ONE TABLET BY MOUTH EVERY MORNING AND TAKE TWO TABLETS BY MOUTH EVERY NIGHT AT BEDTIME 270 Tablet 3    cyanocobalamin (VITAMIN B-12) 100 MCG Tab Take 1 Tablet by mouth every morning.      BIOTIN PO Take 1 Tablet by mouth every morning. Pt unsure of dose      Calcium  Citrate-Vitamin D (CALCIUM + D PO) Take 1 Tablet by mouth every morning.      MAGNESIUM PO Take 1 Capsule by mouth every morning. Pt unsure of dose      Cholecalciferol (VITAMIN D3 PO) Take 1 Capsule by mouth every morning. Pt unsure of dose      VITAMIN E PO Take 1 Capsule by mouth every morning. Pt unsure of dose       No current Epic-ordered facility-administered medications on file.       Allergies:  Pollen extract    Health Maintenance: Completed    Review of Systems:  No fevers or chills. No cough, chest pain, or shortness of breath.       Objective:       Exam:  There were no vitals taken for this visit. There is no height or weight on file to calculate BMI.    Gen: Alert and oriented, No apparent distress.  Lungs: Normal effort, CTA bilaterally, no wheezes, rhonchi, or rales  CV: Regular rate and rhythm. No murmurs, rubs, or gallops.  Ext: No clubbing, cyanosis, edema.        Assessment & Plan:     77 y.o. female with the following -         No follow-ups on file.    Please note that this dictation was created using voice recognition software. I have made every reasonable attempt to correct obvious errors, but I expect that there are errors of grammar and possibly content that I did not discover before finalizing the note.

## 2023-10-05 ENCOUNTER — OFFICE VISIT (OUTPATIENT)
Dept: MEDICAL GROUP | Facility: PHYSICIAN GROUP | Age: 78
End: 2023-10-05
Payer: MEDICARE

## 2023-10-05 ENCOUNTER — TELEPHONE (OUTPATIENT)
Dept: ONCOLOGY | Facility: MEDICAL CENTER | Age: 78
End: 2023-10-05

## 2023-10-05 VITALS
WEIGHT: 119 LBS | SYSTOLIC BLOOD PRESSURE: 116 MMHG | RESPIRATION RATE: 17 BRPM | OXYGEN SATURATION: 94 % | DIASTOLIC BLOOD PRESSURE: 64 MMHG | TEMPERATURE: 98.6 F | HEIGHT: 63 IN | HEART RATE: 94 BPM | BODY MASS INDEX: 21.09 KG/M2

## 2023-10-05 DIAGNOSIS — J84.10 PULMONARY FIBROSIS (HCC): ICD-10-CM

## 2023-10-05 DIAGNOSIS — R01.1 MURMUR: ICD-10-CM

## 2023-10-05 DIAGNOSIS — E78.2 MIXED HYPERLIPIDEMIA: ICD-10-CM

## 2023-10-05 DIAGNOSIS — F51.01 PRIMARY INSOMNIA: ICD-10-CM

## 2023-10-05 DIAGNOSIS — Z00.00 ENCOUNTER FOR MEDICARE ANNUAL WELLNESS EXAM: ICD-10-CM

## 2023-10-05 DIAGNOSIS — R91.1 LUNG NODULE: ICD-10-CM

## 2023-10-05 DIAGNOSIS — E05.90 SUBCLINICAL HYPERTHYROIDISM: Chronic | ICD-10-CM

## 2023-10-05 DIAGNOSIS — D75.89 MACROCYTOSIS: Chronic | ICD-10-CM

## 2023-10-05 DIAGNOSIS — Z23 NEED FOR VACCINATION: ICD-10-CM

## 2023-10-05 PROCEDURE — G0008 ADMIN INFLUENZA VIRUS VAC: HCPCS | Performed by: INTERNAL MEDICINE

## 2023-10-05 PROCEDURE — G0439 PPPS, SUBSEQ VISIT: HCPCS | Mod: 25 | Performed by: INTERNAL MEDICINE

## 2023-10-05 PROCEDURE — 3074F SYST BP LT 130 MM HG: CPT | Performed by: INTERNAL MEDICINE

## 2023-10-05 PROCEDURE — 1170F FXNL STATUS ASSESSED: CPT | Performed by: INTERNAL MEDICINE

## 2023-10-05 PROCEDURE — 3078F DIAST BP <80 MM HG: CPT | Performed by: INTERNAL MEDICINE

## 2023-10-05 PROCEDURE — 90662 IIV NO PRSV INCREASED AG IM: CPT | Performed by: INTERNAL MEDICINE

## 2023-10-05 RX ORDER — ZOLPIDEM TARTRATE 10 MG/1
10 TABLET ORAL NIGHTLY PRN
Qty: 30 TABLET | Refills: 2 | Status: SHIPPED | OUTPATIENT
Start: 2023-10-06 | End: 2024-01-04

## 2023-10-05 RX ORDER — ZOLPIDEM TARTRATE 10 MG/1
TABLET ORAL
COMMUNITY
Start: 2023-09-08 | End: 2023-10-05 | Stop reason: SDUPTHER

## 2023-10-05 ASSESSMENT — PATIENT HEALTH QUESTIONNAIRE - PHQ9: CLINICAL INTERPRETATION OF PHQ2 SCORE: 0

## 2023-10-05 ASSESSMENT — FIBROSIS 4 INDEX: FIB4 SCORE: 1.02

## 2023-10-05 ASSESSMENT — ACTIVITIES OF DAILY LIVING (ADL): BATHING_REQUIRES_ASSISTANCE: 0

## 2023-10-05 ASSESSMENT — ENCOUNTER SYMPTOMS: GENERAL WELL-BEING: GOOD

## 2023-10-05 NOTE — TELEPHONE ENCOUNTER
Yasmin had called to cx her appt on 10/07/23 as she had stated that she was not able to make this appt. I had rescheduled her for 10/14/23 @ 1600 but right after I hung up I realized that was the day of our party so I called her back and the phone rang and went straight to .  I left a  letting her know that we are unable to schedule her at this time due to we have a meeting this day and time and I apologized as I had forgotten and scheduled in a spot that is not available. I asked the patient to call us back so that we could get this rescheduled. I waited for some time for her to call back and she didn't so I cx'd her appt on the 7th, I tried to call her again to reschedule her appt and go no answer. I will try again tomorrow to see if I can reach her.

## 2023-10-05 NOTE — PROGRESS NOTES
Chief Complaint   Patient presents with    Annual Wellness Visit       HPI:  Yasmin Zhong is a 77 y.o. here for Medicare Annual Wellness Visit     Patient Active Problem List    Diagnosis Date Noted    Pseudophakia of both eyes 05/03/2023    S/P total right hip arthroplasty 05/26/2022    Epiretinal membrane (ERM) of right eye 05/02/2022    Myopia of both eyes 05/02/2022    Arthritis of right hip 04/04/2022    Closed displaced fracture of right acetabulum (HCC) 02/22/2022    Age-related osteoporosis with current pathological fracture with routine healing 01/28/2022    Post-op pain 01/12/2022    Adjustment insomnia 01/10/2022    S/P right hip fracture 01/01/2022    Anxiety 01/01/2022    TIFFANY (generalized anxiety disorder) 05/25/2021    Subclinical hyperthyroidism 05/25/2021    Glaucoma suspect of both eyes 04/05/2021    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    Cortical age-related cataract of both eyes 04/05/2021    Aortic valve sclerosis 11/16/2020    Lung nodule 10/26/2020    Osteopenia of neck of femur 05/23/2018    Peripheral neuropathy 01/23/2018    Overactive bladder 11/22/2017    Macular degeneration 07/21/2017    Mixed hyperlipidemia 07/21/2017    Nonrheumatic mitral valve regurgitation 07/21/2017    Other insomnia 04/25/2015    Acetabular fracture (HCC) 04/24/2015    Pulmonary fibrosis (HCC) 03/11/2015    Macrocytosis 07/01/2014    Anemia 06/24/2014    Acute respiratory failure with hypoxia (Formerly [facility]) 06/22/2014       Current Outpatient Medications   Medication Sig Dispense Refill    zolpidem (AMBIEN) 10 MG Tab Take 1 Tablet by mouth at bedtime as needed for Sleep for up to 90 days. 30 Tablet 2    fluticasone (FLONASE) 50 MCG/ACT nasal spray Administer 1 Spray into affected nostril(S) every morning. 16 g 3    atorvastatin (LIPITOR) 20 MG Tab Take 1 Tablet by mouth every evening. 90 Tablet 3    busPIRone (BUSPAR) 10 MG Tab tablet TAKE ONE TABLET BY MOUTH EVERY MORNING AND TAKE TWO TABLETS BY MOUTH EVERY NIGHT AT  BEDTIME 270 Tablet 3    cyanocobalamin (VITAMIN B-12) 100 MCG Tab Take 1 Tablet by mouth every morning.      BIOTIN PO Take 1 Tablet by mouth every morning. Pt unsure of dose      Calcium Citrate-Vitamin D (CALCIUM + D PO) Take 1 Tablet by mouth every morning.      MAGNESIUM PO Take 1 Capsule by mouth every morning. Pt unsure of dose      Cholecalciferol (VITAMIN D3 PO) Take 1 Capsule by mouth every morning. Pt unsure of dose      VITAMIN E PO Take 1 Capsule by mouth every morning. Pt unsure of dose       No current facility-administered medications for this visit.          Current supplements as per medication list.     Allergies: Pollen extract    Current social contact/activities: Monthly meetings at a couple organizations, also goes to lunches with friends      She  reports that she quit smoking about 38 years ago. Her smoking use included cigarettes. She started smoking about 53 years ago. She has a 3.8 pack-year smoking history. She has never used smokeless tobacco. She reports that she does not currently use alcohol. She reports that she does not use drugs.  Counseling given: Not Answered  Tobacco comments: passive smoke exposure her entire life      ROS:    Gait: Uses a walker and cane when walking dog   Ostomy: No  Other tubes: No  Amputations: No  Chronic oxygen use: No  Last eye exam: 08/2023  Wears hearing aids: No   : Denies any urinary leakage during the last 6 months    Screening:    Depression Screening  Little interest or pleasure in doing things?  0 - not at all  Feeling down, depressed , or hopeless? 0 - not at all  Patient Health Questionnaire Score: 0     If depressive symptoms identified deferred to follow up visit unless specifically addressed in assessment and plan.    Interpretation of PHQ-9 Total Score   Score Severity   1-4 No Depression   5-9 Mild Depression   10-14 Moderate Depression   15-19 Moderately Severe Depression   20-27 Severe Depression    Screening for Cognitive  Impairment  Do you or any of your friends or family members have any concern about your memory? No  Three Minute Recall (Banana, Sunrise, Chair) 3/3    Peter clock face with all 12 numbers and set the hands to show 20 past 8.  Yes    Cognitive concerns identified deferred for follow up unless specifically addressed in assessment and plan.    Fall Risk Assessment  Has the patient had two or more falls in the last year or any fall with injury in the last year?  No    Safety Assessment  Do you always wear your seatbelt?  Yes  Any changes to home needed to function safely? No  Difficulty hearing.  Yes  Patient counseled about all safety risks that were identified.    Functional Assessment ADLs  Are there any barriers preventing you from cooking for yourself or meeting nutritional needs?  No.    Are there any barriers preventing you from driving safely or obtaining transportation?  No.    Are there any barriers preventing you from using a telephone or calling for help?  No    Are there any barriers preventing you from shopping?  No.    Are there any barriers preventing you from taking care of your own finances?  No    Are there any barriers preventing you from managing your medications?  No    Are there any barriers preventing you from showering, bathing or dressing yourself? No    Are there any barriers preventing you from doing housework or laundry? No  Are there any barriers preventing you from using the toilet?No  Are you currently engaging in any exercise or physical activity?  Yes.      Self-Assessment of Health  What is your perception of your health? Good  Do you sleep more than six hours a night? Yes  In the past 7 days, how much did pain keep you from doing your normal work? None  Do you spend quality time with family or friends (virtually or in person)? Yes  Do you usually eat a heart healthy diet that constists of a variety of fruits, vegetables, whole grains and fiber? Yes  Do you eat foods high in fat and/or  Fast Food more than three times per week? No    Advance Care Planning  Do you have an Advance Directive, Living Will, Durable Power of , or POLST? Yes      Durable Power of    is not on file - instructed patient to bring in a copy to scan into their chart      Health Maintenance Summary            Overdue - Annual Wellness Visit (Every 366 Days) Overdue since 12/2/2022 12/01/2021  Subsequent Annual Wellness Visit - Includes PPPS ()    12/01/2021  Visit Dx: Encounter for Medicare annual wellness exam    05/23/2018  Visit Dx: Medicare annual wellness visit, initial              Postponed - Zoster (Shingles) Vaccines (1 of 2) Postponed until 3/5/2024      No completion history exists for this topic.              Postponed - COVID-19 Vaccine (6 - Moderna series) Postponed until 10/5/2024      01/19/2023  Imm Admin: PFIZER BIVALENT SARS-COV-2 VACCINE (12+)    05/24/2022  Imm Admin: MODERNA SARS-COV-2 VACCINE (12+)    10/28/2021  Imm Admin: MODERNA SARS-COV-2 VACCINE (12+)    02/25/2021  Imm Admin: MODERNA SARS-COV-2 VACCINE (12+)    01/27/2021  Imm Admin: MODERNA SARS-COV-2 VACCINE (12+)              Bone Density Scan (Every 5 Years) Next due on 10/13/2027      10/13/2022  DS-BONE DENSITY STUDY (DEXA)    05/07/2020  DS-BONE DENSITY STUDY (DEXA)    01/07/2019  DS-BONE DENSITY STUDY (DEXA)    06/19/2015  DS-BONE DENSITY STUDY (DEXA)              IMM DTaP/Tdap/Td Vaccine (2 - Td or Tdap) Next due on 3/27/2029      03/27/2019  Imm Admin: Tdap Vaccine              Hepatitis C Screening  Completed      12/14/2013  Hepatitis C Antibody component of HEPATITIS PANEL ACUTE(4 COMPONENTS)              Pneumococcal Vaccine: 65+ Years (Series Information) Completed      10/04/2018  Imm Admin: Pneumococcal polysaccharide vaccine (PPSV-23)    11/22/2017  Imm Admin: Pneumococcal Conjugate Vaccine (Prevnar/PCV-13)    12/16/2013  Imm Admin: Pneumococcal polysaccharide vaccine (PPSV-23)              Influenza  Vaccine (Series Information) Completed      10/05/2023  Imm Admin: Influenza Vaccine Adult HD    01/19/2023  Imm Admin: Influenza, Unspecified - HISTORICAL DATA    10/28/2021  Imm Admin: Influenza Vaccine Adult HD    10/26/2020  Imm Admin: Influenza Vaccine Adult HD    10/02/2019  Imm Admin: Influenza Vaccine Adult HD    Only the first 5 history entries have been loaded, but more history exists.              Hepatitis A Vaccine (Hep A) (Series Information) Aged Out      No completion history exists for this topic.              HPV Vaccines (Series Information) Aged Out      No completion history exists for this topic.              Polio Vaccine (Inactivated Polio) (Series Information) Aged Out      No completion history exists for this topic.              Meningococcal Immunization (Series Information) Aged Out      No completion history exists for this topic.              Discontinued - Mammogram  Discontinued        Frequency changed to Never automatically (Topic No Longer Applies)    05/07/2020  [id number]    08/27/2018  MA-MAMMO SCREENING BILAT W/KENTON W/CAD    08/25/2017  MA-MAMMO SCREENING BILAT W/KENTON W/CAD    07/01/2016  MA-SCREEN MAMMO W/CAD-BILAT    Only the first 5 history entries have been loaded, but more history exists.              Discontinued - Colorectal Cancer Screening  Discontinued        Frequency changed to Never automatically (Topic No Longer Applies)    01/29/2019  REFERRAL TO GI FOR COLONOSCOPY    08/29/2016  REFERRAL TO GI FOR COLONOSCOPY              Discontinued - Hepatitis B Vaccine (Hep B)  Discontinued      No completion history exists for this topic.                    Patient Care Team:  Lucina Gupta M.D. as PCP - General (Internal Medicine)  Onur Cheng, PT as Physical Therapist (Physical Therapy)  Onur Cheng PT as Physical Therapist (Physical Therapy)  Sebastian Dao M.D. as Consulting Physician (Gastroenterology)  [facility] as [facility]  Provider  Pritesh Baptiste M.D. (Surgery)  Nilesh Garcia, PT, DPT as Physical Therapist (Physical Therapy)        Social History     Tobacco Use    Smoking status: Former     Current packs/day: 0.00     Average packs/day: 0.3 packs/day for 15.0 years (3.8 ttl pk-yrs)     Types: Cigarettes     Start date: 3/11/1970     Quit date: 3/11/1985     Years since quittin.6    Smokeless tobacco: Never    Tobacco comments:     passive smoke exposure her entire life   Vaping Use    Vaping Use: Never used   Substance Use Topics    Alcohol use: Not Currently     Alcohol/week: 0.0 oz    Drug use: No     Family History   Problem Relation Age of Onset    Cancer Mother         Bladder cancer, hx. of smoking    Diabetes Mother     Heart Attack Father     Cancer Father         Colon     Cancer Maternal Uncle         Lung    Diabetes Maternal Uncle     Diabetes Maternal Aunt      She  has a past medical history of Anxiety, Arrhythmia (2020), Arthritis (2020), Blood clotting disorder (Formerly [facility]), Breath shortness (2020), Cancer (HCC) (), Cataract, Depression, Fall from ground level (2021), Heart murmur, History of respiratory failure, Hyperlipidemia, Indigestion (2020), Insomnia, Pain (2020), Peripheral neuropathy, Pneumonia, Pulmonary fibrosis (Formerly [facility]), Recurrent major depressive disorder, in full remission (Formerly [facility]) (2017), and Thrombocytopenia (Formerly [facility]) (2021).   Past Surgical History:   Procedure Laterality Date    ID TOTAL HIP ARTHROPLASTY Right 2022    Procedure: RIGHT ARTHROPLASTY, HIP, TOTAL;  Surgeon: Mauricio Rose M.D.;  Location: SURGERY [facility];  Service: Orthopedics    ORIF, FRACTURE, ACETABULUM Right 1/3/2022    Procedure: OPEN REDUCTION AND INTERNAL FIXATION, FRACTURE, ACETABULUM;  Surgeon: Pritesh Zamora M.D.;  Location: SURGERY [facility];  Service: Orthopedics    STRABISMUS REPAIR Bilateral 2021    Procedure: STRABISMUS SURGERY - ESOTROPIA STRABISMUS  "PROCEDURE, ONE HORIZONTAL MUSCLE - BILATERAL, PROCEDURE FOR SCARRING OF EXTRAOCULAR MUSCLE AND ADJUSTABLE SUTURE PLACEMENT - RIGHT;  Surgeon: Keith Horne M.D.;  Location: SURGERY SAME DAY [facility];  Service: Ophthalmology    EYE SURGERY Bilateral 11/19/2021    Bilateral medial rectus recession in patient with prior ocular Bilateral medial rectus recession in patient with prior ocular     LA LAP,ESOPHAGOGAST FUNDOPLASTY N/A 11/23/2020    Procedure: FUNDOPLICATION, NISSEN, LAPAROSCOPIC- FOR PARAESOPHAGEAL WITH MESH;  Surgeon: Pritesh Baptiste M.D.;  Location: SURGERY [facility];  Service: General    HIP HEMIARTHROPLASTY  4/25/2015    Performed by Raymond Lantigua M.D. at SURGERY [facility] ORS    CATARACT EXTRACTION WITH IOL Bilateral     OTHER      breast reduction       Exam:   /64   Pulse 94   Temp 37 °C (98.6 °F) (Temporal)   Resp 17   Ht 1.6 m (5' 3\")   Wt 54 kg (119 lb)   SpO2 94%  Body mass index is 21.08 kg/m².    Hearing fair.    Dentition good  Alert, oriented in no acute distress.  Eye contact is good, speech goal directed, affect calm    Assessment and Plan. The following treatment and monitoring plan is recommended:      Encounter for Medicare annual wellness exam  - Subsequent Annual Wellness Visit - Includes PPPS ()    Primary insomnia  Chronic medical condition. Well-controlled.  The patient takes zolpidem 10 mg nightly.  This controls her symptoms adequately.  She has had no request for dose escalation.  Reviewed the patient's  shows that the patient was last given a prescription of zolpidem 10 mg, dispo #30, on 9/8/2023. Obtained and reviewed patient utilization report from [facility] pharmacy database on 10/5/2023 5:29 AM  prior to writing prescription for controlled substance II, III or IV per [place] bill . Based on assessment of the report, the prescription is medically necessary.   -Continue current regimen of zolpidem 10 mg nightly for " insomnia  -Controlled Substance Treatment Agreement was signed and scanned into the patient's chart on 10/5/2023  -Urine drug screen ordered on 10/5/2023  -Continue zolpidem 10 mg nightly for insomnia  - URINE DRUG SCREEN; Future  - zolpidem (AMBIEN) 10 MG Tab; Take 1 Tablet by mouth at bedtime as needed for Sleep for up to 90 days.  Dispense: 30 Tablet; Refill: 2     Mixed hyperlipidemia  Chronic condition, controlled.  The patient is currently taking atorvastatin 20 mg nightly.  She denies statin associated myalgias.  Lipid panel from 10/13/2022 showed a total cholesterol 138, LDL 50, HDL 41, triglycerides 195.  The patient is due for updated labs.  -Continue current regimen of atorvastatin 20 mg nightly pending updated labs  - Comp Metabolic Panel; Future  - Lipid Profile; Future    Subclinical hyperthyroidism  Chronic medical condition.  Stable.  Labs on 10/13/2022 showed a reduced TSH of 0.04, normal free T4 1.12, and normal free T3 of 3.40. TSI was negative at <0.10.  She is clinically euthyroid.  The patient is due for updated labs.  -Continue to monitor, consider 5-hour uptake scan for any changes in her labs  - TSH; Future  - T3 FREE; Future  - FREE THYROXINE; Future  - THYROID PEROXIDASE  (TPO) AB; Future  - TSH RECEPTOR ANTIBODY; Future  - TSI (thyroid stimulating immunoglobulin); Future    Macrocytosis  Chronic condition, stable.  Labs from 10/13/2022 showed an elevated MCV of 102.7.  The patient is due for updated labs.  - CBC WITH DIFFERENTIAL; Future  - FOLATE; Future  - VITAMIN B12; Future    Lung nodule  Pulmonary fibrosis (HCC)  Chronic condition, stable.  Most recent CT chest 3/31/2022 showed a stable left lower lobe nodule, stable peripheral fibrotic changes, and no new suspicious pulmonary nodules.  The patient is without new pulmonary symptoms such as cough, chest pain, shortness of breath.  -Continue annual surveillance CT imaging, due 3/2023  - CT-CHEST (THORAX) W/O; Future    Murmur  Chronic  condition.  Patient with systolic murmur on exam.  No recent echocardiogram.  - EC-ECHOCARDIOGRAM COMPLETE W/O CONT; Future    Need for vaccination  - Influenza Vaccine, High Dose (65+ Only)      Services suggested: No services needed at this time  Health Care Screening: Age-appropriate preventive services recommended by USPTF and ACIP covered by Medicare were discussed today. Services ordered if indicated and agreed upon by the patient.  Referrals offered: Community-based lifestyle interventions to reduce health risks and promote self-management and wellness, fall prevention, nutrition, physical activity, tobacco-use cessation, weight loss, and mental health services as per orders if indicated.    Discussion today about general wellness and lifestyle habits:    Prevent falls and reduce trip hazards; Cautioned about securing or removing rugs.  Have a working fire alarm and carbon monoxide detector;   Engage in regular physical activity and social activities     Follow-up: Return in about 3 months (around 1/5/2024) for 3-month f/u visit.    Please note that this dictation was created using voice recognition software. I have made every reasonable attempt to correct obvious errors, but I expect that there are errors of grammar and possibly content that I did not discover before finalizing the note.

## 2023-10-06 ENCOUNTER — TELEPHONE (OUTPATIENT)
Dept: ONCOLOGY | Facility: MEDICAL CENTER | Age: 78
End: 2023-10-06
Payer: MEDICARE

## 2023-10-06 NOTE — TELEPHONE ENCOUNTER
Called pt and lvm asking her to call to rs her appt as we had made an appt on 10/14 @ 1600 but I had forgotten that was our party. I had called her right back but she didn't answer. I have called a few more times trying to reach her to get this rescheduled as she had called to cx her appt that was scheduled on 10/07/23 and will need to get back on the schedule.

## 2023-10-07 ENCOUNTER — APPOINTMENT (OUTPATIENT)
Dept: ONCOLOGY | Facility: MEDICAL CENTER | Age: 78
End: 2023-10-07
Attending: INTERNAL MEDICINE
Payer: MEDICARE

## 2023-10-23 RX ORDER — ATORVASTATIN CALCIUM 20 MG/1
20 TABLET, FILM COATED ORAL EVERY EVENING
Qty: 90 TABLET | Refills: 3 | Status: SHIPPED | OUTPATIENT
Start: 2023-10-23

## 2023-10-29 ENCOUNTER — OUTPATIENT INFUSION SERVICES (OUTPATIENT)
Dept: ONCOLOGY | Facility: MEDICAL CENTER | Age: 78
End: 2023-10-29
Attending: INTERNAL MEDICINE
Payer: MEDICARE

## 2023-10-29 VITALS
SYSTOLIC BLOOD PRESSURE: 119 MMHG | HEART RATE: 80 BPM | RESPIRATION RATE: 18 BRPM | TEMPERATURE: 97 F | BODY MASS INDEX: 21.17 KG/M2 | DIASTOLIC BLOOD PRESSURE: 77 MMHG | WEIGHT: 119.49 LBS | HEIGHT: 63 IN | OXYGEN SATURATION: 90 %

## 2023-10-29 DIAGNOSIS — M80.00XD AGE-RELATED OSTEOPOROSIS WITH CURRENT PATHOLOGICAL FRACTURE WITH ROUTINE HEALING: ICD-10-CM

## 2023-10-29 DIAGNOSIS — M85.859 OSTEOPENIA OF NECK OF FEMUR, UNSPECIFIED LATERALITY: ICD-10-CM

## 2023-10-29 LAB
CA-I BLD ISE-SCNC: 1.29 MMOL/L (ref 1.1–1.3)
CREAT BLD-MCNC: 0.6 MG/DL (ref 0.5–1.4)

## 2023-10-29 PROCEDURE — 700111 HCHG RX REV CODE 636 W/ 250 OVERRIDE (IP): Mod: JZ,JG | Performed by: INTERNAL MEDICINE

## 2023-10-29 PROCEDURE — 82330 ASSAY OF CALCIUM: CPT

## 2023-10-29 PROCEDURE — 96372 THER/PROPH/DIAG INJ SC/IM: CPT

## 2023-10-29 PROCEDURE — 36415 COLL VENOUS BLD VENIPUNCTURE: CPT

## 2023-10-29 PROCEDURE — 82565 ASSAY OF CREATININE: CPT

## 2023-10-29 RX ORDER — METHYLPREDNISOLONE SODIUM SUCCINATE 125 MG/2ML
125 INJECTION, POWDER, LYOPHILIZED, FOR SOLUTION INTRAMUSCULAR; INTRAVENOUS PRN
OUTPATIENT
Start: 2024-02-21

## 2023-10-29 RX ORDER — DIPHENHYDRAMINE HYDROCHLORIDE 50 MG/ML
50 INJECTION INTRAMUSCULAR; INTRAVENOUS PRN
OUTPATIENT
Start: 2024-02-21

## 2023-10-29 RX ORDER — EPINEPHRINE 1 MG/ML(1)
0.5 AMPUL (ML) INJECTION PRN
OUTPATIENT
Start: 2024-02-21

## 2023-10-29 RX ADMIN — DENOSUMAB 60 MG: 60 INJECTION SUBCUTANEOUS at 15:43

## 2023-10-29 ASSESSMENT — FIBROSIS 4 INDEX: FIB4 SCORE: 1.02

## 2023-10-29 NOTE — PROGRESS NOTES
Yasmin came into infusion services today for prolia injection for osteoporosis. No complaints. Labs drawn from right AC, covered with gauze and coban. Labs within parameters. Injection given in back of right arm, covered with band-aid. Tolerated well. Emailing scheduling for future appointments. Discharged to self care.

## 2023-10-30 ENCOUNTER — HOSPITAL ENCOUNTER (OUTPATIENT)
Dept: RADIOLOGY | Facility: MEDICAL CENTER | Age: 78
End: 2023-10-30
Attending: INTERNAL MEDICINE
Payer: MEDICARE

## 2023-10-30 ENCOUNTER — HOSPITAL ENCOUNTER (OUTPATIENT)
Dept: CARDIOLOGY | Facility: MEDICAL CENTER | Age: 78
End: 2023-10-30
Attending: INTERNAL MEDICINE
Payer: MEDICARE

## 2023-10-30 DIAGNOSIS — J84.10 PULMONARY FIBROSIS (HCC): ICD-10-CM

## 2023-10-30 DIAGNOSIS — R91.1 LUNG NODULE: ICD-10-CM

## 2023-10-30 DIAGNOSIS — R01.1 MURMUR: ICD-10-CM

## 2023-10-30 LAB — LV EJECT FRACT  99904: 60

## 2023-10-30 PROCEDURE — 93306 TTE W/DOPPLER COMPLETE: CPT

## 2023-10-30 PROCEDURE — 71250 CT THORAX DX C-: CPT

## 2023-10-30 PROCEDURE — 93306 TTE W/DOPPLER COMPLETE: CPT | Mod: 26 | Performed by: INTERNAL MEDICINE

## 2023-11-17 RX ORDER — BUSPIRONE HYDROCHLORIDE 10 MG/1
TABLET ORAL
Qty: 270 TABLET | Refills: 3 | Status: SHIPPED | OUTPATIENT
Start: 2023-11-17

## 2023-11-29 ENCOUNTER — PATIENT MESSAGE (OUTPATIENT)
Dept: HEALTH INFORMATION MANAGEMENT | Facility: OTHER | Age: 78
End: 2023-11-29

## 2023-12-07 ENCOUNTER — HOSPITAL ENCOUNTER (OUTPATIENT)
Dept: LAB | Facility: MEDICAL CENTER | Age: 78
End: 2023-12-07
Attending: INTERNAL MEDICINE
Payer: MEDICARE

## 2023-12-07 ENCOUNTER — TELEPHONE (OUTPATIENT)
Dept: MEDICAL GROUP | Facility: PHYSICIAN GROUP | Age: 78
End: 2023-12-07
Payer: MEDICARE

## 2023-12-07 DIAGNOSIS — E78.2 MIXED HYPERLIPIDEMIA: ICD-10-CM

## 2023-12-07 DIAGNOSIS — F51.01 PRIMARY INSOMNIA: ICD-10-CM

## 2023-12-07 DIAGNOSIS — D75.89 MACROCYTOSIS: Chronic | ICD-10-CM

## 2023-12-07 DIAGNOSIS — E05.90 SUBCLINICAL HYPERTHYROIDISM: Chronic | ICD-10-CM

## 2023-12-07 LAB
ALBUMIN SERPL BCP-MCNC: 4.3 G/DL (ref 3.2–4.9)
ALBUMIN/GLOB SERPL: 1.3 G/DL
ALP SERPL-CCNC: 65 U/L (ref 30–99)
ALT SERPL-CCNC: 9 U/L (ref 2–50)
ANION GAP SERPL CALC-SCNC: 13 MMOL/L (ref 7–16)
AST SERPL-CCNC: 16 U/L (ref 12–45)
BASOPHILS # BLD AUTO: 0.6 % (ref 0–1.8)
BASOPHILS # BLD: 0.04 K/UL (ref 0–0.12)
BILIRUB SERPL-MCNC: 0.7 MG/DL (ref 0.1–1.5)
BUN SERPL-MCNC: 14 MG/DL (ref 8–22)
CALCIUM ALBUM COR SERPL-MCNC: 8.8 MG/DL (ref 8.5–10.5)
CALCIUM SERPL-MCNC: 9 MG/DL (ref 8.5–10.5)
CHLORIDE SERPL-SCNC: 107 MMOL/L (ref 96–112)
CHOLEST SERPL-MCNC: 135 MG/DL (ref 100–199)
CO2 SERPL-SCNC: 20 MMOL/L (ref 20–33)
CREAT SERPL-MCNC: 0.63 MG/DL (ref 0.5–1.4)
EOSINOPHIL # BLD AUTO: 0.16 K/UL (ref 0–0.51)
EOSINOPHIL NFR BLD: 2.4 % (ref 0–6.9)
ERYTHROCYTE [DISTWIDTH] IN BLOOD BY AUTOMATED COUNT: 43.6 FL (ref 35.9–50)
FASTING STATUS PATIENT QL REPORTED: NORMAL
GFR SERPLBLD CREATININE-BSD FMLA CKD-EPI: 91 ML/MIN/1.73 M 2
GLOBULIN SER CALC-MCNC: 3.3 G/DL (ref 1.9–3.5)
GLUCOSE SERPL-MCNC: 92 MG/DL (ref 65–99)
HCT VFR BLD AUTO: 42.8 % (ref 37–47)
HDLC SERPL-MCNC: 52 MG/DL
HGB BLD-MCNC: 14.2 G/DL (ref 12–16)
IMM GRANULOCYTES # BLD AUTO: 0.01 K/UL (ref 0–0.11)
IMM GRANULOCYTES NFR BLD AUTO: 0.1 % (ref 0–0.9)
LDLC SERPL CALC-MCNC: 64 MG/DL
LYMPHOCYTES # BLD AUTO: 2.1 K/UL (ref 1–4.8)
LYMPHOCYTES NFR BLD: 31.2 % (ref 22–41)
MCH RBC QN AUTO: 32.7 PG (ref 27–33)
MCHC RBC AUTO-ENTMCNC: 33.2 G/DL (ref 32.2–35.5)
MCV RBC AUTO: 98.6 FL (ref 81.4–97.8)
MONOCYTES # BLD AUTO: 0.57 K/UL (ref 0–0.85)
MONOCYTES NFR BLD AUTO: 8.5 % (ref 0–13.4)
NEUTROPHILS # BLD AUTO: 3.86 K/UL (ref 1.82–7.42)
NEUTROPHILS NFR BLD: 57.2 % (ref 44–72)
NRBC # BLD AUTO: 0 K/UL
NRBC BLD-RTO: 0 /100 WBC (ref 0–0.2)
PLATELET # BLD AUTO: 281 K/UL (ref 164–446)
PMV BLD AUTO: 9.7 FL (ref 9–12.9)
POTASSIUM SERPL-SCNC: 4.2 MMOL/L (ref 3.6–5.5)
PROT SERPL-MCNC: 7.6 G/DL (ref 6–8.2)
RBC # BLD AUTO: 4.34 M/UL (ref 4.2–5.4)
SODIUM SERPL-SCNC: 140 MMOL/L (ref 135–145)
TRIGL SERPL-MCNC: 97 MG/DL (ref 0–149)
WBC # BLD AUTO: 6.7 K/UL (ref 4.8–10.8)

## 2023-12-07 PROCEDURE — 36415 COLL VENOUS BLD VENIPUNCTURE: CPT

## 2023-12-07 PROCEDURE — 83520 IMMUNOASSAY QUANT NOS NONAB: CPT

## 2023-12-07 PROCEDURE — 80061 LIPID PANEL: CPT

## 2023-12-07 PROCEDURE — 84445 ASSAY OF TSI GLOBULIN: CPT

## 2023-12-07 PROCEDURE — 80053 COMPREHEN METABOLIC PANEL: CPT

## 2023-12-07 PROCEDURE — 86376 MICROSOMAL ANTIBODY EACH: CPT

## 2023-12-07 PROCEDURE — 84481 FREE ASSAY (FT-3): CPT

## 2023-12-07 PROCEDURE — 82607 VITAMIN B-12: CPT

## 2023-12-07 PROCEDURE — 84439 ASSAY OF FREE THYROXINE: CPT

## 2023-12-07 PROCEDURE — 82746 ASSAY OF FOLIC ACID SERUM: CPT

## 2023-12-07 PROCEDURE — 85025 COMPLETE CBC W/AUTO DIFF WBC: CPT

## 2023-12-07 PROCEDURE — 84443 ASSAY THYROID STIM HORMONE: CPT

## 2023-12-08 LAB
FOLATE SERPL-MCNC: 17.7 NG/ML
T3FREE SERPL-MCNC: 3.56 PG/ML (ref 2–4.4)
T4 FREE SERPL-MCNC: 0.98 NG/DL (ref 0.93–1.7)
THYROPEROXIDASE AB SERPL-ACNC: <9 IU/ML (ref 0–9)
TSH SERPL DL<=0.005 MIU/L-ACNC: 0.16 UIU/ML (ref 0.38–5.33)
VIT B12 SERPL-MCNC: 661 PG/ML (ref 211–911)

## 2023-12-08 NOTE — TELEPHONE ENCOUNTER
Patient stated that her urine test was not covered by insurance so was wondering if there is a reason or if she needs to get it done?

## 2023-12-09 LAB — TSI SER-ACNC: <0.1 IU/L

## 2023-12-10 LAB — TSH RECEP AB SER-ACNC: <1.1 IU/L

## 2023-12-14 ENCOUNTER — TELEPHONE (OUTPATIENT)
Dept: MEDICAL GROUP | Facility: PHYSICIAN GROUP | Age: 78
End: 2023-12-14
Payer: MEDICARE

## 2023-12-14 NOTE — TELEPHONE ENCOUNTER
Spoke to patient on the phone she had left me a voicemail regarding her Ambien refill. She has an appointment on 1/3/2024 and she was wondering if she can get the prescription a day before since that is when she is due. On my end it shows she is not due until 1/5/2024 and I let her know that. I also let her know that we can not refill this medication a day early she needs to be seen in the office every 3 months since we need to have a new Controlled Substance agreement every 3 months. Patient stated she is already on the wait list and I confirmed we will call her if anything comes up. Patient stated she is handicap so give her some time to get back to us.     Patient also let me know about the medicare insurance not covering her urine drug screen she does not know how to get the codes. I told her she needs to contact them and find out why they are not coving it and what code we need to use in order for them to cover it.

## 2023-12-17 DIAGNOSIS — Z79.899 LONG-TERM USE OF HIGH-RISK MEDICATION: ICD-10-CM

## 2023-12-17 DIAGNOSIS — F13.90 SEDATIVE, HYPNOTIC, OR ANXIOLYTIC USE, UNSPECIFIED, UNCOMPLICATED: ICD-10-CM

## 2023-12-17 DIAGNOSIS — F51.01 PRIMARY INSOMNIA: ICD-10-CM

## 2023-12-17 DIAGNOSIS — Z79.899 CHRONIC USE OF BENZODIAZEPINE FOR THERAPEUTIC PURPOSE: ICD-10-CM

## 2023-12-27 ENCOUNTER — HOSPITAL ENCOUNTER (OUTPATIENT)
Dept: LAB | Facility: MEDICAL CENTER | Age: 78
End: 2023-12-27
Attending: INTERNAL MEDICINE
Payer: MEDICARE

## 2023-12-27 DIAGNOSIS — Z79.899 CHRONIC USE OF BENZODIAZEPINE FOR THERAPEUTIC PURPOSE: ICD-10-CM

## 2023-12-27 DIAGNOSIS — F13.90 SEDATIVE, HYPNOTIC, OR ANXIOLYTIC USE, UNSPECIFIED, UNCOMPLICATED: ICD-10-CM

## 2023-12-27 DIAGNOSIS — F51.01 PRIMARY INSOMNIA: ICD-10-CM

## 2023-12-27 DIAGNOSIS — Z79.899 LONG-TERM USE OF HIGH-RISK MEDICATION: ICD-10-CM

## 2023-12-27 PROCEDURE — G0481 DRUG TEST DEF 8-14 CLASSES: HCPCS

## 2023-12-31 LAB
1OH-MIDAZOLAM UR QL SCN: NOT DETECTED
6MAM UR QL: NOT DETECTED
7AMINOCLONAZEPAM UR QL: NOT DETECTED
A-OH ALPRAZ UR QL: NOT DETECTED
ALPRAZ UR QL: NOT DETECTED
AMPHET UR QL SCN: NOT DETECTED
ANNOTATION COMMENT IMP: NORMAL
BARBITURATES UR QL: NEGATIVE
BUPRENORPHINE UR QL: NOT DETECTED
BZE UR QL: NEGATIVE
CARBOXYTHC UR QL: NEGATIVE
CARISOPRODOL UR QL: NEGATIVE
CLONAZEPAM UR QL: NOT DETECTED
CODEINE UR QL: NOT DETECTED
CREAT UR-MCNC: 21.3 MG/DL (ref 20–400)
DIAZEPAM UR QL: NOT DETECTED
ETHYL GLUCURONIDE UR QL: NEGATIVE
FENTANYL UR QL: NOT DETECTED
GABAPENTIN UR QL CFM: NOT DETECTED
HYDROCODONE UR QL: NOT DETECTED
HYDROMORPHONE UR QL: NOT DETECTED
LORAZEPAM UR QL: NOT DETECTED
MDA UR QL: NOT DETECTED
MDEA UR QL: NOT DETECTED
MDMA UR QL: NOT DETECTED
ME-PHENIDATE UR QL: NOT DETECTED
METHADONE UR QL: NEGATIVE
METHAMPHET UR QL: NOT DETECTED
MIDAZOLAM UR QL SCN: NOT DETECTED
MORPHINE UR QL: NOT DETECTED
NALOXONE UR QL CFM: NOT DETECTED
NORBUPRENORPHINE UR QL CFM: NOT DETECTED
NORDIAZEPAM UR QL: NOT DETECTED
NORFENTANYL UR QL: NOT DETECTED
NORHYDROCODONE UR QL CFM: NOT DETECTED
NORMEPERIDINE UR QL CFM: NOT DETECTED
NOROXYCODONE UR QL CFM: NOT DETECTED
NOROXYMORPHONE UR QL SCN: NOT DETECTED
OXAZEPAM UR QL: NOT DETECTED
OXYCODONE UR QL: NOT DETECTED
OXYMORPHONE UR QL: NOT DETECTED
PATHOLOGY STUDY: NORMAL
PCP UR QL: NEGATIVE
PHENTERMINE UR QL: NOT DETECTED
PREGABALIN UR QL CFM: NOT DETECTED
SERVICE CMNT-IMP: NORMAL
TAPENTADOL UR QL SCN: NOT DETECTED
TAPENTADOL UR QL SCN: NOT DETECTED
TEMAZEPAM UR QL: NOT DETECTED
TRAMADOL UR QL: NEGATIVE
ZOLPIDEM PHENYL-4-CARB UR QL SCN: PRESENT
ZOLPIDEM UR QL: NOT DETECTED

## 2024-01-03 ENCOUNTER — OFFICE VISIT (OUTPATIENT)
Dept: MEDICAL GROUP | Facility: PHYSICIAN GROUP | Age: 79
End: 2024-01-03
Payer: MEDICARE

## 2024-01-03 VITALS
TEMPERATURE: 98.8 F | HEART RATE: 90 BPM | WEIGHT: 120 LBS | DIASTOLIC BLOOD PRESSURE: 76 MMHG | HEIGHT: 63 IN | OXYGEN SATURATION: 95 % | BODY MASS INDEX: 21.26 KG/M2 | SYSTOLIC BLOOD PRESSURE: 112 MMHG

## 2024-01-03 DIAGNOSIS — F51.01 PRIMARY INSOMNIA: ICD-10-CM

## 2024-01-03 DIAGNOSIS — E78.2 MIXED HYPERLIPIDEMIA: ICD-10-CM

## 2024-01-03 DIAGNOSIS — R91.1 LUNG NODULE: ICD-10-CM

## 2024-01-03 DIAGNOSIS — J84.10 PULMONARY FIBROSIS (HCC): ICD-10-CM

## 2024-01-03 DIAGNOSIS — E05.90 SUBCLINICAL HYPERTHYROIDISM: Chronic | ICD-10-CM

## 2024-01-03 PROCEDURE — 99214 OFFICE O/P EST MOD 30 MIN: CPT | Performed by: INTERNAL MEDICINE

## 2024-01-03 PROCEDURE — 3078F DIAST BP <80 MM HG: CPT | Performed by: INTERNAL MEDICINE

## 2024-01-03 PROCEDURE — 1170F FXNL STATUS ASSESSED: CPT | Performed by: INTERNAL MEDICINE

## 2024-01-03 PROCEDURE — 3074F SYST BP LT 130 MM HG: CPT | Performed by: INTERNAL MEDICINE

## 2024-01-03 RX ORDER — ZOLPIDEM TARTRATE 10 MG/1
10 TABLET ORAL NIGHTLY PRN
Qty: 30 TABLET | Refills: 2 | Status: SHIPPED | OUTPATIENT
Start: 2024-01-03 | End: 2024-04-02

## 2024-01-03 ASSESSMENT — FIBROSIS 4 INDEX: FIB4 SCORE: 1.46

## 2024-01-03 ASSESSMENT — PATIENT HEALTH QUESTIONNAIRE - PHQ9: CLINICAL INTERPRETATION OF PHQ2 SCORE: 0

## 2024-01-03 NOTE — PROGRESS NOTES
Subjective:     CC:   Chief Complaint   Patient presents with    Follow-Up     3 month follow up          HPI:   Yasmin Zhong is a 78-year-old female who presents for follow-up visit.  The patient feels well and has no acute medical complaints.  She needs a refill of her zolpidem.  She is up to date on her controlled substance treatment agreement and urine drug screen.  Reviewed the patient's most recent lipid panel on 12/7/2023 showed a total cholesterol of 135, LDL 64, HDL 52, triglycerides 97.  She will continue her current regimen of atorvastatin 20 mg nightly.  We reviewed her most recent labs on 12/7/2023 showed a reduced TSH of 0.16, normal free T4 of 0.98, normal free T3 of 3.56.  TSI is less than 0.10.  We will continue to monitor this.  We reviewed the patient's most recent CT chest 10/30/2023 showed a stable 1.9 cm lobulated nodule of the left lower lobe and peripheral fibrotic changes unchanged. The patient is without new pulmonary symptoms such as cough, chest pain, shortness of breath.        Past Medical History:   Diagnosis Date    Anxiety     Arrhythmia 11/18/2020    Arthritis 11/18/2020    Blood clotting disorder (HCC)     hx 2013 in lung    Breath shortness 11/18/2020    no supplemental oxygen    Cancer (HCC) 1990    basal cell per hx    Cataract     meg IOL     Depression     Fall from ground level 5/25/2021    Heart murmur     History of respiratory failure     6-month hospitalization in 2014    Hyperlipidemia     Indigestion 11/18/2020    GERD    Insomnia     Pain 11/18/2020    back pain    Peripheral neuropathy     Pneumonia     hx 2013    Pulmonary fibrosis (HCC)     Recurrent major depressive disorder, in full remission (HCC) 7/21/2017    Thrombocytopenia (HCC) 4/19/2021       Social History     Tobacco Use    Smoking status: Former     Current packs/day: 0.00     Average packs/day: 0.3 packs/day for 15.0 years (3.8 ttl pk-yrs)     Types: Cigarettes     Start date: 3/11/1970     Quit  "date: 3/11/1985     Years since quittin.8    Smokeless tobacco: Never    Tobacco comments:     passive smoke exposure her entire life   Vaping Use    Vaping Use: Never used   Substance Use Topics    Alcohol use: Not Currently     Alcohol/week: 0.0 oz    Drug use: No       Current Outpatient Medications Ordered in Epic   Medication Sig Dispense Refill    zolpidem (AMBIEN) 10 MG Tab Take 1 Tablet by mouth at bedtime as needed for Sleep for up to 90 days. 30 Tablet 2    busPIRone (BUSPAR) 10 MG Tab tablet TAKE ONE TABLET BY MOUTH EVERY MORNING AND TAKE TWO TABLETS BY MOUTH EVERY NIGHT AT BEDTIME 270 Tablet 3    atorvastatin (LIPITOR) 20 MG Tab Take 1 Tablet by mouth every evening. 90 Tablet 3    fluticasone (FLONASE) 50 MCG/ACT nasal spray Administer 1 Spray into affected nostril(S) every morning. 16 g 3    cyanocobalamin (VITAMIN B-12) 100 MCG Tab Take 1 Tablet by mouth every morning.      BIOTIN PO Take 1 Tablet by mouth every morning. Pt unsure of dose      Calcium Citrate-Vitamin D (CALCIUM + D PO) Take 1 Tablet by mouth every morning.      MAGNESIUM PO Take 1 Capsule by mouth every morning. Pt unsure of dose      Cholecalciferol (VITAMIN D3 PO) Take 1 Capsule by mouth every morning. Pt unsure of dose      VITAMIN E PO Take 1 Capsule by mouth every morning. Pt unsure of dose       No current Epic-ordered facility-administered medications on file.       Allergies:  Pollen extract    Health Maintenance: Completed    ROS:  No fevers or chills. No cough, chest pain, or shortness of breath.       Objective:       Exam:  /76   Pulse 90   Temp 37.1 °C (98.8 °F) (Temporal)   Ht 1.6 m (5' 3\")   Wt 54.4 kg (120 lb)   SpO2 95%   BMI 21.26 kg/m²  Body mass index is 21.26 kg/m².    Gen: Alert and oriented, No apparent distress.  Lungs: Normal effort, CTA bilaterally, no wheezes, rhonchi, or rales  CV: Regular rate and rhythm. No murmurs, rubs, or gallops.  Ext: No clubbing, cyanosis, edema.        Assessment & " Plan:     78 y.o. female with the following -     Primary insomnia  Chronic condition, controlled.  The patient currently takes zolpidem 10 mg nightly.  This controls her symptoms adequately.  She has shown no evidence of dose escalation.  Reviewed the patient's  shows that the patient was last given a prescription of zolpidem 10 mg, dispo #30, on 12/4/2023. Obtained and reviewed patient utilization report from [facility] pharmacy database on 1/3/2024 12:53 PM  prior to writing prescription for controlled substance II, III or IV per [place] bill . Based on assessment of the report, the prescription is medically necessary.   -Continue current regimen of zolpidem 10 mg nightly for insomnia  -Controlled Substance Treatment Agreement was signed and scanned into the patient's chart on 10/5/2023  -Urine drug screen ordered on 12/27/2023  -Continue zolpidem 10 mg nightly for insomnia  - zolpidem (AMBIEN) 10 MG Tab; Take 1 Tablet by mouth at bedtime as needed for Sleep for up to 90 days.  Dispense: 30 Tablet; Refill: 2     Mixed hyperlipidemia  Chronic condition, controlled.  The patient is currently taking atorvastatin 20 mg nightly.  She denies statin associated myalgias.  Lipid panel on 12/7/2023 showed a total cholesterol of 135, LDL 64, HDL 52, triglycerides 97.  -Continue current regimen of atorvastatin 20 mg nightly      Subclinical hyperthyroidism  Chronic condition, controlled.  Most recent labs on 12/7/2023 showed a reduced TSH of 0.16, normal free T4 of 0.98, normal free T3 of 3.56.  TSI is less than 0.10.   -Continue to monitor given the stability of labs    Lung nodule  Pulmonary fibrosis (HCC)  Chronic condition, stable.  Most recent CT chest 10/30/2023 showed a stable 1.9 cm lobulated nodule of the left lower lobe and peripheral fibrotic changes unchanged. The patient is without new pulmonary symptoms such as cough, chest pain, shortness of breath.  -Continue annual surveillance CT imaging, due  10/2024      Return in about 3 months (around 4/3/2024) for Controlled Substance.    Please note that this dictation was created using voice recognition software. I have made every reasonable attempt to correct obvious errors, but I expect that there are errors of grammar and possibly content that I did not discover before finalizing the note.

## 2024-03-25 ENCOUNTER — APPOINTMENT (OUTPATIENT)
Dept: MEDICAL GROUP | Facility: PHYSICIAN GROUP | Age: 79
End: 2024-03-25
Payer: MEDICARE

## 2024-03-26 ENCOUNTER — APPOINTMENT (OUTPATIENT)
Dept: MEDICAL GROUP | Facility: PHYSICIAN GROUP | Age: 79
End: 2024-03-26
Payer: MEDICARE

## 2024-03-28 ENCOUNTER — APPOINTMENT (OUTPATIENT)
Dept: MEDICAL GROUP | Facility: PHYSICIAN GROUP | Age: 79
End: 2024-03-28
Payer: MEDICARE

## 2024-03-29 ENCOUNTER — OFFICE VISIT (OUTPATIENT)
Dept: MEDICAL GROUP | Facility: PHYSICIAN GROUP | Age: 79
End: 2024-03-29
Payer: MEDICARE

## 2024-03-29 VITALS
BODY MASS INDEX: 21.09 KG/M2 | WEIGHT: 119 LBS | OXYGEN SATURATION: 93 % | DIASTOLIC BLOOD PRESSURE: 74 MMHG | SYSTOLIC BLOOD PRESSURE: 112 MMHG | HEIGHT: 63 IN | HEART RATE: 100 BPM | RESPIRATION RATE: 18 BRPM | TEMPERATURE: 98.8 F

## 2024-03-29 DIAGNOSIS — R26.2 IMPAIRED AMBULATION: ICD-10-CM

## 2024-03-29 DIAGNOSIS — F51.01 PRIMARY INSOMNIA: ICD-10-CM

## 2024-03-29 RX ORDER — ZOLPIDEM TARTRATE 10 MG/1
10 TABLET ORAL NIGHTLY PRN
Qty: 30 TABLET | Refills: 2 | Status: SHIPPED | OUTPATIENT
Start: 2024-03-30 | End: 2024-06-28

## 2024-03-29 ASSESSMENT — FIBROSIS 4 INDEX: FIB4 SCORE: 1.46

## 2024-03-29 NOTE — ASSESSMENT & PLAN NOTE
This is a chronic problem.  Patient is here asking for a handicap form to be filled out.  She has had 1 before but needs a renewed.  She has to use a cane for walking and can only go short distances.  She has difficulty due to her arthritic condition as well as her neuropathy.

## 2024-03-29 NOTE — PROGRESS NOTES
Subjective:     CC: Here for couple of issues.    HPI:   Yasmin presents today with the following medical concerns:    Impaired ambulation  This is a chronic problem.  Patient is here asking for a handicap form to be filled out.  She has had 1 before but needs a renewed.  She has to use a cane for walking and can only go short distances.  She has difficulty due to her arthritic condition as well as her neuropathy.    Primary insomnia  This is a chronic problem.  This is her 3-month visit to get her Ambien renewed.  Her consent form is current as well as her drug screen.  PCP is currently out of the office.    Past Medical History:   Diagnosis Date    Anxiety     Arrhythmia 2020    Arthritis 2020    Blood clotting disorder (HCC)     hx  in lung    Breath shortness 2020    no supplemental oxygen    Cancer (HCC)     basal cell per hx    Cataract     meg IOL     Depression     Fall from ground level 2021    Heart murmur     History of respiratory failure     6-month hospitalization in     Hyperlipidemia     Indigestion 2020    GERD    Insomnia     Pain 2020    back pain    Peripheral neuropathy     Pneumonia     hx     Pulmonary fibrosis (HCC)     Recurrent major depressive disorder, in full remission ([facility]) 2017    Thrombocytopenia ([facility]) 2021       Social History     Tobacco Use    Smoking status: Former     Current packs/day: 0.00     Average packs/day: 0.3 packs/day for 15.0 years (3.8 ttl pk-yrs)     Types: Cigarettes     Start date: 3/11/1970     Quit date: 3/11/1985     Years since quittin.0    Smokeless tobacco: Never    Tobacco comments:     passive smoke exposure her entire life   Vaping Use    Vaping Use: Never used   Substance Use Topics    Alcohol use: Not Currently     Alcohol/week: 0.0 oz    Drug use: No       Current Outpatient Medications Ordered in Epic   Medication Sig Dispense Refill    [START ON 3/30/2024] zolpidem (AMBIEN) 10 MG Tab Take  "1 Tablet by mouth at bedtime as needed for Sleep for up to 90 days. 30 Tablet 2    busPIRone (BUSPAR) 10 MG Tab tablet TAKE ONE TABLET BY MOUTH EVERY MORNING AND TAKE TWO TABLETS BY MOUTH EVERY NIGHT AT BEDTIME 270 Tablet 3    atorvastatin (LIPITOR) 20 MG Tab Take 1 Tablet by mouth every evening. 90 Tablet 3    fluticasone (FLONASE) 50 MCG/ACT nasal spray Administer 1 Spray into affected nostril(S) every morning. 16 g 3    cyanocobalamin (VITAMIN B-12) 100 MCG Tab Take 1 Tablet by mouth every morning.      BIOTIN PO Take 1 Tablet by mouth every morning. Pt unsure of dose      Calcium Citrate-Vitamin D (CALCIUM + D PO) Take 1 Tablet by mouth every morning.      MAGNESIUM PO Take 1 Capsule by mouth every morning. Pt unsure of dose      Cholecalciferol (VITAMIN D3 PO) Take 1 Capsule by mouth every morning. Pt unsure of dose      VITAMIN E PO Take 1 Capsule by mouth every morning. Pt unsure of dose       No current Epic-ordered facility-administered medications on file.       Allergies:  Pollen extract    Health Maintenance: Completed    ROS:  Gen: no fevers/chills, no changes in weight  Eyes: no changes in vision  ENT: no sore throat, no hearing loss, no bloody nose  Pulm: no sob, no cough  CV: no chest pain, no palpitations  GI: no nausea/vomiting, no diarrhea  : no dysuria  Skin: no rash  Neuro: no headaches,   Heme/Lymph: no easy bruising      Objective:       Exam:  /74 (BP Location: Left arm, Patient Position: Sitting, BP Cuff Size: Adult)   Pulse 100   Temp 37.1 °C (98.8 °F) (Temporal)   Resp 18   Ht 1.6 m (5' 3\")   Wt 54 kg (119 lb)   SpO2 93%   BMI 21.08 kg/m²  Body mass index is 21.08 kg/m².    Gen: Alert and oriented, No apparent distress.  Lungs: Normal effort,   Ext: No clubbing, cyanosis, edema.  Patient does have to use a cane for support and actually even requires assistance getting down the hallway.  She only takes short steps.      Labs: Drug screen result reviewed.    Assessment & Plan: "     78 y.o. female with the following -     1. Primary insomnia  This is a chronic problem.  Chart reviewed.  Medication renewed for 3 months and then she is to follow-up with her PCP.  - zolpidem (AMBIEN) 10 MG Tab; Take 1 Tablet by mouth at bedtime as needed for Sleep for up to 90 days.  Dispense: 30 Tablet; Refill: 2    2. Impaired ambulation  This is a chronic problem.  Her condition does warrant a handicap placard.  Form completed.      Return in about 3 months (around 6/29/2024) for Short with Dr Gupta.    Please note that this dictation was created using voice recognition software. I have made every reasonable attempt to correct obvious errors, but I expect that there are errors of grammar and possibly content that I did not discover before finalizing the note.

## 2024-03-29 NOTE — ASSESSMENT & PLAN NOTE
This is a chronic problem.  This is her 3-month visit to get her Ambien renewed.  Her consent form is current as well as her drug screen.  PCP is currently out of the office.

## 2024-04-30 ENCOUNTER — APPOINTMENT (OUTPATIENT)
Dept: ONCOLOGY | Facility: MEDICAL CENTER | Age: 79
End: 2024-04-30
Attending: INTERNAL MEDICINE
Payer: MEDICARE

## 2024-05-07 ENCOUNTER — OUTPATIENT INFUSION SERVICES (OUTPATIENT)
Dept: ONCOLOGY | Facility: MEDICAL CENTER | Age: 79
End: 2024-05-07
Attending: INTERNAL MEDICINE
Payer: MEDICARE

## 2024-05-07 VITALS
HEART RATE: 106 BPM | BODY MASS INDEX: 21.02 KG/M2 | OXYGEN SATURATION: 92 % | DIASTOLIC BLOOD PRESSURE: 75 MMHG | TEMPERATURE: 98.8 F | SYSTOLIC BLOOD PRESSURE: 117 MMHG | HEIGHT: 63 IN | WEIGHT: 118.61 LBS

## 2024-05-07 DIAGNOSIS — M85.859 OSTEOPENIA OF NECK OF FEMUR, UNSPECIFIED LATERALITY: ICD-10-CM

## 2024-05-07 DIAGNOSIS — M80.00XD AGE-RELATED OSTEOPOROSIS WITH CURRENT PATHOLOGICAL FRACTURE WITH ROUTINE HEALING: ICD-10-CM

## 2024-05-07 LAB
CA-I BLD ISE-SCNC: 1.15 MMOL/L (ref 1.1–1.3)
CREAT BLD-MCNC: 0.6 MG/DL (ref 0.5–1.4)

## 2024-05-07 RX ORDER — DIPHENHYDRAMINE HYDROCHLORIDE 50 MG/ML
50 INJECTION INTRAMUSCULAR; INTRAVENOUS PRN
OUTPATIENT
Start: 2024-10-23

## 2024-05-07 RX ORDER — EPINEPHRINE 1 MG/ML(1)
0.5 AMPUL (ML) INJECTION PRN
OUTPATIENT
Start: 2024-10-23

## 2024-05-07 RX ORDER — METHYLPREDNISOLONE SODIUM SUCCINATE 125 MG/2ML
125 INJECTION, POWDER, LYOPHILIZED, FOR SOLUTION INTRAMUSCULAR; INTRAVENOUS PRN
OUTPATIENT
Start: 2024-10-23

## 2024-05-07 RX ADMIN — DENOSUMAB 60 MG: 60 INJECTION SUBCUTANEOUS at 16:46

## 2024-05-07 ASSESSMENT — FIBROSIS 4 INDEX: FIB4 SCORE: 1.46

## 2024-05-07 NOTE — PROGRESS NOTES
Yasmin arrives ambulatory to [facility] for Prolia injection. Labs drawn by venupuncture to right AC. Site covered with gauze and coban. Results reviewed and Prolia injected into back right arm. Message sent to schedulers for next appointment.

## 2024-06-02 ENCOUNTER — APPOINTMENT (OUTPATIENT)
Dept: RADIOLOGY | Facility: MEDICAL CENTER | Age: 79
End: 2024-06-02
Attending: EMERGENCY MEDICINE
Payer: MEDICARE

## 2024-06-02 ENCOUNTER — HOSPITAL ENCOUNTER (EMERGENCY)
Facility: MEDICAL CENTER | Age: 79
End: 2024-06-02
Attending: EMERGENCY MEDICINE
Payer: MEDICARE

## 2024-06-02 VITALS
BODY MASS INDEX: 19.49 KG/M2 | RESPIRATION RATE: 14 BRPM | TEMPERATURE: 98.3 F | HEIGHT: 63 IN | OXYGEN SATURATION: 98 % | WEIGHT: 110 LBS | HEART RATE: 85 BPM | SYSTOLIC BLOOD PRESSURE: 94 MMHG | DIASTOLIC BLOOD PRESSURE: 64 MMHG

## 2024-06-02 DIAGNOSIS — M54.9 PAIN, UPPER BACK: ICD-10-CM

## 2024-06-02 DIAGNOSIS — S22.040A COMPRESSION FRACTURE OF T4 VERTEBRA, INITIAL ENCOUNTER (HCC): ICD-10-CM

## 2024-06-02 LAB
ALBUMIN SERPL BCP-MCNC: 3.7 G/DL (ref 3.2–4.9)
ALBUMIN/GLOB SERPL: 1.4 G/DL
ALP SERPL-CCNC: 87 U/L (ref 30–99)
ALT SERPL-CCNC: 11 U/L (ref 2–50)
ANION GAP SERPL CALC-SCNC: 17 MMOL/L (ref 7–16)
AST SERPL-CCNC: 17 U/L (ref 12–45)
BASOPHILS # BLD AUTO: 2 % (ref 0–1.8)
BASOPHILS # BLD: 0.18 K/UL (ref 0–0.12)
BILIRUB SERPL-MCNC: 0.3 MG/DL (ref 0.1–1.5)
BUN SERPL-MCNC: 32 MG/DL (ref 8–22)
CALCIUM ALBUM COR SERPL-MCNC: 9.6 MG/DL (ref 8.5–10.5)
CALCIUM SERPL-MCNC: 9.4 MG/DL (ref 8.4–10.2)
CHLORIDE SERPL-SCNC: 100 MMOL/L (ref 96–112)
CO2 SERPL-SCNC: 20 MMOL/L (ref 20–33)
CREAT SERPL-MCNC: 0.56 MG/DL (ref 0.5–1.4)
EOSINOPHIL # BLD AUTO: 0.09 K/UL (ref 0–0.51)
EOSINOPHIL NFR BLD: 1 % (ref 0–6.9)
ERYTHROCYTE [DISTWIDTH] IN BLOOD BY AUTOMATED COUNT: 51.2 FL (ref 35.9–50)
GFR SERPLBLD CREATININE-BSD FMLA CKD-EPI: 94 ML/MIN/1.73 M 2
GLOBULIN SER CALC-MCNC: 2.7 G/DL (ref 1.9–3.5)
GLUCOSE SERPL-MCNC: 98 MG/DL (ref 65–99)
HCT VFR BLD AUTO: 30.4 % (ref 37–47)
HGB BLD-MCNC: 10.3 G/DL (ref 12–16)
LYMPHOCYTES # BLD AUTO: 1.71 K/UL (ref 1–4.8)
LYMPHOCYTES NFR BLD: 19 % (ref 22–41)
MANUAL DIFF BLD: NORMAL
MCH RBC QN AUTO: 35.3 PG (ref 27–33)
MCHC RBC AUTO-ENTMCNC: 33.9 G/DL (ref 32.2–35.5)
MCV RBC AUTO: 104.1 FL (ref 81.4–97.8)
MONOCYTES # BLD AUTO: 0.9 K/UL (ref 0–0.85)
MONOCYTES NFR BLD AUTO: 10 % (ref 0–13.4)
NEUTROPHILS # BLD AUTO: 6.12 K/UL (ref 1.82–7.42)
NEUTROPHILS NFR BLD: 68 % (ref 44–72)
NRBC # BLD AUTO: 0.04 K/UL
NRBC BLD-RTO: 0.4 /100 WBC (ref 0–0.2)
PLATELET # BLD AUTO: 359 K/UL (ref 164–446)
PLATELET BLD QL SMEAR: NORMAL
PMV BLD AUTO: 8.4 FL (ref 9–12.9)
POTASSIUM SERPL-SCNC: 3.3 MMOL/L (ref 3.6–5.5)
PROT SERPL-MCNC: 6.4 G/DL (ref 6–8.2)
RBC # BLD AUTO: 2.92 M/UL (ref 4.2–5.4)
RBC BLD AUTO: NORMAL
SODIUM SERPL-SCNC: 137 MMOL/L (ref 135–145)
WBC # BLD AUTO: 9 K/UL (ref 4.8–10.8)

## 2024-06-02 PROCEDURE — 85027 COMPLETE CBC AUTOMATED: CPT

## 2024-06-02 PROCEDURE — 700101 HCHG RX REV CODE 250: Performed by: EMERGENCY MEDICINE

## 2024-06-02 PROCEDURE — 96375 TX/PRO/DX INJ NEW DRUG ADDON: CPT | Mod: XU

## 2024-06-02 PROCEDURE — 99284 EMERGENCY DEPT VISIT MOD MDM: CPT

## 2024-06-02 PROCEDURE — 20552 NJX 1/MLT TRIGGER POINT 1/2: CPT

## 2024-06-02 PROCEDURE — 80053 COMPREHEN METABOLIC PANEL: CPT

## 2024-06-02 PROCEDURE — 700111 HCHG RX REV CODE 636 W/ 250 OVERRIDE (IP): Performed by: EMERGENCY MEDICINE

## 2024-06-02 PROCEDURE — 85007 BL SMEAR W/DIFF WBC COUNT: CPT

## 2024-06-02 PROCEDURE — 36415 COLL VENOUS BLD VENIPUNCTURE: CPT

## 2024-06-02 PROCEDURE — 72128 CT CHEST SPINE W/O DYE: CPT

## 2024-06-02 PROCEDURE — 72131 CT LUMBAR SPINE W/O DYE: CPT

## 2024-06-02 PROCEDURE — 96374 THER/PROPH/DIAG INJ IV PUSH: CPT | Mod: XU

## 2024-06-02 RX ORDER — LIDOCAINE HYDROCHLORIDE 20 MG/ML
20 INJECTION, SOLUTION INFILTRATION; PERINEURAL ONCE
Status: COMPLETED | OUTPATIENT
Start: 2024-06-02 | End: 2024-06-02

## 2024-06-02 RX ORDER — HYDROMORPHONE HYDROCHLORIDE 1 MG/ML
0.5 INJECTION, SOLUTION INTRAMUSCULAR; INTRAVENOUS; SUBCUTANEOUS ONCE
Status: COMPLETED | OUTPATIENT
Start: 2024-06-02 | End: 2024-06-02

## 2024-06-02 RX ORDER — ONDANSETRON 2 MG/ML
4 INJECTION INTRAMUSCULAR; INTRAVENOUS ONCE
Status: COMPLETED | OUTPATIENT
Start: 2024-06-02 | End: 2024-06-02

## 2024-06-02 RX ORDER — OXYCODONE HYDROCHLORIDE AND ACETAMINOPHEN 5; 325 MG/1; MG/1
1 TABLET ORAL EVERY 4 HOURS PRN
Qty: 15 TABLET | Refills: 0 | Status: SHIPPED | OUTPATIENT
Start: 2024-06-02 | End: 2024-06-05

## 2024-06-02 RX ORDER — KETOROLAC TROMETHAMINE 15 MG/ML
15 INJECTION, SOLUTION INTRAMUSCULAR; INTRAVENOUS ONCE
Status: COMPLETED | OUTPATIENT
Start: 2024-06-02 | End: 2024-06-02

## 2024-06-02 RX ADMIN — LIDOCAINE HYDROCHLORIDE 20 ML: 20 INJECTION, SOLUTION INFILTRATION; PERINEURAL at 11:13

## 2024-06-02 RX ADMIN — KETOROLAC TROMETHAMINE 15 MG: 15 INJECTION, SOLUTION INTRAMUSCULAR; INTRAVENOUS at 10:34

## 2024-06-02 RX ADMIN — HYDROMORPHONE HYDROCHLORIDE 0.5 MG: 1 INJECTION, SOLUTION INTRAMUSCULAR; INTRAVENOUS; SUBCUTANEOUS at 10:31

## 2024-06-02 RX ADMIN — ONDANSETRON 4 MG: 2 INJECTION INTRAMUSCULAR; INTRAVENOUS at 10:31

## 2024-06-02 ASSESSMENT — FIBROSIS 4 INDEX: FIB4 SCORE: 1.46

## 2024-06-02 NOTE — DISCHARGE INSTRUCTIONS
I recommend taking the Percocet as needed    If you take your sleep medicine at night make sure you take the Percocet and then wait 4 hours before taking the Ambien.    Do a referral to the spine surgeon.  Someone should contact you Monday by 12 PM.  If you do not hear from then you can call our scheduling team at [phone].    If you have no pain at work on gentle stretches every day that you can do.  I do like the fact that you have a primary care doctor and getting referral to physical therapist for back specialty treatment would be beneficial for long-term as well.

## 2024-06-02 NOTE — ED NOTES
PIV placed, blood drawn and sent to lab.  Pt and visitor updated on POC including pending tests and chart review by ERP.  Medicated as ordered.

## 2024-06-02 NOTE — ED PROVIDER NOTES
"  CHIEF COMPLAINT  Chief Complaint   Patient presents with    Back Pain     S/p fall on 05/24/24  Followed up w/ [facility], switched from Hydrocodone to Oxy w/o relief       LIMITATION TO HISTORY   None  HPI    Yasmin Zhong is a 78 y.o. female lives alone  Who has a history of neuropathy of unknown etiology  Who has a history of significant back problems/disease as per patient    Complain of back pain.  She said is kind of midthoracic.  It is between both shoulder blades.  Mostly on the left.  I duration has been since the 24th when she fell against the wall after tripping over her dog bed.  It is not associated any fevers or chills there is no bowel bladder incontinence.  There is no weakness in her legs.  Is not as low back pain.  No dysuria urgency frequency.    She states that despite seeing her PA at [facility] clinic and despite x-rays showing \"nothing acute\" she did not improve with hydrocodone.  She then switched to oxycodone.  This gives her minimal relief for short period of time.  It is unable to get her back to her normal function by walking her dog with her walker.  And making her upset.    Patient is very independent she would like to live alone.  She states that she is concerned.  She is here with family because they would like imaging done.  They would like to discover if there is anything more serious and the patient wants to know if she can need may need further elevation of care.    OUTSIDE HISTORIAN(S):  Niece is there.  States that she is concerned and were here for imaging.  She did confirm and state that the oxycodone did help a little bit    EXTERNAL RECORDS REVIEWED  Records here shared with [facility] regarding back injury    REVIEW OF SYSTEMS  No Fevers or chills    PAST MEDICAL HISTORY  Past Medical History:   Diagnosis Date    Anxiety     Arrhythmia 11/18/2020    Arthritis 11/18/2020    Blood clotting disorder (HCC)     hx 2013 in lung    Breath shortness 11/18/2020    no supplemental oxygen "    Cancer (HCC) 1990    basal cell per hx    Cataract     meg IOL     Depression     Fall from ground level 5/25/2021    Heart murmur     History of respiratory failure     6-month hospitalization in 2014    Hyperlipidemia     Indigestion 11/18/2020    GERD    Insomnia     Pain 11/18/2020    back pain    Peripheral neuropathy     Pneumonia     hx 2013    Pulmonary fibrosis (HCC)     Recurrent major depressive disorder, in full remission (HCC) 7/21/2017    Thrombocytopenia ([facility]) 4/19/2021       FAMILY HISTORY  Family History   Problem Relation Age of Onset    Cancer Mother         Bladder cancer, hx. of smoking    Diabetes Mother     Heart Attack Father     Cancer Father         Colon     Cancer Maternal Uncle         Lung    Diabetes Maternal Uncle     Diabetes Maternal Aunt        SOCIAL HISTORY  Social History     Tobacco Use    Smoking status: Former     Types: Cigarettes    Smokeless tobacco: Never    Tobacco comments:     denies   Vaping Use    Vaping status: Never Used   Substance Use Topics    Alcohol use: Not Currently     Comment: denies    Drug use: No     Comment: denies     Social History     Substance and Sexual Activity   Drug Use No    Comment: denies       SURGICAL HISTORY  Past Surgical History:   Procedure Laterality Date    MD TOTAL HIP ARTHROPLASTY Right 4/4/2022    Procedure: RIGHT ARTHROPLASTY, HIP, TOTAL;  Surgeon: Mauricio Rose M.D.;  Location: SURGERY [facility];  Service: Orthopedics    ORIF, FRACTURE, ACETABULUM Right 1/3/2022    Procedure: OPEN REDUCTION AND INTERNAL FIXATION, FRACTURE, ACETABULUM;  Surgeon: Pritesh Zamora M.D.;  Location: SURGERY [facility];  Service: Orthopedics    STRABISMUS REPAIR Bilateral 11/19/2021    Procedure: STRABISMUS SURGERY - ESOTROPIA STRABISMUS PROCEDURE, ONE HORIZONTAL MUSCLE - BILATERAL, PROCEDURE FOR SCARRING OF EXTRAOCULAR MUSCLE AND ADJUSTABLE SUTURE PLACEMENT - RIGHT;  Surgeon: Keith Horne M.D.;  Location: SURGERY SAME DAY  "[facility];  Service: Ophthalmology    EYE SURGERY Bilateral 11/19/2021    Bilateral medial rectus recession in patient with prior ocular Bilateral medial rectus recession in patient with prior ocular     AR LAP,ESOPHAGOGAST FUNDOPLASTY N/A 11/23/2020    Procedure: FUNDOPLICATION, NISSEN, LAPAROSCOPIC- FOR PARAESOPHAGEAL WITH MESH;  Surgeon: Pritesh Baptiste M.D.;  Location: SURGERY [facility];  Service: General    HIP HEMIARTHROPLASTY  4/25/2015    Performed by Raymond Lantigua M.D. at SURGERY [facility] ORS    CATARACT EXTRACTION WITH IOL Bilateral     OTHER      breast reduction       CURRENT MEDICATIONS  No current facility-administered medications for this encounter.    Current Outpatient Medications:     zolpidem (AMBIEN) 10 MG Tab, Take 1 Tablet by mouth at bedtime as needed for Sleep for up to 90 days., Disp: 30 Tablet, Rfl: 2    busPIRone (BUSPAR) 10 MG Tab tablet, TAKE ONE TABLET BY MOUTH EVERY MORNING AND TAKE TWO TABLETS BY MOUTH EVERY NIGHT AT BEDTIME, Disp: 270 Tablet, Rfl: 3    atorvastatin (LIPITOR) 20 MG Tab, Take 1 Tablet by mouth every evening., Disp: 90 Tablet, Rfl: 3    fluticasone (FLONASE) 50 MCG/ACT nasal spray, Administer 1 Spray into affected nostril(S) every morning., Disp: 16 g, Rfl: 3    cyanocobalamin (VITAMIN B-12) 100 MCG Tab, Take 1 Tablet by mouth every morning., Disp: , Rfl:     BIOTIN PO, Take 1 Tablet by mouth every morning. Pt unsure of dose, Disp: , Rfl:     Calcium Citrate-Vitamin D (CALCIUM + D PO), Take 1 Tablet by mouth every morning., Disp: , Rfl:     MAGNESIUM PO, Take 1 Capsule by mouth every morning. Pt unsure of dose, Disp: , Rfl:     Cholecalciferol (VITAMIN D3 PO), Take 1 Capsule by mouth every morning. Pt unsure of dose, Disp: , Rfl:     VITAMIN E PO, Take 1 Capsule by mouth every morning. Pt unsure of dose, Disp: , Rfl:     ALLERGIES  Allergies   Allergen Reactions    Pollen Extract      \"cough\"       PHYSICAL EXAM  VITAL SIGNS: BP 94/62   Pulse 88   Temp 36.8 " "°C (98.3 °F) (Temporal)   Resp 13   Ht 1.6 m (5' 3\")   Wt 49.9 kg (110 lb)   SpO2 88% Comment: Cold fingers  BMI 19.49 kg/m²   Reviewed and.  She is not hypertensive.  She is not tachycardic  Constitutional: Well developed, Well nourished, no acute distress.  HENT: Normocephalic, atraumatic, bilateral external ears normal, No intraoral erythema, edema, exudate  Eyes: PERRLA, conjunctiva pink, no scleral icterus.   Cardiovascular: Pulses are equal bilaterally.  Respiratory: No respiratory distress or stridor i.  Abdominal:  Abdomen soft, non-tender, non distended. No rebound, or guarding.    Skin: No erythema, no rash. No wounds or bruising.  Genitourinary: No costovertebral angle tenderness.   Musculoskeletal: L-spine point tenderness to perform she has thoracic mid thoracic point tenderness.  Feels with rhomboid spasm.  With tenderness over the rhomboid area.  Neurologic: Good sensation to light touch on lower extremities.  Psychiatric: Affect normal, Judgment normal, Mood normal.         MEDICAL DECISION MAKING:  PROBLEMS EVALUATED THIS VISIT:  Back pain.  For approximately 8 days.  Here for imaging and better pain relief have a very frustrating weekend despite changing her medication.  Patient has no fever here.  Neurovascular intact.  Decision making: Acute on chronic disease possible compression fracture unseen by recent x-rays.  Less likely kidney stone dissection PE among others.  Patient also with muscle spasm suspect also muscle spasm as well.         PLAN:  Trigger point injections  IV Dilaudid  Zofran  Toradol      RISK:  Moderate risk require prescriptions for pain management.        RESULTS    ED COURSE:    ED Observation Status? No   No noted need for observation for developing issue    INTERVENTIONS BY ME:  Medications   HYDROmorphone (Dilaudid) injection 0.5 mg (0.5 mg Intravenous Given 6/2/24 1031)   ketorolac (Toradol) 15 MG/ML injection 15 mg (15 mg Intravenous Given 6/2/24 1034) "   ondansetron (Zofran) syringe/vial injection 4 mg (4 mg Intravenous Given 6/2/24 1031)   lidocaine (Xylocaine) 2 % injection 20 mL (20 mL Other Given by Provider 6/2/24 1113)     Discussed with the patient risks alternatives and benefits.  Risk include but not exclusive of infection abscess formation bone infection and bacteremia.  Patient verbalized understanding.  Procedure:   Patient was prepped with chlorhexidine several times.    Patient was in the sitting position.  Anatomic location of injection: Left rhomboid muscles  A total of 2 mL of 2% lidocaine was injected in the rhomboid muscle greatest tenderness.  Dryly needling was performed  Patient tolerated procedure well.  No complications  After injection signs and symptoms were discussed with the patient.      Response on recheck:  Improved pain-free.    CONSULTANTS/OTHER GROUPS CONTACTED    Outpatient spinal surgery    FINAL DISPO PLAN   Discharge Medication List as of 6/2/2024 12:38 PM        START taking these medications    Details   oxyCODONE-acetaminophen (PERCOCET) 5-325 MG Tab Take 1 Tablet by mouth every four hours as needed (pain) for up to 3 days., Disp-15 Tablet, R-0, Normal         Informed the patient that they will get narcotics.  Narcotic prescription drug monitoring check was done.  Patient deemed low risk at this time.  Instructions were given to the patient did not drink or drive while taking narcotic pain medication.  Patient was given under 3-day supply of medications.  Told that long-term medication pain management best suited by primary care doctor and/or pain management doctor.        Followup:  [facility], Emergency Dept  [address]  [place] [address] [phone]  Go to   If symptoms worsen      CONDITION: Improved.     FINAL IMPRESSION  1. Pain, upper back    2. Compression fracture of T4 vertebra, initial encounter ([facility])    3.  Trigger point injections

## 2024-06-02 NOTE — ED NOTES
Pt had a fall on 05/24. Pt was attempting to  her dog's bed and lost her balance. Pt states she fell against her furniture on her left lateral side. Pt denies LOC and is able to recall event before, during, and after. Pt was seen at [facility] where she had an xray done. No fractures were reported at that time. Pt presents today with lower back pain

## 2024-06-02 NOTE — ED NOTES
Reviewed discharge instructions and prescription x 1 sent to selected pharmacy w/ pt and visitor, verbalized understanding to information provided including follow up care w/ PCP, return precautions and medication precautions, denied further questions/concerns.  Pt assisted from ED via  w/ visitor.

## 2024-06-02 NOTE — ED TRIAGE NOTES
"Chief Complaint   Patient presents with    Back Pain     S/p fall on 05/24/24  Followed up w/ Kirstie, switched from Hydrocodone to Oxy w/o relief     BP 94/62   Pulse 88   Temp 36.8 °C (98.3 °F) (Temporal)   Resp 13   Ht 1.6 m (5' 3\")   Wt 49.9 kg (110 lb)   SpO2 88% Comment: Cold fingers  BMI 19.49 kg/m²     Pt to ED via WC w/ visitor for c/o ongoing low back pain s/p fall on 05/24, states is not able to tolerate pain w/ current medications.    "

## 2024-06-14 NOTE — PROGRESS NOTES
Verbal consent was acquired by the patient to use [employer] ambient listening note generation during this visit Yes     Subjective:     CC:   Chief Complaint   Patient presents with    Medication Refill         HPI:   History of Present Illness  Yasmin Zhong is a 78-year-old female here for follow-up visit.  Patient states she resides in an old condominium and is currently having a crack in her ceiling repair.  She has now been forced to stay in her spare bedroom and her dog is unable to get up on this bed.  This has been a source of distress for her.  She otherwise feels well.  She is due for a refill of her zolpidem.      Past Medical History:   Diagnosis Date    Anxiety     Arrhythmia 11/18/2020    Arthritis 11/18/2020    Blood clotting disorder (HCC)     hx 2013 in lung    Breath shortness 11/18/2020    no supplemental oxygen    Cancer (HCC) 1990    basal cell per hx    Cataract     meg IOL     Depression     Fall from ground level 5/25/2021    Heart murmur     History of respiratory failure     6-month hospitalization in 2014    Hyperlipidemia     Indigestion 11/18/2020    GERD    Insomnia     Pain 11/18/2020    back pain    Peripheral neuropathy     Pneumonia     hx 2013    Pulmonary fibrosis (HCC)     Recurrent major depressive disorder, in full remission ([facility]) 7/21/2017    Thrombocytopenia ([facility]) 4/19/2021       Social History     Tobacco Use    Smoking status: Former     Types: Cigarettes    Smokeless tobacco: Never    Tobacco comments:     denies   Vaping Use    Vaping status: Never Used   Substance Use Topics    Alcohol use: Not Currently     Comment: denies    Drug use: No     Comment: denies       Current Outpatient Medications Ordered in Epic   Medication Sig Dispense Refill    zolpidem (AMBIEN) 10 MG Tab Take 1 Tablet by mouth at bedtime as needed for Sleep for up to 90 days. 30 Tablet 2    zolpidem (AMBIEN) 10 MG Tab Take 1 Tablet by mouth at bedtime as needed for Sleep for up to 90 days. 30  "Tablet 2    busPIRone (BUSPAR) 10 MG Tab tablet TAKE ONE TABLET BY MOUTH EVERY MORNING AND TAKE TWO TABLETS BY MOUTH EVERY NIGHT AT BEDTIME 270 Tablet 3    atorvastatin (LIPITOR) 20 MG Tab Take 1 Tablet by mouth every evening. 90 Tablet 3    fluticasone (FLONASE) 50 MCG/ACT nasal spray Administer 1 Spray into affected nostril(S) every morning. 16 g 3    cyanocobalamin (VITAMIN B-12) 100 MCG Tab Take 1 Tablet by mouth every morning.      BIOTIN PO Take 1 Tablet by mouth every morning. Pt unsure of dose      Calcium Citrate-Vitamin D (CALCIUM + D PO) Take 1 Tablet by mouth every morning.      MAGNESIUM PO Take 1 Capsule by mouth every morning. Pt unsure of dose      Cholecalciferol (VITAMIN D3 PO) Take 1 Capsule by mouth every morning. Pt unsure of dose      VITAMIN E PO Take 1 Capsule by mouth every morning. Pt unsure of dose       No current Epic-ordered facility-administered medications on file.       Allergies:  Pollen extract    Health Maintenance: Completed    Review of Systems:  No fevers or chills. No cough, chest pain, or shortness of breath.       Objective:       Exam:  /64 (BP Location: Left arm, Patient Position: Sitting, BP Cuff Size: Adult)   Pulse 96   Temp 36.9 °C (98.5 °F) (Temporal)   Resp 20   Ht 1.6 m (5' 3\")   Wt 52.7 kg (116 lb 4 oz)   Breastfeeding No   BMI 20.59 kg/m²  Body mass index is 20.59 kg/m².    Constitutional: Well-developed, no acute distress.  Lungs: Clear to auscultation bilaterally. No wheezes, rhonchi, or rales.   Cardiovascular: Regular rate and rhythm, no murmurs noted.   Extremities: No edema or erythema.    Assessment & Plan:     78 y.o. female with the following -     Primary insomnia  Chronic condition, controlled.  The patient currently takes zolpidem 10 mg nightly.  This controls her symptoms adequately.  She has shown no evidence of dose escalation. Reviewed the patient's  shows that the patient was last given a prescription of zolpidem 10 mg, dispo #30, " on 5/28/2024. Obtained and reviewed patient utilization report from [facility] pharmacy database on 6/19/2024 1:00 PM  prior to writing prescription for controlled substance II, III or IV per [place] bill . Based on assessment of the report, the prescription is medically necessary.   -Continue current regimen of zolpidem 10 mg nightly for insomnia  -Controlled Substance Treatment Agreement was signed and scanned into the patient's chart on 10/5/2023  -Urine drug screen ordered on 12/27/2023  -Continue zolpidem 10 mg nightly for insomnia  - zolpidem (AMBIEN) 10 MG Tab; Take 1 Tablet by mouth at bedtime as needed for Sleep for up to 90 days.  Dispense: 30 Tablet; Refill: 2           Return in about 3 months (around 9/19/2024) for 3-month f/u visit.    Please note that this dictation was created using voice recognition software. I have made every reasonable attempt to correct obvious errors, but I expect that there are errors of grammar and possibly content that I did not discover before finalizing the note.

## 2024-06-19 ENCOUNTER — OFFICE VISIT (OUTPATIENT)
Dept: MEDICAL GROUP | Facility: PHYSICIAN GROUP | Age: 79
End: 2024-06-19
Payer: MEDICARE

## 2024-06-19 VITALS
SYSTOLIC BLOOD PRESSURE: 112 MMHG | DIASTOLIC BLOOD PRESSURE: 64 MMHG | HEIGHT: 63 IN | HEART RATE: 96 BPM | BODY MASS INDEX: 20.6 KG/M2 | TEMPERATURE: 98.5 F | WEIGHT: 116.25 LBS | RESPIRATION RATE: 20 BRPM

## 2024-06-19 DIAGNOSIS — F51.01 PRIMARY INSOMNIA: ICD-10-CM

## 2024-06-19 PROCEDURE — 3078F DIAST BP <80 MM HG: CPT | Performed by: INTERNAL MEDICINE

## 2024-06-19 PROCEDURE — 99213 OFFICE O/P EST LOW 20 MIN: CPT | Performed by: INTERNAL MEDICINE

## 2024-06-19 PROCEDURE — 3074F SYST BP LT 130 MM HG: CPT | Performed by: INTERNAL MEDICINE

## 2024-06-19 PROCEDURE — 1170F FXNL STATUS ASSESSED: CPT | Performed by: INTERNAL MEDICINE

## 2024-06-19 RX ORDER — ZOLPIDEM TARTRATE 10 MG/1
10 TABLET ORAL NIGHTLY PRN
Qty: 30 TABLET | Refills: 2 | Status: SHIPPED | OUTPATIENT
Start: 2024-06-24 | End: 2024-09-22

## 2024-06-19 ASSESSMENT — FIBROSIS 4 INDEX: FIB4 SCORE: 1.1

## 2024-09-06 NOTE — PROGRESS NOTES
Verbal consent was acquired by the patient to use [employer] ambient listening note generation during this visit Yes     Subjective:     CC:   Chief Complaint   Patient presents with    Other     Sore on bottoms started about 10 days ago. Has been putting hydrogen peroxide but a couple have been bleeding. Patient states its not from sex she has not had sex in 15 years     Medication Refill         HPI:   History of Present Illness  Yasmin Zhong is a 78-year-old female who presents for follow-up medical visit. She has developed sores on her buttocks, a condition she has not experienced before. This issue has persisted for several weeks. She has been applying hydrogen peroxide to the area, but one sore has extended to her thigh, causing discomfort. A few of the sores have bled. Despite being mostly sedentary, she manages to walk her dog four times a day. She tends to sleep on the side where the sores are located. She is also seeking a refill of her Ambien prescription.      Past Medical History:   Diagnosis Date    Anxiety     Arrhythmia 11/18/2020    Arthritis 11/18/2020    Blood clotting disorder (HCC)     hx 2013 in lung    Breath shortness 11/18/2020    no supplemental oxygen    Cancer ([facility]) 1990    basal cell per hx    Cataract     meg IOL     Depression     Fall from ground level 5/25/2021    Heart murmur     History of respiratory failure     6-month hospitalization in 2014    Hyperlipidemia     Indigestion 11/18/2020    GERD    Insomnia     Pain 11/18/2020    back pain    Peripheral neuropathy     Pneumonia     hx 2013    Pulmonary fibrosis (HCC)     Recurrent major depressive disorder, in full remission ([facility]) 7/21/2017    Thrombocytopenia ([facility]) 4/19/2021       Social History     Tobacco Use    Smoking status: Former     Types: Cigarettes    Smokeless tobacco: Never    Tobacco comments:     denies   Vaping Use    Vaping status: Never Used   Substance Use Topics    Alcohol use: Not Currently      "Comment: denies    Drug use: No     Comment: denies       Current Outpatient Medications Ordered in Epic   Medication Sig Dispense Refill    cephALEXin (KEFLEX) 250 MG Cap Take 1 Capsule by mouth 4 times a day for 10 days. 40 Capsule 0    mupirocin calcium (BACTROBAN) 2 % Cream Apply 1 Application topically 2 times a day. 30 g 0    [START ON 9/20/2024] zolpidem (AMBIEN) 10 MG Tab Take 1 Tablet by mouth at bedtime as needed for Sleep for up to 90 days. 30 Tablet 2    zolpidem (AMBIEN) 10 MG Tab Take 1 Tablet by mouth at bedtime as needed for Sleep for up to 90 days. 30 Tablet 2    busPIRone (BUSPAR) 10 MG Tab tablet TAKE ONE TABLET BY MOUTH EVERY MORNING AND TAKE TWO TABLETS BY MOUTH EVERY NIGHT AT BEDTIME 270 Tablet 3    atorvastatin (LIPITOR) 20 MG Tab Take 1 Tablet by mouth every evening. 90 Tablet 3    fluticasone (FLONASE) 50 MCG/ACT nasal spray Administer 1 Spray into affected nostril(S) every morning. 16 g 3    cyanocobalamin (VITAMIN B-12) 100 MCG Tab Take 1 Tablet by mouth every morning.      BIOTIN PO Take 1 Tablet by mouth every morning. Pt unsure of dose      Calcium Citrate-Vitamin D (CALCIUM + D PO) Take 1 Tablet by mouth every morning.      MAGNESIUM PO Take 1 Capsule by mouth every morning. Pt unsure of dose      Cholecalciferol (VITAMIN D3 PO) Take 1 Capsule by mouth every morning. Pt unsure of dose      VITAMIN E PO Take 1 Capsule by mouth every morning. Pt unsure of dose       No current Epic-ordered facility-administered medications on file.       Allergies:  Pollen extract    Health Maintenance: Completed    Review of Systems:  No fevers or chills. No cough, chest pain, or shortness of breath.       Objective:       Exam:  /64   Pulse 94   Temp 37.3 °C (99.2 °F) (Temporal)   Ht 1.6 m (5' 3\")   Wt 52.2 kg (115 lb)   SpO2 92%   BMI 20.37 kg/m²  Body mass index is 20.37 kg/m².    Gen: Alert and oriented, No apparent distress.  Lungs: Normal effort, CTA bilaterally, no wheezes, rhonchi, " or rales  CV: Regular rate and rhythm. No murmurs, rubs, or gallops.  Skin: Pressure ulcer on left thigh, approximately 4 indurated nodules in the buttock crevice        Assessment & Plan:     78 y.o. female with the following -     Primary insomnia  Chronic condition, controlled.  The patient currently takes zolpidem 10 mg nightly.  This controls her symptoms adequately.  She has shown no evidence of dose escalation. Reviewed the patient's  shows that the patient was last given a prescription of zolpidem 10 mg, dispo #30, on 8/23/2024. Obtained and reviewed patient utilization report from [facility] pharmacy database on 9/13/2024 5:57 AM  prior to writing prescription for controlled substance II, III or IV per [place] bill . Based on assessment of the report, the prescription is medically necessary.   -Continue current regimen of zolpidem 10 mg nightly for insomnia  -Controlled Substance Treatment Agreement was signed and scanned into the patient's chart on 10/5/2023  -Urine drug screen ordered on 12/27/2023  -Continue zolpidem 10 mg nightly for insomnia  - zolpidem (AMBIEN) 10 MG Tab; Take 1 Tablet by mouth at bedtime as needed for Sleep for up to 90 days.  Dispense: 30 Tablet; Refill: 2    Cutaneous abscess of buttock  Acute condition, stable.  Suspect that the patient is now developing pressure wounds as well as some skin abscesses due to her low body weight and sedentary status. A referral to wound care has been made. She is advised to apply mupirocin cream twice daily to the affected areas and take Keflex 250 mg four times daily for a duration of 10 days. The use of hydrogen peroxide should be discontinued. If she has nonstick bandages at home, they can be used to cover the area during sleep to alleviate pressure. If the condition improves with the antibiotics, wound care may not be necessary. However, if there is no improvement, wound care should be sought.  - cephALEXin (KEFLEX) 250 MG Cap; Take 1  Capsule by mouth 4 times a day for 10 days.  Dispense: 40 Capsule; Refill: 0  - mupirocin calcium (BACTROBAN) 2 % Cream; Apply 1 Application topically 2 times a day.  Dispense: 30 g; Refill: 0  - Referral to Wound Clinic    Return in about 3 months (around 12/11/2024) for Controlled Substance.    Please note that this dictation was created using voice recognition software. I have made every reasonable attempt to correct obvious errors, but I expect that there are errors of grammar and possibly content that I did not discover before finalizing the note.

## 2024-09-11 ENCOUNTER — OFFICE VISIT (OUTPATIENT)
Dept: MEDICAL GROUP | Facility: PHYSICIAN GROUP | Age: 79
End: 2024-09-11
Payer: MEDICARE

## 2024-09-11 VITALS
TEMPERATURE: 99.2 F | SYSTOLIC BLOOD PRESSURE: 110 MMHG | HEIGHT: 63 IN | OXYGEN SATURATION: 92 % | HEART RATE: 94 BPM | BODY MASS INDEX: 20.38 KG/M2 | WEIGHT: 115 LBS | DIASTOLIC BLOOD PRESSURE: 64 MMHG

## 2024-09-11 DIAGNOSIS — L02.31 CUTANEOUS ABSCESS OF BUTTOCK: ICD-10-CM

## 2024-09-11 DIAGNOSIS — F51.01 PRIMARY INSOMNIA: ICD-10-CM

## 2024-09-11 PROCEDURE — 1170F FXNL STATUS ASSESSED: CPT | Performed by: INTERNAL MEDICINE

## 2024-09-11 PROCEDURE — 3078F DIAST BP <80 MM HG: CPT | Performed by: INTERNAL MEDICINE

## 2024-09-11 PROCEDURE — 3074F SYST BP LT 130 MM HG: CPT | Performed by: INTERNAL MEDICINE

## 2024-09-11 PROCEDURE — 99213 OFFICE O/P EST LOW 20 MIN: CPT | Performed by: INTERNAL MEDICINE

## 2024-09-11 RX ORDER — ZOLPIDEM TARTRATE 10 MG/1
10 TABLET ORAL NIGHTLY PRN
Qty: 30 TABLET | Refills: 2 | Status: SHIPPED | OUTPATIENT
Start: 2024-09-20 | End: 2024-12-19

## 2024-09-11 RX ORDER — MUPIROCIN CALCIUM 20 MG/G
1 CREAM TOPICAL 2 TIMES DAILY
Qty: 30 G | Refills: 0 | Status: SHIPPED | OUTPATIENT
Start: 2024-09-11

## 2024-09-11 ASSESSMENT — FIBROSIS 4 INDEX: FIB4 SCORE: 1.1

## 2024-09-12 ENCOUNTER — TELEPHONE (OUTPATIENT)
Dept: MEDICAL GROUP | Facility: PHYSICIAN GROUP | Age: 79
End: 2024-09-12
Payer: MEDICARE

## 2024-09-12 NOTE — TELEPHONE ENCOUNTER
Pharmacy called and requested to change the Mupirocin calcium cream to an ointment that they have because they currently do not have that one in stock and her insurance does not cover the medication.

## 2024-09-19 ENCOUNTER — TELEPHONE (OUTPATIENT)
Dept: HEALTH INFORMATION MANAGEMENT | Facility: OTHER | Age: 79
End: 2024-09-19
Payer: MEDICARE

## 2024-11-06 ENCOUNTER — TELEPHONE (OUTPATIENT)
Dept: PHYSICAL THERAPY | Facility: REHABILITATION | Age: 79
End: 2024-11-06
Payer: MEDICARE

## 2024-11-06 NOTE — OP THERAPY DISCHARGE SUMMARY
Outpatient Physical Therapy  DISCHARGE SUMMARY NOTE      [facility]  [address], [address]  [place] [address]  Phone:  [phone]  Fax:  [phone]    Date of Visit: 11/06/2024    Patient: Yasmin Zhong  YOB: 1945  MRN: [id number]     Referring Provider: Lucina Gupta M.D.    Referring Diagnosis S/P total right hip arthroplasty [Z96.641]            Comments:  Yasmin Zhong has been discharged due to a lapse in care greater than 30 days. Thank you for the opportunity to assist you and your patient.      Limitations Remaining:  Unknown    Recommendations:  Patient will now be discharged due to a lapse in the plan of care. Administrative discharge to close this case.      Nilesh Garcia, PT, DPT    Date: 11/6/2024

## 2024-11-14 ENCOUNTER — APPOINTMENT (OUTPATIENT)
Dept: ONCOLOGY | Facility: MEDICAL CENTER | Age: 79
End: 2024-11-14
Attending: INTERNAL MEDICINE
Payer: MEDICARE

## 2024-11-21 RX ORDER — ATORVASTATIN CALCIUM 20 MG/1
20 TABLET, FILM COATED ORAL EVERY EVENING
Qty: 30 TABLET | Refills: 0 | Status: SHIPPED | OUTPATIENT
Start: 2024-11-21

## 2024-11-21 RX ORDER — BUSPIRONE HYDROCHLORIDE 10 MG/1
TABLET ORAL
Qty: 90 TABLET | Refills: 0 | Status: SHIPPED | OUTPATIENT
Start: 2024-11-21

## 2024-12-01 SDOH — ECONOMIC STABILITY: INCOME INSECURITY: HOW HARD IS IT FOR YOU TO PAY FOR THE VERY BASICS LIKE FOOD, HOUSING, MEDICAL CARE, AND HEATING?: NOT HARD AT ALL

## 2024-12-01 SDOH — ECONOMIC STABILITY: TRANSPORTATION INSECURITY
IN THE PAST 12 MONTHS, HAS LACK OF TRANSPORTATION KEPT YOU FROM MEETINGS, WORK, OR FROM GETTING THINGS NEEDED FOR DAILY LIVING?: NO

## 2024-12-01 SDOH — ECONOMIC STABILITY: TRANSPORTATION INSECURITY
IN THE PAST 12 MONTHS, HAS LACK OF RELIABLE TRANSPORTATION KEPT YOU FROM MEDICAL APPOINTMENTS, MEETINGS, WORK OR FROM GETTING THINGS NEEDED FOR DAILY LIVING?: NO

## 2024-12-01 SDOH — HEALTH STABILITY: PHYSICAL HEALTH

## 2024-12-01 SDOH — ECONOMIC STABILITY: FOOD INSECURITY: WITHIN THE PAST 12 MONTHS, THE FOOD YOU BOUGHT JUST DIDN'T LAST AND YOU DIDN'T HAVE MONEY TO GET MORE.: NEVER TRUE

## 2024-12-01 SDOH — ECONOMIC STABILITY: TRANSPORTATION INSECURITY
IN THE PAST 12 MONTHS, HAS THE LACK OF TRANSPORTATION KEPT YOU FROM MEDICAL APPOINTMENTS OR FROM GETTING MEDICATIONS?: NO

## 2024-12-01 SDOH — ECONOMIC STABILITY: INCOME INSECURITY: IN THE LAST 12 MONTHS, WAS THERE A TIME WHEN YOU WERE NOT ABLE TO PAY THE MORTGAGE OR RENT ON TIME?: NO

## 2024-12-01 SDOH — ECONOMIC STABILITY: FOOD INSECURITY: WITHIN THE PAST 12 MONTHS, YOU WORRIED THAT YOUR FOOD WOULD RUN OUT BEFORE YOU GOT MONEY TO BUY MORE.: NEVER TRUE

## 2024-12-01 SDOH — ECONOMIC STABILITY: HOUSING INSECURITY
IN THE LAST 12 MONTHS, WAS THERE A TIME WHEN YOU DID NOT HAVE A STEADY PLACE TO SLEEP OR SLEPT IN A SHELTER (INCLUDING NOW)?: NO

## 2024-12-01 SDOH — HEALTH STABILITY: MENTAL HEALTH
STRESS IS WHEN SOMEONE FEELS TENSE, NERVOUS, ANXIOUS, OR CAN'T SLEEP AT NIGHT BECAUSE THEIR MIND IS TROUBLED. HOW STRESSED ARE YOU?: VERY MUCH

## 2024-12-01 ASSESSMENT — LIFESTYLE VARIABLES
AUDIT-C TOTAL SCORE: 1
HOW OFTEN DO YOU HAVE A DRINK CONTAINING ALCOHOL: MONTHLY OR LESS
HOW MANY STANDARD DRINKS CONTAINING ALCOHOL DO YOU HAVE ON A TYPICAL DAY: 1 OR 2
SKIP TO QUESTIONS 9-10: 1
HOW OFTEN DO YOU HAVE SIX OR MORE DRINKS ON ONE OCCASION: NEVER

## 2024-12-01 ASSESSMENT — SOCIAL DETERMINANTS OF HEALTH (SDOH)
IN THE PAST 12 MONTHS, HAS THE ELECTRIC, GAS, OIL, OR WATER COMPANY THREATENED TO SHUT OFF SERVICE IN YOUR HOME?: NO
HOW OFTEN DO YOU HAVE A DRINK CONTAINING ALCOHOL: MONTHLY OR LESS
ARE YOU MARRIED, WIDOWED, DIVORCED, SEPARATED, NEVER MARRIED, OR LIVING WITH A PARTNER?: NEVER MARRIED
IN A TYPICAL WEEK, HOW MANY TIMES DO YOU TALK ON THE PHONE WITH FAMILY, FRIENDS, OR NEIGHBORS?: MORE THAN THREE TIMES A WEEK
HOW HARD IS IT FOR YOU TO PAY FOR THE VERY BASICS LIKE FOOD, HOUSING, MEDICAL CARE, AND HEATING?: NOT HARD AT ALL
IN A TYPICAL WEEK, HOW MANY TIMES DO YOU TALK ON THE PHONE WITH FAMILY, FRIENDS, OR NEIGHBORS?: MORE THAN THREE TIMES A WEEK
HOW OFTEN DO YOU ATTENT MEETINGS OF THE CLUB OR ORGANIZATION YOU BELONG TO?: MORE THAN 4 TIMES PER YEAR
HOW MANY DRINKS CONTAINING ALCOHOL DO YOU HAVE ON A TYPICAL DAY WHEN YOU ARE DRINKING: 1 OR 2
WITHIN THE PAST 12 MONTHS, YOU WORRIED THAT YOUR FOOD WOULD RUN OUT BEFORE YOU GOT THE MONEY TO BUY MORE: NEVER TRUE
HOW OFTEN DO YOU GET TOGETHER WITH FRIENDS OR RELATIVES?: ONCE A WEEK
ARE YOU MARRIED, WIDOWED, DIVORCED, SEPARATED, NEVER MARRIED, OR LIVING WITH A PARTNER?: NEVER MARRIED
DO YOU BELONG TO ANY CLUBS OR ORGANIZATIONS SUCH AS CHURCH GROUPS UNIONS, FRATERNAL OR ATHLETIC GROUPS, OR SCHOOL GROUPS?: NO
DO YOU BELONG TO ANY CLUBS OR ORGANIZATIONS SUCH AS CHURCH GROUPS UNIONS, FRATERNAL OR ATHLETIC GROUPS, OR SCHOOL GROUPS?: NO
HOW OFTEN DO YOU ATTEND CHURCH OR RELIGIOUS SERVICES?: NEVER
HOW OFTEN DO YOU HAVE SIX OR MORE DRINKS ON ONE OCCASION: NEVER
HOW OFTEN DO YOU GET TOGETHER WITH FRIENDS OR RELATIVES?: ONCE A WEEK
HOW OFTEN DO YOU ATTEND CHURCH OR RELIGIOUS SERVICES?: NEVER
HOW OFTEN DO YOU ATTENT MEETINGS OF THE CLUB OR ORGANIZATION YOU BELONG TO?: MORE THAN 4 TIMES PER YEAR

## 2024-12-04 ENCOUNTER — OFFICE VISIT (OUTPATIENT)
Dept: MEDICAL GROUP | Facility: PHYSICIAN GROUP | Age: 79
End: 2024-12-04
Payer: MEDICARE

## 2024-12-04 VITALS
DIASTOLIC BLOOD PRESSURE: 64 MMHG | WEIGHT: 110.25 LBS | HEIGHT: 63 IN | OXYGEN SATURATION: 94 % | RESPIRATION RATE: 20 BRPM | HEART RATE: 96 BPM | TEMPERATURE: 98.4 F | BODY MASS INDEX: 19.54 KG/M2 | SYSTOLIC BLOOD PRESSURE: 106 MMHG

## 2024-12-04 DIAGNOSIS — E78.2 MIXED HYPERLIPIDEMIA: ICD-10-CM

## 2024-12-04 DIAGNOSIS — Z13.0 SCREENING FOR ENDOCRINE, METABOLIC AND IMMUNITY DISORDER: ICD-10-CM

## 2024-12-04 DIAGNOSIS — E55.9 VITAMIN D DEFICIENCY: ICD-10-CM

## 2024-12-04 DIAGNOSIS — F51.01 PRIMARY INSOMNIA: ICD-10-CM

## 2024-12-04 DIAGNOSIS — L02.31 CUTANEOUS ABSCESS OF BUTTOCK: ICD-10-CM

## 2024-12-04 DIAGNOSIS — Z13.29 SCREENING FOR ENDOCRINE, METABOLIC AND IMMUNITY DISORDER: ICD-10-CM

## 2024-12-04 DIAGNOSIS — Z13.228 SCREENING FOR ENDOCRINE, METABOLIC AND IMMUNITY DISORDER: ICD-10-CM

## 2024-12-04 DIAGNOSIS — E05.90 SUBCLINICAL HYPERTHYROIDISM: Chronic | ICD-10-CM

## 2024-12-04 DIAGNOSIS — M85.859 OSTEOPENIA OF NECK OF FEMUR, UNSPECIFIED LATERALITY: ICD-10-CM

## 2024-12-04 DIAGNOSIS — F41.9 ANXIETY: Chronic | ICD-10-CM

## 2024-12-04 PROCEDURE — 99214 OFFICE O/P EST MOD 30 MIN: CPT

## 2024-12-04 PROCEDURE — 2023F DILAT RTA XM W/O RTNOPTHY: CPT

## 2024-12-04 PROCEDURE — 3078F DIAST BP <80 MM HG: CPT

## 2024-12-04 PROCEDURE — 3074F SYST BP LT 130 MM HG: CPT

## 2024-12-04 PROCEDURE — 1170F FXNL STATUS ASSESSED: CPT

## 2024-12-04 RX ORDER — MUPIROCIN 20 MG/G
1 OINTMENT TOPICAL
COMMUNITY
Start: 2024-09-12 | End: 2024-12-04

## 2024-12-04 RX ORDER — ZOLPIDEM TARTRATE 10 MG/1
10 TABLET ORAL NIGHTLY PRN
Qty: 30 TABLET | Refills: 2 | Status: SHIPPED | OUTPATIENT
Start: 2024-12-16 | End: 2025-03-16

## 2024-12-04 RX ORDER — BUSPIRONE HYDROCHLORIDE 10 MG/1
TABLET ORAL
Qty: 270 TABLET | Refills: 0 | Status: SHIPPED | OUTPATIENT
Start: 2024-12-04

## 2024-12-04 RX ORDER — MUPIROCIN 20 MG/G
1 OINTMENT TOPICAL 2 TIMES DAILY
Qty: 22 G | Refills: 0 | Status: SHIPPED | OUTPATIENT
Start: 2024-12-04

## 2024-12-04 RX ORDER — ATORVASTATIN CALCIUM 20 MG/1
20 TABLET, FILM COATED ORAL EVERY EVENING
Qty: 90 TABLET | Refills: 3 | Status: SHIPPED | OUTPATIENT
Start: 2024-12-04

## 2024-12-04 RX ORDER — MUPIROCIN CALCIUM 20 MG/G
1 CREAM TOPICAL 2 TIMES DAILY
Qty: 30 G | Refills: 0 | Status: SHIPPED | OUTPATIENT
Start: 2024-12-04 | End: 2024-12-04

## 2024-12-04 ASSESSMENT — FIBROSIS 4 INDEX: FIB4 SCORE: 1.11

## 2024-12-04 NOTE — PROGRESS NOTES
Subjective:     Chief Complaint   Patient presents with    Establish Care    Medication Refill     Yasmin Zhong is a 79 y.o. female, Her prior PCP was Lucina Gupta MD.  History of Present Illness  The patient is a 79-year-old female here to establish care and request medication refills.    She has a history of neuropathy in her feet.  She underwent eye surgery in  as well as a hernia repair in .    Spinal Fracture  A few years ago, she experienced a fall that resulted in a spinal fracture, leading to a loss of 3 inches in height. She maintains an active lifestyle, walking her dog four times a day. She is currently on Prolia injections for osteopenia, which she receives every 6 months d/t fracture history.    Sleep Disturbances  She has been taking Ambien 10 mg for the past 10 years without any side effects. She reports no hallucinations, morning fogginess, or memory loss. She typically takes Ambien at 9 PM and falls asleep around 11 PM, waking up at 7:48 AM. She has a history of sleep disturbances for many years. She also takes buspirone twice at night and once in the morning.    She reports no chest pain or shortness of breath at rest, no lightheadedness or dizziness while seated, and no blood in her urine or stool.    SOCIAL HISTORY  She is a former smoker, quit about 30 years ago. She does not drink alcohol.    FAMILY HISTORY  Her mother had bladder cancer and  at the age of 82. Her father had colon cancer and  of a heart attack at the age of 89. No family history of stroke. Her mother and all of her siblings had type 2 diabetes.    IMMUNIZATIONS  She received her influenza and Shingrix vaccine this year.    Allergies: Pollen extract    Current Outpatient Medications:     [START ON 2024] zolpidem (AMBIEN) 10 MG Tab, Take 1 Tablet by mouth at bedtime as needed for Sleep for up to 90 days., Disp: 30 Tablet, Rfl: 2    atorvastatin (LIPITOR) 20 MG Tab, Take 1 Tablet by mouth every  "evening., Disp: 90 Tablet, Rfl: 3    busPIRone (BUSPAR) 10 MG Tab tablet, TAKE ONE TABLET BY MOUTH EVERY MORNING AND TAKE TWO TABLETS BY MOUTH EVERY NIGHT AT BEDTIME, Disp: 270 Tablet, Rfl: 0    mupirocin calcium (BACTROBAN) 2 % Cream, Apply 1 Application topically 2 times a day., Disp: 30 g, Rfl: 0    zolpidem (AMBIEN) 10 MG Tab, Take 1 Tablet by mouth at bedtime as needed for Sleep for up to 90 days., Disp: 30 Tablet, Rfl: 2    cyanocobalamin (VITAMIN B-12) 100 MCG Tab, Take 1 Tablet by mouth every morning., Disp: , Rfl:     BIOTIN PO, Take 1 Tablet by mouth every morning. Pt unsure of dose, Disp: , Rfl:     Calcium Citrate-Vitamin D (CALCIUM + D PO), Take 1 Tablet by mouth every morning., Disp: , Rfl:     MAGNESIUM PO, Take 1 Capsule by mouth every morning. Pt unsure of dose, Disp: , Rfl:     Cholecalciferol (VITAMIN D3 PO), Take 1 Capsule by mouth every morning. Pt unsure of dose, Disp: , Rfl:     VITAMIN E PO, Take 1 Capsule by mouth every morning. Pt unsure of dose, Disp: , Rfl:      ROS per HPI  Health Maintenance: Completed    Objective:     /64 (BP Location: Left arm, Patient Position: Sitting, BP Cuff Size: Child)   Pulse 96   Temp 36.9 °C (98.4 °F) (Temporal)   Resp 20   Ht 1.6 m (5' 3\")   Wt 50 kg (110 lb 4 oz)   SpO2 94%  Body mass index is 19.53 kg/m².    Physical Exam  Vitals reviewed.   Constitutional:       General: She is not in acute distress.     Appearance: Normal appearance. She is not ill-appearing.   Cardiovascular:      Rate and Rhythm: Normal rate and regular rhythm.      Heart sounds: Normal heart sounds.   Pulmonary:      Effort: Pulmonary effort is normal.      Breath sounds: Normal breath sounds.   Abdominal:      General: Abdomen is flat.      Palpations: Abdomen is soft.   Musculoskeletal:      Cervical back: Normal range of motion.   Skin:     General: Skin is warm and dry.   Neurological:      General: No focal deficit present.      Mental Status: She is alert and " oriented to person, place, and time.   Psychiatric:         Mood and Affect: Mood normal.         Behavior: Behavior normal.         Thought Content: Thought content normal.         Judgment: Judgment normal.        Results  Imaging  Bone density scan in 2022 showed good results.    Results for orders placed or performed during the hospital encounter of 06/02/24   CBC w/ Differential    Collection Time: 06/02/24 10:30 AM   Result Value Ref Range    WBC 9.0 4.8 - 10.8 K/uL    RBC 2.92 (L) 4.20 - 5.40 M/uL    Hemoglobin 10.3 (L) 12.0 - 16.0 g/dL    Hematocrit 30.4 (L) 37.0 - 47.0 %    .1 (H) 81.4 - 97.8 fL    MCH 35.3 (H) 27.0 - 33.0 pg    MCHC 33.9 32.2 - 35.5 g/dL    RDW 51.2 (H) 35.9 - 50.0 fL    Platelet Count 359 164 - 446 K/uL    MPV 8.4 (L) 9.0 - 12.9 fL    Neutrophils-Polys 68.00 44.00 - 72.00 %    Lymphocytes 19.00 (L) 22.00 - 41.00 %    Monocytes 10.00 0.00 - 13.40 %    Eosinophils 1.00 0.00 - 6.90 %    Basophils 2.00 (H) 0.00 - 1.80 %    Nucleated RBC 0.40 (H) 0.00 - 0.20 /100 WBC    Neutrophils (Absolute) 6.12 1.82 - 7.42 K/uL    Lymphs (Absolute) 1.71 1.00 - 4.80 K/uL    Monos (Absolute) 0.90 (H) 0.00 - 0.85 K/uL    Eos (Absolute) 0.09 0.00 - 0.51 K/uL    Baso (Absolute) 0.18 (H) 0.00 - 0.12 K/uL    NRBC (Absolute) 0.04 K/uL   Complete Metabolic Panel (CMP)    Collection Time: 06/02/24 10:30 AM   Result Value Ref Range    Sodium 137 135 - 145 mmol/L    Potassium 3.3 (L) 3.6 - 5.5 mmol/L    Chloride 100 96 - 112 mmol/L    Co2 20 20 - 33 mmol/L    Anion Gap 17.0 (H) 7.0 - 16.0    Glucose 98 65 - 99 mg/dL    Bun 32 (H) 8 - 22 mg/dL    Creatinine 0.56 0.50 - 1.40 mg/dL    Calcium 9.4 8.4 - 10.2 mg/dL    Correct Calcium 9.6 8.5 - 10.5 mg/dL    AST(SGOT) 17 12 - 45 U/L    ALT(SGPT) 11 2 - 50 U/L    Alkaline Phosphatase 87 30 - 99 U/L    Total Bilirubin 0.3 0.1 - 1.5 mg/dL    Albumin 3.7 3.2 - 4.9 g/dL    Total Protein 6.4 6.0 - 8.2 g/dL    Globulin 2.7 1.9 - 3.5 g/dL    A-G Ratio 1.4 g/dL   ESTIMATED GFR     Collection Time: 06/02/24 10:30 AM   Result Value Ref Range    GFR (CKD-EPI) 94 >60 mL/min/1.73 m 2   DIFFERENTIAL MANUAL    Collection Time: 06/02/24 10:30 AM   Result Value Ref Range    Manual Diff Status PERFORMED    PLATELET ESTIMATE    Collection Time: 06/02/24 10:30 AM   Result Value Ref Range    Plt Estimation Normal    MORPHOLOGY    Collection Time: 06/02/24 10:30 AM   Result Value Ref Range    RBC Morphology Normal      *Note: Due to a large number of results and/or encounters for the requested time period, some results have not been displayed. A complete set of results can be found in Results Review.     Assessment & Plan:     The following plan was discussed through shared decision making with the patient:    1. Primary insomnia  Chronic, controlled.   Controlled substance visit today for insomnia.  Patient has been stable on ambien.  PDMP shows no abnormal prescribing or filling behavior.  Controlled substance agreement completed in office today.  Patient had already voided prior to her visit therefore we will completed at her next follow-up controlled substance visit.  Her last urine drug screen was consistent.  The patient reports ambien 10 mg continues to work well to manage symptoms without side effects.  We will continue the current regimen.  Risks versus benefits were reviewed.   Obtained and reviewed patient utilization report from [facility] pharmacy database on 12/4/2024 10:31 AM  prior to writing prescription for controlled substance II, III or IV per [place] bill . Based on assessment of the report, the prescription is medically necessary.   - Controlled Substance Treatment Agreement  - zolpidem (AMBIEN) 10 MG Tab; Take 1 Tablet by mouth at bedtime as needed for Sleep for up to 90 days.  Dispense: 30 Tablet; Refill: 2    2. Anxiety  Chronic, controlled.  Patient denies any SI/HI..  Reports symptoms well-controlled while on buspirone 10 mg once in the morning and twice at night.  Will  continue with current regimen at this time  - busPIRone (BUSPAR) 10 MG Tab tablet; TAKE ONE TABLET BY MOUTH EVERY MORNING AND TAKE TWO TABLETS BY MOUTH EVERY NIGHT AT BEDTIME  Dispense: 270 Tablet; Refill: 0    3. Subclinical hyperthyroidism  Chronic, controlled/resolved.    Due for updated blood work  - TSH WITH REFLEX TO FT4; Future  - VITAMIN D,25 HYDROXY (DEFICIENCY); Future    4. Mixed hyperlipidemia  Chronic, controlled.  Patient is currently on a statin therapy.  Will continue with current regimen and refills provided in office today.  Will get updated blood work and follow-up in office in approximately 3 months.  - CBC WITH DIFFERENTIAL; Future  - Comp Metabolic Panel; Future  - Lipid Profile; Future  - ESTIMATED GFR; Future  - atorvastatin (LIPITOR) 20 MG Tab; Take 1 Tablet by mouth every evening.  Dispense: 90 Tablet; Refill: 3    5. Cutaneous abscess of buttock  - Patient has sores on her bottom, almost all healed except for one. Patient is also planning to follow-up with wound care in the next month if it is not completely gone.  - mupirocin calcium (BACTROBAN) 2 % Cream; Apply 1 Application topically 2 times a day.  Dispense: 30 g; Refill: 0    6. Osteopenia of neck of femur, unspecified laterality  - Her bone density scan from 2022 was satisfactory. She is currently on Prolia injections.  - Advised to maintain a diet rich in vitamin D and calcium, and to engage in regular walking.  - TSH WITH REFLEX TO FT4; Future  - VITAMIN D,25 HYDROXY (DEFICIENCY); Future    7. Screening for endocrine, metabolic and immunity disorder  - HEMOGLOBIN A1C; Future  - ESTIMATED GFR; Future    8. Vitamin D deficiency  - VITAMIN D,25 HYDROXY (DEFICIENCY); Future    Return in about 3 months (around 3/4/2025) for Controlled Substance, Labs.       Please note that this dictation was created using voice recognition software. I have made every reasonable attempt to correct obvious errors, but I expect that there are errors of  grammar and possibly content that I did not discover before finalizing the note.    YOEL Grullon  [facility]  [facility]

## 2024-12-12 ENCOUNTER — OFFICE VISIT (OUTPATIENT)
Dept: WOUND CARE | Facility: MEDICAL CENTER | Age: 79
End: 2024-12-12
Payer: MEDICARE

## 2024-12-12 DIAGNOSIS — Z91.199 FAILURE TO ATTEND APPOINTMENT: ICD-10-CM

## 2024-12-12 PROCEDURE — 99999 PR NO CHARGE: CPT | Performed by: STUDENT IN AN ORGANIZED HEALTH CARE EDUCATION/TRAINING PROGRAM

## 2025-01-02 ENCOUNTER — APPOINTMENT (OUTPATIENT)
Dept: WOUND CARE | Facility: MEDICAL CENTER | Age: 80
End: 2025-01-02
Payer: MEDICARE

## 2025-01-09 ENCOUNTER — APPOINTMENT (OUTPATIENT)
Dept: WOUND CARE | Facility: MEDICAL CENTER | Age: 80
End: 2025-01-09
Payer: MEDICARE

## 2025-01-16 ENCOUNTER — APPOINTMENT (OUTPATIENT)
Dept: WOUND CARE | Facility: MEDICAL CENTER | Age: 80
End: 2025-01-16
Payer: MEDICARE

## 2025-03-11 ENCOUNTER — OFFICE VISIT (OUTPATIENT)
Dept: MEDICAL GROUP | Facility: PHYSICIAN GROUP | Age: 80
End: 2025-03-11
Payer: MEDICARE

## 2025-03-11 VITALS
RESPIRATION RATE: 20 BRPM | OXYGEN SATURATION: 94 % | HEIGHT: 63 IN | HEART RATE: 92 BPM | SYSTOLIC BLOOD PRESSURE: 110 MMHG | BODY MASS INDEX: 19.4 KG/M2 | TEMPERATURE: 98.3 F | WEIGHT: 109.5 LBS | DIASTOLIC BLOOD PRESSURE: 68 MMHG

## 2025-03-11 DIAGNOSIS — F51.01 PRIMARY INSOMNIA: ICD-10-CM

## 2025-03-11 DIAGNOSIS — Z51.81 ENCOUNTER FOR MEDICATION MONITORING: ICD-10-CM

## 2025-03-11 PROCEDURE — 3074F SYST BP LT 130 MM HG: CPT

## 2025-03-11 PROCEDURE — 1170F FXNL STATUS ASSESSED: CPT

## 2025-03-11 PROCEDURE — 2023F DILAT RTA XM W/O RTNOPTHY: CPT

## 2025-03-11 PROCEDURE — 99213 OFFICE O/P EST LOW 20 MIN: CPT

## 2025-03-11 PROCEDURE — 3078F DIAST BP <80 MM HG: CPT

## 2025-03-11 RX ORDER — ZOLPIDEM TARTRATE 10 MG/1
10 TABLET ORAL NIGHTLY PRN
Qty: 30 TABLET | Refills: 2 | Status: SHIPPED | OUTPATIENT
Start: 2025-03-14 | End: 2025-06-12

## 2025-03-11 ASSESSMENT — FIBROSIS 4 INDEX: FIB4 SCORE: 1.11

## 2025-03-11 ASSESSMENT — PATIENT HEALTH QUESTIONNAIRE - PHQ9: CLINICAL INTERPRETATION OF PHQ2 SCORE: 0

## 2025-03-11 NOTE — PROGRESS NOTES
"Subjective:     Chief Complaint   Patient presents with    Medication Refill     History of Present Illness  The patient is a 79-year-old female here for medication refills for her insomnia.    She reports overall good health, with the exception of occasional sleep disturbances even while on Ambien 10 mg. She adheres to her medication schedule and does not experience any memory loss, hallucinations, or daytime fatigue.    She has been unable to complete her blood work due to misplacing the paperwork but plans to do so prior to her next appointment.    Controlled substance agreement completed on 12/5/2024  Urine drug screen completed and consistent 12/27/2023 -- due for updated urine drug screen    Allergies: Pollen extract  ROS per HPI  Health Maintenance: Deferred   Objective:     /68 (BP Location: Left arm, Patient Position: Sitting, BP Cuff Size: Adult)   Pulse 92   Temp 36.8 °C (98.3 °F) (Temporal)   Resp 20   Ht 1.6 m (5' 3\")   Wt 49.7 kg (109 lb 8 oz)   SpO2 94%   Breastfeeding No   BMI 19.40 kg/m²  Body mass index is 19.4 kg/m².     Physical Exam  Vitals reviewed.   Constitutional:       General: She is not in acute distress.     Appearance: Normal appearance. She is not ill-appearing.   Cardiovascular:      Rate and Rhythm: Normal rate and regular rhythm.   Pulmonary:      Effort: Pulmonary effort is normal. No respiratory distress.   Skin:     Coloration: Skin is not jaundiced or pale.   Neurological:      General: No focal deficit present.      Mental Status: She is alert and oriented to person, place, and time.   Psychiatric:         Mood and Affect: Mood normal.         Behavior: Behavior normal.         Thought Content: Thought content normal.         Judgment: Judgment normal.        Results for orders placed or performed during the hospital encounter of 06/02/24   CBC w/ Differential    Collection Time: 06/02/24 10:30 AM   Result Value Ref Range    WBC 9.0 4.8 - 10.8 K/uL    RBC 2.92 (L) " 4.20 - 5.40 M/uL    Hemoglobin 10.3 (L) 12.0 - 16.0 g/dL    Hematocrit 30.4 (L) 37.0 - 47.0 %    .1 (H) 81.4 - 97.8 fL    MCH 35.3 (H) 27.0 - 33.0 pg    MCHC 33.9 32.2 - 35.5 g/dL    RDW 51.2 (H) 35.9 - 50.0 fL    Platelet Count 359 164 - 446 K/uL    MPV 8.4 (L) 9.0 - 12.9 fL    Neutrophils-Polys 68.00 44.00 - 72.00 %    Lymphocytes 19.00 (L) 22.00 - 41.00 %    Monocytes 10.00 0.00 - 13.40 %    Eosinophils 1.00 0.00 - 6.90 %    Basophils 2.00 (H) 0.00 - 1.80 %    Nucleated RBC 0.40 (H) 0.00 - 0.20 /100 WBC    Neutrophils (Absolute) 6.12 1.82 - 7.42 K/uL    Lymphs (Absolute) 1.71 1.00 - 4.80 K/uL    Monos (Absolute) 0.90 (H) 0.00 - 0.85 K/uL    Eos (Absolute) 0.09 0.00 - 0.51 K/uL    Baso (Absolute) 0.18 (H) 0.00 - 0.12 K/uL    NRBC (Absolute) 0.04 K/uL   Complete Metabolic Panel (CMP)    Collection Time: 06/02/24 10:30 AM   Result Value Ref Range    Sodium 137 135 - 145 mmol/L    Potassium 3.3 (L) 3.6 - 5.5 mmol/L    Chloride 100 96 - 112 mmol/L    Co2 20 20 - 33 mmol/L    Anion Gap 17.0 (H) 7.0 - 16.0    Glucose 98 65 - 99 mg/dL    Bun 32 (H) 8 - 22 mg/dL    Creatinine 0.56 0.50 - 1.40 mg/dL    Calcium 9.4 8.4 - 10.2 mg/dL    Correct Calcium 9.6 8.5 - 10.5 mg/dL    AST(SGOT) 17 12 - 45 U/L    ALT(SGPT) 11 2 - 50 U/L    Alkaline Phosphatase 87 30 - 99 U/L    Total Bilirubin 0.3 0.1 - 1.5 mg/dL    Albumin 3.7 3.2 - 4.9 g/dL    Total Protein 6.4 6.0 - 8.2 g/dL    Globulin 2.7 1.9 - 3.5 g/dL    A-G Ratio 1.4 g/dL   ESTIMATED GFR    Collection Time: 06/02/24 10:30 AM   Result Value Ref Range    GFR (CKD-EPI) 94 >60 mL/min/1.73 m 2   DIFFERENTIAL MANUAL    Collection Time: 06/02/24 10:30 AM   Result Value Ref Range    Manual Diff Status PERFORMED    PLATELET ESTIMATE    Collection Time: 06/02/24 10:30 AM   Result Value Ref Range    Plt Estimation Normal    MORPHOLOGY    Collection Time: 06/02/24 10:30 AM   Result Value Ref Range    RBC Morphology Normal       Assessment and Plan:     The following treatment plan  was discussed through shared decision making with the patient:    1. Primary insomnia  zolpidem (AMBIEN) 10 MG Tab    URINE DRUG SCREEN    CANCELED: URINE DRUG SCREEN      2. Encounter for medication monitoring  URINE DRUG SCREEN    CANCELED: URINE DRUG SCREEN        Assessment & Plan  She reports that despite taking Ambien 10 mg regularly, she sometimes still experiences difficulty sleeping. She does not report any memory loss or daytime fatigue.    Unfortunately at this visit, patient was unable to provide a urine specimen therefore a urine drug screen will be conducted during her next lab visit to comply with the controlled substance agreement.  I did advise patient that if she does not complete this prior to her next refill appointment in 3 months she will not be provided a refill of her Ambien.  Patient stated verbal understanding.      Reviewed risks and benefits of medication with patient, specifically the high dose of Ambien and her age.  Discussed with patient that in the future if we start to notice some memory loss or issues with this medication we may need to come down and trial something else.  Patient stated verbal understanding.    Obtained and reviewed patient utilization report from [facility] pharmacy database on 3/11/2025 1:29 PM  prior to writing prescription for controlled substance II, III or IV per [place] bill . Based on assessment of the report,my physical exam if necessary and the patient's health problem, the prescription is medically necessary.     The prescription for Ambien has been renewed and sent to her pharmacy. She is advised to complete her blood work 1 to 2 weeks prior to her next appointment to ensure accurate and current lab results.   She is advised to continue taking her medications as prescribed and to ensure she completes her blood work before the next visit.      Return in about 3 months (around 6/11/2025) for Controlled Substance.       Please note that this note  was created using dictation with voice recognition software. I have made every reasonable attempt to correct obvious errors, but I expect that there are errors of grammar and possibly content that I did not discover before finalizing the note.    YOEL Grullon  [facility]  [facility]

## 2025-03-13 DIAGNOSIS — F41.9 ANXIETY: Chronic | ICD-10-CM

## 2025-03-13 RX ORDER — BUSPIRONE HYDROCHLORIDE 10 MG/1
TABLET ORAL
Qty: 270 TABLET | Refills: 0 | Status: SHIPPED | OUTPATIENT
Start: 2025-03-13

## 2025-03-13 NOTE — TELEPHONE ENCOUNTER
Received request via: Pharmacy    Was the patient seen in the last year in this department? Yes    Does the patient have an active prescription (recently filled or refills available) for medication(s) requested? No    [facility] [id number] [place], [place] - [address] AT [place]     Does the patient have longterm Plus and need 100-day supply? (This applies to ALL medications) Patient does not have SCP

## 2025-04-25 ENCOUNTER — HOSPITAL ENCOUNTER (OUTPATIENT)
Dept: LAB | Facility: MEDICAL CENTER | Age: 80
End: 2025-04-25
Payer: MEDICARE

## 2025-04-25 DIAGNOSIS — E05.90 SUBCLINICAL HYPERTHYROIDISM: Chronic | ICD-10-CM

## 2025-04-25 DIAGNOSIS — Z13.228 SCREENING FOR ENDOCRINE, METABOLIC AND IMMUNITY DISORDER: ICD-10-CM

## 2025-04-25 DIAGNOSIS — Z13.29 SCREENING FOR ENDOCRINE, METABOLIC AND IMMUNITY DISORDER: ICD-10-CM

## 2025-04-25 DIAGNOSIS — E55.9 VITAMIN D DEFICIENCY: ICD-10-CM

## 2025-04-25 DIAGNOSIS — Z13.0 SCREENING FOR ENDOCRINE, METABOLIC AND IMMUNITY DISORDER: ICD-10-CM

## 2025-04-25 DIAGNOSIS — M85.859 OSTEOPENIA OF NECK OF FEMUR, UNSPECIFIED LATERALITY: ICD-10-CM

## 2025-04-25 DIAGNOSIS — E78.2 MIXED HYPERLIPIDEMIA: ICD-10-CM

## 2025-04-25 DIAGNOSIS — Z51.81 ENCOUNTER FOR MEDICATION MONITORING: ICD-10-CM

## 2025-04-25 DIAGNOSIS — F51.01 PRIMARY INSOMNIA: ICD-10-CM

## 2025-04-25 LAB
BASOPHILS # BLD AUTO: 0.8 % (ref 0–1.8)
BASOPHILS # BLD: 0.04 K/UL (ref 0–0.12)
EOSINOPHIL # BLD AUTO: 0.07 K/UL (ref 0–0.51)
EOSINOPHIL NFR BLD: 1.4 % (ref 0–6.9)
ERYTHROCYTE [DISTWIDTH] IN BLOOD BY AUTOMATED COUNT: 49.8 FL (ref 35.9–50)
EST. AVERAGE GLUCOSE BLD GHB EST-MCNC: 123 MG/DL
HBA1C MFR BLD: 5.9 % (ref 4–5.6)
HCT VFR BLD AUTO: 43.5 % (ref 37–47)
HGB BLD-MCNC: 14.4 G/DL (ref 12–16)
IMM GRANULOCYTES # BLD AUTO: 0.01 K/UL (ref 0–0.11)
IMM GRANULOCYTES NFR BLD AUTO: 0.2 % (ref 0–0.9)
LYMPHOCYTES # BLD AUTO: 1.4 K/UL (ref 1–4.8)
LYMPHOCYTES NFR BLD: 27.4 % (ref 22–41)
MCH RBC QN AUTO: 34.6 PG (ref 27–33)
MCHC RBC AUTO-ENTMCNC: 33.1 G/DL (ref 32.2–35.5)
MCV RBC AUTO: 104.6 FL (ref 81.4–97.8)
MONOCYTES # BLD AUTO: 0.55 K/UL (ref 0–0.85)
MONOCYTES NFR BLD AUTO: 10.8 % (ref 0–13.4)
NEUTROPHILS # BLD AUTO: 3.04 K/UL (ref 1.82–7.42)
NEUTROPHILS NFR BLD: 59.4 % (ref 44–72)
NRBC # BLD AUTO: 0 K/UL
NRBC BLD-RTO: 0 /100 WBC (ref 0–0.2)
PLATELET # BLD AUTO: 244 K/UL (ref 164–446)
PMV BLD AUTO: 9.6 FL (ref 9–12.9)
RBC # BLD AUTO: 4.16 M/UL (ref 4.2–5.4)
WBC # BLD AUTO: 5.1 K/UL (ref 4.8–10.8)

## 2025-04-25 PROCEDURE — 80307 DRUG TEST PRSMV CHEM ANLYZR: CPT

## 2025-04-25 PROCEDURE — 84439 ASSAY OF FREE THYROXINE: CPT

## 2025-04-25 PROCEDURE — 83036 HEMOGLOBIN GLYCOSYLATED A1C: CPT | Mod: GA

## 2025-04-25 PROCEDURE — 80061 LIPID PANEL: CPT

## 2025-04-25 PROCEDURE — 80053 COMPREHEN METABOLIC PANEL: CPT

## 2025-04-25 PROCEDURE — 85025 COMPLETE CBC W/AUTO DIFF WBC: CPT

## 2025-04-25 PROCEDURE — 82306 VITAMIN D 25 HYDROXY: CPT

## 2025-04-25 PROCEDURE — 36415 COLL VENOUS BLD VENIPUNCTURE: CPT

## 2025-04-25 PROCEDURE — 84443 ASSAY THYROID STIM HORMONE: CPT

## 2025-04-26 LAB
25(OH)D3 SERPL-MCNC: 28 NG/ML (ref 30–100)
ALBUMIN SERPL BCP-MCNC: 4.1 G/DL (ref 3.2–4.9)
ALBUMIN/GLOB SERPL: 1.4 G/DL
ALP SERPL-CCNC: 67 U/L (ref 30–99)
ALT SERPL-CCNC: 15 U/L (ref 2–50)
ANION GAP SERPL CALC-SCNC: 13 MMOL/L (ref 7–16)
AST SERPL-CCNC: 22 U/L (ref 12–45)
BILIRUB SERPL-MCNC: 0.6 MG/DL (ref 0.1–1.5)
BUN SERPL-MCNC: 12 MG/DL (ref 8–22)
CALCIUM ALBUM COR SERPL-MCNC: 9.5 MG/DL (ref 8.5–10.5)
CALCIUM SERPL-MCNC: 9.6 MG/DL (ref 8.5–10.5)
CHLORIDE SERPL-SCNC: 107 MMOL/L (ref 96–112)
CHOLEST SERPL-MCNC: 146 MG/DL (ref 100–199)
CO2 SERPL-SCNC: 21 MMOL/L (ref 20–33)
CREAT SERPL-MCNC: 0.82 MG/DL (ref 0.5–1.4)
GFR SERPLBLD CREATININE-BSD FMLA CKD-EPI: 73 ML/MIN/1.73 M 2
GLOBULIN SER CALC-MCNC: 3 G/DL (ref 1.9–3.5)
GLUCOSE SERPL-MCNC: 88 MG/DL (ref 65–99)
HDLC SERPL-MCNC: 59 MG/DL
LDLC SERPL CALC-MCNC: 66 MG/DL
POTASSIUM SERPL-SCNC: 4 MMOL/L (ref 3.6–5.5)
PROT SERPL-MCNC: 7.1 G/DL (ref 6–8.2)
SODIUM SERPL-SCNC: 141 MMOL/L (ref 135–145)
T4 FREE SERPL-MCNC: 0.91 NG/DL (ref 0.93–1.7)
TRIGL SERPL-MCNC: 107 MG/DL (ref 0–149)
TSH SERPL DL<=0.005 MIU/L-ACNC: 0.23 UIU/ML (ref 0.38–5.33)

## 2025-04-27 LAB
AMPHET CTO UR CFM-MCNC: NEGATIVE NG/ML
BARBITURATES CTO UR CFM-MCNC: NEGATIVE NG/ML
BENZODIAZ CTO UR CFM-MCNC: NEGATIVE NG/ML
CANNABINOIDS CTO UR CFM-MCNC: NEGATIVE NG/ML
COCAINE CTO UR CFM-MCNC: NEGATIVE NG/ML
CREAT UR-MCNC: 141.2 MG/DL (ref 20–400)
DRUG COMMENT 753798: NORMAL
METHADONE CTO UR CFM-MCNC: NEGATIVE NG/ML
OPIATES CTO UR CFM-MCNC: NEGATIVE NG/ML
PCP CTO UR CFM-MCNC: NEGATIVE NG/ML
PROPOXYPH CTO UR CFM-MCNC: NEGATIVE NG/ML

## 2025-05-16 ENCOUNTER — RESULTS FOLLOW-UP (OUTPATIENT)
Dept: MEDICAL GROUP | Facility: PHYSICIAN GROUP | Age: 80
End: 2025-05-16

## 2025-06-01 DIAGNOSIS — F51.01 PRIMARY INSOMNIA: ICD-10-CM

## 2025-06-02 NOTE — TELEPHONE ENCOUNTER
Received request via: Pharmacy    Was the patient seen in the last year in this department? Yes    Does the patient have an active prescription (recently filled or refills available) for medication(s) requested? No    [facility] [id number] [place], [place] - [address] AT [place]     Does the patient have residential Plus and need 100-day supply? (This applies to ALL medications) Patient does not have SCP

## 2025-06-03 RX ORDER — ZOLPIDEM TARTRATE 10 MG/1
TABLET ORAL
Qty: 30 TABLET | OUTPATIENT
Start: 2025-06-03

## 2025-06-05 ENCOUNTER — APPOINTMENT (OUTPATIENT)
Dept: MEDICAL GROUP | Facility: PHYSICIAN GROUP | Age: 80
End: 2025-06-05
Payer: MEDICARE

## 2025-06-09 ENCOUNTER — OFFICE VISIT (OUTPATIENT)
Dept: MEDICAL GROUP | Facility: PHYSICIAN GROUP | Age: 80
End: 2025-06-09
Payer: MEDICARE

## 2025-06-09 VITALS
TEMPERATURE: 99.4 F | OXYGEN SATURATION: 94 % | RESPIRATION RATE: 18 BRPM | DIASTOLIC BLOOD PRESSURE: 62 MMHG | HEIGHT: 63 IN | BODY MASS INDEX: 19.55 KG/M2 | HEART RATE: 86 BPM | SYSTOLIC BLOOD PRESSURE: 90 MMHG | WEIGHT: 110.31 LBS

## 2025-06-09 DIAGNOSIS — R73.03 PREDIABETES: Primary | ICD-10-CM

## 2025-06-09 DIAGNOSIS — R94.6 ABNORMAL RESULTS OF THYROID FUNCTION STUDIES: ICD-10-CM

## 2025-06-09 DIAGNOSIS — F41.9 ANXIETY: Chronic | ICD-10-CM

## 2025-06-09 DIAGNOSIS — F51.01 PRIMARY INSOMNIA: ICD-10-CM

## 2025-06-09 DIAGNOSIS — E78.2 MIXED HYPERLIPIDEMIA: ICD-10-CM

## 2025-06-09 PROBLEM — R79.89 ABNORMAL THYROID BLOOD TEST: Status: ACTIVE | Noted: 2025-06-09

## 2025-06-09 PROCEDURE — 3078F DIAST BP <80 MM HG: CPT

## 2025-06-09 PROCEDURE — 2023F DILAT RTA XM W/O RTNOPTHY: CPT

## 2025-06-09 PROCEDURE — 3074F SYST BP LT 130 MM HG: CPT

## 2025-06-09 PROCEDURE — 99214 OFFICE O/P EST MOD 30 MIN: CPT

## 2025-06-09 PROCEDURE — 1170F FXNL STATUS ASSESSED: CPT

## 2025-06-09 RX ORDER — BUSPIRONE HYDROCHLORIDE 10 MG/1
TABLET ORAL
Qty: 270 TABLET | Refills: 0 | Status: SHIPPED | OUTPATIENT
Start: 2025-06-09

## 2025-06-09 RX ORDER — ATORVASTATIN CALCIUM 20 MG/1
20 TABLET, FILM COATED ORAL EVERY EVENING
Qty: 90 TABLET | Refills: 3 | Status: SHIPPED | OUTPATIENT
Start: 2025-06-09

## 2025-06-09 RX ORDER — ZOLPIDEM TARTRATE 10 MG/1
10 TABLET ORAL NIGHTLY PRN
Qty: 30 TABLET | Refills: 2 | Status: SHIPPED | OUTPATIENT
Start: 2025-06-09 | End: 2025-09-07

## 2025-06-09 ASSESSMENT — FIBROSIS 4 INDEX: FIB4 SCORE: 1.82

## 2025-06-09 NOTE — TELEPHONE ENCOUNTER
Received request via: Pharmacy    Was the patient seen in the last year in this department? Yes    Does the patient have an active prescription (recently filled or refills available) for medication(s) requested? No    [facility] [id number] [place], [place] - [address] AT [place]     Does the patient have [facility] Plus and need 100-day supply? (This applies to ALL medications) Patient does not have SCP

## 2025-06-09 NOTE — PROGRESS NOTES
"Subjective:     Chief Complaint   Patient presents with    Medication Follow-up    Lab Results     History of Present Illness  The patient is a 79-year-old female here for a follow-up on lab results and medication management.    She reports being diagnosed with prediabetes, although she does not consider herself overweight. Her physical activity is limited due to impaired mobility. There is a family history of type 2 diabetes, with her mother and all her siblings being affected.    She has not been adhering to her vitamin D supplementation regimen but has recently resumed it. Her vitamin D levels were slightly below normal.    She is currently on Ambien 10 mg but reports occasional sleep disturbances. She is also taking Lipitor, which is nearing its last refill. She has expressed a desire to reduce her medication load, having been on multiple prescriptions since her hospitalization. She is currently managing with three medications.    Her thyroid function has been abnormal in the past, with low TSH levels noted over the last four years. Recent labs from 04/2025 showed a low TSH and low T4, suggesting a possible thyroid disorder. A recheck of thyroid labs is planned for the end of July.    FAMILY HISTORY  Her mother and all her siblings had type II diabetes when they got older.    Allergies: Pollen extract  ROS per HPI  Health Maintenance: Deferred     Objective:     BP 90/62 (BP Location: Left arm, Patient Position: Sitting, BP Cuff Size: Adult)   Pulse 86   Temp 37.4 °C (99.4 °F) (Temporal)   Resp 18   Ht 1.6 m (5' 3\")   Wt 50 kg (110 lb 5 oz)   SpO2 94%   Breastfeeding No   BMI 19.54 kg/m²  Body mass index is 19.54 kg/m².     Physical Exam  Vitals reviewed.   Constitutional:       General: She is not in acute distress.     Appearance: Normal appearance. She is not ill-appearing.   Cardiovascular:      Rate and Rhythm: Normal rate and regular rhythm.   Pulmonary:      Effort: Pulmonary effort is normal. No " respiratory distress.   Skin:     Coloration: Skin is not jaundiced or pale.   Neurological:      General: No focal deficit present.      Mental Status: She is alert and oriented to person, place, and time.   Psychiatric:         Mood and Affect: Mood normal.         Behavior: Behavior normal.         Thought Content: Thought content normal.         Judgment: Judgment normal.        Results  Labs   - Red Blood Cell Count: 04/2025, Slightly low   - A1c: 04/2025, Within the prediabetic range   - Thyroid Panel: 04/2025, Slightly abnormal   - Vitamin D: 04/2025, Low   - Hemoglobin: 04/2025, Normal   - Kidney Function: 04/2025, Normal   - Liver Function: 04/2025, Normal    Results for orders placed or performed during the hospital encounter of 04/25/25   ESTIMATED GFR    Collection Time: 04/25/25 10:43 AM   Result Value Ref Range    GFR (CKD-EPI) 73 >60 mL/min/1.73 m 2   VITAMIN D,25 HYDROXY (DEFICIENCY)    Collection Time: 04/25/25 10:43 AM   Result Value Ref Range    25-Hydroxy   Vitamin D 25 28 (L) 30 - 100 ng/mL   TSH WITH REFLEX TO FT4    Collection Time: 04/25/25 10:43 AM   Result Value Ref Range    TSH 0.231 (L) 0.380 - 5.330 uIU/mL   Lipid Profile    Collection Time: 04/25/25 10:43 AM   Result Value Ref Range    Cholesterol,Tot 146 100 - 199 mg/dL    Triglycerides 107 0 - 149 mg/dL    HDL 59 >=40 mg/dL    LDL 66 <100 mg/dL   HEMOGLOBIN A1C    Collection Time: 04/25/25 10:43 AM   Result Value Ref Range    Glycohemoglobin 5.9 (H) 4.0 - 5.6 %    Est Avg Glucose 123 mg/dL   Comp Metabolic Panel    Collection Time: 04/25/25 10:43 AM   Result Value Ref Range    Sodium 141 135 - 145 mmol/L    Potassium 4.0 3.6 - 5.5 mmol/L    Chloride 107 96 - 112 mmol/L    Co2 21 20 - 33 mmol/L    Anion Gap 13.0 7.0 - 16.0    Glucose 88 65 - 99 mg/dL    Bun 12 8 - 22 mg/dL    Creatinine 0.82 0.50 - 1.40 mg/dL    Calcium 9.6 8.5 - 10.5 mg/dL    Correct Calcium 9.5 8.5 - 10.5 mg/dL    AST(SGOT) 22 12 - 45 U/L    ALT(SGPT) 15 2 - 50 U/L     Alkaline Phosphatase 67 30 - 99 U/L    Total Bilirubin 0.6 0.1 - 1.5 mg/dL    Albumin 4.1 3.2 - 4.9 g/dL    Total Protein 7.1 6.0 - 8.2 g/dL    Globulin 3.0 1.9 - 3.5 g/dL    A-G Ratio 1.4 g/dL   CBC WITH DIFFERENTIAL    Collection Time: 04/25/25 10:43 AM   Result Value Ref Range    WBC 5.1 4.8 - 10.8 K/uL    RBC 4.16 (L) 4.20 - 5.40 M/uL    Hemoglobin 14.4 12.0 - 16.0 g/dL    Hematocrit 43.5 37.0 - 47.0 %    .6 (H) 81.4 - 97.8 fL    MCH 34.6 (H) 27.0 - 33.0 pg    MCHC 33.1 32.2 - 35.5 g/dL    RDW 49.8 35.9 - 50.0 fL    Platelet Count 244 164 - 446 K/uL    MPV 9.6 9.0 - 12.9 fL    Neutrophils-Polys 59.40 44.00 - 72.00 %    Lymphocytes 27.40 22.00 - 41.00 %    Monocytes 10.80 0.00 - 13.40 %    Eosinophils 1.40 0.00 - 6.90 %    Basophils 0.80 0.00 - 1.80 %    Immature Granulocytes 0.20 0.00 - 0.90 %    Nucleated RBC 0.00 0.00 - 0.20 /100 WBC    Neutrophils (Absolute) 3.04 1.82 - 7.42 K/uL    Lymphs (Absolute) 1.40 1.00 - 4.80 K/uL    Monos (Absolute) 0.55 0.00 - 0.85 K/uL    Eos (Absolute) 0.07 0.00 - 0.51 K/uL    Baso (Absolute) 0.04 0.00 - 0.12 K/uL    Immature Granulocytes (abs) 0.01 0.00 - 0.11 K/uL    NRBC (Absolute) 0.00 K/uL   URINE DRUG SCREEN W/CONF (AR)    Collection Time: 04/25/25 10:43 AM   Result Value Ref Range    Urine Amphetamine-Methamphetam Negative Cutoff 300 ng/mL    Barbiturates Negative Cutoff 200 ng/mL    Benzodiazepines Negative Cutoff 200 ng/mL    Propoxyphene Negative Cutoff 300 ng/mL    Cocaine Metabolite Negative Cutoff 150 ng/mL    Methadone Negative Cutoff 150 ng/mL    Codeine-Morphine Negative Cutoff 300 ng/mL    Phencyclidine -Pcp Negative Cutoff 25 ng/mL    Cannabinoid Metab Negative Cutoff 50 ng/mL    Creatinine Urine 141.2 20.0 - 400.0 mg/dL    Drug Comment Urine Drugs See Note    FREE THYROXINE    Collection Time: 04/25/25 10:43 AM   Result Value Ref Range    Free T-4 0.91 (L) 0.93 - 1.70 ng/dL      Assessment and Plan:     The following treatment plan was discussed  through shared decision making with the patient:    1. Prediabetes        2. Abnormal results of thyroid function studies  TSH    FREE THYROXINE      3. Primary insomnia  zolpidem (AMBIEN) 10 MG Tab      4. Mixed hyperlipidemia  atorvastatin (LIPITOR) 20 MG Tab        Assessment & Plan  1. Prediabetes.  - A1c levels have escalated to the prediabetic range, a significant change from the normal levels recorded five years ago.  - Dietary modifications are recommended, focusing on reducing added sugars and processed carbohydrates.  - Limited physical activity necessitates closer dietary monitoring.  - No immediate need for diabetes medication; condition will be monitored.    2. Abnormal thyroid function.  - Thyroid function tests have consistently shown low TSH levels over the past four years, indicating a potential for hypothyroidism.  - Current labs show low TSH and low T4 levels, suggesting a possible mismatch in the numbers.  - Re-evaluation of thyroid function will be conducted at the end of July 2025.  - No medication will be initiated at this time.    3. Vitamin D deficiency.  - Vitamin D levels are slightly below the normal range.  - Advised to continue vitamin D supplementation regimen.  - Recheck of vitamin D levels will be scheduled for one year from now.  - No immediate concerns; supplementation should suffice.    4. Insomnia/Medication management.  - Currently on Ambien 10 mg but reports occasional sleep disturbances.  - Prescriptions for Ambien and Lipitor have been refilled today.  In prescribing controlled substances to this patient, I certify that I have obtained and reviewed the medical history of the patient. I have also made a good eryn effort to obtain applicable records from other providers who have treated the patient.    Obtained and reviewed patient utilization report from [facility] pharmacy database on 6/9/2025 1:22 PM  prior to writing prescription for controlled substance II, III or IV  per [place] bill . Based on assessment of the report, my physical exam, and the patient's health problem, the prescription is medically necessary. I believe the benefits of controlled substance therapy outweigh the risks. I have discussed the risks and benefits of treatment plan, and alternative therapies with the patient.  Also discussed with the patient regarding potential drug interactions with other medications.    5. Dyslipidemia  - Cholesterol levels within normal limits.  - Currently managed with atorvastatin 20 mg every evening.  Refills provided in office today      Return in about 2 months (around 8/9/2025) for Med Check, Labs.       Please note that this note was created using dictation with voice recognition software. I have made every reasonable attempt to correct obvious errors, but I expect that there are errors of grammar and possibly content that I did not discover before finalizing the note.    YOEL Grullon  [facility]  [facility]

## 2025-07-16 ENCOUNTER — OFFICE VISIT (OUTPATIENT)
Dept: WOUND CARE | Facility: MEDICAL CENTER | Age: 80
End: 2025-07-16
Attending: STUDENT IN AN ORGANIZED HEALTH CARE EDUCATION/TRAINING PROGRAM
Payer: MEDICARE

## 2025-07-16 DIAGNOSIS — M79.18 BUTTOCK PAIN: ICD-10-CM

## 2025-07-16 DIAGNOSIS — Z91.89 SEDENTARY LIFESTYLE: Primary | ICD-10-CM

## 2025-07-16 DIAGNOSIS — Z87.2 HISTORY OF PRESSURE INJURY OF SKIN: ICD-10-CM

## 2025-07-16 PROCEDURE — 1170F FXNL STATUS ASSESSED: CPT | Performed by: STUDENT IN AN ORGANIZED HEALTH CARE EDUCATION/TRAINING PROGRAM

## 2025-07-16 PROCEDURE — 99213 OFFICE O/P EST LOW 20 MIN: CPT

## 2025-07-16 PROCEDURE — 99214 OFFICE O/P EST MOD 30 MIN: CPT

## 2025-07-16 PROCEDURE — 99214 OFFICE O/P EST MOD 30 MIN: CPT | Performed by: STUDENT IN AN ORGANIZED HEALTH CARE EDUCATION/TRAINING PROGRAM

## 2025-07-16 ASSESSMENT — ENCOUNTER SYMPTOMS
SENSORY CHANGE: 1
FALLS: 1
WEAKNESS: 1
CHILLS: 0
FEVER: 0

## 2025-07-16 NOTE — PATIENT INSTRUCTIONS
-Keep your wound dressing clean, dry, and intact. Only change dressing if it's over saturated, soiled or falls off.     -Change your dressing if it becomes soiled, soaked, or falls off.    - Resolved wound be fragile for a few days, bathe and dry area gently, only ever regains a maximum of 80% of the tensile strength of the surrounding skin, remodeling of scar can continue for 6 months to a year. Contact PCP for a referral back her if any problems with area opening and draining again.    -Should you experience any significant changes in your wound(s), such as infection (redness, swelling, localized heat, increased pain, fever > 101 F, chills) or have any questions regarding your home care instructions, please contact the wound center at [phone]. If after hours, contact your primary care physician or go to the hospital emergency room.     If you are admitted to any hospital, you will need a new referral to come back to the wound clinic and any scheduled appointments that you already have, may be cancelled.

## 2025-07-16 NOTE — PROGRESS NOTES
Provider Encounter- Full Thickness wound    HISTORY OF PRESENT ILLNESS  Wound History:    START OF CARE IN CLINIC: 7/16/2025    REFERRING PROVIDER: Lucina Gupta      WOUND- Full Thickness Wound   LOCATION: Bilateral buttocks scarring   HISTORY:  79-year-old female with past medical history of insomnia, anxiety, arthritis, peripheral neuropathy, balance issues, high risk of falls.  Patient reports that due to her neuropathy and her high risk of falls/feeling unsteady on her feet she has become more sedentary in her life.  She states prolonged periods of time on her sofa watching TV or reading though she is able to get up and walk her dog 4 times per day and travels outside the house 2 times per week.  Patient's initial referral was from 9/2024 when she was noted to have wounds to bilateral buttocks.  Patient was treating at home with a prescription of mupirocin from her primary care doctor.  Patient improved and never sought wound care at that time.  Patient is now seeking care because she continues to have pain in her buttocks and pain when sitting for prolonged periods of time.  She denies any drainage or any open wounds currently.    Pertinent Medical History: See above     TOBACCO USE:  Former smoker    Patient's problem list, allergies, and current medications reviewed and updated in Epic    Interval History:  7/16/2025: Clinic visit with Jose Perez MD. patient reports feeling a normal state health, denies any fever chills or other signs of infection.  She reports mild pain in her buttocks particularly when sitting for prolonged periods of time, admits that she is more sedentary as she ages.  Patient has majority of her day sitting on her sofa watching TV or reading, she reports this is due to worsening neuropathy and risk of falls.  Patient does get up and walk her small dog approximately 4 times per day.  Patient denies any drainage or wounds.  Patient does endorse occasional urinary incontinence  "however she is able to notice and changes her clothes when it seldomly happens.      REVIEW OF SYSTEMS:   Review of Systems   Constitutional:  Negative for chills, fever and malaise/fatigue.   Musculoskeletal:  Positive for falls.   Skin:         Dry skin buttocks   Neurological:  Positive for sensory change (neuropathy) and weakness (generalized).       PHYSICAL EXAMINATION:   There were no vitals taken for this visit.    Physical Exam  Constitutional:       Comments: Thin appearing   Pulmonary:      Effort: Pulmonary effort is normal. No respiratory distress.   Skin:     Comments: Scattered blanching scar tissue bilateral buttocks. No open wounds. No evidence of current pressure injury.   Neurological:      Mental Status: She is alert.         WOUND ASSESSMENT           Buttocks - no open wounds  Blanching scar tissue from previous wounds.      PROCEDURE:   - No excisional debridement required, wounds are closed    Pertinent Labs and Diagnostics:    Labs:     A1c:   Lab Results   Component Value Date/Time    HBA1C 5.9 (H) 04/25/2025 10:43 AM          IMAGING: None  VASCULAR STUDIES: None    LAST  WOUND CULTURE:  DATE : No results found for: \"CULTRSULT\"      ASSESSMENT AND PLAN:     1. Sedentary lifestyle  2. History of pressure injury of skin  3. Buttock pain    7/16/2025  - Patient reports history of buttocks wounds in past due to prolonged sitting.  - Reports wounds have resolved months ago but continues to have some discomfort when sitting  - Patient does not have any wounds today, blanching scar tissue  - Discussed high risk of recurrence. Should wounds reoccur, recommend applying Desitin and obtain new referral to return to clinic  - Counseled on etiology of pressure injuries.  - Counseled on preventative measures, including offloading and frequent repositioning. We discussed appropriate seat cushions including EHOB and ROHO  - As she does not have any open wounds, will discharge from clinic      PATIENT " EDUCATION  - Importance of adequate nutrition for wound healing  -Advised to go to ER for any increased redness, swelling, drainage, or odor, or if patient develops fever, chills, nausea or vomiting.     My total time spent caring for the patient on the day of the encounter was 30 minutes, reviewing history, assessment, counseling and education, and coordination of care.  This does not include time spent on separately billable procedures/tests.    Please note that this note may have been created using voice recognition software. I have worked with technical experts from [facility] to optimize the interface.  I have made every reasonable attempt to correct obvious errors, but there may be errors of grammar and possibly content that I did not discover before finalizing the note.    N

## 2025-07-30 ENCOUNTER — HOSPITAL ENCOUNTER (OUTPATIENT)
Dept: LAB | Facility: MEDICAL CENTER | Age: 80
End: 2025-07-30
Payer: MEDICARE

## 2025-07-30 DIAGNOSIS — R94.6 ABNORMAL RESULTS OF THYROID FUNCTION STUDIES: ICD-10-CM

## 2025-07-30 LAB
T4 FREE SERPL-MCNC: 0.97 NG/DL (ref 0.93–1.7)
TSH SERPL-ACNC: 0.05 UIU/ML (ref 0.38–5.33)

## 2025-07-30 PROCEDURE — 84439 ASSAY OF FREE THYROXINE: CPT

## 2025-07-30 PROCEDURE — 84443 ASSAY THYROID STIM HORMONE: CPT

## 2025-07-30 PROCEDURE — 36415 COLL VENOUS BLD VENIPUNCTURE: CPT

## 2025-08-19 ENCOUNTER — OFFICE VISIT (OUTPATIENT)
Dept: MEDICAL GROUP | Facility: PHYSICIAN GROUP | Age: 80
End: 2025-08-19
Payer: MEDICARE

## 2025-08-19 VITALS
DIASTOLIC BLOOD PRESSURE: 60 MMHG | WEIGHT: 104.31 LBS | BODY MASS INDEX: 18.48 KG/M2 | SYSTOLIC BLOOD PRESSURE: 98 MMHG | TEMPERATURE: 97.3 F | RESPIRATION RATE: 20 BRPM | HEIGHT: 63 IN | OXYGEN SATURATION: 92 % | HEART RATE: 88 BPM

## 2025-08-19 DIAGNOSIS — M80.00XD AGE-RELATED OSTEOPOROSIS WITH CURRENT PATHOLOGICAL FRACTURE WITH ROUTINE HEALING: ICD-10-CM

## 2025-08-19 DIAGNOSIS — E05.90 SUBCLINICAL HYPERTHYROIDISM: Chronic | ICD-10-CM

## 2025-08-19 DIAGNOSIS — F51.01 PRIMARY INSOMNIA: Primary | ICD-10-CM

## 2025-08-19 DIAGNOSIS — G60.9 IDIOPATHIC PERIPHERAL NEUROPATHY: ICD-10-CM

## 2025-08-19 DIAGNOSIS — L98.9 SKIN LESION OF BACK: ICD-10-CM

## 2025-08-19 DIAGNOSIS — L81.9 DISCOLORATION OF SKIN OF LOWER LEG: ICD-10-CM

## 2025-08-19 PROCEDURE — 3074F SYST BP LT 130 MM HG: CPT

## 2025-08-19 PROCEDURE — 2023F DILAT RTA XM W/O RTNOPTHY: CPT

## 2025-08-19 PROCEDURE — 1170F FXNL STATUS ASSESSED: CPT

## 2025-08-19 PROCEDURE — 99214 OFFICE O/P EST MOD 30 MIN: CPT

## 2025-08-19 PROCEDURE — 3078F DIAST BP <80 MM HG: CPT

## 2025-08-19 RX ORDER — ZOLPIDEM TARTRATE 10 MG/1
10 TABLET ORAL NIGHTLY PRN
Qty: 30 TABLET | Refills: 2 | Status: SHIPPED | OUTPATIENT
Start: 2025-08-19 | End: 2025-11-17

## 2025-08-19 ASSESSMENT — FIBROSIS 4 INDEX: FIB4 SCORE: 1.82

## 2025-08-26 ENCOUNTER — APPOINTMENT (OUTPATIENT)
Dept: RADIOLOGY | Facility: MEDICAL CENTER | Age: 80
End: 2025-08-26
Payer: MEDICARE

## 2025-09-01 ENCOUNTER — APPOINTMENT (OUTPATIENT)
Dept: RADIOLOGY | Facility: MEDICAL CENTER | Age: 80
End: 2025-09-01
Payer: MEDICARE

## (undated) DEVICE — ENDOSTITCH LOAD UNIT 0 SURGI - 12/CA

## (undated) DEVICE — CANISTER SUCTION 3000ML MECHANICAL FILTER AUTO SHUTOFF MEDI-VAC NONSTERILE LF DISP  (40EA/CA)

## (undated) DEVICE — MASK ANESTHESIA ADULT  - (100/CA)

## (undated) DEVICE — PAD EYE GAUZE COVERED OVAL 1 5/8 X 2 5/8" STERILE"

## (undated) DEVICE — TOWEL STOP TIMEOUT SAFETY FLAG (40EA/CA)

## (undated) DEVICE — GLOVE SZ 6.5 BIOGEL PI MICRO - PF LF (50PR/BX)

## (undated) DEVICE — CATHETER IV 20 GA X 1-1/4 ---SURG.& SDS ONLY--- (50EA/BX)

## (undated) DEVICE — BOVIE  BLADE 6 EXTENDED - (50/PK)

## (undated) DEVICE — SUTURE 0 VICRYL PLUS CT-2 - 27 INCH (36/BX)

## (undated) DEVICE — TIP INTPLS HFLO ML ORFC BTRY - (12/CS)  FOR SURGILAV

## (undated) DEVICE — BLANKET PEDIATRIC LARGE FULL ACCESS (10EA/CA)

## (undated) DEVICE — CHLORAPREP 26 ML APPLICATOR - ORANGE TINT(25/CA)

## (undated) DEVICE — KIT ANESTHESIA W/CIRCUIT & 3/LT BAG W/FILTER (20EA/CA)

## (undated) DEVICE — PACK LAP CHOLE OR - (2EA/CA)

## (undated) DEVICE — ELECTRODE 850 FOAM ADHESIVE - HYDROGEL RADIOTRNSPRNT (50/PK)

## (undated) DEVICE — Device

## (undated) DEVICE — SUCTION INSTRUMENT YANKAUER BULBOUS TIP W/O VENT (50EA/CA)

## (undated) DEVICE — WATER IRRIGATION STERILE 1000ML (12EA/CA)

## (undated) DEVICE — LACTATED RINGERS INJ 1000 ML - (14EA/CA 60CA/PF)

## (undated) DEVICE — GLOVE, LITE (PAIR)

## (undated) DEVICE — PROTECTOR ULNA NERVE - (36PR/CA)

## (undated) DEVICE — NEEDLE INSFL 120MM 14GA VRRS - (20/BX)

## (undated) DEVICE — CANISTER SUCTION RIGID RED 1500CC (40EA/CA)

## (undated) DEVICE — SUTURE 5-0 VICRYL D/A S-14 18 (12PK/BX)"

## (undated) DEVICE — SET EXTENSION WITH 2 PORTS (48EA/CA) ***PART #2C8610 IS A SUBSTITUTE*****

## (undated) DEVICE — SPONGE GAUZESTER. 2X2 4-PL - (2/PK 50PK/BX 30BX/CS)

## (undated) DEVICE — LENS/HOOD FOR SPACESUIT - (32/PK) PEEL AWAY FACE

## (undated) DEVICE — SUTURE GENERAL

## (undated) DEVICE — SUTURE 1 PDS-2 PLUS CTX - (24/BX)

## (undated) DEVICE — SET SUCTION/IRRIGATION WITH DISPOSABLE TIP (6/CA )PART #0250-070-520 IS A SUB

## (undated) DEVICE — SENSOR SPO2 NEO LNCS ADHESIVE (20/BX) SEE USER NOTES

## (undated) DEVICE — SUTURE 1 VICRYL PLUS CTX - 36 INCH (36/BX)

## (undated) DEVICE — GLOVE BIOGEL SZ 7.5 SURGICAL PF LTX - (50PR/BX 4BX/CA)

## (undated) DEVICE — GLOVE BIOGEL SZ 7 SURGICAL PF LTX - (50PR/BX 4BX/CA)

## (undated) DEVICE — DRAPE IOBAN II 23 IN X 33 IN - (10/BX)

## (undated) DEVICE — SUTURE 2-0 VICRYL PLUS CT-1 36 (36PK/BX)"

## (undated) DEVICE — GOWN WARMING STANDARD FLEX - (30/CA)

## (undated) DEVICE — HEAD HOLDER JUNIOR/ADULT

## (undated) DEVICE — STAPLER SKIN DISP - (6/BX 10BX/CA) VISISTAT

## (undated) DEVICE — DRESSING TRANSPARENT FILM TEGADERM 4 X 4.75" (50EA/BX)"

## (undated) DEVICE — DRAPE LG. APERTURE 33 X 51" - (10EA/BX)"

## (undated) DEVICE — BLADE SAGITTAL SAW DUAL CUT 75.0 X 25.0MM (1/EA)

## (undated) DEVICE — DRESSING TRANSPARENT FILM TEGADERM 2.375 X 2.75"  (100EA/BX)"

## (undated) DEVICE — SUTURE 2-0 VICRYL PLUS CT-1 - 8 X 18 INCH(12/BX)

## (undated) DEVICE — ENDOSTITCH10MM SUTURING DEVIC - (3/CA)

## (undated) DEVICE — GOWN SURGEONS X-LARGE - DISP. (30/CA)

## (undated) DEVICE — GLOVE BIOGEL INDICATOR SZ 7.5 SURGICAL PF LTX - (50PR/BX 4BX/CA)

## (undated) DEVICE — SUTURE 1 VICRYL PLUS CTX - 8 X 18 INCH (12/BX)

## (undated) DEVICE — SODIUM CHL. IRRIGATION 0.9% 3000ML (4EA/CA 65CA/PF)

## (undated) DEVICE — DRILL BIT MPS 3.5X180 - (2TX1=2)

## (undated) DEVICE — GLOVE BIOGEL PI INDICATOR SZ 7.5 SURGICAL PF LF -(50/BX 4BX/CA)

## (undated) DEVICE — DRESSING PETROLEUM GAUZE 5 X 9" (50EA/BX 4BX/CA)"

## (undated) DEVICE — DERMABOND ADVANCED - (12EA/BX)

## (undated) DEVICE — KIT EVACUATER 3 SPRING PVC LF 1/8 DRAIN SIZE (10EA/CA)"

## (undated) DEVICE — SLEEVE, VASO, THIGH, MED

## (undated) DEVICE — MASK ANESTHESIA CHILD INFLATABLE CUSHION BUBBLEGUM (50EA/CS)

## (undated) DEVICE — GLOVE BIOGEL PI INDICATOR SZ 7.0 SURGICAL PF LF - (50/BX 4BX/CA)

## (undated) DEVICE — PLUMEPEN ULTRA 3/8 IN X 10 FT HOSE (20EA/CA)

## (undated) DEVICE — SET LEADWIRE 5 LEAD BEDSIDE DISPOSABLE ECG (1SET OF 5/EA)

## (undated) DEVICE — SET TUBING PNEUMOCLEAR HIGH FLOW SMOKE EVACUATION (10EA/BX)

## (undated) DEVICE — TUBING CLEARLINK DUO-VENT - C-FLO (48EA/CA)

## (undated) DEVICE — APPLICATOR COTTONTIP 3 IN - STERILE (10EA/PK 100PK/CA)

## (undated) DEVICE — STOCKINETTE IMPERVIOUS 12X48 - STERILELF (10/CA)"

## (undated) DEVICE — CANNULA W/SEAL 5X100 Z-THRE - ADED KII (12/BX)

## (undated) DEVICE — DRAPE STRLE REG TOWEL 18X24 - (10/BX 4BX/CA)"

## (undated) DEVICE — NEPTUNE 4 PORT MANIFOLD - (20/PK)

## (undated) DEVICE — SUTURE NABSB 4-0 P-5 18IN ACS BLK (12PK/BX)

## (undated) DEVICE — TUBING LAPAROSCOPIC PLUME DEVICE (10EA/CA)

## (undated) DEVICE — KIT  I.V. START (100EA/CA)

## (undated) DEVICE — ELECTRODE DUAL RETURN W/ CORD - (50/PK)

## (undated) DEVICE — DRILL BIT MPS 4.5X180 - (2TX1=2)

## (undated) DEVICE — SODIUM CHL IRRIGATION 0.9% 1000ML (12EA/CA)

## (undated) DEVICE — MAT PATIENT POSITIONING PREVALON (10EA/CA)

## (undated) DEVICE — SUTURE 8-0 VICRYL TG140TG140 (12PK/BX)

## (undated) DEVICE — SUTURE 4-0 VICRYL PLUS FS-2 - 27 INCH (36/BX)

## (undated) DEVICE — DISPOSABLE WOUND VAC PICO 10 X 30 CM - WOUND CARE (3/CA)

## (undated) DEVICE — PACK TOTAL HIP - (1/CA)

## (undated) DEVICE — TUBE CONNECTING SUCTION - CLEAR PLASTIC STERILE 72 IN (50EA/CA)

## (undated) DEVICE — DRAPE SURGICAL U 77X120 - (10/CA)

## (undated) DEVICE — SPONGE GAUZESTER 4 X 4 4PLY - (128PK/CA)

## (undated) DEVICE — SUTURE EYE

## (undated) DEVICE — HANDPIECE 10FT INTPLS SCT PLS IRRIGATION HAND CONTROL SET (6/PK)

## (undated) DEVICE — SYRINGE NON SAFETY 3 CC 21 GA X 1 1/2 IN (100/BX 8BX/CA)

## (undated) DEVICE — GLOVE BIOGEL SZ 8 SURGICAL PF LTX - (50PR/BX 4BX/CA)

## (undated) DEVICE — TROCAR Z THREAD11MM OPTICAL - NON BLADED(6/BX)

## (undated) DEVICE — SUTURE 0 VICRYL PLUS CT-1 - 36 INCH (36/BX)

## (undated) DEVICE — SUTURE 5 ETHIBOND V-37 (12PK/BX)

## (undated) DEVICE — DRAPE C ARMOR (12EA/CA)

## (undated) DEVICE — TROCAR SEPARATOR 5X55 - 6/BX

## (undated) DEVICE — DRAPE 36X28IN RAD CARM BND BG - (25/CA) O

## (undated) DEVICE — DRAIN PENROSE 1 IN X 18 IN - STERILE (25EA/BX)

## (undated) DEVICE — TROCAR 5X100 NON BLADED Z-TH - READ KII (6/BX)

## (undated) DEVICE — PENCIL ELECTSURG 10FT BTN SWH - (50/CA)

## (undated) DEVICE — DRAPE U SPLIT IMP 54 X 76 - (24/CA)

## (undated) DEVICE — GLOVE SZ 8 BIOGEL PI MICRO - PF LF (50PR/BX)

## (undated) DEVICE — SUTURE 3-0 MONOCRYL PLUS PS-1 - 27 INCH (36/BX)

## (undated) DEVICE — HUMID-VENT HEAT AND MOISTURE EXCHANGE- (50/BX)

## (undated) DEVICE — TRANSDUCER OXISENSOR PEDS O2 - (20EA/BX)

## (undated) DEVICE — BAG SPONGE COUNT 10.25 X 32 - BLUE (250/CA)

## (undated) DEVICE — SYRINGE TOOMEY (50EA/CA)

## (undated) DEVICE — MICRODRIP PRIMARY VENTED 60 (48EA/CA) THIS WAS PART #2C8428 WHICH WAS DISCONTINUED

## (undated) DEVICE — DRAPE LARGE 3 QUARTER - (20/CA)

## (undated) DEVICE — SEALER VESSEL HARMONIC ACE PLUS WITH ADVANCED HEMOSTASIS 36CM (1/EA)

## (undated) DEVICE — GLOVE BIOGEL PI INDICATOR SZ 6.5 SURGICAL PF LF - (50/BX 4BX/CA)

## (undated) DEVICE — TUBING INSUFFLATION - (10/BX)

## (undated) DEVICE — TUBE NG SALEM SUMP 16FR (50EA/CA)

## (undated) DEVICE — LACTATED RINGERS INJ. 500 ML - (24EA/CA)

## (undated) DEVICE — SUCTION INSTRUMENT YANKAUER OPEN TIP W/O VENT (50EA/CA)

## (undated) DEVICE — DRAPE SM. APERTURE 16X16 IN - (10/BX)

## (undated) DEVICE — DRESSING STERILE BURN ACTICOAT FLEX 7 (50EA/CA)

## (undated) DEVICE — GLOVE BIOGEL PI ORTHO SZ 7.5 PF LF (40PR/BX)